# Patient Record
Sex: MALE | Race: WHITE | Employment: FULL TIME | URBAN - METROPOLITAN AREA
[De-identification: names, ages, dates, MRNs, and addresses within clinical notes are randomized per-mention and may not be internally consistent; named-entity substitution may affect disease eponyms.]

---

## 2018-02-15 PROBLEM — M17.0 OSTEOARTHRITIS OF BOTH KNEES: Status: ACTIVE | Noted: 2018-02-15

## 2018-02-15 PROBLEM — I35.0 AORTIC VALVE STENOSIS: Status: ACTIVE | Noted: 2018-02-15

## 2018-03-16 ENCOUNTER — OFFICE VISIT (OUTPATIENT)
Dept: FAMILY MEDICINE CLINIC | Age: 58
End: 2018-03-16
Payer: COMMERCIAL

## 2018-03-16 VITALS
HEART RATE: 80 BPM | HEIGHT: 71 IN | WEIGHT: 312.8 LBS | TEMPERATURE: 97.9 F | DIASTOLIC BLOOD PRESSURE: 88 MMHG | OXYGEN SATURATION: 98 % | SYSTOLIC BLOOD PRESSURE: 138 MMHG | BODY MASS INDEX: 43.79 KG/M2

## 2018-03-16 DIAGNOSIS — I10 ESSENTIAL HYPERTENSION: ICD-10-CM

## 2018-03-16 DIAGNOSIS — M17.0 OSTEOARTHRITIS OF BOTH KNEES, UNSPECIFIED OSTEOARTHRITIS TYPE: ICD-10-CM

## 2018-03-16 DIAGNOSIS — E78.5 HYPERLIPIDEMIA, UNSPECIFIED HYPERLIPIDEMIA TYPE: ICD-10-CM

## 2018-03-16 DIAGNOSIS — E11.9 TYPE 2 DIABETES MELLITUS WITHOUT COMPLICATION, WITHOUT LONG-TERM CURRENT USE OF INSULIN (HCC): Primary | ICD-10-CM

## 2018-03-16 DIAGNOSIS — F17.200 SMOKER: ICD-10-CM

## 2018-03-16 DIAGNOSIS — N52.9 ERECTILE DYSFUNCTION, UNSPECIFIED ERECTILE DYSFUNCTION TYPE: ICD-10-CM

## 2018-03-16 PROCEDURE — 99214 OFFICE O/P EST MOD 30 MIN: CPT | Performed by: PHYSICIAN ASSISTANT

## 2018-03-16 PROCEDURE — 3017F COLORECTAL CA SCREEN DOC REV: CPT | Performed by: PHYSICIAN ASSISTANT

## 2018-03-16 PROCEDURE — G8417 CALC BMI ABV UP PARAM F/U: HCPCS | Performed by: PHYSICIAN ASSISTANT

## 2018-03-16 PROCEDURE — 4004F PT TOBACCO SCREEN RCVD TLK: CPT | Performed by: PHYSICIAN ASSISTANT

## 2018-03-16 PROCEDURE — G8427 DOCREV CUR MEDS BY ELIG CLIN: HCPCS | Performed by: PHYSICIAN ASSISTANT

## 2018-03-16 PROCEDURE — G8482 FLU IMMUNIZE ORDER/ADMIN: HCPCS | Performed by: PHYSICIAN ASSISTANT

## 2018-03-16 PROCEDURE — 3044F HG A1C LEVEL LT 7.0%: CPT | Performed by: PHYSICIAN ASSISTANT

## 2018-03-16 PROCEDURE — G8598 ASA/ANTIPLAT THER USED: HCPCS | Performed by: PHYSICIAN ASSISTANT

## 2018-03-16 RX ORDER — PIOGLITAZONEHYDROCHLORIDE 15 MG/1
15 TABLET ORAL DAILY
Qty: 30 TABLET | Refills: 2 | Status: SHIPPED | OUTPATIENT
Start: 2018-03-16 | End: 2018-08-17 | Stop reason: SDUPTHER

## 2018-03-16 RX ORDER — FENOFIBRATE 145 MG/1
145 TABLET, COATED ORAL DAILY
Qty: 30 TABLET | Refills: 2 | Status: SHIPPED | OUTPATIENT
Start: 2018-03-16 | End: 2018-04-05 | Stop reason: ALTCHOICE

## 2018-03-16 RX ORDER — BUPROPION HYDROCHLORIDE 150 MG/1
150 TABLET ORAL EVERY MORNING
Qty: 30 TABLET | Refills: 2 | Status: SHIPPED | OUTPATIENT
Start: 2018-03-16 | End: 2018-06-18 | Stop reason: SDUPTHER

## 2018-03-16 RX ORDER — DICLOFENAC SODIUM 75 MG/1
75 TABLET, DELAYED RELEASE ORAL 2 TIMES DAILY
Qty: 60 TABLET | Refills: 2 | Status: SHIPPED | OUTPATIENT
Start: 2018-03-16 | End: 2018-08-05 | Stop reason: SDUPTHER

## 2018-03-16 RX ORDER — CYCLOBENZAPRINE HCL 10 MG
10 TABLET ORAL DAILY
Qty: 30 TABLET | Refills: 2 | Status: SHIPPED | OUTPATIENT
Start: 2018-03-16 | End: 2018-07-05 | Stop reason: SDUPTHER

## 2018-03-16 RX ORDER — SILDENAFIL 50 MG/1
50 TABLET, FILM COATED ORAL PRN
Qty: 10 TABLET | Refills: 5 | Status: SHIPPED | OUTPATIENT
Start: 2018-03-16 | End: 2018-04-05

## 2018-03-16 NOTE — PROGRESS NOTES
[employer]  [address] [facility], [address] [place] [address] N   [phone]      Therese Nicholson  1960  3/16/18      HPI:  Patient presents with hx of well controlled DM2, HTN, CAD, AS and DJD. Darrel Domínguez He is well know to Dr Jana Leigh, recently seen in Dec. He takes all meds as directed. He was taking his metformin 4x day, but changed to 2x bc of gi upset. His a1c was 5.9. He had DJD in bilat knees. He is s/p replacement of the left. He continues to have pain in the right, which he uses tramadol for. He recently recived this from Dr Sravanthi Sadler. hebrings in the disc of his mri, which I cant view. He was recommended to have replacement by Dr Carlos Schulte in the past, but he is the only income in his household, therefore is unable to do this right now. He is using the welbutrin to assist with smoking cessation. His quit date is next Saturday. He denies cp, le edema or sob. He asks for viagra samples. He has never used the ntg yet. Past Medical History:   Diagnosis Date    CAD (coronary artery disease)     Depression     Diabetes mellitus ([address]     DJD (degenerative joint disease)     Knees.  Dyslipidemia     Hiatal hernia 1979    History of ST elevation myocardial infarction (STEMI)     Hypertension     MI (myocardial infarction) 1/2007    Inferior wall.  Presence of stent in left circumflex coronary artery     Presence of stent in right coronary artery     Smoker         Past Surgical History:   Procedure Laterality Date    CORONARY ANGIOPLASTY WITH STENT PLACEMENT  X4    CORONARY ANGIOPLASTY WITH STENT PLACEMENT  12/30/2011    Dr. Jana Leigh 1st OM 3.0 x 20 Ion    DIAGNOSTIC CARDIAC CATH LAB PROCEDURE  3/15/2005    [facility]. Mild to moderate CAD. Normal LV.  DIAGNOSTIC CARDIAC CATH LAB PROCEDURE  1/16/2007    [facility]. Cath/PTCA/DE stent of proximal and distal RCA.  DIAGNOSTIC CARDIAC CATH LAB PROCEDURE  2/21/2007    Cath/DE stent of proximal OM1 and proximal Cx.     DIAGNOSTIC CARDIAC CATH LAB PROCEDURE type  -     cyclobenzaprine (FLEXERIL) 10 MG tablet; Take 1 tablet by mouth daily  -     diclofenac (VOLTAREN) 75 MG EC tablet; Take 1 tablet by mouth 2 times daily  -     DO Tori    Smoker  -     buPROPion (WELLBUTRIN XL) 150 MG extended release tablet; Take 1 tablet by mouth every morning    Essential hypertension    Erectile dysfunction, unspecified erectile dysfunction type  -     sildenafil (VIAGRA) 50 MG tablet; Take 1 tablet by mouth as needed for Erectile Dysfunction        Return in about 3 months (around 6/16/2018) for DM2. dm2 is stable. a1c is 5.9. There is no need for qid metformin, will continue bid. Cholesterol was at goal with trico. Will request an actual report for the mri. Send to ortho for options. He is working on smoking cessation, encouraged today. htn is stable. Given a card for discount of viagra.      TRUDY Yung

## 2018-03-20 ENCOUNTER — TELEPHONE (OUTPATIENT)
Dept: FAMILY MEDICINE CLINIC | Age: 58
End: 2018-03-20

## 2018-03-20 RX ORDER — SIMVASTATIN 10 MG
10 TABLET ORAL EVERY EVENING
Qty: 30 TABLET | Refills: 3 | Status: SHIPPED | OUTPATIENT
Start: 2018-03-20 | End: 2018-04-05 | Stop reason: CLARIF

## 2018-04-03 DIAGNOSIS — R52 PAIN: Primary | ICD-10-CM

## 2018-04-05 ENCOUNTER — OFFICE VISIT (OUTPATIENT)
Dept: ORTHOPEDIC SURGERY | Age: 58
End: 2018-04-05
Payer: COMMERCIAL

## 2018-04-05 ENCOUNTER — HOSPITAL ENCOUNTER (OUTPATIENT)
Dept: GENERAL RADIOLOGY | Age: 58
Discharge: HOME | End: 2018-04-07
Payer: COMMERCIAL

## 2018-04-05 VITALS
HEIGHT: 71 IN | TEMPERATURE: 97.9 F | SYSTOLIC BLOOD PRESSURE: 143 MMHG | HEART RATE: 79 BPM | RESPIRATION RATE: 20 BRPM | BODY MASS INDEX: 43.68 KG/M2 | DIASTOLIC BLOOD PRESSURE: 95 MMHG | WEIGHT: 312 LBS

## 2018-04-05 DIAGNOSIS — Z96.652 STATUS POST TOTAL LEFT KNEE REPLACEMENT: ICD-10-CM

## 2018-04-05 DIAGNOSIS — M17.11 PRIMARY OSTEOARTHRITIS OF RIGHT KNEE: Primary | ICD-10-CM

## 2018-04-05 DIAGNOSIS — M17.0 OSTEOARTHRITIS OF BOTH KNEES, UNSPECIFIED OSTEOARTHRITIS TYPE: ICD-10-CM

## 2018-04-05 PROCEDURE — G8598 ASA/ANTIPLAT THER USED: HCPCS | Performed by: NURSE PRACTITIONER

## 2018-04-05 PROCEDURE — 4004F PT TOBACCO SCREEN RCVD TLK: CPT | Performed by: NURSE PRACTITIONER

## 2018-04-05 PROCEDURE — 73560 X-RAY EXAM OF KNEE 1 OR 2: CPT

## 2018-04-05 PROCEDURE — 99203 OFFICE O/P NEW LOW 30 MIN: CPT | Performed by: NURSE PRACTITIONER

## 2018-04-05 PROCEDURE — 3017F COLORECTAL CA SCREEN DOC REV: CPT | Performed by: NURSE PRACTITIONER

## 2018-04-05 PROCEDURE — 99203 OFFICE O/P NEW LOW 30 MIN: CPT

## 2018-04-05 PROCEDURE — G8417 CALC BMI ABV UP PARAM F/U: HCPCS | Performed by: NURSE PRACTITIONER

## 2018-04-05 PROCEDURE — G8427 DOCREV CUR MEDS BY ELIG CLIN: HCPCS | Performed by: NURSE PRACTITIONER

## 2018-04-05 RX ORDER — FENOFIBRIC ACID 135 MG/1
135 CAPSULE, DELAYED RELEASE ORAL DAILY
COMMUNITY
End: 2018-04-05 | Stop reason: ALTCHOICE

## 2018-04-13 ENCOUNTER — HOSPITAL ENCOUNTER (OUTPATIENT)
Dept: PHYSICAL THERAPY | Age: 58
Setting detail: THERAPIES SERIES
Discharge: HOME | End: 2018-04-13
Payer: COMMERCIAL

## 2018-04-13 PROCEDURE — G8978 MOBILITY CURRENT STATUS: HCPCS | Performed by: PHYSICAL THERAPIST

## 2018-04-13 PROCEDURE — G8979 MOBILITY GOAL STATUS: HCPCS | Performed by: PHYSICAL THERAPIST

## 2018-04-13 PROCEDURE — 97161 PT EVAL LOW COMPLEX 20 MIN: CPT | Performed by: PHYSICAL THERAPIST

## 2018-04-13 ASSESSMENT — PAIN SCALES - GENERAL: PAINLEVEL_OUTOF10: 8

## 2018-04-13 ASSESSMENT — PAIN DESCRIPTION - ORIENTATION: ORIENTATION: RIGHT

## 2018-04-13 ASSESSMENT — PAIN DESCRIPTION - DESCRIPTORS: DESCRIPTORS: ACHING

## 2018-04-13 ASSESSMENT — PAIN DESCRIPTION - PAIN TYPE: TYPE: CHRONIC PAIN

## 2018-04-13 ASSESSMENT — PAIN DESCRIPTION - LOCATION: LOCATION: KNEE

## 2018-04-16 ENCOUNTER — OFFICE VISIT (OUTPATIENT)
Dept: FAMILY MEDICINE CLINIC | Age: 58
End: 2018-04-16
Payer: COMMERCIAL

## 2018-04-16 VITALS
SYSTOLIC BLOOD PRESSURE: 136 MMHG | WEIGHT: 314.4 LBS | HEIGHT: 71 IN | HEART RATE: 76 BPM | DIASTOLIC BLOOD PRESSURE: 88 MMHG | BODY MASS INDEX: 44.02 KG/M2 | TEMPERATURE: 98.2 F | OXYGEN SATURATION: 97 %

## 2018-04-16 DIAGNOSIS — I10 ESSENTIAL HYPERTENSION: ICD-10-CM

## 2018-04-16 DIAGNOSIS — F17.200 SMOKER: ICD-10-CM

## 2018-04-16 DIAGNOSIS — M17.0 OSTEOARTHRITIS OF BOTH KNEES, UNSPECIFIED OSTEOARTHRITIS TYPE: Primary | ICD-10-CM

## 2018-04-16 PROCEDURE — G8427 DOCREV CUR MEDS BY ELIG CLIN: HCPCS | Performed by: PHYSICIAN ASSISTANT

## 2018-04-16 PROCEDURE — 4004F PT TOBACCO SCREEN RCVD TLK: CPT | Performed by: PHYSICIAN ASSISTANT

## 2018-04-16 PROCEDURE — 99213 OFFICE O/P EST LOW 20 MIN: CPT | Performed by: PHYSICIAN ASSISTANT

## 2018-04-16 PROCEDURE — 3017F COLORECTAL CA SCREEN DOC REV: CPT | Performed by: PHYSICIAN ASSISTANT

## 2018-04-16 PROCEDURE — G8417 CALC BMI ABV UP PARAM F/U: HCPCS | Performed by: PHYSICIAN ASSISTANT

## 2018-04-16 PROCEDURE — G8598 ASA/ANTIPLAT THER USED: HCPCS | Performed by: PHYSICIAN ASSISTANT

## 2018-04-16 RX ORDER — TRAMADOL HYDROCHLORIDE 50 MG/1
50 TABLET ORAL EVERY 6 HOURS PRN
Qty: 120 TABLET | Refills: 0 | Status: SHIPPED | OUTPATIENT
Start: 2018-04-16 | End: 2018-05-16

## 2018-04-16 RX ORDER — LIDOCAINE AND PRILOCAINE 25; 25 MG/G; MG/G
1 CREAM TOPICAL 2 TIMES DAILY
COMMUNITY
Start: 2018-04-11 | End: 2018-11-21 | Stop reason: ALTCHOICE

## 2018-05-31 ENCOUNTER — OFFICE VISIT (OUTPATIENT)
Dept: ORTHOPEDIC SURGERY | Age: 58
End: 2018-05-31
Payer: COMMERCIAL

## 2018-05-31 VITALS
WEIGHT: 315 LBS | DIASTOLIC BLOOD PRESSURE: 97 MMHG | TEMPERATURE: 97.4 F | RESPIRATION RATE: 18 BRPM | SYSTOLIC BLOOD PRESSURE: 144 MMHG | HEART RATE: 82 BPM | HEIGHT: 71 IN | BODY MASS INDEX: 44.1 KG/M2

## 2018-05-31 DIAGNOSIS — M17.11 PRIMARY OSTEOARTHRITIS OF RIGHT KNEE: Primary | ICD-10-CM

## 2018-05-31 PROCEDURE — G8427 DOCREV CUR MEDS BY ELIG CLIN: HCPCS | Performed by: ORTHOPAEDIC SURGERY

## 2018-05-31 PROCEDURE — 4004F PT TOBACCO SCREEN RCVD TLK: CPT | Performed by: ORTHOPAEDIC SURGERY

## 2018-05-31 PROCEDURE — 3017F COLORECTAL CA SCREEN DOC REV: CPT | Performed by: ORTHOPAEDIC SURGERY

## 2018-05-31 PROCEDURE — G8417 CALC BMI ABV UP PARAM F/U: HCPCS | Performed by: ORTHOPAEDIC SURGERY

## 2018-05-31 PROCEDURE — G8598 ASA/ANTIPLAT THER USED: HCPCS | Performed by: ORTHOPAEDIC SURGERY

## 2018-05-31 PROCEDURE — 99212 OFFICE O/P EST SF 10 MIN: CPT | Performed by: ORTHOPAEDIC SURGERY

## 2018-05-31 PROCEDURE — 99213 OFFICE O/P EST LOW 20 MIN: CPT | Performed by: ORTHOPAEDIC SURGERY

## 2018-05-31 RX ORDER — HYDROCODONE BITARTRATE AND ACETAMINOPHEN 5; 325 MG/1; MG/1
1 TABLET ORAL EVERY 6 HOURS PRN
Qty: 28 TABLET | Refills: 0 | Status: SHIPPED | OUTPATIENT
Start: 2018-05-31 | End: 2018-06-07

## 2018-06-13 ENCOUNTER — TELEPHONE (OUTPATIENT)
Dept: ORTHOPEDIC SURGERY | Age: 58
End: 2018-06-13

## 2018-06-18 ENCOUNTER — OFFICE VISIT (OUTPATIENT)
Dept: FAMILY MEDICINE CLINIC | Age: 58
End: 2018-06-18
Payer: COMMERCIAL

## 2018-06-18 VITALS
WEIGHT: 314 LBS | BODY MASS INDEX: 43.96 KG/M2 | SYSTOLIC BLOOD PRESSURE: 142 MMHG | HEIGHT: 71 IN | OXYGEN SATURATION: 96 % | HEART RATE: 75 BPM | RESPIRATION RATE: 16 BRPM | DIASTOLIC BLOOD PRESSURE: 94 MMHG

## 2018-06-18 DIAGNOSIS — E11.9 TYPE 2 DIABETES MELLITUS WITHOUT COMPLICATION, WITHOUT LONG-TERM CURRENT USE OF INSULIN (HCC): Primary | ICD-10-CM

## 2018-06-18 DIAGNOSIS — I10 ESSENTIAL HYPERTENSION: ICD-10-CM

## 2018-06-18 DIAGNOSIS — E78.5 HYPERLIPIDEMIA, UNSPECIFIED HYPERLIPIDEMIA TYPE: ICD-10-CM

## 2018-06-18 DIAGNOSIS — G89.4 CHRONIC PAIN SYNDROME: ICD-10-CM

## 2018-06-18 DIAGNOSIS — M17.11 PRIMARY OSTEOARTHRITIS OF RIGHT KNEE: ICD-10-CM

## 2018-06-18 DIAGNOSIS — F17.200 SMOKER: ICD-10-CM

## 2018-06-18 LAB — HBA1C MFR BLD: 6.2 %

## 2018-06-18 PROCEDURE — 4004F PT TOBACCO SCREEN RCVD TLK: CPT | Performed by: PHYSICIAN ASSISTANT

## 2018-06-18 PROCEDURE — G8427 DOCREV CUR MEDS BY ELIG CLIN: HCPCS | Performed by: PHYSICIAN ASSISTANT

## 2018-06-18 PROCEDURE — G8598 ASA/ANTIPLAT THER USED: HCPCS | Performed by: PHYSICIAN ASSISTANT

## 2018-06-18 PROCEDURE — 3017F COLORECTAL CA SCREEN DOC REV: CPT | Performed by: PHYSICIAN ASSISTANT

## 2018-06-18 PROCEDURE — 83036 HEMOGLOBIN GLYCOSYLATED A1C: CPT | Performed by: PHYSICIAN ASSISTANT

## 2018-06-18 PROCEDURE — G8417 CALC BMI ABV UP PARAM F/U: HCPCS | Performed by: PHYSICIAN ASSISTANT

## 2018-06-18 PROCEDURE — 3044F HG A1C LEVEL LT 7.0%: CPT | Performed by: PHYSICIAN ASSISTANT

## 2018-06-18 PROCEDURE — 2022F DILAT RTA XM EVC RTNOPTHY: CPT | Performed by: PHYSICIAN ASSISTANT

## 2018-06-18 PROCEDURE — 99214 OFFICE O/P EST MOD 30 MIN: CPT | Performed by: PHYSICIAN ASSISTANT

## 2018-06-18 RX ORDER — TRIAMTERENE AND HYDROCHLOROTHIAZIDE 37.5; 25 MG/1; MG/1
1 TABLET ORAL DAILY
Qty: 90 TABLET | Refills: 3 | Status: SHIPPED | OUTPATIENT
Start: 2018-06-18 | End: 2019-03-14 | Stop reason: SDUPTHER

## 2018-06-18 RX ORDER — QUINAPRIL 40 MG/1
40 TABLET ORAL DAILY
Qty: 30 TABLET | Refills: 3 | Status: SHIPPED | OUTPATIENT
Start: 2018-06-18 | End: 2018-12-03 | Stop reason: SDUPTHER

## 2018-06-18 RX ORDER — HYDROCODONE BITARTRATE AND ACETAMINOPHEN 5; 325 MG/1; MG/1
1 TABLET ORAL 2 TIMES DAILY
Qty: 60 TABLET | Refills: 0 | Status: SHIPPED | OUTPATIENT
Start: 2018-06-18 | End: 2018-07-24 | Stop reason: SDUPTHER

## 2018-06-18 RX ORDER — CARVEDILOL 25 MG/1
TABLET ORAL
Qty: 180 TABLET | Refills: 3 | Status: SHIPPED | OUTPATIENT
Start: 2018-06-18 | End: 2018-10-22 | Stop reason: SDUPTHER

## 2018-06-18 RX ORDER — ATORVASTATIN CALCIUM 80 MG/1
TABLET, FILM COATED ORAL
Qty: 90 TABLET | Refills: 3 | Status: SHIPPED | OUTPATIENT
Start: 2018-06-18 | End: 2018-10-22 | Stop reason: SDUPTHER

## 2018-06-18 RX ORDER — BUPROPION HYDROCHLORIDE 150 MG/1
150 TABLET ORAL EVERY MORNING
Qty: 30 TABLET | Refills: 3 | Status: SHIPPED | OUTPATIENT
Start: 2018-06-18 | End: 2018-10-22 | Stop reason: SDUPTHER

## 2018-07-05 DIAGNOSIS — M17.0 OSTEOARTHRITIS OF BOTH KNEES, UNSPECIFIED OSTEOARTHRITIS TYPE: ICD-10-CM

## 2018-07-06 RX ORDER — CYCLOBENZAPRINE HCL 10 MG
10 TABLET ORAL DAILY
Qty: 30 TABLET | Refills: 2 | Status: SHIPPED | OUTPATIENT
Start: 2018-07-06 | End: 2018-10-22 | Stop reason: SDUPTHER

## 2018-07-24 ENCOUNTER — OFFICE VISIT (OUTPATIENT)
Dept: FAMILY MEDICINE CLINIC | Age: 58
End: 2018-07-24
Payer: COMMERCIAL

## 2018-07-24 ENCOUNTER — HOSPITAL ENCOUNTER (OUTPATIENT)
Age: 58
Discharge: HOME | End: 2018-07-26
Payer: COMMERCIAL

## 2018-07-24 VITALS
BODY MASS INDEX: 43.73 KG/M2 | OXYGEN SATURATION: 97 % | DIASTOLIC BLOOD PRESSURE: 86 MMHG | HEART RATE: 77 BPM | WEIGHT: 312.4 LBS | HEIGHT: 71 IN | SYSTOLIC BLOOD PRESSURE: 134 MMHG

## 2018-07-24 DIAGNOSIS — M17.11 PRIMARY OSTEOARTHRITIS OF RIGHT KNEE: ICD-10-CM

## 2018-07-24 DIAGNOSIS — G89.4 CHRONIC PAIN SYNDROME: ICD-10-CM

## 2018-07-24 DIAGNOSIS — Z23 NEED FOR PROPHYLACTIC VACCINATION AND INOCULATION AGAINST VARICELLA: ICD-10-CM

## 2018-07-24 DIAGNOSIS — Z23 NEED FOR PROPHYLACTIC VACCINATION AGAINST DIPHTHERIA-TETANUS-PERTUSSIS (DTP): ICD-10-CM

## 2018-07-24 LAB
AMPHETAMINE SCREEN, URINE: NOT DETECTED
BARBITURATE SCREEN URINE: NOT DETECTED
BENZODIAZEPINE SCREEN, URINE: NOT DETECTED
CANNABINOID SCREEN URINE: NOT DETECTED
COCAINE METABOLITE SCREEN URINE: NOT DETECTED
METHADONE SCREEN, URINE: NOT DETECTED
OPIATE SCREEN URINE: NOT DETECTED
PHENCYCLIDINE SCREEN URINE: NOT DETECTED
PROPOXYPHENE SCREEN: NOT DETECTED

## 2018-07-24 PROCEDURE — G0480 DRUG TEST DEF 1-7 CLASSES: HCPCS

## 2018-07-24 PROCEDURE — 99213 OFFICE O/P EST LOW 20 MIN: CPT | Performed by: PHYSICIAN ASSISTANT

## 2018-07-24 PROCEDURE — G8417 CALC BMI ABV UP PARAM F/U: HCPCS | Performed by: PHYSICIAN ASSISTANT

## 2018-07-24 PROCEDURE — 3017F COLORECTAL CA SCREEN DOC REV: CPT | Performed by: PHYSICIAN ASSISTANT

## 2018-07-24 PROCEDURE — 4004F PT TOBACCO SCREEN RCVD TLK: CPT | Performed by: PHYSICIAN ASSISTANT

## 2018-07-24 PROCEDURE — G8598 ASA/ANTIPLAT THER USED: HCPCS | Performed by: PHYSICIAN ASSISTANT

## 2018-07-24 PROCEDURE — G8427 DOCREV CUR MEDS BY ELIG CLIN: HCPCS | Performed by: PHYSICIAN ASSISTANT

## 2018-07-24 PROCEDURE — 80307 DRUG TEST PRSMV CHEM ANLYZR: CPT

## 2018-07-24 PROCEDURE — 90471 IMMUNIZATION ADMIN: CPT | Performed by: PHYSICIAN ASSISTANT

## 2018-07-24 PROCEDURE — 90715 TDAP VACCINE 7 YRS/> IM: CPT | Performed by: PHYSICIAN ASSISTANT

## 2018-07-24 RX ORDER — HYDROCODONE BITARTRATE AND ACETAMINOPHEN 5; 325 MG/1; MG/1
1 TABLET ORAL 2 TIMES DAILY
Qty: 60 TABLET | Refills: 0 | Status: SHIPPED | OUTPATIENT
Start: 2018-07-24 | End: 2018-08-23 | Stop reason: SDUPTHER

## 2018-07-24 NOTE — PROGRESS NOTES
[employer]  [address] [facility], [address] [place] [address] [place]   [phone]      Emily White  1960 7/24/18      HPI:  Patient presents for medication refill for knee pain that is chronic. He is a patient of Dr Brandy Barksdale, who recommended joint replacement. He is unable to do this bc he is the only income in his household. He is hopeful that he can do it in Oct. In the meantime, he has been set up with Pain Mgnt. He will be seeing them next month. He has been out of his Norco for 1.5 weeks. Past Medical History:   Diagnosis Date    Anticoagulant long-term use     CAD (coronary artery disease)     Chronic back pain     Depression     Diabetes mellitus (HCC)     DJD (degenerative joint disease)     Knees.  Dyslipidemia     Hiatal hernia 1979    History of ST elevation myocardial infarction (STEMI)     Hyperlipidemia     Hypertension     MI (myocardial infarction) 1/2007    Inferior wall.  Obesity     Presence of stent in left circumflex coronary artery     Presence of stent in right coronary artery     Sleep apnea     cpap    Smoker     Type 2 diabetes mellitus without complication ([facility]         Past Surgical History:   Procedure Laterality Date    CORONARY ANGIOPLASTY WITH STENT PLACEMENT  X4    CORONARY ANGIOPLASTY WITH STENT PLACEMENT  12/30/2011    Dr. Mauro Reyes 1st OM 3.0 x 20 Ion    DIAGNOSTIC CARDIAC CATH LAB PROCEDURE  3/15/2005    [facility]. Mild to moderate CAD. Normal LV.  DIAGNOSTIC CARDIAC CATH LAB PROCEDURE  1/16/2007    [facility]. Cath/PTCA/DE stent of proximal and distal RCA.  DIAGNOSTIC CARDIAC CATH LAB PROCEDURE  2/21/2007    Cath/DE stent of proximal OM1 and proximal Cx.  DIAGNOSTIC CARDIAC CATH LAB PROCEDURE  12/30/2011    ANURADHA of mid OM branch of circumflex.      ECHO COMPL W DOP COLOR FLOW  12/30/2011         HERNIA REPAIR  2/2014    laparoscopic ventral hernia repain    JOINT REPLACEMENT Left 4/1/14    TKA-ed       Current Outpatient Prescriptions Medication Sig Dispense Refill    HYDROcodone-acetaminophen (NORCO) 5-325 MG per tablet Take 1 tablet by mouth 2 times daily for 60 days. Re:OA, planned joint replacement. Earliest Fill Date: 7/24/18 60 tablet 0    zoster recombinant adjuvanted vaccine (SHINGRIX) 50 MCG SUSR injection Inject 0.5 mLs into the muscle once for 1 dose 50 MCG IM then repeat 2-6 months.  1 each 1    cyclobenzaprine (FLEXERIL) 10 MG tablet TAKE 1 TABLET BY MOUTH DAILY 30 tablet 2    metFORMIN (GLUCOPHAGE) 500 MG tablet Take 1 tablet by mouth 2 times daily (with meals) 60 tablet 3    atorvastatin (LIPITOR) 80 MG tablet TAKE 1 TABLET BY MOUTH AT BEDTIME 90 tablet 3    carvedilol (COREG) 25 MG tablet TAKE ONE TABLET BY MOUTH TWICE DAILY (WITH MEALS) 180 tablet 3    triamterene-hydrochlorothiazide (MAXZIDE-25) 37.5-25 MG per tablet Take 1 tablet by mouth daily 90 tablet 3    quinapril (ACCUPRIL) 40 MG tablet Take 1 tablet by mouth daily 30 tablet 3    buPROPion (WELLBUTRIN XL) 150 MG extended release tablet Take 1 tablet by mouth every morning 30 tablet 3    niacin (NIASPAN) 500 MG extended release tablet TAKE 1 TABLET BY MOUTH EVERY EVENING 30 tablet 2    lidocaine-prilocaine (EMLA) 2.5-2.5 % cream Apply 1 Dose topically 2 times daily      diclofenac (VOLTAREN) 75 MG EC tablet Take 1 tablet by mouth 2 times daily 60 tablet 2    pioglitazone (ACTOS) 15 MG tablet Take 1 tablet by mouth daily 30 tablet 2    nitroGLYCERIN (NITROSTAT) 0.4 MG SL tablet Place 1 tablet under the tongue as needed for Chest pain 25 tablet 2    Omega-3-Acid Eth Est, Dietary, 1 g CAPS Take 1 capsule by mouth 2 times daily 120 capsule 3    albuterol sulfate HFA (VENTOLIN HFA) 108 (90 Base) MCG/ACT inhaler Inhale 2 puffs into the lungs every 4 hours as needed for Wheezing 1 Inhaler 0    loratadine (CLARITIN) 10 MG tablet Take 1 tablet by mouth daily 30 tablet 3    Blood Pressure Monitoring (BLOOD PRESSURE CUFF) MISC 1 Device by Does not apply route 2 times daily Dx:  Hypertension with labile blood pressure 1 each 0    clopidogrel (PLAVIX) 75 MG tablet TAKE 1 TABLET BY MOUTH EVERY DAY 90 tablet 3    aspirin 325 MG EC tablet Take 81 mg by mouth daily.  nitroGLYCERIN (NITROSTAT) 0.4 MG SL tablet Place 1 tablet under the tongue as needed. 25 tablet 3     No current facility-administered medications for this visit. Allergies   Allergen Reactions    Bee Venom        Family History   Problem Relation Age of Onset    Diabetes Mother     Stroke Mother     Diabetes Father     Heart Disease Father        Social History     Social History    Marital status:      Spouse name: N/A    Number of children: N/A    Years of education: N/A     Occupational History    Not on file.      Social History Main Topics    Smoking status: Current Every Day Smoker     Packs/day: 0.15     Years: 39.00     Types: Cigarettes    Smokeless tobacco: Never Used    Alcohol use No    Drug use: No    Sexual activity: Not on file     Other Topics Concern    Not on file     Social History Narrative    No narrative on file       Review of Systems :  Constitutional: negative for - chills, fever, weight loss, or fatigue  Psychological: negative for - anxiety, depression or suicidal ideation  HEENT: negative for - vision changes, nasal congestion, ear pain or pharyngitis   Respiratory: negative for -  Chest heaviness, cough, shortness of breath, or pleuritic pain  Cardiovascular: negative for - diaphoresis, chest pain, palpitations, or edema   Gastrointestinal: negative for -abdominal pain, change in bowel habits, constipation, diarrhea, or nausea/vomiting  Genito-Urinary: negative for - dysuria, frequency, or nocturia  Neurological: negative for - bowel and bladder control changes, dizziness, or headaches   Dermatological: negative for - dry skin, rash, hair or nail symptoms    Physical Exam:   Vitals:    07/24/18 0751   BP: 134/86   Site: Right Arm   Position: Sitting Cuff Size: Large Adult   Pulse: 77   SpO2: 97%   Weight: (!) 312 lb 6.4 oz (141.7 kg)   Height: 5' 11\" (1.803 m)     Physical Exam   Constitutional: He is oriented to person, place, and time. He appears well-developed and well-nourished. No distress. HENT:   Head: Normocephalic and atraumatic. Right Ear: External ear normal.   Left Ear: External ear normal.   Nose: Nose normal.   Mouth/Throat: Oropharynx is clear and moist.   Eyes: Conjunctivae and EOM are normal. Pupils are equal, round, and reactive to light. No scleral icterus. Neck: Normal range of motion. Neck supple. Cardiovascular: Normal rate, regular rhythm, normal heart sounds and intact distal pulses. No murmur heard. Pulmonary/Chest: Effort normal and breath sounds normal. No accessory muscle usage. No respiratory distress. He has no wheezes. Musculoskeletal: Normal range of motion. Neurological: He is alert and oriented to person, place, and time. Skin: Skin is warm and dry. No rash noted. Psychiatric: He has a normal mood and affect. His speech is normal and behavior is normal.         Assessment/Plan:     Morro Pacheco was seen today for knee pain and medication refill. Diagnoses and all orders for this visit:    Primary osteoarthritis of right knee  -     HYDROcodone-acetaminophen (NORCO) 5-325 MG per tablet; Take 1 tablet by mouth 2 times daily for 60 days. Re:OA, planned joint replacement. Earliest Fill Date: 7/24/18    Chronic pain syndrome  -     HYDROcodone-acetaminophen (NORCO) 5-325 MG per tablet; Take 1 tablet by mouth 2 times daily for 60 days. Re:OA, planned joint replacement. Earliest Fill Date: 7/24/18    Need for prophylactic vaccination against diphtheria-tetanus-pertussis (DTP)  -     Tdap (age 10y-63y) IM (Adacel)    Need for prophylactic vaccination and inoculation against varicella  -     zoster recombinant adjuvanted vaccine (SHINGRIX) 50 MCG SUSR injection;  Inject 0.5 mLs into the muscle once for 1 dose 50 MCG IM then repeat 2-6 months. Return in about 4 weeks (around 8/21/2018). he has an apt with pain mgnt next month. He has been out og his Norco for > 1 week. Plan for joint replacement in Oct. UDS sent. Controlled Substances Monitoring:     RX Monitoring 7/24/2018   Attestation The Prescription Monitoring Report for this patient was reviewed today. Documentation No signs of potential drug abuse or diversion identified. ;Random urine drug screen sent today. Acute Pain Prescriptions Severe pain not adequately treated with lower dose. Chronic Pain Dose reduction has been attempted. Medication Contracts Existing medication contract.        TRUDY Rubin

## 2018-07-27 ENCOUNTER — OFFICE VISIT (OUTPATIENT)
Dept: PAIN MANAGEMENT | Age: 58
End: 2018-07-27
Payer: COMMERCIAL

## 2018-07-27 ENCOUNTER — HOSPITAL ENCOUNTER (OUTPATIENT)
Age: 58
Discharge: HOME | End: 2018-07-29
Payer: COMMERCIAL

## 2018-07-27 VITALS
HEART RATE: 88 BPM | SYSTOLIC BLOOD PRESSURE: 140 MMHG | TEMPERATURE: 98.5 F | DIASTOLIC BLOOD PRESSURE: 82 MMHG | HEIGHT: 71 IN | RESPIRATION RATE: 20 BRPM | WEIGHT: 311 LBS | BODY MASS INDEX: 43.54 KG/M2

## 2018-07-27 DIAGNOSIS — M17.11 PRIMARY OSTEOARTHRITIS OF RIGHT KNEE: ICD-10-CM

## 2018-07-27 DIAGNOSIS — M25.561 CHRONIC PAIN OF RIGHT KNEE: ICD-10-CM

## 2018-07-27 DIAGNOSIS — G89.4 CHRONIC PAIN SYNDROME: Primary | ICD-10-CM

## 2018-07-27 DIAGNOSIS — G89.29 CHRONIC PAIN OF RIGHT KNEE: ICD-10-CM

## 2018-07-27 LAB — SPECIFIC GRAVITY UA: 1.02 (ref 1–1.03)

## 2018-07-27 PROCEDURE — 80307 DRUG TEST PRSMV CHEM ANLYZR: CPT

## 2018-07-27 PROCEDURE — 99204 OFFICE O/P NEW MOD 45 MIN: CPT | Performed by: PAIN MEDICINE

## 2018-07-27 PROCEDURE — G0480 DRUG TEST DEF 1-7 CLASSES: HCPCS

## 2018-07-27 PROCEDURE — 81005 URINALYSIS: CPT

## 2018-07-29 LAB
6AM URINE: <10 NG/ML
CODEINE, URINE: <20 NG/ML
HYDROCODONE, URINE: <20 NG/ML
HYDROMORPHONE, URINE: <20 NG/ML
MORPHINE URINE: <20 NG/ML
NORHYDROCODONE, URINE: <20 NG/ML
NOROXYCODONE, URINE: 715 NG/ML
NOROXYMORPHONE, URINE: <20 NG/ML
OXYCODONE, URINE CONFIRMATION: 133 NG/ML
OXYMORPHONE, URINE: <20 NG/ML

## 2018-08-02 LAB
6AM URINE: <10 NG/ML
7-AMINOCLONAZEPAM, URINE: <5 NG/ML
ALPHA-HYDROXYALPRAZOLAM, URINE: <5 NG/ML
ALPHA-HYDROXYMIDAZOLAM, URINE: <20 NG/ML
ALPRAZOLAM, URINE: <5 NG/ML
CHLORDIAZEPOXIDE, URINE: <20 NG/ML
CLONAZEPAM, URINE: <5 NG/ML
CODEINE, URINE: <20 NG/ML
DIAZEPAM, URINE: <20 NG/ML
HYDROCODONE, URINE: 579 NG/ML
HYDROMORPHONE, URINE: <20 NG/ML
LORAZEPAM, URINE: <20 NG/ML
MIDAZOLAM, URINE: <20 NG/ML
MORPHINE URINE: <20 NG/ML
NORDIAZEPAM, URINE: <20 NG/ML
NORHYDROCODONE, URINE: 788 NG/ML
NOROXYCODONE, URINE: 43 NG/ML
NOROXYMORPHONE, URINE: <20 NG/ML
OXAZEPAM, URINE: <20 NG/ML
OXYCODONE, URINE CONFIRMATION: <20 NG/ML
OXYMORPHONE, URINE: <20 NG/ML
TEMAZEPAM, URINE: <20 NG/ML

## 2018-08-03 LAB
Lab: NORMAL
REPORT: NORMAL
THIS TEST SENT TO: NORMAL

## 2018-08-05 DIAGNOSIS — M17.0 OSTEOARTHRITIS OF BOTH KNEES, UNSPECIFIED OSTEOARTHRITIS TYPE: ICD-10-CM

## 2018-08-06 RX ORDER — NIACIN 500 MG/1
TABLET, EXTENDED RELEASE ORAL
Qty: 30 TABLET | Refills: 2 | Status: SHIPPED | OUTPATIENT
Start: 2018-08-06 | End: 2018-10-30 | Stop reason: SDUPTHER

## 2018-08-06 RX ORDER — DICLOFENAC SODIUM 75 MG/1
75 TABLET, DELAYED RELEASE ORAL 2 TIMES DAILY
Qty: 60 TABLET | Refills: 2 | Status: SHIPPED | OUTPATIENT
Start: 2018-08-06 | End: 2018-10-22 | Stop reason: SDUPTHER

## 2018-08-17 DIAGNOSIS — E11.9 TYPE 2 DIABETES MELLITUS WITHOUT COMPLICATION, WITHOUT LONG-TERM CURRENT USE OF INSULIN (HCC): ICD-10-CM

## 2018-08-20 RX ORDER — PIOGLITAZONEHYDROCHLORIDE 15 MG/1
15 TABLET ORAL DAILY
Qty: 30 TABLET | Refills: 2 | Status: SHIPPED | OUTPATIENT
Start: 2018-08-20 | End: 2018-12-03 | Stop reason: SDUPTHER

## 2018-08-22 NOTE — PROGRESS NOTES
[facility]        [address] [facility] Stephania Sahni  Dept: [phone]        Follow up Note      Milvia Ceballostock     Date of Visit:  2018    CC:  Patient presents for follow up   Chief Complaint   Patient presents with    Pain     right knee pain         HPI:    Pain is unchanged. Change in quality of symptoms:no. Medication side effects:none. Recent diagnostic testing:none. Recent interventional procedures:none    He has been on anticoagulation medications to include Plavix (has 5 coronary stents) and has not been on herbal supplements. He is diabetic.     Imaging:   R knee xray 2018 -   Frontal and lateral weight-bearing views of the right knee demonstrate   severe medial femorotibial joint space narrowing with associated   subchondral sclerosis, marginal spurring and irregularity along the   proximal tibia, and genu varus angulation of the knee. Degenerative   enthesopathic changes are noted at the superior aspect of the patella   and posterior aspect of the femoral condyles. The patellofemoral joint   space is not well evaluated on the provided lateral view but may be   narrowed. No acute fracture is seen. A small suprapatellar joint   effusion is noted.      L knee xray 2018 -   Cemented left total arthroplasty without evidence for   hardware failure, change in alignment and a minimal amount of joint   fluid        Potential Aberrant Drug-Related Behavior: no     Urine Drug Screenin2018 consistent    OARRS report:  2018 consistent    Past Medical History:   Diagnosis Date    Anticoagulant long-term use     CAD (coronary artery disease)     Chronic back pain     Depression     Diabetes mellitus ([address]     DJD (degenerative joint disease)     Knees.  Dyslipidemia     Hiatal hernia 1979    History of ST elevation myocardial infarction (STEMI)     Hyperlipidemia     Hypertension     MI (myocardial infarction) ([address] 2007    Inferior wall.        Neurological:     Sensory:normal to light touch BLE     Motor:                    Right Quadriceps5/5          Left Quadriceps5/5           Right Gastrocnemius5/5    Left Gastrocnemius5/5  Right Ant Tibialis5/5  Left Ant Tibialis5/5     Reflexes:       Right Achilles reflex2+  Left Achilles reflex2+  Gait:antalgic     Dermatology:     Skin:no rashes or lesions noted     Assessment/Plan:     Right knee pain due to severe OA, pending TKR in the fall due to work/insurance reasons  On PLAVIX     Urine screen and OARRS report reviewed  Increase Norco 5/325 BID # 90, he will let me know if he ends up with extra at the end of the month and we will then deduct that quantity from the following month  Patient encouraged to stay active and to lose weight, discussed diet[employer]cube [employer]. com (ketogenic diet)  Treatment plan discussed with the patient including medication side effects     Cc:  Referring physician    GARTH Mathew.

## 2018-08-23 ENCOUNTER — OFFICE VISIT (OUTPATIENT)
Dept: PAIN MANAGEMENT | Age: 58
End: 2018-08-23
Payer: COMMERCIAL

## 2018-08-23 VITALS
RESPIRATION RATE: 16 BRPM | HEART RATE: 76 BPM | HEIGHT: 71 IN | DIASTOLIC BLOOD PRESSURE: 88 MMHG | BODY MASS INDEX: 43.54 KG/M2 | TEMPERATURE: 98.4 F | WEIGHT: 311 LBS | SYSTOLIC BLOOD PRESSURE: 148 MMHG

## 2018-08-23 DIAGNOSIS — G89.29 CHRONIC PAIN OF RIGHT KNEE: ICD-10-CM

## 2018-08-23 DIAGNOSIS — M17.11 PRIMARY OSTEOARTHRITIS OF RIGHT KNEE: ICD-10-CM

## 2018-08-23 DIAGNOSIS — G89.4 CHRONIC PAIN SYNDROME: Primary | ICD-10-CM

## 2018-08-23 DIAGNOSIS — M25.561 CHRONIC PAIN OF RIGHT KNEE: ICD-10-CM

## 2018-08-23 PROCEDURE — 99214 OFFICE O/P EST MOD 30 MIN: CPT | Performed by: PAIN MEDICINE

## 2018-08-23 RX ORDER — HYDROCODONE BITARTRATE AND ACETAMINOPHEN 5; 325 MG/1; MG/1
1 TABLET ORAL 3 TIMES DAILY PRN
Qty: 90 TABLET | Refills: 0 | Status: SHIPPED | OUTPATIENT
Start: 2018-08-23 | End: 2018-09-20 | Stop reason: SDUPTHER

## 2018-08-23 NOTE — PROGRESS NOTES
8/23/2018    Trang Hendrix presents to the [address] [place] [address] on 8/23/2018. Mila Evans is complaining of pain in the knee . The pain is constant. The pain is described as aching, throbbing, shooting and stabbing. Pain is rated today at a 9 on the VAS scale. He took his last dose of hydrocodone/acetaminophen (Lorcet, Lortab, Norco, Vicodin)  on this am.      He has been on anticoagulation medications to include ASA and is managed by . TRUDY Good  .      BP (!) 148/88   Pulse 76   Temp 98.4 °F (36.9 °C)   Resp 16   Ht 5' 11\" (1.803 m)   Wt (!) 311 lb (141.1 kg)   BMI 43.38 kg/m²

## 2018-09-14 NOTE — PROGRESS NOTES
infarction) ([facility] 1/2007    Inferior wall.  Obesity     Presence of stent in left circumflex coronary artery     Presence of stent in right coronary artery     Sleep apnea     cpap    Smoker     Type 2 diabetes mellitus without complication ([facility]        Past Surgical History:   Procedure Laterality Date    CORONARY ANGIOPLASTY WITH STENT PLACEMENT  X4    CORONARY ANGIOPLASTY WITH STENT PLACEMENT  12/30/2011    Dr. Autumn Levine 1st OM 3.0 x 20 Ion    DIAGNOSTIC CARDIAC CATH LAB PROCEDURE  3/15/2005    [facility]. Mild to moderate CAD. Normal LV.  DIAGNOSTIC CARDIAC CATH LAB PROCEDURE  1/16/2007    [facility]. Cath/PTCA/DE stent of proximal and distal RCA.  DIAGNOSTIC CARDIAC CATH LAB PROCEDURE  2/21/2007    Cath/DE stent of proximal OM1 and proximal Cx.  DIAGNOSTIC CARDIAC CATH LAB PROCEDURE  12/30/2011    ANURADHA of mid OM branch of circumflex.  ECHO COMPL W DOP COLOR FLOW  12/30/2011         HERNIA REPAIR  2/2014    laparoscopic ventral hernia repain    JOINT REPLACEMENT Left 4/1/14    TKA-ed       Prior to Admission medications    Medication Sig Start Date End Date Taking? Authorizing Provider   HYDROcodone-acetaminophen (NORCO) 5-325 MG per tablet Take 1 tablet by mouth 3 times daily as needed for Pain for up to 30 days. . 8/23/18 9/22/18 Yes Komal Sweet,    pioglitazone (ACTOS) 15 MG tablet TAKE 1 TABLET BY MOUTH DAILY 8/20/18  Yes TRUDY Domingo   niacin (NIASPAN) 500 MG extended release tablet TAKE 1 TABLET BY MOUTH EVERY EVENING 8/6/18  Yes TRUDY Domingo   diclofenac (VOLTAREN) 75 MG EC tablet TAKE 1 TABLET BY MOUTH 2 TIMES DAILY 8/6/18  Yes TRUDY Domingo   cyclobenzaprine (FLEXERIL) 10 MG tablet TAKE 1 TABLET BY MOUTH DAILY 7/6/18  Yes TRUDY Domingo   metFORMIN (GLUCOPHAGE) 500 MG tablet Take 1 tablet by mouth 2 times daily (with meals) 6/18/18  Yes TRUDY Domingo   atorvastatin (LIPITOR) 80 MG tablet TAKE 1 TABLET BY MOUTH AT BEDTIME 6/18/18  Yes TRUDY Domingo   carvedilol (COREG) 25 MG tablet TAKE ONE TABLET BY MOUTH TWICE DAILY (WITH MEALS) 6/18/18  Yes TRUDY Potter   triamterene-hydrochlorothiazide [facility]) 37.5-25 MG per tablet Take 1 tablet by mouth daily 6/18/18 6/18/19 Yes TRUDY Potter   quinapril (ACCUPRIL) 40 MG tablet Take 1 tablet by mouth daily 6/18/18  Yes TRUDY Potter   buPROPion (WELLBUTRIN XL) 150 MG extended release tablet Take 1 tablet by mouth every morning 6/18/18  Yes TRUDY Potter   lidocaine-prilocaine (EMLA) 2.5-2.5 % cream Apply 1 Dose topically 2 times daily 4/11/18  Yes Historical Provider, MD   nitroGLYCERIN (NITROSTAT) 0.4 MG SL tablet Place 1 tablet under the tongue as needed for Chest pain 2/15/18 2/15/19 Yes TRUDY Potter   Ylnyw-1-Kydc Eth Est, Dietary, 1 g CAPS Take 1 capsule by mouth 2 times daily 2/15/18  Yes TRUDY Potter   albuterol sulfate HFA (VENTOLIN HFA) 108 (90 Base) MCG/ACT inhaler Inhale 2 puffs into the lungs every 4 hours as needed for Wheezing 2/15/18  Yes TRUDY Potter   loratadine (CLARITIN) 10 MG tablet Take 1 tablet by mouth daily 2/15/18  Yes TRUDY Potter   Blood Pressure Monitoring (BLOOD PRESSURE CUFF) MISC 1 Device by Does not apply route 2 times daily Dx:  Hypertension with labile blood pressure 2/15/18  Yes TRUDY Potter   clopidogrel (PLAVIX) 75 MG tablet TAKE 1 TABLET BY MOUTH EVERY DAY 1/7/15  Yes Marco Antonio Prakash MD   aspirin 325 MG EC tablet Take 81 mg by mouth daily. Yes Historical Provider, MD   nitroGLYCERIN (NITROSTAT) 0.4 MG SL tablet Place 1 tablet under the tongue as needed. 1/6/14 6/18/18  Marco Antonio Prakash MD       Allergies   Allergen Reactions    Bee Venom        Social History     Social History    Marital status:      Spouse name: N/A    Number of children: N/A    Years of education: N/A     Occupational History    Not on file.      Social History Main Topics    Smoking status: Current Every Day Smoker     Packs/day: 0.15     Years: 39.00     Types: Cigarettes    Smokeless tobacco: Never Used    Alcohol use No    Drug use: No    Sexual activity: Not on file     Other Topics Concern    Not on file     Social History Narrative    No narrative on file       Family History   Problem Relation Age of Onset    Diabetes Mother     Stroke Mother     Diabetes Father     Heart Disease Father        REVIEW OF SYSTEMS:     Ayaz Monk denies fever/chills, chest pain, shortness of breath, new bowel or bladder complaints. All other review of systems was negative. PHYSICAL EXAMINATION:      BP (!) 142/82   Pulse 70   Temp 98.1 °F (36.7 °C)   Resp 20   Ht 5' 11\" (1.803 m)   Wt (!) 310 lb (140.6 kg)   SpO2 94%   BMI 43.24 kg/m²     General:       General appearance:pleasant and well-hydrated, in no distress and A & O x3  Build:Obese  Function:Rises from a seated position with difficulty     HEENT:     Head:normocephalic, atraumatic  Pupils:regular, round, equal  Sclera: icterus absent     Lungs:     Breathing:normal breathing pattern     Abdomen:     Shape:obese and non-distended  Tenderness:none  Guarding:none     Lumbar spine:     Spine inspection:normal   CVA tenderness:No   Palpation:tenderness paravertebral muscles, left, right negative.   Range of motion:abnormal mildly Lateral bending, flexion, extension rotation bilateral and is not painful.     Musculoskeletal:     Trigger points in Paraveteral:absent bilaterally  SI joint tenderness:negative right, negative left              Piriformis tenderness:negative right, negative left  SLR:negative right, negative left, sitting      Extremities:     Tremors:None bilaterally upper and lower  Intact:Yes  Varicose veins:absent   Pulses:present Lt radial  Cyanosis:none  Edema:none x all 4 extremities     Knee:     Inspection:asymmetric, left s/p TKR, rt with obvious OA swelling mild right  Tenderness of Bony Landmarks:Medial, lateral right  Tenderness of Bursa:none, right  Effusion:present right  Crepitus:present right  ROM:Left full  Right full      Neurological:     Sensory:normal to light touch BLE     Motor:                    Right Quadriceps5/5          Left Quadriceps5/5           Right Gastrocnemius5/5    Left Gastrocnemius5/5  Right Ant Tibialis5/5  Left Ant Tibialis5/5     Gait:antalgic     Dermatology:     Skin:no rashes or lesions noted     Assessment/Plan:     Right knee pain due to severe OA, pending TKR in the fall due to work/insurance reasons  On PLAVIX     Continue Norco 5/325 BID # 90  Patient encouraged to stay active   Treatment plan discussed with the patient including medication side effects     Cc:  Referring physician    M. Leda Merlin.

## 2018-09-20 ENCOUNTER — OFFICE VISIT (OUTPATIENT)
Dept: PAIN MANAGEMENT | Age: 58
End: 2018-09-20
Payer: COMMERCIAL

## 2018-09-20 VITALS
WEIGHT: 310 LBS | TEMPERATURE: 98.1 F | HEIGHT: 71 IN | HEART RATE: 70 BPM | DIASTOLIC BLOOD PRESSURE: 82 MMHG | BODY MASS INDEX: 43.4 KG/M2 | RESPIRATION RATE: 20 BRPM | OXYGEN SATURATION: 94 % | SYSTOLIC BLOOD PRESSURE: 142 MMHG

## 2018-09-20 DIAGNOSIS — G89.4 CHRONIC PAIN SYNDROME: Primary | ICD-10-CM

## 2018-09-20 DIAGNOSIS — G89.29 CHRONIC PAIN OF RIGHT KNEE: ICD-10-CM

## 2018-09-20 DIAGNOSIS — M17.11 PRIMARY OSTEOARTHRITIS OF RIGHT KNEE: ICD-10-CM

## 2018-09-20 DIAGNOSIS — M25.561 CHRONIC PAIN OF RIGHT KNEE: ICD-10-CM

## 2018-09-20 PROCEDURE — 99213 OFFICE O/P EST LOW 20 MIN: CPT | Performed by: PAIN MEDICINE

## 2018-09-20 RX ORDER — HYDROCODONE BITARTRATE AND ACETAMINOPHEN 5; 325 MG/1; MG/1
1 TABLET ORAL 3 TIMES DAILY PRN
Qty: 90 TABLET | Refills: 0 | Status: SHIPPED | OUTPATIENT
Start: 2018-09-22 | End: 2018-10-19 | Stop reason: SDUPTHER

## 2018-09-24 ENCOUNTER — TELEPHONE (OUTPATIENT)
Dept: PAIN MANAGEMENT | Age: 58
End: 2018-09-24

## 2018-09-26 NOTE — TELEPHONE ENCOUNTER
Prior auth for [address] [place] [address],[address] [facility] approved. Pharmacy and Patient notified.

## 2018-09-27 ENCOUNTER — OFFICE VISIT (OUTPATIENT)
Dept: ORTHOPEDIC SURGERY | Age: 58
End: 2018-09-27
Payer: COMMERCIAL

## 2018-09-27 VITALS
WEIGHT: 310.3 LBS | DIASTOLIC BLOOD PRESSURE: 84 MMHG | HEIGHT: 71 IN | SYSTOLIC BLOOD PRESSURE: 124 MMHG | HEART RATE: 78 BPM | BODY MASS INDEX: 43.44 KG/M2

## 2018-09-27 DIAGNOSIS — M17.11 PRIMARY OSTEOARTHRITIS OF RIGHT KNEE: Primary | ICD-10-CM

## 2018-09-27 PROCEDURE — 99212 OFFICE O/P EST SF 10 MIN: CPT | Performed by: ORTHOPAEDIC SURGERY

## 2018-09-27 PROCEDURE — 4004F PT TOBACCO SCREEN RCVD TLK: CPT | Performed by: ORTHOPAEDIC SURGERY

## 2018-09-27 PROCEDURE — G8417 CALC BMI ABV UP PARAM F/U: HCPCS | Performed by: ORTHOPAEDIC SURGERY

## 2018-09-27 PROCEDURE — 99214 OFFICE O/P EST MOD 30 MIN: CPT | Performed by: ORTHOPAEDIC SURGERY

## 2018-09-27 PROCEDURE — G8598 ASA/ANTIPLAT THER USED: HCPCS | Performed by: ORTHOPAEDIC SURGERY

## 2018-09-27 PROCEDURE — G8427 DOCREV CUR MEDS BY ELIG CLIN: HCPCS | Performed by: ORTHOPAEDIC SURGERY

## 2018-09-27 PROCEDURE — 3017F COLORECTAL CA SCREEN DOC REV: CPT | Performed by: ORTHOPAEDIC SURGERY

## 2018-09-27 ASSESSMENT — ENCOUNTER SYMPTOMS
RHINORRHEA: 1
SORE THROAT: 0
COUGH: 0
BLOOD IN STOOL: 0
ABDOMINAL PAIN: 0
EYE REDNESS: 0
VOMITING: 0
EYE PAIN: 0
NAUSEA: 0
EYE DISCHARGE: 0
SINUS PRESSURE: 1
CONSTIPATION: 0
EYE ITCHING: 0
BACK PAIN: 0
DIARRHEA: 0
SINUS PAIN: 1
WHEEZING: 0
SHORTNESS OF BREATH: 0

## 2018-09-27 NOTE — PROGRESS NOTES
erythema. Psychiatric: He has a normal mood and affect. MSK:  Right Knee exam  Skin intact. No erythema/induration/fluctuence at knee. MIld effusion noted  Negative ballotment  Patella tracks normally  Positive patellar grind test  .   Moderate crepitus with flexion and extension of the knee  Medial joint line pain tenderness to palpation  Stable to varus and valgus at 0 and 30 degrees of flexion. Negative Lachman's and posterior drawer. Active range of motion 5-90 without pain. Passive range on motion 5-100 with pain  Compartments soft and compressible throughout leg. Calf soft and nontender with palpation    Demonstrates active ankle plantar/dorsiflexion/great toe extension. Sensation intact to light touch sural/deep peroneal/superficial peroneal/saphenous/posterior tibial nerve distributions to foot/ankle. Palpable dorsalis pedis and posterior tibialis pulses. /84 (Site: Left Upper Arm, Position: Sitting, Cuff Size: Large Adult)   Pulse 78   Ht 5' 11\" (1.803 m)   Wt (!) 310 lb 4.8 oz (140.8 kg)   BMI 43.28 kg/m²     XR  Right knee: Advanced OA of the knee with severe medial joint line narrowing, sclerotic joint orders, osteophytes noted, mild to moderate osteoarthritis patellofemoral and lateral joint line space. Left knee: Left total knee arthroplasty in place no fractures noted no loosening noted    Assessment:       Diagnosis Orders   1. Primary osteoarthritis of right knee             Plan:   Had lengthy discussion with patient regarding their diagnosis, typical prognosis, and expected outcomes. We also discussed treatment options including Continued nonoperative managements versus surgical management, along with risks and benefits of each. Patient has elected for surgical management despite associated risks.      Planning for right total knee arthroplasty, patient to obtain medical clearance, he is to attend joint class  Anticipate surgery within the next month    I have

## 2018-10-03 DIAGNOSIS — E11.9 TYPE 2 DIABETES MELLITUS WITHOUT COMPLICATION, WITHOUT LONG-TERM CURRENT USE OF INSULIN (HCC): ICD-10-CM

## 2018-10-19 ENCOUNTER — HOSPITAL ENCOUNTER (OUTPATIENT)
Age: 58
Discharge: HOME | End: 2018-10-21
Payer: COMMERCIAL

## 2018-10-19 ENCOUNTER — OFFICE VISIT (OUTPATIENT)
Dept: PAIN MANAGEMENT | Age: 58
End: 2018-10-19
Payer: COMMERCIAL

## 2018-10-19 VITALS
DIASTOLIC BLOOD PRESSURE: 80 MMHG | HEART RATE: 87 BPM | HEIGHT: 71 IN | OXYGEN SATURATION: 98 % | RESPIRATION RATE: 18 BRPM | WEIGHT: 307 LBS | SYSTOLIC BLOOD PRESSURE: 146 MMHG | BODY MASS INDEX: 42.98 KG/M2 | TEMPERATURE: 98.7 F

## 2018-10-19 DIAGNOSIS — M17.11 PRIMARY OSTEOARTHRITIS OF RIGHT KNEE: ICD-10-CM

## 2018-10-19 DIAGNOSIS — M25.561 CHRONIC PAIN OF RIGHT KNEE: ICD-10-CM

## 2018-10-19 DIAGNOSIS — G89.29 CHRONIC PAIN OF RIGHT KNEE: ICD-10-CM

## 2018-10-19 DIAGNOSIS — G89.4 CHRONIC PAIN SYNDROME: Primary | ICD-10-CM

## 2018-10-19 LAB — SPECIFIC GRAVITY UA: 1.02 (ref 1–1.03)

## 2018-10-19 PROCEDURE — 99213 OFFICE O/P EST LOW 20 MIN: CPT | Performed by: PAIN MEDICINE

## 2018-10-19 PROCEDURE — 80307 DRUG TEST PRSMV CHEM ANLYZR: CPT

## 2018-10-19 PROCEDURE — G0480 DRUG TEST DEF 1-7 CLASSES: HCPCS

## 2018-10-19 RX ORDER — HYDROCODONE BITARTRATE AND ACETAMINOPHEN 5; 325 MG/1; MG/1
1 TABLET ORAL 3 TIMES DAILY PRN
Qty: 90 TABLET | Refills: 0 | Status: SHIPPED | OUTPATIENT
Start: 2018-10-24 | End: 2018-11-23

## 2018-10-19 NOTE — PROGRESS NOTES
MOUTH TWICE DAILY (WITH MEALS) 6/18/18  Yes TRUDY Dunn   triamterene-hydrochlorothiazide [facility]) 37.5-25 MG per tablet Take 1 tablet by mouth daily 6/18/18 6/18/19 Yes TRUDY Dunn   quinapril (ACCUPRIL) 40 MG tablet Take 1 tablet by mouth daily 6/18/18  Yes TRUDY Dunn   buPROPion (WELLBUTRIN XL) 150 MG extended release tablet Take 1 tablet by mouth every morning 6/18/18  Yes TRUDY Dunn   lidocaine-prilocaine (EMLA) 2.5-2.5 % cream Apply 1 Dose topically 2 times daily 4/11/18  Yes Historical Provider, MD   nitroGLYCERIN (NITROSTAT) 0.4 MG SL tablet Place 1 tablet under the tongue as needed for Chest pain 2/15/18 2/15/19 Yes TRUDY Dunn   Rzpnf-2-Nffk Eth Est, Dietary, 1 g CAPS Take 1 capsule by mouth 2 times daily 2/15/18  Yes TRUDY Dunn   albuterol sulfate HFA (VENTOLIN HFA) 108 (90 Base) MCG/ACT inhaler Inhale 2 puffs into the lungs every 4 hours as needed for Wheezing 2/15/18  Yes TRUDY Dunn   loratadine (CLARITIN) 10 MG tablet Take 1 tablet by mouth daily 2/15/18  Yes TRUDY Dunn   Blood Pressure Monitoring (BLOOD PRESSURE CUFF) MISC 1 Device by Does not apply route 2 times daily Dx:  Hypertension with labile blood pressure 2/15/18  Yes TRUDY Dunn   clopidogrel (PLAVIX) 75 MG tablet TAKE 1 TABLET BY MOUTH EVERY DAY 1/7/15  Yes Rigo Diego MD   aspirin 325 MG EC tablet Take 325 mg by mouth daily    Yes Historical Provider, MD   nitroGLYCERIN (NITROSTAT) 0.4 MG SL tablet Place 1 tablet under the tongue as needed. 1/6/14 6/18/18  Rigo Diego MD       Allergies   Allergen Reactions    Bee Venom        Social History     Social History    Marital status:      Spouse name: N/A    Number of children: N/A    Years of education: N/A     Occupational History    Not on file.      Social History Main Topics    Smoking status: Current Every Day Smoker     Packs/day: 0.50     Years: 39.00     Types: Cigarettes    Smokeless tobacco: Never Used    Alcohol

## 2018-10-22 ENCOUNTER — HOSPITAL ENCOUNTER (OUTPATIENT)
Age: 58
Discharge: HOME | End: 2018-10-24
Payer: COMMERCIAL

## 2018-10-22 ENCOUNTER — OFFICE VISIT (OUTPATIENT)
Dept: FAMILY MEDICINE CLINIC | Age: 58
End: 2018-10-22
Payer: COMMERCIAL

## 2018-10-22 VITALS
DIASTOLIC BLOOD PRESSURE: 88 MMHG | SYSTOLIC BLOOD PRESSURE: 138 MMHG | BODY MASS INDEX: 44.1 KG/M2 | HEART RATE: 81 BPM | HEIGHT: 71 IN | TEMPERATURE: 98.2 F | WEIGHT: 315 LBS | OXYGEN SATURATION: 95 %

## 2018-10-22 DIAGNOSIS — I10 ESSENTIAL HYPERTENSION: ICD-10-CM

## 2018-10-22 DIAGNOSIS — E78.5 HYPERLIPIDEMIA, UNSPECIFIED HYPERLIPIDEMIA TYPE: ICD-10-CM

## 2018-10-22 DIAGNOSIS — F17.200 SMOKER: ICD-10-CM

## 2018-10-22 DIAGNOSIS — Z01.818 PRE-OP EVALUATION: Primary | ICD-10-CM

## 2018-10-22 DIAGNOSIS — E11.9 TYPE 2 DIABETES MELLITUS WITHOUT COMPLICATION, WITHOUT LONG-TERM CURRENT USE OF INSULIN (HCC): ICD-10-CM

## 2018-10-22 DIAGNOSIS — Z23 NEED FOR INFLUENZA VACCINATION: ICD-10-CM

## 2018-10-22 DIAGNOSIS — M17.0 OSTEOARTHRITIS OF BOTH KNEES, UNSPECIFIED OSTEOARTHRITIS TYPE: ICD-10-CM

## 2018-10-22 DIAGNOSIS — I25.10 CORONARY ARTERY DISEASE INVOLVING NATIVE HEART WITHOUT ANGINA PECTORIS, UNSPECIFIED VESSEL OR LESION TYPE: ICD-10-CM

## 2018-10-22 LAB
ALBUMIN SERPL-MCNC: 4.1 G/DL (ref 3.5–5.2)
ALP BLD-CCNC: 87 U/L (ref 40–129)
ALT SERPL-CCNC: 11 U/L (ref 0–40)
ANION GAP SERPL CALCULATED.3IONS-SCNC: 17 MMOL/L (ref 7–16)
AST SERPL-CCNC: 14 U/L (ref 0–39)
BILIRUB SERPL-MCNC: 0.4 MG/DL (ref 0–1.2)
BUN BLDV-MCNC: 27 MG/DL (ref 6–20)
CALCIUM SERPL-MCNC: 9.5 MG/DL (ref 8.6–10.2)
CHLORIDE BLD-SCNC: 101 MMOL/L (ref 98–107)
CHOLESTEROL, TOTAL: 137 MG/DL (ref 0–199)
CO2: 23 MMOL/L (ref 22–29)
CREAT SERPL-MCNC: 1.2 MG/DL (ref 0.7–1.2)
GFR AFRICAN AMERICAN: >60
GFR NON-AFRICAN AMERICAN: >60 ML/MIN/1.73
GLUCOSE BLD-MCNC: 146 MG/DL (ref 74–109)
HBA1C MFR BLD: 6.1 %
HCT VFR BLD CALC: 45.7 % (ref 37–54)
HDLC SERPL-MCNC: 33 MG/DL
HEMOGLOBIN: 14.6 G/DL (ref 12.5–16.5)
LDL CHOLESTEROL CALCULATED: 61 MG/DL (ref 0–99)
MCH RBC QN AUTO: 30.5 PG (ref 26–35)
MCHC RBC AUTO-ENTMCNC: 31.9 % (ref 32–34.5)
MCV RBC AUTO: 95.4 FL (ref 80–99.9)
PDW BLD-RTO: 12.9 FL (ref 11.5–15)
PLATELET # BLD: 161 E9/L (ref 130–450)
PMV BLD AUTO: 9.7 FL (ref 7–12)
POTASSIUM SERPL-SCNC: 4.6 MMOL/L (ref 3.5–5)
RBC # BLD: 4.79 E12/L (ref 3.8–5.8)
SODIUM BLD-SCNC: 141 MMOL/L (ref 132–146)
TOTAL PROTEIN: 6.8 G/DL (ref 6.4–8.3)
TRIGL SERPL-MCNC: 214 MG/DL (ref 0–149)
VLDLC SERPL CALC-MCNC: 43 MG/DL
WBC # BLD: 8.5 E9/L (ref 4.5–11.5)

## 2018-10-22 PROCEDURE — G8427 DOCREV CUR MEDS BY ELIG CLIN: HCPCS | Performed by: PHYSICIAN ASSISTANT

## 2018-10-22 PROCEDURE — G8598 ASA/ANTIPLAT THER USED: HCPCS | Performed by: PHYSICIAN ASSISTANT

## 2018-10-22 PROCEDURE — 83036 HEMOGLOBIN GLYCOSYLATED A1C: CPT | Performed by: PHYSICIAN ASSISTANT

## 2018-10-22 PROCEDURE — 4004F PT TOBACCO SCREEN RCVD TLK: CPT | Performed by: PHYSICIAN ASSISTANT

## 2018-10-22 PROCEDURE — 2022F DILAT RTA XM EVC RTNOPTHY: CPT | Performed by: PHYSICIAN ASSISTANT

## 2018-10-22 PROCEDURE — 90471 IMMUNIZATION ADMIN: CPT | Performed by: PHYSICIAN ASSISTANT

## 2018-10-22 PROCEDURE — 99214 OFFICE O/P EST MOD 30 MIN: CPT | Performed by: PHYSICIAN ASSISTANT

## 2018-10-22 PROCEDURE — 80053 COMPREHEN METABOLIC PANEL: CPT

## 2018-10-22 PROCEDURE — 3044F HG A1C LEVEL LT 7.0%: CPT | Performed by: PHYSICIAN ASSISTANT

## 2018-10-22 PROCEDURE — 90688 IIV4 VACCINE SPLT 0.5 ML IM: CPT | Performed by: PHYSICIAN ASSISTANT

## 2018-10-22 PROCEDURE — 85027 COMPLETE CBC AUTOMATED: CPT

## 2018-10-22 PROCEDURE — 3017F COLORECTAL CA SCREEN DOC REV: CPT | Performed by: PHYSICIAN ASSISTANT

## 2018-10-22 PROCEDURE — G8482 FLU IMMUNIZE ORDER/ADMIN: HCPCS | Performed by: PHYSICIAN ASSISTANT

## 2018-10-22 PROCEDURE — G8417 CALC BMI ABV UP PARAM F/U: HCPCS | Performed by: PHYSICIAN ASSISTANT

## 2018-10-22 PROCEDURE — 80061 LIPID PANEL: CPT

## 2018-10-22 RX ORDER — DICLOFENAC SODIUM 75 MG/1
75 TABLET, DELAYED RELEASE ORAL 2 TIMES DAILY
Qty: 60 TABLET | Refills: 5 | Status: SHIPPED | OUTPATIENT
Start: 2018-10-22 | End: 2019-03-14 | Stop reason: SDUPTHER

## 2018-10-22 RX ORDER — ATORVASTATIN CALCIUM 80 MG/1
TABLET, FILM COATED ORAL
Qty: 90 TABLET | Refills: 2 | Status: SHIPPED | OUTPATIENT
Start: 2018-10-22

## 2018-10-22 RX ORDER — BUPROPION HYDROCHLORIDE 150 MG/1
150 TABLET ORAL EVERY MORNING
Qty: 30 TABLET | Refills: 5 | Status: SHIPPED | OUTPATIENT
Start: 2018-10-22 | End: 2018-10-30

## 2018-10-22 RX ORDER — CYCLOBENZAPRINE HCL 10 MG
10 TABLET ORAL DAILY
Qty: 30 TABLET | Refills: 5 | Status: SHIPPED | OUTPATIENT
Start: 2018-10-22 | End: 2018-11-21 | Stop reason: ALTCHOICE

## 2018-10-22 RX ORDER — CARVEDILOL 25 MG/1
TABLET ORAL
Qty: 180 TABLET | Refills: 5 | Status: SHIPPED | OUTPATIENT
Start: 2018-10-22

## 2018-10-24 LAB
6AM URINE: <10 NG/ML
7-AMINOCLONAZEPAM, URINE: <5 NG/ML
ALPHA-HYDROXYALPRAZOLAM, URINE: <5 NG/ML
ALPHA-HYDROXYMIDAZOLAM, URINE: <20 NG/ML
ALPRAZOLAM, URINE: <5 NG/ML
CHLORDIAZEPOXIDE, URINE: <20 NG/ML
CLONAZEPAM, URINE: <5 NG/ML
CODEINE, URINE: <20 NG/ML
DIAZEPAM, URINE: <20 NG/ML
HYDROCODONE, URINE: <20 NG/ML
HYDROMORPHONE, URINE: <20 NG/ML
LORAZEPAM, URINE: <20 NG/ML
MIDAZOLAM, URINE: <20 NG/ML
MORPHINE URINE: <20 NG/ML
NORDIAZEPAM, URINE: <20 NG/ML
NORHYDROCODONE, URINE: <20 NG/ML
NOROXYCODONE, URINE: 2044 NG/ML
NOROXYMORPHONE, URINE: 20 NG/ML
OXAZEPAM, URINE: <20 NG/ML
OXYCODONE, URINE CONFIRMATION: 1424 NG/ML
OXYMORPHONE, URINE: <20 NG/ML
TEMAZEPAM, URINE: <20 NG/ML

## 2018-10-28 LAB
Lab: NORMAL
REPORT: NORMAL
THIS TEST SENT TO: NORMAL

## 2018-11-01 ENCOUNTER — TELEPHONE (OUTPATIENT)
Dept: FAMILY MEDICINE CLINIC | Age: 58
End: 2018-11-01

## 2018-11-01 NOTE — TELEPHONE ENCOUNTER
No note from our office will ever say a patient is cleared for surgery. We review risk factors only. paulina Duff's note can be faxed to surgeon.

## 2018-11-05 ENCOUNTER — TELEPHONE (OUTPATIENT)
Dept: FAMILY MEDICINE CLINIC | Age: 58
End: 2018-11-05

## 2018-11-14 DIAGNOSIS — E11.9 TYPE 2 DIABETES MELLITUS WITHOUT COMPLICATION, WITHOUT LONG-TERM CURRENT USE OF INSULIN (HCC): ICD-10-CM

## 2018-11-14 DIAGNOSIS — F17.200 SMOKER: ICD-10-CM

## 2018-11-14 RX ORDER — OMEGA-3-ACID ETHYL ESTERS 1 G/1
1 CAPSULE, LIQUID FILLED ORAL 2 TIMES DAILY
Qty: 120 CAPSULE | Refills: 3 | Status: SHIPPED | OUTPATIENT
Start: 2018-11-14 | End: 2019-03-14 | Stop reason: SDUPTHER

## 2018-11-14 RX ORDER — BUPROPION HYDROCHLORIDE 150 MG/1
TABLET ORAL
Qty: 30 TABLET | Refills: 3 | Status: SHIPPED | OUTPATIENT
Start: 2018-11-14 | End: 2019-06-21 | Stop reason: ALTCHOICE

## 2018-11-19 ENCOUNTER — PREP FOR PROCEDURE (OUTPATIENT)
Dept: ORTHOPEDIC SURGERY | Age: 58
End: 2018-11-19

## 2018-11-19 DIAGNOSIS — Z01.818 PREOP EXAMINATION: Primary | ICD-10-CM

## 2018-11-19 RX ORDER — SODIUM CHLORIDE 0.9 % (FLUSH) 0.9 %
10 SYRINGE (ML) INJECTION EVERY 12 HOURS SCHEDULED
Status: CANCELLED | OUTPATIENT
Start: 2018-11-19

## 2018-11-19 RX ORDER — SODIUM CHLORIDE, SODIUM LACTATE, POTASSIUM CHLORIDE, CALCIUM CHLORIDE 600; 310; 30; 20 MG/100ML; MG/100ML; MG/100ML; MG/100ML
INJECTION, SOLUTION INTRAVENOUS CONTINUOUS
Status: CANCELLED | OUTPATIENT
Start: 2018-11-19

## 2018-11-19 RX ORDER — SODIUM CHLORIDE 0.9 % (FLUSH) 0.9 %
10 SYRINGE (ML) INJECTION PRN
Status: CANCELLED | OUTPATIENT
Start: 2018-11-19

## 2018-11-21 ENCOUNTER — HOSPITAL ENCOUNTER (OUTPATIENT)
Dept: PREADMISSION TESTING | Age: 58
Discharge: HOME | End: 2018-11-21
Payer: COMMERCIAL

## 2018-11-21 VITALS
WEIGHT: 313 LBS | DIASTOLIC BLOOD PRESSURE: 98 MMHG | BODY MASS INDEX: 43.82 KG/M2 | HEIGHT: 71 IN | OXYGEN SATURATION: 97 % | SYSTOLIC BLOOD PRESSURE: 142 MMHG | TEMPERATURE: 98.1 F | RESPIRATION RATE: 20 BRPM | HEART RATE: 72 BPM

## 2018-11-21 DIAGNOSIS — Z01.818 PREOP EXAMINATION: ICD-10-CM

## 2018-11-21 DIAGNOSIS — E11.9 TYPE 2 DIABETES MELLITUS WITHOUT COMPLICATION, WITHOUT LONG-TERM CURRENT USE OF INSULIN (HCC): Primary | ICD-10-CM

## 2018-11-21 LAB
ANION GAP SERPL CALCULATED.3IONS-SCNC: 10 MMOL/L (ref 7–16)
BASOPHILS ABSOLUTE: 0.03 E9/L (ref 0–0.2)
BASOPHILS RELATIVE PERCENT: 0.4 % (ref 0–2)
BUN BLDV-MCNC: 27 MG/DL (ref 6–20)
CALCIUM SERPL-MCNC: 9.5 MG/DL (ref 8.6–10.2)
CHLORIDE BLD-SCNC: 105 MMOL/L (ref 98–107)
CO2: 30 MMOL/L (ref 22–29)
CREAT SERPL-MCNC: 1.1 MG/DL (ref 0.7–1.2)
EOSINOPHILS ABSOLUTE: 0.24 E9/L (ref 0.05–0.5)
EOSINOPHILS RELATIVE PERCENT: 3 % (ref 0–6)
GFR AFRICAN AMERICAN: >60
GFR NON-AFRICAN AMERICAN: >60 ML/MIN/1.73
GLUCOSE BLD-MCNC: 132 MG/DL (ref 74–99)
HCT VFR BLD CALC: 45 % (ref 37–54)
HEMOGLOBIN: 14.4 G/DL (ref 12.5–16.5)
IMMATURE GRANULOCYTES #: 0.03 E9/L
IMMATURE GRANULOCYTES %: 0.4 % (ref 0–5)
LYMPHOCYTES ABSOLUTE: 1.47 E9/L (ref 1.5–4)
LYMPHOCYTES RELATIVE PERCENT: 18.4 % (ref 20–42)
MCH RBC QN AUTO: 29.9 PG (ref 26–35)
MCHC RBC AUTO-ENTMCNC: 32 % (ref 32–34.5)
MCV RBC AUTO: 93.6 FL (ref 80–99.9)
MONOCYTES ABSOLUTE: 0.67 E9/L (ref 0.1–0.95)
MONOCYTES RELATIVE PERCENT: 8.4 % (ref 2–12)
NEUTROPHILS ABSOLUTE: 5.56 E9/L (ref 1.8–7.3)
NEUTROPHILS RELATIVE PERCENT: 69.4 % (ref 43–80)
PDW BLD-RTO: 12.7 FL (ref 11.5–15)
PLATELET # BLD: 169 E9/L (ref 130–450)
PMV BLD AUTO: 9.6 FL (ref 7–12)
POTASSIUM REFLEX MAGNESIUM: 4.4 MMOL/L (ref 3.5–5)
RBC # BLD: 4.81 E12/L (ref 3.8–5.8)
SODIUM BLD-SCNC: 145 MMOL/L (ref 132–146)
WBC # BLD: 8 E9/L (ref 4.5–11.5)

## 2018-11-21 PROCEDURE — 36415 COLL VENOUS BLD VENIPUNCTURE: CPT

## 2018-11-21 PROCEDURE — 87081 CULTURE SCREEN ONLY: CPT

## 2018-11-21 PROCEDURE — 85025 COMPLETE CBC W/AUTO DIFF WBC: CPT

## 2018-11-21 PROCEDURE — 80048 BASIC METABOLIC PNL TOTAL CA: CPT

## 2018-11-22 LAB — MRSA CULTURE ONLY: NORMAL

## 2018-11-25 ENCOUNTER — ANESTHESIA EVENT (OUTPATIENT)
Dept: OPERATING ROOM | Age: 58
DRG: 470 | End: 2018-11-25
Payer: COMMERCIAL

## 2018-11-26 ENCOUNTER — ANESTHESIA (OUTPATIENT)
Dept: OPERATING ROOM | Age: 58
DRG: 470 | End: 2018-11-26
Payer: COMMERCIAL

## 2018-11-26 ENCOUNTER — HOSPITAL ENCOUNTER (INPATIENT)
Age: 58
LOS: 2 days | Discharge: HOME HEALTH | DRG: 470 | End: 2018-11-28
Attending: ORTHOPAEDIC SURGERY | Admitting: ORTHOPAEDIC SURGERY
Payer: COMMERCIAL

## 2018-11-26 ENCOUNTER — APPOINTMENT (OUTPATIENT)
Dept: GENERAL RADIOLOGY | Age: 58
DRG: 470 | End: 2018-11-26
Attending: ORTHOPAEDIC SURGERY
Payer: COMMERCIAL

## 2018-11-26 VITALS — SYSTOLIC BLOOD PRESSURE: 154 MMHG | TEMPERATURE: 98.2 F | OXYGEN SATURATION: 94 % | DIASTOLIC BLOOD PRESSURE: 100 MMHG

## 2018-11-26 DIAGNOSIS — Z96.651 STATUS POST TOTAL RIGHT KNEE REPLACEMENT: Primary | ICD-10-CM

## 2018-11-26 DIAGNOSIS — E11.9 TYPE 2 DIABETES MELLITUS WITHOUT COMPLICATION, WITHOUT LONG-TERM CURRENT USE OF INSULIN (HCC): ICD-10-CM

## 2018-11-26 PROBLEM — E66.01 MORBID OBESITY WITH BMI OF 40.0-44.9, ADULT (HCC): Status: ACTIVE | Noted: 2018-11-26

## 2018-11-26 LAB
GLUCOSE BLD-MCNC: 161 MG/DL
METER GLUCOSE: 161 MG/DL (ref 74–99)
METER GLUCOSE: 168 MG/DL (ref 74–99)
METER GLUCOSE: 174 MG/DL (ref 74–99)
METER GLUCOSE: 239 MG/DL (ref 74–99)

## 2018-11-26 PROCEDURE — 2500000003 HC RX 250 WO HCPCS: Performed by: ORTHOPAEDIC SURGERY

## 2018-11-26 PROCEDURE — 2709999900 HC NON-CHARGEABLE SUPPLY: Performed by: ORTHOPAEDIC SURGERY

## 2018-11-26 PROCEDURE — 3700000001 HC ADD 15 MINUTES (ANESTHESIA): Performed by: ORTHOPAEDIC SURGERY

## 2018-11-26 PROCEDURE — 97165 OT EVAL LOW COMPLEX 30 MIN: CPT

## 2018-11-26 PROCEDURE — 6360000002 HC RX W HCPCS: Performed by: ANESTHESIOLOGY

## 2018-11-26 PROCEDURE — 6360000002 HC RX W HCPCS: Performed by: PHYSICIAN ASSISTANT

## 2018-11-26 PROCEDURE — 6370000000 HC RX 637 (ALT 250 FOR IP): Performed by: ORTHOPAEDIC SURGERY

## 2018-11-26 PROCEDURE — G8987 SELF CARE CURRENT STATUS: HCPCS

## 2018-11-26 PROCEDURE — 3600000015 HC SURGERY LEVEL 5 ADDTL 15MIN: Performed by: ORTHOPAEDIC SURGERY

## 2018-11-26 PROCEDURE — 2500000003 HC RX 250 WO HCPCS: Performed by: PHYSICIAN ASSISTANT

## 2018-11-26 PROCEDURE — 97530 THERAPEUTIC ACTIVITIES: CPT

## 2018-11-26 PROCEDURE — 7100000000 HC PACU RECOVERY - FIRST 15 MIN: Performed by: ORTHOPAEDIC SURGERY

## 2018-11-26 PROCEDURE — 27447 TOTAL KNEE ARTHROPLASTY: CPT | Performed by: ORTHOPAEDIC SURGERY

## 2018-11-26 PROCEDURE — 3600000005 HC SURGERY LEVEL 5 BASE: Performed by: ORTHOPAEDIC SURGERY

## 2018-11-26 PROCEDURE — 2580000003 HC RX 258: Performed by: ORTHOPAEDIC SURGERY

## 2018-11-26 PROCEDURE — 88305 TISSUE EXAM BY PATHOLOGIST: CPT

## 2018-11-26 PROCEDURE — 6360000002 HC RX W HCPCS: Performed by: ORTHOPAEDIC SURGERY

## 2018-11-26 PROCEDURE — 1200000000 HC SEMI PRIVATE

## 2018-11-26 PROCEDURE — 2580000003 HC RX 258: Performed by: PHYSICIAN ASSISTANT

## 2018-11-26 PROCEDURE — 82962 GLUCOSE BLOOD TEST: CPT

## 2018-11-26 PROCEDURE — 6370000000 HC RX 637 (ALT 250 FOR IP): Performed by: CLINICAL NURSE SPECIALIST

## 2018-11-26 PROCEDURE — 88311 DECALCIFY TISSUE: CPT

## 2018-11-26 PROCEDURE — 97162 PT EVAL MOD COMPLEX 30 MIN: CPT

## 2018-11-26 PROCEDURE — 3700000000 HC ANESTHESIA ATTENDED CARE: Performed by: ORTHOPAEDIC SURGERY

## 2018-11-26 PROCEDURE — 0SRC0J9 REPLACEMENT OF RIGHT KNEE JOINT WITH SYNTHETIC SUBSTITUTE, CEMENTED, OPEN APPROACH: ICD-10-PCS | Performed by: ORTHOPAEDIC SURGERY

## 2018-11-26 PROCEDURE — C1776 JOINT DEVICE (IMPLANTABLE): HCPCS | Performed by: ORTHOPAEDIC SURGERY

## 2018-11-26 PROCEDURE — G8988 SELF CARE GOAL STATUS: HCPCS

## 2018-11-26 PROCEDURE — 73560 X-RAY EXAM OF KNEE 1 OR 2: CPT

## 2018-11-26 PROCEDURE — 7100000001 HC PACU RECOVERY - ADDTL 15 MIN: Performed by: ORTHOPAEDIC SURGERY

## 2018-11-26 PROCEDURE — 64447 NJX AA&/STRD FEMORAL NRV IMG: CPT | Performed by: ANESTHESIOLOGY

## 2018-11-26 DEVICE — IMPLANT PAT DIA32MM THK10MM X3 ASYM TRIATHLON: Type: IMPLANTABLE DEVICE | Site: KNEE | Status: FUNCTIONAL

## 2018-11-26 DEVICE — IMPLANTABLE DEVICE: Type: IMPLANTABLE DEVICE | Site: KNEE | Status: FUNCTIONAL

## 2018-11-26 DEVICE — BASEPLATE TIB SZ 6 KNEE TRITANIUM CEM PRI LO PROF TRIATHLON: Type: IMPLANTABLE DEVICE | Site: KNEE | Status: FUNCTIONAL

## 2018-11-26 DEVICE — CEMENT BNE 40 GM RADIOPAQUE BA SIMPLEX P: Type: IMPLANTABLE DEVICE | Site: KNEE | Status: FUNCTIONAL

## 2018-11-26 RX ORDER — SODIUM CHLORIDE 0.9 % (FLUSH) 0.9 %
10 SYRINGE (ML) INJECTION EVERY 12 HOURS SCHEDULED
Status: DISCONTINUED | OUTPATIENT
Start: 2018-11-26 | End: 2018-11-26 | Stop reason: HOSPADM

## 2018-11-26 RX ORDER — SODIUM CHLORIDE 9 MG/ML
INJECTION, SOLUTION INTRAVENOUS CONTINUOUS PRN
Status: DISCONTINUED | OUTPATIENT
Start: 2018-11-26 | End: 2018-11-26 | Stop reason: SDUPTHER

## 2018-11-26 RX ORDER — CARVEDILOL 25 MG/1
25 TABLET ORAL 2 TIMES DAILY WITH MEALS
Status: DISCONTINUED | OUTPATIENT
Start: 2018-11-26 | End: 2018-11-28 | Stop reason: HOSPADM

## 2018-11-26 RX ORDER — ACETAMINOPHEN 500 MG
1000 TABLET ORAL ONCE
Status: CANCELLED | OUTPATIENT
Start: 2018-11-26 | End: 2018-11-26

## 2018-11-26 RX ORDER — PROMETHAZINE HYDROCHLORIDE 25 MG/ML
12.5 INJECTION, SOLUTION INTRAMUSCULAR; INTRAVENOUS EVERY 6 HOURS PRN
Status: DISCONTINUED | OUTPATIENT
Start: 2018-11-26 | End: 2018-11-28 | Stop reason: HOSPADM

## 2018-11-26 RX ORDER — NICOTINE POLACRILEX 4 MG
15 LOZENGE BUCCAL PRN
Status: DISCONTINUED | OUTPATIENT
Start: 2018-11-26 | End: 2018-11-28 | Stop reason: HOSPADM

## 2018-11-26 RX ORDER — DEXTROSE MONOHYDRATE 25 G/50ML
12.5 INJECTION, SOLUTION INTRAVENOUS PRN
Status: DISCONTINUED | OUTPATIENT
Start: 2018-11-26 | End: 2018-11-28 | Stop reason: HOSPADM

## 2018-11-26 RX ORDER — MORPHINE SULFATE 2 MG/ML
4 INJECTION, SOLUTION INTRAMUSCULAR; INTRAVENOUS
Status: DISCONTINUED | OUTPATIENT
Start: 2018-11-26 | End: 2018-11-28 | Stop reason: HOSPADM

## 2018-11-26 RX ORDER — NEOSTIGMINE METHYLSULFATE 0.5 MG/ML
INJECTION, SOLUTION INTRAVENOUS PRN
Status: DISCONTINUED | OUTPATIENT
Start: 2018-11-26 | End: 2018-11-26 | Stop reason: SDUPTHER

## 2018-11-26 RX ORDER — KETOROLAC TROMETHAMINE 30 MG/ML
15 INJECTION, SOLUTION INTRAMUSCULAR; INTRAVENOUS 2 TIMES DAILY
Status: DISCONTINUED | OUTPATIENT
Start: 2018-11-26 | End: 2018-11-28 | Stop reason: HOSPADM

## 2018-11-26 RX ORDER — DEXAMETHASONE SODIUM PHOSPHATE 10 MG/ML
INJECTION, SOLUTION INTRAMUSCULAR; INTRAVENOUS PRN
Status: DISCONTINUED | OUTPATIENT
Start: 2018-11-26 | End: 2018-11-26 | Stop reason: SDUPTHER

## 2018-11-26 RX ORDER — CELECOXIB 100 MG/1
200 CAPSULE ORAL ONCE
Status: CANCELLED | OUTPATIENT
Start: 2018-11-26 | End: 2018-11-26

## 2018-11-26 RX ORDER — HYDRALAZINE HYDROCHLORIDE 20 MG/ML
INJECTION INTRAMUSCULAR; INTRAVENOUS PRN
Status: DISCONTINUED | OUTPATIENT
Start: 2018-11-26 | End: 2018-11-26 | Stop reason: SDUPTHER

## 2018-11-26 RX ORDER — ONDANSETRON 2 MG/ML
4 INJECTION INTRAMUSCULAR; INTRAVENOUS EVERY 6 HOURS PRN
Status: DISCONTINUED | OUTPATIENT
Start: 2018-11-26 | End: 2018-11-28 | Stop reason: HOSPADM

## 2018-11-26 RX ORDER — CELECOXIB 100 MG/1
200 CAPSULE ORAL ONCE
Status: DISCONTINUED | OUTPATIENT
Start: 2018-11-26 | End: 2018-11-26 | Stop reason: HOSPADM

## 2018-11-26 RX ORDER — FENTANYL CITRATE 50 UG/ML
INJECTION, SOLUTION INTRAMUSCULAR; INTRAVENOUS PRN
Status: DISCONTINUED | OUTPATIENT
Start: 2018-11-26 | End: 2018-11-26 | Stop reason: SDUPTHER

## 2018-11-26 RX ORDER — LABETALOL HYDROCHLORIDE 5 MG/ML
INJECTION, SOLUTION INTRAVENOUS PRN
Status: DISCONTINUED | OUTPATIENT
Start: 2018-11-26 | End: 2018-11-26 | Stop reason: SDUPTHER

## 2018-11-26 RX ORDER — CEFAZOLIN SODIUM 1 G/3ML
INJECTION, POWDER, FOR SOLUTION INTRAMUSCULAR; INTRAVENOUS PRN
Status: DISCONTINUED | OUTPATIENT
Start: 2018-11-26 | End: 2018-11-26 | Stop reason: SDUPTHER

## 2018-11-26 RX ORDER — FENTANYL CITRATE 50 UG/ML
25 INJECTION, SOLUTION INTRAMUSCULAR; INTRAVENOUS EVERY 5 MIN PRN
Status: DISCONTINUED | OUTPATIENT
Start: 2018-11-26 | End: 2018-11-26 | Stop reason: HOSPADM

## 2018-11-26 RX ORDER — CALCIUM GLUCONATE 94 MG/ML
INJECTION, SOLUTION INTRAVENOUS PRN
Status: DISCONTINUED | OUTPATIENT
Start: 2018-11-26 | End: 2018-11-26 | Stop reason: HOSPADM

## 2018-11-26 RX ORDER — OXYCODONE HYDROCHLORIDE AND ACETAMINOPHEN 5; 325 MG/1; MG/1
2 TABLET ORAL EVERY 4 HOURS PRN
Status: DISCONTINUED | OUTPATIENT
Start: 2018-11-26 | End: 2018-11-28 | Stop reason: HOSPADM

## 2018-11-26 RX ORDER — FENTANYL CITRATE 50 UG/ML
50 INJECTION, SOLUTION INTRAMUSCULAR; INTRAVENOUS EVERY 5 MIN PRN
Status: DISCONTINUED | OUTPATIENT
Start: 2018-11-26 | End: 2018-11-26 | Stop reason: HOSPADM

## 2018-11-26 RX ORDER — OXYCODONE HYDROCHLORIDE AND ACETAMINOPHEN 5; 325 MG/1; MG/1
1 TABLET ORAL EVERY 4 HOURS PRN
Status: DISCONTINUED | OUTPATIENT
Start: 2018-11-26 | End: 2018-11-28 | Stop reason: HOSPADM

## 2018-11-26 RX ORDER — OXYCODONE HYDROCHLORIDE AND ACETAMINOPHEN 5; 325 MG/1; MG/1
1 TABLET ORAL EVERY 6 HOURS PRN
Qty: 28 TABLET | Refills: 0 | Status: SHIPPED | OUTPATIENT
Start: 2018-11-26 | End: 2018-12-03

## 2018-11-26 RX ORDER — ROPIVACAINE HYDROCHLORIDE 5 MG/ML
30 INJECTION, SOLUTION EPIDURAL; INFILTRATION; PERINEURAL ONCE
Status: COMPLETED | OUTPATIENT
Start: 2018-11-26 | End: 2018-11-26

## 2018-11-26 RX ORDER — GABAPENTIN 300 MG/1
600 CAPSULE ORAL ONCE
Status: DISCONTINUED | OUTPATIENT
Start: 2018-11-26 | End: 2018-11-26 | Stop reason: HOSPADM

## 2018-11-26 RX ORDER — FENTANYL CITRATE 50 UG/ML
50 INJECTION, SOLUTION INTRAMUSCULAR; INTRAVENOUS EVERY 5 MIN PRN
Status: COMPLETED | OUTPATIENT
Start: 2018-11-26 | End: 2018-11-26

## 2018-11-26 RX ORDER — DOCUSATE SODIUM 100 MG/1
100 CAPSULE, LIQUID FILLED ORAL 2 TIMES DAILY
Status: DISCONTINUED | OUTPATIENT
Start: 2018-11-26 | End: 2018-11-28 | Stop reason: HOSPADM

## 2018-11-26 RX ORDER — LIDOCAINE HYDROCHLORIDE 20 MG/ML
INJECTION, SOLUTION EPIDURAL; INFILTRATION; INTRACAUDAL; PERINEURAL PRN
Status: DISCONTINUED | OUTPATIENT
Start: 2018-11-26 | End: 2018-11-26 | Stop reason: SDUPTHER

## 2018-11-26 RX ORDER — DEXTROSE MONOHYDRATE 50 MG/ML
100 INJECTION, SOLUTION INTRAVENOUS PRN
Status: DISCONTINUED | OUTPATIENT
Start: 2018-11-26 | End: 2018-11-28 | Stop reason: HOSPADM

## 2018-11-26 RX ORDER — OXYCODONE HCL 10 MG/1
10 TABLET, FILM COATED, EXTENDED RELEASE ORAL ONCE
Status: DISCONTINUED | OUTPATIENT
Start: 2018-11-26 | End: 2018-11-26 | Stop reason: HOSPADM

## 2018-11-26 RX ORDER — GLYCOPYRROLATE 1 MG/5 ML
SYRINGE (ML) INTRAVENOUS PRN
Status: DISCONTINUED | OUTPATIENT
Start: 2018-11-26 | End: 2018-11-26 | Stop reason: SDUPTHER

## 2018-11-26 RX ORDER — MEPERIDINE HYDROCHLORIDE 50 MG/ML
12.5 INJECTION INTRAMUSCULAR; INTRAVENOUS; SUBCUTANEOUS EVERY 5 MIN PRN
Status: DISCONTINUED | OUTPATIENT
Start: 2018-11-26 | End: 2018-11-26 | Stop reason: HOSPADM

## 2018-11-26 RX ORDER — ONDANSETRON 2 MG/ML
INJECTION INTRAMUSCULAR; INTRAVENOUS PRN
Status: DISCONTINUED | OUTPATIENT
Start: 2018-11-26 | End: 2018-11-26 | Stop reason: SDUPTHER

## 2018-11-26 RX ORDER — SODIUM CHLORIDE 0.9 % (FLUSH) 0.9 %
10 SYRINGE (ML) INJECTION PRN
Status: DISCONTINUED | OUTPATIENT
Start: 2018-11-26 | End: 2018-11-26 | Stop reason: HOSPADM

## 2018-11-26 RX ORDER — TRIAMTERENE AND HYDROCHLOROTHIAZIDE 37.5; 25 MG/1; MG/1
1 TABLET ORAL DAILY
Status: DISCONTINUED | OUTPATIENT
Start: 2018-11-26 | End: 2018-11-28 | Stop reason: HOSPADM

## 2018-11-26 RX ORDER — SODIUM CHLORIDE 0.9 % (FLUSH) 0.9 %
10 SYRINGE (ML) INJECTION EVERY 12 HOURS SCHEDULED
Status: DISCONTINUED | OUTPATIENT
Start: 2018-11-26 | End: 2018-11-28 | Stop reason: HOSPADM

## 2018-11-26 RX ORDER — SODIUM CHLORIDE 0.9 % (FLUSH) 0.9 %
10 SYRINGE (ML) INJECTION PRN
Status: DISCONTINUED | OUTPATIENT
Start: 2018-11-26 | End: 2018-11-28 | Stop reason: HOSPADM

## 2018-11-26 RX ORDER — SODIUM CHLORIDE, SODIUM LACTATE, POTASSIUM CHLORIDE, CALCIUM CHLORIDE 600; 310; 30; 20 MG/100ML; MG/100ML; MG/100ML; MG/100ML
INJECTION, SOLUTION INTRAVENOUS CONTINUOUS
Status: DISCONTINUED | OUTPATIENT
Start: 2018-11-26 | End: 2018-11-26

## 2018-11-26 RX ORDER — ACETAMINOPHEN 325 MG/1
650 TABLET ORAL EVERY 4 HOURS PRN
Status: DISCONTINUED | OUTPATIENT
Start: 2018-11-26 | End: 2018-11-28 | Stop reason: HOSPADM

## 2018-11-26 RX ORDER — PROMETHAZINE HYDROCHLORIDE 25 MG/ML
6.25 INJECTION, SOLUTION INTRAMUSCULAR; INTRAVENOUS
Status: DISCONTINUED | OUTPATIENT
Start: 2018-11-26 | End: 2018-11-26 | Stop reason: HOSPADM

## 2018-11-26 RX ORDER — MIDAZOLAM HYDROCHLORIDE 1 MG/ML
1 INJECTION INTRAMUSCULAR; INTRAVENOUS EVERY 5 MIN PRN
Status: COMPLETED | OUTPATIENT
Start: 2018-11-26 | End: 2018-11-26

## 2018-11-26 RX ORDER — DIPHENHYDRAMINE HYDROCHLORIDE 50 MG/ML
25 INJECTION INTRAMUSCULAR; INTRAVENOUS EVERY 6 HOURS PRN
Status: DISCONTINUED | OUTPATIENT
Start: 2018-11-26 | End: 2018-11-28 | Stop reason: HOSPADM

## 2018-11-26 RX ORDER — OXYCODONE HCL 10 MG/1
10 TABLET, FILM COATED, EXTENDED RELEASE ORAL ONCE
Status: CANCELLED | OUTPATIENT
Start: 2018-11-26 | End: 2018-11-26

## 2018-11-26 RX ORDER — ROCURONIUM BROMIDE 10 MG/ML
INJECTION, SOLUTION INTRAVENOUS PRN
Status: DISCONTINUED | OUTPATIENT
Start: 2018-11-26 | End: 2018-11-26 | Stop reason: SDUPTHER

## 2018-11-26 RX ORDER — MIDAZOLAM HYDROCHLORIDE 1 MG/ML
1 INJECTION INTRAMUSCULAR; INTRAVENOUS PRN
Status: CANCELLED | OUTPATIENT
Start: 2018-11-26

## 2018-11-26 RX ORDER — DIPHENHYDRAMINE HYDROCHLORIDE 50 MG/ML
12.5 INJECTION INTRAMUSCULAR; INTRAVENOUS
Status: DISCONTINUED | OUTPATIENT
Start: 2018-11-26 | End: 2018-11-26 | Stop reason: HOSPADM

## 2018-11-26 RX ORDER — GABAPENTIN 300 MG/1
600 CAPSULE ORAL ONCE
Status: CANCELLED | OUTPATIENT
Start: 2018-11-26 | End: 2018-11-26

## 2018-11-26 RX ORDER — PROPOFOL 10 MG/ML
INJECTION, EMULSION INTRAVENOUS PRN
Status: DISCONTINUED | OUTPATIENT
Start: 2018-11-26 | End: 2018-11-26 | Stop reason: SDUPTHER

## 2018-11-26 RX ORDER — MORPHINE SULFATE 2 MG/ML
2 INJECTION, SOLUTION INTRAMUSCULAR; INTRAVENOUS
Status: DISCONTINUED | OUTPATIENT
Start: 2018-11-26 | End: 2018-11-28 | Stop reason: HOSPADM

## 2018-11-26 RX ORDER — OXYCODONE HYDROCHLORIDE AND ACETAMINOPHEN 5; 325 MG/1; MG/1
1 TABLET ORAL
Status: DISCONTINUED | OUTPATIENT
Start: 2018-11-26 | End: 2018-11-26 | Stop reason: HOSPADM

## 2018-11-26 RX ORDER — ROPIVACAINE HYDROCHLORIDE 5 MG/ML
30 INJECTION, SOLUTION EPIDURAL; INFILTRATION; PERINEURAL ONCE
Status: CANCELLED | OUTPATIENT
Start: 2018-11-26

## 2018-11-26 RX ORDER — ACETAMINOPHEN 500 MG
1000 TABLET ORAL ONCE
Status: DISCONTINUED | OUTPATIENT
Start: 2018-11-26 | End: 2018-11-26 | Stop reason: HOSPADM

## 2018-11-26 RX ADMIN — OXYCODONE AND ACETAMINOPHEN 2 TABLET: 5; 325 TABLET ORAL at 23:25

## 2018-11-26 RX ADMIN — INSULIN LISPRO 2 UNITS: 100 INJECTION, SOLUTION INTRAVENOUS; SUBCUTANEOUS at 13:02

## 2018-11-26 RX ADMIN — LABETALOL HYDROCHLORIDE 5 MG: 5 INJECTION INTRAVENOUS at 08:03

## 2018-11-26 RX ADMIN — NEOSTIGMINE METHYLSULFATE 3 MG: 0.5 INJECTION, SOLUTION INTRAVENOUS at 09:36

## 2018-11-26 RX ADMIN — FENTANYL CITRATE 50 MCG: 50 INJECTION, SOLUTION INTRAMUSCULAR; INTRAVENOUS at 07:48

## 2018-11-26 RX ADMIN — CARVEDILOL 25 MG: 25 TABLET, FILM COATED ORAL at 16:29

## 2018-11-26 RX ADMIN — INSULIN LISPRO 2 UNITS: 100 INJECTION, SOLUTION INTRAVENOUS; SUBCUTANEOUS at 20:37

## 2018-11-26 RX ADMIN — HYDROMORPHONE HYDROCHLORIDE 0.5 MG: 1 INJECTION, SOLUTION INTRAMUSCULAR; INTRAVENOUS; SUBCUTANEOUS at 10:20

## 2018-11-26 RX ADMIN — LIDOCAINE HYDROCHLORIDE 60 MG: 20 INJECTION, SOLUTION EPIDURAL; INFILTRATION; INTRACAUDAL; PERINEURAL at 07:48

## 2018-11-26 RX ADMIN — CEFAZOLIN SODIUM 3 G: 10 INJECTION, POWDER, FOR SOLUTION INTRAVENOUS at 23:26

## 2018-11-26 RX ADMIN — OXYCODONE AND ACETAMINOPHEN 2 TABLET: 5; 325 TABLET ORAL at 15:01

## 2018-11-26 RX ADMIN — HYDROMORPHONE HYDROCHLORIDE 0.5 MG: 1 INJECTION, SOLUTION INTRAMUSCULAR; INTRAVENOUS; SUBCUTANEOUS at 10:15

## 2018-11-26 RX ADMIN — MIDAZOLAM HYDROCHLORIDE 1 MG: 2 INJECTION, SOLUTION INTRAMUSCULAR; INTRAVENOUS at 07:22

## 2018-11-26 RX ADMIN — HYDRALAZINE HYDROCHLORIDE 5 MG: 20 INJECTION INTRAMUSCULAR; INTRAVENOUS at 08:20

## 2018-11-26 RX ADMIN — ENOXAPARIN SODIUM 30 MG: 30 INJECTION SUBCUTANEOUS at 20:22

## 2018-11-26 RX ADMIN — FENTANYL CITRATE 50 MCG: 50 INJECTION, SOLUTION INTRAMUSCULAR; INTRAVENOUS at 10:06

## 2018-11-26 RX ADMIN — OXYCODONE AND ACETAMINOPHEN 2 TABLET: 5; 325 TABLET ORAL at 19:24

## 2018-11-26 RX ADMIN — LABETALOL HYDROCHLORIDE 10 MG: 5 INJECTION INTRAVENOUS at 08:06

## 2018-11-26 RX ADMIN — TRIAMTERENE AND HYDROCHLOROTHIAZIDE 1 TABLET: 37.5; 25 TABLET ORAL at 15:00

## 2018-11-26 RX ADMIN — KETOROLAC TROMETHAMINE 15 MG: 30 INJECTION, SOLUTION INTRAMUSCULAR at 20:19

## 2018-11-26 RX ADMIN — SODIUM CHLORIDE, POTASSIUM CHLORIDE, SODIUM LACTATE AND CALCIUM CHLORIDE: 600; 310; 30; 20 INJECTION, SOLUTION INTRAVENOUS at 06:43

## 2018-11-26 RX ADMIN — Medication 0.6 MG: at 09:36

## 2018-11-26 RX ADMIN — ROPIVACAINE HYDROCHLORIDE 30 ML: 5 INJECTION, SOLUTION EPIDURAL; INFILTRATION; PERINEURAL at 07:16

## 2018-11-26 RX ADMIN — SODIUM CHLORIDE: 9 INJECTION, SOLUTION INTRAVENOUS at 07:48

## 2018-11-26 RX ADMIN — FENTANYL CITRATE 50 MCG: 50 INJECTION, SOLUTION INTRAMUSCULAR; INTRAVENOUS at 08:00

## 2018-11-26 RX ADMIN — FENTANYL CITRATE 50 MCG: 50 INJECTION, SOLUTION INTRAMUSCULAR; INTRAVENOUS at 07:22

## 2018-11-26 RX ADMIN — INSULIN LISPRO 2 UNITS: 100 INJECTION, SOLUTION INTRAVENOUS; SUBCUTANEOUS at 16:36

## 2018-11-26 RX ADMIN — PROPOFOL 170 MG: 10 INJECTION, EMULSION INTRAVENOUS at 07:48

## 2018-11-26 RX ADMIN — DOCUSATE SODIUM 100 MG: 100 CAPSULE, LIQUID FILLED ORAL at 11:50

## 2018-11-26 RX ADMIN — MORPHINE SULFATE 4 MG: 2 INJECTION, SOLUTION INTRAMUSCULAR; INTRAVENOUS at 23:30

## 2018-11-26 RX ADMIN — ROCURONIUM BROMIDE 50 MG: 10 INJECTION INTRAVENOUS at 07:48

## 2018-11-26 RX ADMIN — TRANEXAMIC ACID 1 G: 1 INJECTION, SOLUTION INTRAVENOUS at 07:48

## 2018-11-26 RX ADMIN — HYDRALAZINE HYDROCHLORIDE 5 MG: 20 INJECTION INTRAMUSCULAR; INTRAVENOUS at 08:10

## 2018-11-26 RX ADMIN — Medication 10 ML: at 20:22

## 2018-11-26 RX ADMIN — FENTANYL CITRATE 50 MCG: 50 INJECTION, SOLUTION INTRAMUSCULAR; INTRAVENOUS at 10:00

## 2018-11-26 RX ADMIN — DOCUSATE SODIUM 100 MG: 100 CAPSULE, LIQUID FILLED ORAL at 20:21

## 2018-11-26 RX ADMIN — FENTANYL CITRATE 50 MCG: 50 INJECTION, SOLUTION INTRAMUSCULAR; INTRAVENOUS at 07:17

## 2018-11-26 RX ADMIN — DEXAMETHASONE SODIUM PHOSPHATE 10 MG: 10 INJECTION INTRAMUSCULAR; INTRAVENOUS at 07:48

## 2018-11-26 RX ADMIN — MORPHINE SULFATE 4 MG: 2 INJECTION, SOLUTION INTRAMUSCULAR; INTRAVENOUS at 20:19

## 2018-11-26 RX ADMIN — CEFAZOLIN SODIUM 3 G: 10 INJECTION, POWDER, FOR SOLUTION INTRAVENOUS at 16:29

## 2018-11-26 RX ADMIN — TRANEXAMIC ACID 1000 MG: 1 INJECTION, SOLUTION INTRAVENOUS at 10:54

## 2018-11-26 RX ADMIN — PROPOFOL 200 MG: 10 INJECTION, EMULSION INTRAVENOUS at 09:15

## 2018-11-26 RX ADMIN — KETOROLAC TROMETHAMINE 15 MG: 30 INJECTION, SOLUTION INTRAMUSCULAR at 11:51

## 2018-11-26 RX ADMIN — MIDAZOLAM HYDROCHLORIDE 1 MG: 2 INJECTION, SOLUTION INTRAMUSCULAR; INTRAVENOUS at 07:17

## 2018-11-26 RX ADMIN — Medication 10 ML: at 23:26

## 2018-11-26 RX ADMIN — ONDANSETRON HYDROCHLORIDE 4 MG: 2 INJECTION, SOLUTION INTRAMUSCULAR; INTRAVENOUS at 09:05

## 2018-11-26 RX ADMIN — CEFAZOLIN 3000 MG: 1 INJECTION, POWDER, FOR SOLUTION INTRAMUSCULAR; INTRAVENOUS at 07:32

## 2018-11-26 RX ADMIN — LABETALOL HYDROCHLORIDE 5 MG: 5 INJECTION INTRAVENOUS at 08:05

## 2018-11-26 RX ADMIN — PROPOFOL 30 MG: 10 INJECTION, EMULSION INTRAVENOUS at 08:00

## 2018-11-26 ASSESSMENT — PULMONARY FUNCTION TESTS
PIF_VALUE: 33
PIF_VALUE: 33
PIF_VALUE: 32
PIF_VALUE: 32
PIF_VALUE: 34
PIF_VALUE: 1
PIF_VALUE: 35
PIF_VALUE: 0
PIF_VALUE: 32
PIF_VALUE: 34
PIF_VALUE: 31
PIF_VALUE: 32
PIF_VALUE: 35
PIF_VALUE: 36
PIF_VALUE: 30
PIF_VALUE: 29
PIF_VALUE: 36
PIF_VALUE: 1
PIF_VALUE: 35
PIF_VALUE: 16
PIF_VALUE: 36
PIF_VALUE: 34
PIF_VALUE: 30
PIF_VALUE: 32
PIF_VALUE: 34
PIF_VALUE: 35
PIF_VALUE: 33
PIF_VALUE: 34
PIF_VALUE: 32
PIF_VALUE: 35
PIF_VALUE: 34
PIF_VALUE: 0
PIF_VALUE: 33
PIF_VALUE: 35
PIF_VALUE: 34
PIF_VALUE: 32
PIF_VALUE: 32
PIF_VALUE: 2
PIF_VALUE: 22
PIF_VALUE: 34
PIF_VALUE: 37
PIF_VALUE: 1
PIF_VALUE: 32
PIF_VALUE: 35
PIF_VALUE: 37
PIF_VALUE: 34
PIF_VALUE: 36
PIF_VALUE: 33
PIF_VALUE: 32
PIF_VALUE: 34
PIF_VALUE: 32
PIF_VALUE: 29
PIF_VALUE: 35
PIF_VALUE: 0
PIF_VALUE: 30
PIF_VALUE: 34
PIF_VALUE: 35
PIF_VALUE: 34
PIF_VALUE: 33
PIF_VALUE: 16
PIF_VALUE: 34
PIF_VALUE: 35
PIF_VALUE: 44
PIF_VALUE: 35
PIF_VALUE: 35
PIF_VALUE: 34
PIF_VALUE: 36
PIF_VALUE: 32
PIF_VALUE: 32
PIF_VALUE: 33
PIF_VALUE: 35
PIF_VALUE: 34
PIF_VALUE: 30
PIF_VALUE: 32
PIF_VALUE: 32
PIF_VALUE: 35
PIF_VALUE: 32
PIF_VALUE: 35
PIF_VALUE: 35
PIF_VALUE: 34
PIF_VALUE: 36
PIF_VALUE: 32
PIF_VALUE: 33
PIF_VALUE: 33
PIF_VALUE: 36
PIF_VALUE: 1
PIF_VALUE: 32
PIF_VALUE: 32
PIF_VALUE: 36
PIF_VALUE: 30
PIF_VALUE: 16
PIF_VALUE: 34
PIF_VALUE: 2
PIF_VALUE: 30
PIF_VALUE: 32
PIF_VALUE: 33
PIF_VALUE: 33
PIF_VALUE: 35
PIF_VALUE: 34
PIF_VALUE: 35
PIF_VALUE: 33
PIF_VALUE: 35
PIF_VALUE: 18
PIF_VALUE: 33
PIF_VALUE: 2
PIF_VALUE: 33
PIF_VALUE: 1
PIF_VALUE: 34
PIF_VALUE: 33
PIF_VALUE: 35
PIF_VALUE: 31
PIF_VALUE: 33
PIF_VALUE: 35
PIF_VALUE: 33
PIF_VALUE: 32
PIF_VALUE: 35
PIF_VALUE: 0
PIF_VALUE: 29

## 2018-11-26 ASSESSMENT — PAIN DESCRIPTION - FREQUENCY
FREQUENCY: CONTINUOUS
FREQUENCY: CONTINUOUS
FREQUENCY: INTERMITTENT
FREQUENCY: CONTINUOUS
FREQUENCY: INTERMITTENT

## 2018-11-26 ASSESSMENT — PAIN DESCRIPTION - LOCATION
LOCATION: KNEE

## 2018-11-26 ASSESSMENT — PAIN DESCRIPTION - ORIENTATION
ORIENTATION: RIGHT

## 2018-11-26 ASSESSMENT — PAIN DESCRIPTION - PAIN TYPE
TYPE: SURGICAL PAIN

## 2018-11-26 ASSESSMENT — PAIN SCALES - GENERAL
PAINLEVEL_OUTOF10: 9
PAINLEVEL_OUTOF10: 7
PAINLEVEL_OUTOF10: 2
PAINLEVEL_OUTOF10: 4
PAINLEVEL_OUTOF10: 7
PAINLEVEL_OUTOF10: 2
PAINLEVEL_OUTOF10: 2
PAINLEVEL_OUTOF10: 7
PAINLEVEL_OUTOF10: 10
PAINLEVEL_OUTOF10: 3
PAINLEVEL_OUTOF10: 8
PAINLEVEL_OUTOF10: 0
PAINLEVEL_OUTOF10: 7
PAINLEVEL_OUTOF10: 7
PAINLEVEL_OUTOF10: 2
PAINLEVEL_OUTOF10: 10
PAINLEVEL_OUTOF10: 4
PAINLEVEL_OUTOF10: 9

## 2018-11-26 ASSESSMENT — PAIN DESCRIPTION - DESCRIPTORS
DESCRIPTORS: ACHING;BURNING;CONSTANT
DESCRIPTORS: ACHING;SORE
DESCRIPTORS: ACHING;DISCOMFORT;SORE
DESCRIPTORS: ACHING;BURNING;CONSTANT
DESCRIPTORS: ACHING;DISCOMFORT;SORE
DESCRIPTORS: ACHING;BURNING;CONSTANT
DESCRIPTORS: ACHING;BURNING;CONSTANT
DESCRIPTORS: ACHING;DISCOMFORT;SORE
DESCRIPTORS: ACHING;CONSTANT;DISCOMFORT
DESCRIPTORS: ACHING;SORE

## 2018-11-26 ASSESSMENT — PAIN - FUNCTIONAL ASSESSMENT: PAIN_FUNCTIONAL_ASSESSMENT: 0-10

## 2018-11-26 ASSESSMENT — PAIN DESCRIPTION - ONSET
ONSET: ON-GOING

## 2018-11-26 ASSESSMENT — LIFESTYLE VARIABLES: SMOKING_STATUS: 1

## 2018-11-26 NOTE — ANESTHESIA PRE PROCEDURE
Department of Anesthesiology  Preprocedure Note       Name:  Stan Orozco   Age:  62 y.o.  :  1960                                          MRN:  [id number]         Date:  2018      Surgeon: Randall Orr):  Rox Larson DO    Procedure: RIGHT KNEE TOTAL ARTHROPLASTY--SRIDEVI (Right )    Medications prior to admission:   Prior to Admission medications    Medication Sig Start Date End Date Taking?  Authorizing Provider   carvedilol (COREG) 25 MG tablet TAKE ONE TABLET BY MOUTH TWICE DAILY (WITH MEALS) 10/22/18  Yes TRUDY Martinez   buPROPion (WELLBUTRIN XL) 150 MG extended release tablet TAKE 1 TABLET BY MOUTH EVERY DAY IN THE MORNING 18   Erica West, DO   omega-3 acid ethyl esters (LOVAZA) 1 g capsule Take 1 capsule by mouth 2 times daily 18   Jerald Byrne,    niacin (NIASPAN) 500 MG extended release tablet TAKE 1 TABLET BY MOUTH EVERY EVENING 10/30/18   TRUDY Martinez   atorvastatin (LIPITOR) 80 MG tablet TAKE 1 TABLET BY MOUTH AT BEDTIME 10/22/18   TRUDY Martinez   diclofenac (VOLTAREN) 75 MG EC tablet Take 1 tablet by mouth 2 times daily 10/22/18   TRUDY Martinez   metFORMIN (GLUCOPHAGE) 500 MG tablet Take 1 tablet by mouth 2 times daily (with meals) 10/22/18   TRUDY Martinez   pioglitazone (ACTOS) 15 MG tablet TAKE 1 TABLET BY MOUTH DAILY 18   TRUDY Martinez   triamterene-hydrochlorothiazide [facility]) 37.5-25 MG per tablet Take 1 tablet by mouth daily 18  TRUDY Martinez   quinapril (ACCUPRIL) 40 MG tablet Take 1 tablet by mouth daily  Patient taking differently: Take 40 mg by mouth every evening  18   TRUDY Martinez   nitroGLYCERIN (NITROSTAT) 0.4 MG SL tablet Place 1 tablet under the tongue as needed for Chest pain 2/15/18 2/15/19  TRUDY Martinez   Blood Pressure Monitoring (BLOOD PRESSURE CUFF) MISC 1 Device by Does not apply route 2 times daily Dx:  Hypertension with labile blood pressure 2/15/18   TRUDY Martinez PHART, PO2ART, NCG2LYL, FSR6VBZ, BEART, [id number]     Type & Screen (If Applicable):  Lab Results   Component Value Date    LABABO O 12/30/2011    LABRH POS 12/30/2011     EKG 10/3/18   Normal Sinus Rhythm   Nonspecific T wave abnormality     ECHO 5/23/17  EF 50-55%  Mild Aortic Valve Stenosis      Anesthesia Evaluation  Patient summary reviewed and Nursing notes reviewed no history of anesthetic complications:   Airway: Mallampati: III  TM distance: <3 FB   Neck ROM: full  Mouth opening: > = 3 FB Dental:    (+) upper dentures and lower dentures      Pulmonary:   (+) sleep apnea: on noncompliant,  decreased breath sounds,  current smoker    (-) wheezes          Patient smoked on day of surgery. Cardiovascular:  Exercise tolerance: poor (<4 METS),   (+) hypertension:, valvular problems/murmurs (mild AS seen on previous echocardiogram): AS, past MI:, CAD:, CABG/stent ( hx of 5 stents, patient states last one placed 7 years ago):, murmur, hyperlipidemia      ECG reviewed  Rhythm: regular  Rate: normal  Echocardiogram reviewed         Beta Blocker:  Not on Beta Blocker         Neuro/Psych:   (+) neuromuscular disease:, psychiatric history:depression/anxiety             GI/Hepatic/Renal:   (+) hiatal hernia, morbid obesity          Endo/Other:    (+) DiabetesType II DM, , blood dyscrasia ( plavix last taken 11/20): anticoagulation therapy:., .                 Abdominal:   (+) obese,         Vascular:                                    Anesthesia Plan      general     ASA 4     (IV 20 AC    Talked to patient and spouse about possible postop ventilation and other supplemental oxygen methods such as nasal cannula, facemask and BIPAP/CPAP due to history of obstructive sleep apnea and morbid obesity.)  Induction: intravenous. MIPS: Postoperative opioids intended, Prophylactic antiemetics administered and Postoperative trial extubation. Anesthetic plan and risks discussed with patient and spouse.     Use of

## 2018-11-26 NOTE — PROGRESS NOTES
Nutrition Education    Type and Reason for Visit:  Consult    Patient stated good po intake PTA. Explained the role of nutrition in post-op healing. Handout and contact information provided. Will order Guille BID while inpatient. · Verbally reviewed following information with PATIENT  · Written educational materials provided. · Contact name and number provided. · Refer to Patient Education activity for more details.     Electronically signed by Susanna Jerome RD, DASH on 11/26/18 at 2:51 PM    Contact Number: [phone]

## 2018-11-26 NOTE — H&P
[facility])      Past Surgical History:        Procedure Laterality Date    CORONARY ANGIOPLASTY WITH STENT PLACEMENT  X4    CORONARY ANGIOPLASTY WITH STENT PLACEMENT  12/30/2011    Dr. Florencio Malone 1st OM 3.0 x 20 Ion    DIAGNOSTIC CARDIAC CATH LAB PROCEDURE  3/15/2005    [facility]. Mild to moderate CAD. Normal LV.  DIAGNOSTIC CARDIAC CATH LAB PROCEDURE  1/16/2007    [facility]. Cath/PTCA/DE stent of proximal and distal RCA.  DIAGNOSTIC CARDIAC CATH LAB PROCEDURE  2/21/2007    Cath/DE stent of proximal OM1 and proximal Cx.  DIAGNOSTIC CARDIAC CATH LAB PROCEDURE  12/30/2011    ANURADHA of mid OM branch of circumflex.  ECHO COMPL W DOP COLOR FLOW  12/30/2011         HERNIA REPAIR  2/2014    laparoscopic ventral hernia repain    JOINT REPLACEMENT Left 4/1/14    TKA-ed     Current Medications:   Current Facility-Administered Medications: celecoxib (CELEBREX) capsule 200 mg, 200 mg, Oral, Once  gabapentin (NEURONTIN) capsule 600 mg, 600 mg, Oral, Once  oxyCODONE (OXYCONTIN) extended release tablet 10 mg, 10 mg, Oral, Once  acetaminophen (TYLENOL) tablet 1,000 mg, 1,000 mg, Oral, Once  midazolam (VERSED) injection 1 mg, 1 mg, Intravenous, Q5 Min PRN  fentaNYL (SUBLIMAZE) injection 50 mcg, 50 mcg, Intravenous, Q5 Min PRN  ceFAZolin (ANCEF) 3 g in dextrose 5 % 100 mL IVPB, 3 g, Intravenous, On Call to OR  lactated ringers infusion, , Intravenous, Continuous  sodium chloride flush 0.9 % injection 10 mL, 10 mL, Intravenous, 2 times per day  sodium chloride flush 0.9 % injection 10 mL, 10 mL, Intravenous, PRN  tranexamic acid (CYKLOKAPRON) 1,000 mg in dextrose 5 % 100 mL IVPB, 1,000 mg, Intravenous, Once  tranexamic acid (CYKLOKAPRON) 1,000 mg in dextrose 5 % 100 mL IVPB, 1,000 mg, Intravenous, Once  Allergies:  Bee venom    Social History:   TOBACCO:   reports that he has been smoking Cigarettes. He has a 19.50 pack-year smoking history.  He has never used smokeless tobacco.  ETOH:   reports that he does not drink

## 2018-11-26 NOTE — CONSULTS
Hospital Medicine  Consult History & Physical        Chief Complaint:    Medical management    Date of Service:   11/26/2018    History Of Present Illness:      62 y.o. male who we are asked to see/evaluate by Magalys Hercules DO for medical management. Admitted to orthopedic services following a right knee total arthroplasty for osteoarthritis. The patient had been having pain in his right knee controlled with percocet under pain management care. He has PMH of CAD, DM, HTN, HLD and MANOLO. He is on Plavix at home. [facility] is consulted for medical management of his co-morbid conditions. Currently, he is lying in bed. He is alert and oriented to person, place and time. He states his pain is controlled at the present time. He has been placed on SSI while inpatient and is on lovenox. Plavix will be restarted when ok with ortho. He denies any chest pain, shortness of breath, nausea or abdominal pain at the present time. Past Medical History:        Diagnosis Date    Anticoagulant long-term use     CAD (coronary artery disease)     Chronic back pain     Depression     Diabetes mellitus (HCC)     DJD (degenerative joint disease)     Knees.  Dyslipidemia     Hiatal hernia 1979    History of ST elevation myocardial infarction (STEMI)     Hyperlipidemia     Hypertension     MI (myocardial infarction) ([facility] [address] 1/2007    Inferior wall.  Obesity     Presence of stent in left circumflex coronary artery     Presence of stent in right coronary artery     Sleep apnea     cpap    Smoker     Type 2 diabetes mellitus without complication ([facility] [address]        Past Surgical History:        Procedure Laterality Date    CORONARY ANGIOPLASTY WITH STENT PLACEMENT  X4    CORONARY ANGIOPLASTY WITH STENT PLACEMENT  12/30/2011    Dr. Do Steven 1st OM 3.0 x 20 Ion    DIAGNOSTIC CARDIAC CATH LAB PROCEDURE  3/15/2005    [facility]. Mild to moderate CAD. Normal LV.     DIAGNOSTIC CARDIAC CATH LAB PROCEDURE  1/16/2007

## 2018-11-26 NOTE — H&P
Please refer to H&P below. There has been no interval changes in history and physical examination. No recent illnesses. Electronically Signed By  Demario Orr D.O.  11/26/2018  7:03 AM      Progress Note with  History and Physical             Lola Matute is a 62 y.o. male, his YOB: 1960 with the following history as recorded in [facility]:             Patient Active Problem List     Diagnosis Date Noted    Chronic pain syndrome 07/27/2018    Chronic pain of right knee 07/27/2018    Type 2 diabetes mellitus without complication, without long-term current use of insulin ([facility] [address] 03/16/2018    Aortic valve stenosis 02/15/2018    Primary osteoarthritis of right knee 02/15/2018    Presence of stent in left circumflex coronary artery      Smoker      CAD (coronary artery disease) 12/28/2011    HTN (hypertension) 12/28/2011    Hyperlipemia 12/28/2011      Current Facility-Administered Medications          Current Outpatient Prescriptions   Medication Sig Dispense Refill    HYDROcodone-acetaminophen (NORCO) 5-325 MG per tablet Take 1 tablet by mouth 3 times daily as needed for Pain for up to 30 days. . 90 tablet 0    pioglitazone (ACTOS) 15 MG tablet TAKE 1 TABLET BY MOUTH DAILY 30 tablet 2    niacin (NIASPAN) 500 MG extended release tablet TAKE 1 TABLET BY MOUTH EVERY EVENING 30 tablet 2    diclofenac (VOLTAREN) 75 MG EC tablet TAKE 1 TABLET BY MOUTH 2 TIMES DAILY 60 tablet 2    cyclobenzaprine (FLEXERIL) 10 MG tablet TAKE 1 TABLET BY MOUTH DAILY 30 tablet 2    metFORMIN (GLUCOPHAGE) 500 MG tablet Take 1 tablet by mouth 2 times daily (with meals) 60 tablet 3    atorvastatin (LIPITOR) 80 MG tablet TAKE 1 TABLET BY MOUTH AT BEDTIME 90 tablet 3    carvedilol (COREG) 25 MG tablet TAKE ONE TABLET BY MOUTH TWICE DAILY (WITH MEALS) 180 tablet 3    triamterene-hydrochlorothiazide (MAXZIDE-25) 37.5-25 MG per tablet Take 1 tablet by mouth daily 90 tablet 3    quinapril (ACCUPRIL) 40 MG tablet as well as his ability to work. States his pain on average is a 6 out of 10 daily. It is worse with activities including stairs and prolonged walking or standing. Feels that it does want to give out at times due to the pain. He has been using a knee brace with minimal improvement. Patient also admits to recent weight gain. He is an underlying diabetic but states his blood sugars are under control. He does have cardiac stents in place and on plavix.     Review of Systems   Constitutional: Negative for appetite change, chills, diaphoresis, fatigue and fever. HENT: Positive for postnasal drip, rhinorrhea, sinus pain and sinus pressure. Negative for congestion, dental problem (upper and lower), hearing loss, sneezing, sore throat and tinnitus. Eyes: Negative for pain, discharge, redness and itching. Respiratory: Negative for cough, shortness of breath and wheezing. Cardiovascular: Negative for chest pain, palpitations and leg swelling. Gastrointestinal: Negative for abdominal pain, blood in stool, constipation, diarrhea, nausea and vomiting. Genitourinary: Negative for difficulty urinating and hematuria. Musculoskeletal: Negative for back pain, gait problem, joint swelling, neck pain and neck stiffness. Skin: Negative for pallor, rash and wound. Neurological: Negative for dizziness, tremors, seizures, weakness, light-headedness, numbness and headaches. Psychiatric/Behavioral: Negative.          Objective:   Physical Exam   Constitutional: He is oriented to person, place, and time. He appears well-developed and well-nourished. No distress. HENT:   Head: Normocephalic and atraumatic. Right Ear: External ear normal.   Left Ear: External ear normal.   Eyes: EOM are normal.   Neck: Normal range of motion. Neck supple. No thyromegaly present. Cardiovascular: Normal rate, regular rhythm and normal heart sounds. Exam reveals no gallop and no friction rub. No murmur heard.   Pulmonary/Chest: Effort

## 2018-11-26 NOTE — ANESTHESIA POSTPROCEDURE EVALUATION
Department of Anesthesiology  Postprocedure Note    Patient: Stan Orozco  MRN: [id number]  YOB: 1960  Date of evaluation: 11/26/2018  Time:  2:41 PM     Procedure Summary     Date:  11/26/18 [place]  [facility] [place] [facility]    Anesthesia Start:  0730 Anesthesia Stop:  0841    Procedure:  RIGHT KNEE TOTAL ARTHROPLASTY (Right Knee) Diagnosis:  (OSTEOARTHRITIS )    Surgeon:  Rox Larson DO Responsible Provider:  Walter Del Rio MD    Anesthesia Type:  general ASA Status:  4          Anesthesia Type: general    Amaury Phase I: Amaury Score: 9    Amaury Phase II:      Last vitals: Reviewed and per EMR flowsheets.        Anesthesia Post Evaluation    Patient location during evaluation: PACU  Patient participation: complete - patient participated  Level of consciousness: awake  Pain score: 2  Airway patency: patent  Nausea & Vomiting: no vomiting and no nausea  Complications: no  Cardiovascular status: hemodynamically stable  Respiratory status: acceptable  Hydration status: stable

## 2018-11-26 NOTE — PROGRESS NOTES
Physical Therapy  Initial Assessment   [facility] ORTHO-TRAUMA    Name: Luis Byers  : 1960  MRN: [id number]    Date of Service: 2018    Evaluating PT:  Jovana Kwong PT [id number]    Room #:  -V  Surgery: 18 S/P R TKR      Diagnosis Date    Anticoagulant long-term use     CAD (coronary artery disease)     Chronic back pain     Depression     Diabetes mellitus ([facility] [address]     DJD (degenerative joint disease)     Knees.  Dyslipidemia     Hiatal hernia 1979    History of ST elevation myocardial infarction (STEMI)     Hyperlipidemia     Hypertension     MI (myocardial infarction) ([facility] [address] 2007    Inferior wall.  Obesity     Presence of stent in left circumflex coronary artery     Presence of stent in right coronary artery     Sleep apnea     cpap    Smoker     Type 2 diabetes mellitus without complication ([facility]           Procedure Laterality Date    CORONARY ANGIOPLASTY WITH STENT PLACEMENT  X4    CORONARY ANGIOPLASTY WITH STENT PLACEMENT  2011    Dr. Akanksha Morrissey 1st OM 3.0 x 20 Ion    DIAGNOSTIC CARDIAC CATH LAB PROCEDURE  3/15/2005    [facility]. Mild to moderate CAD. Normal LV.  DIAGNOSTIC CARDIAC CATH LAB PROCEDURE  2007    [facility]. Cath/PTCA/DE stent of proximal and distal RCA.  DIAGNOSTIC CARDIAC CATH LAB PROCEDURE  2007    Cath/DE stent of proximal OM1 and proximal Cx.  DIAGNOSTIC CARDIAC CATH LAB PROCEDURE  2011    ANURADHA of mid OM branch of circumflex.  ECHO COMPL W DOP COLOR FLOW  2011         HERNIA REPAIR  2014    laparoscopic ventral hernia repain    JOINT REPLACEMENT Left 14    TKA-ed    ND OFFICE/OUTPT VISIT,PROCEDURE ONLY Right 2018    RIGHT KNEE TOTAL ARTHROPLASTY performed by Marv Verde DO at [facility] OR     Precautions:  Falls,   Equipment Needs:  none    Pt lives with wife in a 2 story home with 3 stairs to enter and 1 rail. Bed is on 1 floor and bath is on 1 floor. Pt ambulated with Ind PTA.  Equipment

## 2018-11-26 NOTE — PROGRESS NOTES
Occupational Therapy  OCCUPATIONAL THERAPY INITIAL EVALUATION      Date:2018  Patient Name: Glenda Rudd  MRN: [id number]  : 1960  Room: [address] [place] [address]    Evaluating OT: Marquita Paniagua. Michael Chavez OTR/L #[id number]      AM-PAC Daily Activity Raw Score:   G-Code 8987: CK  Recommended Adaptive Equipment: ww, Elevated BSC, AE prn for LE dressing and bathing     Diagnosis: OA    Surgery: s/p R TKA   Pertinent Medical History: history of  L TKA   Past Medical History:   Diagnosis Date    Anticoagulant long-term use     CAD (coronary artery disease)     Chronic back pain     Depression     Diabetes mellitus ([facility] [address]     DJD (degenerative joint disease)     Knees.  Dyslipidemia     Hiatal hernia     History of ST elevation myocardial infarction (STEMI)     Hyperlipidemia     Hypertension     MI (myocardial infarction) ([facility] [address] 2007    Inferior wall.  Obesity     Presence of stent in left circumflex coronary artery     Presence of stent in right coronary artery     Sleep apnea     cpap    Smoker     Type 2 diabetes mellitus without complication ([facility])         Precautions:  Falls, WBAT  To RLE,      Home Living: Pt lives with wife  in a 2 story home  with 2  step(s) to enter and one rail(s); bed/bath on main floor relocated   Bathroom setup: tub shower and elevated commode   Equipment owned: has ww and 3:1 commode  Prior Level of Function: Independent   with ADLs ,  Independent with IADLs; using no AD for ambulation.    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     Pain Level: 9/10 R knee pain  Cognition: A&O: 3/3; Follows multi step directions   Memory:  good    Sequencing:  good    Problem solving:  good    Judgement/safety:  good -     Functional Assessment:   Initial Eval Status  Date: 18 Treatment Status  Date: Short Term Goals  Treatment frequency: PRN 2-4 x/week   Feeding Independent      Grooming Setup sitting  Mod I with ww    UB Dressing Setup sitting   Mod I   LB Dressing Max A    Mod I with AE prn    Bathing Mod A   Mod I with AE    Toileting Min A   Mod I with AE prn   Bed Mobility  Supine to sit: min A    Sit to supine: n/t  Supine to sit: Independent   Sit to supine: Independent   Functional Transfers Sit to stand: min A   Stand to sit:  Min A  Stand pivot: min A with ww  Mod I   Functional Mobility Min A with ww in room  Mod I    Balance Sitting:     Static:  good    Dynamic:SBA  Standing: SBA with UE support     Activity Tolerance Good-  good   Visual/  Perceptual Glasses:  Yes  WFL        Safety Good-                 good     Hand dominance: R   UE ROM: RUE:  WFL  LUE:  WFL  Strength: RUE:  5/5 LUE:  5/5   Strength: B WFL  Fine Motor Coordination:  WFL    Hearing: WFL  Sensation:  No c/o numbness or tingling  Tone:  WFL  Edema: mod R Knee                            Comments/Treatment: Upon arrival, patient supine in bed. OT evaluation, education on bed mobility, walker safety, bathroom safety, discussed AE and DME for bathroom safety and LE dressing and bathing ,  At end of session, patient sitting up in chair and  with call light and phone within reach, all lines and tubes intact. OT for functional assessment of  ADL, Functional Transfer/Mobility Training, Equipment Needs, Energy Conservation Techniques, Pt/Family Education, Ther Ex- deep breathing for edema and pain control, OT role and POC reviewed. Patient  demo good- understanding of knee precautions, walker safety and LE dressing and bathing. Pt would benefit from continued skilled HHOT to increase functional independence and quality of life.     Eval Complexity: low    Assessment of current deficits   Functional mobility [x]  ADLs [x] Strength []  Cognition []  Functional transfers  [x] IADLs [x] Safety Awareness [x]  Endurance [x]  Fine Motor Coordination [] Balance [] Vision/perception [] Sensation []   Gross Motor Coordination [] ROM [] Delirium []

## 2018-11-27 ENCOUNTER — TELEPHONE (OUTPATIENT)
Dept: ORTHOPEDIC SURGERY | Age: 58
End: 2018-11-27

## 2018-11-27 LAB
HCT VFR BLD CALC: 39.7 % (ref 37–54)
HEMOGLOBIN: 13.2 G/DL (ref 12.5–16.5)
MCH RBC QN AUTO: 30.3 PG (ref 26–35)
MCHC RBC AUTO-ENTMCNC: 33.2 % (ref 32–34.5)
MCV RBC AUTO: 91.3 FL (ref 80–99.9)
METER GLUCOSE: 139 MG/DL (ref 74–99)
METER GLUCOSE: 150 MG/DL (ref 74–99)
METER GLUCOSE: 168 MG/DL (ref 74–99)
METER GLUCOSE: 198 MG/DL (ref 74–99)
PDW BLD-RTO: 12.7 FL (ref 11.5–15)
PLATELET # BLD: 176 E9/L (ref 130–450)
PMV BLD AUTO: 9.8 FL (ref 7–12)
RBC # BLD: 4.35 E12/L (ref 3.8–5.8)
WBC # BLD: 14.2 E9/L (ref 4.5–11.5)

## 2018-11-27 PROCEDURE — 6370000000 HC RX 637 (ALT 250 FOR IP): Performed by: ORTHOPAEDIC SURGERY

## 2018-11-27 PROCEDURE — 97530 THERAPEUTIC ACTIVITIES: CPT

## 2018-11-27 PROCEDURE — 2580000003 HC RX 258: Performed by: ORTHOPAEDIC SURGERY

## 2018-11-27 PROCEDURE — 82962 GLUCOSE BLOOD TEST: CPT

## 2018-11-27 PROCEDURE — 85027 COMPLETE CBC AUTOMATED: CPT

## 2018-11-27 PROCEDURE — 6370000000 HC RX 637 (ALT 250 FOR IP): Performed by: CLINICAL NURSE SPECIALIST

## 2018-11-27 PROCEDURE — 36415 COLL VENOUS BLD VENIPUNCTURE: CPT

## 2018-11-27 PROCEDURE — 1200000000 HC SEMI PRIVATE

## 2018-11-27 PROCEDURE — 6360000002 HC RX W HCPCS: Performed by: ORTHOPAEDIC SURGERY

## 2018-11-27 PROCEDURE — 97110 THERAPEUTIC EXERCISES: CPT

## 2018-11-27 RX ADMIN — DOCUSATE SODIUM 100 MG: 100 CAPSULE, LIQUID FILLED ORAL at 08:23

## 2018-11-27 RX ADMIN — Medication 10 ML: at 20:27

## 2018-11-27 RX ADMIN — INSULIN LISPRO 2 UNITS: 100 INJECTION, SOLUTION INTRAVENOUS; SUBCUTANEOUS at 18:01

## 2018-11-27 RX ADMIN — KETOROLAC TROMETHAMINE 15 MG: 30 INJECTION, SOLUTION INTRAMUSCULAR at 20:25

## 2018-11-27 RX ADMIN — ENOXAPARIN SODIUM 30 MG: 30 INJECTION SUBCUTANEOUS at 08:23

## 2018-11-27 RX ADMIN — Medication 10 ML: at 06:50

## 2018-11-27 RX ADMIN — OXYCODONE AND ACETAMINOPHEN 2 TABLET: 5; 325 TABLET ORAL at 04:15

## 2018-11-27 RX ADMIN — CEFAZOLIN SODIUM 3 G: 10 INJECTION, POWDER, FOR SOLUTION INTRAVENOUS at 06:50

## 2018-11-27 RX ADMIN — OXYCODONE AND ACETAMINOPHEN 2 TABLET: 5; 325 TABLET ORAL at 18:43

## 2018-11-27 RX ADMIN — INSULIN LISPRO 1 UNITS: 100 INJECTION, SOLUTION INTRAVENOUS; SUBCUTANEOUS at 20:32

## 2018-11-27 RX ADMIN — CARVEDILOL 25 MG: 25 TABLET, FILM COATED ORAL at 08:23

## 2018-11-27 RX ADMIN — KETOROLAC TROMETHAMINE 15 MG: 30 INJECTION, SOLUTION INTRAMUSCULAR at 08:24

## 2018-11-27 RX ADMIN — OXYCODONE AND ACETAMINOPHEN 2 TABLET: 5; 325 TABLET ORAL at 14:40

## 2018-11-27 RX ADMIN — MORPHINE SULFATE 4 MG: 2 INJECTION, SOLUTION INTRAMUSCULAR; INTRAVENOUS at 06:50

## 2018-11-27 RX ADMIN — OXYCODONE AND ACETAMINOPHEN 2 TABLET: 5; 325 TABLET ORAL at 10:01

## 2018-11-27 RX ADMIN — Medication 10 ML: at 11:48

## 2018-11-27 RX ADMIN — Medication 10 ML: at 08:32

## 2018-11-27 RX ADMIN — TRIAMTERENE AND HYDROCHLOROTHIAZIDE 1 TABLET: 37.5; 25 TABLET ORAL at 08:23

## 2018-11-27 RX ADMIN — DOCUSATE SODIUM 100 MG: 100 CAPSULE, LIQUID FILLED ORAL at 20:28

## 2018-11-27 RX ADMIN — ENOXAPARIN SODIUM 30 MG: 30 INJECTION SUBCUTANEOUS at 20:29

## 2018-11-27 RX ADMIN — MORPHINE SULFATE 4 MG: 2 INJECTION, SOLUTION INTRAMUSCULAR; INTRAVENOUS at 16:47

## 2018-11-27 RX ADMIN — Medication 10 ML: at 16:41

## 2018-11-27 RX ADMIN — MORPHINE SULFATE 4 MG: 2 INJECTION, SOLUTION INTRAMUSCULAR; INTRAVENOUS at 11:43

## 2018-11-27 RX ADMIN — INSULIN LISPRO 2 UNITS: 100 INJECTION, SOLUTION INTRAVENOUS; SUBCUTANEOUS at 11:52

## 2018-11-27 RX ADMIN — CARVEDILOL 25 MG: 25 TABLET, FILM COATED ORAL at 16:38

## 2018-11-27 ASSESSMENT — PAIN SCALES - GENERAL
PAINLEVEL_OUTOF10: 9
PAINLEVEL_OUTOF10: 8
PAINLEVEL_OUTOF10: 8
PAINLEVEL_OUTOF10: 4
PAINLEVEL_OUTOF10: 3
PAINLEVEL_OUTOF10: 4
PAINLEVEL_OUTOF10: 4
PAINLEVEL_OUTOF10: 8
PAINLEVEL_OUTOF10: 0
PAINLEVEL_OUTOF10: 7
PAINLEVEL_OUTOF10: 2
PAINLEVEL_OUTOF10: 0
PAINLEVEL_OUTOF10: 8

## 2018-11-27 ASSESSMENT — PAIN DESCRIPTION - LOCATION
LOCATION: KNEE

## 2018-11-27 ASSESSMENT — PAIN DESCRIPTION - DESCRIPTORS
DESCRIPTORS: ACHING;DISCOMFORT;SORE
DESCRIPTORS: ACHING;DISCOMFORT;SORE
DESCRIPTORS: ACHING;DISCOMFORT;SHARP;SORE
DESCRIPTORS: ACHING;DISCOMFORT;TENDER
DESCRIPTORS: ACHING;DISCOMFORT;SORE
DESCRIPTORS: ACHING;DISCOMFORT;SHARP
DESCRIPTORS: ACHING;DISCOMFORT
DESCRIPTORS: ACHING;DISCOMFORT;SORE
DESCRIPTORS: ACHING;DISCOMFORT;SORE;TENDER

## 2018-11-27 ASSESSMENT — PAIN DESCRIPTION - PAIN TYPE
TYPE: SURGICAL PAIN

## 2018-11-27 ASSESSMENT — PAIN DESCRIPTION - ORIENTATION
ORIENTATION: RIGHT

## 2018-11-27 ASSESSMENT — PAIN DESCRIPTION - ONSET
ONSET: ON-GOING
ONSET: ON-GOING

## 2018-11-27 ASSESSMENT — PAIN DESCRIPTION - FREQUENCY
FREQUENCY: CONTINUOUS

## 2018-11-27 NOTE — OP NOTE
[address]                  [address] [facility], [address] [place] [address]                                OPERATIVE REPORT    PATIENT NAME: Jana Cox                       :        1960  MED REC NO:   [id number]                            ROOM:       3399  ACCOUNT NO:   [de-identified]                           ADMIT DATE: 2018  PROVIDER:     Polo Pinon DO    DATE OF PROCEDURE:  2018    OPERATING SURGEON:  Polo Pinon DO    ASSISTANT:  Arthur Mosqueda DO    PREOPERATIVE DIAGNOSIS:  Right knee tricompartmental osteoarthritis. POSTOPERATIVE DIAGNOSIS:  Right knee tricompartmental osteoarthritis. PROCEDURE:  Right total knee arthroplasty. IMPLANTS:  [place] Triathlon CR size 6 femoral component, size 6 tibial  base plate, CR 11 mm thickness Triathlon X3 tibial bearing insert,  Triathlon X3 asymmetric patella size A32 10 mm thickness. ANESTHESIA:  General with preoperative regional block. ESTIMATED BLOOD LOSS:  Less than 100 mL. COMPLICATIONS:  None. INDICATIONS:  The patient is a 66-year-old male with longstanding right  knee osteoarthritis, which has failed conservative management. He did  have a contralateral total knee arthroplasty in the past, which he had  done well from. At this point in time, he elected for right total knee  arthroplasty. Risks and benefits of surgery were outlined in detail  with the patient. He verbalized understanding of all that was  discussed. All his questions were addressed to his satisfaction. He  did elect to proceed with the procedure as outlined. DESCRIPTION OF PROCEDURE:  The patient was brought to the operating  suite, placed on the operating table in supine position. He had  previously received a regional block by the Department of Anesthesia. He now received a general anesthetic by the Department of Anesthesia as  well as 2 gm of Ancef intravenously.   Right lower extremity the  medical-surgical thompson under the orthopedic team service. He will  receive 24 hours of postoperative antibiotics. He will be started on  chemical DVT prophylaxis, postoperative day 1. He will receive physical  therapy evaluation and will be weightbearing as tolerated in the right  lower extremity. Anticipate discharge home postoperative day-2 with  home health therapy. He will then follow up in the office in two weeks  for repeat evaluation.         Eliana Mora DO    D: 11/26/2018 13:16:02       T: 11/26/2018 22:35:52     JESSICA/[email or web address]  Job#: [id number]     Doc#: [id number]    CC:

## 2018-11-27 NOTE — PROGRESS NOTES
home with 3 stairs to enter and 1 rail. Bed is on 1 floor and bath is on 1 floor. Pt ambulated with Ind PTA. Equipment owned: maganw, cane       Initial Evaluation  Date: 11/26/18 Treatment  11/27/18 AM Short Term/ Long Term   Goals   AM-PAC 6 Clicks 33/33 20/87      Was pt agreeable to Eval/treatment? yes yes      Does pt have pain? Yes R knee 10/10 RN medicating patient upon conclusion of PT eval.  R knee 2/10 before therapy and 9/10 after session      Bed Mobility  Rolling: SBA  Supine to sit: SBA  Sit to supine: NT  Scooting: SBa Rolling: NT  Supine to sit: SBA  Sit to supine: NT  Scooting: SBA  Rolling: Ind  Supine to sit: Ind  Sit to supine: Ind  Scooting: Ind   Transfers Sit to stand: Min  Stand to sit: Min  Stand pivot: Min Sit to stand: SBA  Stand to sit: SBA  Stand pivot: Min A ww  Sit to stand: Mod Ind  Stand to sit: Mod Ind  Stand pivot: Mod Ind   Ambulation    25 feet with  fww Min A 150 feet with ww Min A  >150 feet with  fww Mod Ind   Stair negotiation: ascended and descended  NT NT  3 steps with 1 rail Min A   ROM BUE:  See OT eval  BLE:  Decreased R knee -5 to 40 flexin.        Strength BUE: See OT eval   RLE:  3/5 able to complete knee ext through available ROM. LLE:   5/5   5/5   Balance Sitting EOB:  Ind  Dynamic Standing:  Min A with fww. Sitting EOB: Ind  Dynamic Standing: Min A ww Sitting EOB:  Ind  Dynamic Standing: Mod Ind. Pt performed therapeutic exercise of the following: RLE LAQ and AAROM towel under heel slides 1 x10. Patient education  Pt was educated on safety, and therex as above for ROM. Patient response to education:   Pt verbalized understanding Pt demonstrated skill Pt requires further education in this area   yes yes x     Additional Comments: Pt was supine in bed on arrival and agreeable to treatment. Pt ambulated with moderate gait speed with no LOB. Pt performed therex as listed above. Pt AAROM 80 degrees flexion.  Will perform stair training with pt this

## 2018-11-28 VITALS
WEIGHT: 307 LBS | SYSTOLIC BLOOD PRESSURE: 136 MMHG | BODY MASS INDEX: 42.98 KG/M2 | HEIGHT: 71 IN | OXYGEN SATURATION: 93 % | TEMPERATURE: 98.2 F | RESPIRATION RATE: 19 BRPM | HEART RATE: 95 BPM | DIASTOLIC BLOOD PRESSURE: 84 MMHG

## 2018-11-28 LAB
METER GLUCOSE: 153 MG/DL (ref 74–99)
METER GLUCOSE: 163 MG/DL (ref 74–99)
METER GLUCOSE: 163 MG/DL (ref 74–99)

## 2018-11-28 PROCEDURE — 2580000003 HC RX 258: Performed by: ORTHOPAEDIC SURGERY

## 2018-11-28 PROCEDURE — 99024 POSTOP FOLLOW-UP VISIT: CPT | Performed by: PHYSICIAN ASSISTANT

## 2018-11-28 PROCEDURE — 6360000002 HC RX W HCPCS: Performed by: ORTHOPAEDIC SURGERY

## 2018-11-28 PROCEDURE — 6370000000 HC RX 637 (ALT 250 FOR IP): Performed by: CLINICAL NURSE SPECIALIST

## 2018-11-28 PROCEDURE — 6370000000 HC RX 637 (ALT 250 FOR IP): Performed by: ORTHOPAEDIC SURGERY

## 2018-11-28 PROCEDURE — 97530 THERAPEUTIC ACTIVITIES: CPT

## 2018-11-28 PROCEDURE — 97535 SELF CARE MNGMENT TRAINING: CPT

## 2018-11-28 PROCEDURE — 82962 GLUCOSE BLOOD TEST: CPT

## 2018-11-28 RX ORDER — ASPIRIN 325 MG
325 TABLET, DELAYED RELEASE (ENTERIC COATED) ORAL 2 TIMES DAILY
Qty: 56 TABLET | Refills: 0 | Status: SHIPPED | OUTPATIENT
Start: 2018-11-28 | End: 2019-03-14 | Stop reason: SDUPTHER

## 2018-11-28 RX ORDER — ASPIRIN 81 MG/1
81 TABLET ORAL 2 TIMES DAILY
Qty: 56 TABLET | Refills: 0 | Status: SHIPPED | OUTPATIENT
Start: 2018-11-28 | End: 2018-11-28

## 2018-11-28 RX ADMIN — INSULIN LISPRO 2 UNITS: 100 INJECTION, SOLUTION INTRAVENOUS; SUBCUTANEOUS at 12:41

## 2018-11-28 RX ADMIN — CARVEDILOL 25 MG: 25 TABLET, FILM COATED ORAL at 17:20

## 2018-11-28 RX ADMIN — Medication 10 ML: at 08:43

## 2018-11-28 RX ADMIN — OXYCODONE AND ACETAMINOPHEN 2 TABLET: 5; 325 TABLET ORAL at 17:20

## 2018-11-28 RX ADMIN — TRIAMTERENE AND HYDROCHLOROTHIAZIDE 1 TABLET: 37.5; 25 TABLET ORAL at 08:41

## 2018-11-28 RX ADMIN — ENOXAPARIN SODIUM 30 MG: 30 INJECTION SUBCUTANEOUS at 08:42

## 2018-11-28 RX ADMIN — INSULIN LISPRO 2 UNITS: 100 INJECTION, SOLUTION INTRAVENOUS; SUBCUTANEOUS at 08:44

## 2018-11-28 RX ADMIN — OXYCODONE AND ACETAMINOPHEN 2 TABLET: 5; 325 TABLET ORAL at 12:59

## 2018-11-28 RX ADMIN — OXYCODONE AND ACETAMINOPHEN 2 TABLET: 5; 325 TABLET ORAL at 04:54

## 2018-11-28 RX ADMIN — OXYCODONE AND ACETAMINOPHEN 2 TABLET: 5; 325 TABLET ORAL at 08:58

## 2018-11-28 RX ADMIN — DOCUSATE SODIUM 100 MG: 100 CAPSULE, LIQUID FILLED ORAL at 08:42

## 2018-11-28 RX ADMIN — OXYCODONE AND ACETAMINOPHEN 2 TABLET: 5; 325 TABLET ORAL at 00:20

## 2018-11-28 RX ADMIN — CARVEDILOL 25 MG: 25 TABLET, FILM COATED ORAL at 08:41

## 2018-11-28 RX ADMIN — KETOROLAC TROMETHAMINE 15 MG: 30 INJECTION, SOLUTION INTRAMUSCULAR at 08:01

## 2018-11-28 ASSESSMENT — PAIN SCALES - GENERAL
PAINLEVEL_OUTOF10: 9
PAINLEVEL_OUTOF10: 4
PAINLEVEL_OUTOF10: 9
PAINLEVEL_OUTOF10: 7
PAINLEVEL_OUTOF10: 4
PAINLEVEL_OUTOF10: 8
PAINLEVEL_OUTOF10: 7
PAINLEVEL_OUTOF10: 7
PAINLEVEL_OUTOF10: 0
PAINLEVEL_OUTOF10: 0

## 2018-11-28 ASSESSMENT — PAIN DESCRIPTION - ORIENTATION
ORIENTATION: RIGHT

## 2018-11-28 ASSESSMENT — PAIN DESCRIPTION - PAIN TYPE
TYPE: SURGICAL PAIN
TYPE: ACUTE PAIN
TYPE: SURGICAL PAIN
TYPE: ACUTE PAIN;SURGICAL PAIN

## 2018-11-28 ASSESSMENT — PAIN DESCRIPTION - DESCRIPTORS
DESCRIPTORS: ACHING;DISCOMFORT;SORE
DESCRIPTORS: ACHING;DISCOMFORT
DESCRIPTORS: ACHING;DISCOMFORT;SORE
DESCRIPTORS: ACHING;CONSTANT;DISCOMFORT
DESCRIPTORS: ACHING;CONSTANT;DISCOMFORT
DESCRIPTORS: ACHING;DISCOMFORT

## 2018-11-28 ASSESSMENT — PAIN DESCRIPTION - LOCATION
LOCATION: KNEE

## 2018-11-28 ASSESSMENT — PAIN DESCRIPTION - FREQUENCY
FREQUENCY: CONTINUOUS

## 2018-11-28 ASSESSMENT — PAIN DESCRIPTION - ONSET
ONSET: ON-GOING

## 2018-11-28 NOTE — DISCHARGE SUMMARY
brisk capillary refill to toes, foot warm and perfused   Sensation intact to light touch in sural/deep peroneal/superficial   peroneal/saphenous/posterior tibial nerve distributions to foot/ankle. Demonstrates active ankle plantar flexion/dorsiflexion/great toe extension   Patient up and ambulatory with wheeled walker without difficulty    Disposition: The patient was provided instructions to continue pain medication, DVT prophylaxis(Aspirin 325 mg twice daily for 28 days), Dressing changes as applicable. The patient was instructed on restrictions and activities.  The patient was to follow up with Twila Ventura DO, and post op follow up appointment was made prior to the patient being discharged from hospital.       Signed:  Electronically signed by Jeannette Marquez PA-C on 11/28/2018 at 5:00 PM

## 2018-11-28 NOTE — PROGRESS NOTES
Department of Orthopedic Surgery  Resident Progress Note    Patient seen and examined. Pain controlled. No new complaints. Denies chest pain, shortness of breath, calf pain, dizziness/lightheadedness. 250 feet with ww SBA with PT yesterday. No Bm yet but states likely today. Would like to be discharged today if possible. VITALS:  /66   Pulse 80   Temp 97.5 °F (36.4 °C)   Resp 18   Ht 5' 11\" (1.803 m)   Wt (!) 307 lb (139.3 kg)   SpO2 93%   BMI 42.82 kg/m²     GENERAL: awake, alert  MUSCULOSKELETAL:   right lower extremity:  · Dressings removed, aquacel intact  · Compartments soft and compressible, calf non-tender  · Palpable dorsalis pedis and posterior tibialis pulse, brisk cap refill to toes, foot warm and perfused  · Sensation intact to light touch in sural/deep peroneal/superficial peroneal/saphenous/posterior tibial nerve distributions to foot/ankle. · Demonstrates active ankle plantar/dorsiflexion/great toe extension    CBC:   Lab Results   Component Value Date    WBC 14.2 11/27/2018    HGB 13.2 11/27/2018    HCT 39.7 11/27/2018     11/27/2018       ASSESSMENT  · Right total knee arthroplasty 11/26/18    PLAN    Weight bearing as tolerated RLE  Deep venous thrombosis prophylaxis - lovenox, early mobilization  PT/OT  Pain Control: IV and PO  Monitor H&H  Dc planning: Okay to DC today home hhc from ortho standpoint  · Discuss with Dr. Sabine Fernandez Attending    I have seen and evaluated the patient with the resident and agree with the above assessments on today's visit. I have performed the key components of the history and physical examination and concur completely with the findings and plans as documented above.     D/c home later today once DME set up    Electronically signed by   Chucky Rockwell DO  11/28/2018

## 2018-12-03 DIAGNOSIS — E11.9 TYPE 2 DIABETES MELLITUS WITHOUT COMPLICATION, WITHOUT LONG-TERM CURRENT USE OF INSULIN (HCC): ICD-10-CM

## 2018-12-03 DIAGNOSIS — I10 ESSENTIAL HYPERTENSION: ICD-10-CM

## 2018-12-03 RX ORDER — PIOGLITAZONEHYDROCHLORIDE 15 MG/1
15 TABLET ORAL DAILY
Qty: 30 TABLET | Refills: 2 | Status: SHIPPED | OUTPATIENT
Start: 2018-12-03 | End: 2019-02-27 | Stop reason: SDUPTHER

## 2018-12-03 RX ORDER — QUINAPRIL 40 MG/1
TABLET ORAL
Qty: 30 TABLET | Refills: 3 | Status: SHIPPED | OUTPATIENT
Start: 2018-12-03 | End: 2019-03-14 | Stop reason: SDUPTHER

## 2018-12-04 DIAGNOSIS — Z96.652 STATUS POST TOTAL LEFT KNEE REPLACEMENT: Primary | ICD-10-CM

## 2018-12-04 RX ORDER — OXYCODONE HYDROCHLORIDE AND ACETAMINOPHEN 5; 325 MG/1; MG/1
1 TABLET ORAL EVERY 6 HOURS PRN
Qty: 28 TABLET | Refills: 0 | Status: SHIPPED | OUTPATIENT
Start: 2018-12-06 | End: 2018-12-13

## 2018-12-04 NOTE — TELEPHONE ENCOUNTER
Patient called for pain med refill. Notified the patient that the Rx will be electronically e-scribed and    will be available on 12/6/18 to call [facility] #[id number]- [place], [place] - [address] - P [phone] to see if it is ready to be picked up. Instructed to read label carefully for directions--dose or frequency  may have been changed.

## 2018-12-04 NOTE — TELEPHONE ENCOUNTER
Patient is status post: PROCEDURE:  Right total knee arthroplasty. Date of surgery 11/26/2018    Last prescription filled: 11/29/2018    Patient not due for refill until 12/6/2018    Will date that it can be filled on 12/6/2018- please make sure patient is taking as prescribed every 6 hours as needed for pain so he does not run out early. Controlled Substances Monitoring:     RX Monitoring 12/4/2018   Attestation The Prescription Monitoring Report for this patient was reviewed today. Documentation No signs of potential drug abuse or diversion identified.    Acute Pain Prescriptions -   Chronic Pain -   Medication Contracts -     Electronically signed by [place] Rendon PA-C on 12/4/2018 at 3:31 PM

## 2018-12-07 ENCOUNTER — TELEPHONE (OUTPATIENT)
Dept: ORTHOPEDIC SURGERY | Age: 58
End: 2018-12-07

## 2018-12-07 NOTE — TELEPHONE ENCOUNTER
Tried to call sobia  back at [facility] and was not successful. Was not able to get thru to her at the number listed. Patient does not want to go on Norco, he wants to stay on percocet. He picked up RX this morning.

## 2018-12-13 DIAGNOSIS — Z96.651 STATUS POST TOTAL RIGHT KNEE REPLACEMENT: Primary | ICD-10-CM

## 2018-12-14 ENCOUNTER — OFFICE VISIT (OUTPATIENT)
Dept: ORTHOPEDIC SURGERY | Age: 58
End: 2018-12-14
Payer: COMMERCIAL

## 2018-12-14 ENCOUNTER — HOSPITAL ENCOUNTER (OUTPATIENT)
Dept: GENERAL RADIOLOGY | Age: 58
Discharge: HOME | End: 2018-12-16
Payer: COMMERCIAL

## 2018-12-14 ENCOUNTER — TELEPHONE (OUTPATIENT)
Dept: ORTHOPEDIC SURGERY | Age: 58
End: 2018-12-14

## 2018-12-14 VITALS
HEART RATE: 74 BPM | HEIGHT: 71 IN | TEMPERATURE: 97.8 F | RESPIRATION RATE: 18 BRPM | BODY MASS INDEX: 44.1 KG/M2 | DIASTOLIC BLOOD PRESSURE: 101 MMHG | SYSTOLIC BLOOD PRESSURE: 141 MMHG | WEIGHT: 315 LBS

## 2018-12-14 DIAGNOSIS — Z96.651 STATUS POST TOTAL RIGHT KNEE REPLACEMENT: ICD-10-CM

## 2018-12-14 DIAGNOSIS — Z96.651 STATUS POST TOTAL RIGHT KNEE REPLACEMENT: Primary | ICD-10-CM

## 2018-12-14 PROCEDURE — 99213 OFFICE O/P EST LOW 20 MIN: CPT

## 2018-12-14 PROCEDURE — 99024 POSTOP FOLLOW-UP VISIT: CPT | Performed by: PHYSICIAN ASSISTANT

## 2018-12-14 PROCEDURE — 73560 X-RAY EXAM OF KNEE 1 OR 2: CPT

## 2018-12-14 RX ORDER — OXYCODONE HYDROCHLORIDE AND ACETAMINOPHEN 5; 325 MG/1; MG/1
1 TABLET ORAL EVERY 6 HOURS PRN
COMMUNITY
End: 2018-12-14 | Stop reason: SDUPTHER

## 2018-12-14 RX ORDER — OXYCODONE HYDROCHLORIDE AND ACETAMINOPHEN 5; 325 MG/1; MG/1
1 TABLET ORAL EVERY 6 HOURS PRN
Qty: 28 TABLET | Refills: 0 | Status: SHIPPED | OUTPATIENT
Start: 2018-12-14 | End: 2018-12-20 | Stop reason: SDUPTHER

## 2018-12-14 RX ORDER — OXYCODONE HYDROCHLORIDE AND ACETAMINOPHEN 5; 325 MG/1; MG/1
1 TABLET ORAL EVERY 6 HOURS PRN
Qty: 28 TABLET | Refills: 0 | Status: CANCELLED | OUTPATIENT
Start: 2018-12-14 | End: 2018-12-21

## 2018-12-14 RX ORDER — METHOCARBAMOL 750 MG/1
750 TABLET, FILM COATED ORAL 3 TIMES DAILY
Qty: 30 TABLET | Refills: 0 | Status: SHIPPED | OUTPATIENT
Start: 2018-12-14 | End: 2018-12-21 | Stop reason: ALTCHOICE

## 2018-12-14 NOTE — PROGRESS NOTES
Sutures were removed from patients right knee without difficulty. Steri strips were applied to area and wrapped in ace bandage. Patient given verbal instructions for care.   Ulisses Bath  12/14/18  3:44 PM

## 2018-12-20 DIAGNOSIS — Z96.651 STATUS POST TOTAL RIGHT KNEE REPLACEMENT: ICD-10-CM

## 2018-12-20 RX ORDER — OXYCODONE HYDROCHLORIDE AND ACETAMINOPHEN 5; 325 MG/1; MG/1
1 TABLET ORAL EVERY 6 HOURS PRN
Qty: 28 TABLET | Refills: 0 | Status: SHIPPED | OUTPATIENT
Start: 2018-12-20 | End: 2018-12-27

## 2018-12-20 NOTE — TELEPHONE ENCOUNTER
Alexa Miller, Could you please draft a letter to this patient's insurance company requesting longer authorization for pain management control for the patient post-operatively. He was authorized for 30 days of pain medication, percocet, at his last office visit, but we were told that they require a letter from our office if this authorization needs extended. Typically we management patient's post op pain for up to 3 months post operatively and would prefer the authorization be allowed for that time frame. Thank you.

## 2018-12-21 ENCOUNTER — HOSPITAL ENCOUNTER (OUTPATIENT)
Dept: PHYSICAL THERAPY | Age: 58
Setting detail: THERAPIES SERIES
Discharge: HOME | End: 2018-12-21
Payer: COMMERCIAL

## 2018-12-21 ENCOUNTER — TELEPHONE (OUTPATIENT)
Dept: ORTHOPEDIC SURGERY | Age: 58
End: 2018-12-21

## 2018-12-21 PROCEDURE — G8979 MOBILITY GOAL STATUS: HCPCS

## 2018-12-21 PROCEDURE — G8978 MOBILITY CURRENT STATUS: HCPCS

## 2018-12-21 PROCEDURE — 97161 PT EVAL LOW COMPLEX 20 MIN: CPT

## 2018-12-26 ENCOUNTER — HOSPITAL ENCOUNTER (OUTPATIENT)
Dept: PHYSICAL THERAPY | Age: 58
Setting detail: THERAPIES SERIES
Discharge: HOME | End: 2018-12-26
Payer: COMMERCIAL

## 2018-12-26 PROCEDURE — 97110 THERAPEUTIC EXERCISES: CPT

## 2018-12-27 ASSESSMENT — KOOS JR
STANDING UPRIGHT: 2
RISING FROM SITTING: 1
GOING UP OR DOWN STAIRS: 3
STRAIGHTENING KNEE FULLY: 1
HOW SEVERE IS YOUR KNEE STIFFNESS AFTER FIRST WAKING IN MORNING: 3
BENDING TO THE FLOOR TO PICK UP OBJECT: 0
TWISING OR PIVOTING ON KNEE: 3

## 2018-12-27 ASSESSMENT — PROMIS GLOBAL HEALTH SCALE
IN GENERAL, HOW WOULD YOU RATE YOUR SATISFACTION WITH YOUR SOCIAL ACTIVITIES AND RELATIONSHIPS [ON A SCALE OF 1 (POOR) TO 5 (EXCELLENT)]?: 3
HOW IS THE PROMIS V1.1 BEING ADMINISTERED?: 2
IN GENERAL, HOW WOULD YOU RATE YOUR PHYSICAL HEALTH [ON A SCALE OF 1 (POOR) TO 5 (EXCELLENT)]?: 3
IN GENERAL, HOW WOULD YOU RATE YOUR MENTAL HEALTH, INCLUDING YOUR MOOD AND YOUR ABILITY TO THINK [ON A SCALE OF 1 (POOR) TO 5 (EXCELLENT)]?: 5
IN GENERAL, WOULD YOU SAY YOUR HEALTH IS...[ON A SCALE OF 1 (POOR) TO 5 (EXCELLENT)]: 3
IN THE PAST 7 DAYS, HOW WOULD YOU RATE YOUR FATIGUE ON AVERAGE [ON A SCALE FROM 1 (NONE) TO 5 (VERY SEVERE)]?: 5
IN GENERAL, PLEASE RATE HOW WELL YOU CARRY OUT YOUR USUAL SOCIAL ACTIVITIES (INCLUDES ACTIVITIES AT HOME, AT WORK, AND IN YOUR COMMUNITY, AND RESPONSIBILITIES AS A PARENT, CHILD, SPOUSE, EMPLOYEE, FRIEND, ETC) [ON A SCALE OF 1 (POOR) TO 5 (EXCELLENT)]?: 5
SUM OF RESPONSES TO QUESTIONS 3, 6, 7, & 8: 15
IN GENERAL, WOULD YOU SAY YOUR QUALITY OF LIFE IS...[ON A SCALE OF 1 (POOR) TO 5 (EXCELLENT)]: 2
IN THE PAST 7 DAYS, HOW WOULD YOU RATE YOUR PAIN ON AVERAGE [ON A SCALE FROM 0 (NO PAIN) TO 10 (WORST IMAGINABLE PAIN)]?: 3
SUM OF RESPONSES TO QUESTIONS 2, 4, 5, & 10: 11
TO WHAT EXTENT ARE YOU ABLE TO CARRY OUT YOUR EVERYDAY PHYSICAL ACTIVITIES SUCH AS WALKING, CLIMBING STAIRS, CARRYING GROCERIES, OR MOVING A CHAIR [ON A SCALE OF 1 (NOT AT ALL) TO 5 (COMPLETELY)]?: 4
WHO IS THE PERSON COMPLETING THE PROMIS V1.1 SURVEY?: 0
IN THE PAST 7 DAYS, HOW OFTEN HAVE YOU BEEN BOTHERED BY EMOTIONAL PROBLEMS, SUCH AS FEELING ANXIOUS, DEPRESSED, OR IRRITABLE [ON A SCALE FROM 1 (NEVER) TO 5 (ALWAYS)]?: 1

## 2018-12-28 ENCOUNTER — HOSPITAL ENCOUNTER (OUTPATIENT)
Dept: PHYSICAL THERAPY | Age: 58
Setting detail: THERAPIES SERIES
Discharge: HOME | End: 2018-12-28
Payer: COMMERCIAL

## 2018-12-28 PROCEDURE — 97110 THERAPEUTIC EXERCISES: CPT

## 2018-12-28 NOTE — PROGRESS NOTES
[address] [facility]                Phone: [phone]   Fax: [phone]    Physical Therapy Daily Treatment Note  Date:  2018    Patient Name:  Robin Sherman    :  1960  MRN: [id number]      Evaluating Physical Therapist:  Kristie Crump PT, DPT   2018    Referring Physician:  Dr. Tatyana Ortiz:  Jannette   Diagnosis:  R TKA 2018  Precautions:  R LE WBAT  Visit# / total visits:  3/14-   Pain:   5-8/10    Time In:    6998  Time Out:    1055    Subjective:   Patient presents with [facility] for second of two scheduled treatment sessions this week. He reports pain across R knee 5/10 prior to session this morning. He states pain was as much as 8/10 following his first treatment session. He states it was back to aq 5/10 by the next morning but he states \"I can't seem to get it [the pain] lower than a 5\".         Exercises:  Exercise/Equipment Resistance/Repetitions Other comments    Step one 10 min L2           Heel slides 30x    Quad sets 30x         SAQs 3 x10    SLRs 3 x10         LAQ's 3 x15  2#           Hip flexor/quad stretch 5 x20s  flex band         NK static ext str 5 min  15#         Calf raises wedge 3 x10    Calf stretch on wedge 3 x20s         Step ups  fwd R LE 2 x10  6\"  two rails           Standing TKE flex band 2 x15  mini flex band         Standing HS curls 3 x10  1#         sit --> stand 2 x10             Gait training  nt           Stair training nt                      Assessment/Comments:    Supine AROM R knee Extension lacking 5° from neutral,  flexion to 98°   SLR lag 8°;  SAQ lacking 10° from neutral    Home Exercise Program:  continue from home care    Treatment/Activity Tolerance:  [x] Patient tolerated treatment well [] Patient limited by fatigue  [] Patient limited by pain  [] Patient limited by other medical complications  [] Other:     Prognosis: [x] Good [x] Fair  [] Poor    Patient Requires Follow-up: [x]

## 2018-12-31 DIAGNOSIS — Z96.651 STATUS POST TOTAL RIGHT KNEE REPLACEMENT: Primary | ICD-10-CM

## 2018-12-31 RX ORDER — OXYCODONE HYDROCHLORIDE AND ACETAMINOPHEN 5; 325 MG/1; MG/1
1 TABLET ORAL EVERY 8 HOURS PRN
Qty: 21 TABLET | Refills: 0 | Status: SHIPPED | OUTPATIENT
Start: 2018-12-31 | End: 2019-01-07

## 2019-01-01 ENCOUNTER — TELEPHONE (OUTPATIENT)
Dept: PAIN MANAGEMENT | Age: 59
End: 2019-01-01

## 2019-01-01 DIAGNOSIS — J06.9 VIRAL URI: ICD-10-CM

## 2019-01-01 RX ORDER — LORATADINE 10 MG/1
TABLET ORAL
Qty: 30 TABLET | Refills: 1 | Status: SHIPPED
Start: 2019-01-01 | End: 2020-01-01

## 2019-01-02 ENCOUNTER — HOSPITAL ENCOUNTER (OUTPATIENT)
Dept: PHYSICAL THERAPY | Age: 59
Setting detail: THERAPIES SERIES
Discharge: HOME | End: 2019-01-02
Payer: COMMERCIAL

## 2019-01-04 ENCOUNTER — HOSPITAL ENCOUNTER (OUTPATIENT)
Dept: PHYSICAL THERAPY | Age: 59
Setting detail: THERAPIES SERIES
Discharge: HOME | End: 2019-01-04
Payer: COMMERCIAL

## 2019-01-04 PROCEDURE — 97110 THERAPEUTIC EXERCISES: CPT

## 2019-01-04 PROCEDURE — 97530 THERAPEUTIC ACTIVITIES: CPT

## 2019-01-07 ENCOUNTER — HOSPITAL ENCOUNTER (OUTPATIENT)
Dept: PHYSICAL THERAPY | Age: 59
Setting detail: THERAPIES SERIES
Discharge: HOME | End: 2019-01-07
Payer: COMMERCIAL

## 2019-01-07 PROCEDURE — 97530 THERAPEUTIC ACTIVITIES: CPT

## 2019-01-07 PROCEDURE — 97110 THERAPEUTIC EXERCISES: CPT

## 2019-01-08 DIAGNOSIS — Z96.651 STATUS POST TOTAL RIGHT KNEE REPLACEMENT: Primary | ICD-10-CM

## 2019-01-08 RX ORDER — OXYCODONE HYDROCHLORIDE AND ACETAMINOPHEN 5; 325 MG/1; MG/1
1 TABLET ORAL EVERY 8 HOURS PRN
Qty: 21 TABLET | Refills: 0 | Status: SHIPPED | OUTPATIENT
Start: 2019-01-08 | End: 2019-01-17 | Stop reason: SDUPTHER

## 2019-01-11 ENCOUNTER — HOSPITAL ENCOUNTER (OUTPATIENT)
Dept: PHYSICAL THERAPY | Age: 59
Setting detail: THERAPIES SERIES
Discharge: HOME | End: 2019-01-11
Payer: COMMERCIAL

## 2019-01-11 ENCOUNTER — OFFICE VISIT (OUTPATIENT)
Dept: ORTHOPEDIC SURGERY | Age: 59
End: 2019-01-11
Payer: COMMERCIAL

## 2019-01-11 ENCOUNTER — HOSPITAL ENCOUNTER (OUTPATIENT)
Dept: GENERAL RADIOLOGY | Age: 59
Discharge: HOME | End: 2019-01-13
Payer: COMMERCIAL

## 2019-01-11 VITALS
DIASTOLIC BLOOD PRESSURE: 120 MMHG | SYSTOLIC BLOOD PRESSURE: 166 MMHG | BODY MASS INDEX: 42.98 KG/M2 | HEIGHT: 71 IN | HEART RATE: 86 BPM | WEIGHT: 307 LBS

## 2019-01-11 DIAGNOSIS — Z96.651 STATUS POST TOTAL RIGHT KNEE REPLACEMENT: ICD-10-CM

## 2019-01-11 DIAGNOSIS — Z96.651 STATUS POST TOTAL RIGHT KNEE REPLACEMENT: Primary | ICD-10-CM

## 2019-01-11 PROCEDURE — 99212 OFFICE O/P EST SF 10 MIN: CPT | Performed by: PHYSICIAN ASSISTANT

## 2019-01-11 PROCEDURE — 99024 POSTOP FOLLOW-UP VISIT: CPT | Performed by: PHYSICIAN ASSISTANT

## 2019-01-11 PROCEDURE — G0283 ELEC STIM OTHER THAN WOUND: HCPCS

## 2019-01-11 PROCEDURE — 97110 THERAPEUTIC EXERCISES: CPT

## 2019-01-11 PROCEDURE — 73560 X-RAY EXAM OF KNEE 1 OR 2: CPT

## 2019-01-14 ENCOUNTER — HOSPITAL ENCOUNTER (OUTPATIENT)
Dept: PHYSICAL THERAPY | Age: 59
Setting detail: THERAPIES SERIES
Discharge: HOME | End: 2019-01-14
Payer: COMMERCIAL

## 2019-01-14 PROCEDURE — 97530 THERAPEUTIC ACTIVITIES: CPT

## 2019-01-14 PROCEDURE — 97110 THERAPEUTIC EXERCISES: CPT

## 2019-01-17 DIAGNOSIS — Z96.651 STATUS POST TOTAL RIGHT KNEE REPLACEMENT: ICD-10-CM

## 2019-01-17 RX ORDER — OXYCODONE HYDROCHLORIDE AND ACETAMINOPHEN 5; 325 MG/1; MG/1
1 TABLET ORAL 2 TIMES DAILY PRN
Qty: 14 TABLET | Refills: 0 | Status: SHIPPED | OUTPATIENT
Start: 2019-01-17 | End: 2019-01-24 | Stop reason: SDUPTHER

## 2019-01-18 ENCOUNTER — HOSPITAL ENCOUNTER (OUTPATIENT)
Dept: PHYSICAL THERAPY | Age: 59
Setting detail: THERAPIES SERIES
Discharge: HOME | End: 2019-01-18
Payer: COMMERCIAL

## 2019-01-18 PROCEDURE — 97530 THERAPEUTIC ACTIVITIES: CPT

## 2019-01-18 PROCEDURE — 97110 THERAPEUTIC EXERCISES: CPT

## 2019-01-22 ENCOUNTER — HOSPITAL ENCOUNTER (OUTPATIENT)
Dept: PHYSICAL THERAPY | Age: 59
Setting detail: THERAPIES SERIES
Discharge: HOME | End: 2019-01-22
Payer: COMMERCIAL

## 2019-01-22 PROCEDURE — 97530 THERAPEUTIC ACTIVITIES: CPT

## 2019-01-22 PROCEDURE — 97110 THERAPEUTIC EXERCISES: CPT

## 2019-01-24 DIAGNOSIS — Z96.651 STATUS POST TOTAL RIGHT KNEE REPLACEMENT: ICD-10-CM

## 2019-01-24 RX ORDER — OXYCODONE HYDROCHLORIDE AND ACETAMINOPHEN 5; 325 MG/1; MG/1
1 TABLET ORAL 2 TIMES DAILY PRN
Qty: 14 TABLET | Refills: 0 | Status: SHIPPED | OUTPATIENT
Start: 2019-01-24 | End: 2019-01-31 | Stop reason: SDUPTHER

## 2019-01-25 ENCOUNTER — HOSPITAL ENCOUNTER (OUTPATIENT)
Dept: PHYSICAL THERAPY | Age: 59
Setting detail: THERAPIES SERIES
Discharge: HOME | End: 2019-01-25
Payer: COMMERCIAL

## 2019-01-25 PROCEDURE — 97530 THERAPEUTIC ACTIVITIES: CPT

## 2019-01-25 PROCEDURE — 97110 THERAPEUTIC EXERCISES: CPT

## 2019-01-28 ENCOUNTER — HOSPITAL ENCOUNTER (OUTPATIENT)
Dept: PHYSICAL THERAPY | Age: 59
Setting detail: THERAPIES SERIES
Discharge: HOME | End: 2019-01-28
Payer: COMMERCIAL

## 2019-01-31 DIAGNOSIS — Z96.651 STATUS POST TOTAL RIGHT KNEE REPLACEMENT: ICD-10-CM

## 2019-01-31 RX ORDER — OXYCODONE HYDROCHLORIDE AND ACETAMINOPHEN 5; 325 MG/1; MG/1
1 TABLET ORAL DAILY PRN
Qty: 7 TABLET | Refills: 0 | Status: SHIPPED | OUTPATIENT
Start: 2019-01-31 | End: 2019-02-11 | Stop reason: SDUPTHER

## 2019-02-01 ENCOUNTER — HOSPITAL ENCOUNTER (OUTPATIENT)
Dept: PHYSICAL THERAPY | Age: 59
Setting detail: THERAPIES SERIES
Discharge: HOME | End: 2019-02-01
Payer: COMMERCIAL

## 2019-02-11 DIAGNOSIS — Z96.651 STATUS POST TOTAL RIGHT KNEE REPLACEMENT: ICD-10-CM

## 2019-02-11 RX ORDER — OXYCODONE HYDROCHLORIDE AND ACETAMINOPHEN 5; 325 MG/1; MG/1
1 TABLET ORAL DAILY PRN
Qty: 7 TABLET | Refills: 0 | Status: SHIPPED | OUTPATIENT
Start: 2019-02-11 | End: 2019-02-18

## 2019-02-27 DIAGNOSIS — E11.9 TYPE 2 DIABETES MELLITUS WITHOUT COMPLICATION, WITHOUT LONG-TERM CURRENT USE OF INSULIN (HCC): ICD-10-CM

## 2019-02-28 ENCOUNTER — OFFICE VISIT (OUTPATIENT)
Dept: ORTHOPEDIC SURGERY | Age: 59
End: 2019-02-28
Payer: COMMERCIAL

## 2019-02-28 ENCOUNTER — HOSPITAL ENCOUNTER (OUTPATIENT)
Dept: GENERAL RADIOLOGY | Age: 59
Discharge: HOME | End: 2019-03-02
Payer: COMMERCIAL

## 2019-02-28 VITALS
HEIGHT: 71 IN | HEART RATE: 80 BPM | WEIGHT: 311 LBS | SYSTOLIC BLOOD PRESSURE: 173 MMHG | DIASTOLIC BLOOD PRESSURE: 121 MMHG | BODY MASS INDEX: 43.54 KG/M2

## 2019-02-28 DIAGNOSIS — Z96.651 STATUS POST TOTAL RIGHT KNEE REPLACEMENT: ICD-10-CM

## 2019-02-28 DIAGNOSIS — M17.11 PRIMARY OSTEOARTHRITIS OF RIGHT KNEE: ICD-10-CM

## 2019-02-28 PROCEDURE — 99024 POSTOP FOLLOW-UP VISIT: CPT | Performed by: ORTHOPAEDIC SURGERY

## 2019-02-28 PROCEDURE — 73560 X-RAY EXAM OF KNEE 1 OR 2: CPT

## 2019-02-28 PROCEDURE — 99212 OFFICE O/P EST SF 10 MIN: CPT | Performed by: ORTHOPAEDIC SURGERY

## 2019-02-28 RX ORDER — PIOGLITAZONEHYDROCHLORIDE 15 MG/1
15 TABLET ORAL DAILY
Qty: 30 TABLET | Refills: 2 | Status: SHIPPED | OUTPATIENT
Start: 2019-02-28 | End: 2019-03-14 | Stop reason: SDUPTHER

## 2019-03-14 DIAGNOSIS — E11.9 TYPE 2 DIABETES MELLITUS WITHOUT COMPLICATION, WITHOUT LONG-TERM CURRENT USE OF INSULIN (HCC): ICD-10-CM

## 2019-03-14 DIAGNOSIS — I10 ESSENTIAL HYPERTENSION: ICD-10-CM

## 2019-03-14 DIAGNOSIS — M17.0 OSTEOARTHRITIS OF BOTH KNEES, UNSPECIFIED OSTEOARTHRITIS TYPE: ICD-10-CM

## 2019-03-14 RX ORDER — PIOGLITAZONEHYDROCHLORIDE 15 MG/1
15 TABLET ORAL DAILY
Qty: 30 TABLET | Refills: 3 | Status: SHIPPED | OUTPATIENT
Start: 2019-03-14 | End: 2019-10-09 | Stop reason: SDUPTHER

## 2019-03-14 RX ORDER — DICLOFENAC SODIUM 75 MG/1
75 TABLET, DELAYED RELEASE ORAL 2 TIMES DAILY
Qty: 60 TABLET | Refills: 3 | Status: SHIPPED | OUTPATIENT
Start: 2019-03-14 | End: 2019-10-09 | Stop reason: SDUPTHER

## 2019-03-14 RX ORDER — OMEGA-3-ACID ETHYL ESTERS 1 G/1
1 CAPSULE, LIQUID FILLED ORAL 2 TIMES DAILY
Qty: 120 CAPSULE | Refills: 3 | Status: SHIPPED | OUTPATIENT
Start: 2019-03-14 | End: 2019-11-19 | Stop reason: SDUPTHER

## 2019-03-14 RX ORDER — ASPIRIN 325 MG
325 TABLET, DELAYED RELEASE (ENTERIC COATED) ORAL DAILY
Qty: 90 TABLET | Refills: 3 | Status: ON HOLD | OUTPATIENT
Start: 2019-03-14 | End: 2019-06-28

## 2019-03-14 RX ORDER — QUINAPRIL 40 MG/1
TABLET ORAL
Qty: 30 TABLET | Refills: 3 | Status: SHIPPED | OUTPATIENT
Start: 2019-03-14 | End: 2019-08-04 | Stop reason: SDUPTHER

## 2019-03-14 RX ORDER — NIACIN 500 MG/1
TABLET, EXTENDED RELEASE ORAL
Qty: 30 TABLET | Refills: 3 | Status: SHIPPED | OUTPATIENT
Start: 2019-03-14 | End: 2019-11-19 | Stop reason: SDUPTHER

## 2019-03-14 RX ORDER — TRIAMTERENE AND HYDROCHLOROTHIAZIDE 37.5; 25 MG/1; MG/1
1 TABLET ORAL DAILY
Qty: 30 TABLET | Refills: 3 | Status: SHIPPED | OUTPATIENT
Start: 2019-03-14 | End: 2019-10-09 | Stop reason: SDUPTHER

## 2019-03-21 ENCOUNTER — OFFICE VISIT (OUTPATIENT)
Dept: FAMILY MEDICINE CLINIC | Age: 59
End: 2019-03-21
Payer: COMMERCIAL

## 2019-03-21 VITALS
WEIGHT: 315 LBS | HEIGHT: 71 IN | OXYGEN SATURATION: 97 % | BODY MASS INDEX: 44.1 KG/M2 | DIASTOLIC BLOOD PRESSURE: 72 MMHG | SYSTOLIC BLOOD PRESSURE: 144 MMHG | HEART RATE: 73 BPM

## 2019-03-21 DIAGNOSIS — I25.10 CORONARY ARTERY DISEASE INVOLVING NATIVE HEART WITHOUT ANGINA PECTORIS, UNSPECIFIED VESSEL OR LESION TYPE: ICD-10-CM

## 2019-03-21 DIAGNOSIS — I10 ESSENTIAL HYPERTENSION: ICD-10-CM

## 2019-03-21 DIAGNOSIS — I35.0 AORTIC VALVE STENOSIS, ETIOLOGY OF CARDIAC VALVE DISEASE UNSPECIFIED: ICD-10-CM

## 2019-03-21 DIAGNOSIS — E11.9 TYPE 2 DIABETES MELLITUS WITHOUT COMPLICATION, WITHOUT LONG-TERM CURRENT USE OF INSULIN (HCC): Primary | ICD-10-CM

## 2019-03-21 DIAGNOSIS — F17.200 SMOKER: ICD-10-CM

## 2019-03-21 DIAGNOSIS — J06.9 VIRAL URI: ICD-10-CM

## 2019-03-21 LAB
CREATININE URINE POCT: 100
HBA1C MFR BLD: 6.7 %
MICROALBUMIN/CREAT 24H UR: 10 MG/G{CREAT}
MICROALBUMIN/CREAT UR-RTO: <30

## 2019-03-21 PROCEDURE — G8417 CALC BMI ABV UP PARAM F/U: HCPCS | Performed by: PHYSICIAN ASSISTANT

## 2019-03-21 PROCEDURE — 99214 OFFICE O/P EST MOD 30 MIN: CPT | Performed by: PHYSICIAN ASSISTANT

## 2019-03-21 PROCEDURE — 2022F DILAT RTA XM EVC RTNOPTHY: CPT | Performed by: PHYSICIAN ASSISTANT

## 2019-03-21 PROCEDURE — G8427 DOCREV CUR MEDS BY ELIG CLIN: HCPCS | Performed by: PHYSICIAN ASSISTANT

## 2019-03-21 PROCEDURE — 83036 HEMOGLOBIN GLYCOSYLATED A1C: CPT | Performed by: PHYSICIAN ASSISTANT

## 2019-03-21 PROCEDURE — 3017F COLORECTAL CA SCREEN DOC REV: CPT | Performed by: PHYSICIAN ASSISTANT

## 2019-03-21 PROCEDURE — G8598 ASA/ANTIPLAT THER USED: HCPCS | Performed by: PHYSICIAN ASSISTANT

## 2019-03-21 PROCEDURE — G8482 FLU IMMUNIZE ORDER/ADMIN: HCPCS | Performed by: PHYSICIAN ASSISTANT

## 2019-03-21 PROCEDURE — 82044 UR ALBUMIN SEMIQUANTITATIVE: CPT | Performed by: PHYSICIAN ASSISTANT

## 2019-03-21 PROCEDURE — 4004F PT TOBACCO SCREEN RCVD TLK: CPT | Performed by: PHYSICIAN ASSISTANT

## 2019-03-21 PROCEDURE — 3044F HG A1C LEVEL LT 7.0%: CPT | Performed by: PHYSICIAN ASSISTANT

## 2019-03-21 RX ORDER — LORATADINE 10 MG/1
10 TABLET ORAL DAILY
Qty: 30 TABLET | Refills: 0 | Status: SHIPPED | OUTPATIENT
Start: 2019-03-21 | End: 2019-04-17 | Stop reason: SDUPTHER

## 2019-03-21 RX ORDER — FLUTICASONE PROPIONATE 50 MCG
2 SPRAY, SUSPENSION (ML) NASAL DAILY
Qty: 1 BOTTLE | Refills: 0 | Status: SHIPPED | OUTPATIENT
Start: 2019-03-21 | End: 2019-04-17 | Stop reason: SDUPTHER

## 2019-03-21 ASSESSMENT — PATIENT HEALTH QUESTIONNAIRE - PHQ9
SUM OF ALL RESPONSES TO PHQ QUESTIONS 1-9: 0
SUM OF ALL RESPONSES TO PHQ QUESTIONS 1-9: 0
1. LITTLE INTEREST OR PLEASURE IN DOING THINGS: 0
SUM OF ALL RESPONSES TO PHQ9 QUESTIONS 1 & 2: 0
2. FEELING DOWN, DEPRESSED OR HOPELESS: 0

## 2019-05-06 NOTE — PROGRESS NOTES
[address] [facility], [address] [place] [address] [place]  [phone]    Follow up Note      Ponce Bence     Date of Visit:  2019    CC:  Patient presents for follow up   Chief Complaint   Patient presents with    Pain     knee back and knee        HPI:    Pain is better. Bilateral buttock pain since R TKA. Left knee pain since surgery due to antalgic gait before and after surgery. Patient reports right knee is doing well overall. Denies any numbness or tingling. Pain worse when standing and is relieved when bending over. Pain is controlled when ambulating. Pain level is an 8. Difficulty falling asleep at night. Appropriate analgesia with current medications regimen: no.    Change in quality of symptoms:no. Medication side effects:not applicable . Recent diagnostic testing:none. Recent interventional procedures:none. He has been on anticoagulation medications to include ASA, NSAIDS and Plavix. The patient  has not been on herbal supplements. The patient is diabetic. Imagin2019 Right knee x-ray    Patient MRN:  [id number]   : 1960   Age: 61 years   Gender: Male   Order Date:  2019 9:13 AM   EXAM: XR KNEE RIGHT (1-2 VIEWS)   NUMBER OF IMAGES:  2 views   INDICATION: Z96.651 Status post total right knee replacement s/p right   TKA   COMPARISON: 2019       . Redemonstration of right total knee arthroplasty hardware   Alignment remains unchanged. No foreign body is identified.           Impression   Stable right knee arthroplasty hardware with no   significant change in alignment.       The exam has been dictated and signed by Taran Jameson PA-C. Alicia Thomas MD, Radiologist, have reviewed the images and   report and concur with these findings.          R knee xray 2018 -   Frontal and lateral weight-bearing views of the right knee demonstrate   severe medial femorotibial joint space narrowing with associated subchondral sclerosis, marginal spurring and irregularity along the   proximal tibia, and genu varus angulation of the knee. Degenerative   enthesopathic changes are noted at the superior aspect of the patella   and posterior aspect of the femoral condyles. The patellofemoral joint   space is not well evaluated on the provided lateral view but may be   narrowed. No acute fracture is seen. A small suprapatellar joint   effusion is noted.      L knee xray 2018 -   Cemented left total arthroplasty without evidence for   hardware failure, change in alignment and a minimal amount of joint   fluid                                         Potential Aberrant Drug-Related Behavior: no      Urine Drug Screenin2018 consistent  10/2018 Inconsistent for oxycodone, noroxycodone. Patient was prescribed norco at the time of the UDS.       OARRS report:  2018 consistent  10/2018 consistent  2019 consistent       Past Medical History:   Diagnosis Date    Anticoagulant long-term use     CAD (coronary artery disease)     Chronic back pain     Depression     Diabetes mellitus ([address]     DJD (degenerative joint disease)     Knees.  Dyslipidemia     Hiatal hernia 1979    History of ST elevation myocardial infarction (STEMI)     Hyperlipidemia     Hypertension     MI (myocardial infarction) ([address] 2007    Inferior wall.  Obesity     Presence of stent in left circumflex coronary artery     Presence of stent in right coronary artery     Sleep apnea     cpap    Smoker     Type 2 diabetes mellitus without complication ([address]        Past Surgical History:   Procedure Laterality Date    CORONARY ANGIOPLASTY WITH STENT PLACEMENT  X4    CORONARY ANGIOPLASTY WITH STENT PLACEMENT  2011    Dr. Gee Amador 1st OM 3.0 x 20 Ion    DIAGNOSTIC CARDIAC CATH LAB PROCEDURE  3/15/2005    [facility]. Mild to moderate CAD. Normal LV.  DIAGNOSTIC CARDIAC CATH LAB PROCEDURE  2007    [facility].  Cath/PTCA/DE stent of proximal and distal RCA.  DIAGNOSTIC CARDIAC CATH LAB PROCEDURE  2/21/2007    Cath/DE stent of proximal OM1 and proximal Cx.  DIAGNOSTIC CARDIAC CATH LAB PROCEDURE  12/30/2011    ANURADHA of mid OM branch of circumflex.  ECHO COMPL W DOP COLOR FLOW  12/30/2011         HERNIA REPAIR  2/2014    laparoscopic ventral hernia repain    JOINT REPLACEMENT Left 4/1/14    TKA-ed    VT OFFICE/OUTPT VISIT,PROCEDURE ONLY Right 11/26/2018    RIGHT KNEE TOTAL ARTHROPLASTY performed by [facility],  at [address]       Prior to Admission medications    Medication Sig Start Date End Date Taking?  Authorizing Provider   Tens Unit MISC by Does not apply route 5/7/19  Yes TRUDY Interiano   fluticasone [facility]) 50 MCG/ACT nasal spray SPRAY 2 SPRAYS INTO EACH NOSTRIL EVERY DAY 4/18/19  Yes TRUDY Plascencia   loratadine (CLARITIN) 10 MG tablet TAKE 1 TABLET BY MOUTH EVERY DAY 4/18/19  Yes TRUDY Plascencia   diclofenac (VOLTAREN) 75 MG EC tablet Take 1 tablet by mouth 2 times daily 3/14/19  Yes TRUDY Plascencia   metFORMIN (GLUCOPHAGE) 500 MG tablet Take 1 tablet by mouth 2 times daily (with meals) 3/14/19  Yes TRUDY Plascencia   niacin (NIASPAN) 500 MG extended release tablet TAKE 1 TABLET BY MOUTH EVERY EVENING 3/14/19  Yes TRUDY Plascencia   omega-3 acid ethyl esters (LOVAZA) 1 g capsule Take 1 capsule by mouth 2 times daily 3/14/19  Yes TRUDY Plascencia   pioglitazone (ACTOS) 15 MG tablet Take 1 tablet by mouth daily 3/14/19  Yes TRUDY Plascencia   quinapril (ACCUPRIL) 40 MG tablet TAKE 1 TABLET BY MOUTH EVERY DAY 3/14/19  Yes TRUDY Plascencia   atorvastatin (LIPITOR) 80 MG tablet TAKE 1 TABLET BY MOUTH AT BEDTIME 10/22/18  Yes TRUDY Plascencia   carvedilol (COREG) 25 MG tablet TAKE ONE TABLET BY MOUTH TWICE DAILY (WITH MEALS) 10/22/18  Yes TRUDY Plascencia   clopidogrel (PLAVIX) 75 MG tablet TAKE 1 TABLET BY MOUTH EVERY DAY 1/7/15  Yes Polly Lopez MD   aspirin 325 MG EC tablet Take 1 tablet by mouth daily for 28 days 3/14/19 4/11/19  TRUDY Mendoza   triamterene-hydrochlorothiazide [facility]) 37.5-25 MG per tablet Take 1 tablet by mouth daily 3/14/19 3/13/20  TRUDY Mendoza   buPROPion (WELLBUTRIN XL) 150 MG extended release tablet TAKE 1 TABLET BY MOUTH EVERY DAY IN THE MORNING 11/14/18   Irina Boone DO   nitroGLYCERIN (NITROSTAT) 0.4 MG SL tablet Place 1 tablet under the tongue as needed for Chest pain 2/15/18 2/15/19  TRUDY Mendoza       Allergies   Allergen Reactions    Bee Venom        Social History     Socioeconomic History    Marital status:      Spouse name: Not on file    Number of children: Not on file    Years of education: Not on file    Highest education level: Not on file   Occupational History    Not on file   Social Needs    Financial resource strain: Not on file    Food insecurity:     Worry: Not on file     Inability: Not on file    Transportation needs:     Medical: Not on file     Non-medical: Not on file   Tobacco Use    Smoking status: Current Every Day Smoker     Packs/day: 0.25     Years: 39.00     Pack years: 9.75     Types: Cigarettes    Smokeless tobacco: Never Used   Substance and Sexual Activity    Alcohol use: No    Drug use: No    Sexual activity: Not on file   Lifestyle    Physical activity:     Days per week: Not on file     Minutes per session: Not on file    Stress: Not on file   Relationships    Social connections:     Talks on phone: Not on file     Gets together: Not on file     Attends Advent service: Not on file     Active member of club or organization: Not on file     Attends meetings of clubs or organizations: Not on file     Relationship status: Not on file    Intimate partner violence:     Fear of current or ex partner: Not on file     Emotionally abused: Not on file     Physically abused: Not on file     Forced sexual activity: Not on file   Other Topics Concern    Not on file   Social History Narrative    Not on file Family History   Problem Relation Age of Onset    Diabetes Mother     Stroke Mother     Diabetes Father     Heart Disease Father        REVIEW OF SYSTEMS:     Mac Brandt denies fever/chills, chest pain, shortness of breath, new bowel or bladder complaints. All other review of systems was negative. PHYSICAL EXAMINATION:      BP (!) 158/98   Pulse 76   Temp 98.1 °F (36.7 °C)   Resp 18   Ht 5' 11\" (1.803 m)   Wt (!) 311 lb (141.1 kg)   SpO2 98%   BMI 43.38 kg/m²     General:      General appearance:   pleasant and well-hydrated. , in no discomfort and A & O x3  Build:Obese  Function:Moves about room without difficulty. HEENT:    Head:normocephalic and atraumatic  Pupils:regular, round and equal.  Sclera: icterus absent,    Lungs:    Breathing:Normal expansion. Clear to auscultation. No rales, rhonchi, or wheezing. Abdomen:    Shape:non-distended and normal  Tenderness:none  Guarding:none    Lumbar spine:    Spine inspection:normal   CVA tenderness:No   Palpation:tenderness paravertebral muscles, midline tenderness, facet loading, left, right and negative. Range of motion:abnormal mildly Lateral bending, flexion, extension rotation bilateral and is not painful. Musculoskeletal:    SI joint tenderness:negative right, negative left              JONO test:not done right, not done left. Piriformis tenderness:negative right, negative left  Trochanteric bursa tenderness:negative right, negative left  SLR:positive right, negative left, sitting     Extremities:    Tremors:None bilaterally upper and lower  Range of motion:Generally normal shoulders, pain with internal rotation of hips negative. Intact:Yes  Varicose veins:absent   Pulses:radial pulse 2+ right  Cyanosis:none  Edema:Normal    Knee:     Inspection:symmetric, swelling none bilaterally  Tenderness of Bony Landmarks:None, bilateral  Drawer Test:negative  Effusion:absent bilaterally  Crepitus:absent bilaterally  ROM:Left 0-100  Right 0-100    Neurological:    Sensory:normal to joint position sense and light touch     Motor:   Right Quadriceps5/5          Left Quadriceps5/5           Right Gastrocnemius5/5    Left Gastrocnemius5/5  Right Ant Tibialis5/5  Left Ant Tibialis5/5  Gait: Minimally antalgic    Dermatology:    Skin:no unusual rashes, no skin lesions, no palpable subcutaneous nodules and good skin turgor    Impression:    Patient is s/p R TKA on 11/26/2018 by Dr. Jan Mari with good results. Patient needs no further care regarding his right TKA from a pain management standpoint. Patient is s/p L TKA 5 years ago by Dr. Connor Cervantes. Patient with new axial lower back pain due to antalgic gait since R TKA and new alignment of right leg  On PLAVIX, ASA, and voltaren    No prior treatment to lumbar spine. X-ray lumbar spine ordered today  PT recommended, but patient reports co-pay is too high $60 per visit. Discussed that we can inquire about financial assistance. They would like to hold off for now. Continue voltaren through pcp. Reports working well. Has failed flexeril, zanaflex - \"keeps me up at night. \"  Fercho Isadora patch sample given to assist in his pain needs  Has failed compounding pain cream when he used for his knee in the past.  States that it made his pain worse. Discussed that further treatment will be recommended after he obtains his lumbar spine x-rays              Treatment plan discussed with the patient     Controlled Substances Monitoring:     RX Monitoring 2/11/2019   Attestation The Prescription Monitoring Report for this patient was reviewed today. Acute Pain Prescriptions -   Chronic Pain Routine Monitoring No signs of potential drug abuse or diversion identified. Chronic Pain > 80 MEDD -                 We discussed with the patient that combining opioids, benzodiazepines, alcohol, illicit drugs or sleep aids increases the risk of respiratory depression including death.  We discussed that these medications may cause drowsiness, sedation or dizziness and have counseled the patient not to drive or operate machinery. We have discussed that these medications will be prescribed only by one provider. We have discussed with the patient about age related risk factors and have thoroughly discussed the importance of taking these medications as prescribed. The patient verbalizes understanding.     ccreferring physic

## 2019-05-07 ENCOUNTER — OFFICE VISIT (OUTPATIENT)
Dept: PAIN MANAGEMENT | Age: 59
End: 2019-05-07
Payer: COMMERCIAL

## 2019-05-07 VITALS
OXYGEN SATURATION: 98 % | RESPIRATION RATE: 18 BRPM | DIASTOLIC BLOOD PRESSURE: 98 MMHG | HEIGHT: 71 IN | TEMPERATURE: 98.1 F | HEART RATE: 76 BPM | WEIGHT: 311 LBS | SYSTOLIC BLOOD PRESSURE: 158 MMHG | BODY MASS INDEX: 43.54 KG/M2

## 2019-05-07 DIAGNOSIS — M54.50 CHRONIC BILATERAL LOW BACK PAIN WITHOUT SCIATICA: Primary | ICD-10-CM

## 2019-05-07 DIAGNOSIS — G89.29 CHRONIC BILATERAL LOW BACK PAIN WITHOUT SCIATICA: Primary | ICD-10-CM

## 2019-05-07 PROCEDURE — 3017F COLORECTAL CA SCREEN DOC REV: CPT | Performed by: PHYSICIAN ASSISTANT

## 2019-05-07 PROCEDURE — 99213 OFFICE O/P EST LOW 20 MIN: CPT | Performed by: PHYSICIAN ASSISTANT

## 2019-05-07 PROCEDURE — 4004F PT TOBACCO SCREEN RCVD TLK: CPT | Performed by: PHYSICIAN ASSISTANT

## 2019-05-07 PROCEDURE — G8427 DOCREV CUR MEDS BY ELIG CLIN: HCPCS | Performed by: PHYSICIAN ASSISTANT

## 2019-05-07 PROCEDURE — G8417 CALC BMI ABV UP PARAM F/U: HCPCS | Performed by: PHYSICIAN ASSISTANT

## 2019-05-07 PROCEDURE — G8598 ASA/ANTIPLAT THER USED: HCPCS | Performed by: PHYSICIAN ASSISTANT

## 2019-05-07 NOTE — PROGRESS NOTES
James Andersen presents to the [facility] on 5/7/2019. Tatiana Gilmore is complaining of pain neck back . The pain is constant. The pain is described as aching, throbbing, shooting and stabbing. Pain is rated on his best day at a 8, on his worst day at a 8, and on average at a 8 on the VAS scale. He took his last dose of asa         Any procedures since your last visit: No,     Pacemaker or defibrilator: No managed by none. He has been on anticoagulation medications to include ASA and is managed by Brenda.       BP (!) 160/110   Pulse 76   Temp 98.1 °F (36.7 °C)   Resp 18   Ht 5' 11\" (1.803 m)   Wt (!) 311 lb (141.1 kg)   SpO2 98%   BMI 43.38 kg/m²

## 2019-06-03 ENCOUNTER — TELEPHONE (OUTPATIENT)
Dept: PAIN MANAGEMENT | Age: 59
End: 2019-06-03

## 2019-06-03 ENCOUNTER — TELEPHONE (OUTPATIENT)
Dept: FAMILY MEDICINE CLINIC | Age: 59
End: 2019-06-03

## 2019-06-03 DIAGNOSIS — R93.5 ABNORMAL X-RAY OF ABDOMEN: Primary | ICD-10-CM

## 2019-06-03 DIAGNOSIS — M54.50 CHRONIC BILATERAL LOW BACK PAIN WITHOUT SCIATICA: ICD-10-CM

## 2019-06-03 DIAGNOSIS — G89.29 CHRONIC BILATERAL LOW BACK PAIN WITHOUT SCIATICA: ICD-10-CM

## 2019-06-03 DIAGNOSIS — E11.9 TYPE 2 DIABETES MELLITUS WITHOUT COMPLICATION, WITHOUT LONG-TERM CURRENT USE OF INSULIN (HCC): Primary | ICD-10-CM

## 2019-06-03 NOTE — TELEPHONE ENCOUNTER
Detailed message left for patient asking him to contact the office ASAP in reference to X-Ray results from pain management (showed a very large abdominal aortic aneurysm). Sumi Kyra ordered a CT scan to have done within the next day and we need to know where he would like this scheduled. I called patient a 2nd time to discuss; I explained that his X-Ray was abnormal & that we need to get a CT scan ASAP. He'd like to have this done at [facility]. Advised him I will call to schedule and call him back with appointment information. [facility] is able to take the patient Wednesday (tentatively) at the soonest if we are able to get insurance authorization. I called the insurance company to initiate this process & was informed all authorizations must be completed by fax. They faxed me the form & I sent it back with urgent status at 1:25pm.     I called [facility] again to verify they received my request & clinical information. They confirmed they did receive the information, and will forward to the nurse for clinical review. They stated it can take up to 48 hours for a determination to be made.

## 2019-06-05 NOTE — TELEPHONE ENCOUNTER
Per Sherin's recommendation, I contacted the patient & directed him to have CT scan done at the Emergency Dept at [facility] so we don't have to wait any longer for insurance approval as this test was to be completed urgently. He verbalized understanding and stated he will go today to have this done.

## 2019-06-05 NOTE — TELEPHONE ENCOUNTER
Patients wife Nate Archibald) called stating she is afraid they will end up paying for an emergency room bill and insists on waiting for insurance approval to have CT done. She was informed that this test is emergent and Luan Neal wants this done ASAP. She still refused to go until they get OK from insurance.

## 2019-06-06 NOTE — TELEPHONE ENCOUNTER
Incoming fax received from [employer] this morning approving CT Abdomen/Pelvis (valid 6/4/19-9/2/19, approval # [id number]). Called patient & informed him of this and also asked if he'd still like to have done at [facility]. He prefers that facility because it is cheaper if he has pay any out of pocket expenses.  Advised him I will call to schedule ASAP (they open at 8:30am)

## 2019-06-06 NOTE — TELEPHONE ENCOUNTER
Called [facility] to schedule; there was no answer on main line. Left message requesting a return call ASAP. Called [facility] a 2nd time and was informed that they are not able to take STAT or immediate scans until 6/17/19 as the reading physician is out of the office until then. I then contacted [id number] [place] [address] to see how soon they can schedule this; patient must have labs prior to test (BUN/Creatinine) and be fasting for 4 hours, therefore the next available appointment is 6/7/19 at 3pm. He needs to arrive at 2 pm for labs, and be NPO for 4 hours prior.  *No Metformin the day of the test & for 48 hours after*

## 2019-06-07 ENCOUNTER — TELEPHONE (OUTPATIENT)
Dept: PAIN MANAGEMENT | Age: 59
End: 2019-06-07

## 2019-06-07 ENCOUNTER — HOSPITAL ENCOUNTER (OUTPATIENT)
Dept: CT IMAGING | Age: 59
Discharge: HOME | End: 2019-06-09
Payer: COMMERCIAL

## 2019-06-07 ENCOUNTER — CLINICAL DOCUMENTATION (OUTPATIENT)
Dept: PAIN MANAGEMENT | Age: 59
End: 2019-06-07

## 2019-06-07 ENCOUNTER — HOSPITAL ENCOUNTER (OUTPATIENT)
Age: 59
Discharge: HOME | End: 2019-06-07
Payer: COMMERCIAL

## 2019-06-07 DIAGNOSIS — R93.5 ABNORMAL X-RAY OF ABDOMEN: ICD-10-CM

## 2019-06-07 DIAGNOSIS — E11.9 TYPE 2 DIABETES MELLITUS WITHOUT COMPLICATION, WITHOUT LONG-TERM CURRENT USE OF INSULIN (HCC): ICD-10-CM

## 2019-06-07 DIAGNOSIS — I71.40 ABDOMINAL AORTIC ANEURYSM (AAA) WITHOUT RUPTURE: Primary | ICD-10-CM

## 2019-06-07 LAB
ANION GAP SERPL CALCULATED.3IONS-SCNC: 12 MMOL/L (ref 7–16)
BUN BLDV-MCNC: 32 MG/DL (ref 6–20)
CALCIUM SERPL-MCNC: 9.9 MG/DL (ref 8.6–10.2)
CHLORIDE BLD-SCNC: 100 MMOL/L (ref 98–107)
CO2: 28 MMOL/L (ref 22–29)
CREAT SERPL-MCNC: 1.2 MG/DL (ref 0.7–1.2)
GFR AFRICAN AMERICAN: >60
GFR NON-AFRICAN AMERICAN: >60 ML/MIN/1.73
GLUCOSE BLD-MCNC: 123 MG/DL (ref 74–99)
POTASSIUM SERPL-SCNC: 4.9 MMOL/L (ref 3.5–5)
SODIUM BLD-SCNC: 140 MMOL/L (ref 132–146)

## 2019-06-07 PROCEDURE — 2580000003 HC RX 258: Performed by: RADIOLOGY

## 2019-06-07 PROCEDURE — 6360000004 HC RX CONTRAST MEDICATION: Performed by: RADIOLOGY

## 2019-06-07 PROCEDURE — 80048 BASIC METABOLIC PNL TOTAL CA: CPT

## 2019-06-07 PROCEDURE — 36415 COLL VENOUS BLD VENIPUNCTURE: CPT

## 2019-06-07 PROCEDURE — 74178 CT ABD&PLV WO CNTR FLWD CNTR: CPT

## 2019-06-07 RX ORDER — SODIUM CHLORIDE 0.9 % (FLUSH) 0.9 %
10 SYRINGE (ML) INJECTION PRN
Status: DISCONTINUED | OUTPATIENT
Start: 2019-06-07 | End: 2019-06-10 | Stop reason: HOSPADM

## 2019-06-07 RX ADMIN — IOPAMIDOL 110 ML: 755 INJECTION, SOLUTION INTRAVENOUS at 15:05

## 2019-06-07 RX ADMIN — Medication 10 ML: at 15:05

## 2019-06-07 NOTE — PROGRESS NOTES
I did speak with radiology to keep patient in the department until CT scan is read by radiologist and to have it read urgently. Patient was notified of this and knows that he will be advised of where he will need to go for care following his scan.

## 2019-06-07 NOTE — PROGRESS NOTES
[address] [facility], [address] [place] [address] N[place]  [phone]    Follow up Note      Dalton Garcia     Date of Visit:  2019    CC:  Patient presents for follow up   Chief Complaint   Patient presents with    Pain     knees/hips     HPI:    Pain is unchanged. Change in quality of symptoms:no. Medication side effects:not applicable . Recent diagnostic testing:none. Recent interventional procedures:none. He has been on anticoagulation medications to include ASA, NSAIDS and Plavix. The patient  has not been on herbal supplements. The patient is diabetic. Imagin/2019 lumbar xray -        2019 Right knee x-ray    Patient MRN:  [id number]   : 1960   Age: 61 years   Gender: Male   Order Date:  2019 9:13 AM   EXAM: XR KNEE RIGHT (1-2 VIEWS)   NUMBER OF IMAGES:  2 views   INDICATION: Z96.651 Status post total right knee replacement s/p right   TKA   COMPARISON: 2019       . Redemonstration of right total knee arthroplasty hardware   Alignment remains unchanged. No foreign body is identified.           Impression   Stable right knee arthroplasty hardware with no   significant change in alignment.       The exam has been dictated and signed by Zhane Forde PA-C. Marcela Avitia MD, Radiologist, have reviewed the images and   report and concur with these findings. R knee xray 2018 -   Frontal and lateral weight-bearing views of the right knee demonstrate   severe medial femorotibial joint space narrowing with associated   subchondral sclerosis, marginal spurring and irregularity along the   proximal tibia, and genu varus angulation of the knee. Degenerative   enthesopathic changes are noted at the superior aspect of the patella   and posterior aspect of the femoral condyles. The patellofemoral joint   space is not well evaluated on the provided lateral view but may be   narrowed. No acute fracture is seen.  A small suprapatellar joint   effusion is noted.      L knee xray 2018 -   Cemented left total arthroplasty without evidence for   hardware failure, change in alignment and a minimal amount of joint   fluid                                         Potential Aberrant Drug-Related Behavior: no      Urine Drug Screenin2018 consistent  10/2018 Inconsistent for oxycodone, noroxycodone. Patient was prescribed norco at the time of the UDS.       OARRS report:  2018 consistent  10/2018 consistent  2019 consistent   2019 consistent    Past Medical History:   Diagnosis Date    Anticoagulant long-term use     CAD (coronary artery disease)     Chronic back pain     Depression     Diabetes mellitus ([address]     DJD (degenerative joint disease)     Knees.  Dyslipidemia     Hiatal hernia 1979    History of ST elevation myocardial infarction (STEMI)     Hyperlipidemia     Hypertension     [facility]) [facility] 2007    Inferior wall.  Obesity     Presence of stent in left circumflex coronary artery     Presence of stent in right coronary artery     Sleep apnea     cpap    Smoker     Type 2 diabetes mellitus without complication ([facility]        Past Surgical History:   Procedure Laterality Date    CORONARY ANGIOPLASTY WITH STENT PLACEMENT  X4    CORONARY ANGIOPLASTY WITH STENT PLACEMENT  2011    Dr. Hill Kos 1st OM 3.0 x 20 Ion    DIAGNOSTIC CARDIAC CATH LAB PROCEDURE  3/15/2005    [facility]. Mild to moderate CAD. Normal LV.  DIAGNOSTIC CARDIAC CATH LAB PROCEDURE  2007    [facility]. Cath/PTCA/DE stent of proximal and distal RCA.  DIAGNOSTIC CARDIAC CATH LAB PROCEDURE  2007    Cath/DE stent of proximal OM1 and proximal Cx.  DIAGNOSTIC CARDIAC CATH LAB PROCEDURE  2011    ANURADHA of mid OM branch of circumflex.      ECHO COMPL W DOP COLOR FLOW  2011         HERNIA REPAIR  2014    laparoscopic ventral hernia repain    JOINT REPLACEMENT Left 14    TKA-ed    [place] OFFICE/OUTPT VISIT,PROCEDURE ONLY Right 11/26/2018    RIGHT KNEE TOTAL ARTHROPLASTY performed by Frantz Nam DO at [address]       Prior to Admission medications    Medication Sig Start Date End Date Taking?  Authorizing Provider   aspirin 325 MG EC tablet TAKE 1 TABLET BY MOUTH EVERY DAY 5/24/19  Yes Historical Provider, MD   Tens Unit [address] [facility] by Does not apply route 5/7/19  Yes TRUDY Erazo   fluticasone [facility]) 50 MCG/ACT nasal spray SPRAY 2 SPRAYS INTO EACH NOSTRIL EVERY DAY 4/18/19  Yes TRUDY Herrera   loratadine (CLARITIN) 10 MG tablet TAKE 1 TABLET BY MOUTH EVERY DAY 4/18/19  Yes TRUDY Herrera   diclofenac (VOLTAREN) 75 MG EC tablet Take 1 tablet by mouth 2 times daily 3/14/19  Yes TRUDY Herrera   metFORMIN (GLUCOPHAGE) 500 MG tablet Take 1 tablet by mouth 2 times daily (with meals) 3/14/19  Yes TRUDY Herrera   niacin (NIASPAN) 500 MG extended release tablet TAKE 1 TABLET BY MOUTH EVERY EVENING 3/14/19  Yes TRUDY Herrera   omega-3 acid ethyl esters (LOVAZA) 1 g capsule Take 1 capsule by mouth 2 times daily 3/14/19  Yes TRUDY Herrera   pioglitazone (ACTOS) 15 MG tablet Take 1 tablet by mouth daily 3/14/19  Yes TRUDY Herrera   quinapril (ACCUPRIL) 40 MG tablet TAKE 1 TABLET BY MOUTH EVERY DAY 3/14/19  Yes TRUDY Herrera   triamterene-hydrochlorothiazide [facility]) 37.5-25 MG per tablet Take 1 tablet by mouth daily 3/14/19 3/13/20 Yes TRUDY Herrera   buPROPion (WELLBUTRIN XL) 150 MG extended release tablet TAKE 1 TABLET BY MOUTH EVERY DAY IN THE MORNING 11/14/18  Yes Jadyn Posadas DO   atorvastatin (LIPITOR) 80 MG tablet TAKE 1 TABLET BY MOUTH AT BEDTIME 10/22/18  Yes TRUDY Herrera   carvedilol (COREG) 25 MG tablet TAKE ONE TABLET BY MOUTH TWICE DAILY (WITH MEALS) 10/22/18  Yes TRUDY Herrera   clopidogrel (PLAVIX) 75 MG tablet TAKE 1 TABLET BY MOUTH EVERY DAY 1/7/15  Yes Victorino Willis MD   aspirin 325 MG EC tablet Take 1 tablet by mouth daily for 28 days

## 2019-06-07 NOTE — TELEPHONE ENCOUNTER
Reviewed CT a/p results in the computer system. Dr. Sergei Gunn, who is on call for vascular surgery. I called Dr. Sergie Gunn to discuss case and seek some direction on what I should recommend to the patient for further evaluation and treatment. Dr. Sergei Gunn reviewed the results of the scan and would like to see the patient in his office within one week to discuss repair of the AAA. Spoke to Rodrigue Teran to discuss plan. Referral placed in this encounter. As I am speaking with Collette Bloomer, the patient is calling her office and Collette Bloomer is going to to relay to Mr. Jose Miguel Dominguez to expect to hear from Dr. Kim Alexis office to be scheduled for an OV within one week.

## 2019-06-07 NOTE — TELEPHONE ENCOUNTER
Reviewed chart in anticipation of pt's upcoming appt with me next week. Reviewed lumbar xray results. Discussed with Julian Montes. Reviewed communications with patient in the chart. It is not clear when/where patient is having CT scan. Called patient to discuss, patient states having CT scan today at [facility] Discussed with Mr. Gabbie Roberto the seriousness of the situation, discussed the function of the abdominal aorta, discussed if ruptures that it can be deadly and given the size of the aorta on the lumbar xray it is at high risk to rupture. Discussed not to do any strenuous activity. Discussed I will watch for the results of his study today and call radiology if needed to get results. Discussed with Mr. Gabbie Roberto that if the study shows it is as large as the lumbar xray showed that he needs to expect to need to go to the ER and be seen by vascular surgery. Julian Montes is calling [facility] radiology dept as he is having study done there today to have the radiologist review the study before the patient is permitted to leave the department. If there study shows a significant AAA, the patient will need to be directed immediately to the [facility] ER for evaluation and consideration for transfer to [facility] facility for vascular evaluation.   Tristin Joyce will then call Jennifer Gabbie Roberto to let him know that is the plan (for him to stay in the department until the radiologist reviews and he gets further instruction on what to do next)

## 2019-06-07 NOTE — PROGRESS NOTES
I talk with pain management. They gave me a call regarding the CT scan findings. I personally reviewed the CT scan findings. He has approximately 6.7 abdominal aortic aneurysm below the level of the renal's. This does not appear to be ruptured. I advised them to follow up in 1 week. I'll also place and note to my office for follow-up with the patient.

## 2019-06-10 ENCOUNTER — TELEPHONE (OUTPATIENT)
Dept: PAIN MANAGEMENT | Age: 59
End: 2019-06-10

## 2019-06-11 ENCOUNTER — OFFICE VISIT (OUTPATIENT)
Dept: PAIN MANAGEMENT | Age: 59
End: 2019-06-11
Payer: COMMERCIAL

## 2019-06-11 VITALS
BODY MASS INDEX: 43.54 KG/M2 | HEART RATE: 82 BPM | RESPIRATION RATE: 20 BRPM | SYSTOLIC BLOOD PRESSURE: 152 MMHG | OXYGEN SATURATION: 95 % | DIASTOLIC BLOOD PRESSURE: 102 MMHG | WEIGHT: 311 LBS | TEMPERATURE: 98.7 F | HEIGHT: 71 IN

## 2019-06-11 DIAGNOSIS — G89.29 CHRONIC BILATERAL LOW BACK PAIN WITHOUT SCIATICA: ICD-10-CM

## 2019-06-11 DIAGNOSIS — M51.36 DDD (DEGENERATIVE DISC DISEASE), LUMBAR: ICD-10-CM

## 2019-06-11 DIAGNOSIS — M25.551 RIGHT HIP PAIN: ICD-10-CM

## 2019-06-11 DIAGNOSIS — M54.50 CHRONIC BILATERAL LOW BACK PAIN WITHOUT SCIATICA: ICD-10-CM

## 2019-06-11 DIAGNOSIS — G89.4 CHRONIC PAIN SYNDROME: Primary | ICD-10-CM

## 2019-06-11 PROCEDURE — G8417 CALC BMI ABV UP PARAM F/U: HCPCS | Performed by: PAIN MEDICINE

## 2019-06-11 PROCEDURE — G8598 ASA/ANTIPLAT THER USED: HCPCS | Performed by: PAIN MEDICINE

## 2019-06-11 PROCEDURE — 99213 OFFICE O/P EST LOW 20 MIN: CPT | Performed by: PAIN MEDICINE

## 2019-06-11 PROCEDURE — 3017F COLORECTAL CA SCREEN DOC REV: CPT | Performed by: PAIN MEDICINE

## 2019-06-11 PROCEDURE — G8427 DOCREV CUR MEDS BY ELIG CLIN: HCPCS | Performed by: PAIN MEDICINE

## 2019-06-11 PROCEDURE — 4004F PT TOBACCO SCREEN RCVD TLK: CPT | Performed by: PAIN MEDICINE

## 2019-06-11 NOTE — PROGRESS NOTES
Mary Ellen Moraes presents to the [facility] on 6/11/2019. Pedro Jalloh is complaining of pain knees/hips. The pain is constant. The pain is described as aching, throbbing, shooting and stabbing. Pain is rated on his best day at a 9, on his worst day at a 10, and on average at a 9 on the VAS scale. He took his last dose of     Any procedures since your last visit    Pacemaker or defibrilator: No managed by none. He has been on anticoagulation medications to include Plavix/ASA and is managed by Dr. Zenaida Esquivel.       BP (!) 152/102   Pulse 82   Temp 98.7 °F (37.1 °C) (Oral)   Resp 20   Ht 5' 11\" (1.803 m)   Wt (!) 311 lb (141.1 kg)   SpO2 95%   BMI 43.38 kg/m²

## 2019-06-12 ENCOUNTER — OFFICE VISIT (OUTPATIENT)
Dept: VASCULAR SURGERY | Age: 59
End: 2019-06-12
Payer: COMMERCIAL

## 2019-06-12 ENCOUNTER — TELEPHONE (OUTPATIENT)
Dept: VASCULAR SURGERY | Age: 59
End: 2019-06-12

## 2019-06-12 VITALS — SYSTOLIC BLOOD PRESSURE: 124 MMHG | DIASTOLIC BLOOD PRESSURE: 72 MMHG | HEART RATE: 72 BPM

## 2019-06-12 DIAGNOSIS — I71.40 ABDOMINAL AORTIC ANEURYSM WITHOUT RUPTURE: Primary | ICD-10-CM

## 2019-06-12 PROCEDURE — 99203 OFFICE O/P NEW LOW 30 MIN: CPT | Performed by: SURGERY

## 2019-06-12 PROCEDURE — 3017F COLORECTAL CA SCREEN DOC REV: CPT | Performed by: SURGERY

## 2019-06-12 PROCEDURE — 4004F PT TOBACCO SCREEN RCVD TLK: CPT | Performed by: SURGERY

## 2019-06-12 PROCEDURE — G8598 ASA/ANTIPLAT THER USED: HCPCS | Performed by: SURGERY

## 2019-06-12 PROCEDURE — G8417 CALC BMI ABV UP PARAM F/U: HCPCS | Performed by: SURGERY

## 2019-06-12 PROCEDURE — G8427 DOCREV CUR MEDS BY ELIG CLIN: HCPCS | Performed by: SURGERY

## 2019-06-18 ENCOUNTER — HOSPITAL ENCOUNTER (OUTPATIENT)
Dept: NON INVASIVE DIAGNOSTICS | Age: 59
Discharge: HOME | End: 2019-06-18
Payer: COMMERCIAL

## 2019-06-18 LAB
LV EF: 55 %
LVEF MODALITY: NORMAL

## 2019-06-18 PROCEDURE — 93306 TTE W/DOPPLER COMPLETE: CPT

## 2019-06-20 ENCOUNTER — TELEPHONE (OUTPATIENT)
Dept: PAIN MANAGEMENT | Age: 59
End: 2019-06-20

## 2019-06-20 NOTE — TELEPHONE ENCOUNTER
Spoke to Mr. Lovetta Oppenheim to discuss his upcoming surgery with Dr. Ines Khan, he is scheduled for next Friday.

## 2019-06-21 RX ORDER — ACETAMINOPHEN 500 MG
500 TABLET ORAL EVERY 6 HOURS PRN
COMMUNITY
End: 2020-01-01 | Stop reason: SDUPTHER

## 2019-06-21 NOTE — PROGRESS NOTES
Vascular Surgery Outpatient Consultation        Reason for Consult:    Chief Complaint   Patient presents with    Surgical Consult     AAA       Requesting Physician:  Blaise Iqbal, [address] [facility]    Chief Complaint   Patient presents with    Surgical Consult     AAA       HISTORY OF PRESENT ILLNESS:                The patient is a 61 y.o. male who is referred for evaluation of an abdominal aortic aneurysm. He has chronic lower back pain as well as chronic lower extremity knee pain. He is status post knee surgery. Overall he states he is doing well this was an incidental finding. He denies any known history of aneurysmal disease. He denies any active chest pain shortness of breath or discomfort. He does have a cardiac history. He presents for further evaluation management and treatment. He denies any known history of distal peripheral vascular disease claudication or rest pain. .    Past Medical History:        Diagnosis Date    Anticoagulant long-term use     CAD (coronary artery disease)     Chronic back pain     Depression     Diabetes mellitus ([address]     DJD (degenerative joint disease)     Knees.  Dyslipidemia     Hiatal hernia 1979    History of ST elevation myocardial infarction (STEMI)     Hyperlipidemia     Hypertension     MI (myocardial infarction) ([address] 1/2007    Inferior wall.  Obesity     Presence of stent in left circumflex coronary artery     Presence of stent in right coronary artery     Sleep apnea     cpap    Smoker     Type 2 diabetes mellitus without complication ([facility]      Past Surgical History:        Procedure Laterality Date    CORONARY ANGIOPLASTY WITH STENT PLACEMENT  X4    CORONARY ANGIOPLASTY WITH STENT PLACEMENT  12/30/2011    Dr. Robin Epstein 1st OM 3.0 x 20 Ion    DIAGNOSTIC CARDIAC CATH LAB PROCEDURE  3/15/2005    [facility]. Mild to moderate CAD. Normal LV.     DIAGNOSTIC CARDIAC CATH LAB PROCEDURE  1/16/2007 [facility]. Cath/PTCA/DE stent of proximal and distal RCA.  DIAGNOSTIC CARDIAC CATH LAB PROCEDURE  2/21/2007    Cath/DE stent of proximal OM1 and proximal Cx.  DIAGNOSTIC CARDIAC CATH LAB PROCEDURE  12/30/2011    ANURADHA of mid OM branch of circumflex.  ECHO COMPL W DOP COLOR FLOW  12/30/2011         HERNIA REPAIR  2/2014    laparoscopic ventral hernia repain    JOINT REPLACEMENT Left 4/1/14    TKA-ed    [place] OFFICE/OUTPT VISIT,PROCEDURE ONLY Right 11/26/2018    RIGHT KNEE TOTAL ARTHROPLASTY performed by Brittney Thrasher DO at [facility] OR     Current Medications:   Prior to Admission medications    Medication Sig Start Date End Date Taking?  Authorizing Provider   aspirin 325 MG EC tablet TAKE 1 TABLET BY MOUTH EVERY DAY 5/24/19  Yes Historical Provider, MD   aspirin 325 MG EC tablet Take 1 tablet by mouth daily for 28 days 3/14/19 6/12/19 Yes TRUDY Maria   diclofenac (VOLTAREN) 75 MG EC tablet Take 1 tablet by mouth 2 times daily 3/14/19  Yes TRUDY Maria   metFORMIN (GLUCOPHAGE) 500 MG tablet Take 1 tablet by mouth 2 times daily (with meals) 3/14/19  Yes TRUDY Maria   niacin (NIASPAN) 500 MG extended release tablet TAKE 1 TABLET BY MOUTH EVERY EVENING 3/14/19  Yes TRUDY Maria   omega-3 acid ethyl esters (LOVAZA) 1 g capsule Take 1 capsule by mouth 2 times daily 3/14/19  Yes TRUDY Maria   pioglitazone (ACTOS) 15 MG tablet Take 1 tablet by mouth daily 3/14/19  Yes TRUDY Maria   quinapril (ACCUPRIL) 40 MG tablet TAKE 1 TABLET BY MOUTH EVERY DAY 3/14/19  Yes TRUDY Maria   triamterene-hydrochlorothiazide [facility]) 37.5-25 MG per tablet Take 1 tablet by mouth daily 3/14/19 3/13/20 Yes TRUDY Maria   atorvastatin (LIPITOR) 80 MG tablet TAKE 1 TABLET BY MOUTH AT BEDTIME 10/22/18  Yes TRUDY Maria   carvedilol (COREG) 25 MG tablet TAKE ONE TABLET BY MOUTH TWICE DAILY (WITH MEALS) 10/22/18  Yes TRUDY Maria   clopidogrel (PLAVIX) 75 MG tablet TAKE 1 TABLET BY MOUTH EVERY DAY 1/7/15  Yes Michele Gauthier MD   Tens Unit MISC by Does not apply route 5/7/19   TRUDY Crowley   fluticasone [facility]) 50 MCG/ACT nasal spray SPRAY 2 SPRAYS INTO EACH NOSTRIL EVERY DAY 4/18/19   TRUDY Carbajal   loratadine (CLARITIN) 10 MG tablet TAKE 1 TABLET BY MOUTH EVERY DAY 4/18/19   TRUDY Carbajal   buPROPion (WELLBUTRIN XL) 150 MG extended release tablet TAKE 1 TABLET BY MOUTH EVERY DAY IN THE MORNING 11/14/18   Stevo Luna DO   nitroGLYCERIN (NITROSTAT) 0.4 MG SL tablet Place 1 tablet under the tongue as needed for Chest pain 2/15/18 2/15/19  TRUDY Carbajal     Allergies:  Bee venom    Social History     Socioeconomic History    Marital status:      Spouse name: Not on file    Number of children: Not on file    Years of education: Not on file    Highest education level: Not on file   Occupational History    Not on file   Social Needs    Financial resource strain: Not on file    Food insecurity:     Worry: Not on file     Inability: Not on file    Transportation needs:     Medical: Not on file     Non-medical: Not on file   Tobacco Use    Smoking status: Current Every Day Smoker     Packs/day: 0.50     Years: 39.00     Pack years: 19.50     Types: Cigarettes    Smokeless tobacco: Never Used   Substance and Sexual Activity    Alcohol use: No    Drug use: No    Sexual activity: Not on file   Lifestyle    Physical activity:     Days per week: Not on file     Minutes per session: Not on file    Stress: Not on file   Relationships    Social connections:     Talks on phone: Not on file     Gets together: Not on file     Attends [employer] service: Not on file     Active member of club or organization: Not on file     Attends meetings of clubs or organizations: Not on file     Relationship status: Not on file    Intimate partner violence:     Fear of current or ex partner: Not on file     Emotionally abused: Not on file     Physically abused: Not on file     Forced sexual activity: Not on file   Other Topics Concern    Not on file   Social History Narrative    Not on file        Family History   Problem Relation Age of Onset    Diabetes Mother     Stroke Mother     Diabetes Father     Heart Disease Father        REVIEW OF SYSTEMS (New symptoms):    Eyes:     Blurred vision:   No [x]/Yes []               Diplopia:   No [x]/Yes []               Vision loss:       No [x]/Yes []   Ears, nose, throat:             Hearing loss:    No [x]/Yes []      Vertigo:   No [x]/Yes []                       Swallowing problem:  No [x]/Yes []               Nose bleeds:   No [x]/Yes []      Voice hoarseness:  No [x]/Yes []  Respiratory:             Cough:   No [x]/Yes []      Pleuritic chest pain:  No [x]/Yes []                        Dyspnea:   No [x]/Yes []      Wheezing:   No [x]/Yes []  Cardiovascular:             Angina:   No [x]/Yes []      Palpitations:   No [x]/Yes []          Claudication:    No [x]/Yes []      Leg swelling:   No [x]/Yes []  Gastrointestinal:             Nausea or vomiting:  No [x]/Yes []               Abdominal pain:  No [x]/Yes []                     Intestinal bleeding: No [x]/Yes []  Musculoskeletal:             Leg pain:   No []/Yes [x] history of bilateral knee pain with surgery      Back pain:   No []/Yes [x]                    Weakness:   No [x]/Yes []  Neurologic:             Numbness:   No [x]/Yes []      Paralysis:   No [x]/Yes []                       Headaches:   No [x]/Yes []  Hematologic, lymphatic:   Anemia:   No [x]/Yes []              Bleeding or bruising:  No [x]/Yes []              Fevers or chills: No [x]/Yes []  Endocrine:             Temp intolerance:   No [x]/Yes []                       Polydipsia, polyuria:  No [x]/Yes []  Skin:              Rash:    No [x]/Yes []      Ulcers:   No [x]/Yes []              Abnorm pigment: No [x]/Yes []  :              Frequency/urgency:  No [x]/Yes []      Hematuria:    No [x]/Yes []

## 2019-06-21 NOTE — PROGRESS NOTES
[address] PRE-ADMISSION TESTING GENERAL INSTRUCTIONS- [facility]-phone number:[phone]    GENERAL INSTRUCTIONS  [x] Antibacterial Soap shower Night before and/or AM of Surgery  [x] Nothing by mouth after midnight, including gum, candy, mints, or water. [x] You may brush your teeth, gargle, but do NOT swallow water. [x]Hibiclens shower  the night before and the morning of surgery. Do not use             Hibiclens on your face or head. [x]No smoking, chewing tobacco, illegal drugs, or alcohol within 24 hours of your surgery. [x] Jewelry, valuables or body piercing's should not be brought to the hospital. All body and/or tongue piercing's must be removed prior to arriving to hospital.   [x] Do not wear lotions, powders, deodorant. [x] Transfusion Bracelet: Please bring with you to hospital, day of surgery     PARKING INSTRUCTIONS:   [x] Arrival Time:___1000 AM__________  · [x] Parking lot '\"I\"  is located on [facility] (the corner of [facility] and [facility]). To enter, press the button and the gate will lift. A free token will be provided to exit the lot. One car per patient is allowed to park in this lot. All other cars are to park on [address] [place] [address] either in the parking garage or the handicap lot. [] To reach the [facility] from [address] [place] [address], upon entering the hospital, take elevator B to the 3rd floor. EDUCATION INSTRUCTIONS:    .     [x] Maure[address] placed in chart. [x] Pre-admission Testing educational folder given  [x] Incentive Spirometry,coughing & deep breathing exercises reviewed. [x]Medication information sheet(s)   [x]Fluoroscopy-Xray used in surgery reviewed with patient. Educational pamphlet placed in chart. [x]Pain: Post-op pain is normal and to be expected. You will be asked to rate your pain from 0-10(a zero is not acceptable-education is needed).  Your post-op pain goal is:4  [x] Ask your nurse for your

## 2019-06-26 ENCOUNTER — ANESTHESIA EVENT (OUTPATIENT)
Dept: OPERATING ROOM | Age: 59
DRG: 269 | End: 2019-06-26
Payer: COMMERCIAL

## 2019-06-26 ENCOUNTER — HOSPITAL ENCOUNTER (OUTPATIENT)
Dept: PREADMISSION TESTING | Age: 59
Setting detail: SURGERY ADMIT
Discharge: HOME | DRG: 269 | End: 2019-06-26
Payer: COMMERCIAL

## 2019-06-26 VITALS
TEMPERATURE: 98.4 F | BODY MASS INDEX: 43.54 KG/M2 | RESPIRATION RATE: 16 BRPM | OXYGEN SATURATION: 96 % | WEIGHT: 311 LBS | HEART RATE: 85 BPM | DIASTOLIC BLOOD PRESSURE: 97 MMHG | SYSTOLIC BLOOD PRESSURE: 137 MMHG | HEIGHT: 71 IN

## 2019-06-26 DIAGNOSIS — I71.40 ABDOMINAL AORTIC ANEURYSM (AAA) WITHOUT RUPTURE: ICD-10-CM

## 2019-06-26 DIAGNOSIS — Z01.818 PRE-OP TESTING: Primary | ICD-10-CM

## 2019-06-26 LAB
ABO/RH: NORMAL
ANION GAP SERPL CALCULATED.3IONS-SCNC: 12 MMOL/L (ref 7–16)
ANTIBODY SCREEN: NORMAL
BILIRUBIN URINE: NEGATIVE
BLOOD, URINE: NEGATIVE
BUN BLDV-MCNC: 37 MG/DL (ref 6–20)
CALCIUM SERPL-MCNC: 10 MG/DL (ref 8.6–10.2)
CHLORIDE BLD-SCNC: 101 MMOL/L (ref 98–107)
CLARITY: CLEAR
CO2: 28 MMOL/L (ref 22–29)
COLOR: YELLOW
CREAT SERPL-MCNC: 1.3 MG/DL (ref 0.7–1.2)
GFR AFRICAN AMERICAN: >60
GFR NON-AFRICAN AMERICAN: 56 ML/MIN/1.73
GLUCOSE BLD-MCNC: 145 MG/DL (ref 74–99)
GLUCOSE URINE: NEGATIVE MG/DL
HCT VFR BLD CALC: 49.8 % (ref 37–54)
HEMOGLOBIN: 16.5 G/DL (ref 12.5–16.5)
INR BLD: 1
KETONES, URINE: NEGATIVE MG/DL
LEUKOCYTE ESTERASE, URINE: NEGATIVE
MCH RBC QN AUTO: 30.7 PG (ref 26–35)
MCHC RBC AUTO-ENTMCNC: 33.1 % (ref 32–34.5)
MCV RBC AUTO: 92.6 FL (ref 80–99.9)
NITRITE, URINE: NEGATIVE
PDW BLD-RTO: 12.7 FL (ref 11.5–15)
PH UA: 5 (ref 5–9)
PLATELET # BLD: 178 E9/L (ref 130–450)
PMV BLD AUTO: 9.1 FL (ref 7–12)
POTASSIUM REFLEX MAGNESIUM: 3.9 MMOL/L (ref 3.5–5)
PROTEIN UA: NEGATIVE MG/DL
PROTHROMBIN TIME: 11.2 SEC (ref 9.3–12.4)
RBC # BLD: 5.38 E12/L (ref 3.8–5.8)
SODIUM BLD-SCNC: 141 MMOL/L (ref 132–146)
SPECIFIC GRAVITY UA: 1.02 (ref 1–1.03)
UROBILINOGEN, URINE: 0.2 E.U./DL
WBC # BLD: 10.9 E9/L (ref 4.5–11.5)

## 2019-06-26 PROCEDURE — 86901 BLOOD TYPING SEROLOGIC RH(D): CPT

## 2019-06-26 PROCEDURE — 85027 COMPLETE CBC AUTOMATED: CPT

## 2019-06-26 PROCEDURE — 81003 URINALYSIS AUTO W/O SCOPE: CPT

## 2019-06-26 PROCEDURE — 86850 RBC ANTIBODY SCREEN: CPT

## 2019-06-26 PROCEDURE — 80048 BASIC METABOLIC PNL TOTAL CA: CPT

## 2019-06-26 PROCEDURE — 36415 COLL VENOUS BLD VENIPUNCTURE: CPT

## 2019-06-26 PROCEDURE — 87081 CULTURE SCREEN ONLY: CPT

## 2019-06-26 PROCEDURE — 86900 BLOOD TYPING SEROLOGIC ABO: CPT

## 2019-06-26 PROCEDURE — 85610 PROTHROMBIN TIME: CPT

## 2019-06-26 ASSESSMENT — LIFESTYLE VARIABLES: SMOKING_STATUS: 1

## 2019-06-26 NOTE — ANESTHESIA PRE PROCEDURE
per tablet Take 1 tablet by mouth daily 3/14/19 3/13/20  [facility]Y [place]   atorvastatin (LIPITOR) 80 MG tablet TAKE 1 TABLET BY MOUTH AT BEDTIME 10/22/18   [facility]UD[facility] [place]   carvedilol (COREG) 25 MG tablet TAKE ONE TABLET BY MOUTH TWICE DAILY (WITH MEALS) 10/22/18   [facility]UDY [place]   nitroGLYCERIN (NITROSTAT) 0.4 MG SL tablet Place 1 tablet under the tongue as needed for Chest pain 2/15/18 2/15/19  [facility]UDY [place]   clopidogrel (PLAVIX) 75 MG tablet TAKE 1 TABLET BY MOUTH EVERY DAY 1/7/15   Jhon Li MD       Current medications:    No current facility-administered medications for this encounter.       Current Outpatient Medications   Medication Sig Dispense Refill    acetaminophen (TYLENOL) 500 MG tablet Take 500 mg by mouth every 6 hours as needed for Pain      aspirin 325 MG EC tablet TAKE 1 TABLET BY MOUTH EVERY DAY  3    Tens Unit MISC by Does not apply route 1 each 0    fluticasone (FLONASE) 50 MCG/ACT nasal spray SPRAY 2 SPRAYS INTO EACH NOSTRIL EVERY DAY 1 Bottle 2    loratadine (CLARITIN) 10 MG tablet TAKE 1 TABLET BY MOUTH EVERY DAY 30 tablet 1    aspirin 325 MG EC tablet Take 1 tablet by mouth daily for 28 days 90 tablet 3    diclofenac (VOLTAREN) 75 MG EC tablet Take 1 tablet by mouth 2 times daily (Patient taking differently: Take 75 mg by mouth 2 times daily HOLD) 60 tablet 3    metFORMIN (GLUCOPHAGE) 500 MG tablet Take 1 tablet by mouth 2 times daily (with meals) 60 tablet 3    niacin (NIASPAN) 500 MG extended release tablet TAKE 1 TABLET BY MOUTH EVERY EVENING (Patient taking differently: TAKE 1 TABLET BY MOUTH EVERY EVENING- HOLD) 30 tablet 3    omega-3 acid ethyl esters (LOVAZA) 1 g capsule Take 1 capsule by mouth 2 times daily 120 capsule 3    pioglitazone (ACTOS) 15 MG tablet Take 1 tablet by mouth daily 30 tablet 3    quinapril (ACCUPRIL) 40 MG tablet TAKE 1 TABLET BY MOUTH EVERY DAY (Patient taking differently: nightly TAKE 1 TABLET BY MOUTH EVERY DAY) 30 tablet 3    triamterene-hydrochlorothiazide (MAXZIDE-25) 37.5-25 MG per tablet Take 1 tablet by mouth daily 30 tablet 3    atorvastatin (LIPITOR) 80 MG tablet TAKE 1 TABLET BY MOUTH AT BEDTIME 90 tablet 2    carvedilol (COREG) 25 MG tablet TAKE ONE TABLET BY MOUTH TWICE DAILY (WITH MEALS) 180 tablet 5    nitroGLYCERIN (NITROSTAT) 0.4 MG SL tablet Place 1 tablet under the tongue as needed for Chest pain 25 tablet 2    clopidogrel (PLAVIX) 75 MG tablet TAKE 1 TABLET BY MOUTH EVERY DAY 90 tablet 3       Allergies: Allergies   Allergen Reactions    Bee Venom        Problem List:    Patient Active Problem List   Diagnosis Code    CAD (coronary artery disease) I25.10    HTN (hypertension) I10    Hyperlipemia E78.5    Presence of stent in left circumflex coronary artery Z95.5    Smoker F17.200    Aortic valve stenosis I35.0    Primary osteoarthritis of right knee M17.11    Type 2 diabetes mellitus without complication, without long-term current use of insulin ([facility]) E11.9    Chronic pain syndrome G89.4    Chronic pain of right knee M25.561, G89.29    Status post total right knee replacement Z96.651    Morbid obesity with BMI of 40.0-44.9, adult ([facility]) E66.01, Z68.41    Chronic bilateral low back pain without sciatica M54.5, G89.29    Right hip pain M25.551    DDD (degenerative disc disease), lumbar M51.36       Past Medical History:        Diagnosis Date    Anticoagulant long-term use     CAD (coronary artery disease)     Chronic back pain     Depression     Diabetes mellitus [facility] [address]     DJD (degenerative joint disease)     Knees.  Dyslipidemia     Hiatal hernia 1979    History of ST elevation myocardial infarction (STEMI)     Hyperlipidemia     Hypertension     MI (myocardial infarction) ([facility] [address] 1/2007    Inferior wall.     Obesity     Presence of stent in left circumflex coronary artery     Presence of stent in right coronary artery     Sleep apnea     cpap    Smoker     Type 2 diabetes mellitus 06/26/2019    GLUCOSE 120 12/31/2011    PROT 6.8 10/22/2018    CALCIUM 10.0 06/26/2019    BILITOT 0.4 10/22/2018    ALKPHOS 87 10/22/2018    AST 14 10/22/2018    ALT 11 10/22/2018       POC Tests: No results for input(s): POCGLU, POCNA, POCK, POCCL, POCBUN, POCHEMO, POCHCT in the last 72 hours. Coags:   Lab Results   Component Value Date    PROTIME 11.2 06/26/2019    PROTIME 11.1 12/30/2011    INR 1.0 06/26/2019    APTT 29.0 12/28/2011       HCG (If Applicable): No results found for: PREGTESTUR, PREGSERUM, HCG, HCGQUANT     ABGs: No results found for: PHART, PO2ART, MCQ7JPX, VBV9HLL, BEART, T5IMAHKL     Type & Screen (If Applicable):  Lab Results   Component Value Date    LABABO O 12/30/2011    [address] POS 12/30/2011       Anesthesia Evaluation  Patient summary reviewed and Nursing notes reviewed no history of anesthetic complications:   Airway: Mallampati: II  TM distance: >3 FB   Neck ROM: full  Mouth opening: > = 3 FB Dental:    (+) upper dentures and lower dentures      Pulmonary: breath sounds clear to auscultation  (+) sleep apnea: on noncompliant,  current smoker (few cigarettes a day per patient)                           Cardiovascular:    (+) hypertension:, past MI: > 6 months, CAD:, CABG/stent (5 stents5 years aog): no interval change,         Rhythm: regular  Rate: normal                 ROS comment: AAA = incidental finding with xray - denies issues       Neuro/Psych:   (+) psychiatric history: stable with treatmentdepression/anxiety             GI/Hepatic/Renal:   (+) hiatal hernia,           Endo/Other:    (+) DiabetesType II DM, well controlled, , .                 Abdominal:   (+) obese,         Vascular:                                      Anesthesia Plan      general     ASA 4     (On plavix so will avoid neuroaxial anesthesia)  Induction: intravenous.   arterial line and BIS  MIPS: Postoperative opioids intended, Prophylactic antiemetics administered and Postoperative trial

## 2019-06-27 LAB — MRSA CULTURE ONLY: NORMAL

## 2019-06-28 ENCOUNTER — ANESTHESIA (OUTPATIENT)
Dept: OPERATING ROOM | Age: 59
DRG: 269 | End: 2019-06-28
Payer: COMMERCIAL

## 2019-06-28 ENCOUNTER — HOSPITAL ENCOUNTER (INPATIENT)
Age: 59
LOS: 2 days | Discharge: HOME | DRG: 269 | End: 2019-06-30
Attending: SURGERY | Admitting: SURGERY
Payer: COMMERCIAL

## 2019-06-28 VITALS — SYSTOLIC BLOOD PRESSURE: 140 MMHG | DIASTOLIC BLOOD PRESSURE: 112 MMHG | OXYGEN SATURATION: 97 %

## 2019-06-28 DIAGNOSIS — I71.40 ABDOMINAL AORTIC ANEURYSM (AAA) WITHOUT RUPTURE: Primary | ICD-10-CM

## 2019-06-28 LAB
B.E.: -3 MMOL/L (ref -3–0)
BLOOD BANK DISPENSE STATUS: NORMAL
BLOOD BANK DISPENSE STATUS: NORMAL
BLOOD BANK PRODUCT CODE: NORMAL
BLOOD BANK PRODUCT CODE: NORMAL
BPU ID: NORMAL
BPU ID: NORMAL
CARDIOPULMONARY BYPASS: NO
DESCRIPTION BLOOD BANK: NORMAL
DESCRIPTION BLOOD BANK: NORMAL
DEVICE: ABNORMAL
HCO3 ARTERIAL: 21.4 MMOL/L (ref 22–26)
HCT VFR BLD CALC: 43.1 % (ref 37–54)
HCT,ARTERIAL: 44 % (ref 37–54)
HEMOGLOBIN: 14.4 G/DL (ref 12.5–16.5)
HGB, ARTERIAL: 15 G/DL (ref 12.5–15.5)
MCH RBC QN AUTO: 30.9 PG (ref 26–35)
MCHC RBC AUTO-ENTMCNC: 33.4 % (ref 32–34.5)
MCV RBC AUTO: 92.5 FL (ref 80–99.9)
METER GLUCOSE: 129 MG/DL (ref 74–99)
METER GLUCOSE: 133 MG/DL (ref 74–99)
METER GLUCOSE: 150 MG/DL (ref 74–99)
O2 SATURATION: 95.4 % (ref 92–98.5)
OPERATOR ID: ABNORMAL
PATIENT TEMP: 36
PCO2 (TEMP CORRECTED): 34.2 MMHG (ref 35–45)
PDW BLD-RTO: 12.5 FL (ref 11.5–15)
PH (TEMPERATURE CORRECTED): 7.4 (ref 7.35–7.45)
PLATELET # BLD: 148 E9/L (ref 130–450)
PMV BLD AUTO: 9.3 FL (ref 7–12)
PO2 (TEMP CORRECTED): 73.3 MMHG (ref 60–100)
POTASSIUM SERPL-SCNC: 3.8 MMOL/L (ref 3.5–5.5)
RBC # BLD: 4.66 E12/L (ref 3.8–5.8)
SOURCE, BLOOD GAS: ABNORMAL
WBC # BLD: 13.6 E9/L (ref 4.5–11.5)

## 2019-06-28 PROCEDURE — 3600000017 HC SURGERY HYBRID ADDL 15MIN: Performed by: SURGERY

## 2019-06-28 PROCEDURE — 85347 COAGULATION TIME ACTIVATED: CPT

## 2019-06-28 PROCEDURE — C1760 CLOSURE DEV, VASC: HCPCS | Performed by: SURGERY

## 2019-06-28 PROCEDURE — 3600000007 HC SURGERY HYBRID BASE: Performed by: SURGERY

## 2019-06-28 PROCEDURE — 6360000002 HC RX W HCPCS: Performed by: SURGERY

## 2019-06-28 PROCEDURE — 2500000003 HC RX 250 WO HCPCS: Performed by: ANESTHESIOLOGIST ASSISTANT

## 2019-06-28 PROCEDURE — C1894 INTRO/SHEATH, NON-LASER: HCPCS | Performed by: SURGERY

## 2019-06-28 PROCEDURE — 36415 COLL VENOUS BLD VENIPUNCTURE: CPT

## 2019-06-28 PROCEDURE — 6360000002 HC RX W HCPCS: Performed by: ANESTHESIOLOGIST ASSISTANT

## 2019-06-28 PROCEDURE — 2580000003 HC RX 258: Performed by: SURGERY

## 2019-06-28 PROCEDURE — 2709999900 HC NON-CHARGEABLE SUPPLY: Performed by: SURGERY

## 2019-06-28 PROCEDURE — C1769 GUIDE WIRE: HCPCS | Performed by: SURGERY

## 2019-06-28 PROCEDURE — 04V03DZ RESTRICTION OF ABDOMINAL AORTA WITH INTRALUMINAL DEVICE, PERCUTANEOUS APPROACH: ICD-10-PCS | Performed by: SURGERY

## 2019-06-28 PROCEDURE — 2000000000 HC ICU R&B

## 2019-06-28 PROCEDURE — 34706 EVASC RPR A-BIILIAC RPT: CPT | Performed by: SURGERY

## 2019-06-28 PROCEDURE — C2628 CATHETER, OCCLUSION: HCPCS | Performed by: SURGERY

## 2019-06-28 PROCEDURE — 3700000001 HC ADD 15 MINUTES (ANESTHESIA): Performed by: SURGERY

## 2019-06-28 PROCEDURE — 82962 GLUCOSE BLOOD TEST: CPT

## 2019-06-28 PROCEDURE — 34713 PERQ ACCESS & CLSR FEM ART: CPT | Performed by: SURGERY

## 2019-06-28 PROCEDURE — C1874 STENT, COATED/COV W/DEL SYS: HCPCS | Performed by: SURGERY

## 2019-06-28 PROCEDURE — 2700000000 HC OXYGEN THERAPY PER DAY

## 2019-06-28 PROCEDURE — 6360000004 HC RX CONTRAST MEDICATION: Performed by: SURGERY

## 2019-06-28 PROCEDURE — 2500000003 HC RX 250 WO HCPCS: Performed by: NURSE ANESTHETIST, CERTIFIED REGISTERED

## 2019-06-28 PROCEDURE — 85027 COMPLETE CBC AUTOMATED: CPT

## 2019-06-28 PROCEDURE — 36620 INSERTION CATHETER ARTERY: CPT

## 2019-06-28 PROCEDURE — 6370000000 HC RX 637 (ALT 250 FOR IP): Performed by: SURGERY

## 2019-06-28 PROCEDURE — 3700000000 HC ANESTHESIA ATTENDED CARE: Performed by: SURGERY

## 2019-06-28 PROCEDURE — 82803 BLOOD GASES ANY COMBINATION: CPT

## 2019-06-28 PROCEDURE — 6360000002 HC RX W HCPCS: Performed by: NURSE ANESTHETIST, CERTIFIED REGISTERED

## 2019-06-28 PROCEDURE — 6370000000 HC RX 637 (ALT 250 FOR IP): Performed by: HOSPITALIST

## 2019-06-28 PROCEDURE — 2580000003 HC RX 258: Performed by: NURSE ANESTHETIST, CERTIFIED REGISTERED

## 2019-06-28 PROCEDURE — 86923 COMPATIBILITY TEST ELECTRIC: CPT

## 2019-06-28 DEVICE — ZENITH, SPIRAL-Z AAA ILIAC LEG
Type: IMPLANTABLE DEVICE | Site: ABDOMEN | Status: FUNCTIONAL
Brand: ZENITH SPIRAL-Z

## 2019-06-28 DEVICE — ZENITH FLEX, AAA ENDOVASCULAR GRAFT MAIN BODY
Type: IMPLANTABLE DEVICE | Site: ABDOMEN | Status: FUNCTIONAL
Brand: ZENITH FLEX

## 2019-06-28 RX ORDER — SODIUM CHLORIDE 0.9 % (FLUSH) 0.9 %
10 SYRINGE (ML) INJECTION EVERY 12 HOURS SCHEDULED
Status: DISCONTINUED | OUTPATIENT
Start: 2019-06-28 | End: 2019-06-28 | Stop reason: HOSPADM

## 2019-06-28 RX ORDER — PROPOFOL 10 MG/ML
INJECTION, EMULSION INTRAVENOUS PRN
Status: DISCONTINUED | OUTPATIENT
Start: 2019-06-28 | End: 2019-06-28 | Stop reason: SDUPTHER

## 2019-06-28 RX ORDER — DEXAMETHASONE SODIUM PHOSPHATE 10 MG/ML
INJECTION, SOLUTION INTRAMUSCULAR; INTRAVENOUS PRN
Status: DISCONTINUED | OUTPATIENT
Start: 2019-06-28 | End: 2019-06-28 | Stop reason: SDUPTHER

## 2019-06-28 RX ORDER — FENTANYL CITRATE 50 UG/ML
INJECTION, SOLUTION INTRAMUSCULAR; INTRAVENOUS PRN
Status: DISCONTINUED | OUTPATIENT
Start: 2019-06-28 | End: 2019-06-28 | Stop reason: SDUPTHER

## 2019-06-28 RX ORDER — TRIAMTERENE AND HYDROCHLOROTHIAZIDE 37.5; 25 MG/1; MG/1
1 TABLET ORAL DAILY
Status: DISCONTINUED | OUTPATIENT
Start: 2019-06-28 | End: 2019-06-30 | Stop reason: HOSPADM

## 2019-06-28 RX ORDER — SODIUM CHLORIDE 9 MG/ML
INJECTION, SOLUTION INTRAVENOUS CONTINUOUS PRN
Status: DISCONTINUED | OUTPATIENT
Start: 2019-06-28 | End: 2019-06-28 | Stop reason: SDUPTHER

## 2019-06-28 RX ORDER — ATORVASTATIN CALCIUM 40 MG/1
80 TABLET, FILM COATED ORAL DAILY
Status: DISCONTINUED | OUTPATIENT
Start: 2019-06-28 | End: 2019-06-30 | Stop reason: HOSPADM

## 2019-06-28 RX ORDER — DOCUSATE SODIUM 100 MG/1
100 CAPSULE, LIQUID FILLED ORAL 2 TIMES DAILY
Status: DISCONTINUED | OUTPATIENT
Start: 2019-06-28 | End: 2019-06-30 | Stop reason: HOSPADM

## 2019-06-28 RX ORDER — OXYCODONE HYDROCHLORIDE AND ACETAMINOPHEN 5; 325 MG/1; MG/1
1 TABLET ORAL EVERY 4 HOURS PRN
Status: DISCONTINUED | OUTPATIENT
Start: 2019-06-28 | End: 2019-06-30 | Stop reason: HOSPADM

## 2019-06-28 RX ORDER — PHENYLEPHRINE HYDROCHLORIDE 10 MG/ML
INJECTION INTRAVENOUS PRN
Status: DISCONTINUED | OUTPATIENT
Start: 2019-06-28 | End: 2019-06-28 | Stop reason: SDUPTHER

## 2019-06-28 RX ORDER — SODIUM CHLORIDE, SODIUM LACTATE, POTASSIUM CHLORIDE, CALCIUM CHLORIDE 600; 310; 30; 20 MG/100ML; MG/100ML; MG/100ML; MG/100ML
INJECTION, SOLUTION INTRAVENOUS CONTINUOUS
Status: DISCONTINUED | OUTPATIENT
Start: 2019-06-28 | End: 2019-06-30

## 2019-06-28 RX ORDER — GLYCOPYRROLATE 1 MG/5 ML
SYRINGE (ML) INTRAVENOUS PRN
Status: DISCONTINUED | OUTPATIENT
Start: 2019-06-28 | End: 2019-06-28 | Stop reason: SDUPTHER

## 2019-06-28 RX ORDER — FAMOTIDINE 20 MG/1
20 TABLET, FILM COATED ORAL 2 TIMES DAILY
Status: DISCONTINUED | OUTPATIENT
Start: 2019-06-28 | End: 2019-06-30 | Stop reason: HOSPADM

## 2019-06-28 RX ORDER — LISINOPRIL 20 MG/1
20 TABLET ORAL DAILY
Status: DISCONTINUED | OUTPATIENT
Start: 2019-06-28 | End: 2019-06-30 | Stop reason: HOSPADM

## 2019-06-28 RX ORDER — NICOTINE POLACRILEX 4 MG
15 LOZENGE BUCCAL PRN
Status: DISCONTINUED | OUTPATIENT
Start: 2019-06-28 | End: 2019-06-30 | Stop reason: HOSPADM

## 2019-06-28 RX ORDER — ONDANSETRON 2 MG/ML
4 INJECTION INTRAMUSCULAR; INTRAVENOUS EVERY 6 HOURS PRN
Status: DISCONTINUED | OUTPATIENT
Start: 2019-06-28 | End: 2019-06-30 | Stop reason: HOSPADM

## 2019-06-28 RX ORDER — SODIUM CHLORIDE 0.9 % (FLUSH) 0.9 %
10 SYRINGE (ML) INJECTION PRN
Status: DISCONTINUED | OUTPATIENT
Start: 2019-06-28 | End: 2019-06-30 | Stop reason: HOSPADM

## 2019-06-28 RX ORDER — OXYCODONE HYDROCHLORIDE AND ACETAMINOPHEN 5; 325 MG/1; MG/1
2 TABLET ORAL EVERY 4 HOURS PRN
Status: DISCONTINUED | OUTPATIENT
Start: 2019-06-28 | End: 2019-06-30 | Stop reason: HOSPADM

## 2019-06-28 RX ORDER — ACETAMINOPHEN 500 MG
500 TABLET ORAL EVERY 6 HOURS PRN
Status: DISCONTINUED | OUTPATIENT
Start: 2019-06-28 | End: 2019-06-30 | Stop reason: HOSPADM

## 2019-06-28 RX ORDER — PROTAMINE SULFATE 10 MG/ML
INJECTION, SOLUTION INTRAVENOUS PRN
Status: DISCONTINUED | OUTPATIENT
Start: 2019-06-28 | End: 2019-06-28 | Stop reason: SDUPTHER

## 2019-06-28 RX ORDER — DEXTROSE MONOHYDRATE 50 MG/ML
100 INJECTION, SOLUTION INTRAVENOUS PRN
Status: DISCONTINUED | OUTPATIENT
Start: 2019-06-28 | End: 2019-06-30 | Stop reason: HOSPADM

## 2019-06-28 RX ORDER — HEPARIN SODIUM 1000 [USP'U]/ML
INJECTION, SOLUTION INTRAVENOUS; SUBCUTANEOUS PRN
Status: DISCONTINUED | OUTPATIENT
Start: 2019-06-28 | End: 2019-06-28 | Stop reason: SDUPTHER

## 2019-06-28 RX ORDER — MIDAZOLAM HYDROCHLORIDE 1 MG/ML
INJECTION INTRAMUSCULAR; INTRAVENOUS PRN
Status: DISCONTINUED | OUTPATIENT
Start: 2019-06-28 | End: 2019-06-28 | Stop reason: SDUPTHER

## 2019-06-28 RX ORDER — DEXTROSE MONOHYDRATE 25 G/50ML
12.5 INJECTION, SOLUTION INTRAVENOUS PRN
Status: DISCONTINUED | OUTPATIENT
Start: 2019-06-28 | End: 2019-06-30 | Stop reason: HOSPADM

## 2019-06-28 RX ORDER — SODIUM CHLORIDE 0.9 % (FLUSH) 0.9 %
10 SYRINGE (ML) INJECTION PRN
Status: DISCONTINUED | OUTPATIENT
Start: 2019-06-28 | End: 2019-06-28 | Stop reason: HOSPADM

## 2019-06-28 RX ORDER — ONDANSETRON 2 MG/ML
INJECTION INTRAMUSCULAR; INTRAVENOUS PRN
Status: DISCONTINUED | OUTPATIENT
Start: 2019-06-28 | End: 2019-06-28 | Stop reason: SDUPTHER

## 2019-06-28 RX ORDER — ACETAMINOPHEN 325 MG/1
650 TABLET ORAL EVERY 4 HOURS PRN
Status: DISCONTINUED | OUTPATIENT
Start: 2019-06-28 | End: 2019-06-28 | Stop reason: SDUPTHER

## 2019-06-28 RX ORDER — ROCURONIUM BROMIDE 10 MG/ML
INJECTION, SOLUTION INTRAVENOUS PRN
Status: DISCONTINUED | OUTPATIENT
Start: 2019-06-28 | End: 2019-06-28 | Stop reason: SDUPTHER

## 2019-06-28 RX ORDER — VECURONIUM BROMIDE 1 MG/ML
INJECTION, POWDER, LYOPHILIZED, FOR SOLUTION INTRAVENOUS PRN
Status: DISCONTINUED | OUTPATIENT
Start: 2019-06-28 | End: 2019-06-28 | Stop reason: SDUPTHER

## 2019-06-28 RX ORDER — LANOLIN ALCOHOL/MO/W.PET/CERES
500 CREAM (GRAM) TOPICAL NIGHTLY
Status: DISCONTINUED | OUTPATIENT
Start: 2019-06-28 | End: 2019-06-30 | Stop reason: HOSPADM

## 2019-06-28 RX ORDER — NEOSTIGMINE METHYLSULFATE 1 MG/ML
INJECTION, SOLUTION INTRAVENOUS PRN
Status: DISCONTINUED | OUTPATIENT
Start: 2019-06-28 | End: 2019-06-28 | Stop reason: SDUPTHER

## 2019-06-28 RX ORDER — SODIUM CHLORIDE 450 MG/100ML
INJECTION, SOLUTION INTRAVENOUS CONTINUOUS
Status: DISCONTINUED | OUTPATIENT
Start: 2019-06-28 | End: 2019-06-29

## 2019-06-28 RX ORDER — LIDOCAINE HYDROCHLORIDE 20 MG/ML
INJECTION, SOLUTION INFILTRATION; PERINEURAL PRN
Status: DISCONTINUED | OUTPATIENT
Start: 2019-06-28 | End: 2019-06-28 | Stop reason: SDUPTHER

## 2019-06-28 RX ORDER — SODIUM CHLORIDE 0.9 % (FLUSH) 0.9 %
10 SYRINGE (ML) INJECTION EVERY 12 HOURS SCHEDULED
Status: DISCONTINUED | OUTPATIENT
Start: 2019-06-28 | End: 2019-06-30 | Stop reason: HOSPADM

## 2019-06-28 RX ADMIN — DEXTROSE MONOHYDRATE 3 G: 50 INJECTION, SOLUTION INTRAVENOUS at 21:40

## 2019-06-28 RX ADMIN — PROTAMINE SULFATE 5 MG: 10 INJECTION, SOLUTION INTRAVENOUS at 16:02

## 2019-06-28 RX ADMIN — Medication 0.6 MG: at 16:43

## 2019-06-28 RX ADMIN — PROTAMINE SULFATE 5 MG: 10 INJECTION, SOLUTION INTRAVENOUS at 16:05

## 2019-06-28 RX ADMIN — DEXTROSE MONOHYDRATE 3 G: 50 INJECTION, SOLUTION INTRAVENOUS at 13:35

## 2019-06-28 RX ADMIN — FENTANYL CITRATE 100 MCG: 50 INJECTION, SOLUTION INTRAMUSCULAR; INTRAVENOUS at 13:31

## 2019-06-28 RX ADMIN — HEPARIN SODIUM 3000 UNITS: 1000 INJECTION INTRAVENOUS; SUBCUTANEOUS at 15:18

## 2019-06-28 RX ADMIN — SODIUM CHLORIDE: 9 INJECTION, SOLUTION INTRAVENOUS at 13:27

## 2019-06-28 RX ADMIN — PROTAMINE SULFATE 5 MG: 10 INJECTION, SOLUTION INTRAVENOUS at 16:01

## 2019-06-28 RX ADMIN — PHENYLEPHRINE HYDROCHLORIDE 200 MCG: 10 INJECTION INTRAVENOUS at 14:59

## 2019-06-28 RX ADMIN — PROPOFOL 200 MG: 10 INJECTION, EMULSION INTRAVENOUS at 13:31

## 2019-06-28 RX ADMIN — PROPOFOL 50 MG: 10 INJECTION, EMULSION INTRAVENOUS at 15:34

## 2019-06-28 RX ADMIN — VECURONIUM BROMIDE FOR INJECTION 3 MG: 1 INJECTION, POWDER, LYOPHILIZED, FOR SOLUTION INTRAVENOUS at 14:16

## 2019-06-28 RX ADMIN — PROTAMINE SULFATE 5 MG: 10 INJECTION, SOLUTION INTRAVENOUS at 16:00

## 2019-06-28 RX ADMIN — DOCUSATE SODIUM 100 MG: 100 CAPSULE, LIQUID FILLED ORAL at 21:32

## 2019-06-28 RX ADMIN — PHENYLEPHRINE HYDROCHLORIDE 100 MCG: 10 INJECTION INTRAVENOUS at 16:28

## 2019-06-28 RX ADMIN — Medication 3 MG: at 16:43

## 2019-06-28 RX ADMIN — SODIUM CHLORIDE, POTASSIUM CHLORIDE, SODIUM LACTATE AND CALCIUM CHLORIDE: 600; 310; 30; 20 INJECTION, SOLUTION INTRAVENOUS at 18:07

## 2019-06-28 RX ADMIN — SODIUM CHLORIDE: 4.5 INJECTION, SOLUTION INTRAVENOUS at 17:18

## 2019-06-28 RX ADMIN — LIDOCAINE HYDROCHLORIDE 100 MG: 20 INJECTION, SOLUTION INFILTRATION; PERINEURAL at 13:31

## 2019-06-28 RX ADMIN — HYDROMORPHONE HYDROCHLORIDE 0.5 MG: 1 INJECTION, SOLUTION INTRAMUSCULAR; INTRAVENOUS; SUBCUTANEOUS at 18:08

## 2019-06-28 RX ADMIN — OXYCODONE HYDROCHLORIDE AND ACETAMINOPHEN 2 TABLET: 5; 325 TABLET ORAL at 22:10

## 2019-06-28 RX ADMIN — PHENYLEPHRINE HYDROCHLORIDE 200 MCG: 10 INJECTION INTRAVENOUS at 15:07

## 2019-06-28 RX ADMIN — FENTANYL CITRATE 50 MCG: 50 INJECTION, SOLUTION INTRAMUSCULAR; INTRAVENOUS at 14:43

## 2019-06-28 RX ADMIN — DEXAMETHASONE SODIUM PHOSPHATE 10 MG: 10 INJECTION INTRAMUSCULAR; INTRAVENOUS at 13:35

## 2019-06-28 RX ADMIN — PROTAMINE SULFATE 5 MG: 10 INJECTION, SOLUTION INTRAVENOUS at 16:06

## 2019-06-28 RX ADMIN — ATORVASTATIN CALCIUM 80 MG: 40 TABLET, FILM COATED ORAL at 21:32

## 2019-06-28 RX ADMIN — Medication 10 ML: at 20:38

## 2019-06-28 RX ADMIN — VECURONIUM BROMIDE FOR INJECTION 7 MG: 1 INJECTION, POWDER, LYOPHILIZED, FOR SOLUTION INTRAVENOUS at 13:31

## 2019-06-28 RX ADMIN — ONDANSETRON HYDROCHLORIDE 4 MG: 2 INJECTION, SOLUTION INTRAMUSCULAR; INTRAVENOUS at 16:26

## 2019-06-28 RX ADMIN — HEPARIN SODIUM 10000 UNITS: 1000 INJECTION INTRAVENOUS; SUBCUTANEOUS at 14:34

## 2019-06-28 RX ADMIN — ROCURONIUM BROMIDE 20 MG: 10 INJECTION, SOLUTION INTRAVENOUS at 15:40

## 2019-06-28 RX ADMIN — PROTAMINE SULFATE 5 MG: 10 INJECTION, SOLUTION INTRAVENOUS at 16:03

## 2019-06-28 RX ADMIN — PROTAMINE SULFATE 5 MG: 10 INJECTION, SOLUTION INTRAVENOUS at 16:04

## 2019-06-28 RX ADMIN — LISINOPRIL 20 MG: 20 TABLET ORAL at 21:31

## 2019-06-28 RX ADMIN — HYDROMORPHONE HYDROCHLORIDE 0.5 MG: 1 INJECTION, SOLUTION INTRAMUSCULAR; INTRAVENOUS; SUBCUTANEOUS at 20:11

## 2019-06-28 RX ADMIN — PROTAMINE SULFATE 5 MG: 10 INJECTION, SOLUTION INTRAVENOUS at 15:59

## 2019-06-28 RX ADMIN — MIDAZOLAM HYDROCHLORIDE 2 MG: 1 INJECTION, SOLUTION INTRAMUSCULAR; INTRAVENOUS at 13:27

## 2019-06-28 RX ADMIN — FENTANYL CITRATE 50 MCG: 50 INJECTION, SOLUTION INTRAMUSCULAR; INTRAVENOUS at 14:16

## 2019-06-28 RX ADMIN — ROCURONIUM BROMIDE 20 MG: 10 INJECTION, SOLUTION INTRAVENOUS at 15:01

## 2019-06-28 RX ADMIN — FENTANYL CITRATE 100 MCG: 50 INJECTION, SOLUTION INTRAMUSCULAR; INTRAVENOUS at 15:37

## 2019-06-28 RX ADMIN — FENTANYL CITRATE 50 MCG: 50 INJECTION, SOLUTION INTRAMUSCULAR; INTRAVENOUS at 14:20

## 2019-06-28 RX ADMIN — FAMOTIDINE 20 MG: 20 TABLET, FILM COATED ORAL at 21:31

## 2019-06-28 RX ADMIN — Medication 500 MG: at 21:31

## 2019-06-28 RX ADMIN — PHENYLEPHRINE HYDROCHLORIDE 100 MCG: 10 INJECTION INTRAVENOUS at 15:05

## 2019-06-28 RX ADMIN — SODIUM CHLORIDE: 9 INJECTION, SOLUTION INTRAVENOUS at 15:10

## 2019-06-28 RX ADMIN — TRIAMTERENE AND HYDROCHLOROTHIAZIDE 1 TABLET: 37.5; 25 TABLET ORAL at 21:30

## 2019-06-28 ASSESSMENT — PULMONARY FUNCTION TESTS
PIF_VALUE: 28
PIF_VALUE: 33
PIF_VALUE: 31
PIF_VALUE: 28
PIF_VALUE: 33
PIF_VALUE: 29
PIF_VALUE: 28
PIF_VALUE: 28
PIF_VALUE: 31
PIF_VALUE: 33
PIF_VALUE: 31
PIF_VALUE: 15
PIF_VALUE: 28
PIF_VALUE: 28
PIF_VALUE: 33
PIF_VALUE: 33
PIF_VALUE: 28
PIF_VALUE: 33
PIF_VALUE: 28
PIF_VALUE: 1
PIF_VALUE: 28
PIF_VALUE: 33
PIF_VALUE: 28
PIF_VALUE: 33
PIF_VALUE: 28
PIF_VALUE: 33
PIF_VALUE: 33
PIF_VALUE: 28
PIF_VALUE: 33
PIF_VALUE: 39
PIF_VALUE: 33
PIF_VALUE: 28
PIF_VALUE: 33
PIF_VALUE: 28
PIF_VALUE: 1
PIF_VALUE: 3
PIF_VALUE: 33
PIF_VALUE: 33
PIF_VALUE: 28
PIF_VALUE: 33
PIF_VALUE: 28
PIF_VALUE: 1
PIF_VALUE: 3
PIF_VALUE: 28
PIF_VALUE: 33
PIF_VALUE: 1
PIF_VALUE: 28
PIF_VALUE: 3
PIF_VALUE: 35
PIF_VALUE: 28
PIF_VALUE: 28
PIF_VALUE: 29
PIF_VALUE: 28
PIF_VALUE: 31
PIF_VALUE: 28
PIF_VALUE: 28
PIF_VALUE: 24
PIF_VALUE: 31
PIF_VALUE: 33
PIF_VALUE: 28
PIF_VALUE: 33
PIF_VALUE: 28
PIF_VALUE: 33
PIF_VALUE: 28
PIF_VALUE: 1
PIF_VALUE: 28
PIF_VALUE: 25
PIF_VALUE: 33
PIF_VALUE: 33
PIF_VALUE: 28
PIF_VALUE: 28
PIF_VALUE: 33
PIF_VALUE: 28
PIF_VALUE: 31
PIF_VALUE: 28
PIF_VALUE: 33
PIF_VALUE: 33
PIF_VALUE: 28
PIF_VALUE: 28
PIF_VALUE: 0
PIF_VALUE: 33
PIF_VALUE: 28
PIF_VALUE: 28
PIF_VALUE: 33
PIF_VALUE: 28
PIF_VALUE: 28
PIF_VALUE: 33
PIF_VALUE: 32
PIF_VALUE: 33
PIF_VALUE: 28
PIF_VALUE: 29
PIF_VALUE: 33
PIF_VALUE: 28
PIF_VALUE: 1
PIF_VALUE: 33
PIF_VALUE: 31
PIF_VALUE: 28
PIF_VALUE: 31
PIF_VALUE: 33
PIF_VALUE: 28
PIF_VALUE: 33
PIF_VALUE: 28
PIF_VALUE: 28
PIF_VALUE: 33
PIF_VALUE: 28
PIF_VALUE: 33
PIF_VALUE: 28
PIF_VALUE: 33
PIF_VALUE: 28
PIF_VALUE: 28
PIF_VALUE: 31
PIF_VALUE: 35
PIF_VALUE: 28
PIF_VALUE: 33
PIF_VALUE: 19
PIF_VALUE: 1
PIF_VALUE: 28
PIF_VALUE: 33
PIF_VALUE: 28
PIF_VALUE: 28
PIF_VALUE: 33
PIF_VALUE: 28
PIF_VALUE: 33
PIF_VALUE: 28
PIF_VALUE: 28
PIF_VALUE: 34
PIF_VALUE: 33
PIF_VALUE: 33
PIF_VALUE: 29
PIF_VALUE: 28
PIF_VALUE: 33
PIF_VALUE: 33
PIF_VALUE: 28
PIF_VALUE: 31
PIF_VALUE: 28
PIF_VALUE: 33
PIF_VALUE: 28
PIF_VALUE: 28
PIF_VALUE: 1
PIF_VALUE: 1
PIF_VALUE: 28
PIF_VALUE: 28
PIF_VALUE: 33
PIF_VALUE: 28
PIF_VALUE: 33
PIF_VALUE: 19
PIF_VALUE: 0
PIF_VALUE: 28
PIF_VALUE: 32
PIF_VALUE: 31
PIF_VALUE: 33
PIF_VALUE: 28
PIF_VALUE: 33
PIF_VALUE: 31
PIF_VALUE: 28
PIF_VALUE: 33
PIF_VALUE: 33
PIF_VALUE: 41
PIF_VALUE: 33
PIF_VALUE: 3
PIF_VALUE: 32
PIF_VALUE: 28
PIF_VALUE: 0
PIF_VALUE: 28
PIF_VALUE: 2
PIF_VALUE: 28
PIF_VALUE: 33
PIF_VALUE: 29
PIF_VALUE: 33
PIF_VALUE: 28
PIF_VALUE: 28
PIF_VALUE: 38
PIF_VALUE: 31
PIF_VALUE: 33
PIF_VALUE: 31
PIF_VALUE: 31
PIF_VALUE: 28
PIF_VALUE: 28
PIF_VALUE: 33
PIF_VALUE: 28

## 2019-06-28 ASSESSMENT — PAIN DESCRIPTION - FREQUENCY: FREQUENCY: CONTINUOUS

## 2019-06-28 ASSESSMENT — PAIN DESCRIPTION - ORIENTATION
ORIENTATION: LEFT

## 2019-06-28 ASSESSMENT — PAIN DESCRIPTION - PAIN TYPE
TYPE: SURGICAL PAIN
TYPE: ACUTE PAIN
TYPE: SURGICAL PAIN

## 2019-06-28 ASSESSMENT — PAIN - FUNCTIONAL ASSESSMENT
PAIN_FUNCTIONAL_ASSESSMENT: PREVENTS OR INTERFERES SOME ACTIVE ACTIVITIES AND ADLS
PAIN_FUNCTIONAL_ASSESSMENT: 0-10

## 2019-06-28 ASSESSMENT — PAIN DESCRIPTION - PROGRESSION: CLINICAL_PROGRESSION: GRADUALLY IMPROVING

## 2019-06-28 ASSESSMENT — PAIN DESCRIPTION - DESCRIPTORS
DESCRIPTORS: ACHING
DESCRIPTORS: ACHING

## 2019-06-28 ASSESSMENT — PAIN SCALES - GENERAL
PAINLEVEL_OUTOF10: 5
PAINLEVEL_OUTOF10: 10
PAINLEVEL_OUTOF10: 10
PAINLEVEL_OUTOF10: 5
PAINLEVEL_OUTOF10: 0
PAINLEVEL_OUTOF10: 3
PAINLEVEL_OUTOF10: 8

## 2019-06-28 ASSESSMENT — PAIN DESCRIPTION - LOCATION
LOCATION: GROIN
LOCATION: GROIN;INCISION
LOCATION: GROIN

## 2019-06-28 ASSESSMENT — PAIN DESCRIPTION - ONSET: ONSET: ON-GOING

## 2019-06-28 NOTE — ANESTHESIA PROCEDURE NOTES
Arterial Line:    An arterial line was placed using surface landmarks, in the OR for the following indication(s): continuous blood pressure monitoring and blood sampling needed. A 20 gauge (size), 1 and 3/4 inch (length), Arrow (type) catheter was placed, Seldinger technique not used, into the right radial artery, secured by tape. Anesthesia type: Local  Local infiltration: Injection    Events:  patient tolerated procedure well with no complications. Other anesthesia staff: Carolyn Concepcion RN  Performed:  Other anesthesia staff   Preanesthetic Checklist  Completed: patient identified, site marked, surgical consent, pre-op evaluation, timeout performed, IV checked, risks and benefits discussed, monitors and equipment checked, anesthesia consent given, oxygen available and patient being monitored

## 2019-06-28 NOTE — H&P
Vascular Surgery History & Physical Exam    Chief Complaint: Hx AAA    HISTORY OF PRESENT ILLNESS:    The patient is a 61 y.o. male who presents to the hospital for elective repair of an assx 6 cm AAA. Past Medical History:   Diagnosis Date    Anticoagulant long-term use     CAD (coronary artery disease)     Chronic back pain     Depression     Diabetes mellitus (HCC)     DJD (degenerative joint disease)     Knees.  Dyslipidemia     Hiatal hernia 1979    History of ST elevation myocardial infarction (STEMI)     Hyperlipidemia     Hypertension     MI (myocardial infarction) ([facility] [address] 1/2007    Inferior wall.  Obesity     Presence of stent in left circumflex coronary artery     Presence of stent in right coronary artery     Sleep apnea     cpap    Smoker     Type 2 diabetes mellitus without complication ([facility] [address]      Past Surgical History:   Procedure Laterality Date    CORONARY ANGIOPLASTY WITH STENT PLACEMENT  X4    CORONARY ANGIOPLASTY WITH STENT PLACEMENT  12/30/2011    Dr. Emilia Prescott 1st OM 3.0 x 20 Ion    DIAGNOSTIC CARDIAC CATH LAB PROCEDURE  3/15/2005    [facility]. Mild to moderate CAD. Normal LV.  DIAGNOSTIC CARDIAC CATH LAB PROCEDURE  1/16/2007    [facility]. Cath/PTCA/DE stent of proximal and distal RCA.  DIAGNOSTIC CARDIAC CATH LAB PROCEDURE  2/21/2007    Cath/DE stent of proximal OM1 and proximal Cx.  DIAGNOSTIC CARDIAC CATH LAB PROCEDURE  12/30/2011    ANURADHA of mid OM branch of circumflex.      ECHO COMPL W DOP COLOR FLOW  12/30/2011         HERNIA REPAIR  2/2014    laparoscopic ventral hernia repain    JOINT REPLACEMENT Left 4/1/14    TKA-ed    JOINT REPLACEMENT Right 2018    KNEE    [place] OFFICE/OUTPT VISIT,PROCEDURE ONLY Right 11/26/2018    RIGHT KNEE TOTAL ARTHROPLASTY performed by Andrew Jordan DO at [facility] OR     Current Medications:     Current Facility-Administered Medications:     sodium chloride flush 0.9 % injection 10 mL, 10 mL, Intravenous, 2 times per day, Junnie Boast

## 2019-06-28 NOTE — ANESTHESIA POSTPROCEDURE EVALUATION
Department of Anesthesiology  Postprocedure Note    Patient: Teressa Bernabe  MRN: [id number]  YOB: 1960  Date of evaluation: 6/28/2019  Time:  7:54 PM     Procedure Summary     Date:  06/28/19 Room / Location:  [facility] 03 / [facility]    Anesthesia Start:  2287 Anesthesia Stop:  1245    Procedure:  ENDOVASCULAR REPAIR ABDOMINAL AORTIC ANEURYSM (N/A ) Diagnosis:  (ABDOMINAL AORTIC ANEURYSM)    Surgeon:  Jennifer Mcgraw MD Responsible Provider:  Severiano Aviles DO    Anesthesia Type:  general ASA Status:  4          Anesthesia Type: general    Amaury Phase I: Amaury Score: 10    Amaury Phase II:      Last vitals: Reviewed and per EMR flowsheets.        Anesthesia Post Evaluation    Patient location during evaluation: ICU  Patient participation: complete - patient participated  Level of consciousness: awake and alert  Airway patency: patent  Nausea & Vomiting: no nausea and no vomiting  Complications: no  Cardiovascular status: blood pressure returned to baseline  Respiratory status: acceptable  Hydration status: euvolemic

## 2019-06-29 ENCOUNTER — APPOINTMENT (OUTPATIENT)
Dept: CT IMAGING | Age: 59
DRG: 269 | End: 2019-06-29
Attending: SURGERY
Payer: COMMERCIAL

## 2019-06-29 PROBLEM — D62 ACUTE BLOOD LOSS AS CAUSE OF POSTOPERATIVE ANEMIA: Status: ACTIVE | Noted: 2019-06-29

## 2019-06-29 LAB
ABO/RH: NORMAL
ANION GAP SERPL CALCULATED.3IONS-SCNC: 11 MMOL/L (ref 7–16)
ANTIBODY SCREEN: NORMAL
BASOPHILS ABSOLUTE: 0.05 E9/L (ref 0–0.2)
BASOPHILS RELATIVE PERCENT: 0.3 % (ref 0–2)
BUN BLDV-MCNC: 30 MG/DL (ref 6–20)
CALCIUM SERPL-MCNC: 8.6 MG/DL (ref 8.6–10.2)
CHLORIDE BLD-SCNC: 104 MMOL/L (ref 98–107)
CO2: 22 MMOL/L (ref 22–29)
CREAT SERPL-MCNC: 1.1 MG/DL (ref 0.7–1.2)
EOSINOPHILS ABSOLUTE: 0 E9/L (ref 0.05–0.5)
EOSINOPHILS RELATIVE PERCENT: 0 % (ref 0–6)
GFR AFRICAN AMERICAN: >60
GFR NON-AFRICAN AMERICAN: >60 ML/MIN/1.73
GLUCOSE BLD-MCNC: 129 MG/DL (ref 74–99)
HCT VFR BLD CALC: 30.2 % (ref 37–54)
HCT VFR BLD CALC: 33.6 % (ref 37–54)
HCT VFR BLD CALC: 35.2 % (ref 37–54)
HCT VFR BLD CALC: 36 % (ref 37–54)
HCT VFR BLD CALC: 37.6 % (ref 37–54)
HEMOGLOBIN: 10.1 G/DL (ref 12.5–16.5)
HEMOGLOBIN: 11.3 G/DL (ref 12.5–16.5)
HEMOGLOBIN: 11.9 G/DL (ref 12.5–16.5)
HEMOGLOBIN: 12 G/DL (ref 12.5–16.5)
HEMOGLOBIN: 12.5 G/DL (ref 12.5–16.5)
IMMATURE GRANULOCYTES #: 0.16 E9/L
IMMATURE GRANULOCYTES %: 0.9 % (ref 0–5)
LYMPHOCYTES ABSOLUTE: 0.87 E9/L (ref 1.5–4)
LYMPHOCYTES RELATIVE PERCENT: 4.8 % (ref 20–42)
MCH RBC QN AUTO: 30.9 PG (ref 26–35)
MCH RBC QN AUTO: 30.9 PG (ref 26–35)
MCHC RBC AUTO-ENTMCNC: 33.2 % (ref 32–34.5)
MCHC RBC AUTO-ENTMCNC: 33.8 % (ref 32–34.5)
MCV RBC AUTO: 91.4 FL (ref 80–99.9)
MCV RBC AUTO: 92.8 FL (ref 80–99.9)
METER GLUCOSE: 131 MG/DL (ref 74–99)
METER GLUCOSE: 164 MG/DL (ref 74–99)
METER GLUCOSE: 169 MG/DL (ref 74–99)
METER GLUCOSE: 170 MG/DL (ref 74–99)
MONOCYTES ABSOLUTE: 1.06 E9/L (ref 0.1–0.95)
MONOCYTES RELATIVE PERCENT: 5.8 % (ref 2–12)
NEUTROPHILS ABSOLUTE: 16.07 E9/L (ref 1.8–7.3)
NEUTROPHILS RELATIVE PERCENT: 88.2 % (ref 43–80)
PDW BLD-RTO: 12.3 FL (ref 11.5–15)
PDW BLD-RTO: 12.3 FL (ref 11.5–15)
PLATELET # BLD: 137 E9/L (ref 130–450)
PLATELET # BLD: 142 E9/L (ref 130–450)
PMV BLD AUTO: 9.4 FL (ref 7–12)
PMV BLD AUTO: 9.6 FL (ref 7–12)
POTASSIUM REFLEX MAGNESIUM: 4.2 MMOL/L (ref 3.5–5)
RBC # BLD: 3.85 E12/L (ref 3.8–5.8)
RBC # BLD: 4.05 E12/L (ref 3.8–5.8)
SODIUM BLD-SCNC: 137 MMOL/L (ref 132–146)
WBC # BLD: 16.9 E9/L (ref 4.5–11.5)
WBC # BLD: 18.2 E9/L (ref 4.5–11.5)

## 2019-06-29 PROCEDURE — 2000000000 HC ICU R&B

## 2019-06-29 PROCEDURE — 36415 COLL VENOUS BLD VENIPUNCTURE: CPT

## 2019-06-29 PROCEDURE — 85025 COMPLETE CBC W/AUTO DIFF WBC: CPT

## 2019-06-29 PROCEDURE — 2700000000 HC OXYGEN THERAPY PER DAY

## 2019-06-29 PROCEDURE — 86923 COMPATIBILITY TEST ELECTRIC: CPT

## 2019-06-29 PROCEDURE — 85014 HEMATOCRIT: CPT

## 2019-06-29 PROCEDURE — 6370000000 HC RX 637 (ALT 250 FOR IP): Performed by: INTERNAL MEDICINE

## 2019-06-29 PROCEDURE — 74176 CT ABD & PELVIS W/O CONTRAST: CPT

## 2019-06-29 PROCEDURE — 86901 BLOOD TYPING SEROLOGIC RH(D): CPT

## 2019-06-29 PROCEDURE — 85027 COMPLETE CBC AUTOMATED: CPT

## 2019-06-29 PROCEDURE — 99024 POSTOP FOLLOW-UP VISIT: CPT | Performed by: SURGERY

## 2019-06-29 PROCEDURE — 6360000002 HC RX W HCPCS: Performed by: SURGERY

## 2019-06-29 PROCEDURE — 85018 HEMOGLOBIN: CPT

## 2019-06-29 PROCEDURE — 2580000003 HC RX 258: Performed by: SURGERY

## 2019-06-29 PROCEDURE — 94664 DEMO&/EVAL PT USE INHALER: CPT

## 2019-06-29 PROCEDURE — 6370000000 HC RX 637 (ALT 250 FOR IP): Performed by: SURGERY

## 2019-06-29 PROCEDURE — 2580000003 HC RX 258: Performed by: STUDENT IN AN ORGANIZED HEALTH CARE EDUCATION/TRAINING PROGRAM

## 2019-06-29 PROCEDURE — 80048 BASIC METABOLIC PNL TOTAL CA: CPT

## 2019-06-29 PROCEDURE — 86900 BLOOD TYPING SEROLOGIC ABO: CPT

## 2019-06-29 PROCEDURE — 82962 GLUCOSE BLOOD TEST: CPT

## 2019-06-29 PROCEDURE — 6370000000 HC RX 637 (ALT 250 FOR IP): Performed by: HOSPITALIST

## 2019-06-29 PROCEDURE — 86850 RBC ANTIBODY SCREEN: CPT

## 2019-06-29 RX ORDER — IPRATROPIUM BROMIDE AND ALBUTEROL SULFATE 2.5; .5 MG/3ML; MG/3ML
1 SOLUTION RESPIRATORY (INHALATION)
Status: DISCONTINUED | OUTPATIENT
Start: 2019-06-29 | End: 2019-06-30 | Stop reason: HOSPADM

## 2019-06-29 RX ORDER — SODIUM CHLORIDE, SODIUM LACTATE, POTASSIUM CHLORIDE, CALCIUM CHLORIDE 600; 310; 30; 20 MG/100ML; MG/100ML; MG/100ML; MG/100ML
1000 INJECTION, SOLUTION INTRAVENOUS ONCE
Status: COMPLETED | OUTPATIENT
Start: 2019-06-29 | End: 2019-06-29

## 2019-06-29 RX ADMIN — FAMOTIDINE 20 MG: 20 TABLET, FILM COATED ORAL at 21:05

## 2019-06-29 RX ADMIN — SODIUM CHLORIDE, POTASSIUM CHLORIDE, SODIUM LACTATE AND CALCIUM CHLORIDE 1000 ML: 600; 310; 30; 20 INJECTION, SOLUTION INTRAVENOUS at 02:54

## 2019-06-29 RX ADMIN — HYDROMORPHONE HYDROCHLORIDE 0.5 MG: 1 INJECTION, SOLUTION INTRAMUSCULAR; INTRAVENOUS; SUBCUTANEOUS at 00:19

## 2019-06-29 RX ADMIN — OXYCODONE HYDROCHLORIDE AND ACETAMINOPHEN 1 TABLET: 5; 325 TABLET ORAL at 11:12

## 2019-06-29 RX ADMIN — DEXTROSE MONOHYDRATE 3 G: 50 INJECTION, SOLUTION INTRAVENOUS at 05:45

## 2019-06-29 RX ADMIN — SODIUM CHLORIDE, POTASSIUM CHLORIDE, SODIUM LACTATE AND CALCIUM CHLORIDE: 600; 310; 30; 20 INJECTION, SOLUTION INTRAVENOUS at 03:57

## 2019-06-29 RX ADMIN — TRIAMTERENE AND HYDROCHLOROTHIAZIDE 1 TABLET: 37.5; 25 TABLET ORAL at 08:52

## 2019-06-29 RX ADMIN — DOCUSATE SODIUM 100 MG: 100 CAPSULE, LIQUID FILLED ORAL at 08:52

## 2019-06-29 RX ADMIN — INSULIN LISPRO 2 UNITS: 100 INJECTION, SOLUTION INTRAVENOUS; SUBCUTANEOUS at 11:12

## 2019-06-29 RX ADMIN — SODIUM CHLORIDE, POTASSIUM CHLORIDE, SODIUM LACTATE AND CALCIUM CHLORIDE: 600; 310; 30; 20 INJECTION, SOLUTION INTRAVENOUS at 12:51

## 2019-06-29 RX ADMIN — INSULIN LISPRO 2 UNITS: 100 INJECTION, SOLUTION INTRAVENOUS; SUBCUTANEOUS at 17:10

## 2019-06-29 RX ADMIN — IPRATROPIUM BROMIDE AND ALBUTEROL SULFATE 1 AMPULE: .5; 3 SOLUTION RESPIRATORY (INHALATION) at 21:51

## 2019-06-29 RX ADMIN — INSULIN LISPRO 2 UNITS: 100 INJECTION, SOLUTION INTRAVENOUS; SUBCUTANEOUS at 21:05

## 2019-06-29 RX ADMIN — FAMOTIDINE 20 MG: 20 TABLET, FILM COATED ORAL at 08:52

## 2019-06-29 RX ADMIN — Medication 10 ML: at 08:52

## 2019-06-29 RX ADMIN — ENOXAPARIN SODIUM 40 MG: 40 INJECTION SUBCUTANEOUS at 08:51

## 2019-06-29 RX ADMIN — OXYCODONE HYDROCHLORIDE AND ACETAMINOPHEN 2 TABLET: 5; 325 TABLET ORAL at 19:51

## 2019-06-29 RX ADMIN — Medication 10 ML: at 21:05

## 2019-06-29 RX ADMIN — DOCUSATE SODIUM 100 MG: 100 CAPSULE, LIQUID FILLED ORAL at 22:03

## 2019-06-29 RX ADMIN — OXYCODONE HYDROCHLORIDE AND ACETAMINOPHEN 2 TABLET: 5; 325 TABLET ORAL at 07:03

## 2019-06-29 RX ADMIN — Medication 500 MG: at 21:05

## 2019-06-29 RX ADMIN — ATORVASTATIN CALCIUM 80 MG: 40 TABLET, FILM COATED ORAL at 22:03

## 2019-06-29 RX ADMIN — OXYCODONE HYDROCHLORIDE AND ACETAMINOPHEN 2 TABLET: 5; 325 TABLET ORAL at 15:14

## 2019-06-29 ASSESSMENT — PAIN DESCRIPTION - ONSET
ONSET: ON-GOING
ONSET: ON-GOING

## 2019-06-29 ASSESSMENT — PAIN DESCRIPTION - DESCRIPTORS
DESCRIPTORS: ACHING;DISCOMFORT;TENDER
DESCRIPTORS: DISCOMFORT;TENDER;ACHING
DESCRIPTORS: ACHING
DESCRIPTORS: ACHING

## 2019-06-29 ASSESSMENT — PAIN SCALES - GENERAL
PAINLEVEL_OUTOF10: 7
PAINLEVEL_OUTOF10: 6
PAINLEVEL_OUTOF10: 6
PAINLEVEL_OUTOF10: 8
PAINLEVEL_OUTOF10: 0
PAINLEVEL_OUTOF10: 10
PAINLEVEL_OUTOF10: 7
PAINLEVEL_OUTOF10: 3
PAINLEVEL_OUTOF10: 6

## 2019-06-29 ASSESSMENT — PAIN DESCRIPTION - FREQUENCY
FREQUENCY: CONTINUOUS
FREQUENCY: CONTINUOUS

## 2019-06-29 ASSESSMENT — PAIN DESCRIPTION - PAIN TYPE
TYPE: ACUTE PAIN
TYPE: ACUTE PAIN;SURGICAL PAIN
TYPE: ACUTE PAIN;SURGICAL PAIN
TYPE: ACUTE PAIN

## 2019-06-29 ASSESSMENT — PAIN DESCRIPTION - LOCATION
LOCATION: GROIN

## 2019-06-29 ASSESSMENT — PAIN - FUNCTIONAL ASSESSMENT
PAIN_FUNCTIONAL_ASSESSMENT: PREVENTS OR INTERFERES SOME ACTIVE ACTIVITIES AND ADLS
PAIN_FUNCTIONAL_ASSESSMENT: PREVENTS OR INTERFERES SOME ACTIVE ACTIVITIES AND ADLS

## 2019-06-29 ASSESSMENT — PAIN DESCRIPTION - ORIENTATION
ORIENTATION: LEFT

## 2019-06-29 NOTE — OP NOTE
which had good wall apposition after the sheath was removed. The wire was maintained there was no hemodynamic instability. the wire was removed. There was still some bleeding that was noted and the remaining arteriotomy was closed with 2 interrupted 5-0 Prolene sutures. This controlled the arteriotomy site. There was a palpable pulse proximally and a palpable pulse distally. There was no bleeding from the arteriotomy site. Was some medial venous bleeding. In the subcutaneous tissue and this was sutured with a 2-0 Vicryl suture. Copious irrigation was performed. We tested the arteriotomy site there was no evidence of bleeding. A multilayer Vicryl closure was performed. There was still some evidence of some bleeding medially therefore we open the incision again I examined the arteriotomy site there was no significant bleeding there was still some venous bleeding from the more medial subcutaneous tissue and additional suture and Bovie cautery was used. The wound was then closed in a multilayer closure with Vicryl suture consisting of 3-0, 2-0 followed by a 4-0 running subcuticular closure. The right sheath was also removed over the wire. The pro glides appear to have good wall apposition. There was no evidence of hematoma. Pressure was held and the patient was reversed with protamine. End of the procedure everything was accounted for. And the patient tolerated the procedure well. I also broke scrub during this time and examined the pulses he had palpable dorsalis pedis and posterior tibial pulses that were symmetric.     Ronaldo Burrell

## 2019-06-29 NOTE — CONSULTS
Hospital Medicine  Consult History & Physical        Chief Complaint:  Med mgt    Date of Service: Pt seen/examined in consultation on 6-28-19    History Of Present Illness:      62 yo male hx cad chronic back pain htn hlp obesity hypothyroidism aaa now s/p aaa repair, seen postop in SICU as postop medical consult/follow up, pt comfortable on NC, no distress aox3, pain controlled    Past Medical History:        Diagnosis Date    Anticoagulant long-term use     CAD (coronary artery disease)     Chronic back pain     Depression     Diabetes mellitus ([address]     DJD (degenerative joint disease)     Knees.  Dyslipidemia     Hiatal hernia 1979    History of ST elevation myocardial infarction (STEMI)     Hyperlipidemia     Hypertension     MI (myocardial infarction) ([facility] [address] 1/2007    Inferior wall.  Obesity     Presence of stent in left circumflex coronary artery     Presence of stent in right coronary artery     Sleep apnea     cpap    Smoker     Type 2 diabetes mellitus without complication ([facility] [address]        Past Surgical History:        Procedure Laterality Date    CORONARY ANGIOPLASTY WITH STENT PLACEMENT  X4    CORONARY ANGIOPLASTY WITH STENT PLACEMENT  12/30/2011    Dr. Emilia Prescott 1st OM 3.0 x 20 Ion    DIAGNOSTIC CARDIAC CATH LAB PROCEDURE  3/15/2005    [facility]. Mild to moderate CAD. Normal LV.  DIAGNOSTIC CARDIAC CATH LAB PROCEDURE  1/16/2007    [facility]. Cath/PTCA/DE stent of proximal and distal RCA.  DIAGNOSTIC CARDIAC CATH LAB PROCEDURE  2/21/2007    Cath/DE stent of proximal OM1 and proximal Cx.  DIAGNOSTIC CARDIAC CATH LAB PROCEDURE  12/30/2011    ANURADHA of mid OM branch of circumflex.      ECHO COMPL W DOP COLOR FLOW  12/30/2011         HERNIA REPAIR  2/2014    laparoscopic ventral hernia repain    JOINT REPLACEMENT Left 4/1/14    TKA-ed    JOINT REPLACEMENT Right 2018    KNEE    [place] OFFICE/OUTPT VISIT,PROCEDURE ONLY Right 11/26/2018    RIGHT KNEE TOTAL ARTHROPLASTY performed by Dinh Del Valle, DO at [facility] OR       Medications Prior to Admission:    Prior to Admission medications    Medication Sig Start Date End Date Taking?  Authorizing Provider   acetaminophen (TYLENOL) 500 MG tablet Take 500 mg by mouth every 6 hours as needed for Pain   Yes Historical Provider, MD   aspirin 325 MG EC tablet TAKE 1 TABLET BY MOUTH EVERY DAY 5/24/19  Yes Historical Provider, MD   metFORMIN (GLUCOPHAGE) 500 MG tablet Take 1 tablet by mouth 2 times daily (with meals) 3/14/19  Yes TRUDY Ortiz   pioglitazone (ACTOS) 15 MG tablet Take 1 tablet by mouth daily 3/14/19  Yes [place] TRUDY Watkins   quinapril (ACCUPRIL) 40 MG tablet TAKE 1 TABLET BY MOUTH EVERY DAY  Patient taking differently: nightly TAKE 1 TABLET BY MOUTH EVERY DAY 3/14/19  Yes TRUDY Ortiz   triamterene-hydrochlorothiazide [facility]) 37.5-25 MG per tablet Take 1 tablet by mouth daily 3/14/19 3/13/20 Yes TRUDY Ortiz   atorvastatin (LIPITOR) 80 MG tablet TAKE 1 TABLET BY MOUTH AT BEDTIME 10/22/18  Yes TRUDY Ortiz   carvedilol (COREG) 25 MG tablet TAKE ONE TABLET BY MOUTH TWICE DAILY (WITH MEALS) 10/22/18  Yes TRUDY Ortiz   clopidogrel (PLAVIX) 75 MG tablet TAKE 1 TABLET BY MOUTH EVERY DAY 1/7/15  Yes Mony Forbes MD   Tens Unit MIS by Does not apply route 5/7/19   TRUDY Morris   fluticasone [facility]) 50 MCG/ACT nasal spray SPRAY 2 SPRAYS INTO EACH NOSTRIL EVERY DAY 4/18/19   TRUDY Ortiz   diclofenac (VOLTAREN) 75 MG EC tablet Take 1 tablet by mouth 2 times daily  Patient taking differently: Take 75 mg by mouth 2 times daily HOLD 3/14/19   TRUDY Ortiz   niacin (NIASPAN) 500 MG extended release tablet TAKE 1 TABLET BY MOUTH EVERY EVENING  Patient taking differently: TAKE 1 TABLET BY MOUTH EVERY EVENING- HOLD 3/14/19   TRUDY Ortiz   omega-3 acid ethyl esters (LOVAZA) 1 g capsule Take 1 capsule by mouth 2 times daily 3/14/19   TRUDY Ortiz   nitroGLYCERIN (NITROSTAT) 0.4 MG SL tablet Place 1

## 2019-06-29 NOTE — PLAN OF CARE
Problem: Pain:  Goal: Control of acute pain  Description  Control of acute pain  Outcome: Met This Shift     Problem: Falls - Risk of:  Goal: Will remain free from falls  Description  Will remain free from falls  Outcome: Met This Shift     Problem: Infection - Surgical Site:  Goal: Will show no infection signs and symptoms  Description  Will show no infection signs and symptoms  Outcome: Met This Shift

## 2019-06-29 NOTE — PROGRESS NOTES
Patient had low urine output in a low blood pressure last night status post Ivar. Overall he was asymptomatic according to the resident last night. Based on those issues we did a noncontrasted CT scan. He has no evidence of retroperitoneal hematoma. He has a left groin hematoma. This is consistent with his physical examination pre-and post.  I have reviewed his hemoglobin his hemoglobin is stable. We will continue with current management. No plans for additional surgical intervention. I also reviewed the aneurysm      The area is secondary to the recently placed stent graft. The air in the left groin is also secondary to dead space and recent closure.
Patient signed out of anesthesia discharge criteria met. Patient is alert and oriented x4, HENRY equal and bilaterally. On 3 Liters nasal cannula, will continue to monitor.
(37.1 °C) Oral 70 14 93 % --   19 0700 -- -- -- 78 14 97 % --   19 0600 -- -- -- 80 14 96 % (!) 305 lb 9.6 oz (138.6 kg)   19 0500 -- -- -- 80 14 96 % --   19 0400 -- 97.5 °F (36.4 °C) Temporal 76 14 95 % --   19 0300 (!) 89/57 -- -- 83 15 95 % --   19 0200 -- -- -- 76 12 94 % --   19 0100 -- -- -- 77 16 94 % --   19 0049 -- -- -- 78 16 94 % --   19 0000 -- 97.3 °F (36.3 °C) Temporal 81 12 95 % --   19 230 -- -- -- 81 12 96 % --   19 220 -- -- -- 81 13 95 % --   19 -- -- -- 85 13 94 % --   19 -- 97.5 °F (36.4 °C) Temporal 83 13 98 % --   19 1930 -- -- -- 87 15 98 % --   19 1900 -- -- -- 79 16 95 % --   19 1845 -- -- -- 81 17 99 % --   19 1830 -- -- -- 79 19 94 % --   19 1815 -- -- -- 79 17 96 % --   19 1800 -- 97.5 °F (36.4 °C) Temporal 76 16 94 % --   19 1745 -- -- -- 76 15 94 % --   19 1730 -- -- -- 74 20 93 % --   19 1715 -- -- -- 77 15 99 % --   19 1711 (!) 153/84 96.8 °F (36 °C) Temporal 75 14 98 % --   19 1705 -- 96.8 °F (36 °C) Temporal 84 14 95 % --     Temp (24hrs), Av.5 °F (36.4 °C), Min:96.8 °F (36 °C), Max:98.8 °F (37.1 °C)      Intake/Output Summary (Last 24 hours) at 2019 1537  Last data filed at 2019 1500  Gross per 24 hour   Intake 5588 ml   Output 2820 ml   Net 2768 ml       Gen: Awake, alert, NAD. Resting Supine in bed with Car podcast on  HEENT: NC/AT, moist mucous membranes, no oropharyngeal erythema or exudate  Neck: supple, trachea midline, no anterior cervical or SC LAD  Heart:  Normal s1/s2, RRR, no murmurs, gallops, or rubs. \  Lungs:  Clear to ausculation  bilaterally, Rather loud Intermittent Sighing -wheeze  Abd: Morbidly Obese examination bowel sounds present, soft, nontender, nondistended, no masses  Extrem:  Left Extremity warm. Groin Access Site is soft.  Large Ecchymosis Spread up the lower abdominal wall and into the
midline no jugular venous distention  CVS: Currently regular rate and rhythm no murmur rub or gallop  Resp: Currently clear to auscultation bilaterally no wheezes rales or rhonchi  Abd: Soft nontender no rebound or guarding positive bowel sounds  Extremities: Palpable dorsalis pedis and posterior tibial pulses. Right groin demonstrates minimal ecchymosis. There is no hematoma. Left groin demonstrates a hematoma that soft. See skin exam  Neuro: Cranial nerves II through XII are grossly intact bilaterally no gross cranial nerve deficit    LABS:    Lab Results   Component Value Date    WBC 13.6 (H) 06/28/2019    HGB 14.4 06/28/2019    HCT 43.1 06/28/2019     06/28/2019    PROTIME 11.2 06/26/2019    INR 1.0 06/26/2019    APTT 29.0 12/28/2011    K 3.8 06/28/2019    BUN 37 (H) 06/26/2019    CREATININE 1.3 (H) 06/26/2019       RADIOLOGY:  No orders to display       ASSESSMENT/PLAN:   · #1 status post abdominal aortic aneurysm repair with left femoral artery cutdown. He is status post endovascular AAA. At this point he is doing well we will keep an eye on the groin. His last hemoglobin was 14.4. We did lose some blood intraoperatively so I do expect this to trend down.         Electronically signed by Devan Troy MD on 6/29/2019 at 2:20 AM

## 2019-06-29 NOTE — PLAN OF CARE
Problem: Pain:  Goal: Pain level will decrease  Description  Pain level will decrease  Outcome: Met This Shift     Problem: Pain:  Goal: Control of acute pain  Description  Control of acute pain  Outcome: Met This Shift     Problem: Pain:  Goal: Control of chronic pain  Description  Control of chronic pain  Outcome: Met This Shift     Problem: Falls - Risk of:  Goal: Will remain free from falls  Description  Will remain free from falls  Outcome: Met This Shift     Problem: Falls - Risk of:  Goal: Absence of physical injury  Description  Absence of physical injury  Outcome: Met This Shift     Problem: Infection - Surgical Site:  Goal: Will show no infection signs and symptoms  Description  Will show no infection signs and symptoms  Outcome: Met This Shift     Problem: Infection - Surgical Site:  Goal: Ability to maintain a body temperature in the normal range will improve  Description  Ability to maintain a body temperature in the normal range will improve  Outcome: Met This Shift     Problem: Infection - Surgical Site:  Goal: Ability to maintain appropriate glucose levels will improve  Description  Ability to maintain appropriate glucose levels will improve  Outcome: Met This Shift     Problem: Pain - Acute:  Goal: Recognizes and communicates pain  Description  Recognizes and communicates pain  Outcome: Met This Shift     Problem: Tissue Perfusion - Peripheral, Altered:  Goal: Absence of signs or symptoms of impaired coagulation  Description  Absence of signs or symptoms of impaired coagulation  Outcome: Met This Shift     Problem: Tissue Perfusion - Peripheral, Altered:  Goal: Circulatory function of lower extremities is within specified parameters  Description  Circulatory function of lower extremities is within specified parameters  Outcome: Met This Shift     Problem: Tissue Perfusion - Peripheral, Altered:  Goal: Strength of peripheral pulses will increase  Description  Strength of peripheral pulses will

## 2019-06-29 NOTE — FLOWSHEET NOTE
Left groin continues to have firmness and ecchymosis;10# sandbag continued. Outlined area of ecchymosis extended some.

## 2019-06-30 VITALS
SYSTOLIC BLOOD PRESSURE: 128 MMHG | OXYGEN SATURATION: 97 % | BODY MASS INDEX: 43.55 KG/M2 | HEART RATE: 78 BPM | TEMPERATURE: 98.2 F | HEIGHT: 71 IN | RESPIRATION RATE: 16 BRPM | DIASTOLIC BLOOD PRESSURE: 69 MMHG | WEIGHT: 311.1 LBS

## 2019-06-30 LAB
HCT VFR BLD CALC: 30.1 % (ref 37–54)
HEMOGLOBIN: 9.9 G/DL (ref 12.5–16.5)
MCH RBC QN AUTO: 30.7 PG (ref 26–35)
MCHC RBC AUTO-ENTMCNC: 32.9 % (ref 32–34.5)
MCV RBC AUTO: 93.5 FL (ref 80–99.9)
METER GLUCOSE: 125 MG/DL (ref 74–99)
METER GLUCOSE: 132 MG/DL (ref 74–99)
PDW BLD-RTO: 12.5 FL (ref 11.5–15)
PLATELET # BLD: 113 E9/L (ref 130–450)
PMV BLD AUTO: 9.8 FL (ref 7–12)
RBC # BLD: 3.22 E12/L (ref 3.8–5.8)
WBC # BLD: 12.3 E9/L (ref 4.5–11.5)

## 2019-06-30 PROCEDURE — 6370000000 HC RX 637 (ALT 250 FOR IP): Performed by: STUDENT IN AN ORGANIZED HEALTH CARE EDUCATION/TRAINING PROGRAM

## 2019-06-30 PROCEDURE — 85027 COMPLETE CBC AUTOMATED: CPT

## 2019-06-30 PROCEDURE — 6370000000 HC RX 637 (ALT 250 FOR IP): Performed by: INTERNAL MEDICINE

## 2019-06-30 PROCEDURE — 6370000000 HC RX 637 (ALT 250 FOR IP): Performed by: SURGERY

## 2019-06-30 PROCEDURE — 6370000000 HC RX 637 (ALT 250 FOR IP): Performed by: HOSPITALIST

## 2019-06-30 PROCEDURE — 99024 POSTOP FOLLOW-UP VISIT: CPT | Performed by: SURGERY

## 2019-06-30 PROCEDURE — 36415 COLL VENOUS BLD VENIPUNCTURE: CPT

## 2019-06-30 PROCEDURE — 2700000000 HC OXYGEN THERAPY PER DAY

## 2019-06-30 PROCEDURE — 82962 GLUCOSE BLOOD TEST: CPT

## 2019-06-30 PROCEDURE — 2580000003 HC RX 258: Performed by: SURGERY

## 2019-06-30 PROCEDURE — 94640 AIRWAY INHALATION TREATMENT: CPT

## 2019-06-30 RX ORDER — PSEUDOEPHEDRINE HCL 30 MG
100 TABLET ORAL 2 TIMES DAILY
Qty: 60 CAPSULE | Refills: 0 | Status: SHIPPED | OUTPATIENT
Start: 2019-06-30 | End: 2020-01-01

## 2019-06-30 RX ORDER — ASPIRIN 81 MG/1
81 TABLET ORAL DAILY
Qty: 30 TABLET | Refills: 0 | Status: SHIPPED | OUTPATIENT
Start: 2019-06-30 | End: 2019-08-14

## 2019-06-30 RX ORDER — OXYCODONE HYDROCHLORIDE AND ACETAMINOPHEN 5; 325 MG/1; MG/1
1 TABLET ORAL EVERY 6 HOURS PRN
Qty: 20 TABLET | Refills: 0 | Status: SHIPPED | OUTPATIENT
Start: 2019-06-30 | End: 2019-07-05

## 2019-06-30 RX ADMIN — OXYCODONE HYDROCHLORIDE AND ACETAMINOPHEN 2 TABLET: 5; 325 TABLET ORAL at 16:03

## 2019-06-30 RX ADMIN — TRIAMTERENE AND HYDROCHLOROTHIAZIDE 1 TABLET: 37.5; 25 TABLET ORAL at 08:25

## 2019-06-30 RX ADMIN — FAMOTIDINE 20 MG: 20 TABLET, FILM COATED ORAL at 08:25

## 2019-06-30 RX ADMIN — OXYCODONE HYDROCHLORIDE AND ACETAMINOPHEN 2 TABLET: 5; 325 TABLET ORAL at 07:50

## 2019-06-30 RX ADMIN — IPRATROPIUM BROMIDE AND ALBUTEROL SULFATE 1 AMPULE: .5; 3 SOLUTION RESPIRATORY (INHALATION) at 17:39

## 2019-06-30 RX ADMIN — DOCUSATE SODIUM 100 MG: 100 CAPSULE, LIQUID FILLED ORAL at 08:25

## 2019-06-30 RX ADMIN — OXYCODONE HYDROCHLORIDE AND ACETAMINOPHEN 2 TABLET: 5; 325 TABLET ORAL at 02:24

## 2019-06-30 RX ADMIN — OXYCODONE HYDROCHLORIDE AND ACETAMINOPHEN 2 TABLET: 5; 325 TABLET ORAL at 11:59

## 2019-06-30 RX ADMIN — IPRATROPIUM BROMIDE AND ALBUTEROL SULFATE 1 AMPULE: .5; 3 SOLUTION RESPIRATORY (INHALATION) at 08:30

## 2019-06-30 RX ADMIN — Medication 10 ML: at 08:25

## 2019-06-30 RX ADMIN — IPRATROPIUM BROMIDE AND ALBUTEROL SULFATE 1 AMPULE: .5; 3 SOLUTION RESPIRATORY (INHALATION) at 14:41

## 2019-06-30 ASSESSMENT — PAIN DESCRIPTION - ORIENTATION
ORIENTATION: LEFT

## 2019-06-30 ASSESSMENT — PAIN SCALES - GENERAL
PAINLEVEL_OUTOF10: 2
PAINLEVEL_OUTOF10: 0
PAINLEVEL_OUTOF10: 8
PAINLEVEL_OUTOF10: 3
PAINLEVEL_OUTOF10: 2
PAINLEVEL_OUTOF10: 8
PAINLEVEL_OUTOF10: 7
PAINLEVEL_OUTOF10: 7
PAINLEVEL_OUTOF10: 0

## 2019-06-30 ASSESSMENT — PAIN DESCRIPTION - PAIN TYPE
TYPE: SURGICAL PAIN
TYPE: ACUTE PAIN;SURGICAL PAIN
TYPE: SURGICAL PAIN
TYPE: ACUTE PAIN;SURGICAL PAIN

## 2019-06-30 ASSESSMENT — PAIN DESCRIPTION - DESCRIPTORS
DESCRIPTORS: ACHING;DISCOMFORT;TENDER
DESCRIPTORS: ACHING;SORE;DISCOMFORT
DESCRIPTORS: ACHING;DISCOMFORT;SORE

## 2019-06-30 ASSESSMENT — PAIN DESCRIPTION - PROGRESSION: CLINICAL_PROGRESSION: GRADUALLY IMPROVING

## 2019-06-30 ASSESSMENT — PAIN - FUNCTIONAL ASSESSMENT
PAIN_FUNCTIONAL_ASSESSMENT: PREVENTS OR INTERFERES SOME ACTIVE ACTIVITIES AND ADLS
PAIN_FUNCTIONAL_ASSESSMENT: ACTIVITIES ARE NOT PREVENTED
PAIN_FUNCTIONAL_ASSESSMENT: PREVENTS OR INTERFERES SOME ACTIVE ACTIVITIES AND ADLS

## 2019-06-30 ASSESSMENT — PAIN DESCRIPTION - LOCATION
LOCATION: GROIN

## 2019-06-30 ASSESSMENT — PAIN DESCRIPTION - ONSET
ONSET: ON-GOING
ONSET: ON-GOING

## 2019-06-30 ASSESSMENT — PAIN DESCRIPTION - FREQUENCY
FREQUENCY: CONTINUOUS
FREQUENCY: CONTINUOUS

## 2019-06-30 NOTE — PLAN OF CARE
Problem: Pain:  Goal: Pain level will decrease  Description  Pain level will decrease  Outcome: Met This Shift     Problem: Falls - Risk of:  Goal: Will remain free from falls  Description  Will remain free from falls  6/30/2019 1530 by Mike Wills RN  Outcome: Met This Shift

## 2019-07-02 LAB
BLOOD BANK DISPENSE STATUS: NORMAL
BLOOD BANK DISPENSE STATUS: NORMAL
BLOOD BANK PRODUCT CODE: NORMAL
BLOOD BANK PRODUCT CODE: NORMAL
BPU ID: NORMAL
BPU ID: NORMAL
DESCRIPTION BLOOD BANK: NORMAL
DESCRIPTION BLOOD BANK: NORMAL
POC ACT LR: 152 SECONDS
POC ACT LR: 245 SECONDS
POC ACT LR: 292 SECONDS
POC ACT LR: 347 SECONDS

## 2019-07-02 NOTE — DISCHARGE SUMMARY
Palpable pulses are present in the DP and PT. Motor and sensation are intact. See skin examination  Neuro: Cranial nerves II through XII are grossly intact bilateral without any gross cranial nerve deficit        Disposition: home    In process/preliminary results:  Outstanding Order Results     Date and Time Order Name Status Description    6/29/2019 0350 PREPARE RBC (CROSSMATCH), 2 Units Preliminary     6/26/2019 0745 PREPARE RBC (CROSSMATCH) Preliminary           Patient Instructions:   Discharge Medication List as of 6/30/2019  6:02 PM           Details   oxyCODONE-acetaminophen (PERCOCET) 5-325 MG per tablet Take 1 tablet by mouth every 6 hours as needed for Pain for up to 5 days. , Disp-20 tablet, R-0Print      docusate sodium (COLACE, DULCOLAX) 100 MG CAPS Take 100 mg by mouth 2 times daily, Disp-60 capsule, R-0Print              Details   aspirin EC 81 MG EC tablet Take 1 tablet by mouth daily, Disp-30 tablet, R-0Normal              Details   acetaminophen (TYLENOL) 500 MG tablet Take 500 mg by mouth every 6 hours as needed for PainHistorical Med      metFORMIN (GLUCOPHAGE) 500 MG tablet Take 1 tablet by mouth 2 times daily (with meals), Disp-60 tablet, R-3Normal      pioglitazone (ACTOS) 15 MG tablet Take 1 tablet by mouth daily, Disp-30 tablet, R-3Normal      quinapril (ACCUPRIL) 40 MG tablet TAKE 1 TABLET BY MOUTH EVERY DAY, Disp-30 tablet, R-3Normal      triamterene-hydrochlorothiazide (MAXZIDE-25) 37.5-25 MG per tablet Take 1 tablet by mouth daily, Disp-30 tablet, R-3Normal      atorvastatin (LIPITOR) 80 MG tablet TAKE 1 TABLET BY MOUTH AT BEDTIME, Disp-90 tablet, R-2Normal      carvedilol (COREG) 25 MG tablet TAKE ONE TABLET BY MOUTH TWICE DAILY (WITH MEALS), Disp-180 tablet, R-5Normal      clopidogrel (PLAVIX) 75 MG tablet TAKE 1 TABLET BY MOUTH EVERY DAY, Disp-90 tablet, R-3      [place] [facility] [place], Disp-1 each, R-0, Print      fluticasone (FLONASE) 50 MCG/ACT nasal spray SPRAY 2

## 2019-07-08 DIAGNOSIS — Z95.828 HISTORY OF ENDOVASCULAR STENT GRAFT FOR ABDOMINAL AORTIC ANEURYSM (AAA): Primary | ICD-10-CM

## 2019-07-08 RX ORDER — OXYCODONE HYDROCHLORIDE AND ACETAMINOPHEN 5; 325 MG/1; MG/1
1 TABLET ORAL EVERY 6 HOURS PRN
Qty: 20 TABLET | Refills: 0 | Status: SHIPPED | OUTPATIENT
Start: 2019-07-08 | End: 2019-07-15

## 2019-07-17 ENCOUNTER — OFFICE VISIT (OUTPATIENT)
Dept: VASCULAR SURGERY | Age: 59
End: 2019-07-17

## 2019-07-17 VITALS — DIASTOLIC BLOOD PRESSURE: 74 MMHG | SYSTOLIC BLOOD PRESSURE: 122 MMHG | HEART RATE: 72 BPM

## 2019-07-17 DIAGNOSIS — I71.40 ABDOMINAL AORTIC ANEURYSM (AAA) WITHOUT RUPTURE: Primary | ICD-10-CM

## 2019-07-17 DIAGNOSIS — G89.18 POST-OP PAIN: ICD-10-CM

## 2019-07-17 DIAGNOSIS — Z95.828 HISTORY OF ENDOVASCULAR STENT GRAFT FOR ABDOMINAL AORTIC ANEURYSM (AAA): ICD-10-CM

## 2019-07-17 PROCEDURE — 99024 POSTOP FOLLOW-UP VISIT: CPT | Performed by: SURGERY

## 2019-07-17 RX ORDER — OXYCODONE HYDROCHLORIDE AND ACETAMINOPHEN 5; 325 MG/1; MG/1
1 TABLET ORAL EVERY 6 HOURS PRN
Qty: 20 TABLET | Refills: 0 | Status: SHIPPED | OUTPATIENT
Start: 2019-07-17 | End: 2019-07-22

## 2019-07-17 NOTE — PROGRESS NOTES
[x]/Yes []  :              Frequency/urgency:  No [x]/Yes []      Hematuria:    No [x]/Yes []                      Incontinence:    No [x]/Yes []    PHYSICAL EXAM:  Vitals:    07/17/19 0806   BP: 122/74   Pulse: 72     General Appearance: alert and oriented to person, place and time, well developed and well- nourished, in no acute distress  Skin: warm and dry, no rash or erythema  Head: normocephalic and atraumatic  Eyes: extraocular eye movements intact, conjunctivae normal  ENT: external ear and ear canal normal bilaterally, nose without deformity  Pulmonary/Chest: clear to auscultation bilaterally- no wheezes, rales or rhonchi, normal air movement, no respiratory distress  Cardiovascular: normal rate, regular rhythm, normal S1 and S2,   Abdomen: soft, non-tender, non-distended, normal bowel sounds, no masses or organomegaly  Left groin still has surgical glue. There is no evidence of drainage. There is no fluctuance. The ecchymosis is essentially resolved. I do not feel any pulsation, thrill or auscultate any bruit. Right groin is unremarkable he was percutaneous accessed on his right side  Musculoskeletal: normal range of motion, no joint swelling, deformity or tenderness  Neurologic: no cranial nerve deficit, gait, coordination and speech normal  Extremities: no leg edema bilaterally    PULSE EXAM      Right      Left   Brachial     Radial     Femoral 3 3   Popliteal 3 3   Dorsalis Pedis 2 2   Posterior Tibial 2 2   (3=normal, 2=diminished, 1=barely palpable, 4=widened)      Problem List Items Addressed This Visit     AAA (abdominal aortic aneurysm) ([address] - Primary    Relevant Orders    CTA ABDOMEN PELVIS W CONTRAST      Other Visit Diagnoses     History of endovascular stent graft for abdominal aortic aneurysm (AAA)        Relevant Orders    CTA ABDOMEN PELVIS W CONTRAST          #1 status post abdominal aortic aneurysm repair. At this point he is doing well.   In the next 2 weeks we will repeat the CT

## 2019-07-22 DIAGNOSIS — E11.9 TYPE 2 DIABETES MELLITUS WITHOUT COMPLICATION, WITHOUT LONG-TERM CURRENT USE OF INSULIN (HCC): ICD-10-CM

## 2019-07-26 ENCOUNTER — TELEPHONE (OUTPATIENT)
Dept: VASCULAR SURGERY | Age: 59
End: 2019-07-26

## 2019-07-28 ENCOUNTER — HOSPITAL ENCOUNTER (OUTPATIENT)
Dept: CT IMAGING | Age: 59
Discharge: HOME | End: 2019-07-30
Payer: COMMERCIAL

## 2019-07-28 DIAGNOSIS — I71.40 ABDOMINAL AORTIC ANEURYSM (AAA) WITHOUT RUPTURE: ICD-10-CM

## 2019-07-28 DIAGNOSIS — Z95.828 HISTORY OF ENDOVASCULAR STENT GRAFT FOR ABDOMINAL AORTIC ANEURYSM (AAA): ICD-10-CM

## 2019-07-28 PROCEDURE — 74174 CTA ABD&PLVS W/CONTRAST: CPT

## 2019-07-28 PROCEDURE — 6360000004 HC RX CONTRAST MEDICATION: Performed by: RADIOLOGY

## 2019-07-28 PROCEDURE — 2580000003 HC RX 258: Performed by: RADIOLOGY

## 2019-07-28 RX ORDER — 0.9 % SODIUM CHLORIDE 0.9 %
10 VIAL (ML) INJECTION 2 TIMES DAILY
Status: DISCONTINUED | OUTPATIENT
Start: 2019-07-28 | End: 2019-07-31 | Stop reason: HOSPADM

## 2019-07-28 RX ADMIN — IOPAMIDOL 100 ML: 755 INJECTION, SOLUTION INTRAVENOUS at 08:46

## 2019-07-28 RX ADMIN — Medication 10 ML: at 08:46

## 2019-07-29 ENCOUNTER — TELEPHONE (OUTPATIENT)
Dept: VASCULAR SURGERY | Age: 59
End: 2019-07-29

## 2019-07-29 DIAGNOSIS — I71.40 ABDOMINAL AORTIC ANEURYSM (AAA) WITHOUT RUPTURE: Primary | ICD-10-CM

## 2019-07-29 DIAGNOSIS — Z95.828 HISTORY OF ENDOVASCULAR STENT GRAFT FOR ABDOMINAL AORTIC ANEURYSM (AAA): ICD-10-CM

## 2019-08-04 DIAGNOSIS — I10 ESSENTIAL HYPERTENSION: ICD-10-CM

## 2019-08-05 RX ORDER — QUINAPRIL 40 MG/1
TABLET ORAL
Qty: 30 TABLET | Refills: 3 | Status: SHIPPED | OUTPATIENT
Start: 2019-08-05 | End: 2019-12-11 | Stop reason: SDUPTHER

## 2019-08-05 NOTE — TELEPHONE ENCOUNTER
Requested Prescriptions     Pending Prescriptions Disp Refills    quinapril (ACCUPRIL) 40 MG tablet [Pharmacy Med Name: QUINAPRIL 40 MG TABLET] 30 tablet 3     Sig: TAKE 1 TABLET BY MOUTH EVERY DAY

## 2019-08-13 NOTE — PROGRESS NOTES
seen. A small suprapatellar joint   effusion is noted.      L knee xray 2018 -   Cemented left total arthroplasty without evidence for   hardware failure, change in alignment and a minimal amount of joint   fluid                                         Potential Aberrant Drug-Related Behavior: no      Urine Drug Screenin2018 consistent  10/2018 Inconsistent for oxycodone, noroxycodone. Patient was prescribed norco at the time of the UDS.       OARRS report:  2018 consistent  10/2018 consistent  2019 consistent  2019 consistent    Past Medical History:   Diagnosis Date    Anticoagulant long-term use     CAD (coronary artery disease)     Chronic back pain     Depression     [facility]     DJD (degenerative joint disease)     Knees.  Dyslipidemia     Hiatal hernia 1979    History of ST elevation myocardial infarction (STEMI)     Hyperlipidemia     Hypertension     MI (myocardial infarction) ([facility] 2007    Inferior wall.  Obesity     Presence of stent in left circumflex coronary artery     Presence of stent in right coronary artery     Sleep apnea     cpap    Smoker     Type 2 diabetes mellitus without complication [facility])        Past Surgical History:   Procedure Laterality Date    ABDOMINAL AORTIC ANEURYSM REPAIR, ENDOVASCULAR N/A 2019    ENDOVASCULAR REPAIR ABDOMINAL AORTIC ANEURYSM performed [facility] Chandrika Lackey MD [facility]  X4    CORONARY ANGIOPLASTY WITH STENT PLACEMENT  2011    Dr. Antonia Montgomery 1st OM 3.0 x 20 Ion    DIAGNOSTIC CARDIAC CATH LAB PROCEDURE  3/15/2005    [facility]. Mild to moderate CAD. Normal LV.  DIAGNOSTIC CARDIAC CATH LAB PROCEDURE  2007    [facility]. Cath/PTCA/DE stent of proximal and distal RCA.  DIAGNOSTIC CARDIAC CATH LAB PROCEDURE  2007    Cath/DE stent of proximal OM1 and proximal Cx.     DIAGNOSTIC CARDIAC CATH LAB PROCEDURE  2011    ANURADHA of mid OM branch of circumflex.  ECHO COMPL W DOP COLOR FLOW  12/30/2011         HERNIA REPAIR  2/2014    laparoscopic ventral hernia repain    JOINT REPLACEMENT Left 4/1/14    TKA-ed    JOINT REPLACEMENT Right 2018    KNEE    MA OFFICE/OUTPT VISIT,PROCEDURE ONLY Right 11/26/2018    RIGHT KNEE TOTAL ARTHROPLASTY performed by Karine Servin DO at [address]       Prior to Admission medications    Medication Sig Start Date End Date Taking?  Authorizing Provider   aspirin 325 MG EC tablet TAKE 1 TABLET BY MOUTH EVERY DAY 7/17/19  Yes Historical Provider, MD   quinapril (ACCUPRIL) 40 MG tablet TAKE 1 TABLET BY MOUTH EVERY DAY 8/5/19  Yes TRUDY Trinidad   metFORMIN (GLUCOPHAGE) 500 MG tablet Take 1 tablet by mouth 2 times daily (with meals) 7/22/19  Yes TRUDY Trinidad   docusate sodium (COLACE, DULCOLAX) 100 MG CAPS Take 100 mg by mouth 2 times daily 6/30/19  Yes Lane Tran DO   acetaminophen (TYLENOL) 500 MG tablet Take 500 mg by mouth every 6 hours as needed for Pain   Yes Historical Provider, MD   Tens Unit [address] [facility] by Does not apply route 5/7/19  Yes TRUDY Smith   fluticasone [facility]) 50 MCG/ACT nasal spray SPRAY 2 SPRAYS INTO EACH NOSTRIL EVERY DAY 4/18/19  Yes TRUDY Trinidad   diclofenac (VOLTAREN) 75 MG EC tablet Take 1 tablet by mouth 2 times daily  Patient taking differently: Take 75 mg by mouth 2 times daily HOLD 3/14/19  Yes TRUDY Trinidad   niacin (NIASPAN) 500 MG extended release tablet TAKE 1 TABLET BY MOUTH EVERY EVENING  Patient taking differently: TAKE 1 TABLET BY MOUTH EVERY EVENING- HOLD 3/14/19  Yes TRUDY Trinidad   omega-3 acid ethyl esters (LOVAZA) 1 g capsule Take 1 capsule by mouth 2 times daily 3/14/19  Yes TRUDY Trinidad   pioglitazone (ACTOS) 15 MG tablet Take 1 tablet by mouth daily 3/14/19  Yes TRUDY Trinidad   triamterene-hydrochlorothiazide [facility]) 37.5-25 MG per tablet Take 1 tablet by mouth daily 3/14/19 3/13/20 Yes TRUDY Trinidad   atorvastatin (LIPITOR) antalgic    Dermatology:    Skin:no unusual rashes, no skin lesions    Impression:     Patient with axial lower back (along b/l SIJ) pain that starts at the end of his work day   Rt postero-lateral hip pain and pain with ROM rt hip   Lumbar xray shows degenerative changes   Rt hip xray shows mild degeneration R>L hips  On PLAVIX, ASA, and voltaren    AAA discovered on previous lumbar xray and care was coordinated with vascular surgery  Had AAA repair, doing well, went back to work w/ light duty  Continues with rt hip pain, xray reviewed and shows mild degeneration    Rt hip xray reviewed  Lumbar MRI ordered to eval if pain has radicular component  We discussed a diagnostic rt hip injection but we will wait until review of the lumbar MRI to determine the next step from an interventional standpoint  PT recommended, but patient reports co-pay is too high $60 per visit. Continue voltaren through pcp. Reports working well. Has failed flexeril, zanaflex - \"keeps me up at night. \"  Apap up to 2000 mg per day  Has failed compounding pain cream when he used for his knee in the past.  States that it made his pain worse.                Treatment plan discussed with the patient     Cc:  Referring physician

## 2019-08-14 ENCOUNTER — OFFICE VISIT (OUTPATIENT)
Dept: PAIN MANAGEMENT | Age: 59
End: 2019-08-14
Payer: COMMERCIAL

## 2019-08-14 ENCOUNTER — TELEPHONE (OUTPATIENT)
Dept: PAIN MANAGEMENT | Age: 59
End: 2019-08-14

## 2019-08-14 VITALS
DIASTOLIC BLOOD PRESSURE: 88 MMHG | RESPIRATION RATE: 16 BRPM | WEIGHT: 298 LBS | BODY MASS INDEX: 41.72 KG/M2 | OXYGEN SATURATION: 94 % | SYSTOLIC BLOOD PRESSURE: 158 MMHG | HEART RATE: 88 BPM | TEMPERATURE: 97.6 F | HEIGHT: 71 IN

## 2019-08-14 DIAGNOSIS — M54.16 LUMBAR RADICULOPATHY: ICD-10-CM

## 2019-08-14 DIAGNOSIS — G89.29 CHRONIC BILATERAL LOW BACK PAIN WITH RIGHT-SIDED SCIATICA: ICD-10-CM

## 2019-08-14 DIAGNOSIS — M51.36 DDD (DEGENERATIVE DISC DISEASE), LUMBAR: ICD-10-CM

## 2019-08-14 DIAGNOSIS — M25.551 RIGHT HIP PAIN: ICD-10-CM

## 2019-08-14 DIAGNOSIS — G89.4 CHRONIC PAIN SYNDROME: Primary | ICD-10-CM

## 2019-08-14 DIAGNOSIS — M54.41 CHRONIC BILATERAL LOW BACK PAIN WITH RIGHT-SIDED SCIATICA: ICD-10-CM

## 2019-08-14 PROCEDURE — 4004F PT TOBACCO SCREEN RCVD TLK: CPT | Performed by: PAIN MEDICINE

## 2019-08-14 PROCEDURE — 99213 OFFICE O/P EST LOW 20 MIN: CPT | Performed by: PAIN MEDICINE

## 2019-08-14 PROCEDURE — G8417 CALC BMI ABV UP PARAM F/U: HCPCS | Performed by: PAIN MEDICINE

## 2019-08-14 PROCEDURE — G8598 ASA/ANTIPLAT THER USED: HCPCS | Performed by: PAIN MEDICINE

## 2019-08-14 PROCEDURE — G8427 DOCREV CUR MEDS BY ELIG CLIN: HCPCS | Performed by: PAIN MEDICINE

## 2019-08-14 PROCEDURE — 3017F COLORECTAL CA SCREEN DOC REV: CPT | Performed by: PAIN MEDICINE

## 2019-08-15 ENCOUNTER — TELEPHONE (OUTPATIENT)
Dept: PAIN MANAGEMENT | Age: 59
End: 2019-08-15

## 2019-08-15 DIAGNOSIS — M51.36 DDD (DEGENERATIVE DISC DISEASE), LUMBAR: ICD-10-CM

## 2019-08-15 DIAGNOSIS — G89.29 CHRONIC BILATERAL LOW BACK PAIN WITHOUT SCIATICA: ICD-10-CM

## 2019-08-15 DIAGNOSIS — G89.29 CHRONIC BILATERAL LOW BACK PAIN WITH RIGHT-SIDED SCIATICA: ICD-10-CM

## 2019-08-15 DIAGNOSIS — M54.50 CHRONIC BILATERAL LOW BACK PAIN WITHOUT SCIATICA: ICD-10-CM

## 2019-08-15 DIAGNOSIS — I71.40 ABDOMINAL AORTIC ANEURYSM (AAA) WITHOUT RUPTURE: ICD-10-CM

## 2019-08-15 DIAGNOSIS — M54.41 CHRONIC BILATERAL LOW BACK PAIN WITH RIGHT-SIDED SCIATICA: ICD-10-CM

## 2019-08-15 DIAGNOSIS — G89.4 CHRONIC PAIN SYNDROME: Primary | ICD-10-CM

## 2019-08-15 DIAGNOSIS — M54.16 LUMBAR RADICULOPATHY: ICD-10-CM

## 2019-08-15 RX ORDER — HYDROCODONE BITARTRATE AND ACETAMINOPHEN 5; 325 MG/1; MG/1
1 TABLET ORAL 2 TIMES DAILY PRN
Qty: 60 TABLET | Refills: 0 | Status: SHIPPED | OUTPATIENT
Start: 2019-08-15 | End: 2019-09-16 | Stop reason: SDUPTHER

## 2019-08-15 NOTE — TELEPHONE ENCOUNTER
Ray So, can you call him. I would be willing to do 1 or 2 Norco per day for him. Please ask him if he needs 1 or 2. Thanks.

## 2019-09-03 ENCOUNTER — TELEPHONE (OUTPATIENT)
Dept: PAIN MANAGEMENT | Age: 59
End: 2019-09-03

## 2019-09-09 DIAGNOSIS — M54.16 LUMBAR RADICULOPATHY: Primary | ICD-10-CM

## 2019-09-09 DIAGNOSIS — G89.29 CHRONIC BILATERAL LOW BACK PAIN WITH RIGHT-SIDED SCIATICA: ICD-10-CM

## 2019-09-09 DIAGNOSIS — M54.41 CHRONIC BILATERAL LOW BACK PAIN WITH RIGHT-SIDED SCIATICA: ICD-10-CM

## 2019-09-09 NOTE — TELEPHONE ENCOUNTER
Pt. Called and stated that he wanted a order for physical therapy and he will  order and find a facility that takes his insurance.

## 2019-09-16 DIAGNOSIS — G89.29 CHRONIC BILATERAL LOW BACK PAIN WITHOUT SCIATICA: ICD-10-CM

## 2019-09-16 DIAGNOSIS — M54.50 CHRONIC BILATERAL LOW BACK PAIN WITHOUT SCIATICA: ICD-10-CM

## 2019-09-16 DIAGNOSIS — M54.16 LUMBAR RADICULOPATHY: ICD-10-CM

## 2019-09-16 DIAGNOSIS — G89.29 CHRONIC BILATERAL LOW BACK PAIN WITH RIGHT-SIDED SCIATICA: ICD-10-CM

## 2019-09-16 DIAGNOSIS — M51.36 DDD (DEGENERATIVE DISC DISEASE), LUMBAR: ICD-10-CM

## 2019-09-16 DIAGNOSIS — G89.4 CHRONIC PAIN SYNDROME: ICD-10-CM

## 2019-09-16 DIAGNOSIS — M54.41 CHRONIC BILATERAL LOW BACK PAIN WITH RIGHT-SIDED SCIATICA: ICD-10-CM

## 2019-09-16 RX ORDER — HYDROCODONE BITARTRATE AND ACETAMINOPHEN 5; 325 MG/1; MG/1
1 TABLET ORAL 2 TIMES DAILY PRN
Qty: 14 TABLET | Refills: 0 | Status: SHIPPED | OUTPATIENT
Start: 2019-09-16 | End: 2019-09-23 | Stop reason: SDUPTHER

## 2019-09-23 DIAGNOSIS — M54.50 CHRONIC BILATERAL LOW BACK PAIN WITHOUT SCIATICA: ICD-10-CM

## 2019-09-23 DIAGNOSIS — M54.16 LUMBAR RADICULOPATHY: ICD-10-CM

## 2019-09-23 DIAGNOSIS — M54.41 CHRONIC BILATERAL LOW BACK PAIN WITH RIGHT-SIDED SCIATICA: ICD-10-CM

## 2019-09-23 DIAGNOSIS — G89.29 CHRONIC BILATERAL LOW BACK PAIN WITHOUT SCIATICA: ICD-10-CM

## 2019-09-23 DIAGNOSIS — M51.36 DDD (DEGENERATIVE DISC DISEASE), LUMBAR: ICD-10-CM

## 2019-09-23 DIAGNOSIS — G89.29 CHRONIC BILATERAL LOW BACK PAIN WITH RIGHT-SIDED SCIATICA: ICD-10-CM

## 2019-09-23 DIAGNOSIS — G89.4 CHRONIC PAIN SYNDROME: ICD-10-CM

## 2019-09-23 RX ORDER — HYDROCODONE BITARTRATE AND ACETAMINOPHEN 5; 325 MG/1; MG/1
1 TABLET ORAL 2 TIMES DAILY PRN
Qty: 6 TABLET | Refills: 0 | Status: SHIPPED | OUTPATIENT
Start: 2019-09-23 | End: 2019-09-25 | Stop reason: SDUPTHER

## 2019-09-25 ENCOUNTER — OFFICE VISIT (OUTPATIENT)
Dept: PAIN MANAGEMENT | Age: 59
End: 2019-09-25
Payer: COMMERCIAL

## 2019-09-25 ENCOUNTER — HOSPITAL ENCOUNTER (OUTPATIENT)
Age: 59
Discharge: HOME | End: 2019-09-27
Payer: COMMERCIAL

## 2019-09-25 VITALS
RESPIRATION RATE: 18 BRPM | TEMPERATURE: 98.6 F | HEIGHT: 71 IN | BODY MASS INDEX: 42.14 KG/M2 | SYSTOLIC BLOOD PRESSURE: 132 MMHG | WEIGHT: 301 LBS | OXYGEN SATURATION: 98 % | HEART RATE: 84 BPM | DIASTOLIC BLOOD PRESSURE: 72 MMHG

## 2019-09-25 DIAGNOSIS — M54.16 LUMBAR RADICULOPATHY: ICD-10-CM

## 2019-09-25 DIAGNOSIS — G89.29 CHRONIC BILATERAL LOW BACK PAIN WITHOUT SCIATICA: ICD-10-CM

## 2019-09-25 DIAGNOSIS — M51.36 DDD (DEGENERATIVE DISC DISEASE), LUMBAR: ICD-10-CM

## 2019-09-25 DIAGNOSIS — M54.41 CHRONIC BILATERAL LOW BACK PAIN WITH RIGHT-SIDED SCIATICA: ICD-10-CM

## 2019-09-25 DIAGNOSIS — G89.4 CHRONIC PAIN SYNDROME: ICD-10-CM

## 2019-09-25 DIAGNOSIS — M54.50 CHRONIC BILATERAL LOW BACK PAIN WITHOUT SCIATICA: ICD-10-CM

## 2019-09-25 DIAGNOSIS — G89.29 CHRONIC BILATERAL LOW BACK PAIN WITH RIGHT-SIDED SCIATICA: ICD-10-CM

## 2019-09-25 LAB — SPECIFIC GRAVITY UA: 1.02 (ref 1–1.03)

## 2019-09-25 PROCEDURE — G0480 DRUG TEST DEF 1-7 CLASSES: HCPCS

## 2019-09-25 PROCEDURE — 3017F COLORECTAL CA SCREEN DOC REV: CPT | Performed by: PHYSICIAN ASSISTANT

## 2019-09-25 PROCEDURE — G8417 CALC BMI ABV UP PARAM F/U: HCPCS | Performed by: PHYSICIAN ASSISTANT

## 2019-09-25 PROCEDURE — 80307 DRUG TEST PRSMV CHEM ANLYZR: CPT

## 2019-09-25 PROCEDURE — 4004F PT TOBACCO SCREEN RCVD TLK: CPT | Performed by: PHYSICIAN ASSISTANT

## 2019-09-25 PROCEDURE — 99213 OFFICE O/P EST LOW 20 MIN: CPT | Performed by: PHYSICIAN ASSISTANT

## 2019-09-25 PROCEDURE — G8427 DOCREV CUR MEDS BY ELIG CLIN: HCPCS | Performed by: PHYSICIAN ASSISTANT

## 2019-09-25 PROCEDURE — G8598 ASA/ANTIPLAT THER USED: HCPCS | Performed by: PHYSICIAN ASSISTANT

## 2019-09-25 RX ORDER — HYDROCODONE BITARTRATE AND ACETAMINOPHEN 5; 325 MG/1; MG/1
1 TABLET ORAL 2 TIMES DAILY PRN
Qty: 70 TABLET | Refills: 0 | Status: SHIPPED | OUTPATIENT
Start: 2019-09-26 | End: 2019-10-23

## 2019-09-25 NOTE — PROGRESS NOTES
[facility] [facility]  [address]    Follow up Note      Odilon Solomon     Date of Visit:  2019    CC:  Patient presents for follow up   Chief Complaint   Patient presents with    Pain     back        HPI:    Pain is unchanged. Has not started therapy yet. Starts on Friday. Right groin pain is tolerable. Lower back is sore. Reports not getting enough coverage with the norco in the middle of the day. Appropriate analgesia with current medications regimen: no.    Change in quality of symptoms:no. Medication side effects:none. Recent diagnostic testing:none. Recent interventional procedures:none since last visit. He has been on anticoagulation medications to include ASA, NSAIDS and Plavix. The patient  has not been on herbal supplements. The patient is diabetic. Imagin/2019 lumbar xray -         2019 Right knee x-ray     Patient MRN:  [id number]   : 1960   Age: 61 years   Gender: Male   Order Date:  2019 9:13 AM   EXAM: XR KNEE RIGHT (1-2 VIEWS)   NUMBER OF IMAGES:  2 views   INDICATION: Z96.651 Status post total right knee replacement s/p right   TKA   COMPARISON: 2019       . Redemonstration of right total knee arthroplasty hardware   Alignment remains unchanged. No foreign body is identified.           Impression   Stable right knee arthroplasty hardware with no   significant change in alignment.       The exam has been dictated and signed by Armond Ospina PA-C. Una Moscoso MD, Radiologist, have reviewed the images and   report and concur with these findings.      R knee xray 2018 -   Frontal and lateral weight-bearing views of the right knee demonstrate   severe medial femorotibial joint space narrowing with associated   subchondral sclerosis, marginal spurring and irregularity along the   proximal tibia, and genu varus angulation of the knee.  Degenerative   enthesopathic changes are noted at the motion:Generally normal shoulders, pain with internal rotation of hips negative. Intact:Yes  Varicose veins:absent   Pulses:radial pulse 2+ right  Cyanosis:none  Edema:Normal    Neurological:    Sensory:normal to light touch   Motor:   Right Quadriceps5/5          Left Quadriceps5/5           Right Gastrocnemius5/5    Left Gastrocnemius5/5  Right Ant Tibialis5/5  Left Ant Tibialis5/5  [address]    Dermatology:    Skin:no unusual rashes, no skin lesions, no palpable subcutaneous nodules and good skin turgor    Impression:                Patient with axial lower back (along b/l SIJ) pain that starts at the end of his work day              Rt postero-lateral hip pain and pain with ROM rt hip              Lumbar xray shows degenerative changes              Rt hip xray shows mild degeneration R>L hips  On PLAVIX, ASA, and voltaren     AAA discovered on previous lumbar xray and care was coordinated with vascular surgery  Had AAA repair, doing well, went back to work w/ light duty  Continues with tolerable rt hip pain, xray reviewed and shows mild degeneration     Continue norco 5/325 BID. #10 extra pills given if needed TID as patient has difficulty making it through the day with 2 pills. UDS ordered today  Lumbar MRI ordered to eval if pain has radicular component. We are awaiting therapy. He starts on Friday. Will be doing 2 sessions to learn HEP. Will re-ordered MRI when completed. We discussed a diagnostic rt hip injection but we will wait until review of the lumbar MRI to determine the next step from an interventional standpoint    Continue voltaren through pcp. Reports working well. Has failed flexeril, zanaflex - \"keeps me up at night. \"  Apap up to 2000 mg per day - no longer taking  Has failed compounding pain cream when he used for his knee in the past.  States that it made his pain worse.                Treatment plan discussed with the patient      Controlled Substance Monitoring:    Acute and

## 2019-09-29 LAB
6AM URINE: <10 NG/ML
CODEINE, URINE: <20 NG/ML
HYDROCODONE, URINE: 1591 NG/ML
HYDROMORPHONE, URINE: <20 NG/ML
MORPHINE URINE: <20 NG/ML
NORHYDROCODONE, URINE: 1315 NG/ML
NOROXYCODONE, URINE: 323 NG/ML
NOROXYMORPHONE, URINE: <20 NG/ML
OXYCODONE, URINE CONFIRMATION: 42 NG/ML
OXYMORPHONE, URINE: <20 NG/ML

## 2019-10-01 LAB
7-AMINOCLONAZEPAM, URINE: <5 NG/ML
ALPHA-HYDROXYALPRAZOLAM, URINE: <5 NG/ML
ALPHA-HYDROXYMIDAZOLAM, URINE: <20 NG/ML
ALPRAZOLAM, URINE: <5 NG/ML
CHLORDIAZEPOXIDE, URINE: <20 NG/ML
CLONAZEPAM, URINE: <5 NG/ML
DIAZEPAM, URINE: <20 NG/ML
LORAZEPAM, URINE: <20 NG/ML
MIDAZOLAM, URINE: <20 NG/ML
NORDIAZEPAM, URINE: <20 NG/ML
OXAZEPAM, URINE: <20 NG/ML
TEMAZEPAM, URINE: <20 NG/ML

## 2019-10-03 LAB
Lab: NORMAL
REPORT: NORMAL
THIS TEST SENT TO: NORMAL

## 2019-10-09 DIAGNOSIS — E11.9 TYPE 2 DIABETES MELLITUS WITHOUT COMPLICATION, WITHOUT LONG-TERM CURRENT USE OF INSULIN (HCC): ICD-10-CM

## 2019-10-09 DIAGNOSIS — M17.0 OSTEOARTHRITIS OF BOTH KNEES, UNSPECIFIED OSTEOARTHRITIS TYPE: ICD-10-CM

## 2019-10-09 DIAGNOSIS — I10 ESSENTIAL HYPERTENSION: ICD-10-CM

## 2019-10-09 RX ORDER — PIOGLITAZONEHYDROCHLORIDE 15 MG/1
TABLET ORAL
Qty: 30 TABLET | Refills: 3 | Status: SHIPPED
Start: 2019-10-09 | End: 2020-01-01

## 2019-10-09 RX ORDER — TRIAMTERENE AND HYDROCHLOROTHIAZIDE 37.5; 25 MG/1; MG/1
TABLET ORAL
Qty: 30 TABLET | Refills: 3 | Status: SHIPPED
Start: 2019-10-09 | End: 2020-01-01

## 2019-10-09 RX ORDER — DICLOFENAC SODIUM 75 MG/1
TABLET, DELAYED RELEASE ORAL
Qty: 60 TABLET | Refills: 3 | Status: SHIPPED
Start: 2019-10-09 | End: 2020-01-01

## 2019-10-13 DIAGNOSIS — J06.9 VIRAL URI: ICD-10-CM

## 2019-10-14 RX ORDER — LORATADINE 10 MG/1
TABLET ORAL
Qty: 30 TABLET | Refills: 1 | Status: SHIPPED | OUTPATIENT
Start: 2019-10-14 | End: 2019-01-01 | Stop reason: SDUPTHER

## 2019-10-23 ENCOUNTER — OFFICE VISIT (OUTPATIENT)
Dept: PAIN MANAGEMENT | Age: 59
End: 2019-10-23
Payer: COMMERCIAL

## 2019-10-23 VITALS
OXYGEN SATURATION: 99 % | WEIGHT: 305 LBS | HEART RATE: 78 BPM | TEMPERATURE: 98.6 F | SYSTOLIC BLOOD PRESSURE: 124 MMHG | RESPIRATION RATE: 16 BRPM | BODY MASS INDEX: 42.7 KG/M2 | DIASTOLIC BLOOD PRESSURE: 74 MMHG | HEIGHT: 71 IN

## 2019-10-23 DIAGNOSIS — G89.4 CHRONIC PAIN SYNDROME: Primary | ICD-10-CM

## 2019-10-23 DIAGNOSIS — M51.36 DDD (DEGENERATIVE DISC DISEASE), LUMBAR: ICD-10-CM

## 2019-10-23 DIAGNOSIS — M54.16 LUMBAR RADICULOPATHY: ICD-10-CM

## 2019-10-23 DIAGNOSIS — G89.29 CHRONIC BILATERAL LOW BACK PAIN WITH RIGHT-SIDED SCIATICA: ICD-10-CM

## 2019-10-23 DIAGNOSIS — M54.50 CHRONIC BILATERAL LOW BACK PAIN WITHOUT SCIATICA: ICD-10-CM

## 2019-10-23 DIAGNOSIS — M54.41 CHRONIC BILATERAL LOW BACK PAIN WITH RIGHT-SIDED SCIATICA: ICD-10-CM

## 2019-10-23 DIAGNOSIS — G89.29 CHRONIC BILATERAL LOW BACK PAIN WITHOUT SCIATICA: ICD-10-CM

## 2019-10-23 PROCEDURE — G8427 DOCREV CUR MEDS BY ELIG CLIN: HCPCS | Performed by: PHYSICIAN ASSISTANT

## 2019-10-23 PROCEDURE — 99213 OFFICE O/P EST LOW 20 MIN: CPT | Performed by: PHYSICIAN ASSISTANT

## 2019-10-23 PROCEDURE — G8598 ASA/ANTIPLAT THER USED: HCPCS | Performed by: PHYSICIAN ASSISTANT

## 2019-10-23 PROCEDURE — 4004F PT TOBACCO SCREEN RCVD TLK: CPT | Performed by: PHYSICIAN ASSISTANT

## 2019-10-23 PROCEDURE — G8417 CALC BMI ABV UP PARAM F/U: HCPCS | Performed by: PHYSICIAN ASSISTANT

## 2019-10-23 PROCEDURE — 3017F COLORECTAL CA SCREEN DOC REV: CPT | Performed by: PHYSICIAN ASSISTANT

## 2019-10-23 PROCEDURE — G8484 FLU IMMUNIZE NO ADMIN: HCPCS | Performed by: PHYSICIAN ASSISTANT

## 2019-10-23 RX ORDER — OXYCODONE HYDROCHLORIDE AND ACETAMINOPHEN 5; 325 MG/1; MG/1
0.5 TABLET ORAL 3 TIMES DAILY PRN
Qty: 42 TABLET | Refills: 0 | Status: SHIPPED | OUTPATIENT
Start: 2019-10-26 | End: 2019-11-20 | Stop reason: SDUPTHER

## 2019-11-19 DIAGNOSIS — E11.9 TYPE 2 DIABETES MELLITUS WITHOUT COMPLICATION, WITHOUT LONG-TERM CURRENT USE OF INSULIN (HCC): ICD-10-CM

## 2019-11-19 RX ORDER — OMEGA-3-ACID ETHYL ESTERS 1 G/1
CAPSULE, LIQUID FILLED ORAL
Qty: 60 CAPSULE | Refills: 7 | Status: SHIPPED | OUTPATIENT
Start: 2019-11-19 | End: 2020-01-01 | Stop reason: SDUPTHER

## 2019-11-19 RX ORDER — NIACIN 500 MG/1
TABLET, EXTENDED RELEASE ORAL
Qty: 30 TABLET | Refills: 3 | Status: SHIPPED
Start: 2019-11-19 | End: 2020-01-01 | Stop reason: SDUPTHER

## 2019-11-20 ENCOUNTER — OFFICE VISIT (OUTPATIENT)
Dept: PAIN MANAGEMENT | Age: 59
End: 2019-11-20
Payer: COMMERCIAL

## 2019-11-20 ENCOUNTER — HOSPITAL ENCOUNTER (OUTPATIENT)
Age: 59
Discharge: HOME | End: 2019-11-22
Payer: COMMERCIAL

## 2019-11-20 VITALS
SYSTOLIC BLOOD PRESSURE: 130 MMHG | OXYGEN SATURATION: 93 % | WEIGHT: 311 LBS | BODY MASS INDEX: 43.54 KG/M2 | RESPIRATION RATE: 18 BRPM | HEIGHT: 71 IN | HEART RATE: 79 BPM | DIASTOLIC BLOOD PRESSURE: 74 MMHG

## 2019-11-20 DIAGNOSIS — G89.29 CHRONIC BILATERAL LOW BACK PAIN WITH RIGHT-SIDED SCIATICA: ICD-10-CM

## 2019-11-20 DIAGNOSIS — G89.29 CHRONIC BILATERAL LOW BACK PAIN WITHOUT SCIATICA: ICD-10-CM

## 2019-11-20 DIAGNOSIS — M54.41 CHRONIC BILATERAL LOW BACK PAIN WITH RIGHT-SIDED SCIATICA: ICD-10-CM

## 2019-11-20 DIAGNOSIS — M51.36 DDD (DEGENERATIVE DISC DISEASE), LUMBAR: ICD-10-CM

## 2019-11-20 DIAGNOSIS — M54.16 LUMBAR RADICULOPATHY: ICD-10-CM

## 2019-11-20 DIAGNOSIS — M54.50 CHRONIC BILATERAL LOW BACK PAIN WITHOUT SCIATICA: ICD-10-CM

## 2019-11-20 DIAGNOSIS — G89.4 CHRONIC PAIN SYNDROME: ICD-10-CM

## 2019-11-20 PROCEDURE — G8484 FLU IMMUNIZE NO ADMIN: HCPCS | Performed by: PHYSICIAN ASSISTANT

## 2019-11-20 PROCEDURE — 99213 OFFICE O/P EST LOW 20 MIN: CPT | Performed by: PHYSICIAN ASSISTANT

## 2019-11-20 PROCEDURE — 3017F COLORECTAL CA SCREEN DOC REV: CPT | Performed by: PHYSICIAN ASSISTANT

## 2019-11-20 PROCEDURE — G8417 CALC BMI ABV UP PARAM F/U: HCPCS | Performed by: PHYSICIAN ASSISTANT

## 2019-11-20 PROCEDURE — G8427 DOCREV CUR MEDS BY ELIG CLIN: HCPCS | Performed by: PHYSICIAN ASSISTANT

## 2019-11-20 PROCEDURE — 4004F PT TOBACCO SCREEN RCVD TLK: CPT | Performed by: PHYSICIAN ASSISTANT

## 2019-11-20 PROCEDURE — G0480 DRUG TEST DEF 1-7 CLASSES: HCPCS

## 2019-11-20 PROCEDURE — G8598 ASA/ANTIPLAT THER USED: HCPCS | Performed by: PHYSICIAN ASSISTANT

## 2019-11-20 PROCEDURE — 80307 DRUG TEST PRSMV CHEM ANLYZR: CPT

## 2019-11-20 RX ORDER — OXYCODONE HYDROCHLORIDE AND ACETAMINOPHEN 5; 325 MG/1; MG/1
0.5 TABLET ORAL 3 TIMES DAILY PRN
Qty: 42 TABLET | Refills: 0 | Status: SHIPPED | OUTPATIENT
Start: 2019-11-25 | End: 2019-12-11 | Stop reason: SDUPTHER

## 2019-11-21 LAB — SPECIFIC GRAVITY UA: 1.01 (ref 1–1.03)

## 2019-11-26 LAB
6AM URINE: <10 NG/ML
CODEINE, URINE: <20 NG/ML
HYDROCODONE, URINE: <20 NG/ML
HYDROMORPHONE, URINE: <20 NG/ML
MORPHINE URINE: <20 NG/ML
NORHYDROCODONE, URINE: <20 NG/ML
NOROXYCODONE, URINE: 1257 NG/ML
NOROXYMORPHONE, URINE: 36 NG/ML
OXYCODONE, URINE CONFIRMATION: 703 NG/ML
OXYMORPHONE, URINE: <20 NG/ML

## 2019-11-27 LAB
Lab: NORMAL
REPORT: NORMAL
THIS TEST SENT TO: NORMAL

## 2019-12-11 ENCOUNTER — PREP FOR PROCEDURE (OUTPATIENT)
Dept: PAIN MANAGEMENT | Age: 59
End: 2019-12-11

## 2019-12-11 ENCOUNTER — OFFICE VISIT (OUTPATIENT)
Dept: PAIN MANAGEMENT | Age: 59
End: 2019-12-11
Payer: COMMERCIAL

## 2019-12-11 VITALS
WEIGHT: 312 LBS | OXYGEN SATURATION: 97 % | DIASTOLIC BLOOD PRESSURE: 88 MMHG | BODY MASS INDEX: 43.68 KG/M2 | RESPIRATION RATE: 18 BRPM | HEART RATE: 78 BPM | TEMPERATURE: 98.4 F | HEIGHT: 71 IN | SYSTOLIC BLOOD PRESSURE: 154 MMHG

## 2019-12-11 DIAGNOSIS — G89.29 CHRONIC BILATERAL LOW BACK PAIN WITHOUT SCIATICA: ICD-10-CM

## 2019-12-11 DIAGNOSIS — M54.16 LUMBAR RADICULOPATHY: ICD-10-CM

## 2019-12-11 DIAGNOSIS — M54.50 CHRONIC BILATERAL LOW BACK PAIN WITHOUT SCIATICA: ICD-10-CM

## 2019-12-11 DIAGNOSIS — I10 ESSENTIAL HYPERTENSION: ICD-10-CM

## 2019-12-11 DIAGNOSIS — E66.01 MORBID OBESITY WITH BMI OF 40.0-44.9, ADULT (HCC): ICD-10-CM

## 2019-12-11 DIAGNOSIS — M47.817 LUMBOSACRAL SPONDYLOSIS WITHOUT MYELOPATHY: Primary | ICD-10-CM

## 2019-12-11 DIAGNOSIS — M51.36 DDD (DEGENERATIVE DISC DISEASE), LUMBAR: ICD-10-CM

## 2019-12-11 DIAGNOSIS — M47.817 LUMBOSACRAL SPONDYLOSIS WITHOUT MYELOPATHY: ICD-10-CM

## 2019-12-11 DIAGNOSIS — M51.36 DDD (DEGENERATIVE DISC DISEASE), LUMBAR: Primary | ICD-10-CM

## 2019-12-11 DIAGNOSIS — G89.29 CHRONIC BILATERAL LOW BACK PAIN WITH RIGHT-SIDED SCIATICA: ICD-10-CM

## 2019-12-11 DIAGNOSIS — M54.41 CHRONIC BILATERAL LOW BACK PAIN WITH RIGHT-SIDED SCIATICA: ICD-10-CM

## 2019-12-11 DIAGNOSIS — G89.4 CHRONIC PAIN SYNDROME: Primary | ICD-10-CM

## 2019-12-11 PROCEDURE — 99214 OFFICE O/P EST MOD 30 MIN: CPT | Performed by: PAIN MEDICINE

## 2019-12-11 PROCEDURE — 3017F COLORECTAL CA SCREEN DOC REV: CPT | Performed by: PAIN MEDICINE

## 2019-12-11 PROCEDURE — G8427 DOCREV CUR MEDS BY ELIG CLIN: HCPCS | Performed by: PAIN MEDICINE

## 2019-12-11 PROCEDURE — G8417 CALC BMI ABV UP PARAM F/U: HCPCS | Performed by: PAIN MEDICINE

## 2019-12-11 PROCEDURE — G8598 ASA/ANTIPLAT THER USED: HCPCS | Performed by: PAIN MEDICINE

## 2019-12-11 PROCEDURE — 4004F PT TOBACCO SCREEN RCVD TLK: CPT | Performed by: PAIN MEDICINE

## 2019-12-11 PROCEDURE — G8484 FLU IMMUNIZE NO ADMIN: HCPCS | Performed by: PAIN MEDICINE

## 2019-12-11 RX ORDER — OXYCODONE HYDROCHLORIDE AND ACETAMINOPHEN 5; 325 MG/1; MG/1
1 TABLET ORAL 3 TIMES DAILY
Qty: 90 TABLET | Refills: 0 | Status: SHIPPED | OUTPATIENT
Start: 2019-01-01 | End: 2020-01-01 | Stop reason: SDUPTHER

## 2019-12-11 RX ORDER — QUINAPRIL 40 MG/1
TABLET ORAL
Qty: 30 TABLET | Refills: 3 | Status: SHIPPED
Start: 2019-12-11 | End: 2020-01-01

## 2020-01-01 ENCOUNTER — VIRTUAL VISIT (OUTPATIENT)
Dept: PAIN MANAGEMENT | Age: 60
End: 2020-01-01
Payer: COMMERCIAL

## 2020-01-01 ENCOUNTER — PREP FOR PROCEDURE (OUTPATIENT)
Dept: PAIN MANAGEMENT | Age: 60
End: 2020-01-01

## 2020-01-01 ENCOUNTER — TELEPHONE (OUTPATIENT)
Dept: PAIN MANAGEMENT | Age: 60
End: 2020-01-01

## 2020-01-01 ENCOUNTER — APPOINTMENT (OUTPATIENT)
Dept: GENERAL RADIOLOGY | Age: 60
DRG: 207 | End: 2020-01-01
Payer: COMMERCIAL

## 2020-01-01 ENCOUNTER — APPOINTMENT (OUTPATIENT)
Dept: ULTRASOUND IMAGING | Age: 60
DRG: 207 | End: 2020-01-01
Payer: COMMERCIAL

## 2020-01-01 ENCOUNTER — HOSPITAL ENCOUNTER (OUTPATIENT)
Dept: RADIATION ONCOLOGY | Age: 60
Discharge: HOME | End: 2020-12-01
Payer: COMMERCIAL

## 2020-01-01 ENCOUNTER — HOSPITAL ENCOUNTER (OUTPATIENT)
Dept: RADIATION ONCOLOGY | Age: 60
Discharge: HOME | End: 2020-12-10
Attending: RADIOLOGY
Payer: COMMERCIAL

## 2020-01-01 ENCOUNTER — OFFICE VISIT (OUTPATIENT)
Dept: FAMILY MEDICINE CLINIC | Age: 60
End: 2020-01-01
Payer: COMMERCIAL

## 2020-01-01 ENCOUNTER — APPOINTMENT (OUTPATIENT)
Dept: GENERAL RADIOLOGY | Age: 60
DRG: 870 | End: 2020-01-01
Attending: HOSPITALIST
Payer: COMMERCIAL

## 2020-01-01 ENCOUNTER — HOSPITAL ENCOUNTER (OUTPATIENT)
Dept: RADIATION ONCOLOGY | Age: 60
Discharge: HOME | End: 2020-12-04
Payer: COMMERCIAL

## 2020-01-01 ENCOUNTER — HOSPITAL ENCOUNTER (OUTPATIENT)
Dept: RADIATION ONCOLOGY | Age: 60
Discharge: HOME | End: 2020-12-08
Attending: RADIOLOGY
Payer: COMMERCIAL

## 2020-01-01 ENCOUNTER — TELEPHONE (OUTPATIENT)
Dept: RADIATION ONCOLOGY | Age: 60
End: 2020-01-01

## 2020-01-01 ENCOUNTER — TELEPHONE (OUTPATIENT)
Dept: BARIATRICS/WEIGHT MGMT | Age: 60
End: 2020-01-01

## 2020-01-01 ENCOUNTER — APPOINTMENT (OUTPATIENT)
Dept: ULTRASOUND IMAGING | Age: 60
DRG: 870 | End: 2020-01-01
Attending: HOSPITALIST
Payer: COMMERCIAL

## 2020-01-01 ENCOUNTER — HOSPITAL ENCOUNTER (OUTPATIENT)
Age: 60
Setting detail: OUTPATIENT SURGERY
Discharge: HOME | End: 2020-06-11
Attending: PAIN MEDICINE | Admitting: PAIN MEDICINE
Payer: COMMERCIAL

## 2020-01-01 ENCOUNTER — HOSPITAL ENCOUNTER (OUTPATIENT)
Age: 60
Discharge: HOME | End: 2020-07-09
Payer: COMMERCIAL

## 2020-01-01 ENCOUNTER — ANESTHESIA EVENT (OUTPATIENT)
Dept: ICU | Age: 60
DRG: 207 | End: 2020-01-01
Payer: COMMERCIAL

## 2020-01-01 ENCOUNTER — HOSPITAL ENCOUNTER (OUTPATIENT)
Dept: RADIATION ONCOLOGY | Age: 60
Discharge: HOME | End: 2020-12-07
Attending: RADIOLOGY
Payer: COMMERCIAL

## 2020-01-01 ENCOUNTER — HOSPITAL ENCOUNTER (OUTPATIENT)
Age: 60
Discharge: HOME | End: 2020-08-29
Payer: COMMERCIAL

## 2020-01-01 ENCOUNTER — HOSPITAL ENCOUNTER (OUTPATIENT)
Age: 60
Discharge: HOME | End: 2020-10-11
Payer: COMMERCIAL

## 2020-01-01 ENCOUNTER — VIRTUAL VISIT (OUTPATIENT)
Dept: FAMILY MEDICINE CLINIC | Age: 60
End: 2020-01-01
Payer: COMMERCIAL

## 2020-01-01 ENCOUNTER — HOSPITAL ENCOUNTER (OUTPATIENT)
Age: 60
Discharge: HOME | End: 2020-10-21
Payer: COMMERCIAL

## 2020-01-01 ENCOUNTER — TELEPHONE (OUTPATIENT)
Dept: FAMILY MEDICINE CLINIC | Age: 60
End: 2020-01-01

## 2020-01-01 ENCOUNTER — ANESTHESIA (OUTPATIENT)
Dept: ICU | Age: 60
DRG: 207 | End: 2020-01-01
Payer: COMMERCIAL

## 2020-01-01 ENCOUNTER — HOSPITAL ENCOUNTER (OUTPATIENT)
Dept: GENERAL RADIOLOGY | Age: 60
Setting detail: OUTPATIENT SURGERY
Discharge: HOME | End: 2020-06-13
Attending: PAIN MEDICINE
Payer: COMMERCIAL

## 2020-01-01 ENCOUNTER — HOSPITAL ENCOUNTER (OUTPATIENT)
Dept: GENERAL RADIOLOGY | Age: 60
Discharge: HOME | End: 2020-11-19
Attending: PAIN MEDICINE
Payer: COMMERCIAL

## 2020-01-01 ENCOUNTER — OFFICE VISIT (OUTPATIENT)
Dept: PAIN MANAGEMENT | Age: 60
End: 2020-01-01
Payer: COMMERCIAL

## 2020-01-01 ENCOUNTER — TELEPHONE (OUTPATIENT)
Dept: SURGERY | Age: 60
End: 2020-01-01

## 2020-01-01 ENCOUNTER — HOSPITAL ENCOUNTER (OUTPATIENT)
Age: 60
Setting detail: OUTPATIENT SURGERY
Discharge: HOME | End: 2020-03-12
Attending: PAIN MEDICINE | Admitting: PAIN MEDICINE
Payer: COMMERCIAL

## 2020-01-01 ENCOUNTER — APPOINTMENT (OUTPATIENT)
Dept: CT IMAGING | Age: 60
DRG: 207 | End: 2020-01-01
Payer: COMMERCIAL

## 2020-01-01 ENCOUNTER — HOSPITAL ENCOUNTER (OUTPATIENT)
Age: 60
Discharge: HOME | End: 2020-10-18
Payer: COMMERCIAL

## 2020-01-01 ENCOUNTER — HOSPITAL ENCOUNTER (OUTPATIENT)
Age: 60
Discharge: HOME | End: 2020-06-10
Payer: COMMERCIAL

## 2020-01-01 ENCOUNTER — VIRTUAL VISIT (OUTPATIENT)
Dept: BARIATRICS/WEIGHT MGMT | Age: 60
End: 2020-01-01
Payer: COMMERCIAL

## 2020-01-01 ENCOUNTER — HOSPITAL ENCOUNTER (OUTPATIENT)
Dept: RADIATION ONCOLOGY | Age: 60
Discharge: HOME | End: 2020-12-09
Attending: RADIOLOGY
Payer: COMMERCIAL

## 2020-01-01 ENCOUNTER — HOSPITAL ENCOUNTER (INPATIENT)
Age: 60
LOS: 16 days | Discharge: ANOTHER HOSPITAL | DRG: 207 | End: 2020-12-03
Attending: EMERGENCY MEDICINE | Admitting: INTERNAL MEDICINE
Payer: COMMERCIAL

## 2020-01-01 ENCOUNTER — HOSPITAL ENCOUNTER (OUTPATIENT)
Age: 60
Setting detail: OUTPATIENT SURGERY
Discharge: OTHER INSTITUTION | End: 2020-11-17
Attending: PAIN MEDICINE | Admitting: PAIN MEDICINE
Payer: COMMERCIAL

## 2020-01-01 ENCOUNTER — HOSPITAL ENCOUNTER (OUTPATIENT)
Dept: GENERAL RADIOLOGY | Age: 60
Setting detail: OUTPATIENT SURGERY
Discharge: HOME | End: 2020-03-14
Attending: PAIN MEDICINE
Payer: COMMERCIAL

## 2020-01-01 ENCOUNTER — TELEPHONE (OUTPATIENT)
Dept: ADMINISTRATIVE | Age: 60
End: 2020-01-01

## 2020-01-01 ENCOUNTER — HOSPITAL ENCOUNTER (OUTPATIENT)
Age: 60
Discharge: HOME | End: 2020-10-08
Payer: COMMERCIAL

## 2020-01-01 ENCOUNTER — HOSPITAL ENCOUNTER (OUTPATIENT)
Dept: MRI IMAGING | Age: 60
Discharge: HOME | End: 2020-09-23
Payer: COMMERCIAL

## 2020-01-01 ENCOUNTER — ANESTHESIA EVENT (OUTPATIENT)
Dept: OPERATING ROOM | Age: 60
End: 2020-01-01

## 2020-01-01 ENCOUNTER — HOSPITAL ENCOUNTER (OUTPATIENT)
Dept: RADIATION ONCOLOGY | Age: 60
Discharge: HOME | End: 2020-12-09
Payer: COMMERCIAL

## 2020-01-01 ENCOUNTER — HOSPITAL ENCOUNTER (OUTPATIENT)
Age: 60
Discharge: HOME | End: 2020-02-14
Payer: COMMERCIAL

## 2020-01-01 ENCOUNTER — HOSPITAL ENCOUNTER (INPATIENT)
Age: 60
LOS: 10 days | DRG: 870 | End: 2020-12-13
Attending: HOSPITALIST | Admitting: INTERNAL MEDICINE
Payer: COMMERCIAL

## 2020-01-01 ENCOUNTER — ANESTHESIA (OUTPATIENT)
Dept: OPERATING ROOM | Age: 60
End: 2020-01-01

## 2020-01-01 ENCOUNTER — HOSPITAL ENCOUNTER (OUTPATIENT)
Age: 60
Setting detail: OUTPATIENT SURGERY
Discharge: HOME | End: 2020-09-01
Attending: PAIN MEDICINE | Admitting: PAIN MEDICINE
Payer: COMMERCIAL

## 2020-01-01 ENCOUNTER — HOSPITAL ENCOUNTER (OUTPATIENT)
Dept: CT IMAGING | Age: 60
Discharge: HOME | End: 2020-10-17
Payer: COMMERCIAL

## 2020-01-01 ENCOUNTER — HOSPITAL ENCOUNTER (OUTPATIENT)
Age: 60
Discharge: HOME | End: 2020-10-25
Payer: COMMERCIAL

## 2020-01-01 ENCOUNTER — NURSE ONLY (OUTPATIENT)
Dept: FAMILY MEDICINE CLINIC | Age: 60
End: 2020-01-01
Payer: COMMERCIAL

## 2020-01-01 ENCOUNTER — HOSPITAL ENCOUNTER (OUTPATIENT)
Dept: GENERAL RADIOLOGY | Age: 60
Discharge: HOME | End: 2020-10-17
Payer: COMMERCIAL

## 2020-01-01 ENCOUNTER — HOSPITAL ENCOUNTER (OUTPATIENT)
Dept: GENERAL RADIOLOGY | Age: 60
Discharge: HOME | End: 2020-09-03
Attending: PAIN MEDICINE
Payer: COMMERCIAL

## 2020-01-01 ENCOUNTER — HOSPITAL ENCOUNTER (OUTPATIENT)
Age: 60
Discharge: HOME | End: 2020-07-08
Payer: COMMERCIAL

## 2020-01-01 VITALS
BODY MASS INDEX: 44.1 KG/M2 | OXYGEN SATURATION: 96 % | DIASTOLIC BLOOD PRESSURE: 100 MMHG | SYSTOLIC BLOOD PRESSURE: 148 MMHG | RESPIRATION RATE: 19 BRPM | TEMPERATURE: 97.7 F | WEIGHT: 315 LBS | HEART RATE: 80 BPM | HEIGHT: 71 IN

## 2020-01-01 VITALS
DIASTOLIC BLOOD PRESSURE: 98 MMHG | OXYGEN SATURATION: 98 % | SYSTOLIC BLOOD PRESSURE: 188 MMHG | TEMPERATURE: 97.9 F | WEIGHT: 311 LBS | HEART RATE: 72 BPM | RESPIRATION RATE: 18 BRPM | HEIGHT: 71 IN | BODY MASS INDEX: 43.54 KG/M2

## 2020-01-01 VITALS
HEART RATE: 136 BPM | WEIGHT: 315 LBS | RESPIRATION RATE: 20 BRPM | OXYGEN SATURATION: 80 % | TEMPERATURE: 101.2 F | DIASTOLIC BLOOD PRESSURE: 72 MMHG | HEIGHT: 71 IN | SYSTOLIC BLOOD PRESSURE: 89 MMHG | BODY MASS INDEX: 44.1 KG/M2

## 2020-01-01 VITALS
TEMPERATURE: 96.3 F | HEIGHT: 71 IN | BODY MASS INDEX: 44.1 KG/M2 | DIASTOLIC BLOOD PRESSURE: 64 MMHG | HEART RATE: 88 BPM | RESPIRATION RATE: 19 BRPM | SYSTOLIC BLOOD PRESSURE: 98 MMHG | OXYGEN SATURATION: 92 % | WEIGHT: 315 LBS

## 2020-01-01 VITALS
HEART RATE: 76 BPM | SYSTOLIC BLOOD PRESSURE: 142 MMHG | TEMPERATURE: 98.1 F | BODY MASS INDEX: 44.1 KG/M2 | HEIGHT: 71 IN | RESPIRATION RATE: 12 BRPM | WEIGHT: 315 LBS | DIASTOLIC BLOOD PRESSURE: 92 MMHG

## 2020-01-01 VITALS
WEIGHT: 314 LBS | SYSTOLIC BLOOD PRESSURE: 157 MMHG | DIASTOLIC BLOOD PRESSURE: 79 MMHG | BODY MASS INDEX: 43.96 KG/M2 | RESPIRATION RATE: 18 BRPM | HEART RATE: 68 BPM | HEIGHT: 71 IN | TEMPERATURE: 97.1 F | OXYGEN SATURATION: 94 %

## 2020-01-01 VITALS
HEART RATE: 77 BPM | WEIGHT: 315 LBS | BODY MASS INDEX: 44.1 KG/M2 | HEIGHT: 71 IN | SYSTOLIC BLOOD PRESSURE: 188 MMHG | RESPIRATION RATE: 18 BRPM | DIASTOLIC BLOOD PRESSURE: 111 MMHG | OXYGEN SATURATION: 94 % | TEMPERATURE: 97.5 F

## 2020-01-01 VITALS
TEMPERATURE: 98.5 F | RESPIRATION RATE: 18 BRPM | HEIGHT: 71 IN | DIASTOLIC BLOOD PRESSURE: 110 MMHG | SYSTOLIC BLOOD PRESSURE: 160 MMHG | WEIGHT: 312 LBS | BODY MASS INDEX: 43.68 KG/M2 | OXYGEN SATURATION: 95 % | HEART RATE: 89 BPM

## 2020-01-01 VITALS
DIASTOLIC BLOOD PRESSURE: 86 MMHG | WEIGHT: 315 LBS | RESPIRATION RATE: 20 BRPM | SYSTOLIC BLOOD PRESSURE: 138 MMHG | BODY MASS INDEX: 44.1 KG/M2 | OXYGEN SATURATION: 97 % | TEMPERATURE: 98.4 F | HEART RATE: 79 BPM | HEIGHT: 71 IN

## 2020-01-01 VITALS
DIASTOLIC BLOOD PRESSURE: 80 MMHG | HEIGHT: 71 IN | OXYGEN SATURATION: 95 % | BODY MASS INDEX: 44.1 KG/M2 | SYSTOLIC BLOOD PRESSURE: 136 MMHG | HEART RATE: 88 BPM | TEMPERATURE: 96.3 F | WEIGHT: 315 LBS

## 2020-01-01 VITALS
RESPIRATION RATE: 16 BRPM | HEIGHT: 71 IN | TEMPERATURE: 97.6 F | WEIGHT: 315 LBS | BODY MASS INDEX: 44.1 KG/M2 | DIASTOLIC BLOOD PRESSURE: 82 MMHG | SYSTOLIC BLOOD PRESSURE: 155 MMHG | HEART RATE: 70 BPM | OXYGEN SATURATION: 92 %

## 2020-01-01 VITALS
OXYGEN SATURATION: 96 % | HEIGHT: 71 IN | WEIGHT: 312 LBS | DIASTOLIC BLOOD PRESSURE: 90 MMHG | RESPIRATION RATE: 20 BRPM | HEART RATE: 86 BPM | BODY MASS INDEX: 43.68 KG/M2 | SYSTOLIC BLOOD PRESSURE: 150 MMHG | TEMPERATURE: 97.6 F

## 2020-01-01 VITALS
SYSTOLIC BLOOD PRESSURE: 132 MMHG | BODY MASS INDEX: 43.54 KG/M2 | WEIGHT: 311 LBS | TEMPERATURE: 98.4 F | OXYGEN SATURATION: 98 % | DIASTOLIC BLOOD PRESSURE: 72 MMHG | HEART RATE: 74 BPM | RESPIRATION RATE: 18 BRPM | HEIGHT: 71 IN

## 2020-01-01 VITALS
WEIGHT: 315 LBS | HEART RATE: 103 BPM | TEMPERATURE: 100 F | HEIGHT: 71 IN | RESPIRATION RATE: 21 BRPM | BODY MASS INDEX: 44.1 KG/M2 | DIASTOLIC BLOOD PRESSURE: 77 MMHG | OXYGEN SATURATION: 91 % | SYSTOLIC BLOOD PRESSURE: 126 MMHG

## 2020-01-01 VITALS
OXYGEN SATURATION: 93 % | SYSTOLIC BLOOD PRESSURE: 116 MMHG | RESPIRATION RATE: 18 BRPM | DIASTOLIC BLOOD PRESSURE: 66 MMHG | WEIGHT: 314 LBS | BODY MASS INDEX: 43.96 KG/M2 | TEMPERATURE: 94.1 F | HEART RATE: 79 BPM | HEIGHT: 71 IN

## 2020-01-01 VITALS
DIASTOLIC BLOOD PRESSURE: 67 MMHG | WEIGHT: 315 LBS | BODY MASS INDEX: 44.1 KG/M2 | SYSTOLIC BLOOD PRESSURE: 108 MMHG | RESPIRATION RATE: 22 BRPM | HEART RATE: 84 BPM | TEMPERATURE: 96.6 F | HEIGHT: 71 IN | OXYGEN SATURATION: 95 %

## 2020-01-01 VITALS
SYSTOLIC BLOOD PRESSURE: 148 MMHG | RESPIRATION RATE: 16 BRPM | TEMPERATURE: 96.5 F | HEART RATE: 83 BPM | OXYGEN SATURATION: 96 % | DIASTOLIC BLOOD PRESSURE: 100 MMHG

## 2020-01-01 VITALS — HEIGHT: 71 IN | BODY MASS INDEX: 43.68 KG/M2 | WEIGHT: 312 LBS

## 2020-01-01 VITALS
TEMPERATURE: 97.6 F | HEART RATE: 78 BPM | SYSTOLIC BLOOD PRESSURE: 136 MMHG | DIASTOLIC BLOOD PRESSURE: 84 MMHG | RESPIRATION RATE: 20 BRPM

## 2020-01-01 LAB
6AM URINE: <10 NG/ML
7-AMINOCLONAZEPAM, URINE: <5 NG/ML
AADO2: 167.6 MMHG
AADO2: 197.2 MMHG
AADO2: 198.1 MMHG
AADO2: 247.5 MMHG
AADO2: 250.6 MMHG
AADO2: 251.9 MMHG
AADO2: 254.4 MMHG
AADO2: 258.6 MMHG
AADO2: 260.1 MMHG
AADO2: 260.1 MMHG
AADO2: 262.9 MMHG
AADO2: 263.2 MMHG
AADO2: 264.8 MMHG
AADO2: 267.2 MMHG
AADO2: 273.9 MMHG
AADO2: 274.7 MMHG
AADO2: 278.2 MMHG
AADO2: 289.2 MMHG
AADO2: 289.2 MMHG
AADO2: 291.1 MMHG
AADO2: 291.5 MMHG
AADO2: 292.9 MMHG
AADO2: 298.8 MMHG
AADO2: 304.1 MMHG
AADO2: 325.5 MMHG
AADO2: 344.3 MMHG
AADO2: 344.6 MMHG
AADO2: 364.9 MMHG
AADO2: 378.3 MMHG
AADO2: 425.1 MMHG
AADO2: 509.2 MMHG
AADO2: 521.6 MMHG
AADO2: 559.5 MMHG
ABO/RH: NORMAL
ADENOVIRUS BY PCR: NOT DETECTED
ALBUMIN SERPL-MCNC: 2.3 G/DL (ref 3.5–5.2)
ALBUMIN SERPL-MCNC: 2.4 G/DL (ref 3.5–5.2)
ALBUMIN SERPL-MCNC: 2.5 G/DL (ref 3.5–5.2)
ALBUMIN SERPL-MCNC: 2.6 G/DL (ref 3.5–5.2)
ALBUMIN SERPL-MCNC: 2.7 G/DL (ref 3.5–5.2)
ALBUMIN SERPL-MCNC: 2.8 G/DL (ref 3.5–5.2)
ALBUMIN SERPL-MCNC: 2.9 G/DL (ref 3.5–5.2)
ALBUMIN SERPL-MCNC: 3 G/DL (ref 3.5–5.2)
ALBUMIN SERPL-MCNC: 3.1 G/DL (ref 3.5–5.2)
ALBUMIN SERPL-MCNC: 3.2 G/DL (ref 3.5–5.2)
ALBUMIN SERPL-MCNC: 3.3 G/DL (ref 3.5–5.2)
ALBUMIN SERPL-MCNC: 3.4 G/DL (ref 3.5–5.2)
ALBUMIN SERPL-MCNC: 3.5 G/DL (ref 3.5–5.2)
ALBUMIN SERPL-MCNC: 3.7 G/DL (ref 3.5–5.2)
ALBUMIN SERPL-MCNC: 4.6 G/DL (ref 3.5–5.2)
ALP BLD-CCNC: 111 U/L (ref 40–129)
ALP BLD-CCNC: 127 U/L (ref 40–129)
ALP BLD-CCNC: 133 U/L (ref 40–129)
ALP BLD-CCNC: 134 U/L (ref 40–129)
ALP BLD-CCNC: 136 U/L (ref 40–129)
ALP BLD-CCNC: 138 U/L (ref 40–129)
ALP BLD-CCNC: 140 U/L (ref 40–129)
ALP BLD-CCNC: 144 U/L (ref 40–129)
ALP BLD-CCNC: 146 U/L (ref 40–129)
ALP BLD-CCNC: 147 U/L (ref 40–129)
ALP BLD-CCNC: 149 U/L (ref 40–129)
ALP BLD-CCNC: 152 U/L (ref 40–129)
ALP BLD-CCNC: 153 U/L (ref 40–129)
ALP BLD-CCNC: 155 U/L (ref 40–129)
ALP BLD-CCNC: 157 U/L (ref 40–129)
ALP BLD-CCNC: 158 U/L (ref 40–129)
ALP BLD-CCNC: 159 U/L (ref 40–129)
ALP BLD-CCNC: 160 U/L (ref 40–129)
ALP BLD-CCNC: 160 U/L (ref 40–129)
ALP BLD-CCNC: 163 U/L (ref 40–129)
ALP BLD-CCNC: 164 U/L (ref 40–129)
ALP BLD-CCNC: 179 U/L (ref 40–129)
ALP BLD-CCNC: 181 U/L (ref 40–129)
ALP BLD-CCNC: 185 U/L (ref 40–129)
ALP BLD-CCNC: 201 U/L (ref 40–129)
ALP BLD-CCNC: 202 U/L (ref 40–129)
ALP BLD-CCNC: 215 U/L (ref 40–129)
ALP BLD-CCNC: 217 U/L (ref 40–129)
ALP BLD-CCNC: 219 U/L (ref 40–129)
ALP BLD-CCNC: 224 U/L (ref 40–129)
ALP BLD-CCNC: 280 U/L (ref 40–129)
ALP BLD-CCNC: 310 U/L (ref 40–129)
ALPHA-HYDROXYALPRAZOLAM, URINE: <5 NG/ML
ALPHA-HYDROXYMIDAZOLAM, URINE: <20 NG/ML
ALPRAZOLAM, URINE: <5 NG/ML
ALT SERPL-CCNC: 16 U/L (ref 0–40)
ALT SERPL-CCNC: 24 U/L (ref 0–40)
ALT SERPL-CCNC: 26 U/L (ref 0–40)
ALT SERPL-CCNC: 26 U/L (ref 0–40)
ALT SERPL-CCNC: 30 U/L (ref 0–40)
ALT SERPL-CCNC: 32 U/L (ref 0–40)
ALT SERPL-CCNC: 34 U/L (ref 0–40)
ALT SERPL-CCNC: 34 U/L (ref 0–40)
ALT SERPL-CCNC: 37 U/L (ref 0–40)
ALT SERPL-CCNC: 37 U/L (ref 0–40)
ALT SERPL-CCNC: 38 U/L (ref 0–40)
ALT SERPL-CCNC: 38 U/L (ref 0–40)
ALT SERPL-CCNC: 44 U/L (ref 0–40)
ALT SERPL-CCNC: 48 U/L (ref 0–40)
ALT SERPL-CCNC: 49 U/L (ref 0–40)
ALT SERPL-CCNC: 51 U/L (ref 0–40)
ALT SERPL-CCNC: 51 U/L (ref 0–40)
ALT SERPL-CCNC: 55 U/L (ref 0–40)
ALT SERPL-CCNC: 61 U/L (ref 0–40)
ALT SERPL-CCNC: 63 U/L (ref 0–40)
ALT SERPL-CCNC: 63 U/L (ref 0–40)
ALT SERPL-CCNC: 73 U/L (ref 0–40)
ALT SERPL-CCNC: 73 U/L (ref 0–40)
ALT SERPL-CCNC: 76 U/L (ref 0–40)
ALT SERPL-CCNC: 77 U/L (ref 0–40)
ALT SERPL-CCNC: 79 U/L (ref 0–40)
ALT SERPL-CCNC: 79 U/L (ref 0–40)
ALT SERPL-CCNC: 81 U/L (ref 0–40)
ALT SERPL-CCNC: 81 U/L (ref 0–40)
ALT SERPL-CCNC: 83 U/L (ref 0–40)
ALT SERPL-CCNC: 85 U/L (ref 0–40)
ALT SERPL-CCNC: 87 U/L (ref 0–40)
ALT SERPL-CCNC: 88 U/L (ref 0–40)
ALT SERPL-CCNC: 88 U/L (ref 0–40)
AMORPHOUS: ABNORMAL
ANION GAP SERPL CALCULATED.3IONS-SCNC: 10 MMOL/L (ref 7–16)
ANION GAP SERPL CALCULATED.3IONS-SCNC: 11 MMOL/L (ref 7–16)
ANION GAP SERPL CALCULATED.3IONS-SCNC: 12 MMOL/L (ref 7–16)
ANION GAP SERPL CALCULATED.3IONS-SCNC: 13 MMOL/L (ref 7–16)
ANION GAP SERPL CALCULATED.3IONS-SCNC: 14 MMOL/L (ref 7–16)
ANION GAP SERPL CALCULATED.3IONS-SCNC: 18 MMOL/L (ref 7–16)
ANION GAP SERPL CALCULATED.3IONS-SCNC: 6 MMOL/L (ref 7–16)
ANION GAP SERPL CALCULATED.3IONS-SCNC: 7 MMOL/L (ref 7–16)
ANION GAP SERPL CALCULATED.3IONS-SCNC: 7 MMOL/L (ref 7–16)
ANION GAP SERPL CALCULATED.3IONS-SCNC: 8 MMOL/L (ref 7–16)
ANION GAP SERPL CALCULATED.3IONS-SCNC: 8 MMOL/L (ref 7–16)
ANION GAP SERPL CALCULATED.3IONS-SCNC: 9 MMOL/L (ref 7–16)
ANISOCYTOSIS: ABNORMAL
ANTIBODY SCREEN: NORMAL
AST SERPL-CCNC: 108 U/L (ref 0–39)
AST SERPL-CCNC: 111 U/L (ref 0–39)
AST SERPL-CCNC: 112 U/L (ref 0–39)
AST SERPL-CCNC: 123 U/L (ref 0–39)
AST SERPL-CCNC: 134 U/L (ref 0–39)
AST SERPL-CCNC: 147 U/L (ref 0–39)
AST SERPL-CCNC: 148 U/L (ref 0–39)
AST SERPL-CCNC: 150 U/L (ref 0–39)
AST SERPL-CCNC: 152 U/L (ref 0–39)
AST SERPL-CCNC: 16 U/L (ref 0–39)
AST SERPL-CCNC: 21 U/L (ref 0–39)
AST SERPL-CCNC: 23 U/L (ref 0–39)
AST SERPL-CCNC: 24 U/L (ref 0–39)
AST SERPL-CCNC: 30 U/L (ref 0–39)
AST SERPL-CCNC: 33 U/L (ref 0–39)
AST SERPL-CCNC: 40 U/L (ref 0–39)
AST SERPL-CCNC: 40 U/L (ref 0–39)
AST SERPL-CCNC: 42 U/L (ref 0–39)
AST SERPL-CCNC: 45 U/L (ref 0–39)
AST SERPL-CCNC: 45 U/L (ref 0–39)
AST SERPL-CCNC: 46 U/L (ref 0–39)
AST SERPL-CCNC: 46 U/L (ref 0–39)
AST SERPL-CCNC: 47 U/L (ref 0–39)
AST SERPL-CCNC: 48 U/L (ref 0–39)
AST SERPL-CCNC: 49 U/L (ref 0–39)
AST SERPL-CCNC: 50 U/L (ref 0–39)
AST SERPL-CCNC: 51 U/L (ref 0–39)
AST SERPL-CCNC: 55 U/L (ref 0–39)
AST SERPL-CCNC: 57 U/L (ref 0–39)
AST SERPL-CCNC: 73 U/L (ref 0–39)
AST SERPL-CCNC: 76 U/L (ref 0–39)
AST SERPL-CCNC: 77 U/L (ref 0–39)
AST SERPL-CCNC: 80 U/L (ref 0–39)
AST SERPL-CCNC: 88 U/L (ref 0–39)
ATYPICAL LYMPHOCYTE RELATIVE PERCENT: 0.9 % (ref 0–4)
ATYPICAL LYMPHOCYTE RELATIVE PERCENT: 1 % (ref 0–4)
B.E.: 0 MMOL/L (ref -3–3)
B.E.: 0.5 MMOL/L (ref -3–3)
B.E.: 0.5 MMOL/L (ref -3–3)
B.E.: 0.7 MMOL/L (ref -3–3)
B.E.: 0.7 MMOL/L (ref -3–3)
B.E.: 1.1 MMOL/L (ref -3–3)
B.E.: 1.2 MMOL/L (ref -3–3)
B.E.: 1.3 MMOL/L (ref -3–3)
B.E.: 1.7 MMOL/L (ref -3–3)
B.E.: 10.1 MMOL/L (ref -3–3)
B.E.: 10.8 MMOL/L (ref -3–3)
B.E.: 11.3 MMOL/L (ref -3–3)
B.E.: 12.3 MMOL/L (ref -3–3)
B.E.: 2.3 MMOL/L (ref -3–3)
B.E.: 2.5 MMOL/L (ref -3–3)
B.E.: 3.2 MMOL/L (ref -3–3)
B.E.: 3.2 MMOL/L (ref -3–3)
B.E.: 3.8 MMOL/L (ref -3–3)
B.E.: 4 MMOL/L (ref -3–3)
B.E.: 5 MMOL/L (ref -3–3)
B.E.: 5 MMOL/L (ref -3–3)
B.E.: 5.1 MMOL/L (ref -3–3)
B.E.: 5.9 MMOL/L (ref -3–3)
B.E.: 6.3 MMOL/L (ref -3–3)
B.E.: 6.5 MMOL/L (ref -3–3)
B.E.: 6.8 MMOL/L (ref -3–3)
B.E.: 7 MMOL/L (ref -3–3)
B.E.: 7.3 MMOL/L (ref -3–3)
B.E.: 7.6 MMOL/L (ref -3–3)
B.E.: 8.5 MMOL/L (ref -3–3)
B.E.: 9.7 MMOL/L (ref -3–3)
B.E.: 9.8 MMOL/L (ref -3–0)
BACTERIA: ABNORMAL /HPF
BASOPHILIC STIPPLING: ABNORMAL
BASOPHILS ABSOLUTE: 0 E9/L (ref 0–0.2)
BASOPHILS ABSOLUTE: 0.02 E9/L (ref 0–0.2)
BASOPHILS ABSOLUTE: 0.04 E9/L (ref 0–0.2)
BASOPHILS ABSOLUTE: 0.04 E9/L (ref 0–0.2)
BASOPHILS ABSOLUTE: 0.05 E9/L (ref 0–0.2)
BASOPHILS ABSOLUTE: 0.06 E9/L (ref 0–0.2)
BASOPHILS ABSOLUTE: 0.07 E9/L (ref 0–0.2)
BASOPHILS RELATIVE PERCENT: 0 % (ref 0–2)
BASOPHILS RELATIVE PERCENT: 0.2 % (ref 0–2)
BASOPHILS RELATIVE PERCENT: 0.3 % (ref 0–2)
BASOPHILS RELATIVE PERCENT: 0.3 % (ref 0–2)
BASOPHILS RELATIVE PERCENT: 0.4 % (ref 0–2)
BASOPHILS RELATIVE PERCENT: 0.6 % (ref 0–2)
BASOPHILS RELATIVE PERCENT: 0.9 % (ref 0–2)
BASOPHILS RELATIVE PERCENT: 1 % (ref 0–2)
BILIRUB SERPL-MCNC: 0.2 MG/DL (ref 0–1.2)
BILIRUB SERPL-MCNC: 0.2 MG/DL (ref 0–1.2)
BILIRUB SERPL-MCNC: 0.3 MG/DL (ref 0–1.2)
BILIRUB SERPL-MCNC: 0.4 MG/DL (ref 0–1.2)
BILIRUB SERPL-MCNC: 0.5 MG/DL (ref 0–1.2)
BILIRUB SERPL-MCNC: 0.6 MG/DL (ref 0–1.2)
BILIRUB SERPL-MCNC: 0.7 MG/DL (ref 0–1.2)
BILIRUB SERPL-MCNC: 0.8 MG/DL (ref 0–1.2)
BILIRUB SERPL-MCNC: 0.9 MG/DL (ref 0–1.2)
BILIRUB SERPL-MCNC: 1 MG/DL (ref 0–1.2)
BILIRUBIN URINE: NEGATIVE
BLASTS RELATIVE PERCENT: 0.9 % (ref 0–0)
BLOOD BANK DISPENSE STATUS: NORMAL
BLOOD BANK PRODUCT CODE: NORMAL
BLOOD CULTURE, ROUTINE: NORMAL
BLOOD, URINE: ABNORMAL
BORDETELLA PARAPERTUSSIS BY PCR: NOT DETECTED
BORDETELLA PERTUSSIS BY PCR: NOT DETECTED
BPU ID: NORMAL
BUN BLDV-MCNC: 100 MG/DL (ref 8–23)
BUN BLDV-MCNC: 101 MG/DL (ref 8–23)
BUN BLDV-MCNC: 103 MG/DL (ref 8–23)
BUN BLDV-MCNC: 106 MG/DL (ref 8–23)
BUN BLDV-MCNC: 27 MG/DL (ref 8–23)
BUN BLDV-MCNC: 31 MG/DL (ref 8–23)
BUN BLDV-MCNC: 36 MG/DL (ref 8–23)
BUN BLDV-MCNC: 45 MG/DL (ref 8–23)
BUN BLDV-MCNC: 49 MG/DL (ref 8–23)
BUN BLDV-MCNC: 50 MG/DL (ref 8–23)
BUN BLDV-MCNC: 52 MG/DL (ref 8–23)
BUN BLDV-MCNC: 55 MG/DL (ref 8–23)
BUN BLDV-MCNC: 55 MG/DL (ref 8–23)
BUN BLDV-MCNC: 56 MG/DL (ref 8–23)
BUN BLDV-MCNC: 56 MG/DL (ref 8–23)
BUN BLDV-MCNC: 57 MG/DL (ref 8–23)
BUN BLDV-MCNC: 57 MG/DL (ref 8–23)
BUN BLDV-MCNC: 58 MG/DL (ref 8–23)
BUN BLDV-MCNC: 59 MG/DL (ref 8–23)
BUN BLDV-MCNC: 60 MG/DL (ref 8–23)
BUN BLDV-MCNC: 61 MG/DL (ref 8–23)
BUN BLDV-MCNC: 62 MG/DL (ref 8–23)
BUN BLDV-MCNC: 63 MG/DL (ref 8–23)
BUN BLDV-MCNC: 64 MG/DL (ref 8–23)
BUN BLDV-MCNC: 64 MG/DL (ref 8–23)
BUN BLDV-MCNC: 68 MG/DL (ref 8–23)
BUN BLDV-MCNC: 69 MG/DL (ref 8–23)
BUN BLDV-MCNC: 71 MG/DL (ref 8–23)
BUN BLDV-MCNC: 76 MG/DL (ref 8–23)
BUN BLDV-MCNC: 78 MG/DL (ref 8–23)
BUN BLDV-MCNC: 84 MG/DL (ref 8–23)
BUN BLDV-MCNC: 87 MG/DL (ref 8–23)
BUN BLDV-MCNC: 89 MG/DL (ref 8–23)
BUN BLDV-MCNC: 90 MG/DL (ref 8–23)
BUN BLDV-MCNC: 95 MG/DL (ref 8–23)
BURR CELLS: ABNORMAL
C-REACTIVE PROTEIN: 2.5 MG/DL (ref 0–0.4)
C-REACTIVE PROTEIN: 4.2 MG/DL (ref 0–0.4)
C-REACTIVE PROTEIN: 6.7 MG/DL (ref 0–0.4)
CALCIUM IONIZED: 1.33 MMOL/L (ref 1.15–1.33)
CALCIUM SERPL-MCNC: 10.1 MG/DL (ref 8.6–10.2)
CALCIUM SERPL-MCNC: 11 MG/DL (ref 8.6–10.2)
CALCIUM SERPL-MCNC: 11.3 MG/DL (ref 8.6–10.2)
CALCIUM SERPL-MCNC: 8 MG/DL (ref 8.6–10.2)
CALCIUM SERPL-MCNC: 8.1 MG/DL (ref 8.6–10.2)
CALCIUM SERPL-MCNC: 8.2 MG/DL (ref 8.6–10.2)
CALCIUM SERPL-MCNC: 8.3 MG/DL (ref 8.6–10.2)
CALCIUM SERPL-MCNC: 8.4 MG/DL (ref 8.6–10.2)
CALCIUM SERPL-MCNC: 8.5 MG/DL (ref 8.6–10.2)
CALCIUM SERPL-MCNC: 8.6 MG/DL (ref 8.6–10.2)
CALCIUM SERPL-MCNC: 8.7 MG/DL (ref 8.6–10.2)
CALCIUM SERPL-MCNC: 8.8 MG/DL (ref 8.6–10.2)
CALCIUM SERPL-MCNC: 8.9 MG/DL (ref 8.6–10.2)
CALCIUM SERPL-MCNC: 9 MG/DL (ref 8.6–10.2)
CALCIUM SERPL-MCNC: 9.1 MG/DL (ref 8.6–10.2)
CALCIUM SERPL-MCNC: 9.3 MG/DL (ref 8.6–10.2)
CALCIUM SERPL-MCNC: 9.7 MG/DL (ref 8.6–10.2)
CALCIUM SERPL-MCNC: 9.8 MG/DL (ref 8.6–10.2)
CHLAMYDOPHILIA PNEUMONIAE BY PCR: NOT DETECTED
CHLORDIAZEPOXIDE, URINE: <20 NG/ML
CHLORIDE BLD-SCNC: 101 MMOL/L (ref 98–107)
CHLORIDE BLD-SCNC: 102 MMOL/L (ref 98–107)
CHLORIDE BLD-SCNC: 103 MMOL/L (ref 98–107)
CHLORIDE BLD-SCNC: 103 MMOL/L (ref 98–107)
CHLORIDE BLD-SCNC: 104 MMOL/L (ref 98–107)
CHLORIDE BLD-SCNC: 105 MMOL/L (ref 98–107)
CHLORIDE BLD-SCNC: 106 MMOL/L (ref 98–107)
CHLORIDE BLD-SCNC: 107 MMOL/L (ref 98–107)
CHLORIDE BLD-SCNC: 107 MMOL/L (ref 98–107)
CHLORIDE BLD-SCNC: 108 MMOL/L (ref 98–107)
CHLORIDE BLD-SCNC: 108 MMOL/L (ref 98–107)
CHLORIDE BLD-SCNC: 111 MMOL/L (ref 98–107)
CHLORIDE BLD-SCNC: 111 MMOL/L (ref 98–107)
CHLORIDE BLD-SCNC: 112 MMOL/L (ref 98–107)
CHLORIDE BLD-SCNC: 92 MMOL/L (ref 98–107)
CHLORIDE BLD-SCNC: 93 MMOL/L (ref 98–107)
CHLORIDE BLD-SCNC: 93 MMOL/L (ref 98–107)
CHLORIDE BLD-SCNC: 94 MMOL/L (ref 98–107)
CHLORIDE BLD-SCNC: 96 MMOL/L (ref 98–107)
CHLORIDE BLD-SCNC: 97 MMOL/L (ref 98–107)
CHLORIDE BLD-SCNC: 97 MMOL/L (ref 98–107)
CHLORIDE BLD-SCNC: 98 MMOL/L (ref 98–107)
CHLORIDE BLD-SCNC: 99 MMOL/L (ref 98–107)
CHLORIDE URINE RANDOM: 120 MMOL/L
CHOLESTEROL, TOTAL: 115 MG/DL (ref 0–199)
CHOLESTEROL, TOTAL: 143 MG/DL (ref 0–199)
CLARITY: ABNORMAL
CLONAZEPAM, URINE: <5 NG/ML
CO2: 21 MMOL/L (ref 22–29)
CO2: 23 MMOL/L (ref 22–29)
CO2: 24 MMOL/L (ref 22–29)
CO2: 25 MMOL/L (ref 22–29)
CO2: 25 MMOL/L (ref 22–29)
CO2: 26 MMOL/L (ref 22–29)
CO2: 27 MMOL/L (ref 22–29)
CO2: 28 MMOL/L (ref 22–29)
CO2: 29 MMOL/L (ref 22–29)
CO2: 30 MMOL/L (ref 22–29)
CO2: 31 MMOL/L (ref 22–29)
CO2: 32 MMOL/L (ref 22–29)
CO2: 33 MMOL/L (ref 22–29)
CO2: 34 MMOL/L (ref 22–29)
CO2: 34 MMOL/L (ref 22–29)
CO2: 35 MMOL/L (ref 22–29)
CO2: 36 MMOL/L (ref 22–29)
CO2: 39 MMOL/L (ref 22–29)
CODEINE, URINE: <20 NG/ML
COHB: 0.2 % (ref 0–1.5)
COHB: 0.4 % (ref 0–1.5)
COHB: 0.7 % (ref 0–1.5)
COHB: 0.7 % (ref 0–1.5)
COHB: 0.8 % (ref 0–1.5)
COHB: 0.9 % (ref 0–1.5)
COHB: 1.1 % (ref 0–1.5)
COHB: 1.1 % (ref 0–1.5)
COHB: 1.2 % (ref 0–1.5)
COHB: 1.2 % (ref 0–1.5)
COHB: 1.3 % (ref 0–1.5)
COHB: 1.4 % (ref 0–1.5)
COHB: 1.5 % (ref 0–1.5)
COHB: 1.6 % (ref 0–1.5)
COHB: 1.7 % (ref 0–1.5)
COHB: 1.8 % (ref 0–1.5)
COHB: 1.8 % (ref 0–1.5)
COHB: 2 % (ref 0–1.5)
COHB: 2.1 % (ref 0–1.5)
COHB: 2.1 % (ref 0–1.5)
COLOR: YELLOW
COMMENT: ABNORMAL
COMMENT: ABNORMAL
CORONAVIRUS 229E BY PCR: NOT DETECTED
CORONAVIRUS HKU1 BY PCR: NOT DETECTED
CORONAVIRUS NL63 BY PCR: NOT DETECTED
CORONAVIRUS OC43 BY PCR: NOT DETECTED
CREAT SERPL-MCNC: 1 MG/DL (ref 0.7–1.2)
CREAT SERPL-MCNC: 1 MG/DL (ref 0.7–1.2)
CREAT SERPL-MCNC: 1.1 MG/DL (ref 0.7–1.2)
CREAT SERPL-MCNC: 1.2 MG/DL (ref 0.7–1.2)
CREAT SERPL-MCNC: 1.3 MG/DL (ref 0.7–1.2)
CREAT SERPL-MCNC: 1.4 MG/DL (ref 0.7–1.2)
CREAT SERPL-MCNC: 1.5 MG/DL (ref 0.7–1.2)
CREAT SERPL-MCNC: 1.5 MG/DL (ref 0.7–1.2)
CREAT SERPL-MCNC: 1.6 MG/DL (ref 0.7–1.2)
CREAT SERPL-MCNC: 1.7 MG/DL (ref 0.7–1.2)
CREAT SERPL-MCNC: 1.7 MG/DL (ref 0.7–1.2)
CREAT SERPL-MCNC: 1.8 MG/DL (ref 0.7–1.2)
CREAT SERPL-MCNC: 2.6 MG/DL (ref 0.7–1.2)
CREATININE URINE POCT: 50
CREATININE URINE: 33 MG/DL (ref 40–278)
CRITICAL: ABNORMAL
CULTURE, BLOOD 2: ABNORMAL
CULTURE, BLOOD 2: NORMAL
CULTURE, BLOOD 2: NORMAL
CULTURE, RESPIRATORY: ABNORMAL
CULTURE, RESPIRATORY: NORMAL
D DIMER: 3820 NG/ML DDU
DATE ANALYZED: ABNORMAL
DATE OF COLLECTION: ABNORMAL
DELIVERY SYSTEMS: ABNORMAL
DESCRIPTION BLOOD BANK: NORMAL
DEVICE: ABNORMAL
DIAZEPAM, URINE: <20 NG/ML
DOHLE BODIES: ABNORMAL
EKG ATRIAL RATE: 73 BPM
EKG P AXIS: 52 DEGREES
EKG P-R INTERVAL: 174 MS
EKG Q-T INTERVAL: 366 MS
EKG QRS DURATION: 100 MS
EKG QTC CALCULATION (BAZETT): 403 MS
EKG R AXIS: 30 DEGREES
EKG T AXIS: 47 DEGREES
EKG VENTRICULAR RATE: 73 BPM
EOSINOPHILS ABSOLUTE: 0 E9/L (ref 0.05–0.5)
EOSINOPHILS ABSOLUTE: 0.04 E9/L (ref 0.05–0.5)
EOSINOPHILS ABSOLUTE: 0.04 E9/L (ref 0.05–0.5)
EOSINOPHILS ABSOLUTE: 0.05 E9/L (ref 0.05–0.5)
EOSINOPHILS ABSOLUTE: 0.05 E9/L (ref 0.05–0.5)
EOSINOPHILS ABSOLUTE: 0.06 E9/L (ref 0.05–0.5)
EOSINOPHILS ABSOLUTE: 0.07 E9/L (ref 0.05–0.5)
EOSINOPHILS ABSOLUTE: 0.07 E9/L (ref 0.05–0.5)
EOSINOPHILS ABSOLUTE: 0.08 E9/L (ref 0.05–0.5)
EOSINOPHILS ABSOLUTE: 0.09 E9/L (ref 0.05–0.5)
EOSINOPHILS ABSOLUTE: 0.09 E9/L (ref 0.05–0.5)
EOSINOPHILS ABSOLUTE: 0.1 E9/L (ref 0.05–0.5)
EOSINOPHILS ABSOLUTE: 0.12 E9/L (ref 0.05–0.5)
EOSINOPHILS ABSOLUTE: 0.28 E9/L (ref 0.05–0.5)
EOSINOPHILS ABSOLUTE: 0.29 E9/L (ref 0.05–0.5)
EOSINOPHILS RELATIVE PERCENT: 0 % (ref 0–6)
EOSINOPHILS RELATIVE PERCENT: 0.2 % (ref 0–6)
EOSINOPHILS RELATIVE PERCENT: 0.3 % (ref 0–6)
EOSINOPHILS RELATIVE PERCENT: 0.4 % (ref 0–6)
EOSINOPHILS RELATIVE PERCENT: 0.4 % (ref 0–6)
EOSINOPHILS RELATIVE PERCENT: 0.5 % (ref 0–6)
EOSINOPHILS RELATIVE PERCENT: 0.6 % (ref 0–6)
EOSINOPHILS RELATIVE PERCENT: 0.7 % (ref 0–6)
EOSINOPHILS RELATIVE PERCENT: 0.9 % (ref 0–6)
EOSINOPHILS RELATIVE PERCENT: 1 % (ref 0–6)
EOSINOPHILS RELATIVE PERCENT: 1.6 % (ref 0–6)
EOSINOPHILS RELATIVE PERCENT: 1.7 % (ref 0–6)
EOSINOPHILS RELATIVE PERCENT: 2 % (ref 0–6)
EOSINOPHILS RELATIVE PERCENT: 2 % (ref 0–6)
EOSINOPHILS RELATIVE PERCENT: 2.6 % (ref 0–6)
EOSINOPHILS RELATIVE PERCENT: 2.6 % (ref 0–6)
FERRITIN: 3578 NG/ML
FIO2 ARTERIAL: 60
FIO2: 100 %
FIO2: 45 %
FIO2: 45 %
FIO2: 50 %
FIO2: 55 %
FIO2: 60 %
FIO2: 65 %
FIO2: 70 %
FIO2: 80 %
FIO2: 80 %
FOLATE: 4.3 NG/ML (ref 4.8–24.2)
GFR AFRICAN AMERICAN: 31
GFR AFRICAN AMERICAN: 47
GFR AFRICAN AMERICAN: 50
GFR AFRICAN AMERICAN: 50
GFR AFRICAN AMERICAN: 53
GFR AFRICAN AMERICAN: 54
GFR AFRICAN AMERICAN: 58
GFR AFRICAN AMERICAN: 58
GFR AFRICAN AMERICAN: >60
GFR NON-AFRICAN AMERICAN: 25 ML/MIN/1.73
GFR NON-AFRICAN AMERICAN: 39 ML/MIN/1.73
GFR NON-AFRICAN AMERICAN: 41 ML/MIN/1.73
GFR NON-AFRICAN AMERICAN: 41 ML/MIN/1.73
GFR NON-AFRICAN AMERICAN: 44 ML/MIN/1.73
GFR NON-AFRICAN AMERICAN: 48 ML/MIN/1.73
GFR NON-AFRICAN AMERICAN: 48 ML/MIN/1.73
GFR NON-AFRICAN AMERICAN: 52 ML/MIN/1.73
GFR NON-AFRICAN AMERICAN: 56 ML/MIN/1.73
GFR NON-AFRICAN AMERICAN: >60 ML/MIN/1.73
GLUCOSE BLD-MCNC: 116 MG/DL (ref 74–99)
GLUCOSE BLD-MCNC: 121 MG/DL (ref 74–99)
GLUCOSE BLD-MCNC: 125 MG/DL (ref 74–99)
GLUCOSE BLD-MCNC: 129 MG/DL (ref 74–99)
GLUCOSE BLD-MCNC: 135 MG/DL (ref 74–99)
GLUCOSE BLD-MCNC: 141 MG/DL (ref 74–99)
GLUCOSE BLD-MCNC: 143 MG/DL (ref 74–99)
GLUCOSE BLD-MCNC: 145 MG/DL (ref 74–99)
GLUCOSE BLD-MCNC: 146 MG/DL (ref 74–99)
GLUCOSE BLD-MCNC: 149 MG/DL (ref 74–99)
GLUCOSE BLD-MCNC: 154 MG/DL (ref 74–99)
GLUCOSE BLD-MCNC: 157 MG/DL (ref 74–99)
GLUCOSE BLD-MCNC: 159 MG/DL (ref 74–99)
GLUCOSE BLD-MCNC: 163 MG/DL (ref 74–99)
GLUCOSE BLD-MCNC: 167 MG/DL (ref 74–99)
GLUCOSE BLD-MCNC: 171 MG/DL (ref 74–99)
GLUCOSE BLD-MCNC: 173 MG/DL (ref 74–99)
GLUCOSE BLD-MCNC: 173 MG/DL (ref 74–99)
GLUCOSE BLD-MCNC: 174 MG/DL (ref 74–99)
GLUCOSE BLD-MCNC: 175 MG/DL (ref 74–99)
GLUCOSE BLD-MCNC: 175 MG/DL (ref 74–99)
GLUCOSE BLD-MCNC: 176 MG/DL (ref 74–99)
GLUCOSE BLD-MCNC: 182 MG/DL (ref 74–99)
GLUCOSE BLD-MCNC: 183 MG/DL (ref 74–99)
GLUCOSE BLD-MCNC: 185 MG/DL (ref 74–99)
GLUCOSE BLD-MCNC: 185 MG/DL (ref 74–99)
GLUCOSE BLD-MCNC: 186 MG/DL (ref 74–99)
GLUCOSE BLD-MCNC: 190 MG/DL (ref 74–99)
GLUCOSE BLD-MCNC: 197 MG/DL (ref 74–99)
GLUCOSE BLD-MCNC: 198 MG/DL (ref 74–99)
GLUCOSE BLD-MCNC: 199 MG/DL (ref 74–99)
GLUCOSE BLD-MCNC: 200 MG/DL (ref 74–99)
GLUCOSE BLD-MCNC: 205 MG/DL (ref 74–99)
GLUCOSE BLD-MCNC: 206 MG/DL (ref 74–99)
GLUCOSE BLD-MCNC: 225 MG/DL (ref 74–99)
GLUCOSE BLD-MCNC: 226 MG/DL (ref 74–99)
GLUCOSE BLD-MCNC: 228 MG/DL (ref 74–99)
GLUCOSE BLD-MCNC: 233 MG/DL (ref 74–99)
GLUCOSE BLD-MCNC: 235 MG/DL (ref 74–99)
GLUCOSE BLD-MCNC: 262 MG/DL (ref 74–99)
GLUCOSE BLD-MCNC: 291 MG/DL (ref 74–99)
GLUCOSE BLD-MCNC: 306 MG/DL (ref 74–99)
GLUCOSE BLD-MCNC: 308 MG/DL (ref 74–99)
GLUCOSE BLD-MCNC: 326 MG/DL (ref 74–99)
GLUCOSE BLD-MCNC: 396 MG/DL (ref 74–99)
GLUCOSE URINE: NEGATIVE MG/DL
HBA1C MFR BLD: 6.9 %
HBA1C MFR BLD: 7.2 %
HBA1C MFR BLD: 7.7 % (ref 4–5.6)
HCO3 ARTERIAL: 36 MMOL/L (ref 22–26)
HCO3: 25 MMOL/L (ref 22–26)
HCO3: 25.2 MMOL/L (ref 22–26)
HCO3: 25.6 MMOL/L (ref 22–26)
HCO3: 26 MMOL/L (ref 22–26)
HCO3: 26.7 MMOL/L (ref 22–26)
HCO3: 26.7 MMOL/L (ref 22–26)
HCO3: 26.9 MMOL/L (ref 22–26)
HCO3: 26.9 MMOL/L (ref 22–26)
HCO3: 27.4 MMOL/L (ref 22–26)
HCO3: 28.1 MMOL/L (ref 22–26)
HCO3: 28.4 MMOL/L (ref 22–26)
HCO3: 28.6 MMOL/L (ref 22–26)
HCO3: 29.2 MMOL/L (ref 22–26)
HCO3: 29.5 MMOL/L (ref 22–26)
HCO3: 29.6 MMOL/L (ref 22–26)
HCO3: 29.9 MMOL/L (ref 22–26)
HCO3: 30.2 MMOL/L (ref 22–26)
HCO3: 30.4 MMOL/L (ref 22–26)
HCO3: 30.7 MMOL/L (ref 22–26)
HCO3: 30.9 MMOL/L (ref 22–26)
HCO3: 31.4 MMOL/L (ref 22–26)
HCO3: 31.7 MMOL/L (ref 22–26)
HCO3: 31.9 MMOL/L (ref 22–26)
HCO3: 32.1 MMOL/L (ref 22–26)
HCO3: 32.3 MMOL/L (ref 22–26)
HCO3: 32.5 MMOL/L (ref 22–26)
HCO3: 33.2 MMOL/L (ref 22–26)
HCO3: 34.3 MMOL/L (ref 22–26)
HCO3: 34.3 MMOL/L (ref 22–26)
HCO3: 34.4 MMOL/L (ref 22–26)
HCO3: 35.8 MMOL/L (ref 22–26)
HCO3: 35.8 MMOL/L (ref 22–26)
HCO3: 36.8 MMOL/L (ref 22–26)
HCT VFR BLD CALC: 19.2 % (ref 37–54)
HCT VFR BLD CALC: 19.3 % (ref 37–54)
HCT VFR BLD CALC: 21.7 % (ref 37–54)
HCT VFR BLD CALC: 22.3 % (ref 37–54)
HCT VFR BLD CALC: 23.2 % (ref 37–54)
HCT VFR BLD CALC: 23.5 % (ref 37–54)
HCT VFR BLD CALC: 24.2 % (ref 37–54)
HCT VFR BLD CALC: 24.3 % (ref 37–54)
HCT VFR BLD CALC: 24.6 % (ref 37–54)
HCT VFR BLD CALC: 24.8 % (ref 37–54)
HCT VFR BLD CALC: 24.9 % (ref 37–54)
HCT VFR BLD CALC: 25 % (ref 37–54)
HCT VFR BLD CALC: 25.1 % (ref 37–54)
HCT VFR BLD CALC: 25.2 % (ref 37–54)
HCT VFR BLD CALC: 25.3 % (ref 37–54)
HCT VFR BLD CALC: 25.4 % (ref 37–54)
HCT VFR BLD CALC: 25.7 % (ref 37–54)
HCT VFR BLD CALC: 25.9 % (ref 37–54)
HCT VFR BLD CALC: 25.9 % (ref 37–54)
HCT VFR BLD CALC: 26 % (ref 37–54)
HCT VFR BLD CALC: 26.1 % (ref 37–54)
HCT VFR BLD CALC: 26.2 % (ref 37–54)
HCT VFR BLD CALC: 26.3 % (ref 37–54)
HCT VFR BLD CALC: 26.4 % (ref 37–54)
HCT VFR BLD CALC: 26.5 % (ref 37–54)
HCT VFR BLD CALC: 26.6 % (ref 37–54)
HCT VFR BLD CALC: 26.7 % (ref 37–54)
HCT VFR BLD CALC: 26.8 % (ref 37–54)
HCT VFR BLD CALC: 26.8 % (ref 37–54)
HCT VFR BLD CALC: 26.9 % (ref 37–54)
HCT VFR BLD CALC: 26.9 % (ref 37–54)
HCT VFR BLD CALC: 27 % (ref 37–54)
HCT VFR BLD CALC: 27.1 % (ref 37–54)
HCT VFR BLD CALC: 27.2 % (ref 37–54)
HCT VFR BLD CALC: 27.3 % (ref 37–54)
HCT VFR BLD CALC: 27.3 % (ref 37–54)
HCT VFR BLD CALC: 27.4 % (ref 37–54)
HCT VFR BLD CALC: 27.6 % (ref 37–54)
HCT VFR BLD CALC: 28 % (ref 37–54)
HCT VFR BLD CALC: 28.2 % (ref 37–54)
HCT VFR BLD CALC: 29.6 % (ref 37–54)
HCT VFR BLD CALC: 30.6 % (ref 37–54)
HCT VFR BLD CALC: 33 % (ref 37–54)
HCT VFR BLD CALC: 42.3 % (ref 37–54)
HCT VFR BLD CALC: 52.5 % (ref 37–54)
HDLC SERPL-MCNC: 42 MG/DL
HDLC SERPL-MCNC: 42 MG/DL
HEMOCCULT STL QL: ABNORMAL
HEMOCCULT STL QL: POSITIVE
HEMOGLOBIN: 10.1 G/DL (ref 12.5–16.5)
HEMOGLOBIN: 13.2 G/DL (ref 12.5–16.5)
HEMOGLOBIN: 16.9 G/DL (ref 12.5–16.5)
HEMOGLOBIN: 5.6 G/DL (ref 12.5–16.5)
HEMOGLOBIN: 5.7 G/DL (ref 12.5–16.5)
HEMOGLOBIN: 6.5 G/DL (ref 12.5–16.5)
HEMOGLOBIN: 6.8 G/DL (ref 12.5–16.5)
HEMOGLOBIN: 7 G/DL (ref 12.5–16.5)
HEMOGLOBIN: 7.1 G/DL (ref 12.5–16.5)
HEMOGLOBIN: 7.1 G/DL (ref 12.5–16.5)
HEMOGLOBIN: 7.2 G/DL (ref 12.5–16.5)
HEMOGLOBIN: 7.2 G/DL (ref 12.5–16.5)
HEMOGLOBIN: 7.3 G/DL (ref 12.5–16.5)
HEMOGLOBIN: 7.3 G/DL (ref 12.5–16.5)
HEMOGLOBIN: 7.4 G/DL (ref 12.5–16.5)
HEMOGLOBIN: 7.5 G/DL (ref 12.5–16.5)
HEMOGLOBIN: 7.6 G/DL (ref 12.5–16.5)
HEMOGLOBIN: 7.7 G/DL (ref 12.5–16.5)
HEMOGLOBIN: 7.8 G/DL (ref 12.5–16.5)
HEMOGLOBIN: 7.9 G/DL (ref 12.5–16.5)
HEMOGLOBIN: 8 G/DL (ref 12.5–16.5)
HEMOGLOBIN: 8.1 G/DL (ref 12.5–16.5)
HEMOGLOBIN: 8.2 G/DL (ref 12.5–16.5)
HEMOGLOBIN: 8.2 G/DL (ref 12.5–16.5)
HEMOGLOBIN: 8.3 G/DL (ref 12.5–16.5)
HEMOGLOBIN: 8.5 G/DL (ref 12.5–16.5)
HEMOGLOBIN: 8.6 G/DL (ref 12.5–16.5)
HEMOGLOBIN: 9.8 G/DL (ref 12.5–16.5)
HEPARIN PF4 ANTIBODY: 0.06 OD
HHB: 10.5 % (ref 0–5)
HHB: 12 % (ref 0–5)
HHB: 13.1 % (ref 0–5)
HHB: 3.2 % (ref 0–5)
HHB: 3.8 % (ref 0–5)
HHB: 3.9 % (ref 0–5)
HHB: 4 % (ref 0–5)
HHB: 4 % (ref 0–5)
HHB: 4.1 % (ref 0–5)
HHB: 4.1 % (ref 0–5)
HHB: 4.4 % (ref 0–5)
HHB: 4.7 % (ref 0–5)
HHB: 5.2 % (ref 0–5)
HHB: 5.3 % (ref 0–5)
HHB: 5.5 % (ref 0–5)
HHB: 5.5 % (ref 0–5)
HHB: 5.8 % (ref 0–5)
HHB: 6 % (ref 0–5)
HHB: 6.1 % (ref 0–5)
HHB: 6.3 % (ref 0–5)
HHB: 6.4 % (ref 0–5)
HHB: 6.5 % (ref 0–5)
HHB: 6.6 % (ref 0–5)
HHB: 6.8 % (ref 0–5)
HHB: 7.5 % (ref 0–5)
HHB: 7.8 % (ref 0–5)
HHB: 7.8 % (ref 0–5)
HHB: 7.9 % (ref 0–5)
HHB: 8 % (ref 0–5)
HHB: 8 % (ref 0–5)
HHB: 9 % (ref 0–5)
HHB: 9.1 % (ref 0–5)
HHB: 9.2 % (ref 0–5)
HUMAN METAPNEUMOVIRUS BY PCR: NOT DETECTED
HUMAN RHINOVIRUS/ENTEROVIRUS BY PCR: NOT DETECTED
HYDROCODONE, URINE: <20 NG/ML
HYDROMORPHONE, URINE: <20 NG/ML
HYPOCHROMIA: ABNORMAL
IMMATURE GRANULOCYTES #: 0.04 E9/L
IMMATURE GRANULOCYTES #: 0.29 E9/L
IMMATURE GRANULOCYTES #: 0.35 E9/L
IMMATURE GRANULOCYTES #: 0.57 E9/L
IMMATURE GRANULOCYTES %: 0.4 % (ref 0–5)
IMMATURE GRANULOCYTES %: 4 % (ref 0–5)
IMMATURE GRANULOCYTES %: 6.1 % (ref 0–5)
IMMATURE GRANULOCYTES %: 6.3 % (ref 0–5)
INFLUENZA A BY PCR: NOT DETECTED
INFLUENZA B BY PCR: NOT DETECTED
INR BLD: 1
INR BLD: 1.1
INR BLD: 1.2
INR BLD: 1.2
INR BLD: 1.3
INR BLD: 1.3
IRON SATURATION: 54 % (ref 20–55)
IRON: 89 MCG/DL (ref 59–158)
KETONES, URINE: NEGATIVE MG/DL
L. PNEUMOPHILA SEROGP 1 UR AG: NORMAL
LAB: ABNORMAL
LACTIC ACID: 1.7 MMOL/L (ref 0.5–2.2)
LACTIC ACID: 2.5 MMOL/L (ref 0.5–2.2)
LDL CHOLESTEROL CALCULATED: 41 MG/DL (ref 0–99)
LDL CHOLESTEROL CALCULATED: 57 MG/DL (ref 0–99)
LEUKOCYTE ESTERASE, URINE: NEGATIVE
LORAZEPAM, URINE: <20 NG/ML
LYMPHOCYTES ABSOLUTE: 0.26 E9/L (ref 1.5–4)
LYMPHOCYTES ABSOLUTE: 0.27 E9/L (ref 1.5–4)
LYMPHOCYTES ABSOLUTE: 0.43 E9/L (ref 1.5–4)
LYMPHOCYTES ABSOLUTE: 0.45 E9/L (ref 1.5–4)
LYMPHOCYTES ABSOLUTE: 0.55 E9/L (ref 1.5–4)
LYMPHOCYTES ABSOLUTE: 0.55 E9/L (ref 1.5–4)
LYMPHOCYTES ABSOLUTE: 0.57 E9/L (ref 1.5–4)
LYMPHOCYTES ABSOLUTE: 0.61 E9/L (ref 1.5–4)
LYMPHOCYTES ABSOLUTE: 0.62 E9/L (ref 1.5–4)
LYMPHOCYTES ABSOLUTE: 0.67 E9/L (ref 1.5–4)
LYMPHOCYTES ABSOLUTE: 0.74 E9/L (ref 1.5–4)
LYMPHOCYTES ABSOLUTE: 0.74 E9/L (ref 1.5–4)
LYMPHOCYTES ABSOLUTE: 0.81 E9/L (ref 1.5–4)
LYMPHOCYTES ABSOLUTE: 0.83 E9/L (ref 1.5–4)
LYMPHOCYTES ABSOLUTE: 0.98 E9/L (ref 1.5–4)
LYMPHOCYTES ABSOLUTE: 1.01 E9/L (ref 1.5–4)
LYMPHOCYTES ABSOLUTE: 1.01 E9/L (ref 1.5–4)
LYMPHOCYTES ABSOLUTE: 1.06 E9/L (ref 1.5–4)
LYMPHOCYTES ABSOLUTE: 1.1 E9/L (ref 1.5–4)
LYMPHOCYTES ABSOLUTE: 1.12 E9/L (ref 1.5–4)
LYMPHOCYTES ABSOLUTE: 1.12 E9/L (ref 1.5–4)
LYMPHOCYTES ABSOLUTE: 1.23 E9/L (ref 1.5–4)
LYMPHOCYTES ABSOLUTE: 1.23 E9/L (ref 1.5–4)
LYMPHOCYTES ABSOLUTE: 1.45 E9/L (ref 1.5–4)
LYMPHOCYTES ABSOLUTE: 1.49 E9/L (ref 1.5–4)
LYMPHOCYTES ABSOLUTE: 1.74 E9/L (ref 1.5–4)
LYMPHOCYTES ABSOLUTE: 1.84 E9/L (ref 1.5–4)
LYMPHOCYTES ABSOLUTE: 1.86 E9/L (ref 1.5–4)
LYMPHOCYTES ABSOLUTE: 1.93 E9/L (ref 1.5–4)
LYMPHOCYTES RELATIVE PERCENT: 10 % (ref 20–42)
LYMPHOCYTES RELATIVE PERCENT: 10.3 % (ref 20–42)
LYMPHOCYTES RELATIVE PERCENT: 10.8 % (ref 20–42)
LYMPHOCYTES RELATIVE PERCENT: 10.8 % (ref 20–42)
LYMPHOCYTES RELATIVE PERCENT: 11 % (ref 20–42)
LYMPHOCYTES RELATIVE PERCENT: 11 % (ref 20–42)
LYMPHOCYTES RELATIVE PERCENT: 12 % (ref 20–42)
LYMPHOCYTES RELATIVE PERCENT: 13 % (ref 20–42)
LYMPHOCYTES RELATIVE PERCENT: 13.6 % (ref 20–42)
LYMPHOCYTES RELATIVE PERCENT: 14.7 % (ref 20–42)
LYMPHOCYTES RELATIVE PERCENT: 15 % (ref 20–42)
LYMPHOCYTES RELATIVE PERCENT: 15.4 % (ref 20–42)
LYMPHOCYTES RELATIVE PERCENT: 15.7 % (ref 20–42)
LYMPHOCYTES RELATIVE PERCENT: 15.9 % (ref 20–42)
LYMPHOCYTES RELATIVE PERCENT: 16.1 % (ref 20–42)
LYMPHOCYTES RELATIVE PERCENT: 16.9 % (ref 20–42)
LYMPHOCYTES RELATIVE PERCENT: 17 % (ref 20–42)
LYMPHOCYTES RELATIVE PERCENT: 17 % (ref 20–42)
LYMPHOCYTES RELATIVE PERCENT: 17.7 % (ref 20–42)
LYMPHOCYTES RELATIVE PERCENT: 18 % (ref 20–42)
LYMPHOCYTES RELATIVE PERCENT: 18.4 % (ref 20–42)
LYMPHOCYTES RELATIVE PERCENT: 23 % (ref 20–42)
LYMPHOCYTES RELATIVE PERCENT: 5.2 % (ref 20–42)
LYMPHOCYTES RELATIVE PERCENT: 5.4 % (ref 20–42)
LYMPHOCYTES RELATIVE PERCENT: 6.1 % (ref 20–42)
LYMPHOCYTES RELATIVE PERCENT: 7 % (ref 20–42)
LYMPHOCYTES RELATIVE PERCENT: 7.1 % (ref 20–42)
LYMPHOCYTES RELATIVE PERCENT: 7.8 % (ref 20–42)
LYMPHOCYTES RELATIVE PERCENT: 9.6 % (ref 20–42)
Lab: ABNORMAL
Lab: NORMAL
MAGNESIUM: 1.7 MG/DL (ref 1.6–2.6)
MAGNESIUM: 1.8 MG/DL (ref 1.6–2.6)
MAGNESIUM: 1.8 MG/DL (ref 1.6–2.6)
MAGNESIUM: 1.9 MG/DL (ref 1.6–2.6)
MAGNESIUM: 1.9 MG/DL (ref 1.6–2.6)
MAGNESIUM: 2.1 MG/DL (ref 1.6–2.6)
MAGNESIUM: 2.2 MG/DL (ref 1.6–2.6)
MAGNESIUM: 2.2 MG/DL (ref 1.6–2.6)
MAGNESIUM: 2.3 MG/DL (ref 1.6–2.6)
MAGNESIUM: 2.4 MG/DL (ref 1.6–2.6)
MAGNESIUM: 2.4 MG/DL (ref 1.6–2.6)
MAGNESIUM: 2.6 MG/DL (ref 1.6–2.6)
MAGNESIUM: 2.6 MG/DL (ref 1.6–2.6)
MAGNESIUM: 2.7 MG/DL (ref 1.6–2.6)
MAGNESIUM: 2.8 MG/DL (ref 1.6–2.6)
MAGNESIUM: 2.9 MG/DL (ref 1.6–2.6)
MAGNESIUM: 3 MG/DL (ref 1.6–2.6)
MAGNESIUM: 3 MG/DL (ref 1.6–2.6)
MAGNESIUM: 3.1 MG/DL (ref 1.6–2.6)
MCH RBC QN AUTO: 26.4 PG (ref 26–35)
MCH RBC QN AUTO: 26.5 PG (ref 26–35)
MCH RBC QN AUTO: 26.5 PG (ref 26–35)
MCH RBC QN AUTO: 26.6 PG (ref 26–35)
MCH RBC QN AUTO: 26.6 PG (ref 26–35)
MCH RBC QN AUTO: 26.7 PG (ref 26–35)
MCH RBC QN AUTO: 26.8 PG (ref 26–35)
MCH RBC QN AUTO: 26.8 PG (ref 26–35)
MCH RBC QN AUTO: 27 PG (ref 26–35)
MCH RBC QN AUTO: 27 PG (ref 26–35)
MCH RBC QN AUTO: 27.1 PG (ref 26–35)
MCH RBC QN AUTO: 27.2 PG (ref 26–35)
MCH RBC QN AUTO: 27.4 PG (ref 26–35)
MCH RBC QN AUTO: 27.4 PG (ref 26–35)
MCH RBC QN AUTO: 27.5 PG (ref 26–35)
MCH RBC QN AUTO: 27.5 PG (ref 26–35)
MCH RBC QN AUTO: 27.7 PG (ref 26–35)
MCH RBC QN AUTO: 27.9 PG (ref 26–35)
MCH RBC QN AUTO: 27.9 PG (ref 26–35)
MCH RBC QN AUTO: 28 PG (ref 26–35)
MCH RBC QN AUTO: 28.1 PG (ref 26–35)
MCH RBC QN AUTO: 28.1 PG (ref 26–35)
MCH RBC QN AUTO: 28.2 PG (ref 26–35)
MCH RBC QN AUTO: 28.3 PG (ref 26–35)
MCH RBC QN AUTO: 28.4 PG (ref 26–35)
MCH RBC QN AUTO: 28.5 PG (ref 26–35)
MCH RBC QN AUTO: 28.6 PG (ref 26–35)
MCH RBC QN AUTO: 28.7 PG (ref 26–35)
MCH RBC QN AUTO: 28.8 PG (ref 26–35)
MCH RBC QN AUTO: 29.2 PG (ref 26–35)
MCH RBC QN AUTO: 29.3 PG (ref 26–35)
MCH RBC QN AUTO: 29.4 PG (ref 26–35)
MCH RBC QN AUTO: 30.5 PG (ref 26–35)
MCHC RBC AUTO-ENTMCNC: 27.8 % (ref 32–34.5)
MCHC RBC AUTO-ENTMCNC: 27.9 % (ref 32–34.5)
MCHC RBC AUTO-ENTMCNC: 28 % (ref 32–34.5)
MCHC RBC AUTO-ENTMCNC: 28.2 % (ref 32–34.5)
MCHC RBC AUTO-ENTMCNC: 28.3 % (ref 32–34.5)
MCHC RBC AUTO-ENTMCNC: 28.4 % (ref 32–34.5)
MCHC RBC AUTO-ENTMCNC: 28.7 % (ref 32–34.5)
MCHC RBC AUTO-ENTMCNC: 28.8 % (ref 32–34.5)
MCHC RBC AUTO-ENTMCNC: 29 % (ref 32–34.5)
MCHC RBC AUTO-ENTMCNC: 29.2 % (ref 32–34.5)
MCHC RBC AUTO-ENTMCNC: 29.5 % (ref 32–34.5)
MCHC RBC AUTO-ENTMCNC: 29.5 % (ref 32–34.5)
MCHC RBC AUTO-ENTMCNC: 29.6 % (ref 32–34.5)
MCHC RBC AUTO-ENTMCNC: 29.7 % (ref 32–34.5)
MCHC RBC AUTO-ENTMCNC: 29.8 % (ref 32–34.5)
MCHC RBC AUTO-ENTMCNC: 29.9 % (ref 32–34.5)
MCHC RBC AUTO-ENTMCNC: 30 % (ref 32–34.5)
MCHC RBC AUTO-ENTMCNC: 30.1 % (ref 32–34.5)
MCHC RBC AUTO-ENTMCNC: 30.1 % (ref 32–34.5)
MCHC RBC AUTO-ENTMCNC: 30.2 % (ref 32–34.5)
MCHC RBC AUTO-ENTMCNC: 30.3 % (ref 32–34.5)
MCHC RBC AUTO-ENTMCNC: 30.4 % (ref 32–34.5)
MCHC RBC AUTO-ENTMCNC: 30.4 % (ref 32–34.5)
MCHC RBC AUTO-ENTMCNC: 30.5 % (ref 32–34.5)
MCHC RBC AUTO-ENTMCNC: 30.6 % (ref 32–34.5)
MCHC RBC AUTO-ENTMCNC: 30.7 % (ref 32–34.5)
MCHC RBC AUTO-ENTMCNC: 31.1 % (ref 32–34.5)
MCHC RBC AUTO-ENTMCNC: 31.2 % (ref 32–34.5)
MCHC RBC AUTO-ENTMCNC: 31.6 % (ref 32–34.5)
MCHC RBC AUTO-ENTMCNC: 31.7 % (ref 32–34.5)
MCHC RBC AUTO-ENTMCNC: 32 % (ref 32–34.5)
MCHC RBC AUTO-ENTMCNC: 32.2 % (ref 32–34.5)
MCHC RBC AUTO-ENTMCNC: 32.3 % (ref 32–34.5)
MCV RBC AUTO: 89.9 FL (ref 80–99.9)
MCV RBC AUTO: 90.1 FL (ref 80–99.9)
MCV RBC AUTO: 90.8 FL (ref 80–99.9)
MCV RBC AUTO: 90.9 FL (ref 80–99.9)
MCV RBC AUTO: 91 FL (ref 80–99.9)
MCV RBC AUTO: 91.2 FL (ref 80–99.9)
MCV RBC AUTO: 91.3 FL (ref 80–99.9)
MCV RBC AUTO: 91.5 FL (ref 80–99.9)
MCV RBC AUTO: 91.8 FL (ref 80–99.9)
MCV RBC AUTO: 92 FL (ref 80–99.9)
MCV RBC AUTO: 92.1 FL (ref 80–99.9)
MCV RBC AUTO: 92.2 FL (ref 80–99.9)
MCV RBC AUTO: 92.3 FL (ref 80–99.9)
MCV RBC AUTO: 92.4 FL (ref 80–99.9)
MCV RBC AUTO: 92.4 FL (ref 80–99.9)
MCV RBC AUTO: 92.5 FL (ref 80–99.9)
MCV RBC AUTO: 92.5 FL (ref 80–99.9)
MCV RBC AUTO: 92.6 FL (ref 80–99.9)
MCV RBC AUTO: 92.6 FL (ref 80–99.9)
MCV RBC AUTO: 93 FL (ref 80–99.9)
MCV RBC AUTO: 93 FL (ref 80–99.9)
MCV RBC AUTO: 93.2 FL (ref 80–99.9)
MCV RBC AUTO: 93.2 FL (ref 80–99.9)
MCV RBC AUTO: 93.4 FL (ref 80–99.9)
MCV RBC AUTO: 93.7 FL (ref 80–99.9)
MCV RBC AUTO: 93.7 FL (ref 80–99.9)
MCV RBC AUTO: 93.8 FL (ref 80–99.9)
MCV RBC AUTO: 93.9 FL (ref 80–99.9)
MCV RBC AUTO: 94.1 FL (ref 80–99.9)
MCV RBC AUTO: 94.2 FL (ref 80–99.9)
MCV RBC AUTO: 94.6 FL (ref 80–99.9)
MCV RBC AUTO: 94.7 FL (ref 80–99.9)
MCV RBC AUTO: 94.9 FL (ref 80–99.9)
MCV RBC AUTO: 95.1 FL (ref 80–99.9)
METAMYELOCYTES RELATIVE PERCENT: 0.9 % (ref 0–1)
METAMYELOCYTES RELATIVE PERCENT: 1 % (ref 0–1)
METAMYELOCYTES RELATIVE PERCENT: 1.7 % (ref 0–1)
METAMYELOCYTES RELATIVE PERCENT: 1.8 % (ref 0–1)
METAMYELOCYTES RELATIVE PERCENT: 1.9 % (ref 0–1)
METAMYELOCYTES RELATIVE PERCENT: 5 % (ref 0–1)
METER GLUCOSE: 118 MG/DL (ref 74–99)
METER GLUCOSE: 125 MG/DL (ref 74–99)
METER GLUCOSE: 127 MG/DL (ref 74–99)
METER GLUCOSE: 128 MG/DL (ref 74–99)
METER GLUCOSE: 129 MG/DL (ref 74–99)
METER GLUCOSE: 130 MG/DL (ref 74–99)
METER GLUCOSE: 131 MG/DL (ref 74–99)
METER GLUCOSE: 132 MG/DL (ref 74–99)
METER GLUCOSE: 133 MG/DL (ref 74–99)
METER GLUCOSE: 136 MG/DL (ref 74–99)
METER GLUCOSE: 139 MG/DL (ref 74–99)
METER GLUCOSE: 140 MG/DL (ref 74–99)
METER GLUCOSE: 141 MG/DL (ref 74–99)
METER GLUCOSE: 141 MG/DL (ref 74–99)
METER GLUCOSE: 143 MG/DL (ref 74–99)
METER GLUCOSE: 145 MG/DL (ref 74–99)
METER GLUCOSE: 145 MG/DL (ref 74–99)
METER GLUCOSE: 146 MG/DL (ref 74–99)
METER GLUCOSE: 150 MG/DL (ref 74–99)
METER GLUCOSE: 152 MG/DL (ref 74–99)
METER GLUCOSE: 152 MG/DL (ref 74–99)
METER GLUCOSE: 154 MG/DL (ref 74–99)
METER GLUCOSE: 155 MG/DL (ref 74–99)
METER GLUCOSE: 155 MG/DL (ref 74–99)
METER GLUCOSE: 157 MG/DL (ref 74–99)
METER GLUCOSE: 157 MG/DL (ref 74–99)
METER GLUCOSE: 158 MG/DL (ref 74–99)
METER GLUCOSE: 158 MG/DL (ref 74–99)
METER GLUCOSE: 159 MG/DL (ref 74–99)
METER GLUCOSE: 160 MG/DL (ref 74–99)
METER GLUCOSE: 160 MG/DL (ref 74–99)
METER GLUCOSE: 162 MG/DL (ref 74–99)
METER GLUCOSE: 163 MG/DL (ref 74–99)
METER GLUCOSE: 164 MG/DL (ref 74–99)
METER GLUCOSE: 165 MG/DL (ref 74–99)
METER GLUCOSE: 165 MG/DL (ref 74–99)
METER GLUCOSE: 166 MG/DL (ref 74–99)
METER GLUCOSE: 166 MG/DL (ref 74–99)
METER GLUCOSE: 168 MG/DL (ref 74–99)
METER GLUCOSE: 168 MG/DL (ref 74–99)
METER GLUCOSE: 169 MG/DL (ref 74–99)
METER GLUCOSE: 169 MG/DL (ref 74–99)
METER GLUCOSE: 170 MG/DL (ref 74–99)
METER GLUCOSE: 171 MG/DL (ref 74–99)
METER GLUCOSE: 171 MG/DL (ref 74–99)
METER GLUCOSE: 173 MG/DL (ref 74–99)
METER GLUCOSE: 175 MG/DL (ref 74–99)
METER GLUCOSE: 176 MG/DL (ref 74–99)
METER GLUCOSE: 178 MG/DL (ref 74–99)
METER GLUCOSE: 179 MG/DL (ref 74–99)
METER GLUCOSE: 179 MG/DL (ref 74–99)
METER GLUCOSE: 181 MG/DL (ref 74–99)
METER GLUCOSE: 183 MG/DL (ref 74–99)
METER GLUCOSE: 184 MG/DL (ref 74–99)
METER GLUCOSE: 185 MG/DL (ref 74–99)
METER GLUCOSE: 186 MG/DL (ref 74–99)
METER GLUCOSE: 189 MG/DL (ref 74–99)
METER GLUCOSE: 190 MG/DL (ref 74–99)
METER GLUCOSE: 190 MG/DL (ref 74–99)
METER GLUCOSE: 192 MG/DL (ref 74–99)
METER GLUCOSE: 192 MG/DL (ref 74–99)
METER GLUCOSE: 193 MG/DL (ref 74–99)
METER GLUCOSE: 194 MG/DL (ref 74–99)
METER GLUCOSE: 195 MG/DL (ref 74–99)
METER GLUCOSE: 199 MG/DL (ref 74–99)
METER GLUCOSE: 200 MG/DL (ref 74–99)
METER GLUCOSE: 202 MG/DL (ref 74–99)
METER GLUCOSE: 203 MG/DL (ref 74–99)
METER GLUCOSE: 204 MG/DL (ref 74–99)
METER GLUCOSE: 205 MG/DL (ref 74–99)
METER GLUCOSE: 208 MG/DL (ref 74–99)
METER GLUCOSE: 209 MG/DL (ref 74–99)
METER GLUCOSE: 211 MG/DL (ref 74–99)
METER GLUCOSE: 213 MG/DL (ref 74–99)
METER GLUCOSE: 213 MG/DL (ref 74–99)
METER GLUCOSE: 214 MG/DL (ref 74–99)
METER GLUCOSE: 215 MG/DL (ref 74–99)
METER GLUCOSE: 216 MG/DL (ref 74–99)
METER GLUCOSE: 218 MG/DL (ref 74–99)
METER GLUCOSE: 219 MG/DL (ref 74–99)
METER GLUCOSE: 219 MG/DL (ref 74–99)
METER GLUCOSE: 220 MG/DL (ref 74–99)
METER GLUCOSE: 221 MG/DL (ref 74–99)
METER GLUCOSE: 227 MG/DL (ref 74–99)
METER GLUCOSE: 231 MG/DL (ref 74–99)
METER GLUCOSE: 233 MG/DL (ref 74–99)
METER GLUCOSE: 240 MG/DL (ref 74–99)
METER GLUCOSE: 247 MG/DL (ref 74–99)
METER GLUCOSE: 247 MG/DL (ref 74–99)
METER GLUCOSE: 255 MG/DL (ref 74–99)
METER GLUCOSE: 256 MG/DL (ref 74–99)
METER GLUCOSE: 259 MG/DL (ref 74–99)
METER GLUCOSE: 261 MG/DL (ref 74–99)
METER GLUCOSE: 261 MG/DL (ref 74–99)
METER GLUCOSE: 268 MG/DL (ref 74–99)
METER GLUCOSE: 268 MG/DL (ref 74–99)
METER GLUCOSE: 276 MG/DL (ref 74–99)
METER GLUCOSE: 286 MG/DL (ref 74–99)
METER GLUCOSE: 290 MG/DL (ref 74–99)
METER GLUCOSE: 297 MG/DL (ref 74–99)
METER GLUCOSE: 302 MG/DL (ref 74–99)
METER GLUCOSE: 489 MG/DL (ref 74–99)
METER GLUCOSE: >500 MG/DL (ref 74–99)
METER GLUCOSE: ABNORMAL MG/DL (ref 74–99)
METHB: 0.1 % (ref 0–1.5)
METHB: 0.1 % (ref 0–1.5)
METHB: 0.2 % (ref 0–1.5)
METHB: 0.2 % (ref 0–1.5)
METHB: 0.3 % (ref 0–1.5)
METHB: 0.4 % (ref 0–1.5)
METHB: 0.4 % (ref 0–1.5)
METHB: 0.6 % (ref 0–1.5)
MICROALBUMIN/CREAT 24H UR: 30 MG/G{CREAT}
MICROALBUMIN/CREAT UR-RTO: ABNORMAL
MIDAZOLAM, URINE: <20 NG/ML
MODE: ABNORMAL
MODE: AC
MONOCYTES ABSOLUTE: 0.06 E9/L (ref 0.1–0.95)
MONOCYTES ABSOLUTE: 0.09 E9/L (ref 0.1–0.95)
MONOCYTES ABSOLUTE: 0.1 E9/L (ref 0.1–0.95)
MONOCYTES ABSOLUTE: 0.12 E9/L (ref 0.1–0.95)
MONOCYTES ABSOLUTE: 0.19 E9/L (ref 0.1–0.95)
MONOCYTES ABSOLUTE: 0.2 E9/L (ref 0.1–0.95)
MONOCYTES ABSOLUTE: 0.2 E9/L (ref 0.1–0.95)
MONOCYTES ABSOLUTE: 0.21 E9/L (ref 0.1–0.95)
MONOCYTES ABSOLUTE: 0.22 E9/L (ref 0.1–0.95)
MONOCYTES ABSOLUTE: 0.24 E9/L (ref 0.1–0.95)
MONOCYTES ABSOLUTE: 0.25 E9/L (ref 0.1–0.95)
MONOCYTES ABSOLUTE: 0.26 E9/L (ref 0.1–0.95)
MONOCYTES ABSOLUTE: 0.26 E9/L (ref 0.1–0.95)
MONOCYTES ABSOLUTE: 0.27 E9/L (ref 0.1–0.95)
MONOCYTES ABSOLUTE: 0.28 E9/L (ref 0.1–0.95)
MONOCYTES ABSOLUTE: 0.29 E9/L (ref 0.1–0.95)
MONOCYTES ABSOLUTE: 0.3 E9/L (ref 0.1–0.95)
MONOCYTES ABSOLUTE: 0.33 E9/L (ref 0.1–0.95)
MONOCYTES ABSOLUTE: 0.4 E9/L (ref 0.1–0.95)
MONOCYTES ABSOLUTE: 0.48 E9/L (ref 0.1–0.95)
MONOCYTES ABSOLUTE: 0.49 E9/L (ref 0.1–0.95)
MONOCYTES ABSOLUTE: 0.5 E9/L (ref 0.1–0.95)
MONOCYTES ABSOLUTE: 0.67 E9/L (ref 0.1–0.95)
MONOCYTES ABSOLUTE: 0.7 E9/L (ref 0.1–0.95)
MONOCYTES ABSOLUTE: 0.83 E9/L (ref 0.1–0.95)
MONOCYTES ABSOLUTE: 1.07 E9/L (ref 0.1–0.95)
MONOCYTES ABSOLUTE: 1.09 E9/L (ref 0.1–0.95)
MONOCYTES RELATIVE PERCENT: 0.9 % (ref 2–12)
MONOCYTES RELATIVE PERCENT: 1.7 % (ref 2–12)
MONOCYTES RELATIVE PERCENT: 1.8 % (ref 2–12)
MONOCYTES RELATIVE PERCENT: 1.9 % (ref 2–12)
MONOCYTES RELATIVE PERCENT: 10.4 % (ref 2–12)
MONOCYTES RELATIVE PERCENT: 13 % (ref 2–12)
MONOCYTES RELATIVE PERCENT: 2 % (ref 2–12)
MONOCYTES RELATIVE PERCENT: 2.6 % (ref 2–12)
MONOCYTES RELATIVE PERCENT: 2.7 % (ref 2–12)
MONOCYTES RELATIVE PERCENT: 2.7 % (ref 2–12)
MONOCYTES RELATIVE PERCENT: 3 % (ref 2–12)
MONOCYTES RELATIVE PERCENT: 3 % (ref 2–12)
MONOCYTES RELATIVE PERCENT: 3.5 % (ref 2–12)
MONOCYTES RELATIVE PERCENT: 3.9 % (ref 2–12)
MONOCYTES RELATIVE PERCENT: 4 % (ref 2–12)
MONOCYTES RELATIVE PERCENT: 4.3 % (ref 2–12)
MONOCYTES RELATIVE PERCENT: 4.4 % (ref 2–12)
MONOCYTES RELATIVE PERCENT: 4.9 % (ref 2–12)
MONOCYTES RELATIVE PERCENT: 5 % (ref 2–12)
MONOCYTES RELATIVE PERCENT: 5.3 % (ref 2–12)
MONOCYTES RELATIVE PERCENT: 5.4 % (ref 2–12)
MONOCYTES RELATIVE PERCENT: 5.5 % (ref 2–12)
MONOCYTES RELATIVE PERCENT: 6 % (ref 2–12)
MONOCYTES RELATIVE PERCENT: 6.4 % (ref 2–12)
MONOCYTES RELATIVE PERCENT: 7.5 % (ref 2–12)
MONOCYTES RELATIVE PERCENT: 7.5 % (ref 2–12)
MONOCYTES RELATIVE PERCENT: 8 % (ref 2–12)
MORPHINE URINE: <20 NG/ML
MRSA CULTURE ONLY: NORMAL
MUCUS: PRESENT /LPF
MYCOPLASMA PNEUMONIAE BY PCR: NOT DETECTED
MYELOCYTE PERCENT: 0.9 % (ref 0–0)
MYELOCYTE PERCENT: 1.8 % (ref 0–0)
MYELOCYTE PERCENT: 2 % (ref 0–0)
MYELOCYTE PERCENT: 2.7 % (ref 0–0)
MYELOCYTE PERCENT: 3 % (ref 0–0)
MYELOCYTE PERCENT: 5 % (ref 0–0)
NEUTROPHILS ABSOLUTE: 3.77 E9/L (ref 1.8–7.3)
NEUTROPHILS ABSOLUTE: 3.84 E9/L (ref 1.8–7.3)
NEUTROPHILS ABSOLUTE: 3.86 E9/L (ref 1.8–7.3)
NEUTROPHILS ABSOLUTE: 4.64 E9/L (ref 1.8–7.3)
NEUTROPHILS ABSOLUTE: 4.64 E9/L (ref 1.8–7.3)
NEUTROPHILS ABSOLUTE: 4.72 E9/L (ref 1.8–7.3)
NEUTROPHILS ABSOLUTE: 4.73 E9/L (ref 1.8–7.3)
NEUTROPHILS ABSOLUTE: 5 E9/L (ref 1.8–7.3)
NEUTROPHILS ABSOLUTE: 5.1 E9/L (ref 1.8–7.3)
NEUTROPHILS ABSOLUTE: 5.21 E9/L (ref 1.8–7.3)
NEUTROPHILS ABSOLUTE: 5.39 E9/L (ref 1.8–7.3)
NEUTROPHILS ABSOLUTE: 5.46 E9/L (ref 1.8–7.3)
NEUTROPHILS ABSOLUTE: 5.49 E9/L (ref 1.8–7.3)
NEUTROPHILS ABSOLUTE: 5.52 E9/L (ref 1.8–7.3)
NEUTROPHILS ABSOLUTE: 5.74 E9/L (ref 1.8–7.3)
NEUTROPHILS ABSOLUTE: 5.8 E9/L (ref 1.8–7.3)
NEUTROPHILS ABSOLUTE: 5.9 E9/L (ref 1.8–7.3)
NEUTROPHILS ABSOLUTE: 6.24 E9/L (ref 1.8–7.3)
NEUTROPHILS ABSOLUTE: 6.36 E9/L (ref 1.8–7.3)
NEUTROPHILS ABSOLUTE: 6.4 E9/L (ref 1.8–7.3)
NEUTROPHILS ABSOLUTE: 6.44 E9/L (ref 1.8–7.3)
NEUTROPHILS ABSOLUTE: 6.8 E9/L (ref 1.8–7.3)
NEUTROPHILS ABSOLUTE: 6.8 E9/L (ref 1.8–7.3)
NEUTROPHILS ABSOLUTE: 6.95 E9/L (ref 1.8–7.3)
NEUTROPHILS ABSOLUTE: 7.38 E9/L (ref 1.8–7.3)
NEUTROPHILS ABSOLUTE: 7.57 E9/L (ref 1.8–7.3)
NEUTROPHILS ABSOLUTE: 7.74 E9/L (ref 1.8–7.3)
NEUTROPHILS ABSOLUTE: 7.94 E9/L (ref 1.8–7.3)
NEUTROPHILS ABSOLUTE: 8.89 E9/L (ref 1.8–7.3)
NEUTROPHILS RELATIVE PERCENT: 69 % (ref 43–80)
NEUTROPHILS RELATIVE PERCENT: 69.1 % (ref 43–80)
NEUTROPHILS RELATIVE PERCENT: 69.5 % (ref 43–80)
NEUTROPHILS RELATIVE PERCENT: 71.3 % (ref 43–80)
NEUTROPHILS RELATIVE PERCENT: 72 % (ref 43–80)
NEUTROPHILS RELATIVE PERCENT: 72 % (ref 43–80)
NEUTROPHILS RELATIVE PERCENT: 74 % (ref 43–80)
NEUTROPHILS RELATIVE PERCENT: 75.7 % (ref 43–80)
NEUTROPHILS RELATIVE PERCENT: 76 % (ref 43–80)
NEUTROPHILS RELATIVE PERCENT: 76 % (ref 43–80)
NEUTROPHILS RELATIVE PERCENT: 76.2 % (ref 43–80)
NEUTROPHILS RELATIVE PERCENT: 76.3 % (ref 43–80)
NEUTROPHILS RELATIVE PERCENT: 78.2 % (ref 43–80)
NEUTROPHILS RELATIVE PERCENT: 78.2 % (ref 43–80)
NEUTROPHILS RELATIVE PERCENT: 78.8 % (ref 43–80)
NEUTROPHILS RELATIVE PERCENT: 80 % (ref 43–80)
NEUTROPHILS RELATIVE PERCENT: 80 % (ref 43–80)
NEUTROPHILS RELATIVE PERCENT: 82.6 % (ref 43–80)
NEUTROPHILS RELATIVE PERCENT: 83.8 % (ref 43–80)
NEUTROPHILS RELATIVE PERCENT: 84.9 % (ref 43–80)
NEUTROPHILS RELATIVE PERCENT: 85 % (ref 43–80)
NEUTROPHILS RELATIVE PERCENT: 85 % (ref 43–80)
NEUTROPHILS RELATIVE PERCENT: 86 % (ref 43–80)
NEUTROPHILS RELATIVE PERCENT: 86.5 % (ref 43–80)
NEUTROPHILS RELATIVE PERCENT: 86.6 % (ref 43–80)
NEUTROPHILS RELATIVE PERCENT: 88.7 % (ref 43–80)
NEUTROPHILS RELATIVE PERCENT: 89.6 % (ref 43–80)
NEUTROPHILS RELATIVE PERCENT: 90.4 % (ref 43–80)
NEUTROPHILS RELATIVE PERCENT: 91.3 % (ref 43–80)
NITRITE, URINE: NEGATIVE
NORDIAZEPAM, URINE: <20 NG/ML
NORHYDROCODONE, URINE: <20 NG/ML
NOROXYCODONE, URINE: 1244 NG/ML
NOROXYCODONE, URINE: 942 NG/ML
NOROXYCODONE, URINE: <20 NG/ML
NOROXYMORPHONE, URINE: 46 NG/ML
NOROXYMORPHONE, URINE: <20 NG/ML
NOROXYMORPHONE, URINE: NORMAL NG/ML
NUCLEATED RED BLOOD CELLS: 1 /100 WBC
NUCLEATED RED BLOOD CELLS: 1.7 /100 WBC
NUCLEATED RED BLOOD CELLS: 1.8 /100 WBC
NUCLEATED RED BLOOD CELLS: 10 /100 WBC
NUCLEATED RED BLOOD CELLS: 10.3 /100 WBC
NUCLEATED RED BLOOD CELLS: 10.4 /100 WBC
NUCLEATED RED BLOOD CELLS: 11.3 /100 WBC
NUCLEATED RED BLOOD CELLS: 13 /100 WBC
NUCLEATED RED BLOOD CELLS: 2.7 /100 WBC
NUCLEATED RED BLOOD CELLS: 3 /100 WBC
NUCLEATED RED BLOOD CELLS: 3.5 /100 WBC
NUCLEATED RED BLOOD CELLS: 4 /100 WBC
NUCLEATED RED BLOOD CELLS: 4.3 /100 WBC
NUCLEATED RED BLOOD CELLS: 4.5 /100 WBC
NUCLEATED RED BLOOD CELLS: 4.6 /100 WBC
NUCLEATED RED BLOOD CELLS: 5 /100 WBC
NUCLEATED RED BLOOD CELLS: 5.4 /100 WBC
NUCLEATED RED BLOOD CELLS: 6.3 /100 WBC
NUCLEATED RED BLOOD CELLS: 6.3 /100 WBC
NUCLEATED RED BLOOD CELLS: 7 /100 WBC
NUCLEATED RED BLOOD CELLS: 8.8 /100 WBC
NUCLEATED RED BLOOD CELLS: 9 /100 WBC
O2 CONTENT: 10.1 ML/DL
O2 CONTENT: 10.2 ML/DL
O2 CONTENT: 10.3 ML/DL
O2 CONTENT: 10.3 ML/DL
O2 CONTENT: 10.4 ML/DL
O2 CONTENT: 10.4 ML/DL
O2 CONTENT: 10.6 ML/DL
O2 CONTENT: 10.8 ML/DL
O2 CONTENT: 10.9 ML/DL
O2 CONTENT: 11 ML/DL
O2 CONTENT: 11.1 ML/DL
O2 CONTENT: 11.2 ML/DL
O2 CONTENT: 11.3 ML/DL
O2 CONTENT: 11.3 ML/DL
O2 CONTENT: 11.4 ML/DL
O2 CONTENT: 11.6 ML/DL
O2 CONTENT: 11.7 ML/DL
O2 CONTENT: 11.9 ML/DL
O2 CONTENT: 12 ML/DL
O2 CONTENT: 12 ML/DL
O2 CONTENT: 12.1 ML/DL
O2 CONTENT: 12.2 ML/DL
O2 CONTENT: 12.3 ML/DL
O2 CONTENT: 12.3 ML/DL
O2 CONTENT: 12.4 ML/DL
O2 CONTENT: 12.8 ML/DL
O2 CONTENT: 13.3 ML/DL
O2 CONTENT: 13.8 ML/DL
O2 CONTENT: 8.2 ML/DL
O2 SATURATION: 86.7 % (ref 92–98.5)
O2 SATURATION: 87.8 % (ref 92–98.5)
O2 SATURATION: 89.2 % (ref 92–98.5)
O2 SATURATION: 90.7 % (ref 92–98.5)
O2 SATURATION: 90.8 % (ref 92–98.5)
O2 SATURATION: 90.8 % (ref 92–98.5)
O2 SATURATION: 91.2 % (ref 92–98.5)
O2 SATURATION: 91.8 % (ref 92–98.5)
O2 SATURATION: 91.9 % (ref 92–98.5)
O2 SATURATION: 92 % (ref 92–98.5)
O2 SATURATION: 92.3 % (ref 92–98.5)
O2 SATURATION: 93.1 % (ref 92–98.5)
O2 SATURATION: 93.3 % (ref 92–98.5)
O2 SATURATION: 93.4 % (ref 92–98.5)
O2 SATURATION: 93.5 % (ref 92–98.5)
O2 SATURATION: 93.6 % (ref 92–98.5)
O2 SATURATION: 93.8 % (ref 92–98.5)
O2 SATURATION: 94 % (ref 92–98.5)
O2 SATURATION: 94.1 % (ref 92–98.5)
O2 SATURATION: 94.4 % (ref 92–98.5)
O2 SATURATION: 94.4 % (ref 92–98.5)
O2 SATURATION: 94.6 % (ref 92–98.5)
O2 SATURATION: 94.7 % (ref 92–98.5)
O2 SATURATION: 95.3 % (ref 92–98.5)
O2 SATURATION: 95.5 % (ref 92–98.5)
O2 SATURATION: 95.8 % (ref 92–98.5)
O2 SATURATION: 95.8 % (ref 92–98.5)
O2 SATURATION: 95.9 % (ref 92–98.5)
O2 SATURATION: 95.9 % (ref 92–98.5)
O2 SATURATION: 96 % (ref 92–98.5)
O2 SATURATION: 96.2 % (ref 92–98.5)
O2 SATURATION: 96.8 % (ref 92–98.5)
O2HB: 85.3 % (ref 94–97)
O2HB: 86.2 % (ref 94–97)
O2HB: 87.1 % (ref 94–97)
O2HB: 89.2 % (ref 94–97)
O2HB: 89.2 % (ref 94–97)
O2HB: 89.8 % (ref 94–97)
O2HB: 89.9 % (ref 94–97)
O2HB: 89.9 % (ref 94–97)
O2HB: 90 % (ref 94–97)
O2HB: 90.2 % (ref 94–97)
O2HB: 90.5 % (ref 94–97)
O2HB: 90.8 % (ref 94–97)
O2HB: 91.3 % (ref 94–97)
O2HB: 91.6 % (ref 94–97)
O2HB: 91.7 % (ref 94–97)
O2HB: 91.8 % (ref 94–97)
O2HB: 92.5 % (ref 94–97)
O2HB: 92.5 % (ref 94–97)
O2HB: 92.6 % (ref 94–97)
O2HB: 92.7 % (ref 94–97)
O2HB: 92.8 % (ref 94–97)
O2HB: 93.2 % (ref 94–97)
O2HB: 93.5 % (ref 94–97)
O2HB: 93.6 % (ref 94–97)
O2HB: 93.7 % (ref 94–97)
O2HB: 93.8 % (ref 94–97)
O2HB: 93.9 % (ref 94–97)
O2HB: 94 % (ref 94–97)
O2HB: 94.1 % (ref 94–97)
O2HB: 94.5 % (ref 94–97)
O2HB: 94.8 % (ref 94–97)
O2HB: 95 % (ref 94–97)
O2HB: 95.3 % (ref 94–97)
OPERATOR ID: 101
OPERATOR ID: 1394
OPERATOR ID: 1632
OPERATOR ID: 1687
OPERATOR ID: 1874
OPERATOR ID: 2325
OPERATOR ID: 2485
OPERATOR ID: 2577
OPERATOR ID: 2577
OPERATOR ID: 2593
OPERATOR ID: 2856
OPERATOR ID: 2856
OPERATOR ID: 2962
OPERATOR ID: 3114
OPERATOR ID: 3114
OPERATOR ID: 316
OPERATOR ID: 420
OPERATOR ID: 789
OPERATOR ID: 789
OPERATOR ID: 913
OPERATOR ID: ABNORMAL
ORGANISM: ABNORMAL
OVALOCYTES: ABNORMAL
OXAZEPAM, URINE: <20 NG/ML
OXYCODONE, URINE CONFIRMATION: 640 NG/ML
OXYCODONE, URINE CONFIRMATION: 707 NG/ML
OXYCODONE, URINE CONFIRMATION: <20 NG/ML
OXYMORPHONE, URINE: <20 NG/ML
PARAINFLUENZA VIRUS 1 BY PCR: NOT DETECTED
PARAINFLUENZA VIRUS 2 BY PCR: NOT DETECTED
PARAINFLUENZA VIRUS 3 BY PCR: NOT DETECTED
PARAINFLUENZA VIRUS 4 BY PCR: NOT DETECTED
PARATHYROID HORMONE INTACT: 16 PG/ML (ref 15–65)
PATHOLOGIST REVIEW: NORMAL
PATIENT TEMP: 37
PATIENT TEMP: 37 C
PCO2 (TEMP CORRECTED): 59.1 MMHG (ref 35–45)
PCO2: 37.1 MMHG (ref 35–45)
PCO2: 37.7 MMHG (ref 35–45)
PCO2: 38.4 MMHG (ref 35–45)
PCO2: 39.2 MMHG (ref 35–45)
PCO2: 41.7 MMHG (ref 35–45)
PCO2: 43.5 MMHG (ref 35–45)
PCO2: 44.1 MMHG (ref 35–45)
PCO2: 45.2 MMHG (ref 35–45)
PCO2: 45.7 MMHG (ref 35–45)
PCO2: 46.2 MMHG (ref 35–45)
PCO2: 46.4 MMHG (ref 35–45)
PCO2: 46.8 MMHG (ref 35–45)
PCO2: 47.3 MMHG (ref 35–45)
PCO2: 47.7 MMHG (ref 35–45)
PCO2: 47.8 MMHG (ref 35–45)
PCO2: 47.8 MMHG (ref 35–45)
PCO2: 47.9 MMHG (ref 35–45)
PCO2: 48.4 MMHG (ref 35–45)
PCO2: 48.9 MMHG (ref 35–45)
PCO2: 49.3 MMHG (ref 35–45)
PCO2: 49.6 MMHG (ref 35–45)
PCO2: 50 MMHG (ref 35–45)
PCO2: 50.1 MMHG (ref 35–45)
PCO2: 51 MMHG (ref 35–45)
PCO2: 51.5 MMHG (ref 35–45)
PCO2: 59.8 MMHG (ref 35–45)
PCO2: 66.8 MMHG (ref 35–45)
PCO2: 71 MMHG (ref 35–45)
PCO2: 81.7 MMHG (ref 35–45)
PCO2: 82.1 MMHG (ref 35–45)
PCO2: 86.5 MMHG (ref 35–45)
PDW BLD-RTO: 12.9 FL (ref 11.5–15)
PDW BLD-RTO: 13.4 FL (ref 11.5–15)
PDW BLD-RTO: 16.3 FL (ref 11.5–15)
PDW BLD-RTO: 16.4 FL (ref 11.5–15)
PDW BLD-RTO: 16.4 FL (ref 11.5–15)
PDW BLD-RTO: 16.5 FL (ref 11.5–15)
PDW BLD-RTO: 16.6 FL (ref 11.5–15)
PDW BLD-RTO: 16.7 FL (ref 11.5–15)
PDW BLD-RTO: 16.8 FL (ref 11.5–15)
PDW BLD-RTO: 16.9 FL (ref 11.5–15)
PDW BLD-RTO: 17 FL (ref 11.5–15)
PDW BLD-RTO: 17.1 FL (ref 11.5–15)
PDW BLD-RTO: 17.2 FL (ref 11.5–15)
PDW BLD-RTO: 17.2 FL (ref 11.5–15)
PDW BLD-RTO: 17.3 FL (ref 11.5–15)
PDW BLD-RTO: 17.4 FL (ref 11.5–15)
PDW BLD-RTO: 17.5 FL (ref 11.5–15)
PEEP/CPAP: 10 CMH2O
PEEP/CPAP: 12 CMH2O
PEEP/CPAP: 5 CMH2O
PEEP/CPAP: 8 CMH2O
PFO2: 0.77 MMHG/%
PFO2: 0.84 MMHG/%
PFO2: 0.87 MMHG/%
PFO2: 0.96 MMHG/%
PFO2: 0.97 MMHG/%
PFO2: 0.97 MMHG/%
PFO2: 1 MMHG/%
PFO2: 1.02 MMHG/%
PFO2: 1.06 MMHG/%
PFO2: 1.07 MMHG/%
PFO2: 1.13 MMHG/%
PFO2: 1.14 MMHG/%
PFO2: 1.14 MMHG/%
PFO2: 1.15 MMHG/%
PFO2: 1.15 MMHG/%
PFO2: 1.16 MMHG/%
PFO2: 1.19 MMHG/%
PFO2: 1.2 MMHG/%
PFO2: 1.2 MMHG/%
PFO2: 1.22 MMHG/%
PFO2: 1.27 MMHG/%
PFO2: 1.37 MMHG/%
PFO2: 1.4 MMHG/%
PFO2: 1.41 MMHG/%
PFO2: 1.42 MMHG/%
PFO2: 1.43 MMHG/%
PFO2: 1.44 MMHG/%
PFO2: 1.48 MMHG/%
PFO2: 1.52 MMHG/%
PFO2: 1.61 MMHG/%
PFO2: 1.96 MMHG/%
PH (TEMPERATURE CORRECTED): 7.39 (ref 7.35–7.45)
PH BLOOD GAS: 7.21 (ref 7.35–7.45)
PH BLOOD GAS: 7.21 (ref 7.35–7.45)
PH BLOOD GAS: 7.22 (ref 7.35–7.45)
PH BLOOD GAS: 7.25 (ref 7.35–7.45)
PH BLOOD GAS: 7.27 (ref 7.35–7.45)
PH BLOOD GAS: 7.31 (ref 7.35–7.45)
PH BLOOD GAS: 7.34 (ref 7.35–7.45)
PH BLOOD GAS: 7.34 (ref 7.35–7.45)
PH BLOOD GAS: 7.37 (ref 7.35–7.45)
PH BLOOD GAS: 7.38 (ref 7.35–7.45)
PH BLOOD GAS: 7.38 (ref 7.35–7.45)
PH BLOOD GAS: 7.39 (ref 7.35–7.45)
PH BLOOD GAS: 7.4 (ref 7.35–7.45)
PH BLOOD GAS: 7.42 (ref 7.35–7.45)
PH BLOOD GAS: 7.43 (ref 7.35–7.45)
PH BLOOD GAS: 7.44 (ref 7.35–7.45)
PH BLOOD GAS: 7.46 (ref 7.35–7.45)
PH BLOOD GAS: 7.46 (ref 7.35–7.45)
PH BLOOD GAS: 7.47 (ref 7.35–7.45)
PH BLOOD GAS: 7.48 (ref 7.35–7.45)
PH BLOOD GAS: 7.49 (ref 7.35–7.45)
PH BLOOD GAS: 7.49 (ref 7.35–7.45)
PH BLOOD GAS: 7.5 (ref 7.35–7.45)
PH BLOOD GAS: 7.51 (ref 7.35–7.45)
PH BLOOD GAS: 7.51 (ref 7.35–7.45)
PH UA: 5.5 (ref 5–9)
PHOSPHORUS: 1.9 MG/DL (ref 2.5–4.5)
PHOSPHORUS: 2.3 MG/DL (ref 2.5–4.5)
PHOSPHORUS: 2.4 MG/DL (ref 2.5–4.5)
PHOSPHORUS: 2.4 MG/DL (ref 2.5–4.5)
PHOSPHORUS: 2.5 MG/DL (ref 2.5–4.5)
PHOSPHORUS: 2.5 MG/DL (ref 2.5–4.5)
PHOSPHORUS: 2.8 MG/DL (ref 2.5–4.5)
PHOSPHORUS: 2.9 MG/DL (ref 2.5–4.5)
PHOSPHORUS: 3 MG/DL (ref 2.5–4.5)
PHOSPHORUS: 3 MG/DL (ref 2.5–4.5)
PHOSPHORUS: 3.1 MG/DL (ref 2.5–4.5)
PHOSPHORUS: 3.2 MG/DL (ref 2.5–4.5)
PHOSPHORUS: 3.3 MG/DL (ref 2.5–4.5)
PHOSPHORUS: 3.3 MG/DL (ref 2.5–4.5)
PHOSPHORUS: 3.4 MG/DL (ref 2.5–4.5)
PHOSPHORUS: 3.6 MG/DL (ref 2.5–4.5)
PHOSPHORUS: 3.6 MG/DL (ref 2.5–4.5)
PHOSPHORUS: 3.9 MG/DL (ref 2.5–4.5)
PHOSPHORUS: 4.1 MG/DL (ref 2.5–4.5)
PHOSPHORUS: 4.1 MG/DL (ref 2.5–4.5)
PLATELET # BLD: 173 E9/L (ref 130–450)
PLATELET # BLD: 19 E9/L (ref 130–450)
PLATELET # BLD: 19 E9/L (ref 130–450)
PLATELET # BLD: 195 E9/L (ref 130–450)
PLATELET # BLD: 20 E9/L (ref 130–450)
PLATELET # BLD: 201 E9/L (ref 130–450)
PLATELET # BLD: 21 E9/L (ref 130–450)
PLATELET # BLD: 22 E9/L (ref 130–450)
PLATELET # BLD: 23 E9/L (ref 130–450)
PLATELET # BLD: 24 E9/L (ref 130–450)
PLATELET # BLD: 25 E9/L (ref 130–450)
PLATELET # BLD: 26 E9/L (ref 130–450)
PLATELET # BLD: 27 E9/L (ref 130–450)
PLATELET # BLD: 28 E9/L (ref 130–450)
PLATELET # BLD: 29 E9/L (ref 130–450)
PLATELET # BLD: 30 E9/L (ref 130–450)
PLATELET # BLD: 30 E9/L (ref 130–450)
PLATELET # BLD: 31 E9/L (ref 130–450)
PLATELET # BLD: 32 E9/L (ref 130–450)
PLATELET # BLD: 33 E9/L (ref 130–450)
PLATELET # BLD: 33 E9/L (ref 130–450)
PLATELET # BLD: 34 E9/L (ref 130–450)
PLATELET # BLD: 34 E9/L (ref 130–450)
PLATELET # BLD: 36 E9/L (ref 130–450)
PLATELET # BLD: 38 E9/L (ref 130–450)
PLATELET # BLD: 39 E9/L (ref 130–450)
PLATELET # BLD: 48 E9/L (ref 130–450)
PLATELET # BLD: 49 E9/L (ref 130–450)
PLATELET # BLD: 57 E9/L (ref 130–450)
PLATELET # BLD: 60 E9/L (ref 130–450)
PLATELET # BLD: 68 E9/L (ref 130–450)
PLATELET # BLD: 80 E9/L (ref 130–450)
PLATELET CONFIRMATION: NORMAL
PMV BLD AUTO: 10 FL (ref 7–12)
PMV BLD AUTO: 10.1 FL (ref 7–12)
PMV BLD AUTO: 10.1 FL (ref 7–12)
PMV BLD AUTO: 10.2 FL (ref 7–12)
PMV BLD AUTO: 10.2 FL (ref 7–12)
PMV BLD AUTO: 10.3 FL (ref 7–12)
PMV BLD AUTO: 10.3 FL (ref 7–12)
PMV BLD AUTO: 10.4 FL (ref 7–12)
PMV BLD AUTO: 10.5 FL (ref 7–12)
PMV BLD AUTO: 10.7 FL (ref 7–12)
PMV BLD AUTO: 10.8 FL (ref 7–12)
PMV BLD AUTO: 10.9 FL (ref 7–12)
PMV BLD AUTO: 11 FL (ref 7–12)
PMV BLD AUTO: 11.1 FL (ref 7–12)
PMV BLD AUTO: 11.2 FL (ref 7–12)
PMV BLD AUTO: 11.3 FL (ref 7–12)
PMV BLD AUTO: 11.5 FL (ref 7–12)
PMV BLD AUTO: 11.6 FL (ref 7–12)
PMV BLD AUTO: 11.8 FL (ref 7–12)
PMV BLD AUTO: 11.8 FL (ref 7–12)
PMV BLD AUTO: 11.9 FL (ref 7–12)
PMV BLD AUTO: 12 FL (ref 7–12)
PMV BLD AUTO: 12 FL (ref 7–12)
PMV BLD AUTO: 12.1 FL (ref 7–12)
PMV BLD AUTO: 12.1 FL (ref 7–12)
PMV BLD AUTO: 12.4 FL (ref 7–12)
PMV BLD AUTO: 12.7 FL (ref 7–12)
PMV BLD AUTO: 12.9 FL (ref 7–12)
PMV BLD AUTO: 12.9 FL (ref 7–12)
PMV BLD AUTO: 13.1 FL (ref 7–12)
PMV BLD AUTO: 13.3 FL (ref 7–12)
PMV BLD AUTO: 9.3 FL (ref 7–12)
PMV BLD AUTO: 9.6 FL (ref 7–12)
PMV BLD AUTO: 9.9 FL (ref 7–12)
PMV BLD AUTO: ABNORMAL FL (ref 7–12)
PO2 (TEMP CORRECTED): 64.2 MMHG (ref 60–80)
PO2: 55.1 MMHG (ref 75–100)
PO2: 57.9 MMHG (ref 75–100)
PO2: 60.7 MMHG (ref 75–100)
PO2: 62.3 MMHG (ref 75–100)
PO2: 63.7 MMHG (ref 75–100)
PO2: 66.1 MMHG (ref 75–100)
PO2: 68.4 MMHG (ref 75–100)
PO2: 68.5 MMHG (ref 75–100)
PO2: 68.8 MMHG (ref 75–100)
PO2: 68.8 MMHG (ref 75–100)
PO2: 69.4 MMHG (ref 75–100)
PO2: 69.7 MMHG (ref 75–100)
PO2: 69.9 MMHG (ref 75–100)
PO2: 70 MMHG (ref 75–100)
PO2: 71.7 MMHG (ref 75–100)
PO2: 72 MMHG (ref 75–100)
PO2: 75.6 MMHG (ref 75–100)
PO2: 76.6 MMHG (ref 75–100)
PO2: 77 MMHG (ref 75–100)
PO2: 77.1 MMHG (ref 75–100)
PO2: 77.9 MMHG (ref 75–100)
PO2: 81.3 MMHG (ref 75–100)
PO2: 81.4 MMHG (ref 75–100)
PO2: 83.6 MMHG (ref 75–100)
PO2: 83.8 MMHG (ref 75–100)
PO2: 84.3 MMHG (ref 75–100)
PO2: 84.3 MMHG (ref 75–100)
PO2: 86.2 MMHG (ref 75–100)
PO2: 88.2 MMHG (ref 75–100)
PO2: 96.6 MMHG (ref 75–100)
PO2: 97.1 MMHG (ref 75–100)
POIKILOCYTES: ABNORMAL
POLYCHROMASIA: ABNORMAL
POSITIVE END EXP PRESS: 8 CMH2O
POTASSIUM REFLEX MAGNESIUM: 3.6 MMOL/L (ref 3.5–5)
POTASSIUM REFLEX MAGNESIUM: 4.4 MMOL/L (ref 3.5–5)
POTASSIUM REFLEX MAGNESIUM: 5.1 MMOL/L (ref 3.5–5)
POTASSIUM SERPL-SCNC: 2.9 MMOL/L (ref 3.5–5)
POTASSIUM SERPL-SCNC: 2.9 MMOL/L (ref 3.5–5)
POTASSIUM SERPL-SCNC: 3 MMOL/L (ref 3.5–5)
POTASSIUM SERPL-SCNC: 3.1 MMOL/L (ref 3.5–5)
POTASSIUM SERPL-SCNC: 3.2 MMOL/L (ref 3.5–5)
POTASSIUM SERPL-SCNC: 3.3 MMOL/L (ref 3.5–5)
POTASSIUM SERPL-SCNC: 3.4 MMOL/L (ref 3.5–5)
POTASSIUM SERPL-SCNC: 3.4 MMOL/L (ref 3.5–5)
POTASSIUM SERPL-SCNC: 3.5 MMOL/L (ref 3.5–5)
POTASSIUM SERPL-SCNC: 3.6 MMOL/L (ref 3.5–5)
POTASSIUM SERPL-SCNC: 3.8 MMOL/L (ref 3.5–5)
POTASSIUM SERPL-SCNC: 3.9 MMOL/L (ref 3.5–5)
POTASSIUM SERPL-SCNC: 3.9 MMOL/L (ref 3.5–5)
POTASSIUM SERPL-SCNC: 4.1 MMOL/L (ref 3.5–5)
POTASSIUM SERPL-SCNC: 4.3 MMOL/L (ref 3.5–5)
POTASSIUM SERPL-SCNC: 4.4 MMOL/L (ref 3.5–5)
POTASSIUM SERPL-SCNC: 4.5 MMOL/L (ref 3.5–5)
POTASSIUM SERPL-SCNC: 4.6 MMOL/L (ref 3.5–5)
POTASSIUM SERPL-SCNC: 4.6 MMOL/L (ref 3.5–5)
POTASSIUM SERPL-SCNC: 4.7 MMOL/L (ref 3.5–5)
POTASSIUM SERPL-SCNC: 4.9 MMOL/L (ref 3.5–5)
POTASSIUM SERPL-SCNC: 5.1 MMOL/L (ref 3.5–5)
POTASSIUM SERPL-SCNC: 5.1 MMOL/L (ref 3.5–5)
POTASSIUM SERPL-SCNC: 5.2 MMOL/L (ref 3.5–5)
POTASSIUM SERPL-SCNC: 5.3 MMOL/L (ref 3.5–5)
POTASSIUM SERPL-SCNC: 5.5 MMOL/L (ref 3.5–5)
POTASSIUM SERPL-SCNC: 5.6 MMOL/L (ref 3.5–5)
POTASSIUM, UR: 46.1 MMOL/L
PRESSURE SUPPORT: 5 CMH2O
PRO-BNP: 1948 PG/ML (ref 0–125)
PRO-BNP: 2533 PG/ML (ref 0–125)
PROCALCITONIN: 0.23 NG/ML (ref 0–0.08)
PROCALCITONIN: 1.15 NG/ML (ref 0–0.08)
PROMYELOCYTES PERCENT: 0.9 % (ref 0–0)
PROTEIN PROTEIN: 16 MG/DL (ref 0–12)
PROTEIN UA: ABNORMAL MG/DL
PROTEIN/CREAT RATIO: 0.5
PROTEIN/CREAT RATIO: 0.5 (ref 0–0.2)
PROTHROMBIN TIME: 11 SEC (ref 9.3–12.4)
PROTHROMBIN TIME: 11.3 SEC (ref 9.3–12.4)
PROTHROMBIN TIME: 11.4 SEC (ref 9.3–12.4)
PROTHROMBIN TIME: 11.6 SEC (ref 9.3–12.4)
PROTHROMBIN TIME: 12.1 SEC (ref 9.3–12.4)
PROTHROMBIN TIME: 12.1 SEC (ref 9.3–12.4)
PROTHROMBIN TIME: 12.5 SEC (ref 9.3–12.4)
PROTHROMBIN TIME: 12.6 SEC (ref 9.3–12.4)
PROTHROMBIN TIME: 13.5 SEC (ref 9.3–12.4)
PROTHROMBIN TIME: 13.5 SEC (ref 9.3–12.4)
PROTHROMBIN TIME: 14.2 SEC (ref 9.3–12.4)
PROTHROMBIN TIME: 15 SEC (ref 9.3–12.4)
PS: 10 CMH20
PS: 10 CMH20
PS: 8 CMH20
RBC # BLD: 2.06 E12/L (ref 3.8–5.8)
RBC # BLD: 2.06 E12/L (ref 3.8–5.8)
RBC # BLD: 2.42 E12/L (ref 3.8–5.8)
RBC # BLD: 2.52 E12/L (ref 3.8–5.8)
RBC # BLD: 2.54 E12/L (ref 3.8–5.8)
RBC # BLD: 2.59 E12/L (ref 3.8–5.8)
RBC # BLD: 2.62 E12/L (ref 3.8–5.8)
RBC # BLD: 2.63 E12/L (ref 3.8–5.8)
RBC # BLD: 2.65 E12/L (ref 3.8–5.8)
RBC # BLD: 2.69 E12/L (ref 3.8–5.8)
RBC # BLD: 2.69 E12/L (ref 3.8–5.8)
RBC # BLD: 2.71 E12/L (ref 3.8–5.8)
RBC # BLD: 2.72 E12/L (ref 3.8–5.8)
RBC # BLD: 2.72 E12/L (ref 3.8–5.8)
RBC # BLD: 2.73 E12/L (ref 3.8–5.8)
RBC # BLD: 2.74 E12/L (ref 3.8–5.8)
RBC # BLD: 2.74 E12/L (ref 3.8–5.8)
RBC # BLD: 2.76 E12/L (ref 3.8–5.8)
RBC # BLD: 2.77 E12/L (ref 3.8–5.8)
RBC # BLD: 2.78 E12/L (ref 3.8–5.8)
RBC # BLD: 2.79 E12/L (ref 3.8–5.8)
RBC # BLD: 2.8 E12/L (ref 3.8–5.8)
RBC # BLD: 2.82 E12/L (ref 3.8–5.8)
RBC # BLD: 2.83 E12/L (ref 3.8–5.8)
RBC # BLD: 2.84 E12/L (ref 3.8–5.8)
RBC # BLD: 2.87 E12/L (ref 3.8–5.8)
RBC # BLD: 2.87 E12/L (ref 3.8–5.8)
RBC # BLD: 2.88 E12/L (ref 3.8–5.8)
RBC # BLD: 2.89 E12/L (ref 3.8–5.8)
RBC # BLD: 2.89 E12/L (ref 3.8–5.8)
RBC # BLD: 2.92 E12/L (ref 3.8–5.8)
RBC # BLD: 2.93 E12/L (ref 3.8–5.8)
RBC # BLD: 2.94 E12/L (ref 3.8–5.8)
RBC # BLD: 2.94 E12/L (ref 3.8–5.8)
RBC # BLD: 2.96 E12/L (ref 3.8–5.8)
RBC # BLD: 2.96 E12/L (ref 3.8–5.8)
RBC # BLD: 3 E12/L (ref 3.8–5.8)
RBC # BLD: 3.02 E12/L (ref 3.8–5.8)
RBC # BLD: 3.02 E12/L (ref 3.8–5.8)
RBC # BLD: 3.05 E12/L (ref 3.8–5.8)
RBC # BLD: 3.2 E12/L (ref 3.8–5.8)
RBC # BLD: 3.35 E12/L (ref 3.8–5.8)
RBC # BLD: 3.54 E12/L (ref 3.8–5.8)
RBC # BLD: 4.6 E12/L (ref 3.8–5.8)
RBC # BLD: 5.55 E12/L (ref 3.8–5.8)
RBC UA: >20 /HPF (ref 0–2)
REPORT: NORMAL
RESPIRATORY SYNCYTIAL VIRUS BY PCR: NOT DETECTED
RI(T): 190 %
RI(T): 269 %
RI(T): 275 %
RI(T): 3.01
RI(T): 3.08
RI(T): 3.21
RI(T): 3.32
RI(T): 3.37
RI(T): 3.78
RI(T): 3.82
RI(T): 3.98
RI(T): 318 %
RI(T): 326 %
RI(T): 359 %
RI(T): 372 %
RI(T): 4.22
RI(T): 4.67
RI(T): 4.69
RI(T): 404 %
RI(T): 404 %
RI(T): 412 %
RI(T): 417 %
RI(T): 422 %
RI(T): 424 %
RI(T): 428 %
RI(T): 465 %
RI(T): 522 %
RI(T): 524 %
RI(T): 525 %
RI(T): 555 %
RI(T): 568 %
RI(T): 619 %
RI(T): 727 %
RR MECHANICAL: 12 B/MIN
RR MECHANICAL: 16 B/MIN
RR MECHANICAL: 20 B/MIN
SARS-COV-2, NAAT: NOT DETECTED
SARS-COV-2, PCR: NOT DETECTED
SARS-COV-2: NOT DETECTED
SARS-COV-2: NOT DETECTED
SCHISTOCYTES: ABNORMAL
SEDIMENTATION RATE, ERYTHROCYTE: 63 MM/HR (ref 0–15)
SEDIMENTATION RATE, ERYTHROCYTE: 82 MM/HR (ref 0–15)
SMEAR, RESPIRATORY: ABNORMAL
SMEAR, RESPIRATORY: NORMAL
SODIUM BLD-SCNC: 131 MMOL/L (ref 132–146)
SODIUM BLD-SCNC: 133 MMOL/L (ref 132–146)
SODIUM BLD-SCNC: 134 MMOL/L (ref 132–146)
SODIUM BLD-SCNC: 134 MMOL/L (ref 132–146)
SODIUM BLD-SCNC: 135 MMOL/L (ref 132–146)
SODIUM BLD-SCNC: 135 MMOL/L (ref 132–146)
SODIUM BLD-SCNC: 136 MMOL/L (ref 132–146)
SODIUM BLD-SCNC: 137 MMOL/L (ref 132–146)
SODIUM BLD-SCNC: 138 MMOL/L (ref 132–146)
SODIUM BLD-SCNC: 139 MMOL/L (ref 132–146)
SODIUM BLD-SCNC: 140 MMOL/L (ref 132–146)
SODIUM BLD-SCNC: 141 MMOL/L (ref 132–146)
SODIUM BLD-SCNC: 142 MMOL/L (ref 132–146)
SODIUM BLD-SCNC: 142 MMOL/L (ref 132–146)
SODIUM BLD-SCNC: 143 MMOL/L (ref 132–146)
SODIUM BLD-SCNC: 143 MMOL/L (ref 132–146)
SODIUM BLD-SCNC: 144 MMOL/L (ref 132–146)
SODIUM BLD-SCNC: 145 MMOL/L (ref 132–146)
SODIUM BLD-SCNC: 145 MMOL/L (ref 132–146)
SODIUM BLD-SCNC: 146 MMOL/L (ref 132–146)
SODIUM BLD-SCNC: 146 MMOL/L (ref 132–146)
SODIUM BLD-SCNC: 147 MMOL/L (ref 132–146)
SODIUM BLD-SCNC: 147 MMOL/L (ref 132–146)
SODIUM BLD-SCNC: 148 MMOL/L (ref 132–146)
SODIUM BLD-SCNC: 149 MMOL/L (ref 132–146)
SODIUM BLD-SCNC: 150 MMOL/L (ref 132–146)
SODIUM BLD-SCNC: 151 MMOL/L (ref 132–146)
SODIUM BLD-SCNC: 152 MMOL/L (ref 132–146)
SODIUM URINE: 93 MMOL/L
SOURCE, BLOOD GAS: ABNORMAL
SOURCE: NORMAL
SOURCE: NORMAL
SPECIFIC GRAVITY UA: 1.02 (ref 1–1.03)
STREP PNEUMONIAE ANTIGEN, URINE: NORMAL
TARGET CELLS: ABNORMAL
TEAR DROP CELLS: ABNORMAL
TEMAZEPAM, URINE: <20 NG/ML
THB: 10.5 G/DL (ref 11.5–16.5)
THB: 6.1 G/DL (ref 11.5–16.5)
THB: 7.9 G/DL (ref 11.5–16.5)
THB: 8 G/DL (ref 11.5–16.5)
THB: 8.1 G/DL (ref 11.5–16.5)
THB: 8.2 G/DL (ref 11.5–16.5)
THB: 8.3 G/DL (ref 11.5–16.5)
THB: 8.3 G/DL (ref 11.5–16.5)
THB: 8.4 G/DL (ref 11.5–16.5)
THB: 8.5 G/DL (ref 11.5–16.5)
THB: 8.6 G/DL (ref 11.5–16.5)
THB: 8.6 G/DL (ref 11.5–16.5)
THB: 8.7 G/DL (ref 11.5–16.5)
THB: 8.8 G/DL (ref 11.5–16.5)
THB: 9.1 G/DL (ref 11.5–16.5)
THB: 9.1 G/DL (ref 11.5–16.5)
THB: 9.2 G/DL (ref 11.5–16.5)
THB: 9.3 G/DL (ref 11.5–16.5)
THB: 9.3 G/DL (ref 11.5–16.5)
THB: 9.4 G/DL (ref 11.5–16.5)
THB: 9.4 G/DL (ref 11.5–16.5)
THB: 9.8 G/DL (ref 11.5–16.5)
THB: 9.9 G/DL (ref 11.5–16.5)
THIS TEST SENT TO: NORMAL
TIME ANALYZED: 1014
TIME ANALYZED: 1115
TIME ANALYZED: 1253
TIME ANALYZED: 1540
TIME ANALYZED: 1543
TIME ANALYZED: 1814
TIME ANALYZED: 411
TIME ANALYZED: 423
TIME ANALYZED: 443
TIME ANALYZED: 444
TIME ANALYZED: 447
TIME ANALYZED: 49
TIME ANALYZED: 506
TIME ANALYZED: 527
TIME ANALYZED: 531
TIME ANALYZED: 539
TIME ANALYZED: 539
TIME ANALYZED: 541
TIME ANALYZED: 546
TIME ANALYZED: 550
TIME ANALYZED: 556
TIME ANALYZED: 603
TIME ANALYZED: 608
TIME ANALYZED: 611
TIME ANALYZED: 628
TIME ANALYZED: 632
TIME ANALYZED: 637
TIME ANALYZED: 642
TIME ANALYZED: 642
TIME ANALYZED: 654
TIME ANALYZED: 713
TIME ANALYZED: 749
TIME ANALYZED: 840
TOTAL IRON BINDING CAPACITY: 164 MCG/DL (ref 250–450)
TOTAL PROTEIN: 4.6 G/DL (ref 6.4–8.3)
TOTAL PROTEIN: 4.7 G/DL (ref 6.4–8.3)
TOTAL PROTEIN: 4.7 G/DL (ref 6.4–8.3)
TOTAL PROTEIN: 4.8 G/DL (ref 6.4–8.3)
TOTAL PROTEIN: 4.9 G/DL (ref 6.4–8.3)
TOTAL PROTEIN: 5 G/DL (ref 6.4–8.3)
TOTAL PROTEIN: 5.1 G/DL (ref 6.4–8.3)
TOTAL PROTEIN: 5.2 G/DL (ref 6.4–8.3)
TOTAL PROTEIN: 5.3 G/DL (ref 6.4–8.3)
TOTAL PROTEIN: 5.3 G/DL (ref 6.4–8.3)
TOTAL PROTEIN: 5.4 G/DL (ref 6.4–8.3)
TOTAL PROTEIN: 5.4 G/DL (ref 6.4–8.3)
TOTAL PROTEIN: 5.6 G/DL (ref 6.4–8.3)
TOTAL PROTEIN: 5.8 G/DL (ref 6.4–8.3)
TOTAL PROTEIN: 5.9 G/DL (ref 6.4–8.3)
TOTAL PROTEIN: 6 G/DL (ref 6.4–8.3)
TOTAL PROTEIN: 6 G/DL (ref 6.4–8.3)
TOTAL PROTEIN: 6.3 G/DL (ref 6.4–8.3)
TOTAL PROTEIN: 6.6 G/DL (ref 6.4–8.3)
TOTAL PROTEIN: 7.3 G/DL (ref 6.4–8.3)
TOTAL PROTEIN: 7.7 G/DL (ref 6.4–8.3)
TOXIC GRANULATION: ABNORMAL
TOXIC GRANULATION: ABNORMAL
TRIGL SERPL-MCNC: 160 MG/DL (ref 0–149)
TRIGL SERPL-MCNC: 218 MG/DL (ref 0–149)
TROPONIN: 0.3 NG/ML (ref 0–0.03)
TROPONIN: <0.01 NG/ML (ref 0–0.03)
UREA NITROGEN, UR: 552 MG/DL (ref 800–1666)
URIC ACID, SERUM: 9.2 MG/DL (ref 3.4–7)
URINE CULTURE, ROUTINE: NORMAL
UROBILINOGEN, URINE: 0.2 E.U./DL
VACUOLATED NEUTROPHILS: ABNORMAL
VITAMIN B-12: 651 PG/ML (ref 211–946)
VLDLC SERPL CALC-MCNC: 32 MG/DL
VLDLC SERPL CALC-MCNC: 44 MG/DL
VT MECHANICAL: 450 ML
VT MECHANICAL: 450 ML
VT MECHANICAL: 480 ML
VT MECHANICAL: 500 ML
VT MECHANICAL: 510 ML
VT MECHANICAL: 570 ML
WBC # BLD: 10.1 E9/L (ref 4.5–11.5)
WBC # BLD: 10.8 E9/L (ref 4.5–11.5)
WBC # BLD: 10.9 E9/L (ref 4.5–11.5)
WBC # BLD: 11 E9/L (ref 4.5–11.5)
WBC # BLD: 4.6 E9/L (ref 4.5–11.5)
WBC # BLD: 4.8 E9/L (ref 4.5–11.5)
WBC # BLD: 4.9 E9/L (ref 4.5–11.5)
WBC # BLD: 4.9 E9/L (ref 4.5–11.5)
WBC # BLD: 5 E9/L (ref 4.5–11.5)
WBC # BLD: 5.1 E9/L (ref 4.5–11.5)
WBC # BLD: 5.3 E9/L (ref 4.5–11.5)
WBC # BLD: 5.4 E9/L (ref 4.5–11.5)
WBC # BLD: 5.5 E9/L (ref 4.5–11.5)
WBC # BLD: 5.5 E9/L (ref 4.5–11.5)
WBC # BLD: 5.6 E9/L (ref 4.5–11.5)
WBC # BLD: 5.7 E9/L (ref 4.5–11.5)
WBC # BLD: 5.7 E9/L (ref 4.5–11.5)
WBC # BLD: 6.1 E9/L (ref 4.5–11.5)
WBC # BLD: 6.1 E9/L (ref 4.5–11.5)
WBC # BLD: 6.2 E9/L (ref 4.5–11.5)
WBC # BLD: 6.3 E9/L (ref 4.5–11.5)
WBC # BLD: 6.4 E9/L (ref 4.5–11.5)
WBC # BLD: 7 E9/L (ref 4.5–11.5)
WBC # BLD: 7 E9/L (ref 4.5–11.5)
WBC # BLD: 7.2 E9/L (ref 4.5–11.5)
WBC # BLD: 7.4 E9/L (ref 4.5–11.5)
WBC # BLD: 7.9 E9/L (ref 4.5–11.5)
WBC # BLD: 8 E9/L (ref 4.5–11.5)
WBC # BLD: 8.2 E9/L (ref 4.5–11.5)
WBC # BLD: 8.4 E9/L (ref 4.5–11.5)
WBC # BLD: 8.4 E9/L (ref 4.5–11.5)
WBC # BLD: 8.5 E9/L (ref 4.5–11.5)
WBC # BLD: 9.3 E9/L (ref 4.5–11.5)
WBC # BLD: 9.3 E9/L (ref 4.5–11.5)
WBC # BLD: 9.7 E9/L (ref 4.5–11.5)
WBC UA: ABNORMAL /HPF (ref 0–5)

## 2020-01-01 PROCEDURE — 94640 AIRWAY INHALATION TREATMENT: CPT

## 2020-01-01 PROCEDURE — 6370000000 HC RX 637 (ALT 250 FOR IP): Performed by: INTERNAL MEDICINE

## 2020-01-01 PROCEDURE — 80053 COMPREHEN METABOLIC PANEL: CPT

## 2020-01-01 PROCEDURE — 36600 WITHDRAWAL OF ARTERIAL BLOOD: CPT

## 2020-01-01 PROCEDURE — 99214 OFFICE O/P EST MOD 30 MIN: CPT | Performed by: PHYSICIAN ASSISTANT

## 2020-01-01 PROCEDURE — 84484 ASSAY OF TROPONIN QUANT: CPT

## 2020-01-01 PROCEDURE — 3600000012 HC SURGERY LEVEL 2 ADDTL 15MIN: Performed by: PAIN MEDICINE

## 2020-01-01 PROCEDURE — 6360000002 HC RX W HCPCS: Performed by: INTERNAL MEDICINE

## 2020-01-01 PROCEDURE — 84300 ASSAY OF URINE SODIUM: CPT

## 2020-01-01 PROCEDURE — G8482 FLU IMMUNIZE ORDER/ADMIN: HCPCS | Performed by: PAIN MEDICINE

## 2020-01-01 PROCEDURE — 6370000000 HC RX 637 (ALT 250 FOR IP)

## 2020-01-01 PROCEDURE — G8417 CALC BMI ABV UP PARAM F/U: HCPCS | Performed by: PAIN MEDICINE

## 2020-01-01 PROCEDURE — P9016 RBC LEUKOCYTES REDUCED: HCPCS

## 2020-01-01 PROCEDURE — G0480 DRUG TEST DEF 1-7 CLASSES: HCPCS

## 2020-01-01 PROCEDURE — 71045 X-RAY EXAM CHEST 1 VIEW: CPT

## 2020-01-01 PROCEDURE — 83880 ASSAY OF NATRIURETIC PEPTIDE: CPT

## 2020-01-01 PROCEDURE — 6360000002 HC RX W HCPCS: Performed by: SPECIALIST

## 2020-01-01 PROCEDURE — 6360000002 HC RX W HCPCS: Performed by: FAMILY MEDICINE

## 2020-01-01 PROCEDURE — 74018 RADEX ABDOMEN 1 VIEW: CPT

## 2020-01-01 PROCEDURE — 87186 SC STD MICRODIL/AGAR DIL: CPT

## 2020-01-01 PROCEDURE — 6370000000 HC RX 637 (ALT 250 FOR IP): Performed by: NURSE PRACTITIONER

## 2020-01-01 PROCEDURE — 85610 PROTHROMBIN TIME: CPT

## 2020-01-01 PROCEDURE — 82962 GLUCOSE BLOOD TEST: CPT

## 2020-01-01 PROCEDURE — 94003 VENT MGMT INPAT SUBQ DAY: CPT

## 2020-01-01 PROCEDURE — 4004F PT TOBACCO SCREEN RCVD TLK: CPT | Performed by: PAIN MEDICINE

## 2020-01-01 PROCEDURE — 3609011900 HC BRONCHOSCOPY NEEDLE BX TRACHEA MAIN STEM&/BRON: Performed by: INTERNAL MEDICINE

## 2020-01-01 PROCEDURE — P9047 ALBUMIN (HUMAN), 25%, 50ML: HCPCS | Performed by: INTERNAL MEDICINE

## 2020-01-01 PROCEDURE — 2500000003 HC RX 250 WO HCPCS: Performed by: INTERNAL MEDICINE

## 2020-01-01 PROCEDURE — 2580000003 HC RX 258: Performed by: FAMILY MEDICINE

## 2020-01-01 PROCEDURE — 2500000003 HC RX 250 WO HCPCS: Performed by: FAMILY MEDICINE

## 2020-01-01 PROCEDURE — 2000000000 HC ICU R&B

## 2020-01-01 PROCEDURE — 02HV33Z INSERTION OF INFUSION DEVICE INTO SUPERIOR VENA CAVA, PERCUTANEOUS APPROACH: ICD-10-PCS | Performed by: FAMILY MEDICINE

## 2020-01-01 PROCEDURE — 99232 SBSQ HOSP IP/OBS MODERATE 35: CPT | Performed by: INTERNAL MEDICINE

## 2020-01-01 PROCEDURE — 77262 THER RADIOLOGY TX PLNG INTRM: CPT | Performed by: RADIOLOGY

## 2020-01-01 PROCEDURE — 31500 INSERT EMERGENCY AIRWAY: CPT | Performed by: ANESTHESIOLOGY

## 2020-01-01 PROCEDURE — 64635 DESTROY LUMB/SAC FACET JNT: CPT | Performed by: PAIN MEDICINE

## 2020-01-01 PROCEDURE — 85025 COMPLETE CBC W/AUTO DIFF WBC: CPT

## 2020-01-01 PROCEDURE — 87040 BLOOD CULTURE FOR BACTERIA: CPT

## 2020-01-01 PROCEDURE — 2580000003 HC RX 258

## 2020-01-01 PROCEDURE — 2709999900 HC NON-CHARGEABLE SUPPLY: Performed by: PAIN MEDICINE

## 2020-01-01 PROCEDURE — 2580000003 HC RX 258: Performed by: INTERNAL MEDICINE

## 2020-01-01 PROCEDURE — 3600000002 HC SURGERY LEVEL 2 BASE: Performed by: PAIN MEDICINE

## 2020-01-01 PROCEDURE — 80061 LIPID PANEL: CPT

## 2020-01-01 PROCEDURE — 3017F COLORECTAL CA SCREEN DOC REV: CPT | Performed by: PAIN MEDICINE

## 2020-01-01 PROCEDURE — P9035 PLATELET PHERES LEUKOREDUCED: HCPCS

## 2020-01-01 PROCEDURE — 36415 COLL VENOUS BLD VENIPUNCTURE: CPT

## 2020-01-01 PROCEDURE — 83735 ASSAY OF MAGNESIUM: CPT

## 2020-01-01 PROCEDURE — 84132 ASSAY OF SERUM POTASSIUM: CPT

## 2020-01-01 PROCEDURE — 99213 OFFICE O/P EST LOW 20 MIN: CPT | Performed by: PAIN MEDICINE

## 2020-01-01 PROCEDURE — 3017F COLORECTAL CA SCREEN DOC REV: CPT | Performed by: PHYSICIAN ASSISTANT

## 2020-01-01 PROCEDURE — G8427 DOCREV CUR MEDS BY ELIG CLIN: HCPCS | Performed by: PAIN MEDICINE

## 2020-01-01 PROCEDURE — 90686 IIV4 VACC NO PRSV 0.5 ML IM: CPT | Performed by: PHYSICIAN ASSISTANT

## 2020-01-01 PROCEDURE — 86901 BLOOD TYPING SEROLOGIC RH(D): CPT

## 2020-01-01 PROCEDURE — 77334 RADIATION TREATMENT AID(S): CPT | Performed by: RADIOLOGY

## 2020-01-01 PROCEDURE — 82436 ASSAY OF URINE CHLORIDE: CPT

## 2020-01-01 PROCEDURE — G8484 FLU IMMUNIZE NO ADMIN: HCPCS | Performed by: PAIN MEDICINE

## 2020-01-01 PROCEDURE — 3E0R3NZ INTRODUCTION OF ANALGESICS, HYPNOTICS, SEDATIVES INTO SPINAL CANAL, PERCUTANEOUS APPROACH: ICD-10-PCS | Performed by: EMERGENCY MEDICINE

## 2020-01-01 PROCEDURE — 85027 COMPLETE CBC AUTOMATED: CPT

## 2020-01-01 PROCEDURE — 6370000000 HC RX 637 (ALT 250 FOR IP): Performed by: FAMILY MEDICINE

## 2020-01-01 PROCEDURE — 84100 ASSAY OF PHOSPHORUS: CPT

## 2020-01-01 PROCEDURE — 77295 3-D RADIOTHERAPY PLAN: CPT | Performed by: RADIOLOGY

## 2020-01-01 PROCEDURE — 3044F HG A1C LEVEL LT 7.0%: CPT | Performed by: PHYSICIAN ASSISTANT

## 2020-01-01 PROCEDURE — C9113 INJ PANTOPRAZOLE SODIUM, VIA: HCPCS | Performed by: INTERNAL MEDICINE

## 2020-01-01 PROCEDURE — 2060000000 HC ICU INTERMEDIATE R&B

## 2020-01-01 PROCEDURE — 2500000003 HC RX 250 WO HCPCS: Performed by: NURSE PRACTITIONER

## 2020-01-01 PROCEDURE — 2022F DILAT RTA XM EVC RTNOPTHY: CPT | Performed by: PHYSICIAN ASSISTANT

## 2020-01-01 PROCEDURE — 86900 BLOOD TYPING SEROLOGIC ABO: CPT

## 2020-01-01 PROCEDURE — 86140 C-REACTIVE PROTEIN: CPT

## 2020-01-01 PROCEDURE — 80048 BASIC METABOLIC PNL TOTAL CA: CPT

## 2020-01-01 PROCEDURE — 2580000003 HC RX 258: Performed by: EMERGENCY MEDICINE

## 2020-01-01 PROCEDURE — 88305 TISSUE EXAM BY PATHOLOGIST: CPT

## 2020-01-01 PROCEDURE — 96372 THER/PROPH/DIAG INJ SC/IM: CPT

## 2020-01-01 PROCEDURE — 3017F COLORECTAL CA SCREEN DOC REV: CPT | Performed by: INTERNAL MEDICINE

## 2020-01-01 PROCEDURE — 36430 TRANSFUSION BLD/BLD COMPNT: CPT

## 2020-01-01 PROCEDURE — 82805 BLOOD GASES W/O2 SATURATION: CPT

## 2020-01-01 PROCEDURE — 77417 THER RADIOLOGY PORT IMAGE(S): CPT | Performed by: RADIOLOGY

## 2020-01-01 PROCEDURE — G8482 FLU IMMUNIZE ORDER/ADMIN: HCPCS | Performed by: PHYSICIAN ASSISTANT

## 2020-01-01 PROCEDURE — 87305 ASPERGILLUS AG IA: CPT

## 2020-01-01 PROCEDURE — 2580000003 HC RX 258: Performed by: NURSE PRACTITIONER

## 2020-01-01 PROCEDURE — 85049 AUTOMATED PLATELET COUNT: CPT

## 2020-01-01 PROCEDURE — 82728 ASSAY OF FERRITIN: CPT

## 2020-01-01 PROCEDURE — G8427 DOCREV CUR MEDS BY ELIG CLIN: HCPCS | Performed by: PHYSICIAN ASSISTANT

## 2020-01-01 PROCEDURE — 7100000011 HC PHASE II RECOVERY - ADDTL 15 MIN

## 2020-01-01 PROCEDURE — 6360000002 HC RX W HCPCS: Performed by: PAIN MEDICINE

## 2020-01-01 PROCEDURE — C1751 CATH, INF, PER/CENT/MIDLINE: HCPCS

## 2020-01-01 PROCEDURE — 64494 INJ PARAVERT F JNT L/S 2 LEV: CPT | Performed by: PAIN MEDICINE

## 2020-01-01 PROCEDURE — 2500000003 HC RX 250 WO HCPCS: Performed by: PAIN MEDICINE

## 2020-01-01 PROCEDURE — 62323 NJX INTERLAMINAR LMBR/SAC: CPT | Performed by: PAIN MEDICINE

## 2020-01-01 PROCEDURE — 93010 ELECTROCARDIOGRAM REPORT: CPT | Performed by: INTERNAL MEDICINE

## 2020-01-01 PROCEDURE — 86022 PLATELET ANTIBODIES: CPT

## 2020-01-01 PROCEDURE — G8417 CALC BMI ABV UP PARAM F/U: HCPCS | Performed by: PHYSICIAN ASSISTANT

## 2020-01-01 PROCEDURE — 7100000011 HC PHASE II RECOVERY - ADDTL 15 MIN: Performed by: PAIN MEDICINE

## 2020-01-01 PROCEDURE — 6360000002 HC RX W HCPCS: Performed by: EMERGENCY MEDICINE

## 2020-01-01 PROCEDURE — 6360000004 HC RX CONTRAST MEDICATION: Performed by: RADIOLOGY

## 2020-01-01 PROCEDURE — 84156 ASSAY OF PROTEIN URINE: CPT

## 2020-01-01 PROCEDURE — P9073 PLATELETS PHERESIS PATH REDU: HCPCS

## 2020-01-01 PROCEDURE — 2580000003 HC RX 258: Performed by: SPECIALIST

## 2020-01-01 PROCEDURE — 2500000003 HC RX 250 WO HCPCS: Performed by: EMERGENCY MEDICINE

## 2020-01-01 PROCEDURE — 82607 VITAMIN B-12: CPT

## 2020-01-01 PROCEDURE — 94669 MECHANICAL CHEST WALL OSCILL: CPT

## 2020-01-01 PROCEDURE — 99232 SBSQ HOSP IP/OBS MODERATE 35: CPT | Performed by: SURGERY

## 2020-01-01 PROCEDURE — 87206 SMEAR FLUORESCENT/ACID STAI: CPT

## 2020-01-01 PROCEDURE — 84295 ASSAY OF SERUM SODIUM: CPT

## 2020-01-01 PROCEDURE — 77412 RADIATION TX DELIVERY LVL 3: CPT | Performed by: RADIOLOGY

## 2020-01-01 PROCEDURE — 6370000000 HC RX 637 (ALT 250 FOR IP): Performed by: EMERGENCY MEDICINE

## 2020-01-01 PROCEDURE — 6360000002 HC RX W HCPCS: Performed by: NURSE PRACTITIONER

## 2020-01-01 PROCEDURE — 84145 PROCALCITONIN (PCT): CPT

## 2020-01-01 PROCEDURE — 99233 SBSQ HOSP IP/OBS HIGH 50: CPT | Performed by: INTERNAL MEDICINE

## 2020-01-01 PROCEDURE — 99285 EMERGENCY DEPT VISIT HI MDM: CPT

## 2020-01-01 PROCEDURE — 0BH17EZ INSERTION OF ENDOTRACHEAL AIRWAY INTO TRACHEA, VIA NATURAL OR ARTIFICIAL OPENING: ICD-10-PCS | Performed by: INTERNAL MEDICINE

## 2020-01-01 PROCEDURE — G8428 CUR MEDS NOT DOCUMENT: HCPCS | Performed by: INTERNAL MEDICINE

## 2020-01-01 PROCEDURE — 36592 COLLECT BLOOD FROM PICC: CPT

## 2020-01-01 PROCEDURE — 99231 SBSQ HOSP IP/OBS SF/LOW 25: CPT | Performed by: SURGERY

## 2020-01-01 PROCEDURE — 84550 ASSAY OF BLOOD/URIC ACID: CPT

## 2020-01-01 PROCEDURE — 4004F PT TOBACCO SCREEN RCVD TLK: CPT | Performed by: PHYSICIAN ASSISTANT

## 2020-01-01 PROCEDURE — 83550 IRON BINDING TEST: CPT

## 2020-01-01 PROCEDURE — 99232 SBSQ HOSP IP/OBS MODERATE 35: CPT | Performed by: FAMILY MEDICINE

## 2020-01-01 PROCEDURE — 99214 OFFICE O/P EST MOD 30 MIN: CPT | Performed by: PAIN MEDICINE

## 2020-01-01 PROCEDURE — 88342 IMHCHEM/IMCYTCHM 1ST ANTB: CPT

## 2020-01-01 PROCEDURE — 99213 OFFICE O/P EST LOW 20 MIN: CPT

## 2020-01-01 PROCEDURE — 83036 HEMOGLOBIN GLYCOSYLATED A1C: CPT | Performed by: PHYSICIAN ASSISTANT

## 2020-01-01 PROCEDURE — 86850 RBC ANTIBODY SCREEN: CPT

## 2020-01-01 PROCEDURE — 83970 ASSAY OF PARATHORMONE: CPT

## 2020-01-01 PROCEDURE — 82044 UR ALBUMIN SEMIQUANTITATIVE: CPT | Performed by: PHYSICIAN ASSISTANT

## 2020-01-01 PROCEDURE — 93308 TTE F-UP OR LMTD: CPT

## 2020-01-01 PROCEDURE — 87077 CULTURE AEROBIC IDENTIFY: CPT

## 2020-01-01 PROCEDURE — 74174 CTA ABD&PLVS W/CONTRAST: CPT

## 2020-01-01 PROCEDURE — 76937 US GUIDE VASCULAR ACCESS: CPT

## 2020-01-01 PROCEDURE — 86923 COMPATIBILITY TEST ELECTRIC: CPT

## 2020-01-01 PROCEDURE — 0W9B3ZZ DRAINAGE OF LEFT PLEURAL CAVITY, PERCUTANEOUS APPROACH: ICD-10-PCS | Performed by: INTERNAL MEDICINE

## 2020-01-01 PROCEDURE — 84133 ASSAY OF URINE POTASSIUM: CPT

## 2020-01-01 PROCEDURE — U0002 COVID-19 LAB TEST NON-CDC: HCPCS

## 2020-01-01 PROCEDURE — 87070 CULTURE OTHR SPECIMN AEROBIC: CPT

## 2020-01-01 PROCEDURE — 99253 IP/OBS CNSLTJ NEW/EST LOW 45: CPT | Performed by: SURGERY

## 2020-01-01 PROCEDURE — 85651 RBC SED RATE NONAUTOMATED: CPT

## 2020-01-01 PROCEDURE — 64636 DESTROY L/S FACET JNT ADDL: CPT | Performed by: PAIN MEDICINE

## 2020-01-01 PROCEDURE — 82746 ASSAY OF FOLIC ACID SERUM: CPT

## 2020-01-01 PROCEDURE — 85018 HEMOGLOBIN: CPT

## 2020-01-01 PROCEDURE — 87449 NOS EACH ORGANISM AG IA: CPT

## 2020-01-01 PROCEDURE — 31624 DX BRONCHOSCOPE/LAVAGE: CPT

## 2020-01-01 PROCEDURE — 99291 CRITICAL CARE FIRST HOUR: CPT | Performed by: INTERNAL MEDICINE

## 2020-01-01 PROCEDURE — 82570 ASSAY OF URINE CREATININE: CPT

## 2020-01-01 PROCEDURE — 6360000002 HC RX W HCPCS: Performed by: STUDENT IN AN ORGANIZED HEALTH CARE EDUCATION/TRAINING PROGRAM

## 2020-01-01 PROCEDURE — 05HM33Z INSERTION OF INFUSION DEVICE INTO RIGHT INTERNAL JUGULAR VEIN, PERCUTANEOUS APPROACH: ICD-10-PCS | Performed by: INTERNAL MEDICINE

## 2020-01-01 PROCEDURE — P9047 ALBUMIN (HUMAN), 25%, 50ML: HCPCS | Performed by: NURSE PRACTITIONER

## 2020-01-01 PROCEDURE — 6360000002 HC RX W HCPCS

## 2020-01-01 PROCEDURE — 3017F COLORECTAL CA SCREEN DOC REV: CPT | Performed by: FAMILY MEDICINE

## 2020-01-01 PROCEDURE — 94002 VENT MGMT INPAT INIT DAY: CPT

## 2020-01-01 PROCEDURE — 99223 1ST HOSP IP/OBS HIGH 75: CPT | Performed by: NURSE PRACTITIONER

## 2020-01-01 PROCEDURE — 80307 DRUG TEST PRSMV CHEM ANLYZR: CPT

## 2020-01-01 PROCEDURE — 85014 HEMATOCRIT: CPT

## 2020-01-01 PROCEDURE — 77307 TELETHX ISODOSE PLAN CPLX: CPT | Performed by: RADIOLOGY

## 2020-01-01 PROCEDURE — 99239 HOSP IP/OBS DSCHRG MGMT >30: CPT | Performed by: FAMILY MEDICINE

## 2020-01-01 PROCEDURE — 99214 OFFICE O/P EST MOD 30 MIN: CPT | Performed by: FAMILY MEDICINE

## 2020-01-01 PROCEDURE — 83605 ASSAY OF LACTIC ACID: CPT

## 2020-01-01 PROCEDURE — 5A1955Z RESPIRATORY VENTILATION, GREATER THAN 96 CONSECUTIVE HOURS: ICD-10-PCS | Performed by: INTERNAL MEDICINE

## 2020-01-01 PROCEDURE — 94664 DEMO&/EVAL PT USE INHALER: CPT

## 2020-01-01 PROCEDURE — 72131 CT LUMBAR SPINE W/O DYE: CPT

## 2020-01-01 PROCEDURE — 85378 FIBRIN DEGRADE SEMIQUANT: CPT

## 2020-01-01 PROCEDURE — 94660 CPAP INITIATION&MGMT: CPT

## 2020-01-01 PROCEDURE — 3209999900 FLUORO FOR SURGICAL PROCEDURES

## 2020-01-01 PROCEDURE — 93005 ELECTROCARDIOGRAM TRACING: CPT | Performed by: INTERNAL MEDICINE

## 2020-01-01 PROCEDURE — 88173 CYTOPATH EVAL FNA REPORT: CPT

## 2020-01-01 PROCEDURE — 2500000003 HC RX 250 WO HCPCS

## 2020-01-01 PROCEDURE — 36569 INSJ PICC 5 YR+ W/O IMAGING: CPT

## 2020-01-01 PROCEDURE — 99253 IP/OBS CNSLTJ NEW/EST LOW 45: CPT | Performed by: RADIOLOGY

## 2020-01-01 PROCEDURE — 6360000004 HC RX CONTRAST MEDICATION: Performed by: EMERGENCY MEDICINE

## 2020-01-01 PROCEDURE — 88341 IMHCHEM/IMCYTCHM EA ADD ANTB: CPT

## 2020-01-01 PROCEDURE — 82330 ASSAY OF CALCIUM: CPT

## 2020-01-01 PROCEDURE — 76604 US EXAM CHEST: CPT | Performed by: RADIOLOGY

## 2020-01-01 PROCEDURE — U0003 INFECTIOUS AGENT DETECTION BY NUCLEIC ACID (DNA OR RNA); SEVERE ACUTE RESPIRATORY SYNDROME CORONAVIRUS 2 (SARS-COV-2) (CORONAVIRUS DISEASE [COVID-19]), AMPLIFIED PROBE TECHNIQUE, MAKING USE OF HIGH THROUGHPUT TECHNOLOGIES AS DESCRIBED BY CMS-2020-01-R: HCPCS

## 2020-01-01 PROCEDURE — 51702 INSERT TEMP BLADDER CATH: CPT

## 2020-01-01 PROCEDURE — 89220 SPUTUM SPECIMEN COLLECTION: CPT

## 2020-01-01 PROCEDURE — 99205 OFFICE O/P NEW HI 60 MIN: CPT | Performed by: INTERNAL MEDICINE

## 2020-01-01 PROCEDURE — 99233 SBSQ HOSP IP/OBS HIGH 50: CPT | Performed by: FAMILY MEDICINE

## 2020-01-01 PROCEDURE — 36415 COLL VENOUS BLD VENIPUNCTURE: CPT | Performed by: PHYSICIAN ASSISTANT

## 2020-01-01 PROCEDURE — 99223 1ST HOSP IP/OBS HIGH 75: CPT | Performed by: INTERNAL MEDICINE

## 2020-01-01 PROCEDURE — 6370000000 HC RX 637 (ALT 250 FOR IP): Performed by: STUDENT IN AN ORGANIZED HEALTH CARE EDUCATION/TRAINING PROGRAM

## 2020-01-01 PROCEDURE — 7100000010 HC PHASE II RECOVERY - FIRST 15 MIN: Performed by: PAIN MEDICINE

## 2020-01-01 PROCEDURE — 93970 EXTREMITY STUDY: CPT

## 2020-01-01 PROCEDURE — 3051F HG A1C>EQUAL 7.0%<8.0%: CPT | Performed by: PHYSICIAN ASSISTANT

## 2020-01-01 PROCEDURE — 64493 INJ PARAVERT F JNT L/S 1 LEV: CPT | Performed by: PAIN MEDICINE

## 2020-01-01 PROCEDURE — 2022F DILAT RTA XM EVC RTNOPTHY: CPT | Performed by: FAMILY MEDICINE

## 2020-01-01 PROCEDURE — 76604 US EXAM CHEST: CPT

## 2020-01-01 PROCEDURE — 99231 SBSQ HOSP IP/OBS SF/LOW 25: CPT | Performed by: NURSE PRACTITIONER

## 2020-01-01 PROCEDURE — 31500 INSERT EMERGENCY AIRWAY: CPT

## 2020-01-01 PROCEDURE — 83540 ASSAY OF IRON: CPT

## 2020-01-01 PROCEDURE — 77290 THER RAD SIMULAJ FIELD CPLX: CPT | Performed by: RADIOLOGY

## 2020-01-01 PROCEDURE — 84540 ASSAY OF URINE/UREA-N: CPT

## 2020-01-01 PROCEDURE — 77300 RADIATION THERAPY DOSE PLAN: CPT | Performed by: RADIOLOGY

## 2020-01-01 PROCEDURE — 87088 URINE BACTERIA CULTURE: CPT

## 2020-01-01 PROCEDURE — 0BC88ZZ EXTIRPATION OF MATTER FROM LEFT UPPER LOBE BRONCHUS, VIA NATURAL OR ARTIFICIAL OPENING ENDOSCOPIC: ICD-10-PCS | Performed by: INTERNAL MEDICINE

## 2020-01-01 PROCEDURE — 2709999900 HC NON-CHARGEABLE SUPPLY: Performed by: INTERNAL MEDICINE

## 2020-01-01 PROCEDURE — 87081 CULTURE SCREEN ONLY: CPT

## 2020-01-01 PROCEDURE — 72265 MYELOGRAPHY L-S SPINE: CPT

## 2020-01-01 PROCEDURE — 82803 BLOOD GASES ANY COMBINATION: CPT

## 2020-01-01 PROCEDURE — 7100000010 HC PHASE II RECOVERY - FIRST 15 MIN

## 2020-01-01 PROCEDURE — 2580000003 HC RX 258: Performed by: STUDENT IN AN ORGANIZED HEALTH CARE EDUCATION/TRAINING PROGRAM

## 2020-01-01 PROCEDURE — G8427 DOCREV CUR MEDS BY ELIG CLIN: HCPCS | Performed by: FAMILY MEDICINE

## 2020-01-01 PROCEDURE — 83036 HEMOGLOBIN GLYCOSYLATED A1C: CPT

## 2020-01-01 PROCEDURE — 99024 POSTOP FOLLOW-UP VISIT: CPT | Performed by: PAIN MEDICINE

## 2020-01-01 PROCEDURE — 0202U NFCT DS 22 TRGT SARS-COV-2: CPT

## 2020-01-01 PROCEDURE — 3046F HEMOGLOBIN A1C LEVEL >9.0%: CPT | Performed by: FAMILY MEDICINE

## 2020-01-01 PROCEDURE — 99233 SBSQ HOSP IP/OBS HIGH 50: CPT | Performed by: NURSE PRACTITIONER

## 2020-01-01 PROCEDURE — 81001 URINALYSIS AUTO W/SCOPE: CPT

## 2020-01-01 PROCEDURE — 93005 ELECTROCARDIOGRAM TRACING: CPT | Performed by: STUDENT IN AN ORGANIZED HEALTH CARE EDUCATION/TRAINING PROGRAM

## 2020-01-01 PROCEDURE — 90471 IMMUNIZATION ADMIN: CPT | Performed by: PHYSICIAN ASSISTANT

## 2020-01-01 PROCEDURE — 72132 CT LUMBAR SPINE W/DYE: CPT

## 2020-01-01 PROCEDURE — 05HY33Z INSERTION OF INFUSION DEVICE INTO UPPER VEIN, PERCUTANEOUS APPROACH: ICD-10-PCS | Performed by: INTERNAL MEDICINE

## 2020-01-01 PROCEDURE — 87450 HC DIRECT STREP B ANTIGEN: CPT

## 2020-01-01 PROCEDURE — 71275 CT ANGIOGRAPHY CHEST: CPT

## 2020-01-01 PROCEDURE — 0BJ08ZZ INSPECTION OF TRACHEOBRONCHIAL TREE, VIA NATURAL OR ARTIFICIAL OPENING ENDOSCOPIC: ICD-10-PCS | Performed by: INTERNAL MEDICINE

## 2020-01-01 PROCEDURE — 6360000004 HC RX CONTRAST MEDICATION: Performed by: PAIN MEDICINE

## 2020-01-01 PROCEDURE — 36410 VNPNXR 3YR/> PHY/QHP DX/THER: CPT

## 2020-01-01 RX ORDER — LANOLIN ALCOHOL/MO/W.PET/CERES
CREAM (GRAM) TOPICAL
Qty: 30 TABLET | Refills: 5 | Status: ON HOLD
Start: 2020-01-01 | End: 2020-01-01

## 2020-01-01 RX ORDER — POTASSIUM CHLORIDE 29.8 MG/ML
20 INJECTION INTRAVENOUS
Status: COMPLETED | OUTPATIENT
Start: 2020-01-01 | End: 2020-01-01

## 2020-01-01 RX ORDER — OXYCODONE HYDROCHLORIDE 5 MG/1
2.5 TABLET ORAL 3 TIMES DAILY PRN
Status: DISCONTINUED | OUTPATIENT
Start: 2020-01-01 | End: 2020-01-01 | Stop reason: HOSPADM

## 2020-01-01 RX ORDER — ACETAZOLAMIDE 250 MG/1
250 TABLET ORAL EVERY 12 HOURS
Status: COMPLETED | OUTPATIENT
Start: 2020-01-01 | End: 2020-01-01

## 2020-01-01 RX ORDER — 0.9 % SODIUM CHLORIDE 0.9 %
20 INTRAVENOUS SOLUTION INTRAVENOUS ONCE
Status: DISCONTINUED | OUTPATIENT
Start: 2020-01-01 | End: 2020-01-01 | Stop reason: HOSPADM

## 2020-01-01 RX ORDER — LORATADINE 10 MG/1
TABLET ORAL
Qty: 30 TABLET | Refills: 1 | Status: SHIPPED
Start: 2020-01-01 | End: 2020-01-01

## 2020-01-01 RX ORDER — OMEGA-3-ACID ETHYL ESTERS 1 G/1
CAPSULE, LIQUID FILLED ORAL
Qty: 60 CAPSULE | Refills: 5 | Status: SHIPPED | OUTPATIENT
Start: 2020-01-01

## 2020-01-01 RX ORDER — FAMOTIDINE 20 MG/1
20 TABLET, FILM COATED ORAL 2 TIMES DAILY
Status: DISCONTINUED | OUTPATIENT
Start: 2020-01-01 | End: 2020-01-01

## 2020-01-01 RX ORDER — METHYLPREDNISOLONE SODIUM SUCCINATE 40 MG/ML
40 INJECTION, POWDER, LYOPHILIZED, FOR SOLUTION INTRAMUSCULAR; INTRAVENOUS EVERY 12 HOURS
Status: DISCONTINUED | OUTPATIENT
Start: 2020-01-01 | End: 2020-01-01

## 2020-01-01 RX ORDER — PREGABALIN 150 MG/1
150 CAPSULE ORAL 2 TIMES DAILY
Qty: 60 CAPSULE | Refills: 0 | Status: SHIPPED | OUTPATIENT
Start: 2020-01-01 | End: 2020-01-01

## 2020-01-01 RX ORDER — MAGNESIUM SULFATE 1 G/100ML
1 INJECTION INTRAVENOUS PRN
Status: DISCONTINUED | OUTPATIENT
Start: 2020-01-01 | End: 2020-01-01 | Stop reason: HOSPADM

## 2020-01-01 RX ORDER — MAGNESIUM SULFATE 1 G/100ML
1 INJECTION INTRAVENOUS ONCE
Status: COMPLETED | OUTPATIENT
Start: 2020-01-01 | End: 2020-01-01

## 2020-01-01 RX ORDER — NICOTINE POLACRILEX 4 MG
15 LOZENGE BUCCAL PRN
Status: DISCONTINUED | OUTPATIENT
Start: 2020-01-01 | End: 2020-01-01 | Stop reason: HOSPADM

## 2020-01-01 RX ORDER — ARFORMOTEROL TARTRATE 15 UG/2ML
15 SOLUTION RESPIRATORY (INHALATION) 2 TIMES DAILY
Status: DISCONTINUED | OUTPATIENT
Start: 2020-01-01 | End: 2020-01-01 | Stop reason: HOSPADM

## 2020-01-01 RX ORDER — ACETAMINOPHEN 650 MG/1
650 SUPPOSITORY RECTAL EVERY 4 HOURS PRN
Status: CANCELLED | OUTPATIENT
Start: 2020-01-01

## 2020-01-01 RX ORDER — SODIUM CHLORIDE FOR INHALATION 3 %
2 VIAL, NEBULIZER (ML) INHALATION 2 TIMES DAILY
Status: DISCONTINUED | OUTPATIENT
Start: 2020-01-01 | End: 2020-01-01

## 2020-01-01 RX ORDER — POTASSIUM CHLORIDE 7.45 MG/ML
10 INJECTION INTRAVENOUS
Status: COMPLETED | OUTPATIENT
Start: 2020-01-01 | End: 2020-01-01

## 2020-01-01 RX ORDER — POTASSIUM CHLORIDE 7.45 MG/ML
10 INJECTION INTRAVENOUS
Status: ACTIVE | OUTPATIENT
Start: 2020-01-01 | End: 2020-01-01

## 2020-01-01 RX ORDER — BUMETANIDE 0.25 MG/ML
1 INJECTION, SOLUTION INTRAMUSCULAR; INTRAVENOUS ONCE
Status: COMPLETED | OUTPATIENT
Start: 2020-01-01 | End: 2020-01-01

## 2020-01-01 RX ORDER — TIZANIDINE 4 MG/1
4 TABLET ORAL 3 TIMES DAILY PRN
Qty: 90 TABLET | Refills: 0 | Status: SHIPPED
Start: 2020-01-01 | End: 2020-01-01 | Stop reason: SDUPTHER

## 2020-01-01 RX ORDER — INSULIN GLARGINE 100 [IU]/ML
0.25 INJECTION, SOLUTION SUBCUTANEOUS NIGHTLY
Status: DISCONTINUED | OUTPATIENT
Start: 2020-01-01 | End: 2020-01-01

## 2020-01-01 RX ORDER — TIZANIDINE 4 MG/1
4 TABLET ORAL 3 TIMES DAILY PRN
Qty: 90 TABLET | Refills: 0 | Status: SHIPPED | OUTPATIENT
Start: 2020-01-01 | End: 2020-01-01

## 2020-01-01 RX ORDER — TRIAMTERENE AND HYDROCHLOROTHIAZIDE 37.5; 25 MG/1; MG/1
TABLET ORAL
Qty: 30 TABLET | Refills: 0 | Status: SHIPPED
Start: 2020-01-01 | End: 2020-01-01

## 2020-01-01 RX ORDER — BUPIVACAINE HYDROCHLORIDE 2.5 MG/ML
INJECTION, SOLUTION EPIDURAL; INFILTRATION; INTRACAUDAL PRN
Status: DISCONTINUED | OUTPATIENT
Start: 2020-01-01 | End: 2020-01-01 | Stop reason: ALTCHOICE

## 2020-01-01 RX ORDER — LORATADINE 10 MG/1
10 TABLET ORAL DAILY
Qty: 30 TABLET | Refills: 0 | Status: SHIPPED | OUTPATIENT
Start: 2020-01-01 | End: 2020-01-01

## 2020-01-01 RX ORDER — ALBUTEROL SULFATE 2.5 MG/3ML
2.5 SOLUTION RESPIRATORY (INHALATION) EVERY 4 HOURS PRN
Status: CANCELLED | OUTPATIENT
Start: 2020-01-01

## 2020-01-01 RX ORDER — METHYLPREDNISOLONE SODIUM SUCCINATE 125 MG/2ML
125 INJECTION, POWDER, LYOPHILIZED, FOR SOLUTION INTRAMUSCULAR; INTRAVENOUS ONCE
Status: COMPLETED | OUTPATIENT
Start: 2020-01-01 | End: 2020-01-01

## 2020-01-01 RX ORDER — 0.9 % SODIUM CHLORIDE 0.9 %
20 INTRAVENOUS SOLUTION INTRAVENOUS ONCE
Status: COMPLETED | OUTPATIENT
Start: 2020-01-01 | End: 2020-01-01

## 2020-01-01 RX ORDER — GABAPENTIN 300 MG/1
CAPSULE ORAL
Qty: 81 CAPSULE | Refills: 0 | Status: ON HOLD
Start: 2020-01-01 | End: 2020-01-01

## 2020-01-01 RX ORDER — ACETAMINOPHEN 325 MG/1
650 TABLET ORAL EVERY 4 HOURS PRN
Status: CANCELLED | OUTPATIENT
Start: 2020-01-01

## 2020-01-01 RX ORDER — ACETAMINOPHEN 500 MG
500 TABLET ORAL EVERY 6 HOURS PRN
Qty: 120 TABLET | Refills: 5 | Status: SHIPPED
Start: 2020-01-01 | End: 2020-01-01

## 2020-01-01 RX ORDER — LANOLIN ALCOHOL/MO/W.PET/CERES
CREAM (GRAM) TOPICAL
Qty: 30 TABLET | Refills: 2 | Status: SHIPPED | OUTPATIENT
Start: 2020-01-01 | End: 2020-01-01 | Stop reason: SDUPTHER

## 2020-01-01 RX ORDER — SODIUM CHLORIDE 0.9 % (FLUSH) 0.9 %
10 SYRINGE (ML) INJECTION PRN
Status: DISCONTINUED | OUTPATIENT
Start: 2020-01-01 | End: 2020-01-01 | Stop reason: HOSPADM

## 2020-01-01 RX ORDER — OXYCODONE AND ACETAMINOPHEN 7.5; 325 MG/1; MG/1
1 TABLET ORAL 3 TIMES DAILY PRN
Qty: 90 TABLET | Refills: 0 | Status: SHIPPED | OUTPATIENT
Start: 2020-01-01 | End: 2020-01-01

## 2020-01-01 RX ORDER — ARFORMOTEROL TARTRATE 15 UG/2ML
15 SOLUTION RESPIRATORY (INHALATION) 2 TIMES DAILY
Status: CANCELLED | OUTPATIENT
Start: 2020-01-01

## 2020-01-01 RX ORDER — MAGNESIUM SULFATE IN WATER 40 MG/ML
2 INJECTION, SOLUTION INTRAVENOUS ONCE
Status: COMPLETED | OUTPATIENT
Start: 2020-01-01 | End: 2020-01-01

## 2020-01-01 RX ORDER — FUROSEMIDE 10 MG/ML
60 INJECTION INTRAMUSCULAR; INTRAVENOUS 3 TIMES DAILY
Status: DISCONTINUED | OUTPATIENT
Start: 2020-01-01 | End: 2020-01-01

## 2020-01-01 RX ORDER — HEPARIN SODIUM (PORCINE) LOCK FLUSH IV SOLN 100 UNIT/ML 100 UNIT/ML
1 SOLUTION INTRAVENOUS EVERY 12 HOURS SCHEDULED
Status: DISCONTINUED | OUTPATIENT
Start: 2020-01-01 | End: 2020-01-01 | Stop reason: HOSPADM

## 2020-01-01 RX ORDER — ONDANSETRON 2 MG/ML
4 INJECTION INTRAMUSCULAR; INTRAVENOUS EVERY 6 HOURS PRN
Status: DISCONTINUED | OUTPATIENT
Start: 2020-01-01 | End: 2020-01-01

## 2020-01-01 RX ORDER — KETOROLAC TROMETHAMINE 30 MG/ML
30 INJECTION, SOLUTION INTRAMUSCULAR; INTRAVENOUS ONCE
Status: COMPLETED | OUTPATIENT
Start: 2020-01-01 | End: 2020-01-01

## 2020-01-01 RX ORDER — ATORVASTATIN CALCIUM 40 MG/1
80 TABLET, FILM COATED ORAL NIGHTLY
Status: CANCELLED | OUTPATIENT
Start: 2020-01-01

## 2020-01-01 RX ORDER — DEXTROSE MONOHYDRATE 25 G/50ML
12.5 INJECTION, SOLUTION INTRAVENOUS PRN
Status: DISCONTINUED | OUTPATIENT
Start: 2020-01-01 | End: 2020-01-01 | Stop reason: HOSPADM

## 2020-01-01 RX ORDER — SODIUM CHLORIDE 0.9 % (FLUSH) 0.9 %
10 SYRINGE (ML) INJECTION PRN
Status: CANCELLED | OUTPATIENT
Start: 2020-01-01

## 2020-01-01 RX ORDER — PIOGLITAZONEHYDROCHLORIDE 15 MG/1
TABLET ORAL
Qty: 30 TABLET | Refills: 0 | Status: SHIPPED | OUTPATIENT
Start: 2020-01-01 | End: 2020-01-01 | Stop reason: SDUPTHER

## 2020-01-01 RX ORDER — FAMOTIDINE 20 MG/1
20 TABLET, FILM COATED ORAL 2 TIMES DAILY
Status: DISCONTINUED | OUTPATIENT
Start: 2020-01-01 | End: 2020-01-01 | Stop reason: HOSPADM

## 2020-01-01 RX ORDER — ACETAZOLAMIDE 250 MG/1
250 TABLET ORAL 2 TIMES DAILY
Status: DISCONTINUED | OUTPATIENT
Start: 2020-01-01 | End: 2020-01-01 | Stop reason: HOSPADM

## 2020-01-01 RX ORDER — METHYLPREDNISOLONE SODIUM SUCCINATE 40 MG/ML
40 INJECTION, POWDER, LYOPHILIZED, FOR SOLUTION INTRAMUSCULAR; INTRAVENOUS EVERY 8 HOURS
Status: DISCONTINUED | OUTPATIENT
Start: 2020-01-01 | End: 2020-01-01

## 2020-01-01 RX ORDER — FUROSEMIDE 10 MG/ML
40 INJECTION INTRAMUSCULAR; INTRAVENOUS ONCE
Status: COMPLETED | OUTPATIENT
Start: 2020-01-01 | End: 2020-01-01

## 2020-01-01 RX ORDER — NICOTINE 21 MG/24HR
1 PATCH, TRANSDERMAL 24 HOURS TRANSDERMAL ONCE
Status: DISCONTINUED | OUTPATIENT
Start: 2020-01-01 | End: 2020-01-01

## 2020-01-01 RX ORDER — PANTOPRAZOLE SODIUM 40 MG/10ML
40 INJECTION, POWDER, LYOPHILIZED, FOR SOLUTION INTRAVENOUS DAILY
Status: DISCONTINUED | OUTPATIENT
Start: 2020-01-01 | End: 2020-01-01

## 2020-01-01 RX ORDER — QUINAPRIL 40 MG/1
TABLET ORAL
Qty: 30 TABLET | Refills: 5 | Status: SHIPPED
Start: 2020-01-01 | End: 2020-01-01

## 2020-01-01 RX ORDER — POTASSIUM CHLORIDE 29.8 MG/ML
40 INJECTION INTRAVENOUS ONCE
Status: COMPLETED | OUTPATIENT
Start: 2020-01-01 | End: 2020-01-01

## 2020-01-01 RX ORDER — ACETYLCYSTEINE 100 MG/ML
4 SOLUTION ORAL; RESPIRATORY (INHALATION) 2 TIMES DAILY
Status: CANCELLED | OUTPATIENT
Start: 2020-01-01

## 2020-01-01 RX ORDER — OXYCODONE AND ACETAMINOPHEN 7.5; 325 MG/1; MG/1
1 TABLET ORAL 3 TIMES DAILY PRN
Qty: 90 TABLET | Refills: 0 | Status: SHIPPED
Start: 2020-01-01 | End: 2020-01-01 | Stop reason: SDUPTHER

## 2020-01-01 RX ORDER — SODIUM CHLORIDE 9 MG/ML
INJECTION, SOLUTION INTRAVENOUS CONTINUOUS
Status: DISCONTINUED | OUTPATIENT
Start: 2020-01-01 | End: 2020-01-01

## 2020-01-01 RX ORDER — QUETIAPINE FUMARATE 100 MG/1
100 TABLET, FILM COATED ORAL 2 TIMES DAILY
Status: DISCONTINUED | OUTPATIENT
Start: 2020-01-01 | End: 2020-01-01

## 2020-01-01 RX ORDER — SODIUM CHLORIDE FOR INHALATION 3 %
2 VIAL, NEBULIZER (ML) INHALATION EVERY 4 HOURS
Status: DISCONTINUED | OUTPATIENT
Start: 2020-01-01 | End: 2020-01-01

## 2020-01-01 RX ORDER — POTASSIUM CHLORIDE 7.45 MG/ML
10 INJECTION INTRAVENOUS
Status: DISCONTINUED | OUTPATIENT
Start: 2020-01-01 | End: 2020-01-01

## 2020-01-01 RX ORDER — FAMOTIDINE 20 MG/1
20 TABLET, FILM COATED ORAL 2 TIMES DAILY
Status: CANCELLED | OUTPATIENT
Start: 2020-01-01

## 2020-01-01 RX ORDER — ALBUTEROL SULFATE 2.5 MG/3ML
2.5 SOLUTION RESPIRATORY (INHALATION) EVERY 4 HOURS
Status: CANCELLED | OUTPATIENT
Start: 2020-01-01

## 2020-01-01 RX ORDER — POLYETHYLENE GLYCOL 3350 17 G/17G
17 POWDER, FOR SOLUTION ORAL DAILY PRN
Status: DISCONTINUED | OUTPATIENT
Start: 2020-01-01 | End: 2020-01-01

## 2020-01-01 RX ORDER — PIOGLITAZONEHYDROCHLORIDE 15 MG/1
TABLET ORAL
Qty: 30 TABLET | Refills: 5 | Status: SHIPPED | OUTPATIENT
Start: 2020-01-01

## 2020-01-01 RX ORDER — LABETALOL HYDROCHLORIDE 5 MG/ML
5 INJECTION, SOLUTION INTRAVENOUS ONCE
Status: COMPLETED | OUTPATIENT
Start: 2020-01-01 | End: 2020-01-01

## 2020-01-01 RX ORDER — CLOPIDOGREL BISULFATE 75 MG/1
TABLET ORAL
Qty: 90 TABLET | Refills: 3 | Status: SHIPPED | OUTPATIENT
Start: 2020-01-01

## 2020-01-01 RX ORDER — METHYLPREDNISOLONE SODIUM SUCCINATE 125 MG/2ML
80 INJECTION, POWDER, LYOPHILIZED, FOR SOLUTION INTRAMUSCULAR; INTRAVENOUS EVERY 8 HOURS
Status: DISCONTINUED | OUTPATIENT
Start: 2020-01-01 | End: 2020-01-01

## 2020-01-01 RX ORDER — DEXTROSE MONOHYDRATE 50 MG/ML
100 INJECTION, SOLUTION INTRAVENOUS PRN
Status: DISCONTINUED | OUTPATIENT
Start: 2020-01-01 | End: 2020-01-01 | Stop reason: HOSPADM

## 2020-01-01 RX ORDER — DICLOFENAC SODIUM 75 MG/1
TABLET, DELAYED RELEASE ORAL
Qty: 60 TABLET | Refills: 5 | OUTPATIENT
Start: 2020-01-01

## 2020-01-01 RX ORDER — ACETYLCYSTEINE 200 MG/ML
2 SOLUTION ORAL; RESPIRATORY (INHALATION) 2 TIMES DAILY
Status: DISCONTINUED | OUTPATIENT
Start: 2020-01-01 | End: 2020-01-01 | Stop reason: ALTCHOICE

## 2020-01-01 RX ORDER — ALBUTEROL SULFATE 90 UG/1
2 AEROSOL, METERED RESPIRATORY (INHALATION)
Status: DISCONTINUED | OUTPATIENT
Start: 2020-01-01 | End: 2020-01-01 | Stop reason: DRUGHIGH

## 2020-01-01 RX ORDER — TRIAMTERENE AND HYDROCHLOROTHIAZIDE 75; 50 MG/1; MG/1
1 TABLET ORAL DAILY
Qty: 30 TABLET | Refills: 1 | Status: SHIPPED
Start: 2020-01-01 | End: 2020-01-01

## 2020-01-01 RX ORDER — SUCCINYLCHOLINE CHLORIDE 20 MG/ML
INJECTION INTRAMUSCULAR; INTRAVENOUS
Status: COMPLETED
Start: 2020-01-01 | End: 2020-01-01

## 2020-01-01 RX ORDER — DICLOFENAC SODIUM 75 MG/1
TABLET, DELAYED RELEASE ORAL
Qty: 60 TABLET | Refills: 0 | Status: SHIPPED
Start: 2020-01-01 | End: 2020-01-01

## 2020-01-01 RX ORDER — POLYETHYLENE GLYCOL 3350 17 G/17G
17 POWDER, FOR SOLUTION ORAL DAILY PRN
Status: CANCELLED | OUTPATIENT
Start: 2020-01-01

## 2020-01-01 RX ORDER — INSULIN GLARGINE 100 [IU]/ML
10 INJECTION, SOLUTION SUBCUTANEOUS DAILY
Status: DISCONTINUED | OUTPATIENT
Start: 2020-01-01 | End: 2020-01-01 | Stop reason: HOSPADM

## 2020-01-01 RX ORDER — NICOTINE 21 MG/24HR
1 PATCH, TRANSDERMAL 24 HOURS TRANSDERMAL DAILY
Status: DISCONTINUED | OUTPATIENT
Start: 2020-01-01 | End: 2020-01-01 | Stop reason: HOSPADM

## 2020-01-01 RX ORDER — DEXTROSE MONOHYDRATE 50 MG/ML
INJECTION, SOLUTION INTRAVENOUS CONTINUOUS
Status: DISCONTINUED | OUTPATIENT
Start: 2020-01-01 | End: 2020-01-01

## 2020-01-01 RX ORDER — LEVOFLOXACIN 5 MG/ML
750 INJECTION, SOLUTION INTRAVENOUS EVERY 24 HOURS
Status: DISCONTINUED | OUTPATIENT
Start: 2020-01-01 | End: 2020-01-01

## 2020-01-01 RX ORDER — NICOTINE 21 MG/24HR
1 PATCH, TRANSDERMAL 24 HOURS TRANSDERMAL DAILY
Status: CANCELLED | OUTPATIENT
Start: 2020-01-01

## 2020-01-01 RX ORDER — FUROSEMIDE 10 MG/ML
40 INJECTION INTRAMUSCULAR; INTRAVENOUS 2 TIMES DAILY
Status: DISCONTINUED | OUTPATIENT
Start: 2020-01-01 | End: 2020-01-01

## 2020-01-01 RX ORDER — IPRATROPIUM BROMIDE AND ALBUTEROL SULFATE 2.5; .5 MG/3ML; MG/3ML
1 SOLUTION RESPIRATORY (INHALATION)
Status: COMPLETED | OUTPATIENT
Start: 2020-01-01 | End: 2020-01-01

## 2020-01-01 RX ORDER — DICLOFENAC SODIUM 75 MG/1
TABLET, DELAYED RELEASE ORAL
Qty: 60 TABLET | Refills: 5 | Status: SHIPPED
Start: 2020-01-01 | End: 2020-01-01

## 2020-01-01 RX ORDER — 0.9 % SODIUM CHLORIDE 0.9 %
250 INTRAVENOUS SOLUTION INTRAVENOUS ONCE
Status: DISCONTINUED | OUTPATIENT
Start: 2020-01-01 | End: 2020-01-01

## 2020-01-01 RX ORDER — METHYLPREDNISOLONE SODIUM SUCCINATE 40 MG/ML
80 INJECTION, POWDER, LYOPHILIZED, FOR SOLUTION INTRAMUSCULAR; INTRAVENOUS EVERY 8 HOURS
Status: DISCONTINUED | OUTPATIENT
Start: 2020-01-01 | End: 2020-01-01

## 2020-01-01 RX ORDER — 0.9 % SODIUM CHLORIDE 0.9 %
20 INTRAVENOUS SOLUTION INTRAVENOUS ONCE
Status: DISCONTINUED | OUTPATIENT
Start: 2020-01-01 | End: 2020-01-01

## 2020-01-01 RX ORDER — SODIUM CHLORIDE 0.9 % (FLUSH) 0.9 %
10 SYRINGE (ML) INJECTION EVERY 12 HOURS SCHEDULED
Status: DISCONTINUED | OUTPATIENT
Start: 2020-01-01 | End: 2020-01-01 | Stop reason: HOSPADM

## 2020-01-01 RX ORDER — TIZANIDINE 4 MG/1
4 TABLET ORAL 3 TIMES DAILY PRN
Status: CANCELLED | OUTPATIENT
Start: 2020-01-01

## 2020-01-01 RX ORDER — POTASSIUM CHLORIDE 7.45 MG/ML
10 INJECTION INTRAVENOUS
Status: DISPENSED | OUTPATIENT
Start: 2020-01-01 | End: 2020-01-01

## 2020-01-01 RX ORDER — OXYCODONE HYDROCHLORIDE AND ACETAMINOPHEN 5; 325 MG/1; MG/1
1 TABLET ORAL 3 TIMES DAILY
Qty: 90 TABLET | Refills: 0 | Status: SHIPPED
Start: 2020-01-01 | End: 2020-01-01 | Stop reason: SDUPTHER

## 2020-01-01 RX ORDER — DICLOFENAC SODIUM 75 MG/1
TABLET, DELAYED RELEASE ORAL
COMMUNITY
Start: 2020-01-01

## 2020-01-01 RX ORDER — OXYCODONE HYDROCHLORIDE AND ACETAMINOPHEN 5; 325 MG/1; MG/1
1 TABLET ORAL 3 TIMES DAILY PRN
Status: DISCONTINUED | OUTPATIENT
Start: 2020-01-01 | End: 2020-01-01 | Stop reason: HOSPADM

## 2020-01-01 RX ORDER — METHYLPREDNISOLONE SODIUM SUCCINATE 40 MG/ML
40 INJECTION, POWDER, LYOPHILIZED, FOR SOLUTION INTRAMUSCULAR; INTRAVENOUS DAILY
Status: CANCELLED | OUTPATIENT
Start: 2020-01-01

## 2020-01-01 RX ORDER — ALBUTEROL SULFATE 2.5 MG/3ML
2.5 SOLUTION RESPIRATORY (INHALATION) EVERY 4 HOURS
Status: DISCONTINUED | OUTPATIENT
Start: 2020-01-01 | End: 2020-01-01 | Stop reason: HOSPADM

## 2020-01-01 RX ORDER — INSULIN GLARGINE 100 [IU]/ML
0.25 INJECTION, SOLUTION SUBCUTANEOUS NIGHTLY
Status: CANCELLED | OUTPATIENT
Start: 2020-01-01

## 2020-01-01 RX ORDER — HEPARIN SODIUM (PORCINE) LOCK FLUSH IV SOLN 100 UNIT/ML 100 UNIT/ML
1 SOLUTION INTRAVENOUS PRN
Status: DISCONTINUED | OUTPATIENT
Start: 2020-01-01 | End: 2020-01-01 | Stop reason: HOSPADM

## 2020-01-01 RX ORDER — CHLORHEXIDINE GLUCONATE 0.12 MG/ML
15 RINSE ORAL 2 TIMES DAILY
Status: DISCONTINUED | OUTPATIENT
Start: 2020-01-01 | End: 2020-01-01 | Stop reason: HOSPADM

## 2020-01-01 RX ORDER — HEPARIN SODIUM (PORCINE) LOCK FLUSH IV SOLN 100 UNIT/ML 100 UNIT/ML
1 SOLUTION INTRAVENOUS EVERY 12 HOURS SCHEDULED
Status: CANCELLED | OUTPATIENT
Start: 2020-01-01

## 2020-01-01 RX ORDER — LORAZEPAM 2 MG/ML
0.5 INJECTION INTRAMUSCULAR ONCE
Status: COMPLETED | OUTPATIENT
Start: 2020-01-01 | End: 2020-01-01

## 2020-01-01 RX ORDER — FLUTICASONE PROPIONATE 50 MCG
2 SPRAY, SUSPENSION (ML) NASAL DAILY
Status: CANCELLED | OUTPATIENT
Start: 2020-01-01

## 2020-01-01 RX ORDER — ACETAMINOPHEN 650 MG/1
650 SUPPOSITORY RECTAL EVERY 4 HOURS PRN
Status: DISCONTINUED | OUTPATIENT
Start: 2020-01-01 | End: 2020-01-01 | Stop reason: HOSPADM

## 2020-01-01 RX ORDER — ACETAMINOPHEN 650 MG/1
650 SUPPOSITORY RECTAL EVERY 6 HOURS PRN
Status: DISCONTINUED | OUTPATIENT
Start: 2020-01-01 | End: 2020-01-01 | Stop reason: HOSPADM

## 2020-01-01 RX ORDER — METHYLPREDNISOLONE ACETATE 40 MG/ML
INJECTION, SUSPENSION INTRA-ARTICULAR; INTRALESIONAL; INTRAMUSCULAR; SOFT TISSUE PRN
Status: DISCONTINUED | OUTPATIENT
Start: 2020-01-01 | End: 2020-01-01 | Stop reason: ALTCHOICE

## 2020-01-01 RX ORDER — POLYETHYLENE GLYCOL 3350 17 G/17G
17 POWDER, FOR SOLUTION ORAL DAILY PRN
Status: DISCONTINUED | OUTPATIENT
Start: 2020-01-01 | End: 2020-01-01 | Stop reason: HOSPADM

## 2020-01-01 RX ORDER — PROPOFOL 10 MG/ML
INJECTION, EMULSION INTRAVENOUS
Status: COMPLETED
Start: 2020-01-01 | End: 2020-01-01

## 2020-01-01 RX ORDER — INSULIN GLARGINE 100 [IU]/ML
40 INJECTION, SOLUTION SUBCUTANEOUS NIGHTLY
Status: DISCONTINUED | OUTPATIENT
Start: 2020-01-01 | End: 2020-01-01 | Stop reason: HOSPADM

## 2020-01-01 RX ORDER — TIZANIDINE 4 MG/1
4 TABLET ORAL 3 TIMES DAILY PRN
Status: DISCONTINUED | OUTPATIENT
Start: 2020-01-01 | End: 2020-01-01 | Stop reason: HOSPADM

## 2020-01-01 RX ORDER — QUINAPRIL 10 MG/1
10 TABLET ORAL DAILY
Qty: 30 TABLET | Refills: 3 | Status: SHIPPED | OUTPATIENT
Start: 2020-01-01

## 2020-01-01 RX ORDER — TRIAMTERENE AND HYDROCHLOROTHIAZIDE 37.5; 25 MG/1; MG/1
TABLET ORAL
Qty: 30 TABLET | Refills: 5 | Status: SHIPPED
Start: 2020-01-01 | End: 2020-01-01

## 2020-01-01 RX ORDER — FLUTICASONE PROPIONATE 50 MCG
2 SPRAY, SUSPENSION (ML) NASAL DAILY
Status: DISCONTINUED | OUTPATIENT
Start: 2020-01-01 | End: 2020-01-01

## 2020-01-01 RX ORDER — NICOTINE POLACRILEX 4 MG
15 LOZENGE BUCCAL PRN
Status: CANCELLED | OUTPATIENT
Start: 2020-01-01

## 2020-01-01 RX ORDER — LIDOCAINE HYDROCHLORIDE 10 MG/ML
5 INJECTION, SOLUTION EPIDURAL; INFILTRATION; INTRACAUDAL; PERINEURAL ONCE
Status: DISCONTINUED | OUTPATIENT
Start: 2020-01-01 | End: 2020-01-01 | Stop reason: HOSPADM

## 2020-01-01 RX ORDER — ACETAMINOPHEN 650 MG/1
650 SUPPOSITORY RECTAL EVERY 4 HOURS PRN
Status: DISCONTINUED | OUTPATIENT
Start: 2020-01-01 | End: 2020-01-01 | Stop reason: SDUPTHER

## 2020-01-01 RX ORDER — LORATADINE 10 MG/1
10 TABLET ORAL DAILY
Qty: 30 TABLET | Refills: 1 | Status: SHIPPED | OUTPATIENT
Start: 2020-01-01 | End: 2020-01-01

## 2020-01-01 RX ORDER — POTASSIUM CHLORIDE 7.45 MG/ML
10 INJECTION INTRAVENOUS PRN
Status: CANCELLED | OUTPATIENT
Start: 2020-01-01

## 2020-01-01 RX ORDER — ALBUMIN (HUMAN) 12.5 G/50ML
25 SOLUTION INTRAVENOUS 2 TIMES DAILY
Status: COMPLETED | OUTPATIENT
Start: 2020-01-01 | End: 2020-01-01

## 2020-01-01 RX ORDER — SODIUM CHLORIDE 0.9 % (FLUSH) 0.9 %
10 SYRINGE (ML) INJECTION EVERY 12 HOURS SCHEDULED
Status: DISCONTINUED | OUTPATIENT
Start: 2020-01-01 | End: 2020-01-01 | Stop reason: SDUPTHER

## 2020-01-01 RX ORDER — PETROLATUM 42 G/100G
OINTMENT TOPICAL 2 TIMES DAILY PRN
Status: DISCONTINUED | OUTPATIENT
Start: 2020-01-01 | End: 2020-01-01 | Stop reason: HOSPADM

## 2020-01-01 RX ORDER — PROPOFOL 10 MG/ML
10 INJECTION, EMULSION INTRAVENOUS
Status: DISCONTINUED | OUTPATIENT
Start: 2020-01-01 | End: 2020-01-01 | Stop reason: HOSPADM

## 2020-01-01 RX ORDER — POTASSIUM CHLORIDE 7.45 MG/ML
10 INJECTION INTRAVENOUS PRN
Status: DISCONTINUED | OUTPATIENT
Start: 2020-01-01 | End: 2020-01-01 | Stop reason: HOSPADM

## 2020-01-01 RX ORDER — LIDOCAINE HYDROCHLORIDE 5 MG/ML
INJECTION, SOLUTION INFILTRATION; INTRAVENOUS PRN
Status: DISCONTINUED | OUTPATIENT
Start: 2020-01-01 | End: 2020-01-01 | Stop reason: ALTCHOICE

## 2020-01-01 RX ORDER — ALBUTEROL SULFATE 2.5 MG/3ML
2.5 SOLUTION RESPIRATORY (INHALATION) EVERY 4 HOURS PRN
Status: DISCONTINUED | OUTPATIENT
Start: 2020-01-01 | End: 2020-01-01

## 2020-01-01 RX ORDER — ACETAMINOPHEN 325 MG/1
650 TABLET ORAL EVERY 6 HOURS PRN
Status: DISCONTINUED | OUTPATIENT
Start: 2020-01-01 | End: 2020-01-01 | Stop reason: HOSPADM

## 2020-01-01 RX ORDER — GLYCOPYRROLATE 0.2 MG/ML
0.2 INJECTION INTRAMUSCULAR; INTRAVENOUS EVERY 4 HOURS PRN
Status: DISCONTINUED | OUTPATIENT
Start: 2020-01-01 | End: 2020-01-01 | Stop reason: HOSPADM

## 2020-01-01 RX ORDER — LORATADINE 10 MG/1
10 CAPSULE, LIQUID FILLED ORAL DAILY
COMMUNITY

## 2020-01-01 RX ORDER — PREGABALIN 75 MG/1
75 CAPSULE ORAL 2 TIMES DAILY
Qty: 14 CAPSULE | Refills: 0 | Status: SHIPPED
Start: 2020-01-01 | End: 2020-01-01

## 2020-01-01 RX ORDER — PROMETHAZINE HYDROCHLORIDE 25 MG/1
12.5 TABLET ORAL EVERY 6 HOURS PRN
Status: CANCELLED | OUTPATIENT
Start: 2020-01-01

## 2020-01-01 RX ORDER — PROPOFOL 10 MG/ML
10 INJECTION, EMULSION INTRAVENOUS
Status: DISCONTINUED | OUTPATIENT
Start: 2020-01-01 | End: 2020-01-01

## 2020-01-01 RX ORDER — MIDAZOLAM HYDROCHLORIDE 2 MG/2ML
0.5 INJECTION, SOLUTION INTRAMUSCULAR; INTRAVENOUS
Status: DISCONTINUED | OUTPATIENT
Start: 2020-01-01 | End: 2020-01-01 | Stop reason: HOSPADM

## 2020-01-01 RX ORDER — CARVEDILOL 25 MG/1
25 TABLET ORAL 2 TIMES DAILY
Status: DISCONTINUED | OUTPATIENT
Start: 2020-01-01 | End: 2020-01-01

## 2020-01-01 RX ORDER — PROMETHAZINE HYDROCHLORIDE 25 MG/1
12.5 TABLET ORAL EVERY 6 HOURS PRN
Status: DISCONTINUED | OUTPATIENT
Start: 2020-01-01 | End: 2020-01-01 | Stop reason: HOSPADM

## 2020-01-01 RX ORDER — SODIUM CHLORIDE 0.9 % (FLUSH) 0.9 %
10 SYRINGE (ML) INJECTION PRN
Status: DISCONTINUED | OUTPATIENT
Start: 2020-01-01 | End: 2020-01-01 | Stop reason: SDUPTHER

## 2020-01-01 RX ORDER — CLOPIDOGREL BISULFATE 75 MG/1
75 TABLET ORAL DAILY
Status: DISCONTINUED | OUTPATIENT
Start: 2020-01-01 | End: 2020-01-01 | Stop reason: HOSPADM

## 2020-01-01 RX ORDER — FENTANYL CITRATE 50 UG/ML
50 INJECTION, SOLUTION INTRAMUSCULAR; INTRAVENOUS ONCE
Status: COMPLETED | OUTPATIENT
Start: 2020-01-01 | End: 2020-01-01

## 2020-01-01 RX ORDER — MIDAZOLAM HYDROCHLORIDE 1 MG/ML
INJECTION INTRAMUSCULAR; INTRAVENOUS
Status: COMPLETED
Start: 2020-01-01 | End: 2020-01-01

## 2020-01-01 RX ORDER — POTASSIUM CHLORIDE 29.8 MG/ML
40 INJECTION INTRAVENOUS
Status: COMPLETED | OUTPATIENT
Start: 2020-01-01 | End: 2020-01-01

## 2020-01-01 RX ORDER — TIZANIDINE 4 MG/1
4 TABLET ORAL 3 TIMES DAILY PRN
Qty: 30 TABLET | Refills: 0 | Status: SHIPPED
Start: 2020-01-01 | End: 2020-01-01 | Stop reason: SDUPTHER

## 2020-01-01 RX ORDER — IPRATROPIUM BROMIDE AND ALBUTEROL SULFATE 2.5; .5 MG/3ML; MG/3ML
1 SOLUTION RESPIRATORY (INHALATION)
Status: DISCONTINUED | OUTPATIENT
Start: 2020-01-01 | End: 2020-01-01 | Stop reason: CLARIF

## 2020-01-01 RX ORDER — SODIUM CHLORIDE 9 MG/ML
10 INJECTION INTRAVENOUS DAILY
Status: DISCONTINUED | OUTPATIENT
Start: 2020-01-01 | End: 2020-01-01

## 2020-01-01 RX ORDER — ATORVASTATIN CALCIUM 40 MG/1
80 TABLET, FILM COATED ORAL NIGHTLY
Status: DISCONTINUED | OUTPATIENT
Start: 2020-01-01 | End: 2020-01-01 | Stop reason: HOSPADM

## 2020-01-01 RX ORDER — ACETAMINOPHEN 650 MG/1
650 SUPPOSITORY RECTAL EVERY 6 HOURS PRN
Status: DISCONTINUED | OUTPATIENT
Start: 2020-01-01 | End: 2020-01-01

## 2020-01-01 RX ORDER — MAGNESIUM SULFATE 1 G/100ML
1 INJECTION INTRAVENOUS PRN
Status: DISCONTINUED | OUTPATIENT
Start: 2020-01-01 | End: 2020-01-01

## 2020-01-01 RX ORDER — BUMETANIDE 0.25 MG/ML
2 INJECTION, SOLUTION INTRAMUSCULAR; INTRAVENOUS 2 TIMES DAILY
Status: DISCONTINUED | OUTPATIENT
Start: 2020-01-01 | End: 2020-01-01

## 2020-01-01 RX ORDER — IPRATROPIUM BROMIDE AND ALBUTEROL SULFATE 2.5; .5 MG/3ML; MG/3ML
1 SOLUTION RESPIRATORY (INHALATION) EVERY 4 HOURS
Status: DISCONTINUED | OUTPATIENT
Start: 2020-01-01 | End: 2020-01-01

## 2020-01-01 RX ORDER — NIACIN 500 MG/1
TABLET, EXTENDED RELEASE ORAL
Qty: 90 TABLET | Refills: 3 | Status: SHIPPED
Start: 2020-01-01 | End: 2020-01-01 | Stop reason: SDUPTHER

## 2020-01-01 RX ORDER — ALBUTEROL SULFATE 90 UG/1
2 AEROSOL, METERED RESPIRATORY (INHALATION) EVERY 4 HOURS PRN
Status: DISCONTINUED | OUTPATIENT
Start: 2020-01-01 | End: 2020-01-01 | Stop reason: CLARIF

## 2020-01-01 RX ORDER — HEPARIN SODIUM (PORCINE) LOCK FLUSH IV SOLN 100 UNIT/ML 100 UNIT/ML
3 SOLUTION INTRAVENOUS EVERY 12 HOURS SCHEDULED
Status: DISCONTINUED | OUTPATIENT
Start: 2020-01-01 | End: 2020-01-01 | Stop reason: HOSPADM

## 2020-01-01 RX ORDER — ALBUMIN (HUMAN) 12.5 G/50ML
25 SOLUTION INTRAVENOUS EVERY 8 HOURS
Status: DISCONTINUED | OUTPATIENT
Start: 2020-01-01 | End: 2020-01-01

## 2020-01-01 RX ORDER — IPRATROPIUM BROMIDE AND ALBUTEROL SULFATE 2.5; .5 MG/3ML; MG/3ML
1 SOLUTION RESPIRATORY (INHALATION)
Status: DISCONTINUED | OUTPATIENT
Start: 2020-01-01 | End: 2020-01-01

## 2020-01-01 RX ORDER — ACETYLCYSTEINE 100 MG/ML
4 SOLUTION ORAL; RESPIRATORY (INHALATION) 2 TIMES DAILY
Status: DISCONTINUED | OUTPATIENT
Start: 2020-01-01 | End: 2020-01-01 | Stop reason: HOSPADM

## 2020-01-01 RX ORDER — POTASSIUM CHLORIDE 7.45 MG/ML
10 INJECTION INTRAVENOUS PRN
Status: DISCONTINUED | OUTPATIENT
Start: 2020-01-01 | End: 2020-01-01

## 2020-01-01 RX ORDER — OXYCODONE AND ACETAMINOPHEN 7.5; 325 MG/1; MG/1
1 TABLET ORAL 3 TIMES DAILY PRN
Status: DISCONTINUED | OUTPATIENT
Start: 2020-01-01 | End: 2020-01-01 | Stop reason: CLARIF

## 2020-01-01 RX ORDER — CHLORHEXIDINE GLUCONATE 0.12 MG/ML
15 RINSE ORAL 2 TIMES DAILY
Status: CANCELLED | OUTPATIENT
Start: 2020-01-01

## 2020-01-01 RX ORDER — METOPROLOL TARTRATE 5 MG/5ML
2.5 INJECTION INTRAVENOUS ONCE
Status: COMPLETED | OUTPATIENT
Start: 2020-01-01 | End: 2020-01-01

## 2020-01-01 RX ORDER — HEPARIN SODIUM (PORCINE) LOCK FLUSH IV SOLN 100 UNIT/ML 100 UNIT/ML
1 SOLUTION INTRAVENOUS PRN
Status: CANCELLED | OUTPATIENT
Start: 2020-01-01

## 2020-01-01 RX ORDER — OXYCODONE HYDROCHLORIDE AND ACETAMINOPHEN 5; 325 MG/1; MG/1
1 TABLET ORAL 3 TIMES DAILY
Qty: 90 TABLET | Refills: 0 | Status: SHIPPED | OUTPATIENT
Start: 2020-01-01 | End: 2020-01-01

## 2020-01-01 RX ORDER — ONDANSETRON 2 MG/ML
4 INJECTION INTRAMUSCULAR; INTRAVENOUS EVERY 6 HOURS PRN
Status: DISCONTINUED | OUTPATIENT
Start: 2020-01-01 | End: 2020-01-01 | Stop reason: HOSPADM

## 2020-01-01 RX ORDER — MIDAZOLAM HYDROCHLORIDE 1 MG/ML
4 INJECTION INTRAMUSCULAR; INTRAVENOUS ONCE
Status: COMPLETED | OUTPATIENT
Start: 2020-01-01 | End: 2020-01-01

## 2020-01-01 RX ORDER — HEPARIN SODIUM (PORCINE) LOCK FLUSH IV SOLN 100 UNIT/ML 100 UNIT/ML
3 SOLUTION INTRAVENOUS PRN
Status: DISCONTINUED | OUTPATIENT
Start: 2020-01-01 | End: 2020-01-01 | Stop reason: HOSPADM

## 2020-01-01 RX ORDER — ASPIRIN 325 MG
325 TABLET ORAL DAILY
Status: DISCONTINUED | OUTPATIENT
Start: 2020-01-01 | End: 2020-01-01

## 2020-01-01 RX ORDER — ALBUMIN (HUMAN) 12.5 G/50ML
25 SOLUTION INTRAVENOUS EVERY 8 HOURS
Status: COMPLETED | OUTPATIENT
Start: 2020-01-01 | End: 2020-01-01

## 2020-01-01 RX ORDER — POTASSIUM CHLORIDE 29.8 MG/ML
20 INJECTION INTRAVENOUS ONCE
Status: COMPLETED | OUTPATIENT
Start: 2020-01-01 | End: 2020-01-01

## 2020-01-01 RX ORDER — PIOGLITAZONEHYDROCHLORIDE 15 MG/1
TABLET ORAL
Qty: 30 TABLET | Refills: 5 | Status: SHIPPED
Start: 2020-01-01 | End: 2020-01-01

## 2020-01-01 RX ORDER — ONDANSETRON 2 MG/ML
4 INJECTION INTRAMUSCULAR; INTRAVENOUS EVERY 6 HOURS PRN
Status: CANCELLED | OUTPATIENT
Start: 2020-01-01

## 2020-01-01 RX ORDER — BUMETANIDE 0.25 MG/ML
2 INJECTION, SOLUTION INTRAMUSCULAR; INTRAVENOUS ONCE
Status: COMPLETED | OUTPATIENT
Start: 2020-01-01 | End: 2020-01-01

## 2020-01-01 RX ORDER — METHYLPREDNISOLONE SODIUM SUCCINATE 40 MG/ML
40 INJECTION, POWDER, LYOPHILIZED, FOR SOLUTION INTRAMUSCULAR; INTRAVENOUS DAILY
Status: DISCONTINUED | OUTPATIENT
Start: 2020-01-01 | End: 2020-01-01 | Stop reason: HOSPADM

## 2020-01-01 RX ORDER — SODIUM CHLORIDE 0.9 % (FLUSH) 0.9 %
10 SYRINGE (ML) INJECTION PRN
Status: DISCONTINUED | OUTPATIENT
Start: 2020-01-01 | End: 2020-01-01

## 2020-01-01 RX ORDER — LORAZEPAM 2 MG/ML
INJECTION INTRAMUSCULAR
Status: DISPENSED
Start: 2020-01-01 | End: 2020-01-01

## 2020-01-01 RX ORDER — QUINAPRIL 40 MG/1
TABLET ORAL
Qty: 30 TABLET | Refills: 3 | Status: SHIPPED | OUTPATIENT
Start: 2020-01-01 | End: 2020-01-01 | Stop reason: SDUPTHER

## 2020-01-01 RX ORDER — CLOPIDOGREL BISULFATE 75 MG/1
TABLET ORAL
Qty: 90 TABLET | Refills: 3 | Status: CANCELLED | OUTPATIENT
Start: 2020-01-01

## 2020-01-01 RX ORDER — ACETAMINOPHEN 325 MG/1
650 TABLET ORAL EVERY 4 HOURS PRN
Status: DISCONTINUED | OUTPATIENT
Start: 2020-01-01 | End: 2020-01-01 | Stop reason: SDUPTHER

## 2020-01-01 RX ORDER — DICLOFENAC SODIUM 75 MG/1
TABLET, DELAYED RELEASE ORAL
Qty: 60 TABLET | Refills: 0 | Status: SHIPPED | OUTPATIENT
Start: 2020-01-01 | End: 2020-01-01 | Stop reason: SDUPTHER

## 2020-01-01 RX ORDER — PROMETHAZINE HYDROCHLORIDE 25 MG/1
12.5 TABLET ORAL EVERY 6 HOURS PRN
Status: DISCONTINUED | OUTPATIENT
Start: 2020-01-01 | End: 2020-01-01

## 2020-01-01 RX ORDER — ACETAMINOPHEN 325 MG/1
650 TABLET ORAL EVERY 6 HOURS PRN
Status: DISCONTINUED | OUTPATIENT
Start: 2020-01-01 | End: 2020-01-01

## 2020-01-01 RX ORDER — PREGABALIN 75 MG/1
150 CAPSULE ORAL 2 TIMES DAILY
Status: DISCONTINUED | OUTPATIENT
Start: 2020-01-01 | End: 2020-01-01

## 2020-01-01 RX ORDER — SUCRALFATE 1 G/1
1 TABLET ORAL EVERY 6 HOURS SCHEDULED
Status: DISCONTINUED | OUTPATIENT
Start: 2020-01-01 | End: 2020-01-01

## 2020-01-01 RX ORDER — BUMETANIDE 0.25 MG/ML
2 INJECTION, SOLUTION INTRAMUSCULAR; INTRAVENOUS DAILY
Status: DISCONTINUED | OUTPATIENT
Start: 2020-01-01 | End: 2020-01-01 | Stop reason: HOSPADM

## 2020-01-01 RX ORDER — ACETAMINOPHEN 325 MG/1
650 TABLET ORAL EVERY 4 HOURS PRN
Status: DISCONTINUED | OUTPATIENT
Start: 2020-01-01 | End: 2020-01-01 | Stop reason: HOSPADM

## 2020-01-01 RX ORDER — ALBUTEROL SULFATE 2.5 MG/3ML
2.5 SOLUTION RESPIRATORY (INHALATION) EVERY 4 HOURS PRN
Status: DISCONTINUED | OUTPATIENT
Start: 2020-01-01 | End: 2020-01-01 | Stop reason: HOSPADM

## 2020-01-01 RX ORDER — ARFORMOTEROL TARTRATE 15 UG/2ML
15 SOLUTION RESPIRATORY (INHALATION) 2 TIMES DAILY
Status: DISCONTINUED | OUTPATIENT
Start: 2020-01-01 | End: 2020-01-01

## 2020-01-01 RX ORDER — LIDOCAINE HYDROCHLORIDE 20 MG/ML
INJECTION, SOLUTION EPIDURAL; INFILTRATION; INTRACAUDAL; PERINEURAL PRN
Status: DISCONTINUED | OUTPATIENT
Start: 2020-01-01 | End: 2020-01-01 | Stop reason: ALTCHOICE

## 2020-01-01 RX ORDER — ATORVASTATIN CALCIUM 80 MG/1
TABLET, FILM COATED ORAL
Qty: 90 TABLET | Refills: 2 | Status: CANCELLED | OUTPATIENT
Start: 2020-01-01

## 2020-01-01 RX ORDER — CLOPIDOGREL BISULFATE 75 MG/1
75 TABLET ORAL DAILY
Status: CANCELLED | OUTPATIENT
Start: 2020-01-01

## 2020-01-01 RX ORDER — LISINOPRIL 10 MG/1
10 TABLET ORAL DAILY
Status: DISCONTINUED | OUTPATIENT
Start: 2020-01-01 | End: 2020-01-01

## 2020-01-01 RX ORDER — OXYCODONE HYDROCHLORIDE AND ACETAMINOPHEN 5; 325 MG/1; MG/1
1 TABLET ORAL 3 TIMES DAILY PRN
Status: CANCELLED | OUTPATIENT
Start: 2020-01-01

## 2020-01-01 RX ORDER — METHYLPREDNISOLONE SODIUM SUCCINATE 40 MG/ML
40 INJECTION, POWDER, LYOPHILIZED, FOR SOLUTION INTRAMUSCULAR; INTRAVENOUS DAILY
Status: DISCONTINUED | OUTPATIENT
Start: 2020-01-01 | End: 2020-01-01

## 2020-01-01 RX ORDER — INSULIN GLARGINE 100 [IU]/ML
0.25 INJECTION, SOLUTION SUBCUTANEOUS NIGHTLY
Status: DISCONTINUED | OUTPATIENT
Start: 2020-01-01 | End: 2020-01-01 | Stop reason: HOSPADM

## 2020-01-01 RX ORDER — ALBUTEROL SULFATE 2.5 MG/3ML
2.5 SOLUTION RESPIRATORY (INHALATION) EVERY 4 HOURS
Status: DISCONTINUED | OUTPATIENT
Start: 2020-01-01 | End: 2020-01-01

## 2020-01-01 RX ORDER — TRIAMTERENE AND HYDROCHLOROTHIAZIDE 37.5; 25 MG/1; MG/1
TABLET ORAL
Qty: 30 TABLET | Refills: 0 | Status: SHIPPED | OUTPATIENT
Start: 2020-01-01 | End: 2020-01-01 | Stop reason: SDUPTHER

## 2020-01-01 RX ORDER — DIMETHICONE, OXYBENZONE, AND PADIMATE O 2; 2.5; 6.6 G/100G; G/100G; G/100G
STICK TOPICAL
Status: COMPLETED
Start: 2020-01-01 | End: 2020-01-01

## 2020-01-01 RX ORDER — SILDENAFIL 50 MG/1
50 TABLET, FILM COATED ORAL PRN
Qty: 30 TABLET | Refills: 3 | Status: SHIPPED | OUTPATIENT
Start: 2020-01-01

## 2020-01-01 RX ORDER — ATORVASTATIN CALCIUM 80 MG/1
80 TABLET, FILM COATED ORAL NIGHTLY
Status: DISCONTINUED | OUTPATIENT
Start: 2020-01-01 | End: 2020-01-01 | Stop reason: HOSPADM

## 2020-01-01 RX ORDER — FLUTICASONE PROPIONATE 50 MCG
2 SPRAY, SUSPENSION (ML) NASAL DAILY
Qty: 1 BOTTLE | Refills: 1 | Status: SHIPPED | OUTPATIENT
Start: 2020-01-01

## 2020-01-01 RX ORDER — DEXTROSE MONOHYDRATE 25 G/50ML
12.5 INJECTION, SOLUTION INTRAVENOUS PRN
Status: CANCELLED | OUTPATIENT
Start: 2020-01-01

## 2020-01-01 RX ORDER — SODIUM CHLORIDE 9 MG/ML
INJECTION, SOLUTION INTRAVENOUS EVERY 12 HOURS
Status: DISCONTINUED | OUTPATIENT
Start: 2020-01-01 | End: 2020-01-01 | Stop reason: HOSPADM

## 2020-01-01 RX ORDER — POTASSIUM CHLORIDE 29.8 MG/ML
40 INJECTION INTRAVENOUS ONCE
Status: DISCONTINUED | OUTPATIENT
Start: 2020-01-01 | End: 2020-01-01

## 2020-01-01 RX ORDER — SODIUM CHLORIDE 0.9 % (FLUSH) 0.9 %
10 SYRINGE (ML) INJECTION EVERY 12 HOURS SCHEDULED
Status: CANCELLED | OUTPATIENT
Start: 2020-01-01

## 2020-01-01 RX ORDER — POTASSIUM CHLORIDE 29.8 MG/ML
20 INJECTION INTRAVENOUS PRN
Status: DISCONTINUED | OUTPATIENT
Start: 2020-01-01 | End: 2020-01-01

## 2020-01-01 RX ORDER — FLUTICASONE PROPIONATE 50 MCG
2 SPRAY, SUSPENSION (ML) NASAL DAILY
Status: DISCONTINUED | OUTPATIENT
Start: 2020-01-01 | End: 2020-01-01 | Stop reason: HOSPADM

## 2020-01-01 RX ORDER — LANOLIN ALCOHOL/MO/W.PET/CERES
CREAM (GRAM) TOPICAL
Qty: 30 TABLET | Refills: 2 | Status: SHIPPED | OUTPATIENT
Start: 2020-01-01

## 2020-01-01 RX ORDER — MAGNESIUM SULFATE 1 G/100ML
1 INJECTION INTRAVENOUS PRN
Status: CANCELLED | OUTPATIENT
Start: 2020-01-01

## 2020-01-01 RX ORDER — OXYCODONE HYDROCHLORIDE 5 MG/1
2.5 TABLET ORAL 3 TIMES DAILY PRN
Status: CANCELLED | OUTPATIENT
Start: 2020-01-01

## 2020-01-01 RX ORDER — MIDAZOLAM HYDROCHLORIDE 2 MG/2ML
1 INJECTION, SOLUTION INTRAMUSCULAR; INTRAVENOUS
Status: DISCONTINUED | OUTPATIENT
Start: 2020-01-01 | End: 2020-01-01 | Stop reason: HOSPADM

## 2020-01-01 RX ORDER — OMEGA-3-ACID ETHYL ESTERS 1 G/1
CAPSULE, LIQUID FILLED ORAL
Qty: 60 CAPSULE | Refills: 5 | Status: SHIPPED
Start: 2020-01-01 | End: 2020-01-01

## 2020-01-01 RX ORDER — PIOGLITAZONEHYDROCHLORIDE 15 MG/1
TABLET ORAL
Qty: 30 TABLET | Refills: 0 | Status: SHIPPED
Start: 2020-01-01 | End: 2020-01-01

## 2020-01-01 RX ORDER — NICOTINE 21 MG/24HR
1 PATCH, TRANSDERMAL 24 HOURS TRANSDERMAL DAILY
Status: DISCONTINUED | OUTPATIENT
Start: 2020-01-01 | End: 2020-01-01

## 2020-01-01 RX ORDER — ETOMIDATE 2 MG/ML
INJECTION INTRAVENOUS
Status: COMPLETED
Start: 2020-01-01 | End: 2020-01-01

## 2020-01-01 RX ORDER — DEXTROSE MONOHYDRATE 50 MG/ML
100 INJECTION, SOLUTION INTRAVENOUS PRN
Status: CANCELLED | OUTPATIENT
Start: 2020-01-01

## 2020-01-01 RX ORDER — CARVEDILOL 25 MG/1
TABLET ORAL
Qty: 180 TABLET | Refills: 5 | Status: CANCELLED | OUTPATIENT
Start: 2020-01-01

## 2020-01-01 RX ORDER — ACETAZOLAMIDE 250 MG/1
250 TABLET ORAL EVERY 12 HOURS
Status: DISCONTINUED | OUTPATIENT
Start: 2020-01-01 | End: 2020-01-01

## 2020-01-01 RX ADMIN — ARFORMOTEROL TARTRATE 15 MCG: 15 SOLUTION RESPIRATORY (INHALATION) at 21:30

## 2020-01-01 RX ADMIN — ALBUTEROL SULFATE 2.5 MG: 2.5 SOLUTION RESPIRATORY (INHALATION) at 12:38

## 2020-01-01 RX ADMIN — ETOMIDATE: 2 INJECTION INTRAVENOUS at 12:34

## 2020-01-01 RX ADMIN — IPRATROPIUM BROMIDE AND ALBUTEROL SULFATE 1 AMPULE: 2.5; .5 SOLUTION RESPIRATORY (INHALATION) at 22:14

## 2020-01-01 RX ADMIN — AMPICILLIN SODIUM AND SULBACTAM SODIUM 3 G: 2; 1 INJECTION, POWDER, FOR SOLUTION INTRAMUSCULAR; INTRAVENOUS at 18:33

## 2020-01-01 RX ADMIN — CHLORHEXIDINE GLUCONATE 0.12% ORAL RINSE 15 ML: 1.2 LIQUID ORAL at 20:12

## 2020-01-01 RX ADMIN — SUCRALFATE 1 G: 1 TABLET ORAL at 06:15

## 2020-01-01 RX ADMIN — ALBUTEROL SULFATE 2.5 MG: 2.5 SOLUTION RESPIRATORY (INHALATION) at 22:22

## 2020-01-01 RX ADMIN — Medication 200 MCG/HR: at 03:16

## 2020-01-01 RX ADMIN — INSULIN LISPRO 2 UNITS: 100 INJECTION, SOLUTION INTRAVENOUS; SUBCUTANEOUS at 17:36

## 2020-01-01 RX ADMIN — CEFEPIME 2 G: 2 INJECTION, POWDER, FOR SOLUTION INTRAVENOUS at 02:06

## 2020-01-01 RX ADMIN — Medication 200 MCG/HR: at 08:00

## 2020-01-01 RX ADMIN — CEFEPIME 2 G: 2 INJECTION, POWDER, FOR SOLUTION INTRAVENOUS at 12:27

## 2020-01-01 RX ADMIN — INSULIN GLARGINE 36 UNITS: 100 INJECTION, SOLUTION SUBCUTANEOUS at 20:30

## 2020-01-01 RX ADMIN — INSULIN LISPRO 3 UNITS: 100 INJECTION, SOLUTION INTRAVENOUS; SUBCUTANEOUS at 00:00

## 2020-01-01 RX ADMIN — ASPIRIN 325 MG: 325 TABLET, FILM COATED ORAL at 08:16

## 2020-01-01 RX ADMIN — SODIUM CHLORIDE: 9 INJECTION, SOLUTION INTRAVENOUS at 00:30

## 2020-01-01 RX ADMIN — CEFEPIME 2 G: 2 INJECTION, POWDER, FOR SOLUTION INTRAVENOUS at 20:16

## 2020-01-01 RX ADMIN — DEXTROSE MONOHYDRATE 9 MG/HR: 5 INJECTION, SOLUTION INTRAVENOUS at 05:38

## 2020-01-01 RX ADMIN — SODIUM CHLORIDE, PRESERVATIVE FREE 100 UNITS: 5 INJECTION INTRAVENOUS at 19:55

## 2020-01-01 RX ADMIN — INSULIN LISPRO 6 UNITS: 100 INJECTION, SOLUTION INTRAVENOUS; SUBCUTANEOUS at 16:40

## 2020-01-01 RX ADMIN — WATER 2 ML: 1 INJECTION INTRAMUSCULAR; INTRAVENOUS; SUBCUTANEOUS at 10:00

## 2020-01-01 RX ADMIN — Medication 200 MCG/HR: at 22:20

## 2020-01-01 RX ADMIN — Medication 200 MCG/HR: at 14:50

## 2020-01-01 RX ADMIN — ATORVASTATIN CALCIUM 80 MG: 40 TABLET, FILM COATED ORAL at 21:54

## 2020-01-01 RX ADMIN — SODIUM CHLORIDE, PRESERVATIVE FREE 10 ML: 5 INJECTION INTRAVENOUS at 10:18

## 2020-01-01 RX ADMIN — SODIUM CHLORIDE SOLN NEBU 3% 2 ML: 3 NEBU SOLN at 09:44

## 2020-01-01 RX ADMIN — SODIUM CHLORIDE, PRESERVATIVE FREE 10 ML: 5 INJECTION INTRAVENOUS at 08:19

## 2020-01-01 RX ADMIN — Medication 200 MCG/HR: at 08:51

## 2020-01-01 RX ADMIN — FAMOTIDINE 20 MG: 10 INJECTION INTRAVENOUS at 21:08

## 2020-01-01 RX ADMIN — METHYLPREDNISOLONE SODIUM SUCCINATE 40 MG: 40 INJECTION, POWDER, LYOPHILIZED, FOR SOLUTION INTRAMUSCULAR; INTRAVENOUS at 09:56

## 2020-01-01 RX ADMIN — ARFORMOTEROL TARTRATE 15 MCG: 15 SOLUTION RESPIRATORY (INHALATION) at 20:34

## 2020-01-01 RX ADMIN — BUMETANIDE 2 MG: 0.25 INJECTION INTRAMUSCULAR; INTRAVENOUS at 07:56

## 2020-01-01 RX ADMIN — ARFORMOTEROL TARTRATE 15 MCG: 15 SOLUTION RESPIRATORY (INHALATION) at 19:58

## 2020-01-01 RX ADMIN — Medication 100 MCG/HR: at 12:27

## 2020-01-01 RX ADMIN — IPRATROPIUM BROMIDE AND ALBUTEROL SULFATE 1 AMPULE: 2.5; .5 SOLUTION RESPIRATORY (INHALATION) at 16:30

## 2020-01-01 RX ADMIN — ARFORMOTEROL TARTRATE 15 MCG: 15 SOLUTION RESPIRATORY (INHALATION) at 08:17

## 2020-01-01 RX ADMIN — ACETAMINOPHEN 650 MG: 325 TABLET ORAL at 06:46

## 2020-01-01 RX ADMIN — SODIUM CHLORIDE, PRESERVATIVE FREE 10 ML: 5 INJECTION INTRAVENOUS at 21:00

## 2020-01-01 RX ADMIN — OXYCODONE HYDROCHLORIDE AND ACETAMINOPHEN 1 TABLET: 5; 325 TABLET ORAL at 02:02

## 2020-01-01 RX ADMIN — METHYLPREDNISOLONE SODIUM SUCCINATE 40 MG: 40 INJECTION, POWDER, LYOPHILIZED, FOR SOLUTION INTRAMUSCULAR; INTRAVENOUS at 11:28

## 2020-01-01 RX ADMIN — ALBUTEROL SULFATE 2.5 MG: 2.5 SOLUTION RESPIRATORY (INHALATION) at 03:15

## 2020-01-01 RX ADMIN — CHLORHEXIDINE GLUCONATE 0.12% ORAL RINSE 15 ML: 1.2 LIQUID ORAL at 21:05

## 2020-01-01 RX ADMIN — SODIUM CHLORIDE, PRESERVATIVE FREE 100 UNITS: 5 INJECTION INTRAVENOUS at 20:08

## 2020-01-01 RX ADMIN — ACETAZOLAMIDE 250 MG: 250 TABLET ORAL at 21:23

## 2020-01-01 RX ADMIN — PROPOFOL 10 MCG/KG/MIN: 10 INJECTION, EMULSION INTRAVENOUS at 19:23

## 2020-01-01 RX ADMIN — INSULIN LISPRO 9 UNITS: 100 INJECTION, SOLUTION INTRAVENOUS; SUBCUTANEOUS at 17:41

## 2020-01-01 RX ADMIN — Medication: at 20:09

## 2020-01-01 RX ADMIN — ARFORMOTEROL TARTRATE 15 MCG: 15 SOLUTION RESPIRATORY (INHALATION) at 20:15

## 2020-01-01 RX ADMIN — ALBUTEROL SULFATE 2.5 MG: 2.5 SOLUTION RESPIRATORY (INHALATION) at 16:18

## 2020-01-01 RX ADMIN — CEFEPIME 2 G: 2 INJECTION, POWDER, FOR SOLUTION INTRAVENOUS at 21:09

## 2020-01-01 RX ADMIN — SODIUM CHLORIDE SOLN NEBU 3% 2 ML: 3 NEBU SOLN at 01:13

## 2020-01-01 RX ADMIN — ACETYLCYSTEINE 400 MG: 100 INHALANT RESPIRATORY (INHALATION) at 20:45

## 2020-01-01 RX ADMIN — ARFORMOTEROL TARTRATE 15 MCG: 15 SOLUTION RESPIRATORY (INHALATION) at 10:15

## 2020-01-01 RX ADMIN — SUCRALFATE 1 G: 1 TABLET ORAL at 11:20

## 2020-01-01 RX ADMIN — POTASSIUM CHLORIDE 20 MEQ: 400 INJECTION, SOLUTION INTRAVENOUS at 20:09

## 2020-01-01 RX ADMIN — ACETYLCYSTEINE 400 MG: 100 INHALANT RESPIRATORY (INHALATION) at 19:54

## 2020-01-01 RX ADMIN — Medication 200 MCG/HR: at 19:53

## 2020-01-01 RX ADMIN — ATORVASTATIN CALCIUM 80 MG: 40 TABLET, FILM COATED ORAL at 21:08

## 2020-01-01 RX ADMIN — METHYLPREDNISOLONE SODIUM SUCCINATE 40 MG: 40 INJECTION, POWDER, FOR SOLUTION INTRAMUSCULAR; INTRAVENOUS at 10:21

## 2020-01-01 RX ADMIN — ALBUTEROL SULFATE 2.5 MG: 2.5 SOLUTION RESPIRATORY (INHALATION) at 11:50

## 2020-01-01 RX ADMIN — INSULIN LISPRO 6 UNITS: 100 INJECTION, SOLUTION INTRAVENOUS; SUBCUTANEOUS at 18:29

## 2020-01-01 RX ADMIN — FAMOTIDINE 20 MG: 20 TABLET, FILM COATED ORAL at 09:56

## 2020-01-01 RX ADMIN — IPRATROPIUM BROMIDE AND ALBUTEROL SULFATE 1 AMPULE: .5; 3 SOLUTION RESPIRATORY (INHALATION) at 03:31

## 2020-01-01 RX ADMIN — SODIUM CHLORIDE: 9 INJECTION, SOLUTION INTRAVENOUS at 08:19

## 2020-01-01 RX ADMIN — PREGABALIN 150 MG: 75 CAPSULE ORAL at 20:12

## 2020-01-01 RX ADMIN — ACETYLCYSTEINE 400 MG: 100 INHALANT RESPIRATORY (INHALATION) at 08:23

## 2020-01-01 RX ADMIN — CHLORHEXIDINE GLUCONATE 0.12% ORAL RINSE 15 ML: 1.2 LIQUID ORAL at 10:16

## 2020-01-01 RX ADMIN — INSULIN GLARGINE 36 UNITS: 100 INJECTION, SOLUTION SUBCUTANEOUS at 20:09

## 2020-01-01 RX ADMIN — Medication 200 MCG/HR: at 05:27

## 2020-01-01 RX ADMIN — ACETYLCYSTEINE 400 MG: 100 INHALANT RESPIRATORY (INHALATION) at 08:16

## 2020-01-01 RX ADMIN — SODIUM CHLORIDE: 9 INJECTION, SOLUTION INTRAVENOUS at 07:56

## 2020-01-01 RX ADMIN — PREGABALIN 150 MG: 75 CAPSULE ORAL at 21:54

## 2020-01-01 RX ADMIN — SODIUM CHLORIDE, PRESERVATIVE FREE 10 ML: 5 INJECTION INTRAVENOUS at 17:25

## 2020-01-01 RX ADMIN — CHLORHEXIDINE GLUCONATE 0.12% ORAL RINSE 15 ML: 1.2 LIQUID ORAL at 20:43

## 2020-01-01 RX ADMIN — SODIUM CHLORIDE: 9 INJECTION, SOLUTION INTRAVENOUS at 07:42

## 2020-01-01 RX ADMIN — AZITHROMYCIN MONOHYDRATE 500 MG: 500 INJECTION, POWDER, LYOPHILIZED, FOR SOLUTION INTRAVENOUS at 17:02

## 2020-01-01 RX ADMIN — DEXTROSE MONOHYDRATE 8 MG/HR: 5 INJECTION, SOLUTION INTRAVENOUS at 15:35

## 2020-01-01 RX ADMIN — SODIUM CHLORIDE, PRESERVATIVE FREE 10 ML: 5 INJECTION INTRAVENOUS at 08:23

## 2020-01-01 RX ADMIN — SODIUM CHLORIDE 20 ML: 9 INJECTION, SOLUTION INTRAVENOUS at 20:03

## 2020-01-01 RX ADMIN — ACETAMINOPHEN 650 MG: 325 TABLET ORAL at 18:55

## 2020-01-01 RX ADMIN — SODIUM CHLORIDE, PRESERVATIVE FREE 10 ML: 5 INJECTION INTRAVENOUS at 09:08

## 2020-01-01 RX ADMIN — Medication 3 MG/HR: at 03:02

## 2020-01-01 RX ADMIN — SODIUM CHLORIDE, PRESERVATIVE FREE 300 UNITS: 5 INJECTION INTRAVENOUS at 08:23

## 2020-01-01 RX ADMIN — CHLORHEXIDINE GLUCONATE 0.12% ORAL RINSE 15 ML: 1.2 LIQUID ORAL at 21:40

## 2020-01-01 RX ADMIN — INSULIN LISPRO 6 UNITS: 100 INJECTION, SOLUTION INTRAVENOUS; SUBCUTANEOUS at 06:20

## 2020-01-01 RX ADMIN — CHLORHEXIDINE GLUCONATE 0.12% ORAL RINSE 15 ML: 1.2 LIQUID ORAL at 20:11

## 2020-01-01 RX ADMIN — CHLORHEXIDINE GLUCONATE 15 ML: 1.2 RINSE ORAL at 20:21

## 2020-01-01 RX ADMIN — ARFORMOTEROL TARTRATE 15 MCG: 15 SOLUTION RESPIRATORY (INHALATION) at 22:15

## 2020-01-01 RX ADMIN — METHYLPREDNISOLONE SODIUM SUCCINATE 40 MG: 40 INJECTION, POWDER, LYOPHILIZED, FOR SOLUTION INTRAMUSCULAR; INTRAVENOUS at 01:08

## 2020-01-01 RX ADMIN — ALBUTEROL SULFATE 2.5 MG: 2.5 SOLUTION RESPIRATORY (INHALATION) at 16:20

## 2020-01-01 RX ADMIN — INSULIN LISPRO 6 UNITS: 100 INJECTION, SOLUTION INTRAVENOUS; SUBCUTANEOUS at 11:42

## 2020-01-01 RX ADMIN — IPRATROPIUM BROMIDE AND ALBUTEROL SULFATE 1 AMPULE: 2.5; .5 SOLUTION RESPIRATORY (INHALATION) at 18:26

## 2020-01-01 RX ADMIN — MAGNESIUM SULFATE HEPTAHYDRATE 1 G: 1 INJECTION, SOLUTION INTRAVENOUS at 06:46

## 2020-01-01 RX ADMIN — Medication 200 MCG/HR: at 06:48

## 2020-01-01 RX ADMIN — METHYLPREDNISOLONE SODIUM SUCCINATE 40 MG: 40 INJECTION, POWDER, LYOPHILIZED, FOR SOLUTION INTRAMUSCULAR; INTRAVENOUS at 09:59

## 2020-01-01 RX ADMIN — Medication 8 MG/HR: at 15:10

## 2020-01-01 RX ADMIN — CHLORHEXIDINE GLUCONATE 0.12% ORAL RINSE 15 ML: 1.2 LIQUID ORAL at 09:07

## 2020-01-01 RX ADMIN — BUMETANIDE 0.5 MG/HR: 0.25 INJECTION INTRAMUSCULAR; INTRAVENOUS at 13:58

## 2020-01-01 RX ADMIN — INSULIN LISPRO 3 UNITS: 100 INJECTION, SOLUTION INTRAVENOUS; SUBCUTANEOUS at 20:24

## 2020-01-01 RX ADMIN — CEFEPIME 2 G: 2 INJECTION, POWDER, FOR SOLUTION INTRAVENOUS at 12:31

## 2020-01-01 RX ADMIN — Medication 10 ML: at 21:40

## 2020-01-01 RX ADMIN — DEXTROSE MONOHYDRATE: 50 INJECTION, SOLUTION INTRAVENOUS at 10:42

## 2020-01-01 RX ADMIN — CLOPIDOGREL 75 MG: 75 TABLET, FILM COATED ORAL at 08:15

## 2020-01-01 RX ADMIN — Medication 200 MCG/HR: at 05:32

## 2020-01-01 RX ADMIN — ACETYLCYSTEINE 400 MG: 100 INHALANT RESPIRATORY (INHALATION) at 20:01

## 2020-01-01 RX ADMIN — METHYLPREDNISOLONE SODIUM SUCCINATE 80 MG: 125 INJECTION, POWDER, LYOPHILIZED, FOR SOLUTION INTRAMUSCULAR; INTRAVENOUS at 02:10

## 2020-01-01 RX ADMIN — IPRATROPIUM BROMIDE AND ALBUTEROL SULFATE 1 AMPULE: 2.5; .5 SOLUTION RESPIRATORY (INHALATION) at 21:48

## 2020-01-01 RX ADMIN — MAGNESIUM SULFATE HEPTAHYDRATE 2 G: 40 INJECTION, SOLUTION INTRAVENOUS at 09:02

## 2020-01-01 RX ADMIN — Medication 200 MCG/HR: at 12:24

## 2020-01-01 RX ADMIN — Medication 200 MCG/HR: at 19:34

## 2020-01-01 RX ADMIN — Medication 200 MCG/HR: at 15:45

## 2020-01-01 RX ADMIN — DEXTROSE MONOHYDRATE: 50 INJECTION, SOLUTION INTRAVENOUS at 05:38

## 2020-01-01 RX ADMIN — INSULIN LISPRO 6 UNITS: 100 INJECTION, SOLUTION INTRAVENOUS; SUBCUTANEOUS at 12:25

## 2020-01-01 RX ADMIN — ACETAMINOPHEN 650 MG: 325 TABLET ORAL at 09:09

## 2020-01-01 RX ADMIN — POTASSIUM & SODIUM PHOSPHATES POWDER PACK 280-160-250 MG 500 MG: 280-160-250 PACK at 14:09

## 2020-01-01 RX ADMIN — PREGABALIN 150 MG: 75 CAPSULE ORAL at 09:00

## 2020-01-01 RX ADMIN — ATORVASTATIN CALCIUM 80 MG: 40 TABLET, FILM COATED ORAL at 21:43

## 2020-01-01 RX ADMIN — AMPICILLIN SODIUM AND SULBACTAM SODIUM 3 G: 2; 1 INJECTION, POWDER, FOR SOLUTION INTRAMUSCULAR; INTRAVENOUS at 12:58

## 2020-01-01 RX ADMIN — ALBUTEROL SULFATE 2.5 MG: 2.5 SOLUTION RESPIRATORY (INHALATION) at 20:03

## 2020-01-01 RX ADMIN — ASPIRIN 325 MG: 325 TABLET, FILM COATED ORAL at 09:05

## 2020-01-01 RX ADMIN — INSULIN LISPRO 2 UNITS: 100 INJECTION, SOLUTION INTRAVENOUS; SUBCUTANEOUS at 17:33

## 2020-01-01 RX ADMIN — DEXTROSE MONOHYDRATE 10 MG/HR: 5 INJECTION, SOLUTION INTRAVENOUS at 22:13

## 2020-01-01 RX ADMIN — CHLORHEXIDINE GLUCONATE 15 ML: 1.2 RINSE ORAL at 21:08

## 2020-01-01 RX ADMIN — PREGABALIN 150 MG: 75 CAPSULE ORAL at 08:42

## 2020-01-01 RX ADMIN — INSULIN LISPRO 6 UNITS: 100 INJECTION, SOLUTION INTRAVENOUS; SUBCUTANEOUS at 12:07

## 2020-01-01 RX ADMIN — INSULIN LISPRO 3 UNITS: 100 INJECTION, SOLUTION INTRAVENOUS; SUBCUTANEOUS at 05:30

## 2020-01-01 RX ADMIN — POTASSIUM & SODIUM PHOSPHATES POWDER PACK 280-160-250 MG 500 MG: 280-160-250 PACK at 22:04

## 2020-01-01 RX ADMIN — ARFORMOTEROL TARTRATE 15 MCG: 15 SOLUTION RESPIRATORY (INHALATION) at 08:24

## 2020-01-01 RX ADMIN — ARFORMOTEROL TARTRATE 15 MCG: 15 SOLUTION RESPIRATORY (INHALATION) at 08:00

## 2020-01-01 RX ADMIN — CEFEPIME 2 G: 2 INJECTION, POWDER, FOR SOLUTION INTRAMUSCULAR; INTRAVENOUS at 12:34

## 2020-01-01 RX ADMIN — DEXTROSE MONOHYDRATE 8 MG/HR: 5 INJECTION, SOLUTION INTRAVENOUS at 04:07

## 2020-01-01 RX ADMIN — INSULIN GLARGINE 36 UNITS: 100 INJECTION, SOLUTION SUBCUTANEOUS at 19:55

## 2020-01-01 RX ADMIN — PROPOFOL 15 MCG/KG/MIN: 10 INJECTION, EMULSION INTRAVENOUS at 20:13

## 2020-01-01 RX ADMIN — RACEPINEPHRINE HYDROCHLORIDE 11.25 MG: 11.25 SOLUTION RESPIRATORY (INHALATION) at 11:40

## 2020-01-01 RX ADMIN — Medication: at 09:48

## 2020-01-01 RX ADMIN — CHLORHEXIDINE GLUCONATE 15 ML: 1.2 RINSE ORAL at 20:17

## 2020-01-01 RX ADMIN — DEXTROSE MONOHYDRATE 7 MG/HR: 5 INJECTION, SOLUTION INTRAVENOUS at 17:26

## 2020-01-01 RX ADMIN — ACETAZOLAMIDE 250 MG: 250 TABLET ORAL at 10:51

## 2020-01-01 RX ADMIN — CHLORHEXIDINE GLUCONATE 15 ML: 1.2 RINSE ORAL at 07:37

## 2020-01-01 RX ADMIN — INSULIN LISPRO 3 UNITS: 100 INJECTION, SOLUTION INTRAVENOUS; SUBCUTANEOUS at 23:02

## 2020-01-01 RX ADMIN — POTASSIUM CHLORIDE 10 MEQ: 10 INJECTION, SOLUTION INTRAVENOUS at 19:00

## 2020-01-01 RX ADMIN — Medication 200 MCG/HR: at 01:00

## 2020-01-01 RX ADMIN — INSULIN LISPRO 3 UNITS: 100 INJECTION, SOLUTION INTRAVENOUS; SUBCUTANEOUS at 18:01

## 2020-01-01 RX ADMIN — SODIUM CHLORIDE SOLN NEBU 3% 2 ML: 3 NEBU SOLN at 17:38

## 2020-01-01 RX ADMIN — SUCRALFATE 1 G: 1 TABLET ORAL at 11:07

## 2020-01-01 RX ADMIN — SUCRALFATE 1 G: 1 TABLET ORAL at 19:55

## 2020-01-01 RX ADMIN — ACETYLCYSTEINE 400 MG: 100 INHALANT RESPIRATORY (INHALATION) at 20:05

## 2020-01-01 RX ADMIN — ALBUTEROL SULFATE 2.5 MG: 2.5 SOLUTION RESPIRATORY (INHALATION) at 16:52

## 2020-01-01 RX ADMIN — IPRATROPIUM BROMIDE AND ALBUTEROL SULFATE 1 AMPULE: 2.5; .5 SOLUTION RESPIRATORY (INHALATION) at 10:14

## 2020-01-01 RX ADMIN — Medication 200 MCG/HR: at 18:04

## 2020-01-01 RX ADMIN — METHYLPREDNISOLONE SODIUM SUCCINATE 40 MG: 40 INJECTION, POWDER, LYOPHILIZED, FOR SOLUTION INTRAMUSCULAR; INTRAVENOUS at 03:00

## 2020-01-01 RX ADMIN — ALBUTEROL SULFATE 2.5 MG: 2.5 SOLUTION RESPIRATORY (INHALATION) at 20:16

## 2020-01-01 RX ADMIN — ALBUTEROL SULFATE 2.5 MG: 2.5 SOLUTION RESPIRATORY (INHALATION) at 19:45

## 2020-01-01 RX ADMIN — INSULIN LISPRO 6 UNITS: 100 INJECTION, SOLUTION INTRAVENOUS; SUBCUTANEOUS at 16:15

## 2020-01-01 RX ADMIN — DEXTROSE MONOHYDRATE: 50 INJECTION, SOLUTION INTRAVENOUS at 02:22

## 2020-01-01 RX ADMIN — ACETYLCYSTEINE 400 MG: 100 INHALANT RESPIRATORY (INHALATION) at 09:24

## 2020-01-01 RX ADMIN — ALBUTEROL SULFATE 2.5 MG: 2.5 SOLUTION RESPIRATORY (INHALATION) at 20:00

## 2020-01-01 RX ADMIN — ARFORMOTEROL TARTRATE 15 MCG: 15 SOLUTION RESPIRATORY (INHALATION) at 21:47

## 2020-01-01 RX ADMIN — Medication 200 MCG/HR: at 16:28

## 2020-01-01 RX ADMIN — INSULIN GLARGINE 36 UNITS: 100 INJECTION, SOLUTION SUBCUTANEOUS at 21:16

## 2020-01-01 RX ADMIN — METHYLPREDNISOLONE SODIUM SUCCINATE 40 MG: 40 INJECTION, POWDER, LYOPHILIZED, FOR SOLUTION INTRAMUSCULAR; INTRAVENOUS at 18:07

## 2020-01-01 RX ADMIN — AMPICILLIN SODIUM AND SULBACTAM SODIUM 3 G: 2; 1 INJECTION, POWDER, FOR SOLUTION INTRAMUSCULAR; INTRAVENOUS at 06:21

## 2020-01-01 RX ADMIN — PREGABALIN 150 MG: 75 CAPSULE ORAL at 21:08

## 2020-01-01 RX ADMIN — SODIUM CHLORIDE: 9 INJECTION, SOLUTION INTRAVENOUS at 16:00

## 2020-01-01 RX ADMIN — PETROLATUM: 42 OINTMENT TOPICAL at 20:17

## 2020-01-01 RX ADMIN — AMPICILLIN SODIUM AND SULBACTAM SODIUM 3 G: 2; 1 INJECTION, POWDER, FOR SOLUTION INTRAMUSCULAR; INTRAVENOUS at 13:34

## 2020-01-01 RX ADMIN — ACETYLCYSTEINE 400 MG: 100 INHALANT RESPIRATORY (INHALATION) at 11:56

## 2020-01-01 RX ADMIN — Medication 50 MCG/HR: at 09:00

## 2020-01-01 RX ADMIN — INSULIN LISPRO 6 UNITS: 100 INJECTION, SOLUTION INTRAVENOUS; SUBCUTANEOUS at 11:28

## 2020-01-01 RX ADMIN — Medication: at 20:50

## 2020-01-01 RX ADMIN — Medication: at 20:17

## 2020-01-01 RX ADMIN — SODIUM CHLORIDE, PRESERVATIVE FREE 100 UNITS: 5 INJECTION INTRAVENOUS at 08:26

## 2020-01-01 RX ADMIN — INSULIN LISPRO 3 UNITS: 100 INJECTION, SOLUTION INTRAVENOUS; SUBCUTANEOUS at 05:35

## 2020-01-01 RX ADMIN — IPRATROPIUM BROMIDE AND ALBUTEROL SULFATE 1 AMPULE: 2.5; .5 SOLUTION RESPIRATORY (INHALATION) at 20:52

## 2020-01-01 RX ADMIN — CEFEPIME 2 G: 2 INJECTION, POWDER, FOR SOLUTION INTRAVENOUS at 11:56

## 2020-01-01 RX ADMIN — ALBUMIN (HUMAN) 25 G: 0.25 INJECTION, SOLUTION INTRAVENOUS at 02:24

## 2020-01-01 RX ADMIN — FLUTICASONE PROPIONATE 2 SPRAY: 50 SPRAY, METERED NASAL at 07:50

## 2020-01-01 RX ADMIN — INSULIN LISPRO 2 UNITS: 100 INJECTION, SOLUTION INTRAVENOUS; SUBCUTANEOUS at 01:08

## 2020-01-01 RX ADMIN — CEFEPIME 2 G: 2 INJECTION, POWDER, FOR SOLUTION INTRAVENOUS at 12:35

## 2020-01-01 RX ADMIN — SODIUM CHLORIDE, PRESERVATIVE FREE 100 UNITS: 5 INJECTION INTRAVENOUS at 08:39

## 2020-01-01 RX ADMIN — ALBUTEROL SULFATE 2.5 MG: 2.5 SOLUTION RESPIRATORY (INHALATION) at 09:23

## 2020-01-01 RX ADMIN — ARFORMOTEROL TARTRATE 15 MCG: 15 SOLUTION RESPIRATORY (INHALATION) at 10:57

## 2020-01-01 RX ADMIN — ATORVASTATIN CALCIUM 80 MG: 40 TABLET, FILM COATED ORAL at 20:12

## 2020-01-01 RX ADMIN — CHLORHEXIDINE GLUCONATE 0.12% ORAL RINSE 15 ML: 1.2 LIQUID ORAL at 08:52

## 2020-01-01 RX ADMIN — PETROLATUM: 42 OINTMENT TOPICAL at 16:00

## 2020-01-01 RX ADMIN — ACETAMINOPHEN 650 MG: 325 TABLET ORAL at 20:43

## 2020-01-01 RX ADMIN — SODIUM CHLORIDE: 9 INJECTION, SOLUTION INTRAVENOUS at 15:36

## 2020-01-01 RX ADMIN — Medication 200 MCG/HR: at 09:47

## 2020-01-01 RX ADMIN — ALBUTEROL SULFATE 2.5 MG: 2.5 SOLUTION RESPIRATORY (INHALATION) at 09:12

## 2020-01-01 RX ADMIN — BUMETANIDE 2 MG: 0.25 INJECTION INTRAMUSCULAR; INTRAVENOUS at 08:18

## 2020-01-01 RX ADMIN — ASPIRIN 325 MG: 325 TABLET, FILM COATED ORAL at 08:36

## 2020-01-01 RX ADMIN — INSULIN LISPRO 9 UNITS: 100 INJECTION, SOLUTION INTRAVENOUS; SUBCUTANEOUS at 12:35

## 2020-01-01 RX ADMIN — CEFTRIAXONE 2 G: 2 INJECTION, POWDER, FOR SOLUTION INTRAMUSCULAR; INTRAVENOUS at 17:08

## 2020-01-01 RX ADMIN — SODIUM CHLORIDE, PRESERVATIVE FREE 10 ML: 5 INJECTION INTRAVENOUS at 21:05

## 2020-01-01 RX ADMIN — CHLORHEXIDINE GLUCONATE 15 ML: 1.2 RINSE ORAL at 19:49

## 2020-01-01 RX ADMIN — PANTOPRAZOLE SODIUM 40 MG: 40 INJECTION, POWDER, LYOPHILIZED, FOR SOLUTION INTRAVENOUS at 08:26

## 2020-01-01 RX ADMIN — AMPICILLIN SODIUM AND SULBACTAM SODIUM 3 G: 2; 1 INJECTION, POWDER, FOR SOLUTION INTRAMUSCULAR; INTRAVENOUS at 12:07

## 2020-01-01 RX ADMIN — PREGABALIN 150 MG: 75 CAPSULE ORAL at 08:17

## 2020-01-01 RX ADMIN — SODIUM CHLORIDE 20 ML: 9 INJECTION, SOLUTION INTRAVENOUS at 13:30

## 2020-01-01 RX ADMIN — DEXTROSE MONOHYDRATE 10 MG/HR: 5 INJECTION, SOLUTION INTRAVENOUS at 05:28

## 2020-01-01 RX ADMIN — POTASSIUM BICARBONATE 40 MEQ: 782 TABLET, EFFERVESCENT ORAL at 14:35

## 2020-01-01 RX ADMIN — Medication 200 MCG/HR: at 01:20

## 2020-01-01 RX ADMIN — BUMETANIDE 2 MG: 0.25 INJECTION INTRAMUSCULAR; INTRAVENOUS at 11:05

## 2020-01-01 RX ADMIN — WATER 1 ML: 1 INJECTION INTRAMUSCULAR; INTRAVENOUS; SUBCUTANEOUS at 09:34

## 2020-01-01 RX ADMIN — INSULIN LISPRO 3 UNITS: 100 INJECTION, SOLUTION INTRAVENOUS; SUBCUTANEOUS at 09:14

## 2020-01-01 RX ADMIN — ALBUTEROL SULFATE 2.5 MG: 2.5 SOLUTION RESPIRATORY (INHALATION) at 20:39

## 2020-01-01 RX ADMIN — Medication 200 MCG/HR: at 03:54

## 2020-01-01 RX ADMIN — VANCOMYCIN HYDROCHLORIDE 2000 MG: 10 INJECTION, POWDER, LYOPHILIZED, FOR SOLUTION INTRAVENOUS at 14:36

## 2020-01-01 RX ADMIN — CHLORHEXIDINE GLUCONATE 15 ML: 1.2 RINSE ORAL at 20:43

## 2020-01-01 RX ADMIN — IPRATROPIUM BROMIDE AND ALBUTEROL SULFATE 1 AMPULE: 2.5; .5 SOLUTION RESPIRATORY (INHALATION) at 09:44

## 2020-01-01 RX ADMIN — CHLORHEXIDINE GLUCONATE 0.12% ORAL RINSE 15 ML: 1.2 LIQUID ORAL at 08:17

## 2020-01-01 RX ADMIN — INSULIN LISPRO 3 UNITS: 100 INJECTION, SOLUTION INTRAVENOUS; SUBCUTANEOUS at 16:29

## 2020-01-01 RX ADMIN — Medication 200 MCG/HR: at 13:34

## 2020-01-01 RX ADMIN — AMPICILLIN SODIUM AND SULBACTAM SODIUM 3 G: 2; 1 INJECTION, POWDER, FOR SOLUTION INTRAMUSCULAR; INTRAVENOUS at 18:26

## 2020-01-01 RX ADMIN — ALBUTEROL SULFATE 2.5 MG: 2.5 SOLUTION RESPIRATORY (INHALATION) at 20:44

## 2020-01-01 RX ADMIN — PANTOPRAZOLE SODIUM 40 MG: 40 INJECTION, POWDER, LYOPHILIZED, FOR SOLUTION INTRAVENOUS at 08:17

## 2020-01-01 RX ADMIN — PERFLUTREN 1.65 MG: 6.52 INJECTION, SUSPENSION INTRAVENOUS at 14:35

## 2020-01-01 RX ADMIN — CHLORHEXIDINE GLUCONATE 0.12% ORAL RINSE 15 ML: 1.2 LIQUID ORAL at 07:50

## 2020-01-01 RX ADMIN — METHYLPREDNISOLONE SODIUM SUCCINATE 40 MG: 40 INJECTION, POWDER, FOR SOLUTION INTRAMUSCULAR; INTRAVENOUS at 09:09

## 2020-01-01 RX ADMIN — METHYLPREDNISOLONE SODIUM SUCCINATE 40 MG: 40 INJECTION, POWDER, LYOPHILIZED, FOR SOLUTION INTRAMUSCULAR; INTRAVENOUS at 02:34

## 2020-01-01 RX ADMIN — ALBUTEROL SULFATE 2.5 MG: 2.5 SOLUTION RESPIRATORY (INHALATION) at 04:11

## 2020-01-01 RX ADMIN — IPRATROPIUM BROMIDE AND ALBUTEROL SULFATE 1 AMPULE: 2.5; .5 SOLUTION RESPIRATORY (INHALATION) at 17:38

## 2020-01-01 RX ADMIN — SODIUM CHLORIDE: 9 INJECTION, SOLUTION INTRAVENOUS at 00:23

## 2020-01-01 RX ADMIN — Medication 200 MCG/HR: at 18:28

## 2020-01-01 RX ADMIN — Medication 200 MCG/HR: at 11:37

## 2020-01-01 RX ADMIN — SODIUM CHLORIDE, PRESERVATIVE FREE 10 ML: 5 INJECTION INTRAVENOUS at 09:17

## 2020-01-01 RX ADMIN — INSULIN LISPRO 9 UNITS: 100 INJECTION, SOLUTION INTRAVENOUS; SUBCUTANEOUS at 06:05

## 2020-01-01 RX ADMIN — AMPICILLIN SODIUM AND SULBACTAM SODIUM 3 G: 2; 1 INJECTION, POWDER, FOR SOLUTION INTRAMUSCULAR; INTRAVENOUS at 20:54

## 2020-01-01 RX ADMIN — CHLORHEXIDINE GLUCONATE 0.12% ORAL RINSE 15 ML: 1.2 LIQUID ORAL at 09:56

## 2020-01-01 RX ADMIN — ACETAZOLAMIDE 250 MG: 250 TABLET ORAL at 11:05

## 2020-01-01 RX ADMIN — SODIUM CHLORIDE: 9 INJECTION, SOLUTION INTRAVENOUS at 16:45

## 2020-01-01 RX ADMIN — ATORVASTATIN CALCIUM 80 MG: 40 TABLET, FILM COATED ORAL at 21:09

## 2020-01-01 RX ADMIN — IPRATROPIUM BROMIDE AND ALBUTEROL SULFATE 1 AMPULE: 2.5; .5 SOLUTION RESPIRATORY (INHALATION) at 17:48

## 2020-01-01 RX ADMIN — INSULIN LISPRO 6 UNITS: 100 INJECTION, SOLUTION INTRAVENOUS; SUBCUTANEOUS at 17:59

## 2020-01-01 RX ADMIN — Medication 150 MCG/HR: at 14:48

## 2020-01-01 RX ADMIN — Medication 200 MCG/HR: at 06:38

## 2020-01-01 RX ADMIN — Medication 200 MCG/HR: at 05:52

## 2020-01-01 RX ADMIN — ARFORMOTEROL TARTRATE 15 MCG: 15 SOLUTION RESPIRATORY (INHALATION) at 20:54

## 2020-01-01 RX ADMIN — METHYLPREDNISOLONE SODIUM SUCCINATE 40 MG: 40 INJECTION, POWDER, FOR SOLUTION INTRAMUSCULAR; INTRAVENOUS at 08:09

## 2020-01-01 RX ADMIN — AMPICILLIN SODIUM AND SULBACTAM SODIUM 3 G: 2; 1 INJECTION, POWDER, FOR SOLUTION INTRAMUSCULAR; INTRAVENOUS at 00:36

## 2020-01-01 RX ADMIN — INSULIN LISPRO 3 UNITS: 100 INJECTION, SOLUTION INTRAVENOUS; SUBCUTANEOUS at 23:11

## 2020-01-01 RX ADMIN — ACETAMINOPHEN 650 MG: 650 SUPPOSITORY RECTAL at 23:32

## 2020-01-01 RX ADMIN — Medication 10 ML: at 08:16

## 2020-01-01 RX ADMIN — INSULIN GLARGINE 36 UNITS: 100 INJECTION, SOLUTION SUBCUTANEOUS at 20:42

## 2020-01-01 RX ADMIN — BUMETANIDE 2 MG: 0.25 INJECTION, SOLUTION INTRAMUSCULAR; INTRAVENOUS at 10:35

## 2020-01-01 RX ADMIN — IPRATROPIUM BROMIDE AND ALBUTEROL SULFATE 1 AMPULE: .5; 3 SOLUTION RESPIRATORY (INHALATION) at 13:27

## 2020-01-01 RX ADMIN — ARFORMOTEROL TARTRATE 15 MCG: 15 SOLUTION RESPIRATORY (INHALATION) at 20:55

## 2020-01-01 RX ADMIN — SODIUM CHLORIDE, PRESERVATIVE FREE 10 ML: 5 INJECTION INTRAVENOUS at 08:36

## 2020-01-01 RX ADMIN — ACETAMINOPHEN 650 MG: 325 TABLET ORAL at 19:04

## 2020-01-01 RX ADMIN — ATORVASTATIN CALCIUM 80 MG: 40 TABLET, FILM COATED ORAL at 19:49

## 2020-01-01 RX ADMIN — Medication 200 MCG/HR: at 17:56

## 2020-01-01 RX ADMIN — SODIUM CHLORIDE: 9 INJECTION, SOLUTION INTRAVENOUS at 00:50

## 2020-01-01 RX ADMIN — SUCRALFATE 1 G: 1 TABLET ORAL at 06:27

## 2020-01-01 RX ADMIN — CEFEPIME 2 G: 2 INJECTION, POWDER, FOR SOLUTION INTRAVENOUS at 13:00

## 2020-01-01 RX ADMIN — CHLORHEXIDINE GLUCONATE 15 ML: 1.2 RINSE ORAL at 08:09

## 2020-01-01 RX ADMIN — Medication 200 MCG/HR: at 10:00

## 2020-01-01 RX ADMIN — AMPICILLIN SODIUM AND SULBACTAM SODIUM 3 G: 2; 1 INJECTION, POWDER, FOR SOLUTION INTRAMUSCULAR; INTRAVENOUS at 06:52

## 2020-01-01 RX ADMIN — ALBUTEROL SULFATE 2.5 MG: 2.5 SOLUTION RESPIRATORY (INHALATION) at 12:49

## 2020-01-01 RX ADMIN — SUCCINYLCHOLINE CHLORIDE: 20 INJECTION, SOLUTION INTRAMUSCULAR; INTRAVENOUS at 12:36

## 2020-01-01 RX ADMIN — CEFEPIME 2 G: 2 INJECTION, POWDER, FOR SOLUTION INTRAVENOUS at 19:08

## 2020-01-01 RX ADMIN — IPRATROPIUM BROMIDE AND ALBUTEROL SULFATE 1 AMPULE: .5; 3 SOLUTION RESPIRATORY (INHALATION) at 09:31

## 2020-01-01 RX ADMIN — INSULIN LISPRO 2 UNITS: 100 INJECTION, SOLUTION INTRAVENOUS; SUBCUTANEOUS at 00:57

## 2020-01-01 RX ADMIN — AMPICILLIN SODIUM AND SULBACTAM SODIUM 3 G: 2; 1 INJECTION, POWDER, FOR SOLUTION INTRAMUSCULAR; INTRAVENOUS at 00:01

## 2020-01-01 RX ADMIN — INSULIN LISPRO 2 UNITS: 100 INJECTION, SOLUTION INTRAVENOUS; SUBCUTANEOUS at 12:55

## 2020-01-01 RX ADMIN — ALBUTEROL SULFATE 2.5 MG: 2.5 SOLUTION RESPIRATORY (INHALATION) at 23:54

## 2020-01-01 RX ADMIN — POTASSIUM CHLORIDE: 149 INJECTION, SOLUTION, CONCENTRATE INTRAVENOUS at 12:34

## 2020-01-01 RX ADMIN — Medication 7 MG/HR: at 06:46

## 2020-01-01 RX ADMIN — POTASSIUM CHLORIDE 10 MEQ: 10 INJECTION, SOLUTION INTRAVENOUS at 18:56

## 2020-01-01 RX ADMIN — Medication 200 MCG/HR: at 01:03

## 2020-01-01 RX ADMIN — INSULIN LISPRO 6 UNITS: 100 INJECTION, SOLUTION INTRAVENOUS; SUBCUTANEOUS at 12:30

## 2020-01-01 RX ADMIN — ACETAZOLAMIDE 250 MG: 250 TABLET ORAL at 20:09

## 2020-01-01 RX ADMIN — CEFEPIME 2 G: 2 INJECTION, POWDER, FOR SOLUTION INTRAVENOUS at 13:59

## 2020-01-01 RX ADMIN — ACETAMINOPHEN 650 MG: 325 TABLET ORAL at 08:24

## 2020-01-01 RX ADMIN — ACETAZOLAMIDE 250 MG: 250 TABLET ORAL at 10:19

## 2020-01-01 RX ADMIN — METHYLPREDNISOLONE SODIUM SUCCINATE 40 MG: 40 INJECTION, POWDER, LYOPHILIZED, FOR SOLUTION INTRAMUSCULAR; INTRAVENOUS at 12:00

## 2020-01-01 RX ADMIN — BUMETANIDE 0.5 MG/HR: 0.25 INJECTION INTRAMUSCULAR; INTRAVENOUS at 11:25

## 2020-01-01 RX ADMIN — SODIUM CHLORIDE SOLN NEBU 3% 2 ML: 3 NEBU SOLN at 20:33

## 2020-01-01 RX ADMIN — ALBUTEROL SULFATE 2.5 MG: 2.5 SOLUTION RESPIRATORY (INHALATION) at 17:55

## 2020-01-01 RX ADMIN — POTASSIUM CHLORIDE 10 MEQ: 10 INJECTION, SOLUTION INTRAVENOUS at 09:18

## 2020-01-01 RX ADMIN — Medication 8 MG/HR: at 21:02

## 2020-01-01 RX ADMIN — PETROLATUM: 42 OINTMENT TOPICAL at 12:33

## 2020-01-01 RX ADMIN — ALBUTEROL SULFATE 2.5 MG: 2.5 SOLUTION RESPIRATORY (INHALATION) at 13:50

## 2020-01-01 RX ADMIN — Medication 200 MCG/HR: at 13:57

## 2020-01-01 RX ADMIN — SUCRALFATE 1 G: 1 TABLET ORAL at 19:04

## 2020-01-01 RX ADMIN — ALBUTEROL SULFATE 2.5 MG: 2.5 SOLUTION RESPIRATORY (INHALATION) at 16:48

## 2020-01-01 RX ADMIN — CEFEPIME 2 G: 2 INJECTION, POWDER, FOR SOLUTION INTRAVENOUS at 04:59

## 2020-01-01 RX ADMIN — ALBUTEROL SULFATE 2.5 MG: 2.5 SOLUTION RESPIRATORY (INHALATION) at 12:28

## 2020-01-01 RX ADMIN — INSULIN LISPRO 3 UNITS: 100 INJECTION, SOLUTION INTRAVENOUS; SUBCUTANEOUS at 23:56

## 2020-01-01 RX ADMIN — CEFEPIME 2 G: 2 INJECTION, POWDER, FOR SOLUTION INTRAVENOUS at 05:08

## 2020-01-01 RX ADMIN — IPRATROPIUM BROMIDE AND ALBUTEROL SULFATE 1 AMPULE: .5; 3 SOLUTION RESPIRATORY (INHALATION) at 08:36

## 2020-01-01 RX ADMIN — ACETAMINOPHEN 650 MG: 325 TABLET ORAL at 08:53

## 2020-01-01 RX ADMIN — CISATRACURIUM BESYLATE 2 MCG/KG/MIN: 10 INJECTION INTRAVENOUS at 02:28

## 2020-01-01 RX ADMIN — SODIUM CHLORIDE SOLN NEBU 3% 2 ML: 3 NEBU SOLN at 17:13

## 2020-01-01 RX ADMIN — SUCRALFATE 1 G: 1 TABLET ORAL at 12:00

## 2020-01-01 RX ADMIN — DEXTROSE MONOHYDRATE 10 MG/HR: 5 INJECTION, SOLUTION INTRAVENOUS at 23:16

## 2020-01-01 RX ADMIN — POTASSIUM CHLORIDE 40 MEQ: 400 INJECTION, SOLUTION INTRAVENOUS at 09:02

## 2020-01-01 RX ADMIN — FUROSEMIDE 40 MG: 10 INJECTION, SOLUTION INTRAMUSCULAR; INTRAVENOUS at 18:33

## 2020-01-01 RX ADMIN — ACETYLCYSTEINE 400 MG: 100 INHALANT RESPIRATORY (INHALATION) at 20:16

## 2020-01-01 RX ADMIN — Medication 5 MG/HR: at 15:19

## 2020-01-01 RX ADMIN — LEVOFLOXACIN 750 MG: 5 INJECTION, SOLUTION INTRAVENOUS at 09:55

## 2020-01-01 RX ADMIN — OXYCODONE HYDROCHLORIDE AND ACETAMINOPHEN 1 TABLET: 5; 325 TABLET ORAL at 12:15

## 2020-01-01 RX ADMIN — Medication 10 ML: at 22:11

## 2020-01-01 RX ADMIN — FLUTICASONE PROPIONATE 2 SPRAY: 50 SPRAY, METERED NASAL at 08:23

## 2020-01-01 RX ADMIN — INSULIN LISPRO 9 UNITS: 100 INJECTION, SOLUTION INTRAVENOUS; SUBCUTANEOUS at 12:02

## 2020-01-01 RX ADMIN — CHLORHEXIDINE GLUCONATE 15 ML: 1.2 RINSE ORAL at 07:38

## 2020-01-01 RX ADMIN — ALBUTEROL SULFATE 2.5 MG: 2.5 SOLUTION RESPIRATORY (INHALATION) at 19:54

## 2020-01-01 RX ADMIN — LORAZEPAM 0.5 MG: 2 INJECTION INTRAMUSCULAR at 06:49

## 2020-01-01 RX ADMIN — Medication 200 MCG/HR: at 20:29

## 2020-01-01 RX ADMIN — BUMETANIDE 1 MG: 0.25 INJECTION INTRAMUSCULAR; INTRAVENOUS at 18:35

## 2020-01-01 RX ADMIN — POTASSIUM BICARBONATE 20 MEQ: 782 TABLET, EFFERVESCENT ORAL at 13:50

## 2020-01-01 RX ADMIN — CARVEDILOL 25 MG: 25 TABLET, FILM COATED ORAL at 02:02

## 2020-01-01 RX ADMIN — ARFORMOTEROL TARTRATE 15 MCG: 15 SOLUTION RESPIRATORY (INHALATION) at 10:53

## 2020-01-01 RX ADMIN — LABETALOL HYDROCHLORIDE 5 MG: 5 INJECTION INTRAVENOUS at 12:35

## 2020-01-01 RX ADMIN — ENOXAPARIN SODIUM 40 MG: 40 INJECTION SUBCUTANEOUS at 08:27

## 2020-01-01 RX ADMIN — Medication 200 MCG/HR: at 21:41

## 2020-01-01 RX ADMIN — SUCRALFATE 1 G: 1 TABLET ORAL at 18:02

## 2020-01-01 RX ADMIN — PANTOPRAZOLE SODIUM 40 MG: 40 INJECTION, POWDER, LYOPHILIZED, FOR SOLUTION INTRAVENOUS at 10:28

## 2020-01-01 RX ADMIN — ATORVASTATIN CALCIUM 80 MG: 40 TABLET, FILM COATED ORAL at 20:21

## 2020-01-01 RX ADMIN — FAMOTIDINE 20 MG: 20 TABLET, FILM COATED ORAL at 07:58

## 2020-01-01 RX ADMIN — ATORVASTATIN CALCIUM 80 MG: 40 TABLET, FILM COATED ORAL at 02:02

## 2020-01-01 RX ADMIN — FAMOTIDINE 20 MG: 10 INJECTION INTRAVENOUS at 10:20

## 2020-01-01 RX ADMIN — METHYLPREDNISOLONE SODIUM SUCCINATE 40 MG: 40 INJECTION, POWDER, LYOPHILIZED, FOR SOLUTION INTRAMUSCULAR; INTRAVENOUS at 08:27

## 2020-01-01 RX ADMIN — ALBUTEROL SULFATE 2.5 MG: 2.5 SOLUTION RESPIRATORY (INHALATION) at 10:56

## 2020-01-01 RX ADMIN — Medication 7 MG/HR: at 09:21

## 2020-01-01 RX ADMIN — POTASSIUM CHLORIDE 40 MEQ: 400 INJECTION, SOLUTION INTRAVENOUS at 08:17

## 2020-01-01 RX ADMIN — Medication 200 MCG/HR: at 14:54

## 2020-01-01 RX ADMIN — ATORVASTATIN CALCIUM 80 MG: 40 TABLET, FILM COATED ORAL at 21:38

## 2020-01-01 RX ADMIN — Medication 200 MCG/HR: at 00:39

## 2020-01-01 RX ADMIN — ASPIRIN 325 MG: 325 TABLET, FILM COATED ORAL at 08:41

## 2020-01-01 RX ADMIN — SODIUM CHLORIDE SOLN NEBU 3% 2 ML: 3 NEBU SOLN at 14:01

## 2020-01-01 RX ADMIN — ALBUMIN (HUMAN) 25 G: 0.25 INJECTION, SOLUTION INTRAVENOUS at 03:05

## 2020-01-01 RX ADMIN — SODIUM CHLORIDE, PRESERVATIVE FREE 10 ML: 5 INJECTION INTRAVENOUS at 08:56

## 2020-01-01 RX ADMIN — CLOPIDOGREL 75 MG: 75 TABLET, FILM COATED ORAL at 10:22

## 2020-01-01 RX ADMIN — MIDAZOLAM HYDROCHLORIDE 4 MG: 1 INJECTION INTRAMUSCULAR; INTRAVENOUS at 09:40

## 2020-01-01 RX ADMIN — METHYLPREDNISOLONE SODIUM SUCCINATE 40 MG: 40 INJECTION, POWDER, FOR SOLUTION INTRAMUSCULAR; INTRAVENOUS at 08:45

## 2020-01-01 RX ADMIN — IPRATROPIUM BROMIDE AND ALBUTEROL SULFATE 1 AMPULE: 2.5; .5 SOLUTION RESPIRATORY (INHALATION) at 12:14

## 2020-01-01 RX ADMIN — CEFEPIME 2 G: 2 INJECTION, POWDER, FOR SOLUTION INTRAVENOUS at 21:06

## 2020-01-01 RX ADMIN — METHYLPREDNISOLONE SODIUM SUCCINATE 40 MG: 40 INJECTION, POWDER, LYOPHILIZED, FOR SOLUTION INTRAMUSCULAR; INTRAVENOUS at 11:17

## 2020-01-01 RX ADMIN — Medication 8 MG/HR: at 21:33

## 2020-01-01 RX ADMIN — CEFEPIME 2 G: 2 INJECTION, POWDER, FOR SOLUTION INTRAVENOUS at 05:03

## 2020-01-01 RX ADMIN — POTASSIUM CHLORIDE 40 MEQ: 400 INJECTION, SOLUTION INTRAVENOUS at 07:39

## 2020-01-01 RX ADMIN — Medication 200 MCG/HR: at 06:16

## 2020-01-01 RX ADMIN — SODIUM CHLORIDE SOLN NEBU 3% 2 ML: 3 NEBU SOLN at 11:37

## 2020-01-01 RX ADMIN — SODIUM CHLORIDE 20 ML: 9 INJECTION, SOLUTION INTRAVENOUS at 00:23

## 2020-01-01 RX ADMIN — Medication 200 MCG/HR: at 20:22

## 2020-01-01 RX ADMIN — SODIUM CHLORIDE, PRESERVATIVE FREE 100 UNITS: 5 INJECTION INTRAVENOUS at 21:02

## 2020-01-01 RX ADMIN — PANTOPRAZOLE SODIUM 40 MG: 40 INJECTION, POWDER, LYOPHILIZED, FOR SOLUTION INTRAVENOUS at 08:28

## 2020-01-01 RX ADMIN — Medication 10 ML: at 20:25

## 2020-01-01 RX ADMIN — SODIUM CHLORIDE SOLN NEBU 3% 2 ML: 3 NEBU SOLN at 21:31

## 2020-01-01 RX ADMIN — ALBUTEROL SULFATE 2.5 MG: 2.5 SOLUTION RESPIRATORY (INHALATION) at 23:32

## 2020-01-01 RX ADMIN — Medication 200 MCG/HR: at 18:34

## 2020-01-01 RX ADMIN — ALBUTEROL SULFATE 2.5 MG: 2.5 SOLUTION RESPIRATORY (INHALATION) at 23:09

## 2020-01-01 RX ADMIN — ALBUTEROL SULFATE 2.5 MG: 2.5 SOLUTION RESPIRATORY (INHALATION) at 15:35

## 2020-01-01 RX ADMIN — SODIUM CHLORIDE, PRESERVATIVE FREE 10 ML: 5 INJECTION INTRAVENOUS at 20:08

## 2020-01-01 RX ADMIN — SODIUM CHLORIDE SOLN NEBU 3% 2 ML: 3 NEBU SOLN at 16:43

## 2020-01-01 RX ADMIN — ASPIRIN 325 MG: 325 TABLET, FILM COATED ORAL at 08:52

## 2020-01-01 RX ADMIN — ALBUTEROL SULFATE 2.5 MG: 2.5 SOLUTION RESPIRATORY (INHALATION) at 09:28

## 2020-01-01 RX ADMIN — Medication 10 ML: at 08:00

## 2020-01-01 RX ADMIN — SODIUM CHLORIDE, PRESERVATIVE FREE 10 ML: 5 INJECTION INTRAVENOUS at 03:46

## 2020-01-01 RX ADMIN — ACETAMINOPHEN 650 MG: 325 TABLET ORAL at 19:27

## 2020-01-01 RX ADMIN — Medication 200 MCG/HR: at 19:37

## 2020-01-01 RX ADMIN — CHLORHEXIDINE GLUCONATE 15 ML: 1.2 RINSE ORAL at 20:09

## 2020-01-01 RX ADMIN — SODIUM CHLORIDE: 9 INJECTION, SOLUTION INTRAVENOUS at 10:23

## 2020-01-01 RX ADMIN — INSULIN GLARGINE 36 UNITS: 100 INJECTION, SOLUTION SUBCUTANEOUS at 19:50

## 2020-01-01 RX ADMIN — INSULIN LISPRO 3 UNITS: 100 INJECTION, SOLUTION INTRAVENOUS; SUBCUTANEOUS at 04:23

## 2020-01-01 RX ADMIN — METHYLPREDNISOLONE SODIUM SUCCINATE 40 MG: 40 INJECTION, POWDER, FOR SOLUTION INTRAMUSCULAR; INTRAVENOUS at 08:15

## 2020-01-01 RX ADMIN — IPRATROPIUM BROMIDE AND ALBUTEROL SULFATE 1 AMPULE: 2.5; .5 SOLUTION RESPIRATORY (INHALATION) at 16:50

## 2020-01-01 RX ADMIN — DEXTROSE MONOHYDRATE 10 MG/HR: 5 INJECTION, SOLUTION INTRAVENOUS at 01:38

## 2020-01-01 RX ADMIN — ARFORMOTEROL TARTRATE 15 MCG: 15 SOLUTION RESPIRATORY (INHALATION) at 11:36

## 2020-01-01 RX ADMIN — IPRATROPIUM BROMIDE AND ALBUTEROL SULFATE 1 AMPULE: 2.5; .5 SOLUTION RESPIRATORY (INHALATION) at 10:54

## 2020-01-01 RX ADMIN — INSULIN GLARGINE 36 UNITS: 100 INJECTION, SOLUTION SUBCUTANEOUS at 20:27

## 2020-01-01 RX ADMIN — HEPARIN 100 UNITS: 100 SYRINGE at 20:10

## 2020-01-01 RX ADMIN — DEXTROSE MONOHYDRATE 10 MG/HR: 5 INJECTION, SOLUTION INTRAVENOUS at 18:29

## 2020-01-01 RX ADMIN — Medication 8 MG/HR: at 02:29

## 2020-01-01 RX ADMIN — WATER 2 ML: 1 INJECTION INTRAMUSCULAR; INTRAVENOUS; SUBCUTANEOUS at 09:19

## 2020-01-01 RX ADMIN — ALBUTEROL SULFATE 2.5 MG: 2.5 SOLUTION RESPIRATORY (INHALATION) at 03:24

## 2020-01-01 RX ADMIN — POTASSIUM CHLORIDE 20 MEQ: 29.8 INJECTION, SOLUTION INTRAVENOUS at 23:30

## 2020-01-01 RX ADMIN — POTASSIUM CHLORIDE 40 MEQ: 400 INJECTION, SOLUTION INTRAVENOUS at 23:30

## 2020-01-01 RX ADMIN — ALBUTEROL SULFATE 2.5 MG: 2.5 SOLUTION RESPIRATORY (INHALATION) at 20:40

## 2020-01-01 RX ADMIN — FAMOTIDINE 20 MG: 20 TABLET, FILM COATED ORAL at 20:25

## 2020-01-01 RX ADMIN — Medication 7 MG/HR: at 13:48

## 2020-01-01 RX ADMIN — INSULIN LISPRO 3 UNITS: 100 INJECTION, SOLUTION INTRAVENOUS; SUBCUTANEOUS at 18:07

## 2020-01-01 RX ADMIN — ALBUTEROL SULFATE 2.5 MG: 2.5 SOLUTION RESPIRATORY (INHALATION) at 19:58

## 2020-01-01 RX ADMIN — VANCOMYCIN HYDROCHLORIDE 2000 MG: 10 INJECTION, POWDER, LYOPHILIZED, FOR SOLUTION INTRAVENOUS at 07:39

## 2020-01-01 RX ADMIN — AMPICILLIN SODIUM AND SULBACTAM SODIUM 3 G: 2; 1 INJECTION, POWDER, FOR SOLUTION INTRAMUSCULAR; INTRAVENOUS at 13:20

## 2020-01-01 RX ADMIN — ALBUTEROL SULFATE 2.5 MG: 2.5 SOLUTION RESPIRATORY (INHALATION) at 00:18

## 2020-01-01 RX ADMIN — INSULIN LISPRO 6 UNITS: 100 INJECTION, SOLUTION INTRAVENOUS; SUBCUTANEOUS at 05:30

## 2020-01-01 RX ADMIN — Medication 125 MCG/HR: at 17:17

## 2020-01-01 RX ADMIN — FAMOTIDINE 20 MG: 20 TABLET, FILM COATED ORAL at 20:30

## 2020-01-01 RX ADMIN — POTASSIUM CHLORIDE 10 MEQ: 10 INJECTION, SOLUTION INTRAVENOUS at 08:21

## 2020-01-01 RX ADMIN — SODIUM CHLORIDE: 9 INJECTION, SOLUTION INTRAVENOUS at 08:16

## 2020-01-01 RX ADMIN — Medication 200 MCG/HR: at 00:57

## 2020-01-01 RX ADMIN — SODIUM CHLORIDE, PRESERVATIVE FREE 100 UNITS: 5 INJECTION INTRAVENOUS at 20:31

## 2020-01-01 RX ADMIN — ACETYLCYSTEINE 400 MG: 100 INHALANT RESPIRATORY (INHALATION) at 07:42

## 2020-01-01 RX ADMIN — SUCRALFATE 1 G: 1 TABLET ORAL at 05:38

## 2020-01-01 RX ADMIN — INSULIN LISPRO 6 UNITS: 100 INJECTION, SOLUTION INTRAVENOUS; SUBCUTANEOUS at 20:04

## 2020-01-01 RX ADMIN — SUCRALFATE 1 G: 1 TABLET ORAL at 00:03

## 2020-01-01 RX ADMIN — INSULIN LISPRO 9 UNITS: 100 INJECTION, SOLUTION INTRAVENOUS; SUBCUTANEOUS at 17:54

## 2020-01-01 RX ADMIN — ACETYLCYSTEINE 400 MG: 100 INHALANT RESPIRATORY (INHALATION) at 09:01

## 2020-01-01 RX ADMIN — LIDOCAINE HYDROCHLORIDE 2 ML: 10 INJECTION, SOLUTION EPIDURAL; INFILTRATION; INTRACAUDAL; PERINEURAL at 14:35

## 2020-01-01 RX ADMIN — CHLORHEXIDINE GLUCONATE 0.12% ORAL RINSE 15 ML: 1.2 LIQUID ORAL at 10:29

## 2020-01-01 RX ADMIN — SODIUM CHLORIDE: 9 INJECTION, SOLUTION INTRAVENOUS at 03:16

## 2020-01-01 RX ADMIN — Medication 200 MCG/HR: at 01:52

## 2020-01-01 RX ADMIN — INSULIN LISPRO 1 UNITS: 100 INJECTION, SOLUTION INTRAVENOUS; SUBCUTANEOUS at 08:57

## 2020-01-01 RX ADMIN — IPRATROPIUM BROMIDE AND ALBUTEROL SULFATE 1 AMPULE: 2.5; .5 SOLUTION RESPIRATORY (INHALATION) at 17:43

## 2020-01-01 RX ADMIN — METHYLPREDNISOLONE SODIUM SUCCINATE 40 MG: 40 INJECTION, POWDER, LYOPHILIZED, FOR SOLUTION INTRAMUSCULAR; INTRAVENOUS at 10:24

## 2020-01-01 RX ADMIN — ALBUMIN (HUMAN) 25 G: 0.25 INJECTION, SOLUTION INTRAVENOUS at 21:38

## 2020-01-01 RX ADMIN — BUMETANIDE 0.5 MG/HR: 0.25 INJECTION INTRAMUSCULAR; INTRAVENOUS at 08:58

## 2020-01-01 RX ADMIN — ARFORMOTEROL TARTRATE 15 MCG: 15 SOLUTION RESPIRATORY (INHALATION) at 19:55

## 2020-01-01 RX ADMIN — CHLORHEXIDINE GLUCONATE 0.12% ORAL RINSE 15 ML: 1.2 LIQUID ORAL at 22:04

## 2020-01-01 RX ADMIN — Medication 200 MCG/HR: at 14:43

## 2020-01-01 RX ADMIN — AMPICILLIN SODIUM AND SULBACTAM SODIUM 3 G: 2; 1 INJECTION, POWDER, FOR SOLUTION INTRAMUSCULAR; INTRAVENOUS at 12:03

## 2020-01-01 RX ADMIN — FAMOTIDINE 20 MG: 10 INJECTION INTRAVENOUS at 08:45

## 2020-01-01 RX ADMIN — ATORVASTATIN CALCIUM 80 MG: 40 TABLET, FILM COATED ORAL at 19:54

## 2020-01-01 RX ADMIN — ACETYLCYSTEINE 400 MG: 100 INHALANT RESPIRATORY (INHALATION) at 19:45

## 2020-01-01 RX ADMIN — ARFORMOTEROL TARTRATE 15 MCG: 15 SOLUTION RESPIRATORY (INHALATION) at 07:45

## 2020-01-01 RX ADMIN — POTASSIUM CHLORIDE 10 MEQ: 10 INJECTION, SOLUTION INTRAVENOUS at 09:22

## 2020-01-01 RX ADMIN — CHLORHEXIDINE GLUCONATE 15 ML: 1.2 RINSE ORAL at 20:16

## 2020-01-01 RX ADMIN — INSULIN LISPRO 3 UNITS: 100 INJECTION, SOLUTION INTRAVENOUS; SUBCUTANEOUS at 17:48

## 2020-01-01 RX ADMIN — ALBUTEROL SULFATE 2.5 MG: 2.5 SOLUTION RESPIRATORY (INHALATION) at 02:51

## 2020-01-01 RX ADMIN — POTASSIUM CHLORIDE 20 MEQ: 29.8 INJECTION, SOLUTION INTRAVENOUS at 22:48

## 2020-01-01 RX ADMIN — SODIUM CHLORIDE, PRESERVATIVE FREE 10 ML: 5 INJECTION INTRAVENOUS at 20:27

## 2020-01-01 RX ADMIN — SUCRALFATE 1 G: 1 TABLET ORAL at 18:33

## 2020-01-01 RX ADMIN — CEFEPIME 2 G: 2 INJECTION, POWDER, FOR SOLUTION INTRAVENOUS at 03:02

## 2020-01-01 RX ADMIN — ALBUMIN (HUMAN) 25 G: 0.25 INJECTION, SOLUTION INTRAVENOUS at 18:39

## 2020-01-01 RX ADMIN — INSULIN LISPRO 3 UNITS: 100 INJECTION, SOLUTION INTRAVENOUS; SUBCUTANEOUS at 00:01

## 2020-01-01 RX ADMIN — POTASSIUM BICARBONATE 40 MEQ: 782 TABLET, EFFERVESCENT ORAL at 20:09

## 2020-01-01 RX ADMIN — INSULIN GLARGINE 10 UNITS: 100 INJECTION, SOLUTION SUBCUTANEOUS at 10:05

## 2020-01-01 RX ADMIN — SODIUM CHLORIDE SOLN NEBU 3% 2 ML: 3 NEBU SOLN at 22:15

## 2020-01-01 RX ADMIN — PROPOFOL 20 MCG/KG/MIN: 10 INJECTION, EMULSION INTRAVENOUS at 02:34

## 2020-01-01 RX ADMIN — INSULIN LISPRO 3 UNITS: 100 INJECTION, SOLUTION INTRAVENOUS; SUBCUTANEOUS at 20:46

## 2020-01-01 RX ADMIN — INSULIN LISPRO 3 UNITS: 100 INJECTION, SOLUTION INTRAVENOUS; SUBCUTANEOUS at 19:52

## 2020-01-01 RX ADMIN — INSULIN LISPRO 6 UNITS: 100 INJECTION, SOLUTION INTRAVENOUS; SUBCUTANEOUS at 11:17

## 2020-01-01 RX ADMIN — CHLORHEXIDINE GLUCONATE 0.12% ORAL RINSE 15 ML: 1.2 LIQUID ORAL at 08:23

## 2020-01-01 RX ADMIN — METHYLPREDNISOLONE SODIUM SUCCINATE 40 MG: 40 INJECTION, POWDER, FOR SOLUTION INTRAMUSCULAR; INTRAVENOUS at 08:00

## 2020-01-01 RX ADMIN — IPRATROPIUM BROMIDE AND ALBUTEROL SULFATE 1 AMPULE: 2.5; .5 SOLUTION RESPIRATORY (INHALATION) at 21:12

## 2020-01-01 RX ADMIN — IPRATROPIUM BROMIDE AND ALBUTEROL SULFATE 1 AMPULE: .5; 3 SOLUTION RESPIRATORY (INHALATION) at 14:37

## 2020-01-01 RX ADMIN — ARFORMOTEROL TARTRATE 15 MCG: 15 SOLUTION RESPIRATORY (INHALATION) at 20:40

## 2020-01-01 RX ADMIN — INSULIN LISPRO 3 UNITS: 100 INJECTION, SOLUTION INTRAVENOUS; SUBCUTANEOUS at 11:12

## 2020-01-01 RX ADMIN — SODIUM CHLORIDE, PRESERVATIVE FREE: 5 INJECTION INTRAVENOUS at 10:18

## 2020-01-01 RX ADMIN — FAMOTIDINE 20 MG: 10 INJECTION INTRAVENOUS at 09:09

## 2020-01-01 RX ADMIN — POTASSIUM CHLORIDE 20 MEQ: 400 INJECTION, SOLUTION INTRAVENOUS at 11:30

## 2020-01-01 RX ADMIN — POTASSIUM CHLORIDE 40 MEQ: 400 INJECTION, SOLUTION INTRAVENOUS at 19:23

## 2020-01-01 RX ADMIN — INSULIN LISPRO 3 UNITS: 100 INJECTION, SOLUTION INTRAVENOUS; SUBCUTANEOUS at 07:42

## 2020-01-01 RX ADMIN — Medication 150 MCG/HR: at 18:08

## 2020-01-01 RX ADMIN — IOPAMIDOL 100 ML: 755 INJECTION, SOLUTION INTRAVENOUS at 15:20

## 2020-01-01 RX ADMIN — DEXTROSE MONOHYDRATE 8 MG/HR: 5 INJECTION, SOLUTION INTRAVENOUS at 14:52

## 2020-01-01 RX ADMIN — INSULIN LISPRO 3 UNITS: 100 INJECTION, SOLUTION INTRAVENOUS; SUBCUTANEOUS at 00:17

## 2020-01-01 RX ADMIN — POTASSIUM CHLORIDE 10 MEQ: 10 INJECTION, SOLUTION INTRAVENOUS at 06:08

## 2020-01-01 RX ADMIN — SUCRALFATE 1 G: 1 TABLET ORAL at 05:30

## 2020-01-01 RX ADMIN — POTASSIUM CHLORIDE 40 MEQ: 400 INJECTION, SOLUTION INTRAVENOUS at 21:50

## 2020-01-01 RX ADMIN — DEXTROSE MONOHYDRATE 9 MG/HR: 5 INJECTION, SOLUTION INTRAVENOUS at 18:28

## 2020-01-01 RX ADMIN — SODIUM CHLORIDE: 9 INJECTION, SOLUTION INTRAVENOUS at 18:33

## 2020-01-01 RX ADMIN — INSULIN GLARGINE 36 UNITS: 100 INJECTION, SOLUTION SUBCUTANEOUS at 20:53

## 2020-01-01 RX ADMIN — BUMETANIDE 2 MG: 0.25 INJECTION, SOLUTION INTRAMUSCULAR; INTRAVENOUS at 17:58

## 2020-01-01 RX ADMIN — INSULIN LISPRO 6 UNITS: 100 INJECTION, SOLUTION INTRAVENOUS; SUBCUTANEOUS at 17:25

## 2020-01-01 RX ADMIN — Medication 75 MCG/HR: at 09:32

## 2020-01-01 RX ADMIN — SODIUM CHLORIDE, PRESERVATIVE FREE 10 ML: 5 INJECTION INTRAVENOUS at 10:33

## 2020-01-01 RX ADMIN — ALBUTEROL SULFATE 2.5 MG: 2.5 SOLUTION RESPIRATORY (INHALATION) at 13:43

## 2020-01-01 RX ADMIN — ALBUTEROL SULFATE 2.5 MG: 2.5 SOLUTION RESPIRATORY (INHALATION) at 23:41

## 2020-01-01 RX ADMIN — SODIUM CHLORIDE, PRESERVATIVE FREE 10 ML: 5 INJECTION INTRAVENOUS at 08:26

## 2020-01-01 RX ADMIN — IPRATROPIUM BROMIDE AND ALBUTEROL SULFATE 1 AMPULE: 2.5; .5 SOLUTION RESPIRATORY (INHALATION) at 20:33

## 2020-01-01 RX ADMIN — DEXTROSE MONOHYDRATE: 50 INJECTION, SOLUTION INTRAVENOUS at 09:30

## 2020-01-01 RX ADMIN — FAMOTIDINE 20 MG: 10 INJECTION INTRAVENOUS at 21:48

## 2020-01-01 RX ADMIN — SODIUM CHLORIDE SOLN NEBU 3% 2 ML: 3 NEBU SOLN at 09:24

## 2020-01-01 RX ADMIN — Medication 200 MCG/HR: at 15:43

## 2020-01-01 RX ADMIN — ALBUTEROL SULFATE 2.5 MG: 2.5 SOLUTION RESPIRATORY (INHALATION) at 08:36

## 2020-01-01 RX ADMIN — PETROLATUM: 42 OINTMENT TOPICAL at 18:33

## 2020-01-01 RX ADMIN — ALBUTEROL SULFATE 2.5 MG: 2.5 SOLUTION RESPIRATORY (INHALATION) at 04:33

## 2020-01-01 RX ADMIN — POTASSIUM CHLORIDE 20 MEQ: 400 INJECTION, SOLUTION INTRAVENOUS at 18:34

## 2020-01-01 RX ADMIN — FAMOTIDINE 20 MG: 10 INJECTION INTRAVENOUS at 20:09

## 2020-01-01 RX ADMIN — PROPOFOL 20.03 MCG/KG/MIN: 10 INJECTION, EMULSION INTRAVENOUS at 01:17

## 2020-01-01 RX ADMIN — FAMOTIDINE 20 MG: 10 INJECTION INTRAVENOUS at 10:43

## 2020-01-01 RX ADMIN — SODIUM CHLORIDE 20 ML: 9 INJECTION, SOLUTION INTRAVENOUS at 12:14

## 2020-01-01 RX ADMIN — ALBUMIN (HUMAN) 25 G: 0.25 INJECTION, SOLUTION INTRAVENOUS at 11:00

## 2020-01-01 RX ADMIN — SODIUM CHLORIDE, PRESERVATIVE FREE 10 ML: 5 INJECTION INTRAVENOUS at 10:30

## 2020-01-01 RX ADMIN — SODIUM CHLORIDE, PRESERVATIVE FREE 10 ML: 5 INJECTION INTRAVENOUS at 22:16

## 2020-01-01 RX ADMIN — Medication 5 MG/HR: at 04:32

## 2020-01-01 RX ADMIN — ALBUTEROL SULFATE 2.5 MG: 2.5 SOLUTION RESPIRATORY (INHALATION) at 06:46

## 2020-01-01 RX ADMIN — SODIUM CHLORIDE, PRESERVATIVE FREE 10 ML: 5 INJECTION INTRAVENOUS at 14:48

## 2020-01-01 RX ADMIN — FAMOTIDINE 20 MG: 10 INJECTION INTRAVENOUS at 07:38

## 2020-01-01 RX ADMIN — SODIUM PHOSPHATE, MONOBASIC, MONOHYDRATE 15 MMOL: 276; 142 INJECTION, SOLUTION INTRAVENOUS at 10:33

## 2020-01-01 RX ADMIN — CEFEPIME 2 G: 2 INJECTION, POWDER, FOR SOLUTION INTRAVENOUS at 04:48

## 2020-01-01 RX ADMIN — Medication 200 MCG/HR: at 05:34

## 2020-01-01 RX ADMIN — INSULIN LISPRO 3 UNITS: 100 INJECTION, SOLUTION INTRAVENOUS; SUBCUTANEOUS at 11:28

## 2020-01-01 RX ADMIN — INSULIN LISPRO 3 UNITS: 100 INJECTION, SOLUTION INTRAVENOUS; SUBCUTANEOUS at 23:50

## 2020-01-01 RX ADMIN — IPRATROPIUM BROMIDE AND ALBUTEROL SULFATE 1 AMPULE: .5; 3 SOLUTION RESPIRATORY (INHALATION) at 03:24

## 2020-01-01 RX ADMIN — Medication: at 15:43

## 2020-01-01 RX ADMIN — METHYLPREDNISOLONE SODIUM SUCCINATE 40 MG: 40 INJECTION, POWDER, LYOPHILIZED, FOR SOLUTION INTRAMUSCULAR; INTRAVENOUS at 01:52

## 2020-01-01 RX ADMIN — IPRATROPIUM BROMIDE AND ALBUTEROL SULFATE 1 AMPULE: .5; 3 SOLUTION RESPIRATORY (INHALATION) at 14:35

## 2020-01-01 RX ADMIN — Medication 200 MCG/HR: at 21:54

## 2020-01-01 RX ADMIN — AMPICILLIN SODIUM AND SULBACTAM SODIUM 3 G: 2; 1 INJECTION, POWDER, FOR SOLUTION INTRAMUSCULAR; INTRAVENOUS at 00:13

## 2020-01-01 RX ADMIN — SODIUM CHLORIDE, PRESERVATIVE FREE 10 ML: 5 INJECTION INTRAVENOUS at 11:04

## 2020-01-01 RX ADMIN — SODIUM CHLORIDE: 9 INJECTION, SOLUTION INTRAVENOUS at 08:00

## 2020-01-01 RX ADMIN — IPRATROPIUM BROMIDE AND ALBUTEROL SULFATE 1 AMPULE: 2.5; .5 SOLUTION RESPIRATORY (INHALATION) at 14:41

## 2020-01-01 RX ADMIN — DEXTROSE MONOHYDRATE 10 MG/HR: 5 INJECTION, SOLUTION INTRAVENOUS at 11:14

## 2020-01-01 RX ADMIN — ASPIRIN 325 MG: 325 TABLET, FILM COATED ORAL at 10:29

## 2020-01-01 RX ADMIN — AMPICILLIN SODIUM AND SULBACTAM SODIUM 3 G: 2; 1 INJECTION, POWDER, FOR SOLUTION INTRAMUSCULAR; INTRAVENOUS at 18:39

## 2020-01-01 RX ADMIN — Medication 200 MCG/HR: at 14:19

## 2020-01-01 RX ADMIN — METHYLPREDNISOLONE SODIUM SUCCINATE 40 MG: 40 INJECTION, POWDER, LYOPHILIZED, FOR SOLUTION INTRAMUSCULAR; INTRAVENOUS at 09:34

## 2020-01-01 RX ADMIN — PANTOPRAZOLE SODIUM 40 MG: 40 INJECTION, POWDER, LYOPHILIZED, FOR SOLUTION INTRAVENOUS at 08:18

## 2020-01-01 RX ADMIN — CEFEPIME 2 G: 2 INJECTION, POWDER, FOR SOLUTION INTRAVENOUS at 18:41

## 2020-01-01 RX ADMIN — ALBUTEROL SULFATE 2.5 MG: 2.5 SOLUTION RESPIRATORY (INHALATION) at 00:30

## 2020-01-01 RX ADMIN — NITROGLYCERIN 0.5 INCH: 20 OINTMENT TOPICAL at 22:37

## 2020-01-01 RX ADMIN — PROPOFOL 1000 MG: 10 INJECTION, EMULSION INTRAVENOUS at 09:00

## 2020-01-01 RX ADMIN — POTASSIUM CHLORIDE 10 MEQ: 10 INJECTION, SOLUTION INTRAVENOUS at 08:39

## 2020-01-01 RX ADMIN — SUCRALFATE 1 G: 1 TABLET ORAL at 18:43

## 2020-01-01 RX ADMIN — INSULIN LISPRO 12 UNITS: 100 INJECTION, SOLUTION INTRAVENOUS; SUBCUTANEOUS at 15:19

## 2020-01-01 RX ADMIN — ALBUTEROL SULFATE 2.5 MG: 2.5 SOLUTION RESPIRATORY (INHALATION) at 03:37

## 2020-01-01 RX ADMIN — ENOXAPARIN SODIUM 40 MG: 40 INJECTION SUBCUTANEOUS at 08:59

## 2020-01-01 RX ADMIN — ARFORMOTEROL TARTRATE 15 MCG: 15 SOLUTION RESPIRATORY (INHALATION) at 19:45

## 2020-01-01 RX ADMIN — SODIUM CHLORIDE, PRESERVATIVE FREE 10 ML: 5 INJECTION INTRAVENOUS at 20:12

## 2020-01-01 RX ADMIN — ALBUTEROL SULFATE 2.5 MG: 2.5 SOLUTION RESPIRATORY (INHALATION) at 16:30

## 2020-01-01 RX ADMIN — ACETAZOLAMIDE 250 MG: 250 TABLET ORAL at 10:38

## 2020-01-01 RX ADMIN — SODIUM CHLORIDE 20 ML: 9 INJECTION, SOLUTION INTRAVENOUS at 17:56

## 2020-01-01 RX ADMIN — INSULIN LISPRO 3 UNITS: 100 INJECTION, SOLUTION INTRAVENOUS; SUBCUTANEOUS at 11:34

## 2020-01-01 RX ADMIN — ACETYLCYSTEINE 400 MG: 100 INHALANT RESPIRATORY (INHALATION) at 08:00

## 2020-01-01 RX ADMIN — POTASSIUM BICARBONATE 40 MEQ: 782 TABLET, EFFERVESCENT ORAL at 09:08

## 2020-01-01 RX ADMIN — IPRATROPIUM BROMIDE AND ALBUTEROL SULFATE 1 AMPULE: 2.5; .5 SOLUTION RESPIRATORY (INHALATION) at 16:49

## 2020-01-01 RX ADMIN — CHLORHEXIDINE GLUCONATE 0.12% ORAL RINSE 15 ML: 1.2 LIQUID ORAL at 08:38

## 2020-01-01 RX ADMIN — FUROSEMIDE 40 MG: 10 INJECTION, SOLUTION INTRAMUSCULAR; INTRAVENOUS at 08:54

## 2020-01-01 RX ADMIN — KETOROLAC TROMETHAMINE 30 MG: 30 INJECTION, SOLUTION INTRAMUSCULAR; INTRAVENOUS at 16:12

## 2020-01-01 RX ADMIN — DEXTROSE MONOHYDRATE: 50 INJECTION, SOLUTION INTRAVENOUS at 12:56

## 2020-01-01 RX ADMIN — Medication 9 MG/HR: at 04:00

## 2020-01-01 RX ADMIN — CEFEPIME 2 G: 2 INJECTION, POWDER, FOR SOLUTION INTRAVENOUS at 21:35

## 2020-01-01 RX ADMIN — SODIUM CHLORIDE, PRESERVATIVE FREE 10 ML: 5 INJECTION INTRAVENOUS at 11:18

## 2020-01-01 RX ADMIN — ATORVASTATIN CALCIUM 80 MG: 40 TABLET, FILM COATED ORAL at 20:24

## 2020-01-01 RX ADMIN — DEXTROSE MONOHYDRATE 10 MG/HR: 5 INJECTION, SOLUTION INTRAVENOUS at 11:01

## 2020-01-01 RX ADMIN — ALBUTEROL SULFATE 2.5 MG: 2.5 SOLUTION RESPIRATORY (INHALATION) at 16:00

## 2020-01-01 RX ADMIN — SODIUM CHLORIDE: 9 INJECTION, SOLUTION INTRAVENOUS at 17:24

## 2020-01-01 RX ADMIN — IPRATROPIUM BROMIDE AND ALBUTEROL SULFATE 1 AMPULE: 2.5; .5 SOLUTION RESPIRATORY (INHALATION) at 14:13

## 2020-01-01 RX ADMIN — ASPIRIN 325 MG: 325 TABLET, FILM COATED ORAL at 08:17

## 2020-01-01 RX ADMIN — CHLORHEXIDINE GLUCONATE 0.12% ORAL RINSE 15 ML: 1.2 LIQUID ORAL at 20:28

## 2020-01-01 RX ADMIN — Medication 200 MCG/HR: at 16:08

## 2020-01-01 RX ADMIN — CEFEPIME 2 G: 2 INJECTION, POWDER, FOR SOLUTION INTRAVENOUS at 05:11

## 2020-01-01 RX ADMIN — AMPICILLIN SODIUM AND SULBACTAM SODIUM 3 G: 2; 1 INJECTION, POWDER, FOR SOLUTION INTRAMUSCULAR; INTRAVENOUS at 06:27

## 2020-01-01 RX ADMIN — CEFTRIAXONE 2 G: 2 INJECTION, POWDER, FOR SOLUTION INTRAMUSCULAR; INTRAVENOUS at 17:23

## 2020-01-01 RX ADMIN — Medication 200 MCG/HR: at 11:43

## 2020-01-01 RX ADMIN — CEFEPIME 2 G: 2 INJECTION, POWDER, FOR SOLUTION INTRAVENOUS at 20:25

## 2020-01-01 RX ADMIN — DEXTROSE MONOHYDRATE: 50 INJECTION, SOLUTION INTRAVENOUS at 02:30

## 2020-01-01 RX ADMIN — ALBUTEROL SULFATE 2.5 MG: 2.5 SOLUTION RESPIRATORY (INHALATION) at 01:12

## 2020-01-01 RX ADMIN — ARFORMOTEROL TARTRATE 15 MCG: 15 SOLUTION RESPIRATORY (INHALATION) at 09:44

## 2020-01-01 RX ADMIN — SODIUM CHLORIDE SOLN NEBU 3% 2 ML: 3 NEBU SOLN at 14:13

## 2020-01-01 RX ADMIN — INSULIN LISPRO 3 UNITS: 100 INJECTION, SOLUTION INTRAVENOUS; SUBCUTANEOUS at 12:07

## 2020-01-01 RX ADMIN — POTASSIUM CHLORIDE 20 MEQ: 400 INJECTION, SOLUTION INTRAVENOUS at 21:15

## 2020-01-01 RX ADMIN — CHLOROTHIAZIDE SODIUM 125 MG: 500 INJECTION, POWDER, LYOPHILIZED, FOR SOLUTION INTRAVENOUS at 22:07

## 2020-01-01 RX ADMIN — SODIUM CHLORIDE, PRESERVATIVE FREE 10 ML: 5 INJECTION INTRAVENOUS at 08:25

## 2020-01-01 RX ADMIN — Medication 8 MG/HR: at 15:36

## 2020-01-01 RX ADMIN — Medication 200 MCG/HR: at 21:22

## 2020-01-01 RX ADMIN — ALBUTEROL SULFATE 2.5 MG: 2.5 SOLUTION RESPIRATORY (INHALATION) at 20:20

## 2020-01-01 RX ADMIN — SODIUM CHLORIDE: 9 INJECTION, SOLUTION INTRAVENOUS at 17:54

## 2020-01-01 RX ADMIN — POTASSIUM CHLORIDE 40 MEQ: 400 INJECTION, SOLUTION INTRAVENOUS at 06:08

## 2020-01-01 RX ADMIN — ARFORMOTEROL TARTRATE 15 MCG: 15 SOLUTION RESPIRATORY (INHALATION) at 08:36

## 2020-01-01 RX ADMIN — CEFEPIME 2 G: 2 INJECTION, POWDER, FOR SOLUTION INTRAVENOUS at 11:28

## 2020-01-01 RX ADMIN — IPRATROPIUM BROMIDE AND ALBUTEROL SULFATE 1 AMPULE: 2.5; .5 SOLUTION RESPIRATORY (INHALATION) at 09:24

## 2020-01-01 RX ADMIN — ARFORMOTEROL TARTRATE 15 MCG: 15 SOLUTION RESPIRATORY (INHALATION) at 08:23

## 2020-01-01 RX ADMIN — INSULIN LISPRO 3 UNITS: 100 INJECTION, SOLUTION INTRAVENOUS; SUBCUTANEOUS at 16:09

## 2020-01-01 RX ADMIN — INSULIN GLARGINE 36 UNITS: 100 INJECTION, SOLUTION SUBCUTANEOUS at 21:21

## 2020-01-01 RX ADMIN — CEFEPIME 2 G: 2 INJECTION, POWDER, FOR SOLUTION INTRAVENOUS at 11:24

## 2020-01-01 RX ADMIN — FAMOTIDINE 20 MG: 20 TABLET, FILM COATED ORAL at 20:58

## 2020-01-01 RX ADMIN — METHYLPREDNISOLONE SODIUM SUCCINATE 40 MG: 40 INJECTION, POWDER, LYOPHILIZED, FOR SOLUTION INTRAMUSCULAR; INTRAVENOUS at 17:29

## 2020-01-01 RX ADMIN — INSULIN LISPRO 2 UNITS: 100 INJECTION, SOLUTION INTRAVENOUS; SUBCUTANEOUS at 06:14

## 2020-01-01 RX ADMIN — PREGABALIN 150 MG: 75 CAPSULE ORAL at 08:26

## 2020-01-01 RX ADMIN — POTASSIUM BICARBONATE 40 MEQ: 782 TABLET, EFFERVESCENT ORAL at 21:43

## 2020-01-01 RX ADMIN — INSULIN LISPRO 9 UNITS: 100 INJECTION, SOLUTION INTRAVENOUS; SUBCUTANEOUS at 00:00

## 2020-01-01 RX ADMIN — IPRATROPIUM BROMIDE AND ALBUTEROL SULFATE 1 AMPULE: .5; 3 SOLUTION RESPIRATORY (INHALATION) at 19:49

## 2020-01-01 RX ADMIN — INSULIN LISPRO 1.5 UNITS: 100 INJECTION, SOLUTION INTRAVENOUS; SUBCUTANEOUS at 08:06

## 2020-01-01 RX ADMIN — IPRATROPIUM BROMIDE AND ALBUTEROL SULFATE 1 AMPULE: .5; 3 SOLUTION RESPIRATORY (INHALATION) at 16:46

## 2020-01-01 RX ADMIN — DEXTROSE MONOHYDRATE 10 MG/HR: 5 INJECTION, SOLUTION INTRAVENOUS at 00:15

## 2020-01-01 RX ADMIN — Medication 10 ML: at 20:17

## 2020-01-01 RX ADMIN — INSULIN LISPRO 3 UNITS: 100 INJECTION, SOLUTION INTRAVENOUS; SUBCUTANEOUS at 17:35

## 2020-01-01 RX ADMIN — Medication 8 MG/HR: at 11:11

## 2020-01-01 RX ADMIN — INSULIN LISPRO 3 UNITS: 100 INJECTION, SOLUTION INTRAVENOUS; SUBCUTANEOUS at 23:49

## 2020-01-01 RX ADMIN — ALBUTEROL SULFATE 2.5 MG: 2.5 SOLUTION RESPIRATORY (INHALATION) at 16:56

## 2020-01-01 RX ADMIN — INSULIN GLARGINE 36 UNITS: 100 INJECTION, SOLUTION SUBCUTANEOUS at 22:12

## 2020-01-01 RX ADMIN — POTASSIUM CHLORIDE 10 MEQ: 10 INJECTION, SOLUTION INTRAVENOUS at 09:55

## 2020-01-01 RX ADMIN — ENOXAPARIN SODIUM 40 MG: 40 INJECTION SUBCUTANEOUS at 08:41

## 2020-01-01 RX ADMIN — IPRATROPIUM BROMIDE AND ALBUTEROL SULFATE 1 AMPULE: 2.5; .5 SOLUTION RESPIRATORY (INHALATION) at 17:13

## 2020-01-01 RX ADMIN — SODIUM CHLORIDE, PRESERVATIVE FREE 10 ML: 5 INJECTION INTRAVENOUS at 08:55

## 2020-01-01 RX ADMIN — CHLORHEXIDINE GLUCONATE 15 ML: 1.2 RINSE ORAL at 07:59

## 2020-01-01 RX ADMIN — Medication 200 MCG/HR: at 16:04

## 2020-01-01 RX ADMIN — ALBUTEROL SULFATE 2.5 MG: 2.5 SOLUTION RESPIRATORY (INHALATION) at 16:17

## 2020-01-01 RX ADMIN — Medication 200 MCG/HR: at 23:43

## 2020-01-01 RX ADMIN — CEFEPIME 2 G: 2 INJECTION, POWDER, FOR SOLUTION INTRAVENOUS at 05:27

## 2020-01-01 RX ADMIN — PANTOPRAZOLE SODIUM 40 MG: 40 INJECTION, POWDER, LYOPHILIZED, FOR SOLUTION INTRAVENOUS at 09:00

## 2020-01-01 RX ADMIN — ACETYLCYSTEINE 400 MG: 100 INHALANT RESPIRATORY (INHALATION) at 08:36

## 2020-01-01 RX ADMIN — ACETAMINOPHEN 650 MG: 650 SUPPOSITORY RECTAL at 04:30

## 2020-01-01 RX ADMIN — AMPICILLIN SODIUM AND SULBACTAM SODIUM 3 G: 2; 1 INJECTION, POWDER, FOR SOLUTION INTRAMUSCULAR; INTRAVENOUS at 00:27

## 2020-01-01 RX ADMIN — IOPAMIDOL 13 ML: 408 INJECTION, SOLUTION INTRATHECAL at 10:28

## 2020-01-01 RX ADMIN — ARFORMOTEROL TARTRATE 15 MCG: 15 SOLUTION RESPIRATORY (INHALATION) at 21:46

## 2020-01-01 RX ADMIN — FAMOTIDINE 20 MG: 10 INJECTION INTRAVENOUS at 08:08

## 2020-01-01 RX ADMIN — INSULIN LISPRO 6 UNITS: 100 INJECTION, SOLUTION INTRAVENOUS; SUBCUTANEOUS at 05:48

## 2020-01-01 RX ADMIN — DEXTROSE MONOHYDRATE: 50 INJECTION, SOLUTION INTRAVENOUS at 06:45

## 2020-01-01 RX ADMIN — Medication 2 MG/HR: at 09:57

## 2020-01-01 RX ADMIN — QUETIAPINE FUMARATE 100 MG: 100 TABLET ORAL at 12:31

## 2020-01-01 RX ADMIN — MAGNESIUM SULFATE HEPTAHYDRATE 2 G: 40 INJECTION, SOLUTION INTRAVENOUS at 01:56

## 2020-01-01 RX ADMIN — VANCOMYCIN HYDROCHLORIDE 2000 MG: 10 INJECTION, POWDER, LYOPHILIZED, FOR SOLUTION INTRAVENOUS at 01:30

## 2020-01-01 RX ADMIN — INSULIN LISPRO 3 UNITS: 100 INJECTION, SOLUTION INTRAVENOUS; SUBCUTANEOUS at 21:29

## 2020-01-01 RX ADMIN — INSULIN LISPRO 3 UNITS: 100 INJECTION, SOLUTION INTRAVENOUS; SUBCUTANEOUS at 04:16

## 2020-01-01 RX ADMIN — METHYLPREDNISOLONE SODIUM SUCCINATE 40 MG: 40 INJECTION, POWDER, LYOPHILIZED, FOR SOLUTION INTRAMUSCULAR; INTRAVENOUS at 09:08

## 2020-01-01 RX ADMIN — INSULIN LISPRO 3 UNITS: 100 INJECTION, SOLUTION INTRAVENOUS; SUBCUTANEOUS at 16:30

## 2020-01-01 RX ADMIN — ACETAZOLAMIDE 250 MG: 250 TABLET ORAL at 08:15

## 2020-01-01 RX ADMIN — SUCRALFATE 1 G: 1 TABLET ORAL at 17:20

## 2020-01-01 RX ADMIN — CHLORHEXIDINE GLUCONATE 0.12% ORAL RINSE 15 ML: 1.2 LIQUID ORAL at 20:17

## 2020-01-01 RX ADMIN — Medication 200 MCG/HR: at 23:41

## 2020-01-01 RX ADMIN — FAMOTIDINE 20 MG: 10 INJECTION INTRAVENOUS at 20:16

## 2020-01-01 RX ADMIN — SODIUM CHLORIDE: 9 INJECTION, SOLUTION INTRAVENOUS at 00:34

## 2020-01-01 RX ADMIN — METHYLPREDNISOLONE SODIUM SUCCINATE 40 MG: 40 INJECTION, POWDER, LYOPHILIZED, FOR SOLUTION INTRAMUSCULAR; INTRAVENOUS at 17:47

## 2020-01-01 RX ADMIN — CEFEPIME 2 G: 2 INJECTION, POWDER, FOR SOLUTION INTRAVENOUS at 20:21

## 2020-01-01 RX ADMIN — ALBUTEROL SULFATE 2.5 MG: 2.5 SOLUTION RESPIRATORY (INHALATION) at 04:29

## 2020-01-01 RX ADMIN — INSULIN LISPRO 6 UNITS: 100 INJECTION, SOLUTION INTRAVENOUS; SUBCUTANEOUS at 11:12

## 2020-01-01 RX ADMIN — INSULIN LISPRO 3 UNITS: 100 INJECTION, SOLUTION INTRAVENOUS; SUBCUTANEOUS at 04:18

## 2020-01-01 RX ADMIN — METHYLPREDNISOLONE SODIUM SUCCINATE 40 MG: 40 INJECTION, POWDER, LYOPHILIZED, FOR SOLUTION INTRAMUSCULAR; INTRAVENOUS at 02:06

## 2020-01-01 RX ADMIN — ACETAZOLAMIDE 250 MG: 250 TABLET ORAL at 20:16

## 2020-01-01 RX ADMIN — ATORVASTATIN CALCIUM 80 MG: 40 TABLET, FILM COATED ORAL at 20:42

## 2020-01-01 RX ADMIN — INSULIN GLARGINE 28.8 UNITS: 100 INJECTION, SOLUTION SUBCUTANEOUS at 21:10

## 2020-01-01 RX ADMIN — ATORVASTATIN CALCIUM 80 MG: 40 TABLET, FILM COATED ORAL at 20:26

## 2020-01-01 RX ADMIN — ALBUMIN (HUMAN) 25 G: 0.25 INJECTION, SOLUTION INTRAVENOUS at 18:35

## 2020-01-01 RX ADMIN — ARFORMOTEROL TARTRATE 15 MCG: 15 SOLUTION RESPIRATORY (INHALATION) at 07:42

## 2020-01-01 RX ADMIN — DEXTROSE MONOHYDRATE: 50 INJECTION, SOLUTION INTRAVENOUS at 10:45

## 2020-01-01 RX ADMIN — ACETAZOLAMIDE 250 MG: 250 TABLET ORAL at 21:31

## 2020-01-01 RX ADMIN — POTASSIUM & SODIUM PHOSPHATES POWDER PACK 280-160-250 MG 250 MG: 280-160-250 PACK at 15:00

## 2020-01-01 RX ADMIN — OXYCODONE HYDROCHLORIDE 2.5 MG: 5 TABLET ORAL at 02:02

## 2020-01-01 RX ADMIN — Medication 200 MCG/HR: at 19:28

## 2020-01-01 RX ADMIN — IPRATROPIUM BROMIDE AND ALBUTEROL SULFATE 1 AMPULE: .5; 3 SOLUTION RESPIRATORY (INHALATION) at 20:51

## 2020-01-01 RX ADMIN — METHYLPREDNISOLONE SODIUM SUCCINATE 40 MG: 40 INJECTION, POWDER, LYOPHILIZED, FOR SOLUTION INTRAMUSCULAR; INTRAVENOUS at 23:38

## 2020-01-01 RX ADMIN — CHLORHEXIDINE GLUCONATE 15 ML: 1.2 RINSE ORAL at 21:24

## 2020-01-01 RX ADMIN — POTASSIUM CHLORIDE 10 MEQ: 10 INJECTION, SOLUTION INTRAVENOUS at 15:00

## 2020-01-01 RX ADMIN — Medication 200 MCG/HR: at 22:30

## 2020-01-01 RX ADMIN — LEVOFLOXACIN 750 MG: 5 INJECTION, SOLUTION INTRAVENOUS at 10:20

## 2020-01-01 RX ADMIN — IBUPROFEN 400 MG: 200 SUSPENSION ORAL at 01:42

## 2020-01-01 RX ADMIN — SODIUM CHLORIDE, PRESERVATIVE FREE 10 ML: 5 INJECTION INTRAVENOUS at 09:00

## 2020-01-01 RX ADMIN — CEFEPIME 2 G: 2 INJECTION, POWDER, FOR SOLUTION INTRAVENOUS at 11:04

## 2020-01-01 RX ADMIN — Medication 200 MCG/HR: at 16:53

## 2020-01-01 RX ADMIN — ACETAMINOPHEN 650 MG: 650 SUPPOSITORY RECTAL at 03:30

## 2020-01-01 RX ADMIN — METHYLPREDNISOLONE SODIUM SUCCINATE 40 MG: 40 INJECTION, POWDER, LYOPHILIZED, FOR SOLUTION INTRAMUSCULAR; INTRAVENOUS at 11:30

## 2020-01-01 RX ADMIN — FAMOTIDINE 20 MG: 10 INJECTION INTRAVENOUS at 21:24

## 2020-01-01 RX ADMIN — OXYCODONE HYDROCHLORIDE 2.5 MG: 5 TABLET ORAL at 12:15

## 2020-01-01 RX ADMIN — METHYLPREDNISOLONE SODIUM SUCCINATE 40 MG: 40 INJECTION, POWDER, FOR SOLUTION INTRAMUSCULAR; INTRAVENOUS at 07:38

## 2020-01-01 RX ADMIN — SODIUM CHLORIDE, PRESERVATIVE FREE 10 ML: 5 INJECTION INTRAVENOUS at 02:10

## 2020-01-01 RX ADMIN — BUMETANIDE 0.2 MG/HR: 0.25 INJECTION INTRAMUSCULAR; INTRAVENOUS at 17:13

## 2020-01-01 RX ADMIN — METHYLPREDNISOLONE SODIUM SUCCINATE 40 MG: 40 INJECTION, POWDER, LYOPHILIZED, FOR SOLUTION INTRAMUSCULAR; INTRAVENOUS at 11:07

## 2020-01-01 RX ADMIN — SODIUM CHLORIDE, PRESERVATIVE FREE 10 ML: 5 INJECTION INTRAVENOUS at 09:26

## 2020-01-01 RX ADMIN — ACETAZOLAMIDE 250 MG: 250 TABLET ORAL at 07:21

## 2020-01-01 RX ADMIN — CHLORHEXIDINE GLUCONATE 0.12% ORAL RINSE 15 ML: 1.2 LIQUID ORAL at 20:30

## 2020-01-01 RX ADMIN — Medication 200 MCG/HR: at 03:09

## 2020-01-01 RX ADMIN — DEXTROSE MONOHYDRATE: 50 INJECTION, SOLUTION INTRAVENOUS at 03:44

## 2020-01-01 RX ADMIN — SODIUM CHLORIDE, PRESERVATIVE FREE 10 ML: 5 INJECTION INTRAVENOUS at 07:51

## 2020-01-01 RX ADMIN — SODIUM CHLORIDE, PRESERVATIVE FREE 10 ML: 5 INJECTION INTRAVENOUS at 11:27

## 2020-01-01 RX ADMIN — INSULIN LISPRO 3 UNITS: 100 INJECTION, SOLUTION INTRAVENOUS; SUBCUTANEOUS at 05:43

## 2020-01-01 RX ADMIN — INSULIN GLARGINE 36 UNITS: 100 INJECTION, SOLUTION SUBCUTANEOUS at 20:29

## 2020-01-01 RX ADMIN — AMPICILLIN SODIUM AND SULBACTAM SODIUM 3 G: 2; 1 INJECTION, POWDER, FOR SOLUTION INTRAMUSCULAR; INTRAVENOUS at 18:24

## 2020-01-01 RX ADMIN — ALBUTEROL SULFATE 2.5 MG: 2.5 SOLUTION RESPIRATORY (INHALATION) at 07:45

## 2020-01-01 RX ADMIN — ARFORMOTEROL TARTRATE 15 MCG: 15 SOLUTION RESPIRATORY (INHALATION) at 20:20

## 2020-01-01 RX ADMIN — METHYLPREDNISOLONE SODIUM SUCCINATE 125 MG: 125 INJECTION, POWDER, FOR SOLUTION INTRAMUSCULAR; INTRAVENOUS at 02:06

## 2020-01-01 RX ADMIN — ARFORMOTEROL TARTRATE 15 MCG: 15 SOLUTION RESPIRATORY (INHALATION) at 08:59

## 2020-01-01 RX ADMIN — CHLORHEXIDINE GLUCONATE 15 ML: 1.2 RINSE ORAL at 21:40

## 2020-01-01 RX ADMIN — ALBUTEROL SULFATE 2.5 MG: 2.5 SOLUTION RESPIRATORY (INHALATION) at 20:25

## 2020-01-01 RX ADMIN — SODIUM CHLORIDE, PRESERVATIVE FREE 10 ML: 5 INJECTION INTRAVENOUS at 20:31

## 2020-01-01 RX ADMIN — BUMETANIDE 0.5 MG/HR: 0.25 INJECTION INTRAMUSCULAR; INTRAVENOUS at 13:39

## 2020-01-01 RX ADMIN — Medication 100 MCG/HR: at 09:58

## 2020-01-01 RX ADMIN — Medication 200 MCG/HR: at 22:50

## 2020-01-01 RX ADMIN — ARFORMOTEROL TARTRATE 15 MCG: 15 SOLUTION RESPIRATORY (INHALATION) at 20:45

## 2020-01-01 RX ADMIN — ALBUMIN (HUMAN) 25 G: 0.25 INJECTION, SOLUTION INTRAVENOUS at 23:30

## 2020-01-01 RX ADMIN — METHYLPREDNISOLONE SODIUM SUCCINATE 40 MG: 40 INJECTION, POWDER, LYOPHILIZED, FOR SOLUTION INTRAMUSCULAR; INTRAVENOUS at 00:01

## 2020-01-01 RX ADMIN — INSULIN LISPRO 6 UNITS: 100 INJECTION, SOLUTION INTRAVENOUS; SUBCUTANEOUS at 05:34

## 2020-01-01 RX ADMIN — SODIUM CHLORIDE, PRESERVATIVE FREE 10 ML: 5 INJECTION INTRAVENOUS at 08:42

## 2020-01-01 RX ADMIN — FENTANYL CITRATE 50 MCG: 50 INJECTION, SOLUTION INTRAMUSCULAR; INTRAVENOUS at 17:38

## 2020-01-01 RX ADMIN — Medication 200 MCG/HR: at 04:18

## 2020-01-01 RX ADMIN — Medication 200 MCG/HR: at 10:14

## 2020-01-01 RX ADMIN — Medication 200 MCG/HR: at 21:47

## 2020-01-01 RX ADMIN — BUMETANIDE 2 MG: 0.25 INJECTION, SOLUTION INTRAMUSCULAR; INTRAVENOUS at 03:46

## 2020-01-01 RX ADMIN — CHLORHEXIDINE GLUCONATE 0.12% ORAL RINSE 15 ML: 1.2 LIQUID ORAL at 08:27

## 2020-01-01 RX ADMIN — INSULIN LISPRO 6 UNITS: 100 INJECTION, SOLUTION INTRAVENOUS; SUBCUTANEOUS at 18:06

## 2020-01-01 RX ADMIN — IPRATROPIUM BROMIDE AND ALBUTEROL SULFATE 1 AMPULE: 2.5; .5 SOLUTION RESPIRATORY (INHALATION) at 14:01

## 2020-01-01 RX ADMIN — CHLORHEXIDINE GLUCONATE 0.12% ORAL RINSE 15 ML: 1.2 LIQUID ORAL at 21:04

## 2020-01-01 RX ADMIN — Medication 200 MCG/HR: at 14:34

## 2020-01-01 RX ADMIN — Medication 10 ML: at 08:09

## 2020-01-01 RX ADMIN — ARFORMOTEROL TARTRATE 15 MCG: 15 SOLUTION RESPIRATORY (INHALATION) at 09:26

## 2020-01-01 RX ADMIN — ARFORMOTEROL TARTRATE 15 MCG: 15 SOLUTION RESPIRATORY (INHALATION) at 10:38

## 2020-01-01 RX ADMIN — Medication 150 MCG/HR: at 19:30

## 2020-01-01 RX ADMIN — Medication 200 MCG/HR: at 04:39

## 2020-01-01 RX ADMIN — DEXTROSE MONOHYDRATE: 50 INJECTION, SOLUTION INTRAVENOUS at 16:09

## 2020-01-01 RX ADMIN — FLUTICASONE PROPIONATE 2 SPRAY: 50 SPRAY, METERED NASAL at 10:01

## 2020-01-01 RX ADMIN — Medication 200 MCG/HR: at 15:37

## 2020-01-01 RX ADMIN — METHYLPREDNISOLONE SODIUM SUCCINATE 40 MG: 40 INJECTION, POWDER, LYOPHILIZED, FOR SOLUTION INTRAMUSCULAR; INTRAVENOUS at 11:59

## 2020-01-01 RX ADMIN — SODIUM CHLORIDE, PRESERVATIVE FREE 10 ML: 5 INJECTION INTRAVENOUS at 10:01

## 2020-01-01 RX ADMIN — Medication 200 MCG/HR: at 16:48

## 2020-01-01 RX ADMIN — PROPOFOL 10 MCG/KG/MIN: 10 INJECTION, EMULSION INTRAVENOUS at 12:27

## 2020-01-01 RX ADMIN — Medication 150 MCG/HR: at 15:01

## 2020-01-01 RX ADMIN — INSULIN LISPRO 3 UNITS: 100 INJECTION, SOLUTION INTRAVENOUS; SUBCUTANEOUS at 08:08

## 2020-01-01 RX ADMIN — ALBUMIN (HUMAN) 25 G: 0.25 INJECTION, SOLUTION INTRAVENOUS at 09:05

## 2020-01-01 RX ADMIN — Medication 8 MG/HR: at 20:32

## 2020-01-01 RX ADMIN — IPRATROPIUM BROMIDE AND ALBUTEROL SULFATE 1 AMPULE: 2.5; .5 SOLUTION RESPIRATORY (INHALATION) at 12:32

## 2020-01-01 RX ADMIN — PREGABALIN 150 MG: 75 CAPSULE ORAL at 08:27

## 2020-01-01 RX ADMIN — INSULIN LISPRO 3 UNITS: 100 INJECTION, SOLUTION INTRAVENOUS; SUBCUTANEOUS at 00:14

## 2020-01-01 RX ADMIN — INSULIN GLARGINE 36 UNITS: 100 INJECTION, SOLUTION SUBCUTANEOUS at 20:58

## 2020-01-01 RX ADMIN — IPRATROPIUM BROMIDE AND ALBUTEROL SULFATE 1 AMPULE: 2.5; .5 SOLUTION RESPIRATORY (INHALATION) at 21:46

## 2020-01-01 RX ADMIN — DEXTROSE MONOHYDRATE: 50 INJECTION, SOLUTION INTRAVENOUS at 14:06

## 2020-01-01 RX ADMIN — Medication 200 MCG/HR: at 07:12

## 2020-01-01 RX ADMIN — PREGABALIN 150 MG: 75 CAPSULE ORAL at 08:19

## 2020-01-01 RX ADMIN — TIZANIDINE 4 MG: 4 TABLET ORAL at 02:02

## 2020-01-01 RX ADMIN — FLUTICASONE PROPIONATE 2 SPRAY: 50 SPRAY, METERED NASAL at 07:59

## 2020-01-01 RX ADMIN — ALBUTEROL SULFATE 2.5 MG: 2.5 SOLUTION RESPIRATORY (INHALATION) at 03:25

## 2020-01-01 RX ADMIN — SUCCINYLCHOLINE CHLORIDE 200 MG: 20 INJECTION, SOLUTION INTRAMUSCULAR; INTRAVENOUS at 08:55

## 2020-01-01 RX ADMIN — INSULIN LISPRO 3 UNITS: 100 INJECTION, SOLUTION INTRAVENOUS; SUBCUTANEOUS at 04:37

## 2020-01-01 RX ADMIN — ARFORMOTEROL TARTRATE 15 MCG: 15 SOLUTION RESPIRATORY (INHALATION) at 09:13

## 2020-01-01 RX ADMIN — POTASSIUM CHLORIDE 20 MEQ: 400 INJECTION, SOLUTION INTRAVENOUS at 22:23

## 2020-01-01 RX ADMIN — Medication 7 MG/HR: at 14:31

## 2020-01-01 RX ADMIN — Medication 6 MG/HR: at 06:24

## 2020-01-01 RX ADMIN — INSULIN LISPRO 6 UNITS: 100 INJECTION, SOLUTION INTRAVENOUS; SUBCUTANEOUS at 12:27

## 2020-01-01 RX ADMIN — FUROSEMIDE 40 MG: 10 INJECTION, SOLUTION INTRAMUSCULAR; INTRAVENOUS at 21:54

## 2020-01-01 RX ADMIN — SUCRALFATE 1 G: 1 TABLET ORAL at 23:58

## 2020-01-01 RX ADMIN — SODIUM CHLORIDE, PRESERVATIVE FREE 100 UNITS: 5 INJECTION INTRAVENOUS at 21:05

## 2020-01-01 RX ADMIN — POTASSIUM BICARBONATE 20 MEQ: 782 TABLET, EFFERVESCENT ORAL at 08:35

## 2020-01-01 RX ADMIN — Medication 10 MG/HR: at 05:48

## 2020-01-01 RX ADMIN — Medication 8 MG/HR: at 04:08

## 2020-01-01 RX ADMIN — SODIUM PHOSPHATE, MONOBASIC, MONOHYDRATE 15 MMOL: 276; 142 INJECTION, SOLUTION INTRAVENOUS at 11:27

## 2020-01-01 RX ADMIN — Medication 9 MG/HR: at 20:07

## 2020-01-01 RX ADMIN — HEPARIN 100 UNITS: 100 SYRINGE at 20:16

## 2020-01-01 RX ADMIN — METHYLPREDNISOLONE SODIUM SUCCINATE 40 MG: 40 INJECTION, POWDER, LYOPHILIZED, FOR SOLUTION INTRAMUSCULAR; INTRAVENOUS at 17:26

## 2020-01-01 RX ADMIN — Medication 10 ML: at 21:50

## 2020-01-01 RX ADMIN — SODIUM CHLORIDE SOLN NEBU 3% 2 ML: 3 NEBU SOLN at 17:02

## 2020-01-01 RX ADMIN — ALBUTEROL SULFATE 2.5 MG: 2.5 SOLUTION RESPIRATORY (INHALATION) at 01:16

## 2020-01-01 RX ADMIN — AMPICILLIN SODIUM AND SULBACTAM SODIUM 3 G: 2; 1 INJECTION, POWDER, FOR SOLUTION INTRAMUSCULAR; INTRAVENOUS at 06:24

## 2020-01-01 RX ADMIN — BUMETANIDE 2 MG: 0.25 INJECTION INTRAMUSCULAR; INTRAVENOUS at 10:21

## 2020-01-01 RX ADMIN — Medication 200 MCG/HR: at 08:28

## 2020-01-01 RX ADMIN — PREGABALIN 150 MG: 75 CAPSULE ORAL at 20:17

## 2020-01-01 RX ADMIN — INSULIN GLARGINE 36 UNITS: 100 INJECTION, SOLUTION SUBCUTANEOUS at 21:43

## 2020-01-01 RX ADMIN — ALBUTEROL SULFATE 2.5 MG: 2.5 SOLUTION RESPIRATORY (INHALATION) at 01:13

## 2020-01-01 RX ADMIN — ALBUTEROL SULFATE 2.5 MG: 2.5 SOLUTION RESPIRATORY (INHALATION) at 11:57

## 2020-01-01 RX ADMIN — Medication 200 MCG/HR: at 22:22

## 2020-01-01 RX ADMIN — INSULIN LISPRO 3 UNITS: 100 INJECTION, SOLUTION INTRAVENOUS; SUBCUTANEOUS at 00:50

## 2020-01-01 RX ADMIN — AMPICILLIN SODIUM AND SULBACTAM SODIUM 3 G: 2; 1 INJECTION, POWDER, FOR SOLUTION INTRAMUSCULAR; INTRAVENOUS at 12:56

## 2020-01-01 RX ADMIN — INSULIN LISPRO 6 UNITS: 100 INJECTION, SOLUTION INTRAVENOUS; SUBCUTANEOUS at 19:51

## 2020-01-01 RX ADMIN — CHLORHEXIDINE GLUCONATE 0.12% ORAL RINSE 15 ML: 1.2 LIQUID ORAL at 21:02

## 2020-01-01 RX ADMIN — ACETYLCYSTEINE 400 MG: 100 INHALANT RESPIRATORY (INHALATION) at 07:50

## 2020-01-01 RX ADMIN — CLOPIDOGREL 75 MG: 75 TABLET, FILM COATED ORAL at 07:57

## 2020-01-01 RX ADMIN — ACETAMINOPHEN 650 MG: 325 TABLET ORAL at 16:30

## 2020-01-01 RX ADMIN — ATORVASTATIN CALCIUM 80 MG: 40 TABLET, FILM COATED ORAL at 20:58

## 2020-01-01 RX ADMIN — INSULIN LISPRO 6 UNITS: 100 INJECTION, SOLUTION INTRAVENOUS; SUBCUTANEOUS at 12:13

## 2020-01-01 RX ADMIN — AMPICILLIN SODIUM AND SULBACTAM SODIUM 3 G: 2; 1 INJECTION, POWDER, FOR SOLUTION INTRAMUSCULAR; INTRAVENOUS at 00:30

## 2020-01-01 RX ADMIN — CHLOROTHIAZIDE SODIUM 125 MG: 500 INJECTION, POWDER, LYOPHILIZED, FOR SOLUTION INTRAVENOUS at 10:19

## 2020-01-01 RX ADMIN — ACETAMINOPHEN 650 MG: 325 TABLET ORAL at 21:38

## 2020-01-01 RX ADMIN — Medication 200 MCG/HR: at 11:56

## 2020-01-01 RX ADMIN — SODIUM CHLORIDE SOLN NEBU 3% 2 ML: 3 NEBU SOLN at 20:52

## 2020-01-01 RX ADMIN — POTASSIUM CHLORIDE 10 MEQ: 10 INJECTION, SOLUTION INTRAVENOUS at 06:54

## 2020-01-01 RX ADMIN — Medication 200 MCG/HR: at 22:26

## 2020-01-01 RX ADMIN — Medication 6 MG/HR: at 20:56

## 2020-01-01 RX ADMIN — ALBUMIN (HUMAN) 25 G: 0.25 INJECTION, SOLUTION INTRAVENOUS at 06:33

## 2020-01-01 RX ADMIN — CHLORHEXIDINE GLUCONATE 15 ML: 1.2 RINSE ORAL at 08:15

## 2020-01-01 RX ADMIN — SODIUM CHLORIDE: 9 INJECTION, SOLUTION INTRAVENOUS at 01:36

## 2020-01-01 RX ADMIN — Medication: at 08:04

## 2020-01-01 RX ADMIN — ACETAMINOPHEN 650 MG: 650 SUPPOSITORY RECTAL at 20:34

## 2020-01-01 RX ADMIN — INSULIN LISPRO 2 UNITS: 100 INJECTION, SOLUTION INTRAVENOUS; SUBCUTANEOUS at 11:53

## 2020-01-01 RX ADMIN — INSULIN LISPRO 3 UNITS: 100 INJECTION, SOLUTION INTRAVENOUS; SUBCUTANEOUS at 11:59

## 2020-01-01 RX ADMIN — POTASSIUM CHLORIDE 10 MEQ: 10 INJECTION, SOLUTION INTRAVENOUS at 08:44

## 2020-01-01 RX ADMIN — IPRATROPIUM BROMIDE AND ALBUTEROL SULFATE 1 AMPULE: 2.5; .5 SOLUTION RESPIRATORY (INHALATION) at 20:55

## 2020-01-01 RX ADMIN — DEXTROSE MONOHYDRATE 10 MG/HR: 5 INJECTION, SOLUTION INTRAVENOUS at 08:45

## 2020-01-01 RX ADMIN — IPRATROPIUM BROMIDE AND ALBUTEROL SULFATE 1 AMPULE: .5; 3 SOLUTION RESPIRATORY (INHALATION) at 11:08

## 2020-01-01 RX ADMIN — ACETAMINOPHEN 650 MG: 325 TABLET ORAL at 12:06

## 2020-01-01 RX ADMIN — CEFEPIME 2 G: 2 INJECTION, POWDER, FOR SOLUTION INTRAVENOUS at 14:33

## 2020-01-01 RX ADMIN — POTASSIUM CHLORIDE 10 MEQ: 10 INJECTION, SOLUTION INTRAVENOUS at 08:15

## 2020-01-01 RX ADMIN — ARFORMOTEROL TARTRATE 15 MCG: 15 SOLUTION RESPIRATORY (INHALATION) at 19:57

## 2020-01-01 RX ADMIN — CEFEPIME 2 G: 2 INJECTION, POWDER, FOR SOLUTION INTRAVENOUS at 21:24

## 2020-01-01 RX ADMIN — Medication 200 MCG/HR: at 12:15

## 2020-01-01 RX ADMIN — CHLORHEXIDINE GLUCONATE 15 ML: 1.2 RINSE ORAL at 10:22

## 2020-01-01 RX ADMIN — ALBUMIN (HUMAN) 25 G: 0.25 INJECTION, SOLUTION INTRAVENOUS at 08:54

## 2020-01-01 RX ADMIN — ACETYLCYSTEINE 400 MG: 100 INHALANT RESPIRATORY (INHALATION) at 20:02

## 2020-01-01 RX ADMIN — ACETYLCYSTEINE 400 MG: 100 INHALANT RESPIRATORY (INHALATION) at 19:59

## 2020-01-01 RX ADMIN — ALBUTEROL SULFATE 2.5 MG: 2.5 SOLUTION RESPIRATORY (INHALATION) at 23:25

## 2020-01-01 RX ADMIN — ALBUTEROL SULFATE 2.5 MG: 2.5 SOLUTION RESPIRATORY (INHALATION) at 03:22

## 2020-01-01 RX ADMIN — CHLORHEXIDINE GLUCONATE 0.12% ORAL RINSE 15 ML: 1.2 LIQUID ORAL at 20:24

## 2020-01-01 RX ADMIN — Medication 200 MCG/HR: at 03:07

## 2020-01-01 RX ADMIN — FAMOTIDINE 20 MG: 10 INJECTION INTRAVENOUS at 08:15

## 2020-01-01 RX ADMIN — ACETAZOLAMIDE 250 MG: 250 TABLET ORAL at 08:05

## 2020-01-01 RX ADMIN — Medication 200 MCG/HR: at 23:06

## 2020-01-01 RX ADMIN — INSULIN LISPRO 3 UNITS: 100 INJECTION, SOLUTION INTRAVENOUS; SUBCUTANEOUS at 07:46

## 2020-01-01 RX ADMIN — FUROSEMIDE 60 MG: 10 INJECTION, SOLUTION INTRAMUSCULAR; INTRAVENOUS at 12:31

## 2020-01-01 RX ADMIN — FAMOTIDINE 20 MG: 10 INJECTION INTRAVENOUS at 21:39

## 2020-01-01 RX ADMIN — Medication 10 MG/HR: at 16:45

## 2020-01-01 RX ADMIN — ALBUMIN (HUMAN) 25 G: 0.25 INJECTION, SOLUTION INTRAVENOUS at 20:22

## 2020-01-01 RX ADMIN — Medication 200 MCG/HR: at 22:59

## 2020-01-01 RX ADMIN — Medication 200 MCG/HR: at 13:52

## 2020-01-01 RX ADMIN — INSULIN LISPRO 9 UNITS: 100 INJECTION, SOLUTION INTRAVENOUS; SUBCUTANEOUS at 18:34

## 2020-01-01 RX ADMIN — Medication 10 ML: at 08:20

## 2020-01-01 RX ADMIN — METHYLPREDNISOLONE SODIUM SUCCINATE 40 MG: 40 INJECTION, POWDER, LYOPHILIZED, FOR SOLUTION INTRAMUSCULAR; INTRAVENOUS at 10:29

## 2020-01-01 RX ADMIN — AMPICILLIN SODIUM AND SULBACTAM SODIUM 3 G: 2; 1 INJECTION, POWDER, FOR SOLUTION INTRAMUSCULAR; INTRAVENOUS at 18:53

## 2020-01-01 RX ADMIN — CEFTRIAXONE 2 G: 2 INJECTION, POWDER, FOR SOLUTION INTRAMUSCULAR; INTRAVENOUS at 17:30

## 2020-01-01 RX ADMIN — PETROLATUM: 42 OINTMENT TOPICAL at 06:15

## 2020-01-01 RX ADMIN — INSULIN LISPRO 4 UNITS: 100 INJECTION, SOLUTION INTRAVENOUS; SUBCUTANEOUS at 06:49

## 2020-01-01 RX ADMIN — INSULIN LISPRO 6 UNITS: 100 INJECTION, SOLUTION INTRAVENOUS; SUBCUTANEOUS at 00:08

## 2020-01-01 RX ADMIN — FAMOTIDINE 20 MG: 20 TABLET, FILM COATED ORAL at 10:46

## 2020-01-01 RX ADMIN — ENOXAPARIN SODIUM 40 MG: 40 INJECTION SUBCUTANEOUS at 10:34

## 2020-01-01 RX ADMIN — ALBUTEROL SULFATE 2.5 MG: 2.5 SOLUTION RESPIRATORY (INHALATION) at 22:52

## 2020-01-01 RX ADMIN — Medication 200 MCG/HR: at 06:51

## 2020-01-01 RX ADMIN — POTASSIUM CHLORIDE 10 MEQ: 10 INJECTION, SOLUTION INTRAVENOUS at 16:11

## 2020-01-01 RX ADMIN — FAMOTIDINE 20 MG: 20 TABLET, FILM COATED ORAL at 09:07

## 2020-01-01 RX ADMIN — Medication 200 MCG/HR: at 16:14

## 2020-01-01 RX ADMIN — METHYLPREDNISOLONE SODIUM SUCCINATE 40 MG: 40 INJECTION, POWDER, LYOPHILIZED, FOR SOLUTION INTRAMUSCULAR; INTRAVENOUS at 09:58

## 2020-01-01 RX ADMIN — METHYLPREDNISOLONE SODIUM SUCCINATE 40 MG: 40 INJECTION, POWDER, LYOPHILIZED, FOR SOLUTION INTRAMUSCULAR; INTRAVENOUS at 23:58

## 2020-01-01 RX ADMIN — CHLORHEXIDINE GLUCONATE 15 ML: 1.2 RINSE ORAL at 07:56

## 2020-01-01 RX ADMIN — POTASSIUM & SODIUM PHOSPHATES POWDER PACK 280-160-250 MG 250 MG: 280-160-250 PACK at 20:53

## 2020-01-01 RX ADMIN — ALBUTEROL SULFATE 2.5 MG: 2.5 SOLUTION RESPIRATORY (INHALATION) at 03:26

## 2020-01-01 RX ADMIN — ACETAMINOPHEN 650 MG: 325 TABLET ORAL at 09:57

## 2020-01-01 RX ADMIN — Medication 10 ML: at 10:21

## 2020-01-01 RX ADMIN — ACETYLCYSTEINE 400 MG: 100 INHALANT RESPIRATORY (INHALATION) at 20:25

## 2020-01-01 RX ADMIN — POTASSIUM CHLORIDE 10 MEQ: 10 INJECTION, SOLUTION INTRAVENOUS at 07:31

## 2020-01-01 RX ADMIN — PROPOFOL 20 MCG/KG/MIN: 10 INJECTION, EMULSION INTRAVENOUS at 08:43

## 2020-01-01 RX ADMIN — Medication 200 MCG/HR: at 12:32

## 2020-01-01 RX ADMIN — TIZANIDINE 4 MG: 4 TABLET ORAL at 19:58

## 2020-01-01 RX ADMIN — POTASSIUM & SODIUM PHOSPHATES POWDER PACK 280-160-250 MG 500 MG: 280-160-250 PACK at 17:26

## 2020-01-01 RX ADMIN — SODIUM CHLORIDE, PRESERVATIVE FREE 100 UNITS: 5 INJECTION INTRAVENOUS at 20:30

## 2020-01-01 RX ADMIN — FAMOTIDINE 20 MG: 10 INJECTION INTRAVENOUS at 20:21

## 2020-01-01 RX ADMIN — SODIUM CHLORIDE, PRESERVATIVE FREE 10 ML: 5 INJECTION INTRAVENOUS at 20:30

## 2020-01-01 RX ADMIN — INSULIN LISPRO 6 UNITS: 100 INJECTION, SOLUTION INTRAVENOUS; SUBCUTANEOUS at 23:30

## 2020-01-01 RX ADMIN — ALBUTEROL SULFATE 2.5 MG: 2.5 SOLUTION RESPIRATORY (INHALATION) at 04:39

## 2020-01-01 RX ADMIN — INSULIN LISPRO 3 UNITS: 100 INJECTION, SOLUTION INTRAVENOUS; SUBCUTANEOUS at 00:23

## 2020-01-01 RX ADMIN — POTASSIUM CHLORIDE 10 MEQ: 10 INJECTION, SOLUTION INTRAVENOUS at 10:34

## 2020-01-01 RX ADMIN — INSULIN LISPRO 6 UNITS: 100 INJECTION, SOLUTION INTRAVENOUS; SUBCUTANEOUS at 15:52

## 2020-01-01 RX ADMIN — METHYLPREDNISOLONE SODIUM SUCCINATE 40 MG: 40 INJECTION, POWDER, LYOPHILIZED, FOR SOLUTION INTRAMUSCULAR; INTRAVENOUS at 18:02

## 2020-01-01 RX ADMIN — CEFEPIME 2 G: 2 INJECTION, POWDER, FOR SOLUTION INTRAVENOUS at 04:44

## 2020-01-01 RX ADMIN — PETROLATUM: 42 OINTMENT TOPICAL at 10:22

## 2020-01-01 RX ADMIN — ACETYLCYSTEINE 400 MG: 200 SOLUTION ORAL; RESPIRATORY (INHALATION) at 20:54

## 2020-01-01 RX ADMIN — METHYLPREDNISOLONE SODIUM SUCCINATE 40 MG: 40 INJECTION, POWDER, LYOPHILIZED, FOR SOLUTION INTRAMUSCULAR; INTRAVENOUS at 00:13

## 2020-01-01 RX ADMIN — Medication 200 MCG/HR: at 07:50

## 2020-01-01 RX ADMIN — INSULIN LISPRO 2 UNITS: 100 INJECTION, SOLUTION INTRAVENOUS; SUBCUTANEOUS at 07:04

## 2020-01-01 RX ADMIN — INSULIN LISPRO 6 UNITS: 100 INJECTION, SOLUTION INTRAVENOUS; SUBCUTANEOUS at 23:59

## 2020-01-01 RX ADMIN — POTASSIUM & SODIUM PHOSPHATES POWDER PACK 280-160-250 MG 250 MG: 280-160-250 PACK at 20:25

## 2020-01-01 RX ADMIN — FAMOTIDINE 20 MG: 10 INJECTION INTRAVENOUS at 21:49

## 2020-01-01 RX ADMIN — CHLORHEXIDINE GLUCONATE 0.12% ORAL RINSE 15 ML: 1.2 LIQUID ORAL at 08:41

## 2020-01-01 RX ADMIN — ACETAMINOPHEN 650 MG: 325 TABLET ORAL at 20:57

## 2020-01-01 RX ADMIN — CHLORHEXIDINE GLUCONATE 0.12% ORAL RINSE 15 ML: 1.2 LIQUID ORAL at 21:09

## 2020-01-01 RX ADMIN — IPRATROPIUM BROMIDE AND ALBUTEROL SULFATE 1 AMPULE: 2.5; .5 SOLUTION RESPIRATORY (INHALATION) at 10:37

## 2020-01-01 RX ADMIN — INSULIN LISPRO 9 UNITS: 100 INJECTION, SOLUTION INTRAVENOUS; SUBCUTANEOUS at 06:07

## 2020-01-01 RX ADMIN — ATORVASTATIN CALCIUM 80 MG: 40 TABLET, FILM COATED ORAL at 20:17

## 2020-01-01 RX ADMIN — INSULIN LISPRO 1 UNITS: 100 INJECTION, SOLUTION INTRAVENOUS; SUBCUTANEOUS at 02:13

## 2020-01-01 RX ADMIN — ALBUTEROL SULFATE 2.5 MG: 2.5 SOLUTION RESPIRATORY (INHALATION) at 09:49

## 2020-01-01 RX ADMIN — POTASSIUM CHLORIDE 20 MEQ: 29.8 INJECTION, SOLUTION INTRAVENOUS at 00:35

## 2020-01-01 RX ADMIN — ATORVASTATIN CALCIUM 80 MG: 40 TABLET, FILM COATED ORAL at 20:09

## 2020-01-01 RX ADMIN — ARFORMOTEROL TARTRATE 15 MCG: 15 SOLUTION RESPIRATORY (INHALATION) at 08:03

## 2020-01-01 RX ADMIN — SODIUM CHLORIDE 20 ML: 9 INJECTION, SOLUTION INTRAVENOUS at 11:22

## 2020-01-01 RX ADMIN — Medication 150 MCG/HR: at 23:27

## 2020-01-01 RX ADMIN — AMPICILLIN SODIUM AND SULBACTAM SODIUM 3 G: 2; 1 INJECTION, POWDER, FOR SOLUTION INTRAMUSCULAR; INTRAVENOUS at 14:07

## 2020-01-01 RX ADMIN — INSULIN LISPRO 3 UNITS: 100 INJECTION, SOLUTION INTRAVENOUS; SUBCUTANEOUS at 11:33

## 2020-01-01 RX ADMIN — CHLORHEXIDINE GLUCONATE 0.12% ORAL RINSE 15 ML: 1.2 LIQUID ORAL at 20:26

## 2020-01-01 RX ADMIN — MIDAZOLAM HYDROCHLORIDE 4 MG: 1 INJECTION, SOLUTION INTRAMUSCULAR; INTRAVENOUS at 09:40

## 2020-01-01 RX ADMIN — FAMOTIDINE 20 MG: 10 INJECTION INTRAVENOUS at 07:59

## 2020-01-01 RX ADMIN — SODIUM CHLORIDE SOLN NEBU 3% 2 ML: 3 NEBU SOLN at 21:12

## 2020-01-01 RX ADMIN — ACETAZOLAMIDE 250 MG: 250 TABLET ORAL at 07:38

## 2020-01-01 RX ADMIN — CHLORHEXIDINE GLUCONATE 0.12% ORAL RINSE 15 ML: 1.2 LIQUID ORAL at 08:24

## 2020-01-01 RX ADMIN — POTASSIUM CHLORIDE 10 MEQ: 10 INJECTION, SOLUTION INTRAVENOUS at 10:35

## 2020-01-01 RX ADMIN — Medication 10 ML: at 20:12

## 2020-01-01 RX ADMIN — ARFORMOTEROL TARTRATE 15 MCG: 15 SOLUTION RESPIRATORY (INHALATION) at 20:25

## 2020-01-01 RX ADMIN — IPRATROPIUM BROMIDE AND ALBUTEROL SULFATE 1 AMPULE: 2.5; .5 SOLUTION RESPIRATORY (INHALATION) at 13:16

## 2020-01-01 RX ADMIN — IPRATROPIUM BROMIDE AND ALBUTEROL SULFATE 1 AMPULE: .5; 3 SOLUTION RESPIRATORY (INHALATION) at 22:42

## 2020-01-01 RX ADMIN — POTASSIUM CHLORIDE 40 MEQ: 400 INJECTION, SOLUTION INTRAVENOUS at 08:08

## 2020-01-01 RX ADMIN — ATORVASTATIN CALCIUM 80 MG: 40 TABLET, FILM COATED ORAL at 20:53

## 2020-01-01 RX ADMIN — POTASSIUM CHLORIDE 40 MEQ: 400 INJECTION, SOLUTION INTRAVENOUS at 09:10

## 2020-01-01 RX ADMIN — CEFEPIME 2 G: 2 INJECTION, POWDER, FOR SOLUTION INTRAVENOUS at 04:25

## 2020-01-01 RX ADMIN — POTASSIUM CHLORIDE 40 MEQ: 400 INJECTION, SOLUTION INTRAVENOUS at 22:11

## 2020-01-01 RX ADMIN — CHLORHEXIDINE GLUCONATE 0.12% ORAL RINSE 15 ML: 1.2 LIQUID ORAL at 08:13

## 2020-01-01 RX ADMIN — ALBUTEROL SULFATE 2.5 MG: 2.5 SOLUTION RESPIRATORY (INHALATION) at 00:13

## 2020-01-01 RX ADMIN — IPRATROPIUM BROMIDE AND ALBUTEROL SULFATE 1 AMPULE: 2.5; .5 SOLUTION RESPIRATORY (INHALATION) at 17:02

## 2020-01-01 RX ADMIN — ALBUTEROL SULFATE 2.5 MG: 2.5 SOLUTION RESPIRATORY (INHALATION) at 07:42

## 2020-01-01 RX ADMIN — Medication 200 MCG/HR: at 03:13

## 2020-01-01 RX ADMIN — Medication 200 MCG/HR: at 01:08

## 2020-01-01 RX ADMIN — SODIUM CHLORIDE, PRESERVATIVE FREE 10 ML: 5 INJECTION INTRAVENOUS at 21:54

## 2020-01-01 RX ADMIN — SODIUM CHLORIDE SOLN NEBU 3% 2 ML: 3 NEBU SOLN at 08:24

## 2020-01-01 RX ADMIN — HEPARIN 100 UNITS: 100 SYRINGE at 21:08

## 2020-01-01 RX ADMIN — CHLORHEXIDINE GLUCONATE 0.12% ORAL RINSE 15 ML: 1.2 LIQUID ORAL at 09:00

## 2020-01-01 RX ADMIN — AMPICILLIN SODIUM AND SULBACTAM SODIUM 3 G: 2; 1 INJECTION, POWDER, FOR SOLUTION INTRAMUSCULAR; INTRAVENOUS at 05:38

## 2020-01-01 RX ADMIN — IPRATROPIUM BROMIDE AND ALBUTEROL SULFATE 1 AMPULE: 2.5; .5 SOLUTION RESPIRATORY (INHALATION) at 13:38

## 2020-01-01 RX ADMIN — ALBUTEROL SULFATE 2.5 MG: 2.5 SOLUTION RESPIRATORY (INHALATION) at 08:03

## 2020-01-01 RX ADMIN — LEVOFLOXACIN 750 MG: 5 INJECTION, SOLUTION INTRAVENOUS at 11:18

## 2020-01-01 RX ADMIN — METHYLPREDNISOLONE SODIUM SUCCINATE 40 MG: 40 INJECTION, POWDER, FOR SOLUTION INTRAMUSCULAR; INTRAVENOUS at 07:57

## 2020-01-01 RX ADMIN — CEFEPIME 2 G: 2 INJECTION, POWDER, FOR SOLUTION INTRAVENOUS at 12:36

## 2020-01-01 RX ADMIN — POTASSIUM CHLORIDE 10 MEQ: 10 INJECTION, SOLUTION INTRAVENOUS at 10:10

## 2020-01-01 RX ADMIN — Medication 200 MCG/HR: at 20:24

## 2020-01-01 RX ADMIN — INSULIN GLARGINE 36 UNITS: 100 INJECTION, SOLUTION SUBCUTANEOUS at 02:13

## 2020-01-01 RX ADMIN — INSULIN LISPRO 6 UNITS: 100 INJECTION, SOLUTION INTRAVENOUS; SUBCUTANEOUS at 06:22

## 2020-01-01 RX ADMIN — METOPROLOL TARTRATE 2.5 MG: 5 INJECTION INTRAVENOUS at 18:02

## 2020-01-01 RX ADMIN — PREGABALIN 150 MG: 75 CAPSULE ORAL at 02:02

## 2020-01-01 RX ADMIN — Medication 200 MCG/HR: at 02:03

## 2020-01-01 RX ADMIN — INSULIN LISPRO 6 UNITS: 100 INJECTION, SOLUTION INTRAVENOUS; SUBCUTANEOUS at 17:08

## 2020-01-01 RX ADMIN — IPRATROPIUM BROMIDE AND ALBUTEROL SULFATE 1 AMPULE: 2.5; .5 SOLUTION RESPIRATORY (INHALATION) at 09:26

## 2020-01-01 RX ADMIN — IPRATROPIUM BROMIDE AND ALBUTEROL SULFATE 1 AMPULE: 2.5; .5 SOLUTION RESPIRATORY (INHALATION) at 21:15

## 2020-01-01 RX ADMIN — INSULIN LISPRO 6 UNITS: 100 INJECTION, SOLUTION INTRAVENOUS; SUBCUTANEOUS at 00:07

## 2020-01-01 RX ADMIN — INSULIN LISPRO 2 UNITS: 100 INJECTION, SOLUTION INTRAVENOUS; SUBCUTANEOUS at 01:52

## 2020-01-01 RX ADMIN — METHYLPREDNISOLONE SODIUM SUCCINATE 125 MG: 125 INJECTION, POWDER, FOR SOLUTION INTRAMUSCULAR; INTRAVENOUS at 14:06

## 2020-01-01 RX ADMIN — Medication 200 MCG/HR: at 05:26

## 2020-01-01 RX ADMIN — CHLORHEXIDINE GLUCONATE 15 ML: 1.2 RINSE ORAL at 09:09

## 2020-01-01 RX ADMIN — CEFEPIME 2 G: 2 INJECTION, POWDER, FOR SOLUTION INTRAVENOUS at 04:56

## 2020-01-01 RX ADMIN — CEFTRIAXONE 2 G: 2 INJECTION, POWDER, FOR SOLUTION INTRAMUSCULAR; INTRAVENOUS at 17:27

## 2020-01-01 RX ADMIN — CEFEPIME 2 G: 2 INJECTION, POWDER, FOR SOLUTION INTRAVENOUS at 21:40

## 2020-01-01 RX ADMIN — Medication 200 MCG/HR: at 04:58

## 2020-01-01 RX ADMIN — DEXTROSE MONOHYDRATE: 50 INJECTION, SOLUTION INTRAVENOUS at 15:13

## 2020-01-01 RX ADMIN — IPRATROPIUM BROMIDE AND ALBUTEROL SULFATE 1 AMPULE: .5; 3 SOLUTION RESPIRATORY (INHALATION) at 14:36

## 2020-01-01 RX ADMIN — ACETAMINOPHEN 650 MG: 325 TABLET ORAL at 09:04

## 2020-01-01 RX ADMIN — MAGNESIUM SULFATE HEPTAHYDRATE 2 G: 40 INJECTION, SOLUTION INTRAVENOUS at 10:34

## 2020-01-01 RX ADMIN — HEPARIN 100 UNITS: 100 SYRINGE at 08:45

## 2020-01-01 RX ADMIN — ACETAMINOPHEN 650 MG: 325 TABLET ORAL at 14:11

## 2020-01-01 RX ADMIN — ATORVASTATIN CALCIUM 80 MG: 80 TABLET, FILM COATED ORAL at 19:57

## 2020-01-01 RX ADMIN — CEFEPIME 2 G: 2 INJECTION, POWDER, FOR SOLUTION INTRAVENOUS at 20:58

## 2020-01-01 RX ADMIN — IPRATROPIUM BROMIDE AND ALBUTEROL SULFATE 1 AMPULE: .5; 3 SOLUTION RESPIRATORY (INHALATION) at 17:36

## 2020-01-01 RX ADMIN — IPRATROPIUM BROMIDE AND ALBUTEROL SULFATE 1 AMPULE: 2.5; .5 SOLUTION RESPIRATORY (INHALATION) at 21:30

## 2020-01-01 RX ADMIN — BUMETANIDE 0.5 MG/HR: 0.25 INJECTION INTRAMUSCULAR; INTRAVENOUS at 08:17

## 2020-01-01 RX ADMIN — ALBUTEROL SULFATE 2.5 MG: 2.5 SOLUTION RESPIRATORY (INHALATION) at 08:00

## 2020-01-01 RX ADMIN — ALBUTEROL SULFATE 2.5 MG: 2.5 SOLUTION RESPIRATORY (INHALATION) at 08:59

## 2020-01-01 RX ADMIN — SODIUM CHLORIDE: 9 INJECTION, SOLUTION INTRAVENOUS at 01:19

## 2020-01-01 RX ADMIN — CEFEPIME 2 G: 2 INJECTION, POWDER, FOR SOLUTION INTRAVENOUS at 19:54

## 2020-01-01 RX ADMIN — ENOXAPARIN SODIUM 40 MG: 40 INJECTION SUBCUTANEOUS at 08:18

## 2020-01-01 RX ADMIN — ASPIRIN 325 MG: 325 TABLET, FILM COATED ORAL at 08:28

## 2020-01-01 RX ADMIN — ALBUTEROL SULFATE 2.5 MG: 2.5 SOLUTION RESPIRATORY (INHALATION) at 08:17

## 2020-01-01 RX ADMIN — POTASSIUM CHLORIDE 10 MEQ: 10 INJECTION, SOLUTION INTRAVENOUS at 11:49

## 2020-01-01 RX ADMIN — AMPICILLIN SODIUM AND SULBACTAM SODIUM 3 G: 2; 1 INJECTION, POWDER, FOR SOLUTION INTRAMUSCULAR; INTRAVENOUS at 06:23

## 2020-01-01 RX ADMIN — INSULIN LISPRO 3 UNITS: 100 INJECTION, SOLUTION INTRAVENOUS; SUBCUTANEOUS at 20:42

## 2020-01-01 RX ADMIN — ALBUMIN (HUMAN) 25 G: 0.25 INJECTION, SOLUTION INTRAVENOUS at 10:10

## 2020-01-01 RX ADMIN — ALBUTEROL SULFATE 2.5 MG: 2.5 SOLUTION RESPIRATORY (INHALATION) at 14:03

## 2020-01-01 RX ADMIN — ARFORMOTEROL TARTRATE 15 MCG: 15 SOLUTION RESPIRATORY (INHALATION) at 20:00

## 2020-01-01 RX ADMIN — POTASSIUM CHLORIDE 10 MEQ: 10 INJECTION, SOLUTION INTRAVENOUS at 10:37

## 2020-01-01 RX ADMIN — CHLORHEXIDINE GLUCONATE 0.12% ORAL RINSE 15 ML: 1.2 LIQUID ORAL at 21:21

## 2020-01-01 RX ADMIN — ATORVASTATIN CALCIUM 80 MG: 80 TABLET, FILM COATED ORAL at 21:31

## 2020-01-01 RX ADMIN — ARFORMOTEROL TARTRATE 15 MCG: 15 SOLUTION RESPIRATORY (INHALATION) at 09:29

## 2020-01-01 RX ADMIN — INSULIN LISPRO 2 UNITS: 100 INJECTION, SOLUTION INTRAVENOUS; SUBCUTANEOUS at 08:23

## 2020-01-01 RX ADMIN — Medication 10 MG/HR: at 19:31

## 2020-01-01 RX ADMIN — SODIUM CHLORIDE, PRESERVATIVE FREE 10 ML: 5 INJECTION INTRAVENOUS at 08:38

## 2020-01-01 RX ADMIN — ARFORMOTEROL TARTRATE 15 MCG: 15 SOLUTION RESPIRATORY (INHALATION) at 09:49

## 2020-01-01 RX ADMIN — ACETYLCYSTEINE 400 MG: 100 INHALANT RESPIRATORY (INHALATION) at 09:29

## 2020-01-01 RX ADMIN — SODIUM CHLORIDE, PRESERVATIVE FREE 10 ML: 5 INJECTION INTRAVENOUS at 08:39

## 2020-01-01 RX ADMIN — ACETAMINOPHEN 650 MG: 325 TABLET ORAL at 08:40

## 2020-01-01 RX ADMIN — INSULIN LISPRO 3 UNITS: 100 INJECTION, SOLUTION INTRAVENOUS; SUBCUTANEOUS at 08:36

## 2020-01-01 RX ADMIN — METHYLPREDNISOLONE SODIUM SUCCINATE 40 MG: 40 INJECTION, POWDER, LYOPHILIZED, FOR SOLUTION INTRAMUSCULAR; INTRAVENOUS at 00:46

## 2020-01-01 RX ADMIN — ACETYLCYSTEINE 400 MG: 100 INHALANT RESPIRATORY (INHALATION) at 09:49

## 2020-01-01 RX ADMIN — ACETAZOLAMIDE 250 MG: 250 TABLET ORAL at 16:01

## 2020-01-01 RX ADMIN — DEXTROSE MONOHYDRATE: 50 INJECTION, SOLUTION INTRAVENOUS at 18:21

## 2020-01-01 RX ADMIN — POTASSIUM CHLORIDE 40 MEQ: 400 INJECTION, SOLUTION INTRAVENOUS at 21:07

## 2020-01-01 RX ADMIN — ASPIRIN 325 MG: 325 TABLET, FILM COATED ORAL at 09:00

## 2020-01-01 RX ADMIN — SUCRALFATE 1 G: 1 TABLET ORAL at 05:29

## 2020-01-01 RX ADMIN — Medication 200 MCG/HR: at 01:34

## 2020-01-01 RX ADMIN — ACETYLCYSTEINE 400 MG: 100 INHALANT RESPIRATORY (INHALATION) at 20:04

## 2020-01-01 RX ADMIN — SODIUM CHLORIDE, PRESERVATIVE FREE 10 ML: 5 INJECTION INTRAVENOUS at 20:24

## 2020-01-01 RX ADMIN — DEXTROSE MONOHYDRATE 10 MG/HR: 5 INJECTION, SOLUTION INTRAVENOUS at 09:30

## 2020-01-01 RX ADMIN — Medication 200 MCG/HR: at 02:22

## 2020-01-01 RX ADMIN — FAMOTIDINE 20 MG: 10 INJECTION INTRAVENOUS at 19:49

## 2020-01-01 RX ADMIN — ALBUTEROL SULFATE 2.5 MG: 2.5 SOLUTION RESPIRATORY (INHALATION) at 08:23

## 2020-01-01 RX ADMIN — HYDROMORPHONE HYDROCHLORIDE 0.5 MG: 1 INJECTION, SOLUTION INTRAMUSCULAR; INTRAVENOUS; SUBCUTANEOUS at 03:39

## 2020-01-01 RX ADMIN — FLUTICASONE PROPIONATE 2 SPRAY: 50 SPRAY, METERED NASAL at 14:07

## 2020-01-01 RX ADMIN — POTASSIUM CHLORIDE 10 MEQ: 10 INJECTION, SOLUTION INTRAVENOUS at 10:11

## 2020-01-01 RX ADMIN — INSULIN LISPRO 3 UNITS: 100 INJECTION, SOLUTION INTRAVENOUS; SUBCUTANEOUS at 20:40

## 2020-01-01 RX ADMIN — Medication 7 MG/HR: at 00:25

## 2020-01-01 RX ADMIN — ALBUMIN (HUMAN) 25 G: 0.25 INJECTION, SOLUTION INTRAVENOUS at 16:02

## 2020-01-01 RX ADMIN — Medication 200 MCG/HR: at 22:24

## 2020-01-01 RX ADMIN — ALBUTEROL SULFATE 2.5 MG: 2.5 SOLUTION RESPIRATORY (INHALATION) at 16:39

## 2020-01-01 RX ADMIN — BUMETANIDE 2 MG: 0.25 INJECTION INTRAMUSCULAR; INTRAVENOUS at 07:38

## 2020-01-01 RX ADMIN — INSULIN LISPRO 6 UNITS: 100 INJECTION, SOLUTION INTRAVENOUS; SUBCUTANEOUS at 05:00

## 2020-01-01 RX ADMIN — ATORVASTATIN CALCIUM 80 MG: 80 TABLET, FILM COATED ORAL at 20:21

## 2020-01-01 RX ADMIN — ARFORMOTEROL TARTRATE 15 MCG: 15 SOLUTION RESPIRATORY (INHALATION) at 20:05

## 2020-01-01 RX ADMIN — Medication 10 ML: at 08:46

## 2020-01-01 RX ADMIN — Medication 200 MCG/HR: at 09:32

## 2020-01-01 RX ADMIN — ACETAZOLAMIDE 250 MG: 250 TABLET ORAL at 20:21

## 2020-01-01 RX ADMIN — Medication 200 MCG/HR: at 02:43

## 2020-01-01 RX ADMIN — ARFORMOTEROL TARTRATE 15 MCG: 15 SOLUTION RESPIRATORY (INHALATION) at 09:24

## 2020-01-01 RX ADMIN — SUCRALFATE 1 G: 1 TABLET ORAL at 05:43

## 2020-01-01 RX ADMIN — POTASSIUM CHLORIDE 40 MEQ: 400 INJECTION, SOLUTION INTRAVENOUS at 10:20

## 2020-01-01 RX ADMIN — Medication 200 MCG/HR: at 22:16

## 2020-01-01 RX ADMIN — ARFORMOTEROL TARTRATE 15 MCG: 15 SOLUTION RESPIRATORY (INHALATION) at 20:03

## 2020-01-01 RX ADMIN — POTASSIUM BICARBONATE 40 MEQ: 782 TABLET, EFFERVESCENT ORAL at 08:21

## 2020-01-01 RX ADMIN — ACETAMINOPHEN 650 MG: 650 SUPPOSITORY RECTAL at 00:30

## 2020-01-01 RX ADMIN — Medication 9 MG/HR: at 21:54

## 2020-01-01 RX ADMIN — AMPICILLIN SODIUM AND SULBACTAM SODIUM 3 G: 2; 1 INJECTION, POWDER, FOR SOLUTION INTRAMUSCULAR; INTRAVENOUS at 12:23

## 2020-01-01 RX ADMIN — SODIUM CHLORIDE, PRESERVATIVE FREE 100 UNITS: 5 INJECTION INTRAVENOUS at 20:26

## 2020-01-01 RX ADMIN — INSULIN LISPRO 6 UNITS: 100 INJECTION, SOLUTION INTRAVENOUS; SUBCUTANEOUS at 17:56

## 2020-01-01 RX ADMIN — IPRATROPIUM BROMIDE AND ALBUTEROL SULFATE 1 AMPULE: 2.5; .5 SOLUTION RESPIRATORY (INHALATION) at 11:37

## 2020-01-01 RX ADMIN — INSULIN LISPRO 6 UNITS: 100 INJECTION, SOLUTION INTRAVENOUS; SUBCUTANEOUS at 00:17

## 2020-01-01 RX ADMIN — SODIUM CHLORIDE, PRESERVATIVE FREE 10 ML: 5 INJECTION INTRAVENOUS at 20:29

## 2020-01-01 RX ADMIN — CEFEPIME 2 G: 2 INJECTION, POWDER, FOR SOLUTION INTRAVENOUS at 12:01

## 2020-01-01 RX ADMIN — CEFEPIME 2 G: 2 INJECTION, POWDER, FOR SOLUTION INTRAVENOUS at 11:30

## 2020-01-01 RX ADMIN — PANTOPRAZOLE SODIUM 40 MG: 40 INJECTION, POWDER, LYOPHILIZED, FOR SOLUTION INTRAVENOUS at 08:42

## 2020-01-01 RX ADMIN — ARFORMOTEROL TARTRATE 15 MCG: 15 SOLUTION RESPIRATORY (INHALATION) at 09:23

## 2020-01-01 RX ADMIN — ACETYLCYSTEINE 400 MG: 100 INHALANT RESPIRATORY (INHALATION) at 20:44

## 2020-01-01 RX ADMIN — POTASSIUM CHLORIDE 10 MEQ: 7.46 INJECTION, SOLUTION INTRAVENOUS at 17:51

## 2020-01-01 RX ADMIN — SUCRALFATE 1 G: 1 TABLET ORAL at 00:50

## 2020-01-01 RX ADMIN — POTASSIUM & SODIUM PHOSPHATES POWDER PACK 280-160-250 MG 250 MG: 280-160-250 PACK at 08:39

## 2020-01-01 RX ADMIN — ACETYLCYSTEINE 400 MG: 100 INHALANT RESPIRATORY (INHALATION) at 20:40

## 2020-01-01 RX ADMIN — Medication 200 MCG/HR: at 06:29

## 2020-01-01 RX ADMIN — CHLORHEXIDINE GLUCONATE 0.12% ORAL RINSE 15 ML: 1.2 LIQUID ORAL at 09:05

## 2020-01-01 RX ADMIN — ARFORMOTEROL TARTRATE 15 MCG: 15 SOLUTION RESPIRATORY (INHALATION) at 21:15

## 2020-01-01 RX ADMIN — Medication 200 MCG/HR: at 08:20

## 2020-01-01 RX ADMIN — HEPARIN 300 UNITS: 100 SYRINGE at 08:17

## 2020-01-01 RX ADMIN — Medication 200 MCG/HR: at 09:03

## 2020-01-01 RX ADMIN — IPRATROPIUM BROMIDE AND ALBUTEROL SULFATE 1 AMPULE: .5; 3 SOLUTION RESPIRATORY (INHALATION) at 23:43

## 2020-01-01 RX ADMIN — POTASSIUM CHLORIDE 10 MEQ: 10 INJECTION, SOLUTION INTRAVENOUS at 09:50

## 2020-01-01 RX ADMIN — METHYLPREDNISOLONE SODIUM SUCCINATE 40 MG: 40 INJECTION, POWDER, LYOPHILIZED, FOR SOLUTION INTRAMUSCULAR; INTRAVENOUS at 17:30

## 2020-01-01 RX ADMIN — INSULIN GLARGINE 36 UNITS: 100 INJECTION, SOLUTION SUBCUTANEOUS at 22:01

## 2020-01-01 RX ADMIN — Medication 10 ML: at 07:56

## 2020-01-01 RX ADMIN — Medication 200 MCG/HR: at 02:34

## 2020-01-01 RX ADMIN — Medication 150 MCG/HR: at 05:12

## 2020-01-01 RX ADMIN — HEPARIN 100 UNITS: 100 SYRINGE at 08:16

## 2020-01-01 RX ADMIN — FLUTICASONE PROPIONATE 2 SPRAY: 50 SPRAY, METERED NASAL at 09:08

## 2020-01-01 RX ADMIN — SUCRALFATE 1 G: 1 TABLET ORAL at 13:00

## 2020-01-01 RX ADMIN — ASPIRIN 325 MG: 325 TABLET, FILM COATED ORAL at 08:26

## 2020-01-01 RX ADMIN — SODIUM CHLORIDE, PRESERVATIVE FREE 10 ML: 5 INJECTION INTRAVENOUS at 08:18

## 2020-01-01 RX ADMIN — ALBUTEROL SULFATE 2.5 MG: 2.5 SOLUTION RESPIRATORY (INHALATION) at 13:22

## 2020-01-01 RX ADMIN — IPRATROPIUM BROMIDE AND ALBUTEROL SULFATE 1 AMPULE: .5; 3 SOLUTION RESPIRATORY (INHALATION) at 09:18

## 2020-01-01 RX ADMIN — ALBUTEROL SULFATE 2.5 MG: 2.5 SOLUTION RESPIRATORY (INHALATION) at 04:17

## 2020-01-01 RX ADMIN — METHYLPREDNISOLONE SODIUM SUCCINATE 40 MG: 40 INJECTION, POWDER, LYOPHILIZED, FOR SOLUTION INTRAMUSCULAR; INTRAVENOUS at 08:01

## 2020-01-01 RX ADMIN — CEFEPIME 2 G: 2 INJECTION, POWDER, FOR SOLUTION INTRAVENOUS at 02:40

## 2020-01-01 RX ADMIN — DEXTROSE MONOHYDRATE: 50 INJECTION, SOLUTION INTRAVENOUS at 19:34

## 2020-01-01 RX ADMIN — Medication 200 MCG/HR: at 10:06

## 2020-01-01 RX ADMIN — POTASSIUM BICARBONATE 40 MEQ: 782 TABLET, EFFERVESCENT ORAL at 10:06

## 2020-01-01 RX ADMIN — ALBUTEROL SULFATE 2.5 MG: 2.5 SOLUTION RESPIRATORY (INHALATION) at 23:30

## 2020-01-01 RX ADMIN — Medication 200 MCG/HR: at 16:50

## 2020-01-01 RX ADMIN — CHLORHEXIDINE GLUCONATE 15 ML: 1.2 RINSE ORAL at 08:45

## 2020-01-01 RX ADMIN — Medication 200 MCG/HR: at 11:28

## 2020-01-01 RX ADMIN — SUCRALFATE 1 G: 1 TABLET ORAL at 00:13

## 2020-01-01 RX ADMIN — Medication 200 MCG/HR: at 19:27

## 2020-01-01 RX ADMIN — ACETYLCYSTEINE 400 MG: 200 SOLUTION ORAL; RESPIRATORY (INHALATION) at 21:16

## 2020-01-01 RX ADMIN — ACETAMINOPHEN 650 MG: 325 TABLET ORAL at 02:31

## 2020-01-01 RX ADMIN — Medication 200 MCG/HR: at 11:04

## 2020-01-01 RX ADMIN — ENOXAPARIN SODIUM 40 MG: 40 INJECTION SUBCUTANEOUS at 10:28

## 2020-01-01 RX ADMIN — METHYLPREDNISOLONE SODIUM SUCCINATE 40 MG: 40 INJECTION, POWDER, LYOPHILIZED, FOR SOLUTION INTRAMUSCULAR; INTRAVENOUS at 01:00

## 2020-01-01 RX ADMIN — Medication 8 MG/HR: at 08:57

## 2020-01-01 RX ADMIN — ALBUTEROL SULFATE 2.5 MG: 2.5 SOLUTION RESPIRATORY (INHALATION) at 16:36

## 2020-01-01 RX ADMIN — POTASSIUM CHLORIDE 20 MEQ: 29.8 INJECTION, SOLUTION INTRAVENOUS at 06:37

## 2020-01-01 RX ADMIN — Medication 6 MG/HR: at 05:52

## 2020-01-01 RX ADMIN — Medication 200 MCG/HR: at 08:57

## 2020-01-01 RX ADMIN — POTASSIUM CHLORIDE: 2 INJECTION, SOLUTION, CONCENTRATE INTRAVENOUS at 01:07

## 2020-01-01 RX ADMIN — INSULIN LISPRO 3 UNITS: 100 INJECTION, SOLUTION INTRAVENOUS; SUBCUTANEOUS at 06:18

## 2020-01-01 RX ADMIN — ACETAMINOPHEN 650 MG: 325 TABLET ORAL at 15:32

## 2020-01-01 RX ADMIN — ACETAZOLAMIDE 250 MG: 250 TABLET ORAL at 21:08

## 2020-01-01 RX ADMIN — SODIUM CHLORIDE SOLN NEBU 3% 2 ML: 3 NEBU SOLN at 09:26

## 2020-01-01 RX ADMIN — AMPICILLIN SODIUM AND SULBACTAM SODIUM 3 G: 2; 1 INJECTION, POWDER, FOR SOLUTION INTRAMUSCULAR; INTRAVENOUS at 00:57

## 2020-01-01 RX ADMIN — Medication 10 ML: at 07:39

## 2020-01-01 RX ADMIN — POTASSIUM BICARBONATE 40 MEQ: 782 TABLET, EFFERVESCENT ORAL at 10:16

## 2020-01-01 RX ADMIN — INSULIN LISPRO 3 UNITS: 100 INJECTION, SOLUTION INTRAVENOUS; SUBCUTANEOUS at 00:29

## 2020-01-01 RX ADMIN — SODIUM CHLORIDE, PRESERVATIVE FREE 10 ML: 5 INJECTION INTRAVENOUS at 20:43

## 2020-01-01 RX ADMIN — ACETAMINOPHEN 650 MG: 325 TABLET ORAL at 03:53

## 2020-01-01 RX ADMIN — INSULIN LISPRO 3 UNITS: 100 INJECTION, SOLUTION INTRAVENOUS; SUBCUTANEOUS at 11:26

## 2020-01-01 RX ADMIN — ALBUTEROL SULFATE 2.5 MG: 2.5 SOLUTION RESPIRATORY (INHALATION) at 15:10

## 2020-01-01 RX ADMIN — SUCRALFATE 1 G: 1 TABLET ORAL at 23:30

## 2020-01-01 RX ADMIN — IPRATROPIUM BROMIDE AND ALBUTEROL SULFATE 1 AMPULE: 2.5; .5 SOLUTION RESPIRATORY (INHALATION) at 08:24

## 2020-01-01 RX ADMIN — DEXTROSE MONOHYDRATE 8 MG/HR: 5 INJECTION, SOLUTION INTRAVENOUS at 12:25

## 2020-01-01 RX ADMIN — INSULIN LISPRO 6 UNITS: 100 INJECTION, SOLUTION INTRAVENOUS; SUBCUTANEOUS at 12:18

## 2020-01-01 RX ADMIN — Medication 200 MCG/HR: at 12:16

## 2020-01-01 RX ADMIN — LEVOFLOXACIN 750 MG: 5 INJECTION, SOLUTION INTRAVENOUS at 09:58

## 2020-01-01 RX ADMIN — FAMOTIDINE 20 MG: 10 INJECTION INTRAVENOUS at 07:56

## 2020-01-01 RX ADMIN — SODIUM CHLORIDE, PRESERVATIVE FREE 300 UNITS: 5 INJECTION INTRAVENOUS at 21:39

## 2020-01-01 RX ADMIN — ALBUTEROL SULFATE 2.5 MG: 2.5 SOLUTION RESPIRATORY (INHALATION) at 12:23

## 2020-01-01 RX ADMIN — METHYLPREDNISOLONE SODIUM SUCCINATE 40 MG: 40 INJECTION, POWDER, LYOPHILIZED, FOR SOLUTION INTRAMUSCULAR; INTRAVENOUS at 08:40

## 2020-01-01 RX ADMIN — AMPICILLIN SODIUM AND SULBACTAM SODIUM 3 G: 2; 1 INJECTION, POWDER, FOR SOLUTION INTRAMUSCULAR; INTRAVENOUS at 05:29

## 2020-01-01 RX ADMIN — SODIUM CHLORIDE SOLN NEBU 3% 2 ML: 3 NEBU SOLN at 10:54

## 2020-01-01 RX ADMIN — ACETYLCYSTEINE 400 MG: 200 SOLUTION ORAL; RESPIRATORY (INHALATION) at 10:38

## 2020-01-01 RX ADMIN — Medication 200 MCG/HR: at 01:32

## 2020-01-01 RX ADMIN — ALBUTEROL SULFATE 2.5 MG: 2.5 SOLUTION RESPIRATORY (INHALATION) at 12:13

## 2020-01-01 RX ADMIN — PREGABALIN 150 MG: 75 CAPSULE ORAL at 20:42

## 2020-01-01 RX ADMIN — ARFORMOTEROL TARTRATE 15 MCG: 15 SOLUTION RESPIRATORY (INHALATION) at 21:12

## 2020-01-01 RX ADMIN — POTASSIUM CHLORIDE 10 MEQ: 10 INJECTION, SOLUTION INTRAVENOUS at 11:00

## 2020-01-01 RX ADMIN — ACETAMINOPHEN 650 MG: 325 TABLET ORAL at 10:13

## 2020-01-01 RX ADMIN — INSULIN LISPRO 2 UNITS: 100 INJECTION, SOLUTION INTRAVENOUS; SUBCUTANEOUS at 12:07

## 2020-01-01 RX ADMIN — ALBUTEROL SULFATE 2.5 MG: 2.5 SOLUTION RESPIRATORY (INHALATION) at 03:01

## 2020-01-01 RX ADMIN — SODIUM CHLORIDE 20 ML: 9 INJECTION, SOLUTION INTRAVENOUS at 20:52

## 2020-01-01 RX ADMIN — Medication: at 15:41

## 2020-01-01 RX ADMIN — AMPICILLIN SODIUM AND SULBACTAM SODIUM 3 G: 2; 1 INJECTION, POWDER, FOR SOLUTION INTRAMUSCULAR; INTRAVENOUS at 18:54

## 2020-01-01 RX ADMIN — POTASSIUM CHLORIDE 10 MEQ: 10 INJECTION, SOLUTION INTRAVENOUS at 09:49

## 2020-01-01 RX ADMIN — POTASSIUM BICARBONATE 20 MEQ: 782 TABLET, EFFERVESCENT ORAL at 17:22

## 2020-01-01 RX ADMIN — Medication 200 MCG/HR: at 17:41

## 2020-01-01 RX ADMIN — DEXTROSE MONOHYDRATE 10 MG/HR: 5 INJECTION, SOLUTION INTRAVENOUS at 12:25

## 2020-01-01 RX ADMIN — POTASSIUM CHLORIDE 20 MEQ: 29.8 INJECTION, SOLUTION INTRAVENOUS at 08:13

## 2020-01-01 ASSESSMENT — PAIN DESCRIPTION - DESCRIPTORS
DESCRIPTORS: ACHING;DISCOMFORT;THROBBING
DESCRIPTORS_2: SPASM;BURNING
DESCRIPTORS: PATIENT UNABLE TO DESCRIBE
DESCRIPTORS: PATIENT UNABLE TO DESCRIBE
DESCRIPTORS: ACHING;THROBBING
DESCRIPTORS_2: SPASM;BURNING
DESCRIPTORS: ACHING;CONSTANT;DISCOMFORT;DULL

## 2020-01-01 ASSESSMENT — PAIN SCALES - GENERAL
PAINLEVEL_OUTOF10: 0
PAINLEVEL_OUTOF10: 2
PAINLEVEL_OUTOF10: 0
PAINLEVEL_OUTOF10: 0
PAINLEVEL_OUTOF10: 4
PAINLEVEL_OUTOF10: 0
PAINLEVEL_OUTOF10: 5
PAINLEVEL_OUTOF10: 0
PAINLEVEL_OUTOF10: 3
PAINLEVEL_OUTOF10: 0
PAINLEVEL_OUTOF10: 10
PAINLEVEL_OUTOF10: 0
PAINLEVEL_OUTOF10: 5
PAINLEVEL_OUTOF10: 0
PAINLEVEL_OUTOF10: 7
PAINLEVEL_OUTOF10: 0
PAINLEVEL_OUTOF10: 5
PAINLEVEL_OUTOF10: 0
PAINLEVEL_OUTOF10: 5
PAINLEVEL_OUTOF10: 0
PAINLEVEL_OUTOF10: 3
PAINLEVEL_OUTOF10: 0
PAINLEVEL_OUTOF10: 3
PAINLEVEL_OUTOF10: 7
PAINLEVEL_OUTOF10: 0
PAINLEVEL_OUTOF10: 10
PAINLEVEL_OUTOF10: 0
PAINLEVEL_OUTOF10: 3
PAINLEVEL_OUTOF10: 0

## 2020-01-01 ASSESSMENT — PULMONARY FUNCTION TESTS
PIF_VALUE: 31
PIF_VALUE: 28
PIF_VALUE: 29
PIF_VALUE: 36
PIF_VALUE: 30
PIF_VALUE: 28
PIF_VALUE: 39
PIF_VALUE: 29
PIF_VALUE: 23
PIF_VALUE: 30
PIF_VALUE: 28
PIF_VALUE: 31
PIF_VALUE: 40
PIF_VALUE: 32
PIF_VALUE: 34
PIF_VALUE: 30
PIF_VALUE: 30
PIF_VALUE: 31
PIF_VALUE: 38
PIF_VALUE: 21
PIF_VALUE: 29
PIF_VALUE: 34
PIF_VALUE: 32
PIF_VALUE: 30
PIF_VALUE: 41
PIF_VALUE: 36
PIF_VALUE: 32
PIF_VALUE: 32
PIF_VALUE: 35
PIF_VALUE: 33
PIF_VALUE: 32
PIF_VALUE: 31
PIF_VALUE: 41
PIF_VALUE: 35
PIF_VALUE: 28
PIF_VALUE: 29
PIF_VALUE: 41
PIF_VALUE: 16
PIF_VALUE: 27
PIF_VALUE: 32
PIF_VALUE: 55
PIF_VALUE: 32
PIF_VALUE: 30
PIF_VALUE: 38
PIF_VALUE: 32
PIF_VALUE: 34
PIF_VALUE: 30
PIF_VALUE: 35
PIF_VALUE: 31
PIF_VALUE: 29
PIF_VALUE: 31
PIF_VALUE: 28
PIF_VALUE: 35
PIF_VALUE: 28
PIF_VALUE: 38
PIF_VALUE: 30
PIF_VALUE: 30
PIF_VALUE: 31
PIF_VALUE: 28
PIF_VALUE: 40
PIF_VALUE: 28
PIF_VALUE: 27
PIF_VALUE: 30
PIF_VALUE: 26
PIF_VALUE: 27
PIF_VALUE: 30
PIF_VALUE: 30
PIF_VALUE: 36
PIF_VALUE: 32
PIF_VALUE: 34
PIF_VALUE: 38
PIF_VALUE: 15
PIF_VALUE: 31
PIF_VALUE: 30
PIF_VALUE: 31
PIF_VALUE: 31
PIF_VALUE: 35
PIF_VALUE: 30
PIF_VALUE: 31
PIF_VALUE: 31
PIF_VALUE: 30
PIF_VALUE: 31
PIF_VALUE: 24
PIF_VALUE: 35
PIF_VALUE: 28
PIF_VALUE: 30
PIF_VALUE: 28
PIF_VALUE: 27
PIF_VALUE: 26
PIF_VALUE: 30
PIF_VALUE: 29
PIF_VALUE: 37
PIF_VALUE: 27
PIF_VALUE: 17
PIF_VALUE: 32
PIF_VALUE: 34
PIF_VALUE: 32
PIF_VALUE: 33
PIF_VALUE: 28
PIF_VALUE: 40
PIF_VALUE: 34
PIF_VALUE: 30
PIF_VALUE: 29
PIF_VALUE: 32
PIF_VALUE: 23
PIF_VALUE: 30
PIF_VALUE: 28
PIF_VALUE: 27
PIF_VALUE: 31
PIF_VALUE: 31
PIF_VALUE: 34
PIF_VALUE: 31
PIF_VALUE: 31
PIF_VALUE: 27
PIF_VALUE: 30
PIF_VALUE: 32
PIF_VALUE: 28
PIF_VALUE: 30
PIF_VALUE: 36
PIF_VALUE: 41
PIF_VALUE: 27
PIF_VALUE: 30
PIF_VALUE: 30
PIF_VALUE: 31
PIF_VALUE: 25
PIF_VALUE: 18
PIF_VALUE: 31
PIF_VALUE: 30
PIF_VALUE: 26
PIF_VALUE: 29
PIF_VALUE: 35
PIF_VALUE: 31
PIF_VALUE: 31
PIF_VALUE: 28
PIF_VALUE: 31
PIF_VALUE: 38
PIF_VALUE: 38
PIF_VALUE: 23
PIF_VALUE: 30
PIF_VALUE: 28
PIF_VALUE: 30
PIF_VALUE: 36
PIF_VALUE: 32
PIF_VALUE: 55
PIF_VALUE: 33
PIF_VALUE: 34
PIF_VALUE: 38
PIF_VALUE: 27
PIF_VALUE: 40
PIF_VALUE: 29
PIF_VALUE: 20
PIF_VALUE: 35
PIF_VALUE: 33
PIF_VALUE: 30
PIF_VALUE: 29
PIF_VALUE: 30
PIF_VALUE: 31
PIF_VALUE: 33
PIF_VALUE: 26
PIF_VALUE: 33
PIF_VALUE: 33
PIF_VALUE: 31
PIF_VALUE: 27
PIF_VALUE: 31
PIF_VALUE: 33
PIF_VALUE: 30
PIF_VALUE: 41
PIF_VALUE: 32
PIF_VALUE: 28
PIF_VALUE: 30
PIF_VALUE: 17
PIF_VALUE: 34
PIF_VALUE: 35
PIF_VALUE: 29
PIF_VALUE: 31
PIF_VALUE: 29
PIF_VALUE: 31
PIF_VALUE: 29
PIF_VALUE: 32
PIF_VALUE: 36
PIF_VALUE: 34
PIF_VALUE: 26
PIF_VALUE: 36
PIF_VALUE: 32
PIF_VALUE: 33
PIF_VALUE: 30
PIF_VALUE: 37
PIF_VALUE: 40
PIF_VALUE: 29
PIF_VALUE: 30
PIF_VALUE: 31
PIF_VALUE: 24
PIF_VALUE: 32
PIF_VALUE: 34
PIF_VALUE: 28
PIF_VALUE: 29
PIF_VALUE: 29
PIF_VALUE: 39
PIF_VALUE: 33
PIF_VALUE: 32
PIF_VALUE: 30
PIF_VALUE: 29
PIF_VALUE: 38
PIF_VALUE: 30
PIF_VALUE: 31
PIF_VALUE: 22
PIF_VALUE: 0
PIF_VALUE: 38
PIF_VALUE: 34
PIF_VALUE: 31
PIF_VALUE: 29
PIF_VALUE: 28
PIF_VALUE: 33
PIF_VALUE: 33
PIF_VALUE: 27
PIF_VALUE: 30
PIF_VALUE: 28
PIF_VALUE: 35
PIF_VALUE: 16
PIF_VALUE: 25
PIF_VALUE: 30
PIF_VALUE: 33
PIF_VALUE: 31
PIF_VALUE: 37
PIF_VALUE: 27
PIF_VALUE: 37
PIF_VALUE: 30
PIF_VALUE: 35
PIF_VALUE: 32
PIF_VALUE: 36
PIF_VALUE: 36
PIF_VALUE: 32
PIF_VALUE: 33
PIF_VALUE: 31
PIF_VALUE: 27
PIF_VALUE: 32
PIF_VALUE: 32
PIF_VALUE: 33
PIF_VALUE: 31
PIF_VALUE: 27
PIF_VALUE: 30
PIF_VALUE: 31
PIF_VALUE: 29
PIF_VALUE: 34
PIF_VALUE: 28
PIF_VALUE: 29
PIF_VALUE: 32
PIF_VALUE: 39
PIF_VALUE: 25
PIF_VALUE: 32
PIF_VALUE: 27
PIF_VALUE: 33
PIF_VALUE: 29
PIF_VALUE: 38
PIF_VALUE: 28
PIF_VALUE: 30
PIF_VALUE: 31
PIF_VALUE: 29
PIF_VALUE: 28
PIF_VALUE: 50
PIF_VALUE: 31
PIF_VALUE: 32
PIF_VALUE: 16
PIF_VALUE: 30
PIF_VALUE: 43
PIF_VALUE: 31
PIF_VALUE: 30
PIF_VALUE: 31
PIF_VALUE: 34
PIF_VALUE: 40
PIF_VALUE: 55
PIF_VALUE: 31
PIF_VALUE: 29
PIF_VALUE: 28
PIF_VALUE: 33
PIF_VALUE: 32
PIF_VALUE: 32
PIF_VALUE: 34
PIF_VALUE: 28
PIF_VALUE: 27
PIF_VALUE: 17
PIF_VALUE: 16
PIF_VALUE: 31
PIF_VALUE: 33
PIF_VALUE: 31
PIF_VALUE: 27
PIF_VALUE: 31
PIF_VALUE: 32
PIF_VALUE: 36
PIF_VALUE: 30
PIF_VALUE: 40
PIF_VALUE: 16
PIF_VALUE: 28
PIF_VALUE: 47
PIF_VALUE: 32
PIF_VALUE: 39
PIF_VALUE: 29
PIF_VALUE: 36
PIF_VALUE: 16
PIF_VALUE: 36
PIF_VALUE: 31
PIF_VALUE: 23
PIF_VALUE: 31
PIF_VALUE: 48
PIF_VALUE: 29
PIF_VALUE: 30
PIF_VALUE: 29
PIF_VALUE: 32
PIF_VALUE: 29
PIF_VALUE: 27
PIF_VALUE: 37
PIF_VALUE: 30
PIF_VALUE: 36
PIF_VALUE: 34
PIF_VALUE: 35
PIF_VALUE: 36
PIF_VALUE: 29
PIF_VALUE: 26
PIF_VALUE: 29
PIF_VALUE: 31
PIF_VALUE: 29
PIF_VALUE: 35
PIF_VALUE: 33
PIF_VALUE: 38
PIF_VALUE: 29
PIF_VALUE: 31
PIF_VALUE: 31
PIF_VALUE: 34
PIF_VALUE: 31
PIF_VALUE: 35
PIF_VALUE: 32
PIF_VALUE: 31
PIF_VALUE: 31
PIF_VALUE: 29
PIF_VALUE: 16
PIF_VALUE: 19
PIF_VALUE: 32
PIF_VALUE: 36
PIF_VALUE: 31
PIF_VALUE: 36
PIF_VALUE: 27
PIF_VALUE: 34
PIF_VALUE: 33
PIF_VALUE: 32
PIF_VALUE: 29
PIF_VALUE: 35
PIF_VALUE: 32
PIF_VALUE: 30
PIF_VALUE: 36
PIF_VALUE: 0
PIF_VALUE: 28
PIF_VALUE: 32
PIF_VALUE: 16
PIF_VALUE: 29
PIF_VALUE: 32
PIF_VALUE: 29
PIF_VALUE: 35
PIF_VALUE: 36
PIF_VALUE: 33
PIF_VALUE: 29
PIF_VALUE: 32
PIF_VALUE: 32
PIF_VALUE: 31
PIF_VALUE: 33
PIF_VALUE: 29
PIF_VALUE: 27
PIF_VALUE: 29
PIF_VALUE: 32
PIF_VALUE: 29
PIF_VALUE: 30
PIF_VALUE: 29
PIF_VALUE: 28
PIF_VALUE: 0
PIF_VALUE: 30
PIF_VALUE: 31
PIF_VALUE: 28
PIF_VALUE: 28
PIF_VALUE: 29
PIF_VALUE: 29
PIF_VALUE: 28
PIF_VALUE: 32
PIF_VALUE: 30
PIF_VALUE: 37
PIF_VALUE: 29
PIF_VALUE: 29
PIF_VALUE: 34
PIF_VALUE: 30
PIF_VALUE: 30
PIF_VALUE: 29
PIF_VALUE: 31
PIF_VALUE: 28
PIF_VALUE: 33
PIF_VALUE: 36
PIF_VALUE: 31
PIF_VALUE: 32
PIF_VALUE: 41
PIF_VALUE: 29
PIF_VALUE: 32
PIF_VALUE: 31
PIF_VALUE: 35
PIF_VALUE: 27
PIF_VALUE: 34
PIF_VALUE: 29
PIF_VALUE: 17
PIF_VALUE: 27
PIF_VALUE: 48
PIF_VALUE: 30
PIF_VALUE: 34
PIF_VALUE: 29
PIF_VALUE: 35
PIF_VALUE: 52
PIF_VALUE: 41
PIF_VALUE: 26
PIF_VALUE: 31
PIF_VALUE: 44
PIF_VALUE: 50
PIF_VALUE: 34
PIF_VALUE: 33
PIF_VALUE: 33
PIF_VALUE: 31
PIF_VALUE: 29
PIF_VALUE: 30
PIF_VALUE: 27
PIF_VALUE: 44
PIF_VALUE: 36
PIF_VALUE: 35
PIF_VALUE: 33
PIF_VALUE: 28
PIF_VALUE: 14
PIF_VALUE: 30
PIF_VALUE: 30
PIF_VALUE: 31
PIF_VALUE: 31
PIF_VALUE: 35
PIF_VALUE: 32
PIF_VALUE: 37
PIF_VALUE: 31
PIF_VALUE: 26
PIF_VALUE: 31
PIF_VALUE: 38
PIF_VALUE: 21
PIF_VALUE: 31
PIF_VALUE: 31
PIF_VALUE: 26
PIF_VALUE: 29
PIF_VALUE: 30
PIF_VALUE: 39
PIF_VALUE: 29
PIF_VALUE: 20
PIF_VALUE: 38
PIF_VALUE: 15
PIF_VALUE: 34
PIF_VALUE: 30
PIF_VALUE: 29
PIF_VALUE: 32
PIF_VALUE: 30
PIF_VALUE: 16
PIF_VALUE: 33
PIF_VALUE: 34
PIF_VALUE: 36
PIF_VALUE: 37
PIF_VALUE: 15
PIF_VALUE: 37
PIF_VALUE: 32
PIF_VALUE: 38
PIF_VALUE: 30
PIF_VALUE: 16
PIF_VALUE: 33
PIF_VALUE: 33
PIF_VALUE: 29
PIF_VALUE: 55
PIF_VALUE: 32
PIF_VALUE: 30
PIF_VALUE: 22
PIF_VALUE: 30
PIF_VALUE: 30
PIF_VALUE: 33
PIF_VALUE: 34
PIF_VALUE: 30
PIF_VALUE: 29
PIF_VALUE: 28
PIF_VALUE: 28
PIF_VALUE: 38
PIF_VALUE: 30
PIF_VALUE: 36
PIF_VALUE: 33
PIF_VALUE: 31
PIF_VALUE: 22
PIF_VALUE: 22
PIF_VALUE: 31
PIF_VALUE: 33
PIF_VALUE: 35
PIF_VALUE: 32
PIF_VALUE: 38
PIF_VALUE: 33
PIF_VALUE: 19
PIF_VALUE: 30
PIF_VALUE: 35
PIF_VALUE: 27
PIF_VALUE: 27
PIF_VALUE: 32
PIF_VALUE: 29
PIF_VALUE: 32
PIF_VALUE: 34
PIF_VALUE: 15
PIF_VALUE: 26
PIF_VALUE: 33
PIF_VALUE: 33
PIF_VALUE: 34
PIF_VALUE: 31
PIF_VALUE: 17
PIF_VALUE: 30
PIF_VALUE: 37
PIF_VALUE: 31
PIF_VALUE: 34
PIF_VALUE: 30
PIF_VALUE: 29
PIF_VALUE: 27
PIF_VALUE: 29
PIF_VALUE: 31
PIF_VALUE: 30
PIF_VALUE: 37
PIF_VALUE: 36
PIF_VALUE: 35
PIF_VALUE: 27
PIF_VALUE: 35
PIF_VALUE: 33
PIF_VALUE: 31
PIF_VALUE: 30
PIF_VALUE: 25
PIF_VALUE: 30
PIF_VALUE: 19
PIF_VALUE: 31
PIF_VALUE: 15
PIF_VALUE: 36
PIF_VALUE: 30
PIF_VALUE: 32
PIF_VALUE: 31
PIF_VALUE: 32
PIF_VALUE: 17
PIF_VALUE: 28
PIF_VALUE: 29
PIF_VALUE: 29
PIF_VALUE: 27
PIF_VALUE: 41
PIF_VALUE: 29
PIF_VALUE: 29
PIF_VALUE: 31
PIF_VALUE: 32
PIF_VALUE: 30
PIF_VALUE: 32
PIF_VALUE: 33
PIF_VALUE: 31
PIF_VALUE: 26
PIF_VALUE: 30
PIF_VALUE: 41
PIF_VALUE: 29
PIF_VALUE: 27
PIF_VALUE: 34
PIF_VALUE: 14
PIF_VALUE: 37
PIF_VALUE: 26
PIF_VALUE: 41
PIF_VALUE: 22
PIF_VALUE: 34
PIF_VALUE: 27
PIF_VALUE: 27
PIF_VALUE: 26
PIF_VALUE: 30
PIF_VALUE: 35
PIF_VALUE: 31
PIF_VALUE: 30
PIF_VALUE: 35
PIF_VALUE: 31
PIF_VALUE: 28
PIF_VALUE: 31
PIF_VALUE: 26
PIF_VALUE: 23
PIF_VALUE: 30
PIF_VALUE: 30
PIF_VALUE: 31
PIF_VALUE: 29
PIF_VALUE: 35
PIF_VALUE: 32
PIF_VALUE: 14
PIF_VALUE: 30
PIF_VALUE: 34
PIF_VALUE: 37
PIF_VALUE: 30
PIF_VALUE: 31
PIF_VALUE: 41
PIF_VALUE: 37
PIF_VALUE: 32
PIF_VALUE: 30
PIF_VALUE: 34
PIF_VALUE: 36
PIF_VALUE: 22
PIF_VALUE: 42
PIF_VALUE: 33
PIF_VALUE: 36
PIF_VALUE: 30
PIF_VALUE: 29
PIF_VALUE: 30
PIF_VALUE: 31
PIF_VALUE: 31
PIF_VALUE: 28
PIF_VALUE: 30
PIF_VALUE: 34
PIF_VALUE: 31
PIF_VALUE: 33
PIF_VALUE: 30
PIF_VALUE: 29
PIF_VALUE: 33
PIF_VALUE: 32
PIF_VALUE: 34
PIF_VALUE: 30
PIF_VALUE: 30
PIF_VALUE: 29
PIF_VALUE: 34
PIF_VALUE: 30
PIF_VALUE: 29
PIF_VALUE: 29
PIF_VALUE: 42
PIF_VALUE: 33
PIF_VALUE: 29
PIF_VALUE: 38
PIF_VALUE: 32
PIF_VALUE: 35
PIF_VALUE: 30
PIF_VALUE: 34
PIF_VALUE: 30
PIF_VALUE: 27
PIF_VALUE: 33
PIF_VALUE: 27
PIF_VALUE: 29
PIF_VALUE: 19
PIF_VALUE: 34
PIF_VALUE: 32
PIF_VALUE: 26
PIF_VALUE: 53
PIF_VALUE: 29
PIF_VALUE: 33
PIF_VALUE: 36
PIF_VALUE: 30
PIF_VALUE: 32
PIF_VALUE: 32
PIF_VALUE: 19

## 2020-01-01 ASSESSMENT — ENCOUNTER SYMPTOMS
DIARRHEA: 0
VOMITING: 0
SORE THROAT: 0
NAUSEA: 0
ABDOMINAL PAIN: 0
CHEST TIGHTNESS: 0
VOICE CHANGE: 0
COUGH: 1
BACK PAIN: 0
WHEEZING: 1
SHORTNESS OF BREATH: 1
EYE ITCHING: 0
COUGH: 0
NAUSEA: 0
DIARRHEA: 0
SHORTNESS OF BREATH: 0
EYE PAIN: 0
ABDOMINAL PAIN: 0
BACK PAIN: 0
RHINORRHEA: 0
PHOTOPHOBIA: 0
CONSTIPATION: 0

## 2020-01-01 ASSESSMENT — PAIN DESCRIPTION - PROGRESSION
CLINICAL_PROGRESSION: NOT CHANGED
CLINICAL_PROGRESSION: GRADUALLY WORSENING
CLINICAL_PROGRESSION_2: NOT CHANGED
CLINICAL_PROGRESSION_2: NOT CHANGED

## 2020-01-01 ASSESSMENT — PAIN DESCRIPTION - DURATION
DURATION_2: INTERMITTENT
DURATION_2: INTERMITTENT

## 2020-01-01 ASSESSMENT — PAIN DESCRIPTION - LOCATION
LOCATION_2: BUTTOCKS
LOCATION: BACK
LOCATION_2: LEG
LOCATION: ABDOMEN
LOCATION: BACK

## 2020-01-01 ASSESSMENT — PAIN DESCRIPTION - ONSET
ONSET_2: ON-GOING
ONSET: ON-GOING
ONSET_2: ON-GOING
ONSET: PROGRESSIVE

## 2020-01-01 ASSESSMENT — PATIENT HEALTH QUESTIONNAIRE - PHQ9
SUM OF ALL RESPONSES TO PHQ9 QUESTIONS 1 & 2: 0
SUM OF ALL RESPONSES TO PHQ QUESTIONS 1-9: 0
SUM OF ALL RESPONSES TO PHQ QUESTIONS 1-9: 0
1. LITTLE INTEREST OR PLEASURE IN DOING THINGS: 0
SUM OF ALL RESPONSES TO PHQ QUESTIONS 1-9: 0
SUM OF ALL RESPONSES TO PHQ QUESTIONS 1-9: 0
2. FEELING DOWN, DEPRESSED OR HOPELESS: 0
1. LITTLE INTEREST OR PLEASURE IN DOING THINGS: 0
2. FEELING DOWN, DEPRESSED OR HOPELESS: 0
SUM OF ALL RESPONSES TO PHQ9 QUESTIONS 1 & 2: 0

## 2020-01-01 ASSESSMENT — PAIN DESCRIPTION - ORIENTATION
ORIENTATION: LOWER
ORIENTATION_2: LEFT;RIGHT
ORIENTATION: LOWER
ORIENTATION_2: LEFT;RIGHT

## 2020-01-01 ASSESSMENT — PAIN DESCRIPTION - PAIN TYPE
TYPE: CHRONIC PAIN
TYPE: OTHER (COMMENT)
TYPE: SURGICAL PAIN
TYPE_2: CHRONIC PAIN
TYPE: SURGICAL PAIN
TYPE_2: CHRONIC PAIN
TYPE: CHRONIC PAIN

## 2020-01-01 ASSESSMENT — PAIN - FUNCTIONAL ASSESSMENT
PAIN_FUNCTIONAL_ASSESSMENT: 0-10
PAIN_FUNCTIONAL_ASSESSMENT: 0-10
PAIN_FUNCTIONAL_ASSESSMENT: PREVENTS OR INTERFERES WITH MANY ACTIVE NOT PASSIVE ACTIVITIES
PAIN_FUNCTIONAL_ASSESSMENT: 0-10
PAIN_FUNCTIONAL_ASSESSMENT: PREVENTS OR INTERFERES SOME ACTIVE ACTIVITIES AND ADLS

## 2020-01-01 ASSESSMENT — PAIN DESCRIPTION - INTENSITY
RATING_2: 10
RATING_2: 10

## 2020-01-01 ASSESSMENT — PAIN DESCRIPTION - DIRECTION: RADIATING_TOWARDS_2: HAMSTRINGS

## 2020-01-01 ASSESSMENT — LIFESTYLE VARIABLES: SMOKING_STATUS: 1

## 2020-01-01 ASSESSMENT — PAIN DESCRIPTION - FREQUENCY
FREQUENCY: CONTINUOUS
FREQUENCY: CONTINUOUS

## 2020-01-15 NOTE — PROGRESS NOTES
[facility]  [address]    Follow up Note      Kelly Kasper     Date of Visit:  2020    CC:  Patient presents for follow up   Chief Complaint   Patient presents with    Follow-up     back pain      HPI:    Pain is better on increased percocet dosing. Appropriate analgesia with current medications regimen: no.    Change in quality of symptoms:no. Medication side effects:none. Recent diagnostic testing:none. Recent interventional procedures:none since last visit. He has been on anticoagulation medications to include ASA, NSAIDS and Plavix. The patient  has not been on herbal supplements. The patient is diabetic. Imagin/2019 lumbar xray -       2019 Right knee x-ray     Patient MRN:  [id number]   : 1960   Age: 61 years   Gender: Male   Order Date:  2019 9:13 AM   EXAM: XR KNEE RIGHT (1-2 VIEWS)   NUMBER OF IMAGES:  2 views   INDICATION: Z96.651 Status post total right knee replacement s/p right   TKA   COMPARISON: 2019       . Redemonstration of right total knee arthroplasty hardware   Alignment remains unchanged. No foreign body is identified.           Impression   Stable right knee arthroplasty hardware with no   significant change in alignment.       The exam has been dictated and signed by Kassandra Beltran PA-C. Jason Connell MD, Radiologist, have reviewed the images and   report and concur with these findings.      R knee xray 2018 -   Frontal and lateral weight-bearing views of the right knee demonstrate   severe medial femorotibial joint space narrowing with associated   subchondral sclerosis, marginal spurring and irregularity along the   proximal tibia, and genu varus angulation of the knee. Degenerative   enthesopathic changes are noted at the superior aspect of the patella   and posterior aspect of the femoral condyles.  The patellofemoral joint   space is not well evaluated on the provided lateral view but may be   narrowed. No acute fracture is seen. A small suprapatellar joint   effusion is noted.      L knee xray 2018 -   Cemented left total arthroplasty without evidence for   hardware failure, change in alignment and a minimal amount of joint   fluid      Potential Aberrant Drug-Related Behavior: no      Urine Drug Screenin2018 consistent  10/2018 Inconsistent for oxycodone, noroxycodone. Patient was prescribed norco at the time of the UDS.    2019  Inconsistent for oxycodone, noroxycodone. Patient was prescribed norco at the time of the UDS. I did discuss this with the patient at length as these were 1 year apart and patient had surgery and was cared for other providers in the meantime. Patient reports that he did not realize that he was not allowed to take previously prescribed pain medication. He understands that if this occurs again, we will no longer be able to prescribe. 2019 consistent     OARRS report:  2018 consistent  10/2018 consistent  2019 consistent  2019 consistent  2019 consistent  10/2019 consistent  2019 consistent  2019 consistent  2020 consistent    Past Medical History:   Diagnosis Date    Anticoagulant long-term use     CAD (coronary artery disease)     Chronic back pain     Depression     Diabetes mellitus ([address]     DJD (degenerative joint disease)     Knees.  Dyslipidemia     Hiatal hernia     History of ST elevation myocardial infarction (STEMI)     Hyperlipidemia     Hypertension     Lumbar radiculopathy 2019    MI (myocardial infarction) ([address] 2007    Inferior wall.     Obesity     Presence of stent in left circumflex coronary artery     Presence of stent in right coronary artery     Sleep apnea     cpap    Smoker     Type 2 diabetes mellitus without complication [facility])        Past Surgical History:   Procedure Laterality Date    ABDOMINAL AORTIC ANEURYSM REPAIR, ENDOVASCULAR N/A 2019 ENDOVASCULAR REPAIR ABDOMINAL AORTIC ANEURYSM performed by Dany Neil MD at [address]  X4    CORONARY ANGIOPLASTY WITH STENT PLACEMENT  12/30/2011    Dr. John Richardson 1st OM 3.0 x 20 Ion    DIAGNOSTIC CARDIAC CATH LAB PROCEDURE  3/15/2005    [facility]. Mild to moderate CAD. Normal LV.  DIAGNOSTIC CARDIAC CATH LAB PROCEDURE  1/16/2007    [facility]. Cath/PTCA/DE stent of proximal and distal RCA.  DIAGNOSTIC CARDIAC CATH LAB PROCEDURE  2/21/2007    Cath/DE stent of proximal OM1 and proximal Cx.  DIAGNOSTIC CARDIAC CATH LAB PROCEDURE  12/30/2011    ANURADHA of mid OM branch of circumflex.  ECHO COMPL W DOP COLOR FLOW  12/30/2011         HERNIA REPAIR  2/2014    laparoscopic ventral hernia repain    JOINT REPLACEMENT Left 4/1/14    TKA-ed    JOINT REPLACEMENT Right 2018    KNEE    SC OFFICE/OUTPT VISIT,PROCEDURE ONLY Right 11/26/2018    RIGHT KNEE TOTAL ARTHROPLASTY performed by Evan Altamirano DO at [address]       Prior to Admission medications    Medication Sig Start Date End Date Taking?  Authorizing Provider   loratadine (CLARITIN) 10 MG tablet TAKE 1 TABLET BY MOUTH EVERY DAY 12/16/19  Yes TRUDY Martinez   quinapril (ACCUPRIL) 40 MG tablet TAKE 1 TABLET BY MOUTH EVERY DAY 12/11/19  Yes Salma Jain DO   omega-3 acid ethyl esters (LOVAZA) 1 g capsule TAKE 1 CAPSULE BY MOUTH TWICE A DAY 11/19/19  Yes TRUDY Martinez   niacin (NIASPAN) 500 MG extended release tablet TAKE 1 TABLET BY MOUTH EVERY DAY IN THE EVENING 11/19/19  Yes TURDY Martinez   pioglitazone (ACTOS) 15 MG tablet TAKE 1 TABLET BY MOUTH EVERY DAY 10/9/19  Yes Joe Byrne DO   diclofenac (VOLTAREN) 75 MG EC tablet TAKE 1 TABLET BY MOUTH TWICE A DAY 10/9/19  Yes Salma Jain DO   triamterene-hydrochlorothiazide (ZVGKIFQ-21) 37.5-25 MG per tablet TAKE 1 TABLET BY MOUTH EVERY DAY 10/9/19  Yes Salma Jain DO   aspirin 325 MG EC tablet TAKE 1 TABLET BY MOUTH EVERY DAY 7/17/19 Yes Historical Provider, MD   metFORMIN (GLUCOPHAGE) 500 MG tablet Take 1 tablet by mouth 2 times daily (with meals) 7/22/19  Yes TRUDY Devi   docusate sodium (COLACE, DULCOLAX) 100 MG CAPS Take 100 mg by mouth 2 times daily 6/30/19  Yes Lane Tran DO   acetaminophen (TYLENOL) 500 MG tablet Take 500 mg by mouth every 6 hours as needed for Pain   Yes Historical Provider, MD   Tens Unit [address] [facility] by Does not apply route 5/7/19  Yes TRUDY Ivory   fluticasone [facility]) 50 MCG/ACT nasal spray SPRAY 2 SPRAYS INTO EACH NOSTRIL EVERY DAY 4/18/19  Yes TRUDY Devi   atorvastatin (LIPITOR) 80 MG tablet TAKE 1 TABLET BY MOUTH AT BEDTIME 10/22/18  Yes TRUDY Devi   carvedilol (COREG) 25 MG tablet TAKE ONE TABLET BY MOUTH TWICE DAILY (WITH MEALS) 10/22/18  Yes TRUDY Devi   clopidogrel (PLAVIX) 75 MG tablet TAKE 1 TABLET BY MOUTH EVERY DAY 1/7/15  Yes Edwardo Esparza MD   nitroGLYCERIN (NITROSTAT) 0.4 MG SL tablet Place 1 tablet under the tongue as needed for Chest pain 2/15/18 2/15/19  TRUDY Devi       Allergies   Allergen Reactions    Bee Venom        Social History     Socioeconomic History    Marital status:      Spouse name: Not on file    Number of children: Not on file    Years of education: Not on file    Highest education level: Not on file   Occupational History    Not on file   Social Needs    Financial resource strain: Not on file    Food insecurity:     Worry: Not on file     Inability: Not on file    Transportation needs:     Medical: Not on file     Non-medical: Not on file   Tobacco Use    Smoking status: Current Every Day Smoker     Packs/day: 0.50     Years: 39.00     Pack years: 19.50     Types: Cigarettes    Smokeless tobacco: Never Used   Substance and Sexual Activity    Alcohol use: No    Drug use: No    Sexual activity: Not on file   Lifestyle    Physical activity:     Days per week: Not on file     Minutes per session: Not on file    Stress: Not on file   Relationships    Social connections:     Talks on phone: Not on file     Gets together: Not on file     Attends [employer] service: Not on file     Active member of club or organization: Not on file     Attends meetings of clubs or organizations: Not on file     Relationship status: Not on file    Intimate partner violence:     Fear of current or ex partner: Not on file     Emotionally abused: Not on file     Physically abused: Not on file     Forced sexual activity: Not on file   Other Topics Concern    Not on file   Social History Narrative    Not on file       Family History   Problem Relation Age of Onset    Diabetes Mother     Stroke Mother     Diabetes Father     Heart Disease Father        REVIEW OF SYSTEMS:     [facility] denies fever/chills, chest pain, shortness of breath, new bowel or bladder complaints. All other review of systems was negative. PHYSICAL EXAMINATION:      BP (!) 150/90   Pulse 86   Temp 97.6 °F (36.4 °C) (Oral)   Resp 20   Ht 5' 11\" (1.803 m)   Wt (!) 312 lb (141.5 kg)   SpO2 96%   BMI 43.52 kg/m²     General:      General appearance:pleasant and well-hydrated, in no discomfort and A & O x3  Build: Morbidly obese  Function:Rises from a seated position with difficulty    HEENT:    Head:normocephalic and atraumatic  Pupils:regular, round and equal.  Sclera: icterus absent,    Lungs:    Breathing:Normal expansion. No accessory muscle use. Abdomen:    Shape:obese, non-distended and normal  Tenderness:none  Guarding:none    Lumbar spine:    Spine inspection:normal   CVA tenderness:No   Palpation: + midline TTP, + paraspinal TTP b/l.  + pain with facet loading. Range of motion:abnormal moderately in lateral bending, flexion, extension rotation bilateral and is painful.     Musculoskeletal:    SI joint tenderness:negative right, negative left              JONO test:not done right, not done left  Piriformis tenderness:negative right, negative left  Trochanteric bursa

## 2020-01-17 NOTE — PROGRESS NOTES
Darcy Christine presents to the [address] [place] [address] on 1/17/2020. Rosanna Reeves is complaining of pain lower back . The pain is constant. The pain is described as aching, throbbing, shooting and stabbing. Pain is rated on his best day at a 5, on his worst day at a 10, and on average at a 8 on the VAS scale. He took his last dose of     Any procedures since your last visit: No, with  % relief. Pacemaker or defibrilator: No managed by     He is not on NSAIDS and is  on anticoagulation medications to include ASA and Plavix and is managed by PCP. BP (!) 150/90   Pulse 86   Temp 97.6 °F (36.4 °C) (Oral)   Resp 20   Ht 5' 11\" (1.803 m)   Wt (!) 312 lb (141.5 kg)   SpO2 96%   BMI 43.52 kg/m²      No LMP for male patient.

## 2020-02-11 NOTE — PROGRESS NOTES
6/28/2019    ENDOVASCULAR REPAIR ABDOMINAL AORTIC ANEURYSM performed by Robbert Galeazzi, MD at [facility]  X4    CORONARY ANGIOPLASTY WITH STENT PLACEMENT  12/30/2011    Dr. Kamilah Landon 1st OM 3.0 x 20 Ion    DIAGNOSTIC CARDIAC CATH LAB PROCEDURE  3/15/2005    [facility]. Mild to moderate CAD. Normal LV.  DIAGNOSTIC CARDIAC CATH LAB PROCEDURE  1/16/2007    [facility]. Cath/PTCA/DE stent of proximal and distal RCA.  DIAGNOSTIC CARDIAC CATH LAB PROCEDURE  2/21/2007    Cath/DE stent of proximal OM1 and proximal Cx.  DIAGNOSTIC CARDIAC CATH LAB PROCEDURE  12/30/2011    ANURADHA of mid OM branch of circumflex.  ECHO COMPL W DOP COLOR FLOW  12/30/2011         HERNIA REPAIR  2/2014    laparoscopic ventral hernia repain    JOINT REPLACEMENT Left 4/1/14    TKA-ed    JOINT REPLACEMENT Right 2018    KNEE    NM OFFICE/OUTPT VISIT,PROCEDURE ONLY Right 11/26/2018    RIGHT KNEE TOTAL ARTHROPLASTY performed by Colleen Mckeon DO at [address]       Prior to Admission medications    Medication Sig Start Date End Date Taking? Authorizing Provider   oxyCODONE-acetaminophen (PERCOCET) 5-325 MG per tablet Take 1 tablet by mouth 3 times daily for 30 days.  1/17/20 2/16/20 Yes Kesha Maldonado,    loratadine (CLARITIN) 10 MG tablet TAKE 1 TABLET BY MOUTH EVERY DAY 12/16/19  Yes TRUDY Charlton   quinapril (ACCUPRIL) 40 MG tablet TAKE 1 TABLET BY MOUTH EVERY DAY 12/11/19  Yes Alta Greene DO   omega-3 acid ethyl esters (LOVAZA) 1 g capsule TAKE 1 CAPSULE BY MOUTH TWICE A DAY 11/19/19  Yes TRUDY Charlton   niacin (NIASPAN) 500 MG extended release tablet TAKE 1 TABLET BY MOUTH EVERY DAY IN THE EVENING 11/19/19  Yes TRUDY Charlton   pioglitazone (ACTOS) 15 MG tablet TAKE 1 TABLET BY MOUTH EVERY DAY 10/9/19  Yes Ekaterina Byrne,    diclofenac (VOLTAREN) 75 MG EC tablet TAKE 1 TABLET BY MOUTH TWICE A DAY 10/9/19  Yes Alta Greene DO   triamterene-hydrochlorothiazide (MAXZIDE-25) 37.5-25 MG per tablet TAKE 1 TABLET BY MOUTH EVERY DAY 10/9/19  Yes Ifeanyi Byrne DO   aspirin 325 MG EC tablet TAKE 1 TABLET BY MOUTH EVERY DAY 7/17/19  Yes Historical Provider, MD   metFORMIN (GLUCOPHAGE) 500 MG tablet Take 1 tablet by mouth 2 times daily (with meals) 7/22/19  Yes TRUDY Arboleda   docusate sodium (COLACE, DULCOLAX) 100 MG CAPS Take 100 mg by mouth 2 times daily 6/30/19  Yes Lane Tran DO   acetaminophen (TYLENOL) 500 MG tablet Take 500 mg by mouth every 6 hours as needed for Pain   Yes Historical Provider, MD   Tens Unit [address] [facility] by Does not apply route 5/7/19  Yes TRUDY Wick   fluticasone Annabelle Buzzard) 50 MCG/ACT nasal spray SPRAY 2 SPRAYS INTO EACH NOSTRIL EVERY DAY 4/18/19  Yes TRUDY Arboleda   atorvastatin (LIPITOR) 80 MG tablet TAKE 1 TABLET BY MOUTH AT BEDTIME 10/22/18  Yes TRUDY Arboleda   carvedilol (COREG) 25 MG tablet TAKE ONE TABLET BY MOUTH TWICE DAILY (WITH MEALS) 10/22/18  Yes TRUDY Arboleda   clopidogrel (PLAVIX) 75 MG tablet TAKE 1 TABLET BY MOUTH EVERY DAY 1/7/15  Yes Shay Dean MD   nitroGLYCERIN (NITROSTAT) 0.4 MG SL tablet Place 1 tablet under the tongue as needed for Chest pain 2/15/18 2/15/19  TRUDY Arboleda       Allergies   Allergen Reactions    Bee Venom        Social History     Socioeconomic History    Marital status:      Spouse name: Not on file    Number of children: Not on file    Years of education: Not on file    Highest education level: Not on file   Occupational History    Not on file   Social Needs    Financial resource strain: Not on file    Food insecurity:     Worry: Not on file     Inability: Not on file    Transportation needs:     Medical: Not on file     Non-medical: Not on file   Tobacco Use    Smoking status: Current Every Day Smoker     Packs/day: 0.50     Years: 39.00     Pack years: 19.50     Types: Cigarettes    Smokeless tobacco: Never Used   Substance and Sexual Activity    Alcohol use: tenderness:negative right, negative left              JONO test:not done right, not done left  Piriformis tenderness:negative right, negative left  Trochanteric bursa tenderness:negative right, negative left  SLR: negative right, negative left, sitting     Extremities:    Tremors:None bilaterally upper and lower  Range of motion:pain with internal rotation of hips negative. Intact:Yes  Varicose veins:absent   Pulses:radial pulse 2+ left  Cyanosis:none  Edema:Normal    Neurological:    Sensory:normal to light touch   Motor:   Right Quadriceps5/5          Left Quadriceps5/5           Right Gastrocnemius5/5    Left Gastrocnemius5/5  Right Ant Tibialis5/5  Left Ant Tibialis5/5    Gait:normal station    Dermatology:    Skin:no unusual rashes, no skin lesions    Impression:                Patient with lower back (along b/l SIJ) pain that radiates into the b/l hips              Lumbar xray shows significant degenerative changes (DDD and facet degeneration)              Rt hip xray shows mild degeneration R>L hips  On PLAVIX, ASA    Pt went to PT end of September and then again beginning of October for two sessions. He was instructed on a HEP plan as his copays were unaffordable. He has continue with this HEP as well as significant activity modification without improvement in his symptoms.        RF percocet 5/325 1 tab TID prn. Ordered for 2/16/2020.   Previously discussed taking medication as prescribed  Lumbar MRI ordered prev, were able to obtain PT notes today, it seems this was again denied, will work on peer to peer considering above information  Consider b/l L3,4,5 MNBB vs L5-S1 IRENE (pt on Plavix and ASA and will need to hold per WILLIAM guidelines), discussed with patient I would trial MNBB first given his H&P as well as lack of MRI - he woule like to move forward  Discussed smoking cessation strategies - he is down 14 cigarettes per day at this point  Discussed weight loss - referral to Dr. 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 \"  Has failed compounding pain cream when he used for his knee in the past.  States that it made his pain worse. Treatment plan discussed with the patient                  Plan:    We discussed with the patient that combining opioids, benzodiazepines, alcohol, illicit drugs or sleep aids increases the risk of respiratory depression including death. We discussed that these medications may cause drowsiness, sedation or dizziness and have counseled the patient not to drive or operate machinery. We have discussed that these medications will be prescribed only by one provider. We have discussed with the patient about age related risk factors and have thoroughly discussed the importance of taking these medications as prescribed. The patient verbalizes understanding.     Cc:  Referring physician

## 2020-03-06 NOTE — PROGRESS NOTES
[facility]  INSTRUCTIONS    . ..........................................................................................................................................       ? Parking the day of Surgery is located in the [facility]. Upon entering the door, make an immediate right to the surgery reception desk    ? Bring photo ID and insurance card     ? You may have a light breakfast day of procedure    ? Wear loose comfortable clothing    ? Please follow instructions for medications as given per Dr's office     ? Stop blood thinners as per Dr's office instructions    ? You can expect a call the business day prior to procedure to notify you if your arrival time changes    ?  Please arrange for

## 2020-03-12 NOTE — H&P
[facility] Pain Management        [address] [facility], [address]  Dept: [phone]    Procedure History & Physical      Jorge Check. HPI:    Patient  is here for b/l LBP for b/l L3,4,5 MNBB #1  Labs/imaging studies reviewed   All question and concerns addressed including R/B/A associated with the procedure    Past Medical History:   Diagnosis Date    Anticoagulant long-term use     CAD (coronary artery disease)     Chronic back pain     Depression     Diabetes mellitus ([address]     Dyslipidemia     Hiatal hernia 1979    History of ST elevation myocardial infarction (STEMI)     Hyperlipidemia     Hypertension     Lumbar radiculopathy 8/14/2019    Obesity     Presence of stent in left circumflex coronary artery     Presence of stent in right coronary artery     Sleep apnea     cpap    Smoker     Type 2 diabetes mellitus without complication ([facility]        Past Surgical History:   Procedure Laterality Date    ABDOMINAL AORTIC ANEURYSM REPAIR, ENDOVASCULAR N/A 6/28/2019    ENDOVASCULAR REPAIR ABDOMINAL AORTIC ANEURYSM performed by Scott Mcdermott MD at [address] [facility] STENT PLACEMENT  X4    CORONARY ANGIOPLASTY WITH STENT PLACEMENT  12/30/2011    Dr. Dilip Hong 1st OM 3.0 x 20 Ion    DIAGNOSTIC CARDIAC CATH LAB PROCEDURE  3/15/2005    [facility]. Mild to moderate CAD. Normal LV.  DIAGNOSTIC CARDIAC CATH LAB PROCEDURE  1/16/2007    [facility]. Cath/PTCA/DE stent of proximal and distal RCA.  DIAGNOSTIC CARDIAC CATH LAB PROCEDURE  2/21/2007    Cath/DE stent of proximal OM1 and proximal Cx.  DIAGNOSTIC CARDIAC CATH LAB PROCEDURE  12/30/2011    ANURADHA of mid OM branch of circumflex.      ECHO COMPL W DOP COLOR FLOW  12/30/2011         HERNIA REPAIR  2/2014    laparoscopic ventral hernia repain    JOINT REPLACEMENT Left 4/1/14    TKA-ed    JOINT REPLACEMENT Right 2018    KNEE    [place] OFFICE/OUTPT VISIT,PROCEDURE ONLY Right 11/26/2018    RIGHT KNEE TOTAL ARTHROPLASTY performed by Eladio Sharma DO at [address]       Prior to Admission medications    Medication Sig Start Date End Date Taking? Authorizing Provider   metFORMIN (GLUCOPHAGE) 500 MG tablet TAKE 1 TABLET BY MOUTH TWICE A DAY WITH MEALS 2/27/20  Yes TRUDY Berry   diclofenac (VOLTAREN) 75 MG EC tablet TAKE 1 TABLET BY MOUTH TWICE A DAY 2/24/20  Yes TRUDY Berry   pioglitazone (ACTOS) 15 MG tablet TAKE 1 TABLET BY MOUTH EVERY DAY 2/24/20  Yes TRUDY Berry   triamterene-hydrochlorothiazide (MAXZIDE-25) 37.5-25 MG per tablet TAKE 1 TABLET BY MOUTH EVERY DAY 2/24/20  Yes TRUDY Berry   oxyCODONE-acetaminophen (PERCOCET) 5-325 MG per tablet Take 1 tablet by mouth 3 times daily for 30 days.  2/16/20 3/17/20 Yes Fermin Pierson DO   quinapril (ACCUPRIL) 40 MG tablet TAKE 1 TABLET BY MOUTH EVERY DAY 12/11/19  Yes Rosalba Fisher DO   omega-3 acid ethyl esters (LOVAZA) 1 g capsule TAKE 1 CAPSULE BY MOUTH TWICE A DAY 11/19/19  Yes TRUDY Berry   niacin (NIASPAN) 500 MG extended release tablet TAKE 1 TABLET BY MOUTH EVERY DAY IN THE EVENING 11/19/19  Yes TRUDY Berry   aspirin 325 MG EC tablet TAKE 1 TABLET BY MOUTH EVERY DAY 7/17/19  Yes Historical Provider, MD   acetaminophen (TYLENOL) 500 MG tablet Take 500 mg by mouth every 6 hours as needed for Pain   Yes Historical Provider, MD   Tens [address] [facility] by Does not apply route 5/7/19  Yes TRUDY Wolfe   atorvastatin (LIPITOR) 80 MG tablet TAKE 1 TABLET BY MOUTH AT BEDTIME 10/22/18  Yes TRUDY Berry   carvedilol (COREG) 25 MG tablet TAKE ONE TABLET BY MOUTH TWICE DAILY (WITH MEALS) 10/22/18  Yes TRUDY Berry   nitroGLYCERIN (NITROSTAT) 0.4 MG SL tablet Place 1 tablet under the tongue as needed for Chest pain 2/15/18 3/6/20 Yes TRUDY Berry   clopidogrel (PLAVIX) 75 MG tablet TAKE 1 TABLET BY MOUTH EVERY DAY 1/7/15  Yes Teddy Leija MD   loratadine (CLARITIN) 10 MG tablet TAKE 1 TABLET BY MOUTH EVERY DAY 2/21/20   Bharati Sams nausea, vomiting, change in bowel habits, diarrhea, constipation and abdominal pain    MUSCULOSKELETAL: negative for muscle weakness    SKIN: negative for itching or rashes. BEHAVIOR/PSYCH:  negative for poor appetite, increased appetite, decreased sleep and poor concentration    All other systems negative      PHYSICAL EXAM:    VITALS:  BP (!) 173/91   Pulse 77   Temp 97.6 °F (36.4 °C) (Temporal)   Resp 18   Ht 5' 11\" (1.803 m)   Wt (!) 315 lb (142.9 kg)   SpO2 96%   BMI 43.93 kg/m²     CONSTITUTIONAL:  awake, alert, cooperative, no apparent distress, and appears stated age    EYES: PERRLA, EOMI    LUNGS:  No increased work of breathing, no audible wheezing    CARDIOVASCULAR:  regular rate and rhythm    ABDOMEN:  Soft non tender non distended     EXTREMITIES: no signs of clubbing or cyanosis. MUSCULOSKELETAL: negative for flaccid muscle tone or spastic movements. SKIN: gross examination reveals no signs of rashes, or diaphoresis. NEURO: Cranial nerves II-XII grossly intact. No signs of agitated mood.        Assessment/Plan:    B/l LBP pain for b/l L3,4,5 MNBB #1

## 2020-03-12 NOTE — PROGRESS NOTES
ethyl esters (LOVAZA) 1 g capsule TAKE 1 CAPSULE BY MOUTH TWICE A DAY 11/19/19  Yes TRUDY Hernández   niacin (NIASPAN) 500 MG extended release tablet TAKE 1 TABLET BY MOUTH EVERY DAY IN THE EVENING 11/19/19  Yes TRUDY Hernández   aspirin 325 MG EC tablet TAKE 1 TABLET BY MOUTH EVERY DAY 7/17/19  Yes Historical Provider, MD   acetaminophen (TYLENOL) 500 MG tablet Take 500 mg by mouth every 6 hours as needed for Pain   Yes Historical Provider, MD   [address] [facility] by Does not apply route 5/7/19  Yes TRUDY Roman   atorvastatin (LIPITOR) 80 MG tablet TAKE 1 TABLET BY MOUTH AT BEDTIME 10/22/18  Yes TRUDY Hernández   carvedilol (COREG) 25 MG tablet TAKE ONE TABLET BY MOUTH TWICE DAILY (WITH MEALS) 10/22/18  Yes TRUDY Hernández   nitroGLYCERIN (NITROSTAT) 0.4 MG SL tablet Place 1 tablet under the tongue as needed for Chest pain 2/15/18 3/13/20 Yes TRUDY Hernández   clopidogrel (PLAVIX) 75 MG tablet TAKE 1 TABLET BY MOUTH EVERY DAY 1/7/15  Yes Alexandria Angulo MD       Allergies   Allergen Reactions    Bee Venom Anaphylaxis       Social History     Socioeconomic History    Marital status:      Spouse name: Not on file    Number of children: Not on file    Years of education: Not on file    Highest education level: Not on file   Occupational History    Not on file   Social Needs    Financial resource strain: Not on file    Food insecurity     Worry: Not on file     Inability: Not on file    Transportation needs     Medical: Not on file     Non-medical: Not on file   Tobacco Use    Smoking status: Current Every Day Smoker     Packs/day: 0.50     Years: 39.00     Pack years: 19.50     Types: Cigarettes    Smokeless tobacco: Never Used   Substance and Sexual Activity    Alcohol use: No    Drug use: No    Sexual activity: Not on file   Lifestyle    Physical activity     Days per week: Not on file     Minutes per session: Not on file    Stress: Not on file   Relationships    Social connections Talks on phone: Not on file     Gets together: Not on file     Attends [employer] service: Not on file     Active member of club or organization: Not on file     Attends meetings of clubs or organizations: Not on file     Relationship status: Not on file    Intimate partner violence     Fear of current or ex partner: Not on file     Emotionally abused: Not on file     Physically abused: Not on file     Forced sexual activity: Not on file   Other Topics Concern    Not on file   Social History Narrative    Not on file       Family History   Problem Relation Age of Onset    Diabetes Mother     Stroke Mother     Diabetes Father     Heart Disease Father        REVIEW OF SYSTEMS:     Loan Rodriguez denies fever/chills, chest pain, shortness of breath, new bowel or bladder complaints. All other review of systems was negative. PHYSICAL EXAMINATION:      BP (!) 142/92 (Site: Right Upper Arm, Position: Sitting, Cuff Size: Large Adult)   Pulse 76   Temp 98.1 °F (36.7 °C) (Oral)   Resp 12   Ht 5' 11\" (1.803 m)   Wt (!) 315 lb (142.9 kg)   BMI 43.93 kg/m²     General:      General appearance:pleasant and well-hydrated, in no discomfort and A & O x3  Build: Morbidly obese  Function:Rises from a seated position with difficulty    HEENT:    Head:normocephalic and atraumatic  Pupils:regular, round and equal.  Sclera: icterus absent    Lungs:    Breathing:Normal expansion. No accessory muscle use. Abdomen:    Shape:obese, non-distended and normal  Tenderness:none  Guarding:none    Lumbar spine:    Spine inspection:normal   CVA tenderness:No   Palpation: + midline TTP, + paraspinal TTP b/l.  + pain with facet loading. Range of motion:abnormal moderately in lateral bending, flexion, extension rotation bilateral and is painful.     Musculoskeletal:    SI joint tenderness:negative right, negative left              JONO test:not done right, not done left  Piriformis tenderness:negative right, negative left  Trochanteric bursa tenderness:negative right, negative left  SLR: negative right, negative left, sitting     Extremities:    Tremors:None bilaterally upper and lower  Range of motion:pain with internal rotation of hips negative. Intact:Yes  Varicose veins:absent   Pulses:radial pulse 2+ left  Cyanosis:none  Edema:Normal    Neurological:    Sensory:normal to light touch   Motor:   Right Quadriceps5/5          Left Quadriceps5/5           Right Gastrocnemius5/5    Left Gastrocnemius5/5  Right Ant Tibialis5/5  Left Ant Tibialis5/5    Gait:normal station    Dermatology:    Skin:no unusual rashes, no skin lesions    Impression:                Patient with lower back (along b/l SIJ) pain that radiates into the b/l hips              Lumbar xray shows significant degenerative changes (DDD and facet degeneration)              Rt hip xray shows mild degeneration R>L hips  On PLAVIX, ASA     OARRS and UDS reviewed  RF percocet 5/325 1 tab TID prn. Ordered for 3/17/2020. Previously discussed taking medication as prescribed  Lumbar MRI ordered prev, were able to obtain PT notes, it seems this was again denied, will work on peer to peer considering above information, however, he is improving with the current tx plan  b/l L3,4,5 MNBB (pt on Plavix and ASA and will need to hold per WILLIAM guidelines) #1 with 100% relief x 6 hours, repeat  Discussed smoking cessation strategies - he is down 14 cigarettes per day at this point  Discussed weight loss - referral to Dr. 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 \"  Has failed compounding pain cream when he used for his knee in the past.  States that it made his pain worse.                Treatment plan discussed with the patient                  Plan:    We discussed with the patient that combining opioids, benzodiazepines, alcohol, illicit drugs or sleep aids increases the risk of respiratory depression including death. We discussed that these medications may cause drowsiness, sedation or dizziness and have counseled the patient not to drive or operate machinery. We have discussed that these medications will be prescribed only by one provider. We have discussed with the patient about age related risk factors and have thoroughly discussed the importance of taking these medications as prescribed. The patient verbalizes understanding.     Cc:  Referring physician

## 2020-03-12 NOTE — PROGRESS NOTES
Patient given discharge instructions and verbalizes understanding  Patient pain free and ambulating at this time

## 2020-03-12 NOTE — OP NOTE
3/12/2020    Patient: Jorge Hansen :  1960  Age:  61 y.o. Sex:  male     PRE-OPERATIVE DIAGNOSIS: Bilateral Lumbar spondylosis, lumbar facet syndrome. POST-OPERATIVE DIAGNOSIS: Same. PROCEDURE:  # 1 Fluoroscopic guided lumbar medial branch blocks Bilateral at Levels: L3,4,5 (anesthetizing the L4-5 and L5-S1 facet joints    SURGEON: GARTH Robins. ANESTHESIA: local    ESTIMATED BLOOD LOSS: None.  __________________________________________________________  BRIEF HISTORY:  Jorge Hansen comes in today for 3 fluoroscopic guided lumbar medial branch blocks  Bilateral  at Levels: L3,4,5. The potential complications of this procedure were discussed with him again today. He has elected to undergo the aforementioned procedure. Loi complete History & Physical examination were reviewed in depth, a copy of which is in the chart. DESCRIPTION OF PROCEDURE:   After confirming written and informed consent, a time-out was performed and Loi name and date of birth, the procedure to be performed as well as the plan for the location of the needle insertion were confirmed. The patient was brought into the procedure room and placed in the prone position on the fluoroscopy table. Standard monitors were placed and vital signs were observed throughout the procedure. The area of the lumbar spine was prepped with chloraprep and draped in a sterile manner. AP fluoroscopy was used to identify and katty bartons point at the targeted levels. The skin and subcutaneous tissues in these identified areas were anesthetized with 0.5%Lidocaine. A #22 gauge spinal needle was advanced toward the junction of the superior articular process and the transverse process, along the course of the medial branch. Satisfactory needle placement was confirmed by AP and oblique projections.   At the sacral alar level, the sacral alar region was visualized and the needle tip was positioned on the sacral alar at the base

## 2020-03-13 NOTE — PROGRESS NOTES
Patricio Houser presents to the [facility] on 3/13/2020. Rosanna Reeves is complaining of low back pain. The pain is constant. The pain is described as aching. Pain is rated on his best day at a 6, on his worst day at a 10, and on average at a 8 on the VAS scale. He took his last dose of Percocet at 6:30am.     Any procedures since your last visit: Yes, with 0 % relief. Pacemaker or defibrilator: No    He is not on NSAIDS and is  on anticoagulation medications to include Plavix and is managed by Dr. Nikhil Sanchez. There were no vitals taken for this visit. No LMP for male patient.

## 2020-04-01 NOTE — TELEPHONE ENCOUNTER
4-1-20-left voice mail message for [phone] to call back and set up a virtual appointment.     Melinda Florentino RN  Pain Management

## 2020-04-03 NOTE — PROGRESS NOTES
[address]  [address] [facility], [address] [place] [address]  [phone]    Tele health follow up Note      Janneth Mckeon. Date of Visit:  2020    CC:  Tele health follow up   Chief Complaint   Patient presents with    Pain       Consent:  The patient and/or health care decision maker is aware that he/she may receive a bill for this tele-health service [employer] [facility], depending on his insurance coverage, and has provided verbal consent to proceed: Yes  I have considered the risks of abuse, dependence, addiction and diversion. My patient is aware that they will need a follow-up visit (in-person or virtually) at the appropriate time indicated for continued medications. Further, my patient is aware that when this acute crisis has lifted, they will be expected to return for an in-person visit and all elements of standard local and hospital guidelines in order to continue this medication. Patient Location:Home   Physician location:Home    HPI:    Pain is unchanged. Change in quality of symptoms:no. Medication side effects:none. Recent diagnostic testing:none. Recent interventional procedures:none.       He has been on anticoagulation medications to include ASA, NSAIDS and Plavix. The patient  has not been on herbal supplements. The patient is diabetic.     Imagin/2019 lumbar xray -       2019 Right knee x-ray     Patient MRN:  [id number]   : 1960   Age: 61 years   Gender: Male   Order Date:  2019 9:13 AM   EXAM: XR KNEE RIGHT (1-2 VIEWS)   NUMBER OF IMAGES:  2 views   INDICATION: Z96.651 Status post total right knee replacement s/p right   TKA   COMPARISON: 2019       . Redemonstration of right total knee arthroplasty hardware   Alignment remains unchanged.    No foreign body is identified.           Impression   Stable right knee arthroplasty hardware with no   significant change in alignment.       The exam has been dictated and signed by

## 2020-05-04 NOTE — PROGRESS NOTES
Percocet, Diclofenec    PHYSICAL EXAMINATION:  Vital Signs: none available    Constitutional: [x] Appears well-developed and well-nourished [x] No apparent distress        Mental status: [x] Alert and awake  [x] Oriented to person/place/time [x]Able to follow commands            Psychiatric:     [x] Normal mood [x] Full affect      HISTORY & ASSESSMENT/PLAN:    Problem 1 - Chronic back pain  HPI  - see above for description of problem      Pt has been directed by Dr. Gena Cruz in pain management to reduce his BMI to off load the mechanical load on the spine and correct the altered biomechanics of his spine that are due to changes in posture due to his adiposity  Assessment - Uncontrolled  Plan  - Proceed with weight reduction per the plan below    Problem 2 - Obesity  HPI  - See above for description of problem. He describes a diet without trouble foods and only  rena in his beverages per day. If this is an accurate assessment, then the source of the excess calories required to support his excess weight must come from his regular meals and the types and amounts of the entrees and sides that it comprises. Therefore targeted elimination will provide little benefit. After discussing the options of a shake-based VLCD and a counting-based regimen, he prefers the latter. Specifically, he wishes to work with a dietician and craft a meal-plan that produces weight reduction and also is a diet on which he can remain for weight control.   Assessment - Uncontrolled; needs to meet with  Ginette DAVID Zhu, and begin a simple counting-plan until seeing him  Plan  -   Rules:  · Count every calorie every day (consider using a smart phone hilario such as [employer] (EPS), Fat Tracker, Lose It, My Fitness Pal)    Requirements:  · Make sure protein intake is at least 100 grams per day (consider using a protein shake - Nectar, Pure Protein, Premier, Boost Max, Ensure Max, BeneProtein and [employer] (which is lactose-free) are milk-based

## 2020-05-04 NOTE — PATIENT INSTRUCTIONS
Rules:  · Count every calorie every day (consider using a smart phone hilario such as B[employer]ap[employer], Fat Tracker, Lose It, My Fitness Pal)    Requirements:  · Make sure protein intake is at least 100 grams per day (consider using a protein shake - Nectar, Pure Protein, Premier, Boost Max, Ensure Max, BeneProtein and [employer] (which is lactose-free) are milk-based options; Nectar, Premier Protein Clear, IsoPure Protein Drink, and Protein 2 O are water-based options; Quest protein bar(or Cosco, which is cheaper and is ordered on [address] [facility] [address]) and the [employer] 1 protein bars can also be used, but have less protein in them ); other drink options can be found in the protein powder report on the [address] [place] [address] website  · Make sure that fiber intake is at least 28 grams per day. Do this by either eating 1 cup of the original, plain Fiber One cereal every day or 6 tablespoons of Benefiber every day. Work up to this amount slowly by starting with only one-fourth of the target amount and adding another one-fourth every one or two weeks  · Take one multivitamin every day    Targets:  · Limit calorie intake to 2000 calories/day  · Walk 30 minutes daily  · Avoid eating 2 hours within bedtime. Tips:  · Do not eat outside of the dining room or the kitchen  · Do not eat while watching TV, videos, working on the computer or using a smart phone  · Do not eat food out of a multi-serving bag or container. See the dietician for assistance with food choices and meal planning  Prior to seeing him, keep a careful food log for two typical days (one work day and one weekend day). Log all foods and their amounts (volume or weight), carb/fat/protein content, and calore content for the foods, meals and day. Make two lists: one of the top 5 foods important for you to include in your weekly diet routine and one of the top 5 foods commonly eaten by people that you dislike.     Return to see Dr. Brandy Gallego in 6 weeks

## 2020-05-06 NOTE — PROGRESS NOTES
[address]  [address] [facility], [address] [place] [address] [place]  [phone]    Tele health follow up Note      Franco Tavares. Date of Visit:  2020    CC:  Tele health follow up   Chief Complaint   Patient presents with    Follow-up    Back Pain     lumbar       Consent:  The patient and/or health care decision maker is aware that he/she may receive a bill for this tele-health service [employer] [facility], depending on his insurance coverage, and has provided verbal consent to proceed: Yes  I have considered the risks of abuse, dependence, addiction and diversion. My patient is aware that they will need a follow-up visit (in-person or virtually) at the appropriate time indicated for continued medications. Further, my patient is aware that when this acute crisis has lifted, they will be expected to return for an in-person visit and all elements of standard local and hospital guidelines in order to continue this medication. Patient Location:Home   Physician location:Home    HPI:    Pain is unchanged. Change in quality of symptoms:no. Medication side effects:none. Recent diagnostic testing:none. Recent interventional procedures:none.       He has been on anticoagulation medications to include ASA, NSAIDS and Plavix. The patient  has not been on herbal supplements. The patient is diabetic.     Imagin/2019 lumbar xray -       2019 Right knee x-ray     Patient MRN:  [id number]   : 1960   Age: 61 years   Gender: Male   Order Date:  2019 9:13 AM   EXAM: XR KNEE RIGHT (1-2 VIEWS)   NUMBER OF IMAGES:  2 views   INDICATION: Z96.651 Status post total right knee replacement s/p right   TKA   COMPARISON: 2019       . Redemonstration of right total knee arthroplasty hardware   Alignment remains unchanged.    No foreign body is identified.           Impression   Stable right knee arthroplasty hardware with no   significant change in alignment.       The exam has alcohol, illicit drugs or sleep aids increases the risk of respiratory depression including death. We discussed that these medications may cause drowsiness, sedation or dizziness and have counseled the patient not to drive or operate machinery. We have discussed that these medications will be prescribed only by one provider. We have discussed with the patient about age related risk factors and have thoroughly discussed the importance of taking these medications as prescribed. The patient verbalizes understanding. Patient advised regarding steps to help prevent the spread of COVID-19   SOURCE - https://zayda-riky.info/. html     1-Stay home except to get medical care  2-Clean your hands often for atleast 20 seconds, avoid touching: Avoid touching your eyes, nose, and mouth with unwashed hands. 3-Seek medical attention: Seek prompt medical attention if your illness is worsening (e.g., difficulty breathing). Call you doctor first.  3-Wear a facemask if you are sick   4-Cover your coughs and sneezes           I affirm this is a Patient Initiated Episode with an established Patient who has not had a related appointment within my department in the past 7 days or scheduled within the next 24 hours.     Total Time: 17 minutes     Cc: Referring physician

## 2020-05-07 NOTE — PROGRESS NOTES
swelling, neuro changes. He does have a hx of AAA and follows with vascular. He does need to have US completed and recommend to follow up. All questions were answered to patients satisfaction. Past Medical History:   Diagnosis Date    Anticoagulant long-term use     CAD (coronary artery disease)     Chronic back pain     Depression     Dyslipidemia     Hiatal hernia 1979    History of ST elevation myocardial infarction (STEMI)     Hyperlipidemia     Hypertension     Lumbar radiculopathy 8/14/2019    Obesity     Presence of stent in left circumflex coronary artery     Presence of stent in right coronary artery     Sleep apnea     cpap    Smoker     Type 2 diabetes mellitus without complication ([facility] [address]        Past Surgical History:   Procedure Laterality Date    ABDOMINAL AORTIC ANEURYSM REPAIR, ENDOVASCULAR N/A 6/28/2019    ENDOVASCULAR REPAIR ABDOMINAL AORTIC ANEURYSM performed by Mingo Kramer MD at [address] Bilateral 3/12/2020    BILATERAL L3,L4,L5 MEDIAL BRANCH NERVE BLOCK performed by Roger Waters DO at [facility] OR    CORONARY ANGIOPLASTY WITH STENT PLACEMENT  X4    CORONARY ANGIOPLASTY WITH STENT PLACEMENT  12/30/2011    Dr. Herring Close 1st OM 3.0 x 20 Ion    DIAGNOSTIC CARDIAC CATH LAB PROCEDURE  3/15/2005    [facility]. Mild to moderate CAD. Normal LV.  DIAGNOSTIC CARDIAC CATH LAB PROCEDURE  1/16/2007    [facility]. Cath/PTCA/DE stent of proximal and distal RCA.  DIAGNOSTIC CARDIAC CATH LAB PROCEDURE  2/21/2007    Cath/DE stent of proximal OM1 and proximal Cx.  DIAGNOSTIC CARDIAC CATH LAB PROCEDURE  12/30/2011    ANURADHA of mid OM branch of circumflex.      ECHO COMPL W DOP COLOR FLOW  12/30/2011         HERNIA REPAIR  2/2014    laparoscopic ventral hernia repain    JOINT REPLACEMENT Left 4/1/14    TKA-ed    JOINT REPLACEMENT Right 2018    KNEE    [place] OFFICE/OUTPT VISIT,PROCEDURE ONLY Right 11/26/2018    RIGHT KNEE TOTAL ARTHROPLASTY performed by Elinor Jennings postnasal drip, rhinorrhea and sore throat. Eyes: Negative for pain and itching. Respiratory: Negative for cough, chest tightness and shortness of breath. Cardiovascular: Negative for chest pain and leg swelling. Gastrointestinal: Negative for abdominal pain, constipation, diarrhea and nausea. Endocrine: Negative for heat intolerance. Genitourinary: Negative for flank pain, frequency and urgency. Musculoskeletal: Positive for arthralgias. Negative for back pain. Skin: Negative for pallor and rash. Allergic/Immunologic: Negative for environmental allergies. Neurological: Negative for dizziness, numbness and headaches. Hematological: Does not bruise/bleed easily. Psychiatric/Behavioral: Negative for agitation, behavioral problems and hallucinations. PHYSICAL EXAM  Ht 5' 11\" (1.803 m)   Wt (!) 312 lb (141.5 kg)   BMI 43.52 kg/m²   Physical Exam  Vitals signs reviewed. Constitutional:       Appearance: Normal appearance. He is obese. HENT:      Head: Normocephalic. Nose: Nose normal.   Eyes:      Extraocular Movements: Extraocular movements intact. Conjunctiva/sclera: Conjunctivae normal.   Neck:      Musculoskeletal: Normal range of motion. Pulmonary:      Effort: Pulmonary effort is normal.   Musculoskeletal: Normal range of motion. Skin:     Findings: No rash. Neurological:      General: No focal deficit present. Mental Status: He is alert and oriented to person, place, and time. Mental status is at baseline. Psychiatric:         Mood and Affect: Mood normal.         Behavior: Behavior normal.         Thought Content: Thought content normal.         Judgment: Judgment normal.              Laboratory:   All laboratory and radiology results have been personally reviewed by myself    Lab Results   Component Value Date     06/29/2019    K 4.2 06/29/2019     06/29/2019    CO2 22 06/29/2019    BUN 30 06/29/2019    CREATININE 1.1 06/29/2019    PROT 6.8 10/22/2018    LABALBU 4.1 10/22/2018    LABALBU 4.5 12/28/2011    CALCIUM 8.6 06/29/2019    GFRAA >60 06/29/2019    LABGLOM >60 06/29/2019    GLUCOSE 129 06/29/2019    GLUCOSE 120 12/31/2011    AST 14 10/22/2018    ALT 11 10/22/2018    ALKPHOS 87 10/22/2018    BILITOT 0.4 10/22/2018    TSH 1.090 02/15/2018    CHOL 137 10/22/2018    TRIG 214 10/22/2018    HDL 33 10/22/2018    LDLCALC 61 10/22/2018    LABA1C 6.7 03/21/2019        Lab Results   Component Value Date    CHOL 137 10/22/2018    CHOL 130 02/15/2018    CHOL 123 12/29/2011     Lab Results   Component Value Date    TRIG 214 (H) 10/22/2018    TRIG 147 02/15/2018    TRIG 216 (H) 12/29/2011     Lab Results   Component Value Date    HDL 33 10/22/2018    HDL 40 02/15/2018    HDL 26.0 (A) 12/29/2011     Lab Results   Component Value Date    LDLCALC 61 10/22/2018    LDLCALC 61 02/15/2018    LDLCALC 54 12/29/2011       Lab Results   Component Value Date    LABA1C 6.7 03/21/2019    LABA1C 6.1 10/22/2018    LABA1C 6.2 06/18/2018     Lab Results   Component Value Date    LDLCALC 61 10/22/2018    CREATININE 1.1 06/29/2019       ASSESSMENT/PLAN:     Diagnosis Orders   1. Type 2 diabetes mellitus without complication, without long-term current use of insulin ([facility])  metFORMIN (GLUCOPHAGE) 500 MG tablet    pioglitazone (ACTOS) 15 MG tablet    omega-3 acid ethyl esters (LOVAZA) 1 g capsule    CBC Auto Differential    Comprehensive Metabolic Panel    TSH without Reflex    Hemoglobin A1C   2. Hyperlipidemia, unspecified hyperlipidemia type  Lipid Panel   3. Essential hypertension  triamterene-hydrochlorothiazide (MAXZIDE-25) 37.5-25 MG per tablet    quinapril (ACCUPRIL) 40 MG tablet   4. Osteoarthritis of both knees, unspecified osteoarthritis type  diclofenac (VOLTAREN) 75 MG EC tablet   5. Vitamin D deficiency  Vitamin D 25 Hydroxy   6. Abdominal aortic aneurysm (AAA) without rupture ([facility] [address]     7. Morbid obesity with BMI of 40.0-44.9, adult ([facility])       A1c to be completed.  Will

## 2020-06-04 NOTE — PROGRESS NOTES
Patricio Mosher. was read the following message We want to confirm that, for purposes of billing, this is a virtual visit with your provider for which we will submit a claim for reimbursement with your insurance company. You will be responsible for any copays, coinsurance amounts or other amounts not covered by your insurance company. If you do not accept this, unfortunately we will not be able to schedule a virtual visit with the provider. Do you accept? Nancy Hightower responded Yes .
benzodiazepines, alcohol, illicit drugs or sleep aids increases the risk of respiratory depression including death. We discussed that these medications may cause drowsiness, sedation or dizziness and have counseled the patient not to drive or operate machinery. We have discussed that these medications will be prescribed only by one provider. We have discussed with the patient about age related risk factors and have thoroughly discussed the importance of taking these medications as prescribed. The patient verbalizes understanding. Patient advised regarding steps to help prevent the spread of COVID-19   SOURCE - https://zayda-lan.info/. html     1-Stay home except to get medical care  2-Clean your hands often for atleast 20 seconds, avoid touching: Avoid touching your eyes, nose, and mouth with unwashed hands. 3-Seek medical attention: Seek prompt medical attention if your illness is worsening (e.g., difficulty breathing). Call you doctor first.  3-Wear a facemask if you are sick   4-Cover your coughs and sneezes           I affirm this is a Patient Initiated Episode with an established Patient who has not had a related appointment within my department in the past 7 days or scheduled within the next 24 hours.     Total Time: 15 minutes     Cc: Referring physician

## 2020-06-11 NOTE — OP NOTE
2020    Patient: Luiz Nelson :  1960  Age:  61 y.o. Sex:  male     PRE-OPERATIVE DIAGNOSIS: Bilateral Lumbar spondylosis, lumbar facet syndrome. POST-OPERATIVE DIAGNOSIS: Same. PROCEDURE:  # 2 Fluoroscopic guided lumbar medial branch blocks Bilateral at Levels: L3,4,5 (anesthetizing the L4-5 and L5-S1 facet joints    SURGEON: GARTH Warner. ANESTHESIA: local    ESTIMATED BLOOD LOSS: None.  __________________________________________________________  BRIEF HISTORY:  Luiz Nelson comes in today for 3 fluoroscopic guided lumbar medial branch blocks  Bilateral  at Levels: L3,4,5. The potential complications of this procedure were discussed with him again today. He has elected to undergo the aforementioned procedure. Loi complete History & Physical examination were reviewed in depth, a copy of which is in the chart. DESCRIPTION OF PROCEDURE:   After confirming written and informed consent, a time-out was performed and Loi name and date of birth, the procedure to be performed as well as the plan for the location of the needle insertion were confirmed. The patient was brought into the procedure room and placed in the prone position on the fluoroscopy table. Standard monitors were placed and vital signs were observed throughout the procedure. The area of the lumbar spine was prepped with chloraprep and draped in a sterile manner. AP fluoroscopy was used to identify and katty bartons point at the targeted levels. The skin and subcutaneous tissues in these identified areas were anesthetized with 0.5%Lidocaine. A #22 gauge spinal needle was advanced toward the junction of the superior articular process and the transverse process, along the course of the medial branch. Satisfactory needle placement was confirmed by AP and oblique projections.   At the sacral alar level, the sacral alar region was visualized and the needle tip was positioned on the sacral alar at the base

## 2020-07-06 NOTE — PROGRESS NOTES
kg)   Height: 5' 11\" (1.803 m)       Physical Exam  Constitutional:       General: He is not in acute distress. Appearance: He is well-developed. HENT:      Head: Normocephalic and atraumatic. Right Ear: External ear normal.      Left Ear: External ear normal.      Nose: Nose normal.   Eyes:      General: No scleral icterus. Conjunctiva/sclera: Conjunctivae normal.      Pupils: Pupils are equal, round, and reactive to light. Neck:      Musculoskeletal: Normal range of motion and neck supple. Thyroid: No thyromegaly. Cardiovascular:      Rate and Rhythm: Normal rate and regular rhythm. Heart sounds: Normal heart sounds. No murmur. Pulmonary:      Effort: Pulmonary effort is normal. No accessory muscle usage or respiratory distress. Breath sounds: Normal breath sounds. No wheezing. Musculoskeletal: Normal range of motion. Skin:     General: Skin is warm and dry. Findings: No rash. Neurological:      Mental Status: He is alert and oriented to person, place, and time. Deep Tendon Reflexes: Reflexes are normal and symmetric. Comments: Diabetic foot exam: No lesions seen. Pulses: intact and symmetric,  Monofilament: intact 6 sites bilat   Psychiatric:         Speech: Speech normal.         Behavior: Behavior normal.         Assessment/Plan:      Lizeth Hicks was seen today for diabetes. Diagnoses and all orders for this visit:    Type 2 diabetes mellitus without complication, without long-term current use of insulin ([facility])  -     POCT glycosylated hemoglobin (Hb A1C)  -     Diabetic Foot Exam  -     POCT Microalbumin  -     COMPREHENSIVE METABOLIC PANEL; Future  -     CBC Auto Differential; Future    Hyperlipidemia associated with type 2 diabetes mellitus ([facility] [address]  -     LIPID PANEL;  Future    Class 3 severe obesity due to excess calories with serious comorbidity and body mass index (BMI) of 45.0 to 49.9 in adult [facility])    Smoker    Seasonal allergies  -     loratadine (CLARITIN) 10 MG tablet; Take 1 tablet by mouth daily    Coronary artery disease involving native heart without angina pectoris, unspecified vessel or lesion type  -     niacin (NIASPAN) 500 MG extended release tablet; TAKE 1 TABLET BY MOUTH EVERY DAY IN THE EVENING  -     clopidogrel (PLAVIX) 75 MG tablet; TAKE 1 TABLET BY MOUTH EVERY DAY    Erectile dysfunction, unspecified erectile dysfunction type  -     sildenafil (VIAGRA) 50 MG tablet; Take 1 tablet by mouth as needed for Erectile Dysfunction      As above. Call or go to ED immediately if symptoms worsen or persist.  F/u 3 months, or sooner if necessary. Educational materials and/or home exercises printed for patient's review and were included in patient instructions on his/her After Visit Summary and given to patient at the end of visit. Counseled regarding above diagnosis, including possible risks and complications,  especially if left uncontrolled. Counseled regarding the possible side effects, risks, benefits and alternatives to treatment; patient and/or guardian verbalizes understanding, agrees, feels comfortable with and wishes to proceed with above treatment plan. Advised patient to call with any new medication issues, and read all Rx info from pharmacy to assure aware of all possible risks and side effects of medication before taking. Reviewed age and gender appropriate health screening exams and vaccinations. Advised patient regarding importance of keeping up with recommended health maintenance and to schedule as soon as possible if overdue, as this is important in assessing for undiagnosed pathology, especially cancer, as well as protecting against potentially harmful/life threatening disease. Patient and/or guardian verbalizes understanding and agrees with above counseling, assessment and plan. All questions answered.     Chris Bianchi PA-C  7/6/2020    I have personally reviewed and updated the chief complaint, HPI, Past Medical, Family and Social History, as well as the above Review of Systems.

## 2020-07-07 NOTE — PROGRESS NOTES
[address]  [address] [facility], [address]    Follow up Note      Mimi Simental. Date of Visit:  2020    CC:  Patient presents for follow up   Chief Complaint   Patient presents with    Follow-up     lower back     HPI:    Pain is unchanged. Change in quality of symptoms:no.    Medication side effects:none. Recent diagnostic testing:none.   Recent interventional procedures:none.       He has been on anticoagulation medications to include ASA, NSAIDS and Plavix. The patient  has not been on herbal supplements.  The patient is diabetic.     Imagin/2019 lumbar xray -       2019 Right knee x-ray     Patient MRN:  [id number]   : 1960   Age: 61 years   Gender: Male   Order Date:  2019 9:13 AM   EXAM: XR KNEE RIGHT (1-2 VIEWS)   NUMBER OF IMAGES:  2 views   INDICATION: Z96.651 Status post total right knee replacement s/p right   TKA   COMPARISON: 2019       . Redemonstration of right total knee arthroplasty hardware   Alignment remains unchanged. No foreign body is identified.           Impression   Stable right knee arthroplasty hardware with no   significant change in alignment.       The exam has been dictated and signed by Sundeep Rockwell PA-C. Andrzej Cantu MD, Radiologist, have reviewed the images and   report and concur with these findings.      R knee xray 2018 -   Frontal and lateral weight-bearing views of the right knee demonstrate   severe medial femorotibial joint space narrowing with associated   subchondral sclerosis, marginal spurring and irregularity along the   proximal tibia, and genu varus angulation of the knee. Degenerative   enthesopathic changes are noted at the superior aspect of the patella   and posterior aspect of the femoral condyles. The patellofemoral joint   space is not well evaluated on the provided lateral view but may be   narrowed. No acute fracture is seen.  A small suprapatellar joint   effusion is 1 TABLET BY MOUTH TWICE A DAY 5/7/20  Yes Mello Tillman,    metFORMIN (GLUCOPHAGE) 500 MG tablet TAKE 1 TABLET BY MOUTH TWICE A DAY WITH MEALS 5/7/20  Yes Taylor Matias DO   triamterene-hydrochlorothiazide (LBGQGLW-89) 37.5-25 MG per tablet TAKE 1 TABLET BY MOUTH EVERY DAY 5/7/20  Yes Taylor Matias DO   acetaminophen (TYLENOL) 500 MG tablet Take 1 tablet by mouth every 6 hours as needed for Pain 5/7/20  Yes Mello Tillman DO   aspirin 325 MG EC tablet TAKE 1 TABLET BY MOUTH EVERY DAY 5/7/20  Yes Monika Matias DO   pioglitazone (ACTOS) 15 MG tablet TAKE 1 TABLET BY MOUTH EVERY DAY 5/7/20  Yes Taylor Matias DO   quinapril (ACCUPRIL) 40 MG tablet Take one tab po daily 5/7/20  Yes Monika Matias DO   omega-3 acid ethyl esters (LOVAZA) 1 g capsule TAKE 1 CAPSULE BY MOUTH TWICE A DAY 5/7/20  Yes Monika Matias DO   loratadine (CLARITIN) 10 MG tablet TAKE 1 TABLET BY MOUTH EVERY DAY 2/21/20  Yes Celso Velasqeuz PA-C   Tens Unit MISC by Does not apply route 5/7/19  Yes TRUDY Araujo   atorvastatin (LIPITOR) 80 MG tablet TAKE 1 TABLET BY MOUTH AT BEDTIME 10/22/18  Yes Celso Velasquez PA-C   carvedilol (COREG) 25 MG tablet TAKE ONE TABLET BY MOUTH TWICE DAILY (WITH MEALS) 10/22/18  Yes Celso Velasquez PA-C       Allergies   Allergen Reactions    Bee Venom Anaphylaxis       Social History     Socioeconomic History    Marital status:      Spouse name: Not on file    Number of children: Not on file    Years of education: Not on file    Highest education level: Not on file   Occupational History    Not on file   Social Needs    Financial resource strain: Not on file    Food insecurity     Worry: Not on file     Inability: Not on file    Transportation needs     Medical: Not on file     Non-medical: Not on file   Tobacco Use    Smoking status: Current Every Day Smoker     Packs/day: 0.25     Years: 39.00     Pack years: 9.75     Types: Cigarettes    Smokeless tobacco: Never Used   Substance and Sexual Activity    Alcohol use: No    Drug use: No    Sexual activity: Not on file   Lifestyle    Physical activity     Days per week: Not on file     Minutes per session: Not on file    Stress: Not on file   Relationships    Social connections     Talks on phone: Not on file     Gets together: Not on file     Attends Mu-ism service: Not on file     Active member of club or organization: Not on file     Attends meetings of clubs or organizations: Not on file     Relationship status: Not on file    Intimate partner violence     Fear of current or ex partner: Not on file     Emotionally abused: Not on file     Physically abused: Not on file     Forced sexual activity: Not on file   Other Topics Concern    Not on file   Social History Narrative    Not on file       Family History   Problem Relation Age of Onset    Diabetes Mother     Stroke Mother     Diabetes Father     No Known Problems Sister        REVIEW OF SYSTEMS:     Cristina Buck denies fever/chills, chest pain, shortness of breath, new bowel or bladder complaints. All other review of systems was negative. PHYSICAL EXAMINATION:      BP (!) 160/110   Pulse 89   Temp 98.5 °F (36.9 °C) (Oral)   Resp 18   Ht 5' 11\" (1.803 m)   Wt (!) 312 lb (141.5 kg)   SpO2 95%   BMI 43.52 kg/m²     General:      General appearance:pleasant and well-hydrated, in no discomfort and A & O x3  Build: Morbidly obese  Function:Rises from a seated position with difficulty    HEENT:    Head:normocephalic and atraumatic  Pupils:regular, round and equal.  Sclera: icterus absent    Lungs:    Breathing:Normal expansion. No accessory muscle use. Abdomen:    Shape:obese, non-distended and normal  Tenderness:none  Guarding:none    Lumbar spine:    Spine inspection:normal   CVA tenderness:No   Palpation: + midline TTP, + paraspinal TTP b/l.  + pain with facet loading.   Range of motion:abnormal moderately in lateral bending, flexion, extension rotation bilateral and is painful. Musculoskeletal:    SI joint tenderness:negative right, negative left              JONO test:not done right, not done left  Piriformis tenderness:negative right, negative left  Trochanteric bursa tenderness:negative right, negative left  SLR: negative right, negative left, sitting     Extremities:    Tremors:None bilaterally upper and lower  Range of motion:pain with internal rotation of hips negative. Intact:Yes  Varicose veins:absent   Pulses:radial pulse 2+ left  Cyanosis:none  Edema:Normal    Neurological:    Sensory:normal to light touch   Motor:   Right Quadriceps5/5          Left Quadriceps5/5           Right Gastrocnemius5/5    Left Gastrocnemius5/5  Right Ant Tibialis5/5  Left Ant Tibialis5/5    Gait:normal station    Dermatology:    Skin:no unusual rashes, no skin lesions    Impression:                Patient with lower back (along b/l SIJ) pain that radiates into the b/l hips              Lumbar xray shows significant degenerative changes (DDD and facet degeneration)              Rt hip xray shows mild degeneration R>L hips  On PLAVIX, ASA     OARRS reviewed  RF percocet 5/325 1 tab TID prn.  Ordered for 7/15/2020. Previously discussed taking medication as prescribed  Lumbar MRI ordered prev, were able to obtain PT notes, it seems this was again denied, will work on peer to peer considering above information, however, he is improving with the current tx plan  b/l L3,4,5 MNBB (pt on Plavix and ASA and will need to hold per WILLIAM guidelines) #1 with 100% relief x 6 hours, repeat gave minimal to no relief, will do #3 for clarification  Discussed smoking cessation strategies - he continues to work on reduction  Discussed weight loss - he is following with Dr. 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 \"  Has failed compounding pain cream when he used for his knee in the past.  States that it made his pain worse.               Treatment plan discussed with the patient       We discussed with the patient that combining opioids, benzodiazepines, alcohol, illicit drugs or sleep aids increases the risk of respiratory depression including death. We discussed that these medications may cause drowsiness, sedation or dizziness and have counseled the patient not to drive or operate machinery. We have discussed that these medications will be prescribed only by one provider. We have discussed with the patient about age related risk factors and have thoroughly discussed the importance of taking these medications as prescribed. The patient verbalizes understanding.     Cc:  Referring physician

## 2020-07-07 NOTE — PROGRESS NOTES
Do you currently have any of the following:    Fever: No  Headache:  No  Cough: No  Shortness of breath: No  Exposed to anyone with these symptoms: No         Es Shearing. presents to the [address] [place] [address] on 7/7/2020. Timothy Kilgore is complaining of pain lower back. The pain is constant. The pain is described as aching, throbbing, shooting, stabbing, sharp and tender. Pain is rated on his best day at a 3, on his worst day at a 10, and on average at a 8 on the VAS scale. He took his last dose of Percocet today. Any procedures since your last visit: yes, with 0 % relief. Pacemaker or defibrilator: No managed by . He is not on NSAIDS and is  on anticoagulation medications to include Plavix and is managed by Ernie Velazquez PA-C  .     BP (!) 160/110   Pulse 89   Temp 98.5 °F (36.9 °C) (Oral)   Resp 18   Ht 5' 11\" (1.803 m)   Wt (!) 312 lb (141.5 kg)   SpO2 95%   BMI 43.52 kg/m²      No LMP for male patient.

## 2020-08-06 NOTE — PROGRESS NOTES
Patricio Garcia. was read the following message We want to confirm that, for purposes of billing, this is a virtual visit with your provider for which we will submit a claim for reimbursement with your insurance company. You will be responsible for any copays, coinsurance amounts or other amounts not covered by your insurance company. If you do not accept this, unfortunately we will not be able to schedule a virtual visit with the provider. Do you accept? Beth Baker responded Yes . Patient states he had no releif after second procedure. Pain level today is a 9. Pain stays in his back.

## 2020-08-06 NOTE — PROGRESS NOTES
[address]  [address] [facility], [address] [place] [address]  [phone]    Tele health follow up Note      Merlene Lujan. Date of Visit:  2020    CC:  Tele health follow up   Chief Complaint   Patient presents with    Follow-up     back       Consent:  The patient and/or health care decision maker is aware that he/she may receive a bill for this tele-health service Doxy Me, depending on his insurance coverage, and has provided verbal consent to proceed: Yes  I have considered the risks of abuse, dependence, addiction and diversion. My patient is aware that they will need a follow-up visit (in-person or virtually) at the appropriate time indicated for continued medications. Further, my patient is aware that when this acute crisis has lifted, they will be expected to return for an in-person visit and all elements of standard local and hospital guidelines in order to continue this medication. Patient Location:Home   Physician location:Other [facility] address    HPI:    Pain is unchanged. Change in quality of symptoms:no.    Medication side effects:none. Recent diagnostic testing:none.   Recent interventional procedures:none.       He has been on anticoagulation medications to include ASA, NSAIDS and Plavix. The patient  has not been on herbal supplements.  The patient is diabetic.     Imagin/2019 lumbar xray -       2019 Right knee x-ray     Patient MRN:  [id number]   : 1960   Age: 61 years   Gender: Male   Order Date:  2019 9:13 AM   EXAM: XR KNEE RIGHT (1-2 VIEWS)   NUMBER OF IMAGES:  2 views   INDICATION: Z96.651 Status post total right knee replacement s/p right   TKA   COMPARISON: 2019       . Redemonstration of right total knee arthroplasty hardware   Alignment remains unchanged.    No foreign body is identified.           Impression   Stable right knee arthroplasty hardware with no   significant change in alignment.       The exam has been dictated and signed by Nati Dao PA-C. Jolly Coyle MD, Radiologist, have reviewed the images and   report and concur with these findings.      R knee xray 2018 -   Frontal and lateral weight-bearing views of the right knee demonstrate   severe medial femorotibial joint space narrowing with associated   subchondral sclerosis, marginal spurring and irregularity along the   proximal tibia, and genu varus angulation of the knee. Degenerative   enthesopathic changes are noted at the superior aspect of the patella   and posterior aspect of the femoral condyles. The patellofemoral joint   space is not well evaluated on the provided lateral view but may be   narrowed. No acute fracture is seen. A small suprapatellar joint   effusion is noted.      L knee xray 2018 -   Cemented left total arthroplasty without evidence for   hardware failure, change in alignment and a minimal amount of joint   fluid      Potential Aberrant Drug-Related Behavior: no      Urine Drug Screenin2018 consistent  10/2018 Inconsistent for oxycodone, noroxycodone.  Patient was prescribed norco at the time of the UDS.    2019  Inconsistent for oxycodone, noroxycodone.  Patient was prescribed norco at the time of the UDS. I did discuss this with the patient at length as these were 1 year apart and patient had surgery and was cared for other providers in the meantime.  Patient reports that he did not realize that he was not allowed to take previously prescribed pain medication.  He understands that if this occurs again, we will no longer be able to prescribe.     2019 consistent   2020 consistent     OARRS report:  2018 consistent  10/2018 consistent  2019 consistent  2019 consistent  2019 consistent  10/2019 consistent  2019 consistent  2019 consistent  2020 consistent  2020 consistent  3/2020 consistent          3/8724 consistent         2020 consistent         2408 consistent 7/2020 consistent     8/2020 consistent    Past Medical History: Reviewed    Past Surgical History: Reviewed     Home Medications: Reviewed    Allergies: Reviewed     Social History: Reviewed     REVIEW OF SYSTEMS:     Caitlin De Los Santos denies fever/chills, chest pain, shortness of breath, new bowel or bladder complaints. All other review of systems was negative. PHYSICAL EXAMINATION:      General:       A & O x3  Build: Morbidly obese    HEENT:    Head:normocephalic and atraumatic  Pupils:regular, round and equal.  Sclera: icterus absent    Lungs:    Breathing:non-labored    Neurological:     Motor:          No apparent weakness per patient       [address] [place] [address]    Dermatology:    Skin:no unusual rashes and no skin lesions    Impression:   Patient with lower back (along b/l SIJ) pain that radiates into the b/l hips              Lumbar xray shows significant degenerative changes (DDD and facet degeneration)              Rt hip xray shows mild degeneration R>L hips  On PLAVIX, ASA    Pt states his pain is not controlled at the current dose. Scott Griffin will check into the approval/denial of RFA and for the MRI, it appears [place] wants him to do PT before approving anything and he has done some PT and continues with a HEP since that time     OARRS reviewed  Increase percocet 7.5/325 1 tab TID prn.  Ordered for 8/14/2020. Discussed limited dosing.   Previously discussed taking medication as prescribed  Lumbar MRI ordered prev, were able to obtain PT notes, it seems this was again denied, will work on peer to peer considering above information, however, he is improving with the current tx plan  b/l L3,4,5 MNBB (pt on Plavix and ASA and will need to hold per WILLIAM guidelines) #1 with 100% relief x 6 hours, repeat gave minimal to no relief, #3 denied by insurance, will see if we can move forward with RFA  Discussed smoking cessation strategies - he continues to work on reduction  Discussed weight loss - he is following with Dr. 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 \"  Has failed compounding pain cream when he used for his knee in the past.  States that it made his pain worse.               Treatment plan discussed with the patient       We discussed with the patient that combining opioids, benzodiazepines, alcohol, illicit drugs or sleep aids increases the risk of respiratory depression including death. We discussed that these medications may cause drowsiness, sedation or dizziness and have counseled the patient not to drive or operate machinery. We have discussed that these medications will be prescribed only by one provider. We have discussed with the patient about age related risk factors and have thoroughly discussed the importance of taking these medications as prescribed. The patient verbalizes understanding. Plan:    We discussed with the patient that combining opioids, benzodiazepines, alcohol, illicit drugs or sleep aids increases the risk of respiratory depression including death. We discussed that these medications may cause drowsiness, sedation or dizziness and have counseled the patient not to drive or operate machinery. We have discussed that these medications will be prescribed only by one provider. We have discussed with the patient about age related risk factors and have thoroughly discussed the importance of taking these medications as prescribed. The patient verbalizes understanding. Patient advised regarding steps to help prevent the spread of COVID-19   SOURCE - https://zayda-riky.info/. html     1-Stay home except to get medical care  2-Clean your hands often for atleast 20 seconds, avoid touching: Avoid touching your eyes, nose, and mouth with unwashed hands.   3-Seek medical attention: Seek prompt medical attention if your illness is

## 2020-08-28 NOTE — PROGRESS NOTES
[facility]  INSTRUCTIONS    . ..........................................................................................................................................       ? Parking the day of Surgery is located in the [facility]. Upon entering the door, make an immediate right to the surgery reception desk    ? Bring photo ID and insurance card     ? You may have a light breakfast day of procedure    ? Wear loose comfortable clothing    ? Please follow instructions for medications as given per Dr's office     ? Stop blood thinners as per Dr's office instructions    ? You can expect a call the business day prior to procedure to notify you if your arrival time changes    ? Please arrange for     ?   Other instructions

## 2020-08-28 NOTE — PROGRESS NOTES
Have you been tested for COVID  Yes           Have you been told you were positive for COVID No  Have you had any known exposure to someone that is positive for COVID No  Do you have a cough                   No              Do you have shortness of breath No                 Do you have a sore throat            No                Are you having chills                    No                Are you having muscle aches. No                    Please come to the hospital wearing a mask and have your significant other wear a mask as well. Both of you should check your temperature before leaving to come here,  if it is 100 or higher please call [phone] for instruction.

## 2020-09-01 NOTE — PROGRESS NOTES
Patient admitted to recovery post procedure- no numbness noted to lower extremities  Patient free of pain, adhesive bandage to right back intact

## 2020-09-01 NOTE — PROGRESS NOTES
Patient assisted to sit/stand at side of bed.  Patient does not have any numbness and is pain free  Patient ambulated in room prior to being left unattended to dress

## 2020-09-01 NOTE — OP NOTE
2020    Patient: Elle Isabel. :  1960  Age:  61 y.o. Sex:  male     PRE-OPERATIVE DIAGNOSIS: Right Lumbar spondylosis, lumbar facet arthropathy. POST-OPERATIVE DIAGNOSIS: Same. PROCEDURE:  Fluoroscopic-guided Right  Lumbar facet medial branch radiofrequency ablation at levels L3,4,5 (anesthetizing the L4-5 and L5-S1 facet joints). SURGEON:  GARTH Vargas. ANESTHESIA: local    ESTIMATED BLOOD LOSS: None.  ______________________________________________________________________  HISTORY & PHYSICAL: Elle Ball presents today for fluoroscopic-guided Right lumbar facet medial branch radiofrequency ablation at levels L3,4,5. The potential complications of the procedure were explained to Columba Dougherty again today and he has elected to undergo the aforementioned procedure. Loi complete History & Physical examination were reviewed in depth, a copy of which is in the chart. DESCRIPTION OF PROCEDURE:    After confirming written and informed consent, a time-out was performed and Loi name and date of birth, the procedure to be performed as well as the plan for the location of the needle insertion were confirmed. The patient was brought into the procedure room and placed in the prone position on the fluoroscopy table. Standard monitors were placed and vital signs were observed throughout the procedure. The area of the lumbar spine and upper buttocks was sterilely prepped with chloraprep and draped in a sterile manner. AP fluoroscopy was used to identify and katty bartons point at the targeted area. A 22 gauge 10 mm radiofrequency probe was advanced toward each of these points. Once bone was contacted, negative aspiration for blood and CSF was confirmed, sensory stimulation was performed at 50 Hz and at 0.4 volts generating a pressure sensation. Motor stimulation < 2.0 volts elicited multifidus twitching without any radicular symptoms.  1 ml of 2% lidocaine was injected prior to lesioning, which was performed for 90 seconds at 90 degrees centigrade. Once the lesions were complete, a solution of 0.25% marcaine 3 cc and 40 mg DepoMedrol was injected and distributed equally through each probe. The probes were removed . The patient's back was cleaned and bandages were placed over the needle insertion sites. Disposition the patient tolerated the procedure well and there were no complications . Vital signs remained stable throughout the procedure. The patient was escorted to the recovery area where they remained until discharge and written discharge instructions for the procedure were given. Plan: Gael Titus will return to our pain management center as scheduled.      Mya Wynne, DO

## 2020-09-08 NOTE — PROGRESS NOTES
[address]  [address] [facility], [address]    Follow up Note      Es aJne. Date of Visit:  2020    CC:  Patient presents for follow up   Chief Complaint   Patient presents with    Follow-up     Right  Lumbar facet medial branch radiofrequency ablation at levels L3,4,5 on 2020    Lower Back Pain     HPI:    Pain is unchanged. Change in quality of symptoms:no.    Medication side effects:none. Recent diagnostic testing:none.   Recent interventional procedures:right L3,4,5 RFA with no relief at this point.       He has been on anticoagulation medications to include ASA, NSAIDS and Plavix. The patient  has not been on herbal supplements.  The patient is diabetic.     Imagin/2019 lumbar xray -       2019 Right knee x-ray     Patient MRN:  [id number]   : 1960   Age: 61 years   Gender: Male   Order Date:  2019 9:13 AM   EXAM: XR KNEE RIGHT (1-2 VIEWS)   NUMBER OF IMAGES:  2 views   INDICATION: Z96.651 Status post total right knee replacement s/p right   TKA   COMPARISON: 2019       . Redemonstration of right total knee arthroplasty hardware   Alignment remains unchanged. No foreign body is identified.           Impression   Stable right knee arthroplasty hardware with no   significant change in alignment.       The exam has been dictated and signed by Nati Dao PA-C. Jolly Coyle MD, Radiologist, have reviewed the images and   report and concur with these findings.      R knee xray 2018 -   Frontal and lateral weight-bearing views of the right knee demonstrate   severe medial femorotibial joint space narrowing with associated   subchondral sclerosis, marginal spurring and irregularity along the   proximal tibia, and genu varus angulation of the knee. Degenerative   enthesopathic changes are noted at the superior aspect of the patella   and posterior aspect of the femoral condyles.  The patellofemoral joint   space is not well evaluated on the provided lateral view but may be   narrowed. No acute fracture is seen. A small suprapatellar joint   effusion is noted.      L knee xray 2018 -   Cemented left total arthroplasty without evidence for   hardware failure, change in alignment and a minimal amount of joint   fluid      Potential Aberrant Drug-Related Behavior: no      Urine Drug Screenin2018 consistent  10/2018 Inconsistent for oxycodone, noroxycodone.  Patient was prescribed norco at the time of the UDS.    2019  Inconsistent for oxycodone, noroxycodone.  Patient was prescribed norco at the time of the UDS. I did discuss this with the patient at length as these were 1 year apart and patient had surgery and was cared for other providers in the meantime.  Patient reports that he did not realize that he was not allowed to take previously prescribed pain medication.  He understands that if this occurs again, we will no longer be able to prescribe.     2019 consistent   2020 consistent     OARRS report:  2018-2020 consistent    Past Medical History:   Diagnosis Date    Anticoagulant long-term use     Arthritis     CAD (coronary artery disease)     Chronic back pain     Depression     Dyslipidemia     Hiatal hernia 1979    History of ST elevation myocardial infarction (STEMI)     Hyperlipidemia     Hypertension     Low back pain     Lumbar radiculopathy 2019    Obesity     MANOLO on CPAP     Presence of stent in left circumflex coronary artery     Presence of stent in right coronary artery     Smoker     Type 2 diabetes mellitus without complication ([facility] [address]        Past Surgical History:   Procedure Laterality Date    ABDOMINAL AORTIC ANEURYSM REPAIR, ENDOVASCULAR N/A 2019    ENDOVASCULAR REPAIR ABDOMINAL AORTIC ANEURYSM performed by Cornelio Lebron MD at [facility] 3/12/2020    BILATERAL L3,L4,L5 MEDIAL BRANCH NERVE BLOCK performed by Tc Dobbs, DO at [address] Bilateral 6/11/2020    BILATERAL L3,L4,L5 MEDIAL NERVE BRANCH BLOCK #2 performed by Hubert Walton DO at [address] PLACEMENT  X4    CORONARY ANGIOPLASTY WITH STENT PLACEMENT  12/30/2011    Dr. Iman Eaton 1st OM 3.0 x 20 Ion    DIAGNOSTIC CARDIAC CATH LAB PROCEDURE  3/15/2005    [facility]. Mild to moderate CAD. Normal LV.  DIAGNOSTIC CARDIAC CATH LAB PROCEDURE  1/16/2007    [facility]. Cath/PTCA/DE stent of proximal and distal RCA.  DIAGNOSTIC CARDIAC CATH LAB PROCEDURE  2/21/2007    Cath/DE stent of proximal OM1 and proximal Cx.  DIAGNOSTIC CARDIAC CATH LAB PROCEDURE  12/30/2011    ANURADHA of mid OM branch of circumflex.  ECHO COMPL W DOP COLOR FLOW  12/30/2011         HERNIA REPAIR  2/2014    laparoscopic ventral hernia repain    JOINT REPLACEMENT Left 4/1/14    TKA-ed    JOINT REPLACEMENT Right 2018    KNEE    PAIN MANAGEMENT PROCEDURE Right 9/1/2020    RIGHT L3,4,5 MEDIAL BRANCH RADIOFREQUENCY ABLATION performed by Hubert Walton DO at [address] [facility] OFFICE/OUTPT VISIT,PROCEDURE ONLY Right 11/26/2018    RIGHT KNEE TOTAL ARTHROPLASTY performed by Dieudonne Grgeory DO at [address]       Prior to Admission medications    Medication Sig Start Date End Date Taking? Authorizing Provider   omega-3 acid ethyl esters (LOVAZA) 1 g capsule take 1 capsule twice a day 8/14/20  Yes Kathrine Zhang PA-C   oxyCODONE-acetaminophen (PERCOCET) 7.5-325 MG per tablet Take 1 tablet by mouth 3 times daily as needed for Pain for up to 30 days.  Intended supply: 30 days 8/14/20 9/13/20 Yes Hubert Walton DO   niacin (NIASPAN) 500 MG extended release tablet TAKE 1 TABLET BY MOUTH EVERY DAY IN THE EVENING 7/6/20  Yes Kathrine Zhang PA-C   clopidogrel (PLAVIX) 75 MG tablet TAKE 1 TABLET BY MOUTH EVERY DAY 7/6/20  Yes Kathrine Zhang PA-C   sildenafil (VIAGRA) 50 MG tablet Take 1 tablet by mouth as needed for Erectile Dysfunction 7/6/20  Yes Kathrine Zhang PA-C diclofenac (VOLTAREN) 75 MG EC tablet TAKE 1 TABLET BY MOUTH TWICE A DAY 5/7/20  Yes Monika Matias, DO   metFORMIN (GLUCOPHAGE) 500 MG tablet TAKE 1 TABLET BY MOUTH TWICE A DAY WITH MEALS 5/7/20  Yes Casey Matias, DO   triamterene-hydrochlorothiazide (LLPZTTX-47) 37.5-25 MG per tablet TAKE 1 TABLET BY MOUTH EVERY DAY 5/7/20  Yes Casey Matias, DO   acetaminophen (TYLENOL) 500 MG tablet Take 1 tablet by mouth every 6 hours as needed for Pain 5/7/20  Yes Dany Butter, DO   aspirin 325 MG EC tablet TAKE 1 TABLET BY MOUTH EVERY DAY 5/7/20  Yes Monika Matias, DO   pioglitazone (ACTOS) 15 MG tablet TAKE 1 TABLET BY MOUTH EVERY DAY 5/7/20  Yes Casey Matias, DO   quinapril (ACCUPRIL) 40 MG tablet Take one tab po daily 5/7/20  Yes Dany Butter, DO   Tens Unit MISC by Does not apply route 5/7/19  Yes TRUDY Prieto   atorvastatin (LIPITOR) 80 MG tablet TAKE 1 TABLET BY MOUTH AT BEDTIME 10/22/18  Yes Jabari Ling PA-C   carvedilol (COREG) 25 MG tablet TAKE ONE TABLET BY MOUTH TWICE DAILY (WITH MEALS) 10/22/18  Yes Jabari Ling PA-C   niacin (SLO-NIACIN) 500 MG extended release tablet take 1 tablet by mouth once daily 7/20/20   Jocelynn Byrne,    loratadine (CLARITIN) 10 MG tablet TAKE 1 TABLET BY MOUTH EVERY DAY  Patient not taking: Reported on 9/9/2020 2/21/20   Jabari Ling PA-C       Allergies   Allergen Reactions    Bee Venom Anaphylaxis       Social History     Socioeconomic History    Marital status:      Spouse name: Not on file    Number of children: Not on file    Years of education: Not on file    Highest education level: Not on file   Occupational History    Not on file   Social Needs    Financial resource strain: Not on file    Food insecurity     Worry: Not on file     Inability: Not on file    Transportation needs     Medical: Not on file     Non-medical: Not on file   Tobacco Use    Smoking status: Current Every Day Smoker     Packs/day: 0.25 Years: 39.00     Pack years: 9.75     Types: Cigarettes    Smokeless tobacco: Never Used   Substance and Sexual Activity    Alcohol use: No    Drug use: No    Sexual activity: Not on file   Lifestyle    Physical activity     Days per week: Not on file     Minutes per session: Not on file    Stress: Not on file   Relationships    Social connections     Talks on phone: Not on file     Gets together: Not on file     Attends Spiritism service: Not on file     Active member of club or organization: Not on file     Attends meetings of clubs or organizations: Not on file     Relationship status: Not on file    Intimate partner violence     Fear of current or ex partner: Not on file     Emotionally abused: Not on file     Physically abused: Not on file     Forced sexual activity: Not on file   Other Topics Concern    Not on file   Social History Narrative    Not on file       Family History   Problem Relation Age of Onset    Diabetes Mother     Stroke Mother     Diabetes Father     No Known Problems Sister        REVIEW OF SYSTEMS:     Beth Kenyettamarlyn denies fever/chills, chest pain, shortness of breath, new bowel or bladder complaints. All other review of systems was negative. PHYSICAL EXAMINATION:      /84   Pulse 78   Temp 97.6 °F (36.4 °C)   Resp 20     General:      General appearance:pleasant and well-hydrated, in no discomfort and A & O x3  Build: Morbidly obese  Function:Rises from a seated position with difficulty    HEENT:    Head:normocephalic and atraumatic  Pupils:regular, round and equal.  Sclera: icterus absent    Lungs:    Breathing:Normal expansion. No accessory muscle use. Abdomen:    Shape:obese, non-distended and normal  Tenderness:none  Guarding:none    Lumbar spine:    Spine inspection:normal   CVA tenderness:No   Palpation: + midline TTP, + paraspinal TTP b/l.  + pain with facet loading.   Range of motion:abnormal moderately in lateral bending, flexion, extension rotation bilateral and is painful. Musculoskeletal:    SI joint tenderness:negative right, negative left              JONO test:not done right, not done left  Piriformis tenderness:negative right, negative left  Trochanteric bursa tenderness:negative right, negative left  SLR: negative right, negative left, sitting     Extremities:    Tremors:None bilaterally upper and lower  Range of motion:pain with internal rotation of hips negative. Intact:Yes  Varicose veins:absent   Pulses:radial pulse 2+ left  Cyanosis:none  Edema:Normal    Neurological:    Sensory:normal to light touch   Motor:   Right Quadriceps5/5          Left Quadriceps5/5           Right Gastrocnemius5/5    Left Gastrocnemius5/5  Right Ant Tibialis5/5  Left Ant Tibialis5/5    Reflexes BLE diffusely 0-1+, s/p b/l TKR    Gait:normal station    Dermatology:    Skin:no unusual rashes, no skin lesions    Impression:                Patient with lower back (along b/l SIJ) pain that radiates into the b/l hips and anterior thighs              Lumbar xray shows significant degenerative changes (DDD and facet degeneration)              Rt hip xray shows mild degeneration R>L hips  On PLAVIX, ASA     Had an episode yesterday with severe LBP radiating into the RLE in L4 distribution with a shocking/burning pain. Pt states his pain is better controlled with the increased percocet dosing but admits to taking more than prescribed and running out a week early, discussed he CAN NOT do this and must take as prescribed  He states he cannot sleep due to pain     UDS next visit   OARRS reviewed  Continue percocet 7.5/325 1 tab TID prn.  Ordered for 9/13/2020. Discussed limited dosing.   Previously discussed taking medication as prescribed  Lumbar MRI ordered prev, were able to obtain PT notes, will have staff work on approval  Right L3,4,5 RFA without significant relief (pt on Plavix and ASA and will need to hold per WILLIAM guidelines)   Discussed smoking cessation strategies - he

## 2020-09-09 NOTE — PROGRESS NOTES
Do you currently have any of the following:    Fever: No  Headache:  No  Cough: No  Shortness of breath: No  Exposed to anyone with these symptoms: No         Vargas Colin. presents to the [facility] on 9/9/2020. Jose Antonio Leon is complaining of pain in his lower back. The pain is persistent. The pain is described as aching, stabbing, sharp and nagging. Pain is rated on his best day at a 5, on his worst day at a 10, and on average at a 8 on the VAS scale. He took his last dose of Percocet 2 days ago. Any procedures since your last visit: Yes,  Right lumbar facet medial branch radiofrequency ablation at levels L3,4,5 on 9/1/2020 with minimal relief currently. Pacemaker or defibrilator: No managed by N/A. He is not on NSAIDS and is  on anticoagulation medications to include ASA and Plavix and is managed by Dr Leny Neff. /84   Pulse 78   Temp 97.6 °F (36.4 °C)   Resp 20      No LMP for male patient.

## 2020-09-21 NOTE — TELEPHONE ENCOUNTER
Pt. Edwige Agarwal for his MRI Lumbar Spine and it could not be finished related to his stents were causing to much artifact. He stated that he stayed on the table as long as he could and it was too painful.  SCOT jacobs

## 2020-09-29 NOTE — TELEPHONE ENCOUNTER
Procedure approved and scheduled for [facility]/15/2020 at [facility]. [facility] notified.

## 2020-10-02 NOTE — TELEPHONE ENCOUNTER
Patient called in , jonna stated he has been having, increase muscle spasms. He wanted to know if he can have something called to help him. His next apt is 10-9, he feels he cant wait that long.

## 2020-10-06 NOTE — PROGRESS NOTES
DM2:   Patient is here to fu regarding DM2. Patient is  controlled. Taking all medications and tolerating well. Fasting sugars are running unkown. Patient is taking ASA and Ace Inhibitor/ARB. Patient is  on appropriately-dosed statin. LDL is  at goal.  BP is not  Controlled. He often has increased readings, that he attributes to pain. He is seeing Dr Chrystal Cox. No hypoglycemic episodes. Patient does not see Podiatry regularly. Saw an Eye Dr about 1 year ago. Patient is aware that it is necessary to see an Eye Dr yearly. Patient does smoke. Most recent labs reviewed with patient. Scrolling through labs, he does not have a tendency toward potassium abnormalities. He is usually in the mid normal range. Explained how important it is for close f/u on electrolytes as he is diabetic and on Maxzide. He agrees. Patient does have complaints of rhinitis, worse at night. Lab Results   Component Value Date    LABA1C 6.9 07/06/2020       Lab Results   Component Value Date    LDLCALC 57 07/06/2020        Patient's past medical, surgical, social and/or family history reviewed, updated in chart, and are non-contributory (unless otherwise stated). Medications and allergies also reviewed and updated in chart.       Review of Systems:  Constitutional:  No fever, no fatigue, no chills, no headaches, no weight change  Dermatology:  No rash, no mole, no dry or sensitive skin  ENT:  No cough, no sore throat, no sinus pain, + runny nose, no ear pain  Cardiology:  No chest pain, no palpitations, no leg edema, no shortness of breath, no PND  Gastroenterology:  No dysphagia, no abdominal pain, no nausea, no vomiting, no constipation, no diarrhea, no heartburn  Musculoskeletal:  No joint pain, no leg cramps, + back pain, no muscle aches  Respiratory:  No shortness of breath, no orthopnea, no wheezing, no WESLEY, no hemoptysis  Urology:  No blood in the urine, no urinary frequency, no urinary incontinence, no urinary urgency, no nocturia, no dysuria    Vitals:    10/06/20 0758 10/06/20 0818   BP: (!) 160/100 (!) 148/100   Site: Left Upper Arm    Position: Sitting    Cuff Size: Large Adult    Pulse: 80    Resp: 19    Temp: 97.7 °F (36.5 °C)    TempSrc: Infrared    SpO2: 96%    Weight: (!) 323 lb (146.5 kg)    Height: 5' 11\" (1.803 m)        Physical Exam  Constitutional:       General: He is not in acute distress. Appearance: He is well-developed. He is obese. HENT:      Head: Normocephalic and atraumatic. Right Ear: External ear normal.      Left Ear: External ear normal.      Nose: Nose normal.   Eyes:      General: No scleral icterus. Conjunctiva/sclera: Conjunctivae normal.      Pupils: Pupils are equal, round, and reactive to light. Neck:      Musculoskeletal: Normal range of motion and neck supple. Thyroid: No thyromegaly. Cardiovascular:      Rate and Rhythm: Normal rate and regular rhythm. Heart sounds: Normal heart sounds. No murmur. Pulmonary:      Effort: Pulmonary effort is normal. No accessory muscle usage or respiratory distress. Breath sounds: Normal breath sounds. No wheezing. Musculoskeletal: Normal range of motion. Right lower leg: Edema (trace) present. Left lower leg: Edema present. Skin:     General: Skin is warm and dry. Findings: No rash. Neurological:      Mental Status: He is alert and oriented to person, place, and time. Deep Tendon Reflexes: Reflexes are normal and symmetric. Psychiatric:         Speech: Speech normal.         Behavior: Behavior normal.         Assessment/Plan:      Maria Del Carmen Martinez was seen today for diabetes. Diagnoses and all orders for this visit:    Type 2 diabetes mellitus without complication, without long-term current use of insulin ([facility])  -     POCT glycosylated hemoglobin (Hb A1C)    Essential hypertension  -     CBC Auto Differential; Future  -     COMPREHENSIVE METABOLIC PANEL;  Future  -     triamterene-hydroCHLOROthiazide (MAXZIDE) 75-50 MG per tablet; Take 1 tablet by mouth daily    Hyperlipidemia, unspecified hyperlipidemia type  -     LIPID PANEL; Future    Need for influenza vaccination  -     INFLUENZA, QUADV, 3 YRS AND OLDER, IM PF, PREFILL SYR OR SDV, 0.5ML (AFLURIA QUADV, PF)    BMI 45.0-49.9, adult (HCC)    Chronic back pain, unspecified back location, unspecified back pain laterality    Allergic rhinitis, unspecified seasonality, unspecified trigger  -     loratadine (CLARITIN) 10 MG tablet; Take 1 tablet by mouth daily  -     fluticasone (FLONASE) 50 MCG/ACT nasal spray; 2 sprays by Nasal route daily     Most recent labs reviewed with patient. Scrolling through labs, he does not have a tendency toward potassium abnormalities. He is usually in the mid normal range. Explained how important it is for close f/u on electrolytes as he is diabetic and on Maxzide. He agrees. As above. Call or go to ED immediately if symptoms worsen or persist.  Return in about 2 weeks (around 10/20/2020). , or sooner if necessary. Educational materials and/or home exercises printed for patient's review and were included in patient instructions on his/her After Visit Summary and given to patient at the end of visit. Counseled regarding above diagnosis, including possible risks and complications,  especially if left uncontrolled. Counseled regarding the possible side effects, risks, benefits and alternatives to treatment; patient and/or guardian verbalizes understanding, agrees, feels comfortable with and wishes to proceed with above treatment plan. Advised patient to call with any new medication issues, and read all Rx info from pharmacy to assure aware of all possible risks and side effects of medication before taking. Reviewed age and gender appropriate health screening exams and vaccinations.   Advised patient regarding importance of keeping up with recommended health maintenance and to schedule as soon as possible if overdue, as this is important in assessing for undiagnosed pathology, especially cancer, as well as protecting against potentially harmful/life threatening disease. Patient and/or guardian verbalizes understanding and agrees with above counseling, assessment and plan. All questions answered. Janelle Kunz PA-C  10/6/2020    I have personally reviewed and updated the chief complaint, HPI, Past Medical, Family and Social History, as well as the above Review of Systems.

## 2020-10-07 NOTE — PROGRESS NOTES
[address] [facility]  [address]    Follow up Note      Lambert Catalan. Date of Visit:  10/9/2020    CC:  Patient presents for follow up   Chief Complaint   Patient presents with    Follow-up     HPI:    Pain is worse. Change in quality of symptoms:yes - having increased lumbar spasms.    Medication side effects:none. Recent diagnostic testing:none.   Recent interventional procedures:none.      He has been on anticoagulation medications to include ASA, NSAIDS and Plavix. The patient  has not been on herbal supplements.  The patient is diabetic.     Imagin/2019 lumbar xray -       2019 Right knee x-ray     Patient MRN:  [id number]   : 1960   Age: 61 years   Gender: Male   Order Date:  2019 9:13 AM   EXAM: XR KNEE RIGHT (1-2 VIEWS)   NUMBER OF IMAGES:  2 views   INDICATION: Z96.651 Status post total right knee replacement s/p right   TKA   COMPARISON: 2019       . Redemonstration of right total knee arthroplasty hardware   Alignment remains unchanged. No foreign body is identified.           Impression   Stable right knee arthroplasty hardware with no   significant change in alignment.       The exam has been dictated and signed by Jack Solis PA-C. Carolyn Britton MD, Radiologist, have reviewed the images and   report and concur with these findings.      R knee xray 2018 -   Frontal and lateral weight-bearing views of the right knee demonstrate   severe medial femorotibial joint space narrowing with associated   subchondral sclerosis, marginal spurring and irregularity along the   proximal tibia, and genu varus angulation of the knee. Degenerative   enthesopathic changes are noted at the superior aspect of the patella   and posterior aspect of the femoral condyles. The patellofemoral joint   space is not well evaluated on the provided lateral view but may be   narrowed. No acute fracture is seen.  A small suprapatellar joint Patient reports bilateral swelling on feet/ankle that started today.  Patient has catheter placed and has f/u with Urology on Monday.   Catheter was placed for gross hematuria - urine in bag is normal in color.  Patient reports he has sat most of the days this week due to catheter.  He can only elevate legs so much due to catheter and draining.  Just finished abx ciprofloxacin earlier today.  Denies chest pain, SOB, redness, pain, warmth, cough, fevers, blue/cold feet, s/s of infection, inability to walk or any other symptoms at this time.  Patient reports he will elevate his legs as much as he can and he will get up and walk around a bit more than he has been.  He has f/u with Urology on Monday and will talk to Urology if swelling persists then.  He was also advised of s/s that would warrant ER visit this weekend.  Evangelina IFNNEY RN     Reynaldo Bassett, DO   omega-3 acid ethyl esters (LOVAZA) 1 g capsule take 1 capsule twice a day 8/14/20  Yes Malia Medina PA-C   niacin (SLO-NIACIN) 500 MG extended release tablet take 1 tablet by mouth once daily 7/20/20  Yes Roslyn Byrne,    niacin (NIASPAN) 500 MG extended release tablet TAKE 1 TABLET BY MOUTH EVERY DAY IN THE EVENING 7/6/20  Yes Malia Medina PA-C   clopidogrel (PLAVIX) 75 MG tablet TAKE 1 TABLET BY MOUTH EVERY DAY 7/6/20  Yes Malia Medina PA-C   sildenafil (VIAGRA) 50 MG tablet Take 1 tablet by mouth as needed for Erectile Dysfunction 7/6/20  Yes Malia Medina PA-C   acetaminophen (TYLENOL) 500 MG tablet Take 1 tablet by mouth every 6 hours as needed for Pain 5/7/20  Yes Titus Tellez, DO   aspirin 325 MG EC tablet TAKE 1 TABLET BY MOUTH EVERY DAY 5/7/20  Yes Charlie Matias, DO   pioglitazone (ACTOS) 15 MG tablet TAKE 1 TABLET BY MOUTH EVERY DAY 5/7/20  Yes Charlie Matias, DO   quinapril (ACCUPRIL) 40 MG tablet Take one tab po daily 5/7/20  Yes Titus Tellez, DO   Tens Unit MISC by Does not apply route 5/7/19  Yes TRUDY Arango   atorvastatin (LIPITOR) 80 MG tablet TAKE 1 TABLET BY MOUTH AT BEDTIME 10/22/18  Yes Malia Medina PA-C   carvedilol (COREG) 25 MG tablet TAKE ONE TABLET BY MOUTH TWICE DAILY (WITH MEALS) 10/22/18  Yes Malia Medina PA-C       Allergies   Allergen Reactions    Bee Venom Anaphylaxis       Social History     Socioeconomic History    Marital status:      Spouse name: Not on file    Number of children: Not on file    Years of education: Not on file    Highest education level: Not on file   Occupational History    Not on file   Social Needs    Financial resource strain: Not on file    Food insecurity     Worry: Not on file     Inability: Not on file    Transportation needs     Medical: Not on file     Non-medical: Not on file   Tobacco Use    Smoking status: Current Every Day Smoker     Packs/day: 0.25     Years: 39.00     Pack years: 9.75     Types: Cigarettes    Smokeless tobacco: Never Used   Substance and Sexual Activity    Alcohol use: No    Drug use: No    Sexual activity: Not on file   Lifestyle    Physical activity     Days per week: Not on file     Minutes per session: Not on file    Stress: Not on file   Relationships    Social connections     Talks on phone: Not on file     Gets together: Not on file     Attends Yazdanism service: Not on file     Active member of club or organization: Not on file     Attends meetings of clubs or organizations: Not on file     Relationship status: Not on file    Intimate partner violence     Fear of current or ex partner: Not on file     Emotionally abused: Not on file     Physically abused: Not on file     Forced sexual activity: Not on file   Other Topics Concern    Not on file   Social History Narrative    Not on file       Family History   Problem Relation Age of Onset    Diabetes Mother     Stroke Mother     Diabetes Father     No Known Problems Sister        REVIEW OF SYSTEMS:     Nancy Farris denies fever/chills, chest pain, shortness of breath, new bowel or bladder complaints. All other review of systems was negative. PHYSICAL EXAMINATION:      /80 (Site: Left Upper Arm, Position: Sitting, Cuff Size: Large Adult)   Pulse 88   Temp 96.3 °F (35.7 °C)   Ht 5' 11\" (1.803 m)   Wt (!) 321 lb (145.6 kg)   SpO2 95%   BMI 44.77 kg/m²     General:      General appearance:pleasant and well-hydrated, in no discomfort and A & O x3  Build: Morbidly obese  Function:Rises from a seated position with difficulty    HEENT:    Head:normocephalic and atraumatic  Pupils:regular, round and equal.  Sclera: icterus absent    Lungs:    Breathing:Normal expansion. No accessory muscle use.     Abdomen:    Shape:obese, non-distended and normal  Tenderness:none  Guarding:none    Lumbar spine:    Spine inspection:normal   CVA tenderness:No   Palpation: + paraspinal TTP b/l   Range of motion:abnormal following with Dr. 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  States that it made his pain worse.               Treatment plan discussed with the patient       We discussed with the patient that combining opioids, benzodiazepines, alcohol, illicit drugs or sleep aids increases the risk of respiratory depression including death. We discussed that these medications may cause drowsiness, sedation or dizziness and have counseled the patient not to drive or operate machinery. We have discussed that these medications will be prescribed only by one provider. We have discussed with the patient about age related risk factors and have thoroughly discussed the importance of taking these medications as prescribed. The patient verbalizes understanding.     Cc:  Referring physician

## 2020-10-09 NOTE — PROGRESS NOTES
Do you currently have any of the following:    Fever: No  Headache:  No  Cough: No  Shortness of breath: No  Exposed to anyone with these symptoms: No         Ynes Mahnaz. presents to the [address] [place] [address] on 10/9/2020. Vanessa Marin is complaining of low back pain. The pain is constant. The pain is described as aching, throbbing, shooting and stabbing. Pain is rated on his best day at a 10, on his worst day at a 10, and on average at a 10 on the VAS scale. He took his last dose of Percocet  Today at 1200 pm.     Any procedures since your last visit: No    Pacemaker or defibrilator: No     He is not on NSAIDS and is  on anticoagulation medications to include ASA and Plavix and is managed by Cardiologist.     /80 (Site: Left Upper Arm, Position: Sitting, Cuff Size: Large Adult)   Pulse 88   Temp 96.3 °F (35.7 °C)   Ht 5' 11\" (1.803 m)   Wt (!) 321 lb (145.6 kg)   SpO2 95%   BMI 44.77 kg/m²      No LMP for male patient.

## 2020-10-15 NOTE — PROGRESS NOTES
Patient arrived to Radiology recovery room for observation until discharged. Vital signs, dressing and pain assessment will be done every 15 minutes or as needed. Presently patient is lying supine in bed in no discomfort. VSS.           1125: Patient discharged in stable condition, VSS, low back dressing CDI. Discharge instructions were reviewed and questions were answered. The patient verbalized understanding. Signed copy given to patient and copy placed in folder for Medical Records. Patient tolerated food and drink during recovery, dressed himself, and went to the restroom before being discharged. He was taken to the Radiology entrance to meet  in wheelchair and handed off in good condition.

## 2020-10-16 NOTE — PROGRESS NOTES
DM2:   Patient is here to fu regarding DM2 and HTN. Patient is  controlled. Taking all medications and tolerating well. We adjusted his Maxzide last visit. Today and on 10/15 apt, his bp was low. He denies dizziness or fatigue. No cp or sob. Fasting sugars are unknown, he doesn't check. Patient is taking ASA and Ace Inhibitor/ARB. Patient is  on appropriately-dosed statin. LDL is  at goal.  BP is  controlled. No hypoglycemic episodes. Patient does not see Podiatry regularly. Saw an Eye Dr 2019. Patient is aware that it is necessary to see an Eye Dr yearly. Patient does smoke. Most recent labs reviewed with patient. His cr went from 1.0 in July to 1.6 now. Patient does not have complaints or concerns today. Lab Results   Component Value Date    LABA1C 7.2 10/06/2020       Lab Results   Component Value Date    LDLCALC 41 10/06/2020        Patient's past medical, surgical, social and/or family history reviewed, updated in chart, and are non-contributory (unless otherwise stated). Medications and allergies also reviewed and updated in chart.       Review of Systems:  Constitutional:  No fever, no fatigue, no chills, no headaches, no weight change  Dermatology:  No rash, no mole, no dry or sensitive skin  ENT:  No cough, no sore throat, no sinus pain, no runny nose, no ear pain  Cardiology:  No chest pain, no palpitations, no leg edema, no shortness of breath, no PND  Gastroenterology:  No dysphagia, no abdominal pain, no nausea, no vomiting, no constipation, no diarrhea, no heartburn  Musculoskeletal:  No joint pain, no leg cramps,+ back pain, no muscle aches  Respiratory:  No shortness of breath, no orthopnea, no wheezing, no WESLEY, no hemoptysis  Urology:  No blood in the urine, no urinary frequency, no urinary incontinence, no urinary urgency, no nocturia, no dysuria    Vitals:    10/16/20 0827   BP: 98/64   Pulse: 88   Resp: 19   Temp: 96.3 °F (35.7 °C)   TempSrc: Infrared   SpO2: 92%   Weight: (!) 319 lb (144.7 kg)   Height: 5' 11\" (1.803 m)       Physical Exam  Constitutional:       General: He is not in acute distress. Appearance: He is well-developed. He is obese. HENT:      Head: Normocephalic and atraumatic. Right Ear: External ear normal.      Left Ear: External ear normal.      Nose: Nose normal.   Eyes:      General: No scleral icterus. Conjunctiva/sclera: Conjunctivae normal.      Pupils: Pupils are equal, round, and reactive to light. Neck:      Musculoskeletal: Normal range of motion and neck supple. Thyroid: No thyromegaly. Cardiovascular:      Rate and Rhythm: Normal rate and regular rhythm. Heart sounds: Normal heart sounds. No murmur. Pulmonary:      Effort: Pulmonary effort is normal. No accessory muscle usage or respiratory distress. Breath sounds: Normal breath sounds. No wheezing. Musculoskeletal: Normal range of motion. Skin:     General: Skin is warm and dry. Findings: No rash. Neurological:      Mental Status: He is alert and oriented to person, place, and time. Deep Tendon Reflexes: Reflexes are normal and symmetric. Psychiatric:         Speech: Speech normal.         Behavior: Behavior normal.         Assessment/Plan:      Shubham Thornton was seen today for diabetes. Diagnoses and all orders for this visit:    Essential hypertension  -     BASIC METABOLIC PANEL; Future  -     quinapril (ACCUPRIL) 10 MG tablet; Take 1 tablet by mouth daily    Type 2 diabetes mellitus without complication, without long-term current use of insulin (HCC)    Hyperlipidemia associated with type 2 diabetes mellitus (HCC)    Elevated serum creatinine    decrease the accupril. Recheck electrolytes. Close follow up. As above. Call or go to ED immediately if symptoms worsen or persist.  Return in about 1 week (around 10/23/2020). , or sooner if necessary. Adendum: labs reviewed. Cr increased to 2.6. k+5.3 and Na 131. Called and spoke to patient.  Unsure if he was taking both maxzide doses. Hold meds. Make sure he was not taking the 40 mg of quinapril either. He seems to understand now. He continues to [employer] fine. \" asked him to increase water intake. If sx present, have a low threshold for what takes him to the ED. Discussed via phone with Dr Coleman Donald. She agrees that as long as he has no sx, hydrate and hold meds and recheck on Monday. Educational materials and/or home exercises printed for patient's review and were included in patient instructions on his/her After Visit Summary and given to patient at the end of visit. Counseled regarding above diagnosis, including possible risks and complications,  especially if left uncontrolled. Counseled regarding the possible side effects, risks, benefits and alternatives to treatment; patient and/or guardian verbalizes understanding, agrees, feels comfortable with and wishes to proceed with above treatment plan. Advised patient to call with any new medication issues, and read all Rx info from pharmacy to assure aware of all possible risks and side effects of medication before taking. Reviewed age and gender appropriate health screening exams and vaccinations. Advised patient regarding importance of keeping up with recommended health maintenance and to schedule as soon as possible if overdue, as this is important in assessing for undiagnosed pathology, especially cancer, as well as protecting against potentially harmful/life threatening disease. Patient and/or guardian verbalizes understanding and agrees with above counseling, assessment and plan. All questions answered. Riley Whitlock PA-C  10/16/2020    I have personally reviewed and updated the chief complaint, HPI, Past Medical, Family and Social History, as well as the above Review of Systems.

## 2020-10-20 NOTE — TELEPHONE ENCOUNTER
Provider called so that we can contact patient with results . I called patient and notify of results as well as what provider wants him to do now until she sees him again     Patient was notified of results of his kidney function recovering he must continue  Taking quinapril 10 mg and do not take the maxzide. Must drink lots of water  And to write down  B/P reading starting today until he is seen on Friday oct.  23 at 8am

## 2020-10-22 NOTE — TELEPHONE ENCOUNTER
Called patient back. Will have patient follow up on 10/29/2020 to discuss further options. Gabapentin is an option but has kidney issues.

## 2020-10-23 PROBLEM — D62 ACUTE BLOOD LOSS AS CAUSE OF POSTOPERATIVE ANEMIA: Status: RESOLVED | Noted: 2019-06-29 | Resolved: 2020-01-01

## 2020-10-23 NOTE — PROGRESS NOTES
Hypertension:  Patient is here for follow up or ISIDRO and chronic hypertension. Maxzide d/c following the increase of cr to 2.6. acei decreased significantly as well. He also had a ct with contrast prior to the increased cr. He did hold his metformin. Takes meds as directed and tolerates them well. No symptoms from htn standpoint per ROS. Patient is  compliant with lifestyle modifications. Patient does smoke. He continues to [employer] fine. \" he is checking his bp at home and it has been very stable. Patient's past medical, surgical, social and/or family history reviewed, updated in chart, and are non-contributory (unless otherwise stated). Medications and allergies also reviewed and updated in chart. Review of Systems:  Constitutional:  No fever, no fatigue, no chills, no headaches, no weight change  Dermatology:  No rash, no mole, no dry or sensitive skin  ENT:  No cough, no sore throat, no sinus pain, no runny nose, no ear pain  Cardiology:  No chest pain, no palpitations, no leg edema, no shortness of breath, no PND  Gastroenterology:  No dysphagia, no abdominal pain, no nausea, no vomiting, no constipation, no diarrhea, no heartburn  Musculoskeletal:  No joint pain, no leg cramps, + back pain, no muscle aches  Respiratory:  No shortness of breath, no orthopnea, no wheezing, no WESLEY, no hemoptysis  Urology:  No blood in the urine, no urinary frequency, no urinary incontinence, no urinary urgency, no nocturia, no dysuria    Vitals:    10/23/20 0758   BP: 116/66   Pulse: 79   Resp: 18   Temp: 94.1 °F (34.5 °C)   TempSrc: Infrared   SpO2: 93%   Weight: (!) 314 lb (142.4 kg)   Height: 5' 11\" (1.803 m)       Physical Exam  Constitutional:       General: He is not in acute distress. Appearance: He is well-developed. He is obese. HENT:      Head: Normocephalic and atraumatic.       Right Ear: External ear normal.      Left Ear: External ear normal.      Nose: Nose normal.   Eyes:      General: No scleral icterus. Conjunctiva/sclera: Conjunctivae normal.      Pupils: Pupils are equal, round, and reactive to light. Neck:      Musculoskeletal: Normal range of motion and neck supple. Thyroid: No thyromegaly. Cardiovascular:      Rate and Rhythm: Normal rate and regular rhythm. Heart sounds: Normal heart sounds. No murmur. Pulmonary:      Effort: Pulmonary effort is normal. No accessory muscle usage or respiratory distress. Breath sounds: Normal breath sounds. No wheezing. Musculoskeletal: Normal range of motion. Right lower leg: No edema. Left lower leg: No edema. Skin:     General: Skin is warm and dry. Findings: No rash. Neurological:      Mental Status: He is alert and oriented to person, place, and time. Deep Tendon Reflexes: Reflexes are normal and symmetric. Psychiatric:         Speech: Speech normal.         Behavior: Behavior normal.         Assessment/Plan:      Connor Watkins was seen today for hypertension. Diagnoses and all orders for this visit:    Serum calcium elevated  -     COMPREHENSIVE METABOLIC PANEL; Future  -     CALCIUM, IONIZED; Future  -     PTH, INTACT; Future    Acute kidney injury ([address]  -improved    Essential hypertension  -stable on low dose acei    Smoker    Abdominal aortic aneurysm (AAA) without rupture ([address]  -consider u/s of renal a. As above. Call or go to ED immediately if symptoms worsen or persist.  Return in about 4 weeks (around 11/20/2020). , or sooner if necessary. Educational materials and/or home exercises printed for patient's review and were included in patient instructions on his/her After Visit Summary and given to patient at the end of visit. Counseled regarding above diagnosis, including possible risks and complications,  especially if left uncontrolled.     Counseled regarding the possible side effects, risks, benefits and alternatives to treatment; patient and/or guardian verbalizes understanding, agrees, feels comfortable with and wishes to proceed with above treatment plan. Advised patient to call with any new medication issues, and read all Rx info from pharmacy to assure aware of all possible risks and side effects of medication before taking. Reviewed age and gender appropriate health screening exams and vaccinations. Advised patient regarding importance of keeping up with recommended health maintenance and to schedule as soon as possible if overdue, as this is important in assessing for undiagnosed pathology, especially cancer, as well as protecting against potentially harmful/life threatening disease. Patient and/or guardian verbalizes understanding and agrees with above counseling, assessment and plan. All questions answered. Uriel Crawford PA-C  10/23/2020    I have personally reviewed and updated the chief complaint, HPI, Past Medical, Family and Social History, as well as the above Review of Systems.

## 2020-10-29 PROBLEM — M48.062 SPINAL STENOSIS OF LUMBAR REGION WITH NEUROGENIC CLAUDICATION: Status: ACTIVE | Noted: 2020-01-01

## 2020-10-29 PROBLEM — M54.42 CHRONIC BILATERAL LOW BACK PAIN WITH BILATERAL SCIATICA: Status: ACTIVE | Noted: 2019-06-11

## 2020-10-29 PROBLEM — M54.41 CHRONIC BILATERAL LOW BACK PAIN WITH BILATERAL SCIATICA: Status: ACTIVE | Noted: 2019-06-11

## 2020-10-29 NOTE — PROGRESS NOTES
Do you currently have any of the following:    Fever: No  Headache:  No  Cough: No  Shortness of breath: No  Exposed to anyone with these symptoms: No         Della Wiseman. presents to the [facility] on 10/29/2020. Deepika Omalley is complaining of pain in his lower back. The pain is constant. The pain is described as aching, stabbing, sharp, burning and spasms. Pain is rated on his best day at a 8, on his worst day at a 10, and on average at a 10 on the VAS scale. He took his last dose of Percocet today. Any procedures since your last visit: No.    Pacemaker or defibrilator: No managed by N/A. He is  on NSAIDS and is  on anticoagulation medications to include Plavix and is managed by Dr. Maikel Orellana. BP (!) 148/100   Pulse 83   Temp 96.5 °F (35.8 °C)   Resp 16   SpO2 96%      No LMP for male patient.

## 2020-10-29 NOTE — PROGRESS NOTES
[address] [facility]  [address] [id number] [facility]    Follow up Note      Drucilla Blizzard. Date of Visit:  10/29/2020    CC:  Patient presents for follow up   Chief Complaint   Patient presents with    Follow-up    Lower Back Pain     HPI:    Pain is worse. Change in quality of symptoms:yes - having increased lumbar spasms.    Medication side effects:none. Recent diagnostic testing:CT myelogram.   Recent interventional procedures:none.      He has been on anticoagulation medications to include ASA, NSAIDS and Plavix.  The patient [facility] not been on herbal supplements.  The patient is diabetic.     Imaging:   10/2020 CT myelogram lumbar -  FINDINGS:    There is mild lumbar levocurvature with apex centered at L3-L4.  There is    increased lordotic curvature of the lumbar spine with grade 1 anterolisthesis    at L5-S1.  The vertebral body heights are well maintained.  There is severe    intervertebral disc space narrowing, vacuum disc phenomenon, endplate    sclerosis and cystic changes.  To lesser extent, there is mild intervertebral    disc space narrowing at L1-L2 and L5-S1.  There is no acute compression    fracture.  There are multilevel ventral vertebral body osteophytes.  There is    symmetric appearance of the bilateral sacroiliac joints with mild    degenerative changes.         There is no intraspinal or paraspinal mass lesion observed.  The conus    medullaris terminates at L1.  The surrounding soft tissues demonstrates a    right renal mid axis 2.6 cm hypodense lesion, likely reflecting a cyst.    There is an aorto bi-iliac endo stent graft in place excluding an infrarenal    abdominal aortic fusiform aneurysm measuring up to 5.8 cm, partially    visualized.         Spinal levels:         At T12-L1, there is no significant spinal canal stenosis or neural foraminal    stenosis.         At L1-L2, there is a broad mild disc osteophyte complex and bilateral mild    facet arthropathy without significant spinal canal stenosis.         At L2-L3, there is a broad disc osteophyte complex, ligamentum flavum    thickening, prominent posterior epidural fat, and bilateral moderate facet    arthropathy contributing to moderate spinal canal stenosis and moderate    right/severe left bilateral neural foraminal stenosis.         At L3-L4, there is moderate left/mild to moderate right bilateral facet    arthropathy and mildly prominent posterior epidural fat without significant    spinal canal stenosis or neural foraminal stenosis.         At L4-L5, there is a mild disc bulge with a superimposed left extraforaminal    disc protrusion abutting the left extraforaminal L4 nerve root and bilateral    moderate facet arthropathy contributing to moderate right neural foraminal    stenosis and mild left neural foraminal stenosis.  There is no significant    spinal canal stenosis.         At L5-S1, there is a broad-based diffuse disc bulge and bilateral severe    facet arthropathy with vacuum disc phenomenon contributing to bilateral    severe neural foraminal stenosis.  There is no significant spinal canal    stenosis.               Impression    Multilevel lumbar spondylosis as detailed above, most significant at L2-L3    with moderate spinal canal stenosis and moderate right/severe left bilateral    neural foraminal stenosis as well as at L5-S1 with bilateral severe neural    foraminal stenosis.         L4-L5 left extraforaminal disc protrusion abutting the left extraforaminal L4    nerve root.               2019 lumbar xray -       2019 Right knee x-ray     Patient MRN:  [id number]   : 1960   Age: 61 years   Gender: Male   Order Date:  2019 9:13 AM   EXAM: XR KNEE RIGHT (1-2 VIEWS)   NUMBER OF IMAGES:  2 views   INDICATION: Z96.651 Status post total right knee replacement s/p right   TKA   COMPARISON: 2019       . Redemonstration of right total knee arthroplasty hardware   Alignment remains unchanged. No foreign body is identified.           Impression   Stable right knee arthroplasty hardware with no   significant change in alignment.       The exam has been dictated and signed by Jennifer Gant PA-C. Mckinley Lane MD, Radiologist, have reviewed the images and   report and concur with these findings.      R knee xray 2018 -   Frontal and lateral weight-bearing views of the right knee demonstrate   severe medial femorotibial joint space narrowing with associated   subchondral sclerosis, marginal spurring and irregularity along the   proximal tibia, and genu varus angulation of the knee. Degenerative   enthesopathic changes are noted at the superior aspect of the patella   and posterior aspect of the femoral condyles. The patellofemoral joint   space is not well evaluated on the provided lateral view but may be   narrowed. No acute fracture is seen. A small suprapatellar joint   effusion is noted.      L knee xray 2018 -   Cemented left total arthroplasty without evidence for   hardware failure, change in alignment and a minimal amount of joint   fluid      Potential Aberrant Drug-Related Behavior: no      Urine Drug Screenin2018 consistent  10/2018 Inconsistent for oxycodone, noroxycodone.  Patient was prescribed norco at the time of the UDS.    2019  Inconsistent for oxycodone, noroxycodone.  Patient was prescribed norco at the time of the UDS. I did discuss this with the patient at length as these were 1 year apart and patient had surgery and was cared for other providers in the meantime.  Patient reports that he did not realize that he was not allowed to take previously prescribed pain medication.  He understands that if this occurs again, we will no longer be able to prescribe.     2019 consistent   2020 consistent   10/2020 consistent    OARRS report:  2018-10/2020 consistent    Past Medical History:   Diagnosis Date    Anticoagulant long-term use     ARTHROPLASTY performed by Sven Lauren DO at [address]       Prior to Admission medications    Medication Sig Start Date End Date Taking? Authorizing Provider   diclofenac (VOLTAREN) 75 MG EC tablet TAKE 1 TABLET BY MOUTH TWICE A DAY 10/7/20  Yes Historical Provider, MD   oxyCODONE-acetaminophen (PERCOCET) 7.5-325 MG per tablet Take 1 tablet by mouth 3 times daily as needed for Pain for up to 30 days.  Intended supply: 30 days 10/13/20 11/12/20 Yes Linus Adams DO   tiZANidine (ZANAFLEX) 4 MG tablet Take 1 tablet by mouth 3 times daily as needed (spasms) 10/9/20 11/8/20 Yes Linus Adams DO   loratadine (CLARITIN) 10 MG tablet Take 1 tablet by mouth daily 10/6/20 11/5/20 Yes Malia Medina PA-C   fluticasone Samantha Redhead) 50 MCG/ACT nasal spray 2 sprays by Nasal route daily 10/6/20  Yes Malia Medina PA-C   omega-3 acid ethyl esters (LOVAZA) 1 g capsule take 1 capsule twice a day 8/14/20  Yes Malia Medina PA-C   niacin (NIASPAN) 500 MG extended release tablet TAKE 1 TABLET BY MOUTH EVERY DAY IN THE EVENING 7/6/20  Yes Malia Medina PA-C   clopidogrel (PLAVIX) 75 MG tablet TAKE 1 TABLET BY MOUTH EVERY DAY 7/6/20  Yes Malia Medina PA-C   pioglitazone (ACTOS) 15 MG tablet TAKE 1 TABLET BY MOUTH EVERY DAY 5/7/20  Yes Charlie Matias DO   atorvastatin (LIPITOR) 80 MG tablet TAKE 1 TABLET BY MOUTH AT BEDTIME 10/22/18  Yes Malia Medina PA-C   carvedilol (COREG) 25 MG tablet TAKE ONE TABLET BY MOUTH TWICE DAILY (WITH MEALS) 10/22/18  Yes Malia Medina PA-C   quinapril (ACCUPRIL) 10 MG tablet Take 1 tablet by mouth daily  Patient not taking: Reported on 10/29/2020 10/16/20   Malia Medina PA-C   metFORMIN (GLUCOPHAGE) 500 MG tablet TAKE 1 TABLET BY MOUTH TWICE A DAY WITH MEALS  Patient not taking: Reported on 10/29/2020 10/8/20   Kiesha Denny DO   niacin (SLO-NIACIN) 500 MG extended release tablet take 1 tablet by mouth once daily  Patient not taking: Reported on 10/29/2020 7/20/20   Kiesha Denny DO Relation Age of Onset    Diabetes Mother     Stroke Mother     Diabetes Father     No Known Problems Sister        REVIEW OF SYSTEMS:     Rayshawn Franco denies fever/chills, chest pain, shortness of breath, new bowel or bladder complaints. All other review of systems was negative. PHYSICAL EXAMINATION:      BP (!) 148/100   Pulse 83   Temp 96.5 °F (35.8 °C)   Resp 16   SpO2 96%     General:      General appearance:pleasant and well-hydrated, in no discomfort and A & O x3  Build: Morbidly obese  Function:Rises from a seated position with difficulty    HEENT:    Head:normocephalic and atraumatic  Pupils:regular, round and equal.  Sclera: icterus absent    Lungs:    Breathing:Normal expansion. No accessory muscle use. Abdomen:    Shape:obese, non-distended and normal  Tenderness:none  Guarding:none    Lumbar spine:    Spine inspection:increased muscle tension on the right   CVA tenderness:No   Palpation: + paraspinal TTP b/l   Range of motion:abnormal severely in lateral bending, flexion, extension rotation bilateral and is painful. Musculoskeletal:    SI joint tenderness:negative right, negative left              JONO test:not done right, not done left  Piriformis tenderness:negative right, negative left  Trochanteric bursa tenderness:negative right, negative left  SLR: negative right, negative left, sitting     Extremities:    Tremors:None bilaterally upper and lower  Range of motion:pain with internal rotation of hips negative.   Intact:Yes  Varicose veins:absent   Pulses:radial pulse 2+ left  Cyanosis:none  Edema:Normal    Neurological:    Sensory:normal to light touch   Motor:   Right Quadriceps5/5          Left Quadriceps5/5           Right Gastrocnemius5/5    Left Gastrocnemius5/5  Right Ant Tibialis5/5  Left Ant Tibialis5/5    Reflexes BLE diffusely 0-1+, s/p b/l TKR    Gait:normal station, with a cane    Dermatology:    Skin:no unusual rashes, no skin lesions    Impression:                Patient with lower back (along b/l SIJ) pain that radiates into the b/l hips and RLE in L4 distribution   2020 lumbar myelogram as above              Lumbar xray shows significant degenerative changes (DDD and facet degeneration)              Rt hip xray shows mild degeneration R>L hips  On PLAVIX, ASA     Severe LBP radiating into the RLE in L4 distribution with a shocking/burning pain, spasms in the lumbar spine are the most significant     Reviewed lumbar myelogram  Back brace - Heywood Habermann, he missed the appt and will r/s  UDS and OARRS reviewed  RF percocet 7.5/325 1 tab TID prn. Discussed limited dosing  RF tizanidine 4 mg tid prn #90  Add gabapentin 300 mg titration (can take up to 1400 mg per day with his GFR)  Failed Right L3,4,5 RFA   L4-5 IRENE #1 - r/b and procedure discussed (pt on Plavix and ASA and will need to hold per WILLIAM guidelines)   Referral to [address] [place] [address] for surgical consideration given severity of symptoms  Discussed smoking cessation strategies - he continues to work on reduction  Discussed weight loss - he is following with Dr. 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  States that it made his pain worse.               Treatment plan discussed with the patient       We discussed with the patient that combining opioids, benzodiazepines, alcohol, illicit drugs or sleep aids increases the risk of respiratory depression including death. We discussed that these medications may cause drowsiness, sedation or dizziness and have counseled the patient not to drive or operate machinery. We have discussed that these medications will be prescribed only by one provider. We have discussed with the patient about age related risk factors and have thoroughly discussed the importance of taking these medications as prescribed.  The patient verbalizes understanding.     Cc:  Referring physician

## 2020-11-05 NOTE — TELEPHONE ENCOUNTER
Mr. Lynne Folds called and stated that he can not take the gabapentin. His side effects are jerking movements, insomina and eyesight blurry. Please advise.

## 2020-11-05 NOTE — TELEPHONE ENCOUNTER
escribed lyrica.   Please explain it is a titration and he should take the lower dose script first.  thx.

## 2020-11-06 NOTE — TELEPHONE ENCOUNTER
Call to [facility]. that procedure was approved for 11/17/2020 and that the surgery center should call him a few days before for the pre op call and after 3:00 PM the business day before with the arrival time. Instructed Patricio to hold ibuprofen for 24 hours, naprosyn for 4 days and any aspirin containing products for 7 days. Instructed to call office back if any questions. Instructed of need for COVID-19 testing and self quarantining. Patricio verbalized understanding.     Patricio 's response to the following screening questions:    Fever: No  Headache:  No  Cough: No  Shortness of breath: No  Exposed to anyone with these symptoms: No

## 2020-11-13 NOTE — PROGRESS NOTES
Have you been tested for COVID  No           Have you been told you were positive for COVID No  Have you had any known exposure to someone that is positive for COVID No  Do you have a cough                   No              Do you have shortness of breath No                 Do you have a sore throat            No                Are you having chills                    No                Are you having muscle aches. No                    Please come to the hospital wearing a mask and have your significant other wear a mask as well. Both of you should check your temperature before leaving to come here,  if it is 100 or higher please call [phone] for instruction. [facility] PAIN MANAGEMENT  INSTRUCTIONS  . .......................................................................................................................................... [x] Parking the day of Surgery is located in the [facility].   Upon entering the door, someone will be there to greet you    [x]  Bring photo ID and insurance card     [x] You may have a light breakfast day of procedure    [x]  Wear loose comfortable clothing    [x]  Please follow instructions for medications as given per Dr's office     [x] Stop blood thinners as per Dr's office instructions    [x] You can expect a call the business day prior to procedure to notify you of your arrival time     [x] Please arrange for

## 2020-11-17 PROBLEM — J96.01 ACUTE RESPIRATORY FAILURE WITH HYPOXIA (HCC): Status: ACTIVE | Noted: 2020-01-01

## 2020-11-17 PROBLEM — D69.6 THROMBOCYTOPENIA (HCC): Status: ACTIVE | Noted: 2020-01-01

## 2020-11-17 PROBLEM — J18.9 COMMUNITY ACQUIRED PNEUMONIA OF LEFT UPPER LOBE OF LUNG: Status: ACTIVE | Noted: 2020-01-01

## 2020-11-17 NOTE — ED PROVIDER NOTES
The patient is a 61year-old-male with a history of CAD, DM-2, morbid obesity, chronic back pain, HTN, AAA s/p repair who presents to the Emergency Department complaining of shortness of breath. The patient was having an L4/L5 epidural with his pain management physician, Dr. Angel Daniels. She called and reports that his O2 dropped into the 80's during the procedure. The patient states he has been experiencing worsening shortness of breath over the past two weeks. He also has had a productive cough. He did not take anything for his symptoms and nothing makes it better or worse. He is a smoker, but has never seen a pulmonologist in the past. He is basically bed bound and only takes a couple steps occasionally. He lives at home with his wife. Dr. Angel Daniels was also concerned the patient was experiencing increased back pain/spasms. Denies any fever, chills, sick contacts, chest pain, nausea, vomiting, diarrhea, lightheadedness, dizziness, syncope, recent hospitalization, recent illness, or other acute symptoms or concerns. The history is provided by the patient. Review of Systems   Constitutional: Negative for chills, fatigue and fever. HENT: Negative for congestion and voice change. Eyes: Negative for photophobia and visual disturbance. Respiratory: Positive for cough, shortness of breath and wheezing. Cardiovascular: Negative for chest pain, palpitations and leg swelling. Gastrointestinal: Negative for abdominal pain, diarrhea, nausea and vomiting. Genitourinary: Negative for dysuria, flank pain and frequency. Musculoskeletal: Negative for back pain and neck pain. Skin: Negative for rash and wound. Neurological: Negative for dizziness, syncope, weakness, light-headedness and headaches. All other systems reviewed and are negative. Physical Exam  Vitals signs and nursing note reviewed. Constitutional:       General: He is not in acute distress. Appearance: He is well-developed.  He is morbidly obese. He is ill-appearing (chronically ill appearing). He is not toxic-appearing. HENT:      Head: Normocephalic and atraumatic. Nose: Nose normal.      Mouth/Throat:      Lips: Pink. No lesions. Mouth: Mucous membranes are moist.   Eyes:      General: Lids are normal.   Neck:      Musculoskeletal: Normal range of motion and neck supple. Cardiovascular:      Rate and Rhythm: Normal rate and regular rhythm. Heart sounds: Normal heart sounds, S1 normal and S2 normal. No murmur. Pulmonary:      Effort: Pulmonary effort is normal. No respiratory distress. Breath sounds: Decreased breath sounds (mildly diminished breath sounds at the bases) and wheezing (inspiratory and expiratory wheezing bilaterally) present. No rhonchi or rales. Abdominal:      General: Bowel sounds are normal.      Palpations: Abdomen is soft. Tenderness: There is no abdominal tenderness. There is no guarding or rebound. Musculoskeletal:      Right lower leg: Edema present. Left lower leg: Edema present. Skin:     General: Skin is warm and dry. Neurological:      Mental Status: He is alert and oriented to person, place, and time. Motor: No tremor or abnormal muscle tone. Coordination: Coordination normal.          Procedures     MDM  Number of Diagnoses or Management Options  Acute respiratory failure with hypoxia ([facility] [address]): Anemia, unspecified type:   Bilateral pleural effusion:   Pneumonia due to organism: Thrombocytopenia [facility]):   Diagnosis management comments: The patient is a 61year-old-male who presents to the Emergency Department complaining of shortness of breath and wheezing. He is hemodynamically stable, non-toxic, and in no acute distress but chronically ill appearing.  He is anemic with a hemoglobin of 7.8, no leukocytosis, thrombocytopenia of 60, mild hyperkalemia of 5.1, no lactic acidosis, BNP elevated over 2533, troponin negative, ekg with t wave inversions and flattening, CTA evident of pneumonia and pleural effusions but AAA appears stable. Treated the patient with 3 duonebs and a dose of solumedrol. Attempted patient off of NC O2 and he desaturated to 85%. Patient does not wear O2 at home. He was placed back on NC O2. Treated for community acquired pneumonia with Rocephin and Azithromycin. Consulted with hospitalist who accepted the patient for admission. Discussed results and plan of care with patient and he verbalized understanding and agreement to plan and admission. Amount and/or Complexity of Data Reviewed  Decide to obtain previous medical records or to obtain history from someone other than the patient: yes         ED Course as of Nov 18 0908   Tue Nov 17, 2020   1402 EKG: This EKG is signed and interpreted by me. Rate: 73  Rhythm: Sinus  Interpretation: Normal sinus rhythm, normal PA interval, normal QRS, normal QT interval, nonspecific T wave inversions and flattening in inferior leads appear new  Comparison: changes compared to previous EKG        [JA]   1606 Consulted with Dr. Daryl Billings, hospitalist, who accepted the patient for admission. [KG]   1714 I spoke with Dr. Breonna Gann. She states that she consulted with Dr. Severiano Lipschutz with hematology. Recommends giving the patient 1 pack of platelets now due to thrombocytopenia after recent epidural.  Hospitalist is aware of frequent neuro checks. Platelets ordered in the ED. [BN]   8647 Updated patient on findings of thrombocytopenia and anemia. Updated patient on plan to administer a pack of platelets. Discussed risks versus benefits of blood product transfusion. Discussed risks of infection, allergic reaction, and pulmonary edema. Discussed that I believe benefits outweigh risks. Patient demonstrates understanding and agreeable to blood product transfusion. [JA]   2200 I was called in the room as patient required more oxygen from his 4 L nasal cannula.   Patient was placed on 15 L high flow pulmonary edema. Discussed that I believe benefits outweigh risks. Patient demonstrates understanding and agreeable to blood product transfusion. [JA]   2200 I was called in the room as patient required more oxygen from his 4 L nasal cannula. Patient was placed on 15 L high flow saturating at 92%. He is not conversationally dyspneic. Lung sounds are rhonchorous throughout all lung fields. Patient be started on BiPAP, given Lasix, plus or minus a nitro drip. Patient will be able evaluated after medications given. [JV]   Wed Nov 18, 2020   0109 Patient evaluated bedside after being placed on BiPAP. Within Nitropaste on chest.  He is sitting in bed comfortably. Able to communicate by using gestures. Alert and oriented. In no acute distress. Still waiting for his bed upstairs. Patient has no other complaints at this time. [JV]      ED Course User Index  [JA] Wicho Mosher MD  [JV] Luz Maria Delgado MD  [KG] Laura Marte, DO       --------------------------------------------- PAST HISTORY ---------------------------------------------  Past Medical History:  has a past medical history of Anticoagulant long-term use, Arthritis, CAD (coronary artery disease), Chronic back pain, Depression, Dyslipidemia, Hiatal hernia, History of ST elevation myocardial infarction (STEMI), Hyperlipidemia, Hypertension, Low back pain, Lumbar radiculopathy, Obesity, MANOLO on CPAP, Presence of stent in left circumflex coronary artery, Presence of stent in right coronary artery, Smoker, and Type 2 diabetes mellitus without complication ([facility] [address] Past Surgical History:  has a past surgical history that includes Coronary angioplasty with stent (X4); ECHO Compl W Dop Color Flow (12/30/2011); Coronary angioplasty with stent (12/30/2011); Diagnostic Cardiac Cath Lab Procedure (3/15/2005); Diagnostic Cardiac Cath Lab Procedure (1/16/2007); Diagnostic Cardiac Cath Lab Procedure (2/21/2007);  Diagnostic Cardiac Cath Lab Procedure (12/30/2011); hernia repair (2/2014); pr office/outpt visit,procedure only (Right, 11/26/2018); joint replacement (Left, 4/1/14); joint replacement (Right, 2018); AAA repair, endovascular (N/A, 6/28/2019); Anesthesia Nerve Block (Bilateral, 3/12/2020); Anesthesia Nerve Block (Bilateral, 6/11/2020); Pain management procedure (Right, 9/1/2020); and Pain management procedure (N/A, 11/17/2020). Social History:  reports that he has been smoking cigarettes. He started smoking about 42 years ago. He has a 39.00 pack-year smoking history. He has never used smokeless tobacco. He reports that he does not drink alcohol or use drugs. Family History: family history includes Diabetes in his father and mother; No Known Problems in his sister; Stroke in his mother. The patients home medications have been reviewed.     Allergies: Bee venom    -------------------------------------------------- RESULTS -------------------------------------------------    LABS:  Results for orders placed or performed during the hospital encounter of 11/17/20   Respiratory Panel, Molecular, with COVID-19 (Restricted: peds pts or suitable admitted adults)    Specimen: Nasopharyngeal   Result Value Ref Range    Adenovirus by PCR Not Detected Not Detected    Bordetella parapertussis by PCR Not Detected Not Detected    Bordetella pertussis by PCR Not Detected Not Detected    Chlamydophilia pneumoniae by PCR Not Detected Not Detected    Coronavirus 229E by PCR Not Detected Not Detected    Coronavirus HKU1 by PCR Not Detected Not Detected    Coronavirus NL63 by PCR Not Detected Not Detected    Coronavirus OC43 by PCR Not Detected Not Detected    SARS-CoV-2, PCR Not Detected Not Detected    Human Metapneumovirus by PCR Not Detected Not Detected    Human Rhinovirus/Enterovirus by PCR Not Detected Not Detected    Influenza A by PCR Not Detected Not Detected    Influenza B by PCR Not Detected Not Detected    Mycoplasma pneumoniae by PCR Not Detected Not Detected    Parainfluenza Virus 1 by PCR Not Detected Not Detected    Parainfluenza Virus 2 by PCR Not Detected Not Detected    Parainfluenza Virus 3 by PCR Not Detected Not Detected    Parainfluenza Virus 4 by PCR Not Detected Not Detected    Respiratory Syncytial Virus by PCR Not Detected Not Detected   CBC auto differential   Result Value Ref Range    WBC 8.2 4.5 - 11.5 E9/L    RBC 2.92 (L) 3.80 - 5.80 E12/L    Hemoglobin 7.8 (L) 12.5 - 16.5 g/dL    Hematocrit 26.9 (L) 37.0 - 54.0 %    MCV 92.1 80.0 - 99.9 fL    MCH 26.7 26.0 - 35.0 pg    MCHC 29.0 (L) 32.0 - 34.5 %    RDW 16.6 (H) 11.5 - 15.0 fL    Platelets 60 (L) 268 - 450 E9/L    MPV 10.1 7.0 - 12.0 fL    Neutrophils % 72.0 43.0 - 80.0 %    Lymphocytes % 15.0 (L) 20.0 - 42.0 %    Monocytes % 13.0 (H) 2.0 - 12.0 %    Eosinophils % 0.0 0.0 - 6.0 %    Basophils % 0.0 0.0 - 2.0 %    Neutrophils Absolute 5.90 1.80 - 7.30 E9/L    Lymphocytes Absolute 1.23 (L) 1.50 - 4.00 E9/L    Monocytes Absolute 1.07 (H) 0.10 - 0.95 E9/L    Eosinophils Absolute 0.00 (L) 0.05 - 0.50 E9/L    Basophils Absolute 0.00 0.00 - 0.20 E9/L    nRBC 5.0 /100 WBC    Anisocytosis 1+     Polychromasia 1+     Hypochromia 1+     Poikilocytes 1+     Ovalocytes 1+     Basophilic Stippling 1+    Basic Metabolic Panel w/ Reflex to MG   Result Value Ref Range    Sodium 139 132 - 146 mmol/L    Potassium reflex Magnesium 5.1 (H) 3.5 - 5.0 mmol/L    Chloride 103 98 - 107 mmol/L    CO2 29 22 - 29 mmol/L    Anion Gap 7 7 - 16 mmol/L    Glucose 135 (H) 74 - 99 mg/dL    BUN 36 (H) 8 - 23 mg/dL    CREATININE 1.2 0.7 - 1.2 mg/dL    GFR Non-African American >60 >=60 mL/min/1.73    GFR African American >60     Calcium 9.1 8.6 - 10.2 mg/dL   Lactic Acid, Plasma   Result Value Ref Range    Lactic Acid 1.7 0.5 - 2.2 mmol/L   Brain Natriuretic Peptide   Result Value Ref Range    Pro-BNP 2,533 (H) 0 - 125 pg/mL   Troponin   Result Value Ref Range    Troponin <0.01 0.00 - 0.03 ng/mL   Platelet Confirmation   Result Value Ref Range    Platelet Confirmation CONFIRMED    Hemoglobin A1c   Result Value Ref Range    Hemoglobin A1C 7.7 (H) 4.0 - 5.6 %   CBC   Result Value Ref Range    WBC 10.8 4.5 - 11.5 E9/L    RBC 3.20 (L) 3.80 - 5.80 E12/L    Hemoglobin 8.5 (L) 12.5 - 16.5 g/dL    Hematocrit 29.6 (L) 37.0 - 54.0 %    MCV 92.5 80.0 - 99.9 fL    MCH 26.6 26.0 - 35.0 pg    MCHC 28.7 (L) 32.0 - 34.5 %    RDW 16.6 (H) 11.5 - 15.0 fL    Platelets 80 (L) 803 - 450 E9/L    MPV 10.4 7.0 - 12.0 fL   Basic Metabolic Panel   Result Value Ref Range    Sodium 137 132 - 146 mmol/L    Potassium 4.5 3.5 - 5.0 mmol/L    Chloride 98 98 - 107 mmol/L    CO2 31 (H) 22 - 29 mmol/L    Anion Gap 8 7 - 16 mmol/L    Glucose 146 (H) 74 - 99 mg/dL    BUN 31 (H) 8 - 23 mg/dL    CREATININE 1.1 0.7 - 1.2 mg/dL    GFR Non-African American >60 >=60 mL/min/1.73    GFR African American >60     Calcium 8.9 8.6 - 10.2 mg/dL   Platelet Confirmation   Result Value Ref Range    Platelet Confirmation SEE BELOW    Blood Gas, Arterial   Result Value Ref Range    Date Analyzed 20201118     Time Analyzed 0713     Source: Blood Arterial     pH, Blood Gas 7.207 (LL) 7.350 - 7.450    PCO2 81.7 (HH) 35.0 - 45.0 mmHg    PO2 97.1 75.0 - 100.0 mmHg    HCO3 31.7 (H) 22.0 - 26.0 mmol/L    B.E. 2.5 -3.0 - 3.0 mmol/L    O2 Sat 95.9 92.0 - 98.5 %    PO2/FIO2 0.97 mmHg/%    AaDO2 509.2 mmHg    RI(T) 524 %    O2Hb 94.1 94.0 - 97.0 %    COHb 1.6 (H) 0.0 - 1.5 %    MetHb 0.3 0.0 - 1.5 %    O2 Content 12.3 mL/dL    HHb 4.0 0.0 - 5.0 %    tHb (est) 9.2 (L) 11.5 - 16.5 g/dL    Mode BIPAP 15/5     FIO2 100.0 %    Rr Mechanical 12.0 b/min    Date Of Collection      Time Collected      Pt Temp 37.0 C     ID [id number]     Lab [id number]     Critical(s) Notified Called to Gale Pipes RN    Blood Gas, Arterial   Result Value Ref Range    Date Analyzed 20201118     Time Analyzed 0749     Source: Blood Arterial     pH, Blood Gas 7.213 (L) 7.350 - 7.450    PCO2 82.1 (HH) 35.0 - 45.0 mmHg    PO2 84.3 75.0 - 100.0 mmHg    HCO3 32.3 (H) 22.0 - 26.0 mmol/L    B.E. 3.2 (H) -3.0 - 3.0 mmol/L    O2 Sat 94.6 92.0 - 98.5 %    PO2/FIO2 0.84 mmHg/%    AaDO2 521.6 mmHg    RI(T) 619 %    O2Hb 92.8 (L) 94.0 - 97.0 %    COHb 1.6 (H) 0.0 - 1.5 %    MetHb 0.3 0.0 - 1.5 %    O2 Content 12.1 mL/dL    HHb 5.3 (H) 0.0 - 5.0 %    tHb (est) 9.2 (L) 11.5 - 16.5 g/dL    Mode AVAPS     FIO2 100.0 %    Rr Mechanical 20.0 b/min    Vt Mechanical 500.0 mL    Peep/Cpap 10.0 cmH2O    Date Of Collection      Time Collected      Pt Temp 37.0 C     ID [id number]     Lab [id number]     Critical(s) Notified Dr hooper at Team/RRT    Blood Gas, Arterial   Result Value Ref Range    Date Analyzed 20201118     Time Analyzed 0840     Source: Blood Arterial     pH, Blood Gas 7.216 (L) 7.350 - 7.450    PCO2 86.5 (HH) 35.0 - 45.0 mmHg    PO2 81.3 75.0 - 100.0 mmHg    HCO3 34.3 (H) 22.0 - 26.0 mmol/L    B.E. 5.0 (H) -3.0 - 3.0 mmol/L    O2 Sat 94.4 92.0 - 98.5 %    PO2/FIO2 1.02 mmHg/%    AaDO2 378.3 mmHg    RI(T) 465 %    O2Hb 92.7 (L) 94.0 - 97.0 %    COHb 1.5 0.0 - 1.5 %    MetHb 0.3 0.0 - 1.5 %    O2 Content 12.0 mL/dL    HHb 5.5 (H) 0.0 - 5.0 %    tHb (est) 9.1 (L) 11.5 - 16.5 g/dL    Mode AVAPS     FIO2 80.0 %    Rr Mechanical 20.0 b/min    Vt Mechanical 500.0 mL    Peep/Cpap 10.0 cmH2O    Date Of Collection      Time Collected      Pt Temp 37.0 C     ID 0101     Lab [id number]     Critical(s) Notified Handed report to Dr/RN    POCT Glucose   Result Value Ref Range    Meter Glucose 152 (H) 74 - 99 mg/dL   POCT Glucose   Result Value Ref Range    Meter Glucose 164 (H) 74 - 99 mg/dL   POCT Glucose   Result Value Ref Range    Meter Glucose 181 (H) 74 - 99 mg/dL   POCT Glucose   Result Value Ref Range    Meter Glucose 170 (H) 74 - 99 mg/dL   EKG 12 Lead   Result Value Ref Range    Ventricular Rate 73 BPM    Atrial Rate 73 BPM    P-R Interval 174 ms    QRS Duration 100 ms    Q-T Interval 366 ms    QTc Calculation (Bazett) 403 ms    P Axis 52 degrees    R Axis 30 degrees    T Axis 47 degrees   TYPE AND SCREEN   Result Value Ref Range    ABO/Rh O POS     Antibody Screen NEG    PREPARE PLATELETS, 1 Product   Result Value Ref Range    Product Code Blood Bank [id number]     Description Blood Bank      Unit Number [id number]     Dispense Status Blood Bank transfused        RADIOLOGY:  XR CHEST PORTABLE   Final Result   Left upper lobe masslike consolidation. Small to moderate left pleural effusion. CTA CHEST W CONTRAST   Final Result   1. New confluent opacities located in left upper lobe suggesting pneumonia,   atelectasis, or neoplasm. 2.  Patchy airspace opacities located in lingula suggesting pneumonia with   areas of atelectasis. 3.  Stenosis and occlusion are associated with left upper lobe segmental   bronchi and lingular segmental bronchi. 4.  Moderate to large left pleural effusion. 5.  Mediastinal lymphadenopathy. 6.  Stable fusiform infrarenal abdominal aortic aneurysm with endograft   repair. Aneurysm measures up to 6.4 cm. No evidence of endoleak or   retroperitoneal fluid. 7.  Diverticulosis. CTA ABDOMEN PELVIS W CONTRAST   Final Result   1. New confluent opacities located in left upper lobe suggesting pneumonia,   atelectasis, or neoplasm. 2.  Patchy airspace opacities located in lingula suggesting pneumonia with   areas of atelectasis. 3.  Stenosis and occlusion are associated with left upper lobe segmental   bronchi and lingular segmental bronchi. 4.  Moderate to large left pleural effusion. 5.  Mediastinal lymphadenopathy. 6.  Stable fusiform infrarenal abdominal aortic aneurysm with endograft   repair. Aneurysm measures up to 6.4 cm. No evidence of endoleak or   retroperitoneal fluid. 7.  Diverticulosis.       XR CHEST ABDOMEN NG PLACEMENT    (Results Pending)   XR CHEST 1 VIEW    (Results Pending)   XR CHEST PORTABLE    (Results Pending)       ------------------------- NURSING NOTES AND VITALS REVIEWED ---------------------------  Date / Time Roomed:  11/17/2020  1:13 PM  ED Bed Assignment:  [id number]/[id number]    The nursing notes within the ED encounter and vital signs as below have been reviewed.      Patient Vitals for the past 24 hrs:   BP Temp Temp src Pulse Resp SpO2 Height Weight   11/18/20 0646 (!) 171/94 97.8 °F (36.6 °C) Axillary 81 20 96 % -- --   11/18/20 0351 -- -- -- -- -- 94 % -- --   11/18/20 0345 -- -- -- -- -- (!) 89 % -- --   11/18/20 0330 -- -- -- -- 14 -- -- --   11/18/20 0130 -- -- -- -- -- -- -- (!) 323 lb 6.4 oz (146.7 kg)   11/18/20 0119 (!) 163/87 97 °F (36.1 °C) Axillary 79 21 97 % -- --   11/18/20 0118 -- -- -- -- 24 -- -- --   11/18/20 0044 136/76 97.8 °F (36.6 °C) Axillary 81 17 97 % -- --   11/18/20 0015 119/75 -- -- 81 -- 100 % -- --   11/17/20 2315 (!) 150/105 -- -- 83 -- 99 % -- --   11/17/20 2238 (!) 152/90 -- -- 80 18 100 % -- --   11/17/20 2224 (!) 159/87 -- -- 83 20 99 % -- --   11/17/20 2220 -- -- -- -- 19 -- -- --   11/17/20 2215 (!) 159/89 -- -- 80 -- 92 % -- --   11/17/20 2201 -- -- -- 81 -- -- -- --   11/17/20 2200 (!) 150/82 -- -- 82 24 92 % -- --   11/17/20 2158 (!) 162/106 98.8 °F (37.1 °C) Oral 83 24 91 % -- --   11/17/20 2146 -- -- -- -- 24 91 % -- --   11/17/20 2145 -- -- -- 81 24 (!) 89 % -- --   11/17/20 2141 -- -- -- 78 24 (!) 88 % -- --   11/17/20 2140 -- -- -- 90 24 90 % -- --   11/17/20 2136 -- -- -- -- 24 92 % -- --   11/17/20 2133 -- -- -- -- 24 (!) 87 % -- --   11/17/20 2115 (!) 125/92 -- -- 77 -- 91 % -- --   11/17/20 2033 128/81 98.5 °F (36.9 °C) Oral 76 16 93 % -- --   11/17/20 2022 (!) 148/90 98.4 °F (36.9 °C) Oral 84 16 93 % -- --   11/17/20 2017 137/77 98.6 °F (37 °C) Oral 70 16 93 % -- --   11/17/20 2012 120/79 98 °F (36.7 °C) -- 71 16 93 % -- --   11/17/20 2005 (!) 145/99 98.5 °F (36.9 °C) Oral 72 16 93 % -- --   11/17/20 1953 (!) 158/88 98.5 °F (36.9 °C) Oral 94 16 93 % -- --   11/17/20 1923 135/79 98.5 °F (36.9 °C) -- 72 16 92 % -- -- 11/17/20 1848 136/80 -- -- 74 16 96 % -- --   11/17/20 1813 130/82 99.2 °F (37.3 °C) Oral 94 15 97 % -- --   11/17/20 1657 130/81 99.2 °F (37.3 °C) Oral 96 18 96 % -- --   11/17/20 1601 -- -- -- -- -- (!) 85 % -- --   11/17/20 1316 (!) 120/108 98.6 °F (37 °C) -- 73 16 97 % 5' 11\" (1.803 m) (!) 321 lb (145.6 kg)       Oxygen Saturation Interpretation: Normal    ------------------------------------------ PROGRESS NOTES ------------------------------------------  Re-evaluation(s): Patients symptoms show no change  Repeat physical examination is not changed    Counseling:  I have spoken with the patient and discussed todays results, in addition to providing specific details for the plan of care and counseling regarding the diagnosis and prognosis. Their questions are answered at this time and they are agreeable with the plan of admission.    --------------------------------- ADDITIONAL PROVIDER NOTES ---------------------------------  Consultations: Spoke with Dr. Bhupendra Holcomb. Discussed case. They will admit the patient. This patient's ED course included: a personal history and physicial examination, re-evaluation prior to disposition, multiple bedside re-evaluations, IV medications, cardiac monitoring, continuous pulse oximetry and complex medical decision making and emergency management    This patient has remained hemodynamically stable during their ED course. Diagnosis:  1. Acute respiratory failure with hypoxia ([address]    2. Pneumonia due to organism    3. Bilateral pleural effusion    4. Thrombocytopenia ([address]    5. Anemia, unspecified type        Disposition:  Patient's disposition: Admit to telemetry  Patient's condition is serious.          Flores Villalta DO  Resident  11/17/20 2100       Flores Villalta DO  Resident  11/18/20 7897

## 2020-11-17 NOTE — PROGRESS NOTES
Database complete. Medications reconciled. Care plans and education initiated. Has walker at home but pt states he is bedridden at home and required a wheelchair today. Known to Dr. Inez Gutiérrez for Cardiology and Dr. Andrew Ashraf for Nephrology. 1ppd smoker requesting a nicotine patch.

## 2020-11-17 NOTE — H&P
[facility]        [address], [facility] [address]  Dept: [phone]    Procedure History & Physical      Lauren Guillermo. HPI:    Patient  is here for LBP BLE pain for L4-5 IRENE  Labs/imaging studies reviewed   All question and concerns addressed including R/B/A associated with the procedure    Past Medical History:   Diagnosis Date    Anticoagulant long-term use     Arthritis     CAD (coronary artery disease)     Chronic back pain     Depression     Dyslipidemia     Hiatal hernia 1979    History of ST elevation myocardial infarction (STEMI)     Hyperlipidemia     Hypertension     Low back pain     Lumbar radiculopathy 8/14/2019    Obesity     MANOLO on CPAP     Presence of stent in left circumflex coronary artery     Presence of stent in right coronary artery     Smoker     Type 2 diabetes mellitus without complication (HCC)        Past Surgical History:   Procedure Laterality Date    ABDOMINAL AORTIC ANEURYSM REPAIR, ENDOVASCULAR N/A 6/28/2019    ENDOVASCULAR REPAIR ABDOMINAL AORTIC ANEURYSM performed by Dian Corbett MD at [facility] Bilateral 3/12/2020    BILATERAL L3,L4,L5 MEDIAL BRANCH NERVE BLOCK performed by Korey Bowles DO at [facility] Bilateral 6/11/2020    BILATERAL L3,L4,L5 MEDIAL NERVE BRANCH BLOCK #2 performed by Korey Bowles DO at [facility] OR    CORONARY ANGIOPLASTY WITH STENT PLACEMENT  X4    CORONARY ANGIOPLASTY WITH STENT PLACEMENT  12/30/2011    Dr. Tamy Massey 1st OM 3.0 x 20 Ion    DIAGNOSTIC CARDIAC CATH LAB PROCEDURE  3/15/2005    [facility]. Mild to moderate CAD. Normal LV.  DIAGNOSTIC CARDIAC CATH LAB PROCEDURE  1/16/2007    [facility]. Cath/PTCA/DE stent of proximal and distal RCA.  DIAGNOSTIC CARDIAC CATH LAB PROCEDURE  2/21/2007    Cath/DE stent of proximal OM1 and proximal Cx.  DIAGNOSTIC CARDIAC CATH LAB PROCEDURE  12/30/2011    ANURADHA of mid OM branch of circumflex.      ECHO COMPL W DOP COLOR FLOW  12/30/2011         HERNIA REPAIR  2/2014    laparoscopic ventral hernia repain    JOINT REPLACEMENT Left 4/1/14    TKA-ed    JOINT REPLACEMENT Right 2018    KNEE    PAIN MANAGEMENT PROCEDURE Right 9/1/2020    RIGHT L3,4,5 MEDIAL BRANCH RADIOFREQUENCY ABLATION performed by Mando Brothers DO at [address] [facility] OFFICE/OUTPT VISIT,PROCEDURE ONLY Right 11/26/2018    RIGHT KNEE TOTAL ARTHROPLASTY performed by Ewa Hernandez DO at [address]       Prior to Admission medications    Medication Sig Start Date End Date Taking? Authorizing Provider   pioglitazone (ACTOS) 15 MG tablet TAKE 1 TABLET BY MOUTH EVERY DAY 11/6/20  Yes Darío Banegas PA-C   pregabalin (LYRICA) 150 MG capsule Take 1 capsule by mouth 2 times daily for 30 days. 11/12/20 12/12/20 Yes Mando Brothers DO   diclofenac (VOLTAREN) 75 MG EC tablet TAKE 1 TABLET BY MOUTH TWICE A DAY 10/7/20  Yes Historical Provider, MD   tiZANidine (ZANAFLEX) 4 MG tablet Take 1 tablet by mouth 3 times daily as needed (spasms) 10/29/20 11/28/20 Yes Mando Brothers DO   oxyCODONE-acetaminophen (PERCOCET) 7.5-325 MG per tablet Take 1 tablet by mouth 3 times daily as needed for Pain for up to 30 days.  Intended supply: 30 days 11/12/20 12/12/20 Yes Mando Brothers DO   fluticasone [facility]) 50 MCG/ACT nasal spray 2 sprays by Nasal route daily 10/6/20  Yes Darío Banegas PA-C   omega-3 acid ethyl esters (LOVAZA) 1 g capsule take 1 capsule twice a day 8/14/20  Yes Darío Banegas PA-C   niacin (SLO-NIACIN) 500 MG extended release tablet take 1 tablet by mouth once daily 7/20/20  Yes Roslyn Byrne DO   clopidogrel (PLAVIX) 75 MG tablet TAKE 1 TABLET BY MOUTH EVERY DAY 7/6/20  Yes Darío Banegas PA-C   sildenafil (VIAGRA) 50 MG tablet Take 1 tablet by mouth as needed for Erectile Dysfunction 7/6/20  Yes Darío Banegas PA-C   aspirin 325 MG EC tablet TAKE 1 TABLET BY MOUTH EVERY DAY 5/7/20  Yes Carina Leija DO   Tens Unit MISC by Does not apply route 5/7/19 Yes TRUDY Jones   atorvastatin (LIPITOR) 80 MG tablet TAKE 1 TABLET BY MOUTH AT BEDTIME 10/22/18  Yes Rachel Sung PA-C   carvedilol (COREG) 25 MG tablet TAKE ONE TABLET BY MOUTH TWICE DAILY (WITH MEALS) 10/22/18  Yes Rachel Sung PA-C   quinapril (ACCUPRIL) 10 MG tablet Take 1 tablet by mouth daily 10/16/20   Rachel Sung PA-C   metFORMIN (GLUCOPHAGE) 500 MG tablet TAKE 1 TABLET BY MOUTH TWICE A DAY WITH MEALS 10/8/20   Annette Wheatley, DO       Allergies   Allergen Reactions    Bee Venom Anaphylaxis       Social History     Socioeconomic History    Marital status:      Spouse name: Not on file    Number of children: Not on file    Years of education: Not on file    Highest education level: Not on file   Occupational History    Not on file   Social Needs    Financial resource strain: Not on file    Food insecurity     Worry: Not on file     Inability: Not on file    Transportation needs     Medical: Not on file     Non-medical: Not on file   Tobacco Use    Smoking status: Current Every Day Smoker     Packs/day: 0.25     Years: 39.00     Pack years: 9.75     Types: Cigarettes    Smokeless tobacco: Never Used   Substance and Sexual Activity    Alcohol use: No    Drug use: No    Sexual activity: Not Currently   Lifestyle    Physical activity     Days per week: Not on file     Minutes per session: Not on file    Stress: Not on file   Relationships    Social connections     Talks on phone: Not on file     Gets together: Not on file     Attends Rastafarian service: Not on file     Active member of club or organization: Not on file     Attends meetings of clubs or organizations: Not on file     Relationship status: Not on file    Intimate partner violence     Fear of current or ex partner: Not on file     Emotionally abused: Not on file     Physically abused: Not on file     Forced sexual activity: Not on file   Other Topics Concern    Not on file   Social History Narrative    Not on file       Family History   Problem Relation Age of Onset    Diabetes Mother     Stroke Mother     Diabetes Father     No Known Problems Sister          REVIEW OF SYSTEMS:    CONSTITUTIONAL:  negative for  fevers, chills, sweats and fatigue    RESPIRATORY:  negative for  dry cough, cough with sputum, dyspnea, wheezing and chest pain    CARDIOVASCULAR:  negative for chest pain, dyspnea, palpitations, syncope    GASTROINTESTINAL:  negative for nausea, vomiting, change in bowel habits, diarrhea, constipation and abdominal pain    MUSCULOSKELETAL: negative for muscle weakness    SKIN: negative for itching or rashes. BEHAVIOR/PSYCH:  negative for poor appetite, increased appetite, decreased sleep and poor concentration    All other systems negative      PHYSICAL EXAM:    VITALS:  /73   Pulse 75   Temp 97.1 °F (36.2 °C) (Temporal)   Resp 20   Ht 5' 11\" (1.803 m)   Wt (!) 314 lb (142.4 kg)   SpO2 91%   BMI 43.79 kg/m²     CONSTITUTIONAL:  awake, alert, cooperative, no apparent distress, and appears stated age    EYES: PERRLA, EOMI    LUNGS:  No increased work of breathing, no audible wheezing    CARDIOVASCULAR:  regular rate and rhythm    ABDOMEN:  Soft non tender non distended     EXTREMITIES: no signs of clubbing or cyanosis. MUSCULOSKELETAL: negative for flaccid muscle tone or spastic movements. SKIN: gross examination reveals no signs of rashes, or diaphoresis. NEURO: Cranial nerves II-XII grossly intact. No signs of agitated mood.        Assessment/Plan:    LBP BLE pain for L4-5 IRENE

## 2020-11-17 NOTE — OP NOTE
2020    Patient: Merlin Major. :  1960  Age:  61 y.o. Sex:  male     PRE-OPERATIVE DIAGNOSIS: Lumbar disc displacement, lumbar radiculopathy. POST-OPERATIVE DIAGNOSIS: Same. PROCEDURE: Fluoroscopic guided therapeutic lumbar epidural steroid injection at the L4-5 level (# 1). SURGEON: GARTH Lentz. ANESTHESIA: local    ESTIMATED BLOOD LOSS: None.  __________________________________________________    BRIEF HISTORY:  Merlin Major. comes in today for first lumbar epidural injection at L4-5 level. The potential complications of this procedure were discussed with him again today. He has elected to undergo the aforementioned procedure. Loi complete History & Physical examination were reviewed in depth, a copy of which is in the chart. DESCRIPTION OF PROCEDURE:    After confirming written and informed consent, a time-out was performed and Loi name and date of birth, the procedure to be performed as well as the plan for the location of the needle insertion were confirmed. The patient was brought into the procedure room and placed in the prone position on the fluoroscopy table. A pillow was placed under the patient's lower abdomen/upper pelvis to increase lumbar interlaminar space. Standard monitors were placed, and vital signs were observed throughout the procedure. The area of the lumbar spine was prepped with chloraprep and draped in a sterile manner. The L4-5 interspace was identified and marked under AP fluoroscopy. The skin and subcutaneous tissues at the above level were anesthestized with 0.5% lidocaine. With intermittent fluoroscopy, an # 18 gauge 3 1/2 tuohy epidural needle was inserted and directed toward the interlaminar space. The needle was slowly advanced using loss of resistance technique and 5 cc glass syringe until the tip of the epidural needle has passed through the ligamentum flavum and entered the epidural space.  AP and lateral fluoroscopic imaging was performed to verify that the epidural needle was properly placed. Negative aspiration of blood and CSF was confirmed. Two ml of Omnipaque 240 was used for confirmation of even epidural spread under both live lateral and live AP fluoroscopy. After negative aspiration, a solution of 0.5 % Lidocaine 3 ml and 40 mg DepoMedrol was easily injected. The needle was gently removed intact . The patient's back was cleaned and a Band-Aid was placed over the needle insertion point. Disposition the patient tolerated the procedure well and there were no complications . Vital signs remained stable throughout the procedure other than a low sp02=88 throughout the procedure. The patient was escorted to the recovery area where they remained until discharge and written discharge instructions for the procedure were given. Upon evaluation in the recovery room, pt's coloring was noted to be sallow (no scleral icterus), conjunctiva appeared to be pale, sp02 pre-op was 91% (prior procedures was 95%) but sp02 did not recover following the procedure. Patient denies any symptoms other than back pain and back spasms. Denies CP, SOB, N/V, HA, dizziness. Spoke to patient and wife stating I am concerned something undiagnosed is going on with the patient. Wife states she is also fearful of this and she has been concerned about him for some time. Upon eval of patient, he does appear to have some slight difficulty breathing and his face/neck appear edemetous to me. I discussed with patient and wife I am sending him to the ER for further evaluation. I called the ER and spoke to Dr. Duran Hanson about his history with me as well as his medical history (including of the repaired AAA) and gave her report of what is going on with Jennifer Dov Mena. Plan: Rayshawn Franco will return to our pain management center as scheduled.      Ken Lewis,

## 2020-11-17 NOTE — H&P
[facility] History and Physical    Admission Date  11/17/2020  1:13 PM  Chief Complaint Shortness of breath  Admit Dx   Acute respiratory failure with hypoxia (HCC) [J96.01]    Subjective  History of Present Illness  Malvina Gosselin is a 80-year-old male with past medical history pertinent for chronic back pain follows with physical medicine and rehab, NIDDM, CAD, hyperlipidemia, hypertension, obesity, obstructive sleep apnea, continued tobacco abuse, and likely underlying COPD who has been dealing with shortness of breath over the past 2 to 3 weeks. Throughout that time, the patient's symptoms have progressed a bit to the point where he was relatively dyspneic on exertion. Cough is persistent but nonproductive. The patient has had no fevers or chills. He has not sought outpatient care, does not typically follow with a physician. Patient was getting an epidural injection, noted to be short of breath, and recommended to come here. Additionally, we were called by the facility where the patient got his injections and informed that he was found to be thrombocytopenic with platelet count 60 and they typically do not perform the injections with platelets under 169. They recommend neurochecks every hour with recommendations to monitor for neurologic changes of the lower extremities.     Review of Systems - 12-point review of systems has been reviewed and is otherwise negative except as listed in the HPI    Past Medical History:   Diagnosis Date    Anticoagulant long-term use     Arthritis     CAD (coronary artery disease)     Chronic back pain     Depression     Dyslipidemia     Hiatal hernia 1979    History of ST elevation myocardial infarction (STEMI)     Hyperlipidemia     Hypertension     Low back pain     Lumbar radiculopathy 8/14/2019    Obesity     MANOLO on CPAP     Presence of stent in left circumflex coronary artery     Presence of stent in right coronary artery     Smoker     Type 2 diabetes mellitus without complication [facility])      Past Surgical History:   Procedure Laterality Date    ABDOMINAL AORTIC ANEURYSM REPAIR, ENDOVASCULAR N/A 6/28/2019    ENDOVASCULAR REPAIR ABDOMINAL AORTIC ANEURYSM performed by Richmond Macias MD at [address] [facility] 1 Bilateral 3/12/2020    BILATERAL L3,L4,L5 MEDIAL BRANCH NERVE BLOCK performed by Linus Adams DO at [address] [facility] 1 Bilateral 6/11/2020    BILATERAL L3,L4,L5 MEDIAL NERVE BRANCH BLOCK #2 performed by Linus Adams DO at [address] [facility] PLACEMENT  X4    CORONARY ANGIOPLASTY WITH STENT PLACEMENT  12/30/2011    Dr. Bahman Marie 1st OM 3.0 x 20 Ion    DIAGNOSTIC CARDIAC CATH LAB PROCEDURE  3/15/2005    [facility]. Mild to moderate CAD. Normal LV.  DIAGNOSTIC CARDIAC CATH LAB PROCEDURE  1/16/2007    [facility]. Cath/PTCA/DE stent of proximal and distal RCA.  DIAGNOSTIC CARDIAC CATH LAB PROCEDURE  2/21/2007    Cath/DE stent of proximal OM1 and proximal Cx.  DIAGNOSTIC CARDIAC CATH LAB PROCEDURE  12/30/2011    ANURADHA of mid OM branch of circumflex.  ECHO COMPL W DOP COLOR FLOW  12/30/2011         HERNIA REPAIR  2/2014    laparoscopic ventral hernia repain    JOINT REPLACEMENT Left 4/1/14    TKA-ed    JOINT REPLACEMENT Right 2018    KNEE    PAIN MANAGEMENT PROCEDURE Right 9/1/2020    RIGHT L3,4,5 MEDIAL BRANCH RADIOFREQUENCY ABLATION performed by Linus Adams DO at [facility] N/A 11/17/2020    L4-5 EPIDURAL STEROID INJECTION #1 performed by Linus Adams DO at [address] [facility] OFFICE/OUTPT VISIT,PROCEDURE ONLY Right 11/26/2018    RIGHT KNEE TOTAL ARTHROPLASTY performed by Sven Lauren DO at [address]     Prior to Admission medications    Medication Sig Start Date End Date Taking?  Authorizing Provider   pioglitazone (ACTOS) 15 MG tablet TAKE 1 TABLET BY MOUTH EVERY DAY 11/6/20   Malia Medina PA-C   pregabalin (LYRICA) 150 MG capsule Take 1 capsule by mouth 2 times daily for 30 days. 11/12/20 12/12/20  Inez Nicole DO   diclofenac (VOLTAREN) 75 MG EC tablet TAKE 1 TABLET BY MOUTH TWICE A DAY 10/7/20   Historical Provider, MD   tiZANidine (ZANAFLEX) 4 MG tablet Take 1 tablet by mouth 3 times daily as needed (spasms) 10/29/20 11/28/20  Inez Nicole DO   oxyCODONE-acetaminophen (PERCOCET) 7.5-325 MG per tablet Take 1 tablet by mouth 3 times daily as needed for Pain for up to 30 days. Intended supply: 30 days 11/12/20 12/12/20  Inez Nicole DO   quinapril (ACCUPRIL) 10 MG tablet Take 1 tablet by mouth daily 10/16/20   Merced Flores PA-C   metFORMIN (GLUCOPHAGE) 500 MG tablet TAKE 1 TABLET BY MOUTH TWICE A DAY WITH MEALS 10/8/20   Vasquez Campa DO   fluticasone (FLONASE) 50 MCG/ACT nasal spray 2 sprays by Nasal route daily 10/6/20   Merced Flores PA-C   omega-3 acid ethyl esters (LOVAZA) 1 g capsule take 1 capsule twice a day 8/14/20   Merced Flores PA-C   niacin (SLO-NIACIN) 500 MG extended release tablet take 1 tablet by mouth once daily 7/20/20   Vasquez Campa DO   clopidogrel (PLAVIX) 75 MG tablet TAKE 1 TABLET BY MOUTH EVERY DAY 7/6/20   Merced Flores PA-C   sildenafil (VIAGRA) 50 MG tablet Take 1 tablet by mouth as needed for Erectile Dysfunction 7/6/20   Merced Flores PA-C   aspirin 325 MG EC tablet TAKE 1 TABLET BY MOUTH EVERY DAY 5/7/20   Johnathan Matias DO   Tens Unit MISC by Does not apply route 5/7/19   TRUDY Shaver   atorvastatin (LIPITOR) 80 MG tablet TAKE 1 TABLET BY MOUTH AT BEDTIME 10/22/18   Merced Flores PA-C   carvedilol (COREG) 25 MG tablet TAKE ONE TABLET BY MOUTH TWICE DAILY (WITH MEALS) 10/22/18   Merced Flores PA-C     Allergies  Bee venom    Social History   reports that he has been smoking cigarettes. He has a 9.75 pack-year smoking history. He has never used smokeless tobacco. He reports that he does not drink alcohol or use drugs.     Family History  family history includes Diabetes in his father and mother; No Known Problems in his sister; Stroke in his mother. Objective  Physical Exam   General: well-developed, well-nourished, no acute distress, cooperative  Skin: warm, dry, intact, normal color without cyanosis  HEENT: normocephalic, atraumatic, mucous membranes normal  Respiratory: coarse and diminished to auscultation bilaterally worse on left without respiratory distress  Cardiovascular: regular rate and rhythm without murmur / rub / gallop  Abdominal: obese, soft, nontender, nondistended, normoactive bowel sounds  Extremities: no mottling, pulses intact, no edema  Neurologic: awake, alert, no focal deficits  Psychiatric: normal affect, cooperative    Labs  Recent Labs     11/17/20  1400      K 5.1*   CO2 29   BUN 36*   CREATININE 1.2   GLUCOSE 135*   CALCIUM 9.1   WBC 8.2   RBC 2.92*   HGB 7.8*   HCT 26.9*   PLT 60*     Radiology  CTA CHEST W CONTRAST   Final Result   1. New confluent opacities located in left upper lobe suggesting pneumonia,   atelectasis, or neoplasm. 2.  Patchy airspace opacities located in lingula suggesting pneumonia with   areas of atelectasis. 3.  Stenosis and occlusion are associated with left upper lobe segmental   bronchi and lingular segmental bronchi. 4.  Moderate to large left pleural effusion. 5.  Mediastinal lymphadenopathy. 6.  Stable fusiform infrarenal abdominal aortic aneurysm with endograft   repair. Aneurysm measures up to 6.4 cm. No evidence of endoleak or   retroperitoneal fluid. 7.  Diverticulosis. CTA ABDOMEN PELVIS W CONTRAST   Final Result   1. New confluent opacities located in left upper lobe suggesting pneumonia,   atelectasis, or neoplasm. 2.  Patchy airspace opacities located in lingula suggesting pneumonia with   areas of atelectasis. 3.  Stenosis and occlusion are associated with left upper lobe segmental   bronchi and lingular segmental bronchi. 4.  Moderate to large left pleural effusion.    5.  Mediastinal lymphadenopathy. 6.  Stable fusiform infrarenal abdominal aortic aneurysm with endograft   repair. Aneurysm measures up to 6.4 cm. No evidence of endoleak or   retroperitoneal fluid. 7.  Diverticulosis. Assessment / Plan  Acute hypoxic resp fail d/t CAP - does not meet sepsis criteria. SOB x2 weeks no fevers or chills. RFA negative (including COVID) in ED. CTA reviewed; L-sided pna generally inconsistent w COVID. Check sputum cx, Strep and Legionella antigens. Azithro / Rocephin given in ED; continue for now. Nebs, incentive spirometer, also IV Solumedrol as pt likely has underlying COPD. Does not follow w pulmonology and will consult    Thrombocytopenia - baseline ~180; 60 today. Likely d/t pna. Follow on labs. NIDDM - hold home orals, start BG checks and ISS ACHS. Target -180. Diabetic diet. Hypoglycemic protocol. Check a1c. Would expect some decompensation from steroids    Chronic back pain s/p epidural injection 11/17 - given with thrombocytopenia; ED staff has spoken w facility and they recommend q1h neuro checks to assess for LE neurologic changes that would suggest bleed.     CAD / stents - continue ASA, Plavix, statin    Tobacco abuse - nicotine patch    Code status  Full  DVT prophylaxis Lovenox  Disposition  Home    Electronically signed by Merlin Bleacher, DO on 11/17/2020 at 5:14 PM

## 2020-11-18 NOTE — PROGRESS NOTES
Patient seen and evaluated with Dr. Brett So. Intubated and sedated. Spoke to [facility]. Reports that he was moving his legs earlier today when he was agitated. Recommend CT lumbar spine to r/o epidural hematoma as we are unable to perform Q 1 hour neuro checks on him. Dr. Valarie Pedro to discuss with Dr. Kelly Bucio.

## 2020-11-18 NOTE — PROGRESS NOTES
Date: 11/17/2020    Time: 10:21 PM    Patient Placed On BIPAP/CPAP/ Non-Invasive Ventilation? Yes    If no must comment. Facial area red/color change? No           If YES are Blister/Lesion present? No   If yes must notify nursing staff  BIPAP/CPAP skin barrier?   Yes    Skin barrier type:mepilexlite       Comments:        Jeferson Ayala

## 2020-11-18 NOTE — PROGRESS NOTES
RRT called at approximately 7:30 am for patient who had been admitted 11/17/20 with acute hypoxic respiratory failure secondary to CAP. He was COVID negative. He does not have the official dx of COPD, however he does have a 39 pack year smoking history. He also has MANOLO, obesity, DM2 (A1C 7.7), CAD, HTN and HLD     Patient had increased work of breathing through the night and did require the addition of BiPAP to keep his O2 saturation above 90%. The night physician was notified of increased work of breathing, anxiousness and patient trying to remove BiPAP at 06:42. At that time the patient was placed on AVAPS and ABGs were performed. RRT called due to worsening respiratory status despite AVAPS. Upon arrival to the room, the patient is sitting straight up in the bed. He is using accessory muscles to breathe and cannot talk due to work of breathing. He is oriented to person and place. He denied chest pain. His BP was in the 130s/80s, and HR 72.  Pulse ox was at 98% with the AVAPS. There were significantly reduced breath sounds bilaterally with poor air movement. Normal S1/S2. Monitor showed NSR. Review of chart reveals improvement in the patient's pH but worsening CO2 and O2 levels with change from BiPAP to AVAPS :  ABG:  Results for Helio Gonzales (MRN [id number]) as of 11/18/2020 08:20   Ref.  Range 11/18/2020 07:13 11/18/2020 07:24 11/18/2020 07:49   Meter Glucose Latest Ref Range: 74 - 99 mg/dL  170 (H)    Source: Unknown Blood Arterial  Blood Arterial   pH, Blood Gas Latest Ref Range: 7.350 - 7.450  7.207 (LL)  7.213 (L)   PCO2 Latest Ref Range: 35.0 - 45.0 mmHg 81.7 (HH)  82.1 (HH)   pO2 Latest Ref Range: 75.0 - 100.0 mmHg 97.1  84.3   HCO3 Latest Ref Range: 22.0 - 26.0 mmol/L 31.7 (H)  32.3 (H)   Base Excess Latest Ref Range: -3.0 - 3.0 mmol/L 2.5  3.2 (H)   O2 Sat Latest Ref Range: 92.0 - 98.5 % 95.9  94.6   tHb (est) Latest Ref Range: 11.5 - 16.5 g/dL 9.2 (L)  9.2 (L)   O2Hb Latest Ref Range: 94.0 - 97.0 % 94.1  92.8 (L)   COHb Latest Ref Range: 0.0 - 1.5 % 1.6 (H)  1.6 (H)   MetHb Latest Ref Range: 0.0 - 1.5 % 0.3  0.3   HHb Latest Ref Range: 0.0 - 5.0 % 4.0  5.3 (H)   O2 Content Latest Units: mL/dL 12.3  12.1   AaDO2 Latest Units: mmHg 509.2  521.6   RI(T) Latest Units: % 524  619   FIO2 Latest Units: % 100.0  100.0   PO2/FIO2 Latest Units: mmHg/% 0.97  0.84   Pt Temp Latest Units: C 37.0  37.0   Critical(s) Notified Unknown Called to Michael Mercado RN, Dr present at Team/RRT   Time Analyzed Unknown [id number]  0749   Mode Unknown BIPAP 15/5  AVAPS   Peep/Cpap Latest Units: cmH2O   10.0   Vt Mechanical Latest Units: mL   500.0   Rr Mechanical Latest Units: b/min 12.0  20.0     CXR:      EXAMINATION:    ONE XRAY VIEW OF THE CHEST    11/18/2020 6:39 am    COMPARISON:    CT chest November 17, 2020. HISTORY:    ORDERING SYSTEM PROVIDED HISTORY: SOB    TECHNOLOGIST PROVIDED HISTORY:    Reason for exam:->SOB    FINDINGS:    The cardiac silhouette is within normal limits in size.  There is masslike    consolidation of the left upper lobe. Dewitte Peto is a small to moderate left    dependent pleural effusion. Dewitte Peto is no visible pneumothorax.  The pulmonary    vessels are within normal limits.         Impression    Left upper lobe masslike consolidation. Small to moderate left pleural effusion. I discussed the case with Dr. Jimmy Lobato with pulmonary critical care and she agreed that the patient would need intubation. The patient had stable vital signs and was moved to the ICU. Anesthesia was called for intubation. Critical care time:  28 minutes.

## 2020-11-18 NOTE — PROGRESS NOTES
Notified Dr Joanna Mendez of pt struggling to breathe, anxious, keeps taking BIPAP off. POX is 96% but using accessory muscles. Orders received.

## 2020-11-18 NOTE — ED PROVIDER NOTES
Follow-up physician ER note; asked to recheck the patient seemingly having more shortness of breath. Patient ordered IV Lasix and placed on BiPAP. Color is improving. COVID-19 test came back negative.

## 2020-11-18 NOTE — PLAN OF CARE
Problem: Falls - Risk of:  Goal: Will remain free from falls  Description: Will remain free from falls  11/18/2020 0246 by Sabine Ramsey RN  Outcome: Met This Shift  11/17/2020 1802 by Tariq Marina RN  Outcome: Met This Shift  Goal: Absence of physical injury  Description: Absence of physical injury  11/18/2020 0246 by Sabine Ramsey RN  Outcome: Met This Shift  11/17/2020 1802 by Tariq Marina RN  Outcome: Met This Shift     Problem: Skin Integrity:  Goal: Will show no infection signs and symptoms  Description: Will show no infection signs and symptoms  11/18/2020 0246 by Sabine Ramsey RN  Outcome: Met This Shift  11/17/2020 1802 by Tariq Marina RN  Outcome: Met This Shift  Goal: Absence of new skin breakdown  Description: Absence of new skin breakdown  11/18/2020 0246 by Sabine Ramsey RN  Outcome: Met This Shift  11/17/2020 1802 by Tariq Marina RN  Outcome: Met This Shift     Problem: Pain:  Description: Pain management should include both nonpharmacologic and pharmacologic interventions.   Goal: Pain level will decrease  Description: Pain level will decrease  11/18/2020 0246 by Sabine Ramsey RN  Outcome: Not Met This Shift  11/17/2020 1802 by Tariq Marina RN  Outcome: Met This Shift  Goal: Control of acute pain  Description: Control of acute pain  11/18/2020 0246 by Sabine Ramsey RN  Outcome: Met This Shift  11/17/2020 1802 by Tariq Marina RN  Outcome: Met This Shift  Goal: Control of chronic pain  Description: Control of chronic pain  11/18/2020 0246 by Sabine Ramsey RN  Outcome: Not Met This Shift  11/17/2020 1802 by Tariq Marina RN  Outcome: Met This Shift     Problem: Gas Exchange - Impaired  Goal: Able to breathe comfortably  Description: Able to breathe comfortably  11/18/2020 0246 by Sabine Ramsey RN  Outcome: Not Met This Shift  11/17/2020 1802 by Tariq Marina RN  Outcome: Ongoing     Problem: Fluid and Electrolyte Imbalance  Goal: Fluid and electrolyte balance are

## 2020-11-18 NOTE — PROGRESS NOTES
[facility]) Hospitalist Progress Note    Admission Date         11/17/2020  1:13 PM  Chief Complaint        Shortness of breath  Admit Dx                    Acute respiratory failure with hypoxia ([facility] [address] [J96.01]     Subjective  History of Present Illness  Pawan Kramer is a 80-year-old male with past medical history pertinent for chronic back pain follows with physical medicine and rehab, NIDDM, CAD, hyperlipidemia, hypertension, obesity, obstructive sleep apnea, continued tobacco abuse, and likely underlying COPD who has been dealing with shortness of breath over the past 2 to 3 weeks. Throughout that time, the patient's symptoms have progressed a bit to the point where he was relatively dyspneic on exertion. Cough is persistent but nonproductive. The patient has had no fevers or chills. He has not sought outpatient care, does not typically follow with a physician. Patient was getting an epidural injection, noted to be short of breath, and recommended to come here. Additionally, we were called by the facility where the patient got his injections and informed that he was found to be thrombocytopenic with platelet count 60 and they typically do not perform the injections with platelets under 485. They recommend neurochecks every hour with recommendations to monitor for neurologic changes of the lower extremities.     Interim History  Overnight, pt had required BiPAP though had issues with compliance and increased work of breathing. This culminated in a rapid response this morning with pt being transferred to ICU. ABGs show acidosis / hypoxia / hypercapnia despite NIPPV including AVAPS overnight with patient just having been endotracheally intubated.     Review of Systems - unable to obtain    Objective  Physical Exam  Vitals: /69   Pulse 72   Temp 97.5 °F (36.4 °C) (Axillary)   Resp 20   Ht 5' 11\" (1.803 m)   Wt (!) 323 lb 6.4 oz (146.7 kg)   SpO2 94%   BMI 45.11 kg/m²   General: well-developed, well-nourished, no acute distress, cooperative  Skin: warm, dry, intact, normal color without cyanosis  HEENT: normocephalic, atraumatic, mucous membranes normal; ET and NG tubes in place  Respiratory: extremely diminished / mechanical breath sounds  Cardiovascular: regular rate and rhythm without murmur / rub / gallop  Abdominal: obese, soft, nontender, nondistended, normoactive bowel sounds  Extremities: no mottling, pulses intact, no edema  Neurologic: intubated / sedated  Psychiatric: unable to assess    Assessment / Plan  Acute hypoxic resp fail d/t CAP - does not meet sepsis criteria. SOB x2 weeks no fevers or chills. RFA negative (including COVID) in ED. CTA reviewed; L-sided pna generally inconsistent w COVID. Check sputum cx, Strep and Legionella antigens. Azithro / Rocephin given in ED; continue for now. Nebs, incentive spirometer, also IV Solumedrol as pt likely has underlying COPD. Does not follow w pulmonology and will consult. S/p RRT this AM for resp distress; pt moved to ICU ABG showing acidosis / hypoxia / hypercapnia with increased work of breathing and pt s/p intubation AM of 11/18    Thrombocytopenia - baseline ~180; 60 in ED and 80 today. Likely d/t pna. Follow on labs.     NIDDM - hold home orals, start BG checks and ISS ACHS. Target -180. Diabetic diet. Hypoglycemic protocol. Check a1c - 7.7%.   Would expect some decompensation from steroids     Chronic back pain s/p epidural injection 11/17 - given with thrombocytopenia; ED staff has spoken w facility and they recommend q1h neuro checks to assess for LE neurologic changes that would suggest bleed.     CAD / stents - continue ASA, Plavix, statin     Tobacco abuse - nicotine patch     Code status               Full  DVT prophylaxis       Lovenox  Disposition                Home    Electronically signed by Sarah Longo DO on 11/18/2020 at 1:22 PM

## 2020-11-18 NOTE — PROGRESS NOTES
Spoke with Dr. Jordin Murphy about my concern for inability to do neuro checks, while patient intubated and sedated, in order to r/o epidural hematoma. Dr. Jordin Murphy will order lumbar CT scan to r/o epidural hematoma now that patient is stable and comfortably sedated.     Tanna Breen 27-Sep-2019 22:45

## 2020-11-18 NOTE — CONSULTS
Pulmonary Consultation    Admit Date: 11/17/2020  Requesting Physician: Isi Santos PA-C    CC:  Community-acquired pneumonia  HPI:  61years old obese male, heavy smoker who was sent to ER for an outpatient procedure unit because increased shortness of breath and cough. Patient was evaluated in ER and started antibiotics with analysis of community-acquired pneumonia. The patient was initially medically floor and pulmonary consult was requested. Because of progressive respiratory distress and hypoxemia the decision was to transfer the patient to intensive care units and proceed with endotracheal tube placement and full mechanical ventilatory support. After evaluation by intensivist the decision was to proceed with bedside bronchoscopy due to left upper lobe collapse. Findings the bronchoscopy show a very narrow left upper lobe most likely due to extrinsic compression no major mucous plug seen erythema noticed sample for microbiology and cytology were obtained. At this time we are able to obtain information from the patient directly due to patient current medical condition, patient is fully sedated on mechanical ventilatory support.     PMH:    Past Medical History:   Diagnosis Date    Anticoagulant long-term use     Arthritis     CAD (coronary artery disease)     Chronic back pain     Depression     Dyslipidemia     Hiatal hernia 1979    History of ST elevation myocardial infarction (STEMI)     Hyperlipidemia     Hypertension     Low back pain     Lumbar radiculopathy 8/14/2019    Obesity     MANOLO on CPAP     Presence of stent in left circumflex coronary artery     Presence of stent in right coronary artery     Smoker     Type 2 diabetes mellitus without complication (HCC)      PSH:   Past Surgical History:   Procedure Laterality Date    ABDOMINAL AORTIC ANEURYSM REPAIR, ENDOVASCULAR N/A 6/28/2019    ENDOVASCULAR REPAIR ABDOMINAL AORTIC ANEURYSM performed by Devin Sequeira MD at [address] [place] [address]  ANESTHESIA NERVE BLOCK Bilateral 3/12/2020    BILATERAL L3,L4,L5 MEDIAL BRANCH NERVE BLOCK performed by Korey Bowles DO at [address] Bilateral 6/11/2020    BILATERAL L3,L4,L5 MEDIAL NERVE BRANCH BLOCK #2 performed by Korey Bowles DO at [address] [place] [address] N/A 11/18/2020    BRONCHOSCOPY/TRANSBRONCHIAL NEEDLE BIOPSY performed by Pieter Majano MD at [facility]  X4    CORONARY ANGIOPLASTY WITH STENT PLACEMENT  12/30/2011    Dr. Tamy Massey 1st OM 3.0 x 20 Ion    DIAGNOSTIC CARDIAC CATH LAB PROCEDURE  3/15/2005    [facility]. Mild to moderate CAD. Normal LV.  DIAGNOSTIC CARDIAC CATH LAB PROCEDURE  1/16/2007    [facility]. Cath/PTCA/DE stent of proximal and distal RCA.  DIAGNOSTIC CARDIAC CATH LAB PROCEDURE  2/21/2007    Cath/DE stent of proximal OM1 and proximal Cx.  DIAGNOSTIC CARDIAC CATH LAB PROCEDURE  12/30/2011    ANURADHA of mid OM branch of circumflex.  ECHO COMPL W DOP COLOR FLOW  12/30/2011         HERNIA REPAIR  2/2014    laparoscopic ventral hernia repain    JOINT REPLACEMENT Left 4/1/14    TKA-ed    JOINT REPLACEMENT Right 2018    KNEE    PAIN MANAGEMENT PROCEDURE Right 9/1/2020    RIGHT L3,4,5 MEDIAL BRANCH RADIOFREQUENCY ABLATION performed by Korey Bowles DO at [address] N/A 11/17/2020    L4-5 EPIDURAL STEROID INJECTION #1 performed by Korey Bowles DO at [facility] OFFICE/OUTPT VISIT,PROCEDURE ONLY Right 11/26/2018    RIGHT KNEE TOTAL ARTHROPLASTY performed by Annie Valencia DO at [facility] OR          Respiratory ROS: positive for - shortness of breath Otherwise, a complete review of systems is undertaken and is negative.     Social History:  · Alcohol:   Social drinker History of Alcohol abuse  · Tobacco: ppd x 40 yrs  · Employment: no silica or asbestos exposure  Medications:     dextrose      fentaNYL 5 mcg/ml in 0.9%  ml infusion 200 mcg/hr (11/18/20 1352)    midazolam 7 mg/hr (20 1348)      atorvastatin  80 mg Oral Nightly    [Held by provider] clopidogrel  75 mg Oral Daily    fluticasone  2 spray Nasal Daily    pregabalin  150 mg Oral BID    sodium chloride flush  10 mL Intravenous 2 times per day    enoxaparin  40 mg Subcutaneous Daily    insulin glargine  0.25 Units/kg Subcutaneous Nightly    nicotine  1 patch Transdermal Daily    cefTRIAXone (ROCEPHIN) IV  2 g Intravenous Q24H    ipratropium-albuterol  1 ampule Inhalation Q4H    chlorhexidine  15 mL Mouth/Throat BID    pantoprazole  40 mg Intravenous Daily    And    sodium chloride (PF)  10 mL Intravenous Daily    methylPREDNISolone  40 mg Intravenous Q8H    levofloxacin  750 mg Intravenous Q24H    aspirin  325 mg Oral Daily    insulin lispro  0-12 Units Subcutaneous Q6H         Vitals:  Tmax:  VITALS:  BP 95/62   Pulse 73   Temp 97.8 °F (36.6 °C) (Axillary)   Resp 20   Ht 5' 11\" (1.803 m)   Wt (!) 316 lb 12.8 oz (143.7 kg)   SpO2 96%   BMI 44.18 kg/m²   24HR INTAKE/OUTPUT:      Intake/Output Summary (Last 24 hours) at 2020 1555  Last data filed at 2020 1400  Gross per 24 hour   Intake 1754 ml   Output 3070 ml   Net -1316 ml     CURRENT PULSE OXIMETRY:  SpO2: 96 %  24HR PULSE OXIMETRY RANGE:  SpO2  Av.6 %  Min: 85 %  Max: 100 %        EXAM:  General: No distress. Alert. Sedated. Obese  Eyes: PERRL. No sclera icterus. No conjunctival injection. ENT: No discharge. Pharynx clear. Endotracheal tube is in place  Neck: Trachea midline. Normal thyroid. Thick  Resp: No accessory muscle use. No crackles. Bronchial sounds noticed left more than right  CV: Regular rate. Regular rhythm. No mumur or rub. ABD: Non-tender. Non-distended. No masses. No organmegaly. Normal bowel sounds. Fatty, soft  Skin: Warm and dry. No nodule on exposed extremities. No rash on exposed extremities. Lymph: No cervical LAD. No supraclavicular LAD. Ext: No joint deformity. No clubbing.  No cyanosis. 1+ edema  Neuro: . Positive pupils/gag/corneals. Normal pain response. Lab Results:  CBC:   Recent Labs     11/17/20  1400 11/18/20  0211   WBC 8.2 10.8   HGB 7.8* 8.5*   HCT 26.9* 29.6*   MCV 92.1 92.5   PLT 60* 80*     BMP:   Recent Labs     11/17/20  1400 11/18/20  0211    137   K 5.1* 4.5    98   CO2 29 31*   BUN 36* 31*   CREATININE 1.2 1.1      ALB:3,BILIDIR:3,BILITOT:3,ALKPHOS:3)@  PT/INR: No results for input(s): PROTIME, INR in the last 72 hours. Cultures:  -    ABG: noted  Films:  CT : Left upper lobe collapse      Assessment:  · Acute on chronic respiratory failure  · Community-acquired pneumonia  · History heavy smoking and morbid obesity, cannot rule out underlying COPD, cannot rule out MANOLO      Plan:  · Agree with current plan. · IV fluids  · Antibiotic  · Cultures  · Bronchodilators and IV steroids  · Follow results of bronchoscopy  · Vent management as per CCM  · Discussed with CCM team      Thanks for letting us see this patient in consultation. Please contact us with any questions.     Francisco Javier Stewart M.D., F.C.C.P.                    pulp

## 2020-11-18 NOTE — PROGRESS NOTES
Pt called for assistance, stating he could not breathe. Returned BIPAP to 100% O2; pt seems to be less anxious and says that helped. Notified RT for breathing tx. Previously, during PO medication administration, attempted to place pt on nasal cannula only to have POX desat fairly quickly to 82%. Returned to BIPAP.

## 2020-11-18 NOTE — PATIENT CARE CONFERENCE
Intensive Care Daily Quality Rounding Checklist      ICU Team Members: Dr. Sumi Thornton, Pily Baptiste & Myles (residents), clinical pharmacist, charge nurse, bedside nurse, respiratory therapist    ICU Day #: NUMBER: 1    Intubation Date: November 18    Ventilator Day #: NUMBER: 1    Central Line Insertion Date: N/A        Day #: N/A     Arterial Line Insertion Date: N/A      Day #: N/A    DVT Prophylaxis: Lovenox    GI Prophylaxis: Protonix    Black Catheter Insertion Date: November 17       Day #: 2      Continued need (if yes, reason documented and discussed with physician): yes, need for accurate I+O's    Skin Issues/ Wounds and ordered treatment discussed on rounds: no issues    Goals/ Plans for the Day: Daily labs, wean vent as able, bronchoscopy, soft wrist restraints to prevent pulling tubes, add Versed drip

## 2020-11-18 NOTE — ANESTHESIA PROCEDURE NOTES
Airway  Date/Time: 11/18/2020 8:58 AM  Urgency: urgent    Difficult airway    General Information and Staff    Patient location during procedure: ICU  Anesthesiologist: Marlee Robert MD  Performed: anesthesiologist     Consent for Airway (if performed for an anesthetic, see related documentation for consents)  Consent: Verbal consent obtained.   Consent given by: patient      Indications and Patient Condition  Indications for airway management: hypercapnia and respiratory distress  Spontaneous ventilation: present  Sedation level: deep  Preoxygenated: yes  Patient position: sniffing  MILS not maintained throughout  Mask difficulty assessment: not attempted    Final Airway Details  Final airway type: endotracheal airway      Successful airway: ETT    Successful intubation technique: video laryngoscopy  Endotracheal tube insertion site: oral  Blade size: #4  ETT size (mm): 8.0  Cormack-Lehane Classification: grade I - full view of glottis  Placement verified by: chest auscultation and capnometry   Number of attempts at approach: 1  Ventilation between attempts: bag mask    Additional Comments  Propofol 100mg  Anectine 180mg  no

## 2020-11-18 NOTE — CARE COORDINATION
11/18/2020  Social Work Discharge Planning:COVID NEG. Pt is on a vent. IV ATB. Sw spoke to Pts spouse who informed they reside in a 2 story home with bed on the second floor. Prior to admission here spouse said they were going to go and stay with their son for awhile for assistance and being able to be on the main floor with no steps. Pt has used [facility] in the past and was using a cane up until a  couple of weeks ago in which he started using a ww. If PAVEL is needed spouse chose [facility] or [address] [place] [address]. Electronically signed by BRIEN Renae on 11/18/2020 at 9:19 AM

## 2020-11-18 NOTE — CONSULTS
or clear liquid status due to medical condition, intubation    Nutrition Interventions:   Food and/or Nutrient Delivery:  Continue NPO, Start Tube Feeding(TF recommendation: Diabetic 1.5 @ 60 ml/hr x 2 protein modulars daily)  Nutrition Education/Counseling:  No recommendation at this time   Coordination of Nutrition Care:  Continue to monitor while inpatient    Goals:  nutrition progression       Nutrition Monitoring and Evaluation:   Food/Nutrient Intake Outcomes:  Enteral Nutrition Intake/Tolerance  Physical Signs/Symptoms Outcomes:  Biochemical Data, GI Status, Fluid Status or Edema, Hemodynamic Status, Nutrition Focused Physical Findings, Skin, Weight     Discharge Planning:     Too soon to determine     Electronically signed by Anayeli Arechiga MS, RD, LD on 11/18/20 at 3:34 PM EST    Contact: [phone]

## 2020-11-18 NOTE — PROCEDURES
Bronchoscopy Procedure Note      Date of Procedure: 11/18/2020    Pre-op Diagnosis: left upper lobe atelectasis     Post-op Diagnosis: abnormal OMER airways    Bronchoscopist: Aba Haider MD    Assistants - bronchoscopy nurses     Anesthesia: versed and fentanyl infusions in ICU    Procedure: Flexible fiberoptic bronchoscopy, transbronchial needle aspiration left upper lobe     Estimated Blood Loss: Minimal    Complications: None    Procedure: The bronchoscope was introduced through the ETT and advanced throughout the tracheobronchial tree. The trachea was of normal caliber and the korina was normal.  The left upper lobe was significantly narrowed with abnormal erythematous mucosa. There is concern for extrinsic compression from tumor. Using a hutson needle transbronchial needle aspiration was completed from the left upper lobe. Three passes were completed and three samples obtained. The scope was withdrawn at the conclusion of the procedure. Patient tolerated the procedure well.     Start time: 13:25  End time: 13:38      Aba Haider MD  11/18/2020 1:39 PM

## 2020-11-18 NOTE — CONSULTS
Critical Care Admit/Consult Note         Patient - Germán Acosta. MRN -  [id number]   Acct # - [de-identified]   - 1960      Date of Admission -  2020  1:13 PM  Date of evaluation -  2020  0207/0207-A   Hospital Day - 1          ADMIT/CONSULT DETAILS     Reason for Admit/Consult   Acute hypoxic and hypercapnic respiratory failure    Consulting Service/Physician   Consulting - Merlin Bleacher, DO  Primary Care Physician - MARY ANNE Javier   The patient is a 61 y.o. male with significant past medical history of diabetes, MANOLO, back pain, hypertension, hyperlipidemia, STEMI's status post 5 stents, CAD, aortic aneurysm presenting to the the hospital initially on . He had a epidural performed yesterday for low back pain and after the procedure became very short of breath. He was satting 80% so was sent to the hospital for further evaluation. While in the ED patient was found to be thrombocytopenic as well as hypoxic on room air. He was placed on 4 L of oxygen but O2 sats improved. CTA of the chest demonstrating pneumonia and pleural effusions. He was given 1 dose of Rocephin and azithromycin while in the ER. Today,  RRT was called at 7:30 AM.  Patient was found to have increased work of breathing as well as hypercapnic on ABGs. He was on BiPAP all night with minimal improvement of his symptoms. During the RT he was switched to AVAPS. He was subsequently brought down to the ICU for further evaluation and care. While in the room patient was AOx4 and able to answer questions but was having increased work of breathing. We allowed patient to continue on AVAPS and plan for repeat ABGs. Repeat ABGs performed and demonstrate worsening of patient's respiratory acidosis and he was subsequently intubated for airway protection.       Past Medical History         Diagnosis Date    Anticoagulant long-term use     Arthritis     CAD (coronary artery disease)     Chronic back pain     Depression     Dyslipidemia     Hiatal hernia 1979    History of ST elevation myocardial infarction (STEMI)     Hyperlipidemia     Hypertension     Low back pain     Lumbar radiculopathy 8/14/2019    Obesity     MANOLO on CPAP     Presence of stent in left circumflex coronary artery     Presence of stent in right coronary artery     Smoker     Type 2 diabetes mellitus without complication [facility])         Past Surgical History           Procedure Laterality Date    ABDOMINAL AORTIC ANEURYSM REPAIR, ENDOVASCULAR N/A 6/28/2019    ENDOVASCULAR REPAIR ABDOMINAL AORTIC ANEURYSM performed by Magui Barber MD at [address] [place] [address] 1 Bilateral 3/12/2020    BILATERAL L3,L4,L5 MEDIAL BRANCH NERVE BLOCK performed by Gamaliel Hannon DO at [address] [place] [address] Bilateral 6/11/2020    BILATERAL L3,L4,L5 MEDIAL NERVE BRANCH BLOCK #2 performed by Gamaliel Hannon DO at [address] [place] [address] N/A 11/18/2020    BRONCHOSCOPY/TRANSBRONCHIAL NEEDLE BIOPSY performed by Lisa Boston MD at [place] [facility] STENT PLACEMENT  12/30/2011    Dr. García Sep 1st OM 3.0 x 20 Ion    DIAGNOSTIC CARDIAC CATH LAB PROCEDURE  3/15/2005    [facility]. Mild to moderate CAD. Normal LV.  DIAGNOSTIC CARDIAC CATH LAB PROCEDURE  1/16/2007    [facility]. Cath/PTCA/DE stent of proximal and distal RCA.  DIAGNOSTIC CARDIAC CATH LAB PROCEDURE  2/21/2007    Cath/DE stent of proximal OM1 and proximal Cx.  DIAGNOSTIC CARDIAC CATH LAB PROCEDURE  12/30/2011    ANURADHA of mid OM branch of circumflex.      ECHO COMPL W DOP COLOR FLOW  12/30/2011         HERNIA REPAIR  2/2014    laparoscopic ventral hernia repain    JOINT REPLACEMENT Left 4/1/14    TKA-ed    JOINT REPLACEMENT Right 2018    KNEE    PAIN MANAGEMENT PROCEDURE Right 9/1/2020    RIGHT L3,4,5 MEDIAL BRANCH RADIOFREQUENCY ABLATION performed by Gamaliel Hannon DO at [facility] PAIN MANAGEMENT PROCEDURE N/A 11/17/2020    L4-5 EPIDURAL STEROID INJECTION #1 performed by Ann Curran DO at [place] [address] OFFICE/OUTPT VISIT,PROCEDURE ONLY Right 11/26/2018    RIGHT KNEE TOTAL ARTHROPLASTY performed by Erick Griffin DO at [facility] OR       Current Medications   Current Medications    atorvastatin  80 mg Oral Nightly    [Held by provider] clopidogrel  75 mg Oral Daily    fluticasone  2 spray Nasal Daily    pregabalin  150 mg Oral BID    sodium chloride flush  10 mL Intravenous 2 times per day    enoxaparin  40 mg Subcutaneous Daily    insulin glargine  0.25 Units/kg Subcutaneous Nightly    nicotine  1 patch Transdermal Daily    cefTRIAXone (ROCEPHIN) IV  2 g Intravenous Q24H    ipratropium-albuterol  1 ampule Inhalation Q4H    chlorhexidine  15 mL Mouth/Throat BID    pantoprazole  40 mg Intravenous Daily    And    sodium chloride (PF)  10 mL Intravenous Daily    methylPREDNISolone  40 mg Intravenous Q8H    levofloxacin  750 mg Intravenous Q24H    aspirin  325 mg Oral Daily    insulin lispro  0-12 Units Subcutaneous Q6H     tiZANidine, sodium chloride flush, acetaminophen **OR** acetaminophen, polyethylene glycol, promethazine **OR** ondansetron, glucose, dextrose, glucagon (rDNA), dextrose, oxyCODONE-acetaminophen **AND** oxyCODONE, albuterol  IV Drips/Infusions   dextrose      fentaNYL 5 mcg/ml in 0.9%  ml infusion 200 mcg/hr (11/18/20 1352)    midazolam 7 mg/hr (11/18/20 1348)     Home Medications  Medications Prior to Admission: loratadine (CLARITIN) 10 MG capsule, Take 10 mg by mouth daily  pioglitazone (ACTOS) 15 MG tablet, TAKE 1 TABLET BY MOUTH EVERY DAY  pregabalin (LYRICA) 150 MG capsule, Take 1 capsule by mouth 2 times daily for 30 days.   diclofenac (VOLTAREN) 75 MG EC tablet, TAKE 1 TABLET BY MOUTH TWICE A DAY  tiZANidine (ZANAFLEX) 4 MG tablet, Take 1 tablet by mouth 3 times daily as needed (spasms)  oxyCODONE-acetaminophen (PERCOCET) 7.5-325 MG per tablet, Take 1 tablet by mouth 3 times daily as needed for Pain for up to 30 days. Intended supply: 30 days  quinapril (ACCUPRIL) 10 MG tablet, Take 1 tablet by mouth daily (Patient taking differently: Take 10 mg by mouth daily Wife states on hold)  metFORMIN (GLUCOPHAGE) 500 MG tablet, TAKE 1 TABLET BY MOUTH TWICE A DAY WITH MEALS (Patient taking differently: Wife states on hold)  fluticasone (FLONASE) 50 MCG/ACT nasal spray, 2 sprays by Nasal route daily  omega-3 acid ethyl esters (LOVAZA) 1 g capsule, take 1 capsule twice a day  niacin (SLO-NIACIN) 500 MG extended release tablet, take 1 tablet by mouth once daily  clopidogrel (PLAVIX) 75 MG tablet, TAKE 1 TABLET BY MOUTH EVERY DAY (Patient taking differently: Take 75 mg by mouth daily Has been on hold for procedure)  atorvastatin (LIPITOR) 80 MG tablet, TAKE 1 TABLET BY MOUTH AT BEDTIME  carvedilol (COREG) 25 MG tablet, TAKE ONE TABLET BY MOUTH TWICE DAILY (WITH MEALS)  sildenafil (VIAGRA) 50 MG tablet, Take 1 tablet by mouth as needed for Erectile Dysfunction  aspirin 325 MG EC tablet, TAKE 1 TABLET BY MOUTH EVERY DAY (Patient taking differently: Take 325 mg by mouth daily Has been on hold for procedure)  Tens Unit MISC, by Does not apply route    Diet/Nutrition   No diet orders on file    Allergies   Bee venom    Social History   Tobacco   reports that he has been smoking cigarettes. He started smoking about 42 years ago. He has a 39.00 pack-year smoking history. He has never used smokeless tobacco.    Alcohol     reports no history of alcohol use. Occupational history : Unknown    Family History         Problem Relation Age of Onset    Diabetes Mother     Stroke Mother     Diabetes Father     No Known Problems Sister        Sleep History   On CPAP at home    ROS   REVIEW OF SYSTEMS:  Please see HPI above. All bolded are positive. All un-bolded are negative.   Constitutional Symptoms:  fever, chills, fatigue, generalized weakness, diaphoresis, increase in thirst, loss of appetite  Eyes: vision change   Ears, Nose, Mouth, Throat: hearing loss, nasal congestion, sores in the mouth  Cardiovascular: chest pain, chest heaviness, palpitations  Respiratory: shortness of breath, wheezing, coughing  Gastrointestinal: abdominal pain, nausea, vomiting, diarrhea, constipation, melena, hematochezia, hematemesis  Genitourinary: dysuria, hematuria, or increase in frequency  Musculoskeletal: lower extremity edema, myalgias, arthralgias, back pain  Integumentary: rashes, itching   Neurological: headache, lightheadedness, dizziness, confusion, syncope, numbness, tingling, focal weakness  Psychiatric: depression, suicidal ideation, or anxiety  Endocrine: unintentional weight change  Hematologic/Lymphatic: lymphadenopathy, easy bruising, easy bleeding   Allergic/Immunologic: recurrent infections      Lines and Devices   Peripherals    Drains/Tubes/Outputs   ET tube, Black    Mechanical Ventilation Data   VENT SETTINGS (Comprehensive)  Vent Information  $Ventilation: $Initial Day  Equipment ID: [id number]  Vent Type: 840  Vent Mode: AC/VC  Vt Ordered: 500 mL  Rate Set: 20 bmp  Pressure Support: 0 cmH20  FiO2 : 80 %  SpO2: 96 %  SpO2/FiO2 ratio: 120  PEEP/CPAP: 10  I Time/ I Time %: 1 s  Humidification Source: Heated wire  Humidification Temp: 37  Humidification Temp Measured: 37  Mask Type: Full face mask  Mask Size: Medium  Additional Respiratory  Assessments  Pulse: 73  Resp: 20  SpO2: 96 %  Humidification Source: Heated wire  Humidification Temp: 37    ABG  Lab Results   Component Value Date    PH 7.404 11/18/2020    PCO2 46.4 11/18/2020    PO2 76.6 11/18/2020    HCO3 28.4 11/18/2020    O2SAT 96.0 11/18/2020     Lab Results   Component Value Date    MODE AC 11/18/2020         Vitals    height is 5' 11\" (1.803 m) and weight is 316 lb 12.8 oz (143.7 kg) (abnormal). His axillary temperature is 97.8 °F (36.6 °C). His blood pressure is 95/62 and his pulse is 73.  His respiration is 20 and oxygen saturation is 96%. Temperature Range: Temp: 97.8 °F (36.6 °C) Temp  Av.3 °F (36.8 °C)  Min: 97 °F (36.1 °C)  Max: 99.2 °F (37.3 °C)  BP Range:  Systolic (03JGN), DZN:511 , Min:68 , CNV:001     Diastolic (85YWL), OGL:39, Min:49, Max:106    Pulse Range: Pulse  Av  Min: 67  Max: 96  Respiration Range: Resp  Av.5  Min: 14  Max: 26  Current Pulse Ox[de-identified]  SpO2: 96 %  24HR Pulse Ox Range:  SpO2  Av.8 %  Min: 87 %  Max: 100 %  Oxygen Amount and Delivery: O2 Flow Rate (L/min): 14 L/min    I/O (24 Hours)    Patient Vitals for the past 8 hrs:   BP Temp Temp src Pulse Resp SpO2 Height Weight   20 1512 -- -- -- -- -- -- 5' \" (1.803 m) --   20 1500 95/62 -- -- 73 20 96 % -- --   20 1400 107/63 -- -- 73 19 94 % -- --   20 1300 112/69 -- -- 72 20 94 % -- --   20 1200 103/75 97.8 °F (36.6 °C) Axillary 70 20 96 % -- --   20 1100 93/62 -- -- 70 20 94 % -- --   20 1000 (!) 92/55 -- -- 67 16 90 % -- --   20 0900 (!) 68/49 -- -- 81 16 97 % 5' 11\" (1.803 m) (!) 316 lb 12.8 oz (143.7 kg)       Intake/Output Summary (Last 24 hours) at 2020 1611  Last data filed at 2020 1400  Gross per 24 hour   Intake 1754 ml   Output 3070 ml   Net -1316 ml     I/O last 3 completed shifts: In: 5222 [P. O.:60; I.V.:792; Blood:482; IV FOIFAYZXN:936]  Out: 1740 [Urine:2920; Emesis/NG output:150]   Date 20 0000 - 20   Shift 4066-4069 2483-9828 0602-0672 24 Hour Total   INTAKE   P.O.(mL/kg/hr) 60(0.1)   60   I. V.(mL/kg)  792(5.5)  792(5.5)   IV Piggyback(mL/kg)  150(1)  150(1)   Shift Total(mL/kg) 60(0.4) 942(6.6)  1002(7)   OUTPUT   Urine(mL/kg/hr) 1900(1.6) 450(0.4)  2350   Emesis/NG output(mL/kg)  150(1)  150(1)   Shift Total(mL/kg) 1900(13) 600(4.2)  2500(17.4)   Weight (kg) 146.7 143.7 143.7 143.7     Patient Vitals for the past 96 hrs (Last 3 readings):   Weight   20 0900 (!) 316 lb 12.8 oz (143.7 kg)   20 0130 (!) 323 lb 6.4 oz (146.7 kg)   11/17/20 1316 (!) 321 lb (145.6 kg)         Exam   PHYSICAL EXAM:  General: awake, alert, oriented to person, place, time, and purpose.   Moderate distress  Eyes: conjunctivae/corneas clear, sclera non icteric, EOMI  Ears: no obvious scars, no lesions, no masses, hearing intact  Mouth: mucous membranes moist, no obvious oral sores  Head: normocephalic, atraumatic  Neck: no JVD, no adenopathy, no thyromegaly, neck is supple, trachea is midline  Back: Puncture site over the lumbar region with bandage in place secondary to recent epidural  Chest: no pain on palpation  Lungs: Diminished in the bilateral bases and OMER  Heart: regular rate and regular rhythm  Abdomen: soft, obese, non-tender; bowel sounds normal; no masses, no organomegaly  Extremities: no lower extremity edema, extremities atraumatic, no cyanosis, no clubbing, 2+ pedal pulses palpated  Skin: normal color, normal texture, normal turgor, no rashes, no lesions  Neurologic: strength equal bilaterally, cranial nerves II-XII grossly intact    Data   Old records and images have been reviewed    Lab Results   CBC     Lab Results   Component Value Date    WBC 10.8 11/18/2020    RBC 3.20 11/18/2020    HGB 8.5 11/18/2020    HCT 29.6 11/18/2020    PLT 80 11/18/2020    MCV 92.5 11/18/2020    MCH 26.6 11/18/2020    MCHC 28.7 11/18/2020    RDW 16.6 11/18/2020    NRBC 5.0 11/17/2020    SEGSPCT 62 12/29/2011    LYMPHOPCT 15.0 11/17/2020    MONOPCT 13.0 11/17/2020    BASOPCT 0.0 11/17/2020    MONOSABS 1.07 11/17/2020    LYMPHSABS 1.23 11/17/2020    EOSABS 0.00 11/17/2020    BASOSABS 0.00 11/17/2020       BMP   Lab Results   Component Value Date     11/18/2020    K 4.5 11/18/2020    K 5.1 11/17/2020    CL 98 11/18/2020    CO2 31 11/18/2020    BUN 31 11/18/2020    CREATININE 1.1 11/18/2020    GLUCOSE 146 11/18/2020    GLUCOSE 120 12/31/2011    CALCIUM 8.9 11/18/2020       LFTS  Lab Results   Component Value Date    ALKPHOS 219 10/23/2020    ALT 77 10/23/2020 AST 42 10/23/2020    PROT 6.6 10/23/2020    BILITOT 0.2 10/23/2020    LABALBU 3.0 10/23/2020    LABALBU 4.5 12/28/2011       INR  No results for input(s): PROTIME, INR in the last 72 hours. APTT  No results for input(s): APTT in the last 72 hours. Lactic Acid  Lab Results   Component Value Date    LACTA 1.7 11/17/2020        BNP   No results for input(s): BNP in the last 72 hours. Cultures   No results for input(s): BC in the last 72 hours. No results for input(s): Amber Mike in the last 72 hours. No results for input(s): LABURIN in the last 72 hours. Radiology   11/18/2020  XR CHEST ABDOMEN NG PLACEMENT   Final Result   NG tube tip in the stomach. XR CHEST 1 VIEW   Final Result   1. The life-support lines and tubes are well positioned. 2. Masslike consolidative opacity in the left upper lung. Small left   effusion. XR CHEST PORTABLE   Final Result   Left upper lobe masslike consolidation. Small to moderate left pleural effusion. CTA CHEST W CONTRAST   Final Result   1. New confluent opacities located in left upper lobe suggesting pneumonia,   atelectasis, or neoplasm. 2.  Patchy airspace opacities located in lingula suggesting pneumonia with   areas of atelectasis. 3.  Stenosis and occlusion are associated with left upper lobe segmental   bronchi and lingular segmental bronchi. 4.  Moderate to large left pleural effusion. 5.  Mediastinal lymphadenopathy. 6.  Stable fusiform infrarenal abdominal aortic aneurysm with endograft   repair. Aneurysm measures up to 6.4 cm. No evidence of endoleak or   retroperitoneal fluid. 7.  Diverticulosis. CTA ABDOMEN PELVIS W CONTRAST   Final Result   1. New confluent opacities located in left upper lobe suggesting pneumonia,   atelectasis, or neoplasm. 2.  Patchy airspace opacities located in lingula suggesting pneumonia with   areas of atelectasis.    3.  Stenosis and occlusion are associated with left upper lobe segmental bronchi and lingular segmental bronchi. 4.  Moderate to large left pleural effusion. 5.  Mediastinal lymphadenopathy. 6.  Stable fusiform infrarenal abdominal aortic aneurysm with endograft   repair. Aneurysm measures up to 6.4 cm. No evidence of endoleak or   retroperitoneal fluid. 7.  Diverticulosis. XR CHEST PORTABLE    (Results Pending)         SYSTEMS ASSESSMENT    Neuro   Chronic back pain   -Epidural on 11/17    Sedated on the vent   -Versed, fentanyl    Respiratory   Acute hypoxic and hypercapnic respiratory failure   -Worsening ABG on AVAPS   -Intubated and ABGs improving   -Follow ABG   -Nebs, incentive spirometer   -Solu-Medrol 40    Community-acquired pneumonia   -Started on Rocephin and azithromycin--> which to Rocephin and Levaquin   -Strep and Legionella pending   -Respiratory culture pending   -Pro-Kael pending    Lung mass   -Status post bronc 11/18   -No obvious mucous plugging or purulence. Very erythematous and compressed airways, concern for mass   -Biopsies obtained secondary to patient being off of Plavix for greater than 1 week. Respiratory film array negative  Long, lifetime smoker--nicotine patch    Cardiovascular   CAD/stents   -Plavix is on hold for approximately 7 days per patient he had his epidural.  It has still been on hold secondary to thrombocytopenia in the ER. History of aortic aneurysm   -Status post endovascular repair   -Stable on most recent CT    Gastrointestinal   N.p.o.    Renal   No acute issues     Infectious Disease   Community-acquired pneumonia   -Rocephin and azithromycin x1 day   -Switch to Rocephin and Levaquin day 1   -Pro-Kael pending    -Sputum culture pending    Blood cultures pending    Hematology/Oncology   Thrombocytopenia   -60 yesterday in the ED. Given 1 unit of platelets. Improving today.    -Monitor    Endocrine   Diabetes   -Noninsulin-dependent   -Monitor sugars    msk   Chronic back pain   -Status post epidural on 11/17   -No multiple PCI, large AAA s/p endovascular repair  Patient's wife was updated  Discussed with Dr. Zarina Light    50 minutes of CCT spent with the patient, reviewing the chart including imaging studies, and discussing the case with other health care professionals. This time excludes procedures.      Melinda Chan MD

## 2020-11-19 NOTE — PROGRESS NOTES
Bubba Kiser MD at [address] [place] [address] 1 Bilateral 3/12/2020    BILATERAL L3,L4,L5 MEDIAL BRANCH NERVE BLOCK performed by Yomaira Quiroz DO at [address] [place] [address] 1 Bilateral 6/11/2020    BILATERAL L3,L4,L5 MEDIAL NERVE BRANCH BLOCK #2 performed by Yomaira Quiroz DO at [address] [place] [address] N/A 11/18/2020    BRONCHOSCOPY/TRANSBRONCHIAL NEEDLE BIOPSY performed by Jacobo Saldana MD at [address],[facility] STENT PLACEMENT  12/30/2011    Dr. Argelia Nickerson 1st OM 3.0 x 20 Ion    DIAGNOSTIC CARDIAC CATH LAB PROCEDURE  3/15/2005    [facility]. Mild to moderate CAD. Normal LV.  DIAGNOSTIC CARDIAC CATH LAB PROCEDURE  1/16/2007    [facility]. Cath/PTCA/DE stent of proximal and distal RCA.  DIAGNOSTIC CARDIAC CATH LAB PROCEDURE  2/21/2007    Cath/DE stent of proximal OM1 and proximal Cx.  DIAGNOSTIC CARDIAC CATH LAB PROCEDURE  12/30/2011    ANURADHA of mid OM branch of circumflex.      ECHO COMPL W DOP COLOR FLOW  12/30/2011         HERNIA REPAIR  2/2014    laparoscopic ventral hernia repain    JOINT REPLACEMENT Left 4/1/14    TKA-ed    JOINT REPLACEMENT Right 2018    KNEE    PAIN MANAGEMENT PROCEDURE Right 9/1/2020    RIGHT L3,4,5 MEDIAL BRANCH RADIOFREQUENCY ABLATION performed by Yomaira Quiroz DO at [facility] N/A 11/17/2020    L4-5 EPIDURAL STEROID INJECTION #1 performed by Yomaira Quiroz DO at [facility] OFFICE/OUTPT VISIT,PROCEDURE ONLY Right 11/26/2018    RIGHT KNEE TOTAL ARTHROPLASTY performed by Darnell Hernandez DO at [facility] OR          ·   Medications:     dextrose      fentaNYL 5 mcg/ml in 0.9%  ml infusion 200 mcg/hr (11/19/20 1450)    midazolam 8 mg/hr (11/19/20 1536)      atorvastatin  80 mg Oral Nightly    [Held by provider] clopidogrel  75 mg Oral Daily    fluticasone  2 spray Nasal Daily    pregabalin  150 mg Oral BID    sodium chloride flush  10 mL Intravenous 2 times per day    enoxaparin  40 mg Subcutaneous Daily    insulin glargine  0.25 Units/kg Subcutaneous Nightly    nicotine  1 patch Transdermal Daily    cefTRIAXone (ROCEPHIN) IV  2 g Intravenous Q24H    ipratropium-albuterol  1 ampule Inhalation Q4H    chlorhexidine  15 mL Mouth/Throat BID    pantoprazole  40 mg Intravenous Daily    And    sodium chloride (PF)  10 mL Intravenous Daily    methylPREDNISolone  40 mg Intravenous Q8H    levofloxacin  750 mg Intravenous Q24H    aspirin  325 mg Oral Daily    insulin lispro  0-12 Units Subcutaneous Q6H         Vitals:  Tmax:  VITALS:  /80   Pulse 86   Temp 99.2 °F (37.3 °C) (Axillary)   Resp 28   Ht 5' 11\" (1.803 m)   Wt (!) 313 lb 7.9 oz (142.2 kg)   SpO2 92%   BMI 43.72 kg/m²   24HR INTAKE/OUTPUT:      Intake/Output Summary (Last 24 hours) at 2020 1715  Last data filed at 2020 1442  Gross per 24 hour   Intake 1700 ml   Output 1420 ml   Net 280 ml     CURRENT PULSE OXIMETRY:  SpO2: 92 %  24HR PULSE OXIMETRY RANGE:  SpO2  Av.8 %  Min: 90 %  Max: 97 %        EXAM:  General: No distress. Alert. Sedated. Obese  Eyes: PERRL. No sclera icterus. No conjunctival injection. ENT: No discharge. Pharynx clear. Endotracheal tube is in place  Neck: Trachea midline. Normal thyroid. Thick  Resp: No accessory muscle use. No crackles. Bronchial sounds noticed left more than right  CV: Regular rate. Regular rhythm. No mumur or rub. ABD: Non-tender. Non-distended. No masses. No organmegaly. Normal bowel sounds. Fatty, soft  Skin: Warm and dry. No nodule on exposed extremities. No rash on exposed extremities. Lymph: No cervical LAD. No supraclavicular LAD. Ext: No joint deformity. No clubbing. No cyanosis. 1+ edema  Neuro: . Positive pupils/gag/corneals. Normal pain response.      Lab Results:  CBC:   Recent Labs     20  1400 20  0211 20  0620   WBC 8.2 10.8 8.5   HGB 7.8* 8.5* 7.5*   HCT 26.9* 29.6* 25.9*   MCV 92.1 92.5 91.5   PLT 60* 80* 68*     BMP:   Recent Labs     11/17/20  1400 11/18/20  0211 11/19/20  0620    137 137   K 5.1* 4.5 5.6*    98 101   CO2 29 31* 26   PHOS  --   --  2.3*   BUN 36* 31* 45*   CREATININE 1.2 1.1 1.1      ALB:3,BILIDIR:3,BILITOT:3,ALKPHOS:3)@  PT/INR: No results for input(s): PROTIME, INR in the last 72 hours. Cultures:  -    ABG: noted  Films:  CT : Left upper lobe collapse      Assessment:  · Acute on chronic respiratory failure  · Community-acquired pneumonia  · Left upper lobe collapse  · History heavy smoking and morbid obesity, cannot rule out underlying COPD, cannot rule out MANOLO      Plan:  · Agree with current plan. · IV fluids  · Antibiotic  · Cultures  · Bronchodilators and IV steroids  · Follow results of bronchoscopy  · Vent management as per CCM  · Discussed with CCM team      Thanks for letting us see this patient in consultation. Please contact us with any questions. Isabel Robin M.D., F.C.C.P.     Discussed with CCM                    pulp

## 2020-11-19 NOTE — PROGRESS NOTES
Becky Goldman Hospitalist   Progress Note    Admitting Date and Time: 11/17/2020  1:13 PM  Admit Dx: Acute respiratory failure with hypoxia ([facility] [J96.01]  Acute respiratory failure with hypoxia ([facility] [address] [J96.01]    Seen for follow on multiple problems as listed below. Subjective: Intubated , on vent , reviewed care. yesterday's events noted. Can not provide further details. ROS: Not possible sec to above.      atorvastatin  80 mg Oral Nightly    [Held by provider] clopidogrel  75 mg Oral Daily    fluticasone  2 spray Nasal Daily    pregabalin  150 mg Oral BID    sodium chloride flush  10 mL Intravenous 2 times per day    enoxaparin  40 mg Subcutaneous Daily    insulin glargine  0.25 Units/kg Subcutaneous Nightly    nicotine  1 patch Transdermal Daily    cefTRIAXone (ROCEPHIN) IV  2 g Intravenous Q24H    ipratropium-albuterol  1 ampule Inhalation Q4H    chlorhexidine  15 mL Mouth/Throat BID    pantoprazole  40 mg Intravenous Daily    And    sodium chloride (PF)  10 mL Intravenous Daily    methylPREDNISolone  40 mg Intravenous Q8H    levofloxacin  750 mg Intravenous Q24H    aspirin  325 mg Oral Daily    insulin lispro  0-12 Units Subcutaneous Q6H     tiZANidine, 4 mg, TID PRN  sodium chloride flush, 10 mL, PRN  acetaminophen, 650 mg, Q6H PRN    Or  acetaminophen, 650 mg, Q6H PRN  polyethylene glycol, 17 g, Daily PRN  promethazine, 12.5 mg, Q6H PRN    Or  ondansetron, 4 mg, Q6H PRN  glucose, 15 g, PRN  dextrose, 12.5 g, PRN  glucagon (rDNA), 1 mg, PRN  dextrose, 100 mL/hr, PRN  oxyCODONE-acetaminophen, 1 tablet, TID PRN    And  oxyCODONE, 2.5 mg, TID PRN  albuterol, 2.5 mg, Q4H PRN         Objective:    /83   Pulse 78   Temp 98.9 °F (37.2 °C) (Axillary)   Resp 20   Ht 5' 11\" (1.803 m)   Wt (!) 313 lb 7.9 oz (142.2 kg)   SpO2 96%   BMI 43.72 kg/m²   General Appearance: intubated on vent - sedated  Skin: warm and dry  Head: normocephalic and atraumatic  Neck: supple and non-tender without mass  Pulmonary/Chest: diminished throughout with few coarse BS  Cardiovascular: normal rate, regular rhythm, normal S1 and S2, no murmurs, rubs, clicks, or gallops, distal pulses intact, no carotid bruits  Abdomen: soft, non-tender, non-distended, normal bowel sounds, no masses or organomegaly  Extremities: no cyanosis, clubbing   Musculoskeletal: normal range of motion  Neurologic:  no cranial nerve deficit,  speech normal      Recent Labs     11/17/20  1400 11/18/20  0211 11/19/20  0620    137 137   K 5.1* 4.5 5.6*    98 101   CO2 29 31* 26   BUN 36* 31* 45*   CREATININE 1.2 1.1 1.1   GLUCOSE 135* 146* 173*   CALCIUM 9.1 8.9 9.0       Recent Labs     11/17/20  1400 11/18/20  0211 11/19/20  0620   WBC 8.2 10.8 8.5   RBC 2.92* 3.20* 2.83*   HGB 7.8* 8.5* 7.5*   HCT 26.9* 29.6* 25.9*   MCV 92.1 92.5 91.5   MCH 26.7 26.6 26.5   MCHC 29.0* 28.7* 29.0*   RDW 16.6* 16.6* 16.7*   PLT 60* 80* 68*   MPV 10.1 10.4 10.5       Labs and images reviewed     Radiology:   XR CHEST PORTABLE   Final Result   Increasing multifocal left lung pneumonia. CT LUMBAR SPINE WO CONTRAST   Final Result   1. No evidence for epidural hematoma on CT scan. 2. Suspected sacral insufficiency fracture. XR CHEST ABDOMEN NG PLACEMENT   Final Result   NG tube tip in the stomach. XR CHEST 1 VIEW   Final Result   1. The life-support lines and tubes are well positioned. 2. Masslike consolidative opacity in the left upper lung. Small left   effusion. XR CHEST PORTABLE   Final Result   Left upper lobe masslike consolidation. Small to moderate left pleural effusion. CTA CHEST W CONTRAST   Final Result   1. New confluent opacities located in left upper lobe suggesting pneumonia,   atelectasis, or neoplasm. 2.  Patchy airspace opacities located in lingula suggesting pneumonia with   areas of atelectasis.    3.  Stenosis and occlusion are associated with left upper lobe segmental   bronchi and lingular segmental bronchi. 4.  Moderate to large left pleural effusion. 5.  Mediastinal lymphadenopathy. 6.  Stable fusiform infrarenal abdominal aortic aneurysm with endograft   repair. Aneurysm measures up to 6.4 cm. No evidence of endoleak or   retroperitoneal fluid. 7.  Diverticulosis. CTA ABDOMEN PELVIS W CONTRAST   Final Result   1. New confluent opacities located in left upper lobe suggesting pneumonia,   atelectasis, or neoplasm. 2.  Patchy airspace opacities located in lingula suggesting pneumonia with   areas of atelectasis. 3.  Stenosis and occlusion are associated with left upper lobe segmental   bronchi and lingular segmental bronchi. 4.  Moderate to large left pleural effusion. 5.  Mediastinal lymphadenopathy. 6.  Stable fusiform infrarenal abdominal aortic aneurysm with endograft   repair. Aneurysm measures up to 6.4 cm. No evidence of endoleak or   retroperitoneal fluid. 7.  Diverticulosis. XR CHEST PORTABLE    (Results Pending)       Assessment:    Active Problems:    CAD (coronary artery disease)    Smoker    Non-insulin dependent type 2 diabetes mellitus ([address]    Morbid obesity with BMI of 40.0-44.9, adult (HCC)    Chronic back pain    Acute respiratory failure with hypoxia ([address]    Community acquired pneumonia of left upper lobe of lung    Thrombocytopenia ([address]  Resolved Problems:    * No resolved hospital problems. *      Plan:  Acute hypoxic and hypercapnic respiratory failure secondary to CAP-s/p intubation 11/18 . Was on IV Rocephin plus Zithromax now switched to Rocephin plus Levaquin, on IV steroids, nebulized treatments, ICS, critical care/pulmonary following. S/p bronc with biopsy of left upper lobe atelectasis/consolidation/mass. Respiratory panel negative. Covid negative. procal 0.23. Follow cxs. CAP - on IV Rocephin + Z max and other supportive rx.     Suspect COPD exacerbation with MANOLO -on  bronchodilators, IV steroids, vent support as per critical care. Will need out pt follow with pulm    Thrombocytopenia-chronic,s/p 1 U plts , monitor. NIDDM - On Lantus + ISS    Back pain status post epidural injection 11/17, CT spine negative for hematoma    Hypertension / Coronary artery status post PC I -on aspirin, Plavix ,statin    Hyperlipidemia - on stain     Tobacco use-has been counseled and is on nicotine patch. Does not have official COPD as a diagnosis-but has 39 pack years history of smoking.     On Lovenox for DVT prophylaxis  Full code        Electronically signed by Pamela Haskins MD on 11/19/2020 at 10:41 AM

## 2020-11-19 NOTE — PATIENT CARE CONFERENCE
Intensive Care Daily Quality Rounding Checklist        ICU Team Members: Dr. Nuvia Mosqueda, clinical pharmacist, charge nurse, bedside nurse, respiratory therapist     ICU Day #: NUMBER: 2     Intubation Date: November 18     Ventilator Day #: NUMBER: 2     Central Line Insertion Date: N/A                                                    Day #: N/A      Arterial Line Insertion Date: N/A                             Day #: N/A     DVT Prophylaxis: Lovenox    GI Prophylaxis: Protonix     Black Catheter Insertion Date: November 17                                        Day #: 3                             Continued need (if yes, reason documented and discussed with physician): yes, need for accurate I+O's     Skin Issues/ Wounds and ordered treatment discussed on rounds: no issues, SOS precautions     Goals/ Plans for the Day: Daily labs, wean vent as able, start tube feeds if to remain intubated, soft wrist restraints to prevent pulling tubes

## 2020-11-19 NOTE — PROGRESS NOTES
Critical Care Team - Daily Progress Note         Date and time: 11/19/2020 3:46 PM  Patient's name:  Lizz Jewell. Medical Record Number: [id number]  Patient's account/billing number: [de-identified]  Patient's YOB: 1960  Age: 61 y.o. Date of Admission: 11/17/2020  1:13 PM  Length of stay during current admission: 2      Primary Care Physician: Sumit Gardiner PA-C  ICU Attending Physician: Dr. Manoj Corea    Code Status: Full Code    Reason for ICU admission: acute hypoxic respiratory failure      SUBJECTIVE:     BRIEF HISTORY:   The patient is a 61 y.o. male with significant past medical history of diabetes, MANOLO, back pain, hypertension, hyperlipidemia, STEMI's status post 5 stents, CAD, aortic aneurysm presenting to the the hospital initially on 11/17. He had a epidural performed yesterday for low back pain and after the procedure became very short of breath. He was satting 80% so was sent to the hospital for further evaluation. While in the ED patient was found to be thrombocytopenic as well as hypoxic on room air. He was placed on 4 L of oxygen but O2 sats improved. CTA of the chest demonstrating pneumonia and pleural effusions. He was given 1 dose of Rocephin and azithromycin while in the ER.     On 11/18 RRT was called at 7:30 AM.  Patient was found to have increased work of breathing as well as hypercapnic on ABGs. He was on BiPAP all night with minimal improvement of his symptoms. During the RT he was switched to AVAPS. He was subsequently brought down to the ICU for further evaluation and care. He was not improving on AVAPS and subsequently intubated     A bronchoscopy was performed on 11/18 and biopsies were sent.        OVERNIGHT EVENTS:    11/19/20: Continues to be intubated, no acute events    CURRENT VENTILATION STATUS:     [x] Ventilator  [] BIPAP  [] Nasal Cannula [] Room Air        SECRETIONS : Amount:  [] Small [] Moderate  [] Large  [x] None  Color:     [] White [] Colored  [] Bloody    SEDATION:  RAAS Score:  [] Propofol gtt  [x] Versed gtt  [] Ativan gtt   [] No Sedation    PARALYZED:  [x] No    [] Yes      VASOPRESSORS:  [x] No    [] Yes    If yes -   [] Levophed       [] Dopamine     [] Vasopressin       [] Dobutamine  [] Phenylephrine         [] Epinephrine    CENTRAL LINES:     [x] No   [] Yes   (Date of Insertion:   )           If yes -     [] Right IJ     [] Left IJ [] Right Femoral [] Left Femoral                   [] Right Subclavian [] Left Subclavian       HUFF'S CATHETER:   [] No   [x] Yes  (Date of Insertion:   )     URINE OUTPUT:            [x] Good   [] Low              [] Anuric      OBJECTIVE:     VITAL SIGNS:  /80   Pulse 78   Temp 99.2 °F (37.3 °C) (Axillary)   Resp 20   Ht 5' 11\" (1.803 m)   Wt (!) 313 lb 7.9 oz (142.2 kg)   SpO2 94%   BMI 43.72 kg/m²   Tmax over 24 hours:  Temp (24hrs), Av.1 °F (37.3 °C), Min:98.6 °F (37 °C), Max:99.4 °F (37.4 °C)      Patient Vitals for the past 6 hrs:   BP Temp Temp src Pulse Resp SpO2   20 1400 132/80 -- -- 78 20 94 %   20 1325 -- -- -- -- 23 90 %   20 1300 127/85 -- -- 85 23 91 %   20 1200 127/78 99.2 °F (37.3 °C) Axillary 82 19 95 %   20 1100 132/80 -- -- 79 20 95 %   20 1000 126/83 -- -- 78 20 96 %         Intake/Output Summary (Last 24 hours) at 2020 1546  Last data filed at 2020 1442  Gross per 24 hour   Intake 1700 ml   Output 1420 ml   Net 280 ml     Wt Readings from Last 2 Encounters:   20 (!) 313 lb 7.9 oz (142.2 kg)   20 (!) 314 lb (142.4 kg)     Body mass index is 43.72 kg/m². PHYSICAL EXAMINATION:  General: Intubated and sedated  HEENT:  Normocephalic, atraumatic. Pupils pinpoint,  No scleral icterus. No conjunctival injection. No nasal discharge.   Neck:  Supple, without stridor, no JVD  Heart:  RRR, no murmurs, gallops, rubs  Lungs: Greatly diminished in left upper lobe  Abdomen: Obese, bowel sounds present, soft, )  Urine Culture:                   [x] None drawn      [] Negative             []  Positive (Details:  )  Sputum Culture:               [] None drawn       [] Negative             []  Positive (Details: Pending)   Endotracheal aspirate:     [] None drawn       [] Negative             []  Positive (Details: Pending)     Other pertinent Labs:     Radiology/Imaging:     Chest x-ray:  11/19/20:           ASSESSMENT:     Active Problems:    CAD (coronary artery disease)    Smoker    Non-insulin dependent type 2 diabetes mellitus ([place])    Morbid obesity with BMI of 40.0-44.9, adult ([place])    Chronic back pain    Acute respiratory failure with hypoxia ([place])    Community acquired pneumonia of left upper lobe of lung    Thrombocytopenia ([place])  Resolved Problems:    * No resolved hospital problems. *      Additional assessment:        PLAN:     WEAN PER PROTOCOL:  [x] No   [] Yes  [] N/A    DISCONTINUE ANY LABS:   [x] No   [] Yes    ICU PROPHYLAXIS:  Stress ulcer:  [x] PPI Agent  [] [id number] [] Sucralfate  [] Other:  VTE:   [x] Enoxaparin  [] Unfract.  Heparin Subcut  [] EPC Cuffs    NUTRITION:  [] NPO [] Tube Feeding (Specify: ) [] TPN  [x] PO (Diet: DIET TUBE FEED CONTINUOUS/CYCLIC NPO; Diabetic 1.5; Orogastric; Continuous; 20; 60)    HOME MEDICATIONS RECONCILED: [] No  [] Yes    INSULIN DRIP:   [x] No   [] Yes    CONSULTATION NEEDED:  [x] No   [] Yes    FAMILY UPDATED:    [x] No   [] Yes    TRANSFER OUT OF ICU:   [x] No   [] Yes    ADDITIONAL PLAN:  ASSESSMENT / PLAN:      Neuro   Chronic back pain              -Epidural on 11/17     Sedated on the vent              -Versed, fentanyl     Respiratory   Acute hypoxic and hypercapnic respiratory failure              -Worsening ABG on AVAPS              -Intubated and ABGs improving              -Follow ABG              -Nebs, incentive spirometer              -Solu-Medrol 40   -wean FiO2 as able   -metanebs     Community-acquired pneumonia              -Started on Rocephin and azithromycin--> switch to Rocephin and Levaquin              -Strep and Legionella pending              -Respiratory culture pending              -Pro-Kael pending     Lung mass              -Status post bronc 11/18              -No obvious mucous plugging or purulence. Very erythematous and compressed airways, concern for mass              -Biopsies obtained secondary to patient being off of Plavix for greater than 1 week. -follow results     Respiratory film array negative  Long, lifetime smoker--nicotine patch     Cardiovascular   CAD/stents              -Plavix is on hold for approximately 7 days per patient he had his epidural.  It has still been on hold secondary to thrombocytopenia in the ER.     History of aortic aneurysm              -Status post endovascular repair              -Stable on most recent CT     Gastrointestinal   Start tube feeds     Renal   Hypernatremia- free water bolus     Infectious Disease   Community-acquired pneumonia              -Rocephin and azithromycin x1 day              -Switch to Rocephin and Levaquin day 1              -Pro-Kael pending                       -Sputum culture pending     Blood cultures pending     Hematology/Oncology   Thrombocytopenia              -60 yesterday in the ED. Given 1 unit of platelets. Improving today. -Monitor     Endocrine   Diabetes              -Noninsulin-dependent              -Monitor sugars     msk   Chronic back pain              -Status post epidural on 11/17              -No erythematous region or fluctuance              -We will get CT of the lumbar spine     Social/Spiritual/DNR/Other   Full code       ______________________________________________________________________     ASSESSMENT/ PLAN   1. As above  2. Awaiting bronch results  3. Wean FiO2 as able  4. Start tube feeds  5. Await cultures  6. GI prophylaxis  7. DVT Prophylaxis  8.  Discuss case and plan with attending, Dr. Dotty Palacios: continue ICU level of care        Cassi Zee DO  Resident, PGY-2  11/19/2020  3:46 PM    I personally saw, examined and provided care for the patient. Radiographs, labs and medication list were reviewed by me independently. I spoke with bedside nursing, therapists and consultants. Critical care services and times documented are independent of procedures and multidisciplinary rounds with Residents. Additionally comprehensive, multidisciplinary rounds were conducted with the MICU team. The case was discussed in detail and plans for care were established. Review of Residents documentation was conducted and revisions were made as appropriate. I agree with the above documented exam, problem list and plan of care with the following additions:    Vent weaning as able  CXR with OMER collapse  Await TBNA  Continue steroids/nebs/abx  Lumbar CT without hematoma    36 minutes of CCT spent with the patient, reviewing the chart including imaging studies, and discussing the case with other health care professionals. This time excludes procedures.      Shin Gardner MD

## 2020-11-20 NOTE — PATIENT CARE CONFERENCE
Intensive Care Daily Quality Rounding Checklist        ICU Team Members: Dr. Nelda Mccollum, Pily Baptiste & Myles (residents), clinical pharmacist, charge nurse, bedside nurse, respiratory therapist     ICU Day #: NUMBER: 3     Intubation Date: November 18     Ventilator Day #: NUMBER: 3     Central Line Insertion Date: N/A                                                    IOP #: N/A      Arterial Line Insertion Date: N/A                             Day #: N/A     DVT Prophylaxis: Lovenox    GI Prophylaxis: Protonix     Black Catheter Insertion [facility]                                        Day #: 4                             Continued need (if yes, reason documented and discussed with physician): yes, need for accurate I+O's     Skin Issues/ Wounds and ordered treatment discussed on rounds: no issues, SOS precautions     Goals/ Plans for the Day: Daily labs, wean vent as able, soft wrist restraints to prevent pulling tubes, start Mucomyst and change aerosols to just Albuterol and no Atrovent

## 2020-11-20 NOTE — PROGRESS NOTES
N/A 6/28/2019    ENDOVASCULAR REPAIR ABDOMINAL AORTIC ANEURYSM performed by Albert Wilder MD at [address] Bilateral 3/12/2020    BILATERAL L3,L4,L5 MEDIAL BRANCH NERVE BLOCK performed by Ken Lewis DO at [address] Bilateral 6/11/2020    BILATERAL L3,L4,L5 MEDIAL NERVE BRANCH BLOCK #2 performed by Ken Lewis DO at [address] [place] [address] N/A 11/18/2020    BRONCHOSCOPY/TRANSBRONCHIAL NEEDLE BIOPSY performed by Rodrick Charles MD at [facility]  X4    CORONARY ANGIOPLASTY WITH STENT PLACEMENT  12/30/2011    Dr. Jyoti Rizvi 1st OM 3.0 x 20 Ion    DIAGNOSTIC CARDIAC CATH LAB PROCEDURE  3/15/2005    [facility]. Mild to moderate CAD. Normal LV.  DIAGNOSTIC CARDIAC CATH LAB PROCEDURE  1/16/2007    [facility]. Cath/PTCA/DE stent of proximal and distal RCA.  DIAGNOSTIC CARDIAC CATH LAB PROCEDURE  2/21/2007    Cath/DE stent of proximal OM1 and proximal Cx.  DIAGNOSTIC CARDIAC CATH LAB PROCEDURE  12/30/2011    ANURADHA of mid OM branch of circumflex.      ECHO COMPL W DOP COLOR FLOW  12/30/2011         HERNIA REPAIR  2/2014    laparoscopic ventral hernia repain    JOINT REPLACEMENT Left 4/1/14    TKA-ed    JOINT REPLACEMENT Right 2018    KNEE    PAIN MANAGEMENT PROCEDURE Right 9/1/2020    RIGHT L3,4,5 MEDIAL BRANCH RADIOFREQUENCY ABLATION performed by Ken Lewis DO at [facility] N/A 11/17/2020    L4-5 EPIDURAL STEROID INJECTION #1 performed by Ken Lewis DO at [address] [facility] OFFICE/OUTPT VISIT,PROCEDURE ONLY Right 11/26/2018    RIGHT KNEE TOTAL ARTHROPLASTY performed by DO Liliana at [place] [address] sodium chloride 75 mL/hr at 11/20/20 0819    dextrose      fentaNYL 5 mcg/ml in 0.9%  ml infusion 150 mcg/hr (11/20/20 1501)    midazolam 5 mg/hr (11/20/20 1403)      potassium & sodium phosphates  2 packet Oral 4x Daily    acetylcysteine  4 mL

## 2020-11-20 NOTE — PLAN OF CARE
Problem: Falls - Risk of:  Goal: Will remain free from falls  Description: Will remain free from falls  Outcome: Met This Shift  Goal: Absence of physical injury  Description: Absence of physical injury  Outcome: Met This Shift     Problem: Skin Integrity:  Goal: Absence of new skin breakdown  Description: Absence of new skin breakdown  Outcome: Met This Shift     Problem: Pain:  Goal: Control of acute pain  Description: Control of acute pain  Outcome: Met This Shift     Problem: Restraint Use - Nonviolent/Non-Self-Destructive Behavior:  Goal: Absence of restraint indications  Description: Absence of restraint indications  Outcome: Not Met This Shift  Goal: Absence of restraint-related injury  Description: Absence of restraint-related injury  Outcome: Met This Shift

## 2020-11-20 NOTE — PROGRESS NOTES
[] Nasal Cannula [] Room Air        SECRETIONS : Amount:  [] Small [x] Moderate  [] Large  [] None  Color:     [] White [x] Colored  [] Bloody    SEDATION:  RAAS Score:  [] Propofol gtt  [x] Versed gtt  [] Ativan gtt   [] No Sedation    PARALYZED:  [x] No    [] Yes      VASOPRESSORS:  [x] No    [] Yes    If yes -   [] Levophed       [] Dopamine     [] Vasopressin       [] Dobutamine  [] Phenylephrine         [] Epinephrine    CENTRAL LINES:     [x] No   [] Yes   (Date of Insertion:   )           If yes -     [] Right IJ     [] Left IJ [] Right Femoral [] Left Femoral                   [] Right Subclavian [] Left Subclavian       HUFF'S CATHETER:   [] No   [x] Yes  (Date of Insertion:   )     URINE OUTPUT:            [x] Good   [] Low              [] Anuric      OBJECTIVE:     VITAL SIGNS:  BP (!) 110/58   Pulse 88   Temp 99.6 °F (37.6 °C) (Axillary)   Resp 25   Ht 5' 11\" (1.803 m)   Wt (!) 317 lb 14.5 oz (144.2 kg)   SpO2 93%   BMI 44.34 kg/m²   Tmax over 24 hours:  Temp (24hrs), Av.5 °F (37.5 °C), Min:99.2 °F (37.3 °C), Max:100 °F (37.8 °C)      Patient Vitals for the past 6 hrs:   BP Pulse Resp SpO2   20 1700 (!) 110/58 88 25 93 %   20 1649 -- 85 19 94 %   20 1600 (!) 95/50 92 22 91 %   20 1500 107/68 83 20 91 %   20 1423 126/81 94 19 95 %   20 1400 126/81 90 19 --   20 1300 132/85 100 (!) 7 94 %         Intake/Output Summary (Last 24 hours) at 2020 1838  Last data filed at 2020 1423  Gross per 24 hour   Intake 3468.5 ml   Output 1250 ml   Net 2218.5 ml     Wt Readings from Last 2 Encounters:   20 (!) 317 lb 14.5 oz (144.2 kg)   20 (!) 314 lb (142.4 kg)     Body mass index is 44.34 kg/m². PHYSICAL EXAMINATION:  General: Intubated   HEENT:  Normocephalic, atraumatic. Pulls equal and reactive no scleral icterus. No conjunctival injection. No nasal discharge.   Neck:  Supple, without stridor, no JVD  Heart:  RRR, no murmurs, gallops, rubs  Lungs: Greatly diminished in left upper lobe  Abdomen: Obese, bowel sounds present, soft, non-tender, non-distended, no guarding or rigidity  Extremities:  No clubbing, cyanosis, mild 1+ edema bilaterally  Skin:  Warm and dry  Neuro: Following commands. Cranial nerves II through XII grossly intact.   No focal neurologic deficits  Breast: deferred  Rectal: deferred  Genitalia:  deferred     MEDICATIONS:    Scheduled Meds:   potassium & sodium phosphates  2 packet Oral 4x Daily    acetylcysteine  4 mL Inhalation BID    albuterol  2.5 mg Nebulization Q4H    Arformoterol Tartrate  15 mcg Nebulization BID    atorvastatin  80 mg Oral Nightly    [Held by provider] clopidogrel  75 mg Oral Daily    fluticasone  2 spray Nasal Daily    pregabalin  150 mg Oral BID    sodium chloride flush  10 mL Intravenous 2 times per day    enoxaparin  40 mg Subcutaneous Daily    insulin glargine  0.25 Units/kg Subcutaneous Nightly    nicotine  1 patch Transdermal Daily    cefTRIAXone (ROCEPHIN) IV  2 g Intravenous Q24H    chlorhexidine  15 mL Mouth/Throat BID    pantoprazole  40 mg Intravenous Daily    And    sodium chloride (PF)  10 mL Intravenous Daily    methylPREDNISolone  40 mg Intravenous Q8H    levofloxacin  750 mg Intravenous Q24H    aspirin  325 mg Oral Daily    insulin lispro  0-12 Units Subcutaneous Q6H     Continuous Infusions:   sodium chloride 75 mL/hr at 11/20/20 0819    dextrose      fentaNYL 5 mcg/ml in 0.9%  ml infusion 150 mcg/hr (11/20/20 1501)    midazolam 5 mg/hr (11/20/20 1403)     PRN Meds:   tiZANidine, 4 mg, TID PRN  sodium chloride flush, 10 mL, PRN  acetaminophen, 650 mg, Q6H PRN    Or  acetaminophen, 650 mg, Q6H PRN  polyethylene glycol, 17 g, Daily PRN  promethazine, 12.5 mg, Q6H PRN    Or  ondansetron, 4 mg, Q6H PRN  glucose, 15 g, PRN  dextrose, 12.5 g, PRN  glucagon (rDNA), 1 mg, PRN  dextrose, 100 mL/hr, PRN  oxyCODONE-acetaminophen, 1 tablet, TID PRN --    < > 310* 280*   AST  --    < > 24 21   ALT  --    < > 26 24    < > = values in this interval not displayed. Magnesium:   Lab Results   Component Value Date    MG 2.8 11/20/2020     Phosphorus:   Lab Results   Component Value Date    PHOS 1.9 11/20/2020     Ionized Calcium:   Lab Results   Component Value Date    CAION 1.33 10/23/2020        Urinalysis:     Troponin:   No results for input(s): TROPONINI in the last 72 hours. Microbiology:  Cultures during this admission:     Blood cultures:                 [x] None drawn      [] Negative             []  Positive (Details:  )  Urine Culture:                   [x] None drawn      [] Negative             []  Positive (Details:  )  Sputum Culture:               [] None drawn       [] Negative             []  Positive (Details: Pending)   Endotracheal aspirate:     [] None drawn       [] Negative             []  Positive (Details: Pending)     Other pertinent Labs:     Radiology/Imaging:     Chest x-ray:  11/19/20:           ASSESSMENT:     Active Problems:    CAD (coronary artery disease)    Smoker    Non-insulin dependent type 2 diabetes mellitus ([facility] [address]    Morbid obesity with BMI of 40.0-44.9, adult ([facility] [address]    Chronic back pain    Acute respiratory failure with hypoxia ([facility] [address]    Community acquired pneumonia of left upper lobe of lung    Thrombocytopenia ([facility] [address]  Resolved Problems:    * No resolved hospital problems. *      Additional assessment:        PLAN:     WEAN PER PROTOCOL:  [] No   [x] Yes  [] N/A    DISCONTINUE ANY LABS:   [x] No   [] Yes    ICU PROPHYLAXIS:  Stress ulcer:  [x] PPI Agent  [] [id number] [] Sucralfate  [] Other:  VTE:   [x] Enoxaparin  [] Unfract.  Heparin Subcut  [] EPC Cuffs    NUTRITION:  [] NPO [] Tube Feeding (Specify: ) [] TPN  [x] PO (Diet: DIET TUBE FEED CONTINUOUS/CYCLIC NPO; Diabetic 1.5; Orogastric; Continuous; 20; 60)    HOME MEDICATIONS RECONCILED: [] No  [] Yes    INSULIN DRIP:   [x] No   [] Yes    CONSULTATION NEEDED:  [x] No   [] Yes    FAMILY UPDATED:    [x] No   [] Yes    TRANSFER OUT OF ICU:   [x] No   [] Yes    ADDITIONAL PLAN:  ASSESSMENT / PLAN:      Neuro   Chronic back pain              -Epidural on 11/17     Sedated on the vent              -Versed, fentanyl     Respiratory   Acute hypoxic and hypercapnic respiratory failure              -Worsening ABG on AVAPS              -Intubated              -Follow ABG              -Albuterol, incentive spirometer              -Decrease steroid dose   -wean FiO2 as able   -metanebs   -Attempted pressure support trials and weaning today. ABG worsening. Placed back to [facility]   -Mucomyst     Community-acquired pneumonia              -Started on Rocephin and azithromycin--> switch to Rocephin and Levaquin              -Strep and Legionella negative              -Respiratory culture pending              -Pro-Kael pending      Lung mass              -Status post [facility] 11/18              -No obvious mucous plugging or purulence. Very erythematous and compressed airways, concern for mass              -Biopsies obtained secondary to patient being off of Plavix for greater than 1 week.    -follow results     Respiratory film array negative  Long, lifetime smoker--nicotine patch     Cardiovascular   CAD/stents              -Plavix is on hold for approximately 7 days per patient he had his epidural.  It has still been on hold secondary to thrombocytopenia in the ER.     History of aortic aneurysm              -Status post endovascular repair              -Stable on most recent CT     Gastrointestinal   Start tube feeds     Renal   Hypernatremia- free water bolus    beto   -IV fluids   -Monitor    hypophos   -Replace and monitor     Infectious Disease   Community-acquired pneumonia              -Rocephin and azithromycin x1 day              -Switch to Rocephin and Levaquin day 1              -Pro-Kael pending                       -Sputum culture pending     Blood cultures Charmayne Roys MD

## 2020-11-20 NOTE — PLAN OF CARE
Problem: Falls - Risk of:  Goal: Will remain free from falls  Description: Will remain free from falls  11/20/2020 0725 by Shantelle Ortiz RN  Outcome: Met This Shift  11/20/2020 0445 by Eulice Claude, RN  Outcome: Met This Shift  Goal: Absence of physical injury  Description: Absence of physical injury  11/20/2020 0725 by Shantelle Ortiz RN  Outcome: Met This Shift  11/20/2020 0445 by Eulice Claude, RN  Outcome: Met This Shift     Problem: Skin Integrity:  Goal: Will show no infection signs and symptoms  Description: Will show no infection signs and symptoms  Outcome: Met This Shift  Goal: Absence of new skin breakdown  Description: Absence of new skin breakdown  11/20/2020 0725 by Shantelle Ortiz RN  Outcome: Met This Shift  11/20/2020 0445 by Eulice Claude, RN  Outcome: Met This Shift     Problem: Pain:  Goal: Pain level will decrease  Description: Pain level will decrease  Outcome: Met This Shift  Goal: Control of acute pain  Description: Control of acute pain  11/20/2020 0725 by Shantelle Ortiz RN  Outcome: Met This Shift  11/20/2020 0445 by Eulice Claude, RN  Outcome: Met This Shift  Goal: Control of chronic pain  Description: Control of chronic pain  Outcome: Met This Shift     Problem: Gas Exchange - Impaired  Goal: Able to breathe comfortably  Description: Able to breathe comfortably  Outcome: Met This Shift     Problem: Fluid and Electrolyte Imbalance  Goal: Fluid and electrolyte balance are achieved/maintained  Outcome: Met This Shift     Problem: HH FLUID RETENTION-CHF  Goal: Absence of fluid overload signs and symptoms  Outcome: Met This Shift     Problem: HEMODYNAMIC STATUS  Goal: Hemoglobin within specified parameters  Outcome: Met This Shift     Problem: Bleeding - Risk of  Goal: Absence of active bleeding  Outcome: Met This Shift     Problem: MOBILITY  Goal: Mobility/activity is maintained at optimum level for patient  Outcome: Met This Shift     Problem: Restraint Use - Nonviolent/Non-Self-Destructive Behavior:  Goal: Absence of restraint indications  Description: Absence of restraint indications  11/20/2020 0725 by Phu Castillo RN  Outcome: Met This Shift  11/20/2020 0445 by Leonora Zaman RN  Outcome: Not Met This Shift  Goal: Absence of restraint-related injury  Description: Absence of restraint-related injury  11/20/2020 0725 by Phu Castillo RN  Outcome: Met This Shift  11/20/2020 0445 by Leonora Zaman RN  Outcome: Met This Shift

## 2020-11-20 NOTE — PROGRESS NOTES
- sedated,as above opens eyes intermittently  Skin: warm and dry  Head: normocephalic and atraumatic  Neck: supple and non-tender without mass  Pulmonary/Chest: diminished throughout with few coarse BS  Cardiovascular: normal rate, regular rhythm, normal S1 and S2, no murmurs, rubs, clicks, or gallops, distal pulses intact, no carotid bruits  Abdomen: soft, non-tender, non-distended, normal bowel sounds, no masses or organomegaly  Extremities: no cyanosis, clubbing   Musculoskeletal: normal range of motion  Neurologic:  Can not assess      Recent Labs     11/18/20 0211 11/19/20 0620 11/20/20  0625    137 142   K 4.5 5.6* 4.3   CL 98 101 105   CO2 31* 26 26   BUN 31* 45* 58*   CREATININE 1.1 1.1 1.3*   GLUCOSE 146* 173* 205*   CALCIUM 8.9 9.0 8.9       Recent Labs     11/18/20 0211 11/19/20 0620 11/20/20  0625   WBC 10.8 8.5 7.9   RBC 3.20* 2.83* 2.84*   HGB 8.5* 7.5* 7.5*   HCT 29.6* 25.9* 26.4*   MCV 92.5 91.5 93.0   MCH 26.6 26.5 26.4   MCHC 28.7* 29.0* 28.4*   RDW 16.6* 16.7* 16.3*   PLT 80* 68* 57*   MPV 10.4 10.5 10.8       Labs and images reviewed     Radiology:   XR CHEST PORTABLE   Final Result   Grossly stable opacities on the left. XR CHEST PORTABLE   Final Result   Increasing multifocal left lung pneumonia. CT LUMBAR SPINE WO CONTRAST   Final Result   1. No evidence for epidural hematoma on CT scan. 2. Suspected sacral insufficiency fracture. XR CHEST ABDOMEN NG PLACEMENT   Final Result   NG tube tip in the stomach. XR CHEST 1 VIEW   Final Result   1. The life-support lines and tubes are well positioned. 2. Masslike consolidative opacity in the left upper lung. Small left   effusion. XR CHEST PORTABLE   Final Result   Left upper lobe masslike consolidation. Small to moderate left pleural effusion. CTA CHEST W CONTRAST   Final Result   1. New confluent opacities located in left upper lobe suggesting pneumonia,   atelectasis, or neoplasm.    2.  Patchy negative. procal 0.23. Follow cxs. CAP - on IV Rocephin + Z max and other supportive rx. Suspect COPD exacerbation with MANOLO -on  bronchodilators, IV steroids, vent support as per critical care. Will need out pt follow with pulm    Thrombocytopenia-chronic,s/p 1 U plts , monitor. NIDDM - On Lantus + ISS    Back pain status post epidural injection 11/17, CT spine negative for hematoma    Hypertension / Coronary artery status post PC I -on aspirin, Plavix ,statin    Hyperlipidemia - on stain     Tobacco use-has been counseled and is on nicotine patch. Does not have official COPD as a diagnosis-but has 39 pack years history of smoking.     On Lovenox for DVT prophylaxis  Full code        Electronically signed by Kamilah Martines MD on 11/20/2020 at 1:19 PM

## 2020-11-21 NOTE — PATIENT CARE CONFERENCE
Intensive Care Daily Quality Rounding Checklist        ICU Team Members: Dr. Anais Major, Pily Baptiste & Myles (residents), bedside RN, nursing leadership     ICU Day #: NUMBER: 4     Intubation Date: November 18     Ventilator Day #: NUMBER: 4     Central Line Insertion Date: N/A                                                    PQY #: N/A      Arterial Line Insertion Date: N/A                             Day #: N/A     DVT Prophylaxis: Lovenox    GI Prophylaxis: Protonix     Black Catheter Insertion Loulou Carson                                        Day #: 5                             Continued need (if yes, reason documented and discussed with physician): yes, need for accurate I+O's     Skin Issues/ Wounds and ordered treatment discussed on rounds: no issues, SOS precautions     Goals/ Plans for the Day: Antibiotic changes, respiratory culture, increase sliding scale to high, restraints, no wean today, continue critical care management.

## 2020-11-21 NOTE — PROGRESS NOTES
Pulmonary Consultation    Admit Date: 11/17/2020  Requesting Physician: Tommy Everett PA-C    CC:  Community-acquired pneumonia  HPI:  61years old obese male, heavy smoker who was sent to ER for an outpatient procedure unit because increased shortness of breath and cough. Patient was evaluated in ER and started antibiotics with analysis of community-acquired pneumonia. The patient was initially medically floor and pulmonary consult was requested. Because of progressive respiratory distress and hypoxemia the decision was to transfer the patient to intensive care units and proceed with endotracheal tube placement and full mechanical ventilatory support. After evaluation by intensivist the decision was to proceed with bedside bronchoscopy due to left upper lobe collapse. Findings the bronchoscopy show a very narrow left upper lobe most likely due to extrinsic compression no major mucous plug seen erythema noticed sample for microbiology and cytology were obtained. November 20.no events overnight, still not able to be weaned to extubate failed first attempt this morning. November 21. Febrile, failed vent weaning as he required 60% FiO2 with PEEP of 10. Panculture.   Antibiotics change by [facility] team.    PMH:    Past Medical History:   Diagnosis Date    Anticoagulant long-term use     Arthritis     CAD (coronary artery disease)     Chronic back pain     Depression     Dyslipidemia     Hiatal hernia 1979    History of ST elevation myocardial infarction (STEMI)     Hyperlipidemia     Hypertension     Low back pain     Lumbar radiculopathy 8/14/2019    Obesity     MANOLO on CPAP     Presence of stent in left circumflex coronary artery     Presence of stent in right coronary artery     Smoker     Type 2 diabetes mellitus without complication (HCC)      PSH:   Past Surgical History:   Procedure Laterality Date    ABDOMINAL AORTIC ANEURYSM REPAIR, ENDOVASCULAR N/A 6/28/2019    ENDOVASCULAR REPAIR ABDOMINAL AORTIC ANEURYSM performed by Devin Sequeira MD at [address] [facility] Bilateral 3/12/2020    BILATERAL L3,L4,L5 MEDIAL BRANCH NERVE BLOCK performed by Jennifer Garcia DO at [address] [facility] Bilateral 6/11/2020    BILATERAL L3,L4,L5 MEDIAL NERVE BRANCH BLOCK #2 performed by Jennifer Garcia DO at [address] N/A 11/18/2020    BRONCHOSCOPY/TRANSBRONCHIAL NEEDLE BIOPSY performed by Melinda Chan MD at [address] [facility]  12/30/2011    Dr. Mona Arenas 1st OM 3.0 x 20 Ion    DIAGNOSTIC CARDIAC CATH LAB PROCEDURE  3/15/2005    [facility]. Mild to moderate CAD. Normal LV.  DIAGNOSTIC CARDIAC CATH LAB PROCEDURE  1/16/2007    [facility]. Cath/PTCA/DE stent of proximal and distal RCA.  DIAGNOSTIC CARDIAC CATH LAB PROCEDURE  2/21/2007    Cath/DE stent of proximal OM1 and proximal Cx.  DIAGNOSTIC CARDIAC CATH LAB PROCEDURE  12/30/2011    ANURADHA of mid OM branch of circumflex.      ECHO COMPL W DOP COLOR FLOW  12/30/2011         HERNIA REPAIR  2/2014    laparoscopic ventral hernia repain    JOINT REPLACEMENT Left 4/1/14    TKA-ed    JOINT REPLACEMENT Right 2018    KNEE    PAIN MANAGEMENT PROCEDURE Right 9/1/2020    RIGHT L3,4,5 MEDIAL BRANCH RADIOFREQUENCY ABLATION performed by Jennifer Garcia DO at [facility] N/A 11/17/2020    L4-5 EPIDURAL STEROID INJECTION #1 performed by Jennifer Garcia DO at [address] [facility] OFFICE/OUTPT VISIT,PROCEDURE ONLY Right 11/26/2018    RIGHT KNEE TOTAL ARTHROPLASTY performed by Archana Bautista DO at [place] [address] sodium chloride 75 mL/hr at 11/20/20 0819    dextrose      fentaNYL 5 mcg/ml in 0.9%  ml infusion 200 mcg/hr (11/21/20 1137)    midazolam 8 mg/hr (11/21/20 0857)      sodium phosphate IVPB  15 mmol Intravenous Once    cefepime  2 g Intravenous Q8H    insulin lispro  0-18 Units No cyanosis. 1+ edema  Neuro: . Positive pupils/gag/corneals. Normal pain response. Lab Results:  CBC:   Recent Labs     11/19/20 0620 11/20/20 0625 11/21/20  0530   WBC 8.5 7.9 8.0   HGB 7.5* 7.5* 7.4*   HCT 25.9* 26.4* 26.2*   MCV 91.5 93.0 94.9   PLT 68* 57* 48*     BMP:   Recent Labs     11/19/20 0620 11/20/20 0625 11/21/20  0530    142 144   K 5.6* 4.3 4.6    105 107   CO2 26 26 25   PHOS 2.3* 1.9* 2.4*   BUN 45* 58* 58*   CREATININE 1.1 1.3* 1.2      ALB:3,BILIDIR:3,BILITOT:3,ALKPHOS:3)@  PT/INR: No results for input(s): PROTIME, INR in the last 72 hours. Cultures:  -    ABG: noted  Films:  CT : Left upper lobe collapse      Assessment:  · Acute on chronic respiratory failure, day #4  · Community-acquired pneumonia  · History heavy smoking and morbid obesity, cannot rule out underlying COPD, cannot rule out MANOLO      Plan:  · Agree with current plan. · Antibiotic, changed to cefepime and vancomycin  · Cultures, no growth. Repeated  · Bronchodilators and IV steroids  · Follow results of bronchoscopy, cytology still pending  · Vent management as per CCM  · Discussed with CCM team  · To continue LABA with hope to improve bronchoconstriction      .     Francisco Javier Stewart M.D., F.C.C.P.                    pulp

## 2020-11-21 NOTE — PROGRESS NOTES
Becky Goldman Hospitalist   Progress Note    Admitting Date and Time: 11/17/2020  1:13 PM  Admit Dx: Acute respiratory failure with hypoxia ([facility] [address] [J96.01]  Acute respiratory failure with hypoxia ([facility] [address] [J96.01]    Seen for follow on multiple problems as listed below. Subjective: Intubated on vent , d/w bedside nurse , reviewed care. ROS: Not possible sec to above.      sodium phosphate IVPB  15 mmol Intravenous Once    cefepime  2 g Intravenous Q8H    insulin lispro  0-18 Units Subcutaneous Q6H    nicotine  1 patch Transdermal Daily    vancomycin  2,000 mg Intravenous Q18H    acetylcysteine  4 mL Inhalation BID    albuterol  2.5 mg Nebulization Q4H    Arformoterol Tartrate  15 mcg Nebulization BID    atorvastatin  80 mg Oral Nightly    [Held by provider] clopidogrel  75 mg Oral Daily    fluticasone  2 spray Nasal Daily    pregabalin  150 mg Oral BID    sodium chloride flush  10 mL Intravenous 2 times per day    enoxaparin  40 mg Subcutaneous Daily    insulin glargine  0.25 Units/kg Subcutaneous Nightly    chlorhexidine  15 mL Mouth/Throat BID    pantoprazole  40 mg Intravenous Daily    And    sodium chloride (PF)  10 mL Intravenous Daily    methylPREDNISolone  40 mg Intravenous Q8H    aspirin  325 mg Oral Daily     tiZANidine, 4 mg, TID PRN  sodium chloride flush, 10 mL, PRN  acetaminophen, 650 mg, Q6H PRN    Or  acetaminophen, 650 mg, Q6H PRN  polyethylene glycol, 17 g, Daily PRN  promethazine, 12.5 mg, Q6H PRN    Or  ondansetron, 4 mg, Q6H PRN  glucose, 15 g, PRN  dextrose, 12.5 g, PRN  glucagon (rDNA), 1 mg, PRN  dextrose, 100 mL/hr, PRN  oxyCODONE-acetaminophen, 1 tablet, TID PRN    And  oxyCODONE, 2.5 mg, TID PRN  albuterol, 2.5 mg, Q4H PRN         Objective:    /66   Pulse 86   Temp 99.2 °F (37.3 °C) (Axillary)   Resp 20   Ht 5' 11\" (1.803 m)   Wt (!) 317 lb 14.5 oz (144.2 kg)   SpO2 92%   BMI 44.34 kg/m²   General Appearance: intubated on vent - sedated,as above opens eyes intermittently  Skin: warm and dry  Head: normocephalic and atraumatic  Neck: supple and non-tender without mass  Pulmonary/Chest: diminished throughout with few coarse BS  Cardiovascular: normal rate, regular rhythm, normal S1 and S2, no murmurs, rubs, clicks, or gallops, distal pulses intact, no carotid bruits  Abdomen: soft, non-tender, non-distended, normal bowel sounds, no masses or organomegaly  Extremities: no cyanosis, clubbing   Musculoskeletal: normal range of motion  Neurologic:  Can not assess      Recent Labs     11/19/20 0620 11/20/20 0625 11/21/20  0530    142 144   K 5.6* 4.3 4.6    105 107   CO2 26 26 25   BUN 45* 58* 58*   CREATININE 1.1 1.3* 1.2   GLUCOSE 173* 205* 183*   CALCIUM 9.0 8.9 8.4*       Recent Labs     11/19/20 0620 11/20/20 0625 11/21/20  0530   WBC 8.5 7.9 8.0   RBC 2.83* 2.84* 2.76*   HGB 7.5* 7.5* 7.4*   HCT 25.9* 26.4* 26.2*   MCV 91.5 93.0 94.9   MCH 26.5 26.4 26.8   MCHC 29.0* 28.4* 28.2*   RDW 16.7* 16.3* 16.7*   PLT 68* 57* 48*   MPV 10.5 10.8 10.2       Labs and images reviewed     Radiology:   XR CHEST PORTABLE   Final Result   Unchanged large opacity/consolidation in left upper lobe. Unchanged airspace   disease in left lower lobe. XR CHEST PORTABLE   Final Result   Grossly stable opacities on the left. XR CHEST PORTABLE   Final Result   Increasing multifocal left lung pneumonia. CT LUMBAR SPINE WO CONTRAST   Final Result   1. No evidence for epidural hematoma on CT scan. 2. Suspected sacral insufficiency fracture. XR CHEST ABDOMEN NG PLACEMENT   Final Result   NG tube tip in the stomach. XR CHEST 1 VIEW   Final Result   1. The life-support lines and tubes are well positioned. 2. Masslike consolidative opacity in the left upper lung. Small left   effusion. XR CHEST PORTABLE   Final Result   Left upper lobe masslike consolidation. Small to moderate left pleural effusion.       CTA CHEST W CONTRAST   Final Result   1. New confluent opacities located in left upper lobe suggesting pneumonia,   atelectasis, or neoplasm. 2.  Patchy airspace opacities located in lingula suggesting pneumonia with   areas of atelectasis. 3.  Stenosis and occlusion are associated with left upper lobe segmental   bronchi and lingular segmental bronchi. 4.  Moderate to large left pleural effusion. 5.  Mediastinal lymphadenopathy. 6.  Stable fusiform infrarenal abdominal aortic aneurysm with endograft   repair. Aneurysm measures up to 6.4 cm. No evidence of endoleak or   retroperitoneal fluid. 7.  Diverticulosis. CTA ABDOMEN PELVIS W CONTRAST   Final Result   1. New confluent opacities located in left upper lobe suggesting pneumonia,   atelectasis, or neoplasm. 2.  Patchy airspace opacities located in lingula suggesting pneumonia with   areas of atelectasis. 3.  Stenosis and occlusion are associated with left upper lobe segmental   bronchi and lingular segmental bronchi. 4.  Moderate to large left pleural effusion. 5.  Mediastinal lymphadenopathy. 6.  Stable fusiform infrarenal abdominal aortic aneurysm with endograft   repair. Aneurysm measures up to 6.4 cm. No evidence of endoleak or   retroperitoneal fluid. 7.  Diverticulosis. XR CHEST PORTABLE    (Results Pending)       Assessment:    Active Problems:    CAD (coronary artery disease)    Smoker    Non-insulin dependent type 2 diabetes mellitus ([address]    Morbid obesity with BMI of 40.0-44.9, adult (HCC)    Chronic back pain    Acute respiratory failure with hypoxia ([address]    Community acquired pneumonia of left upper lobe of lung    Thrombocytopenia ([address]  Resolved Problems:    * No resolved hospital problems. *      Plan:  Acute hypoxic and hypercapnic respiratory failure secondary to CAP-s/p intubation 11/18 .   Was on IV Rocephin plus Zithromax now switched to Rocephin plus Levaquin, on IV steroids, nebulized treatments, critical care/pulmonary following. S/p [facility] with biopsy of left upper lobe atelectasis/consolidation/mass. Respiratory panel negative. Covid negative. procal 0.23. Follow cxs. CAP - on IV Rocephin + Z max and other supportive rx. Suspect COPD exacerbation with MANOLO -on  bronchodilators, IV steroids, vent support as per critical care. Will need out pt follow with pulm    Thrombocytopenia-chronic,s/p 1 U plts , monitor. NIDDM - On Lantus + ISS    Back pain status post epidural injection 11/17, CT spine negative for hematoma    Hypertension / Coronary artery status post PC I -on aspirin, Plavix ,statin    Hyperlipidemia - on stain     Tobacco use-has been counseled and is on nicotine patch. Does not have official COPD as a diagnosis-but has 39 pack years history of smoking.     On Lovenox for DVT prophylaxis  Full code        Electronically signed by Omer Orourke MD on 11/21/2020 at 12:55 PM

## 2020-11-21 NOTE — PROGRESS NOTES
Critical Care Team - Daily Progress Note         Date and time: 11/21/2020 5:56 PM  Patient's name:  Sean Shin. Medical Record Number: [id number]  Patient's account/billing number: [de-identified]  Patient's YOB: 1960  Age: 61 y.o. Date of Admission: 11/17/2020  1:13 PM  Length of stay during current admission: 4      Primary Care Physician: Umm Meng PA-C  ICU Attending Physician: Dr. Sejal Torres    Code Status: Full Code    Reason for ICU admission: acute hypoxic respiratory failure      SUBJECTIVE:     BRIEF HISTORY:   The patient is a 61 y.o. male with significant past medical history of diabetes, MANOLO, back pain, hypertension, hyperlipidemia, STEMI's status post 5 stents, CAD, aortic aneurysm presenting to the the hospital initially on 11/17. He had a epidural performed yesterday for low back pain and after the procedure became very short of breath. He was satting 80% so was sent to the hospital for further evaluation. While in the ED patient was found to be thrombocytopenic as well as hypoxic on room air. He was placed on 4 L of oxygen but O2 sats improved. CTA of the chest demonstrating pneumonia and pleural effusions. He was given 1 dose of Rocephin and azithromycin while in the ER.     On 11/18 RRT was called at 7:30 AM.  Patient was found to have increased work of breathing as well as hypercapnic on ABGs. He was on BiPAP all night with minimal improvement of his symptoms. During the RT he was switched to AVAPS. He was subsequently brought down to the ICU for further evaluation and care. He was not improving on AVAPS and subsequently intubated     A bronchoscopy was performed on 11/18 and biopsies were sent. OVERNIGHT EVENTS:    11/19/20: Continues to be intubated, no acute events  11/20/20: Attempted weaning trials. Patient on pressure support for a few hours. ABGs worsening. Placed on it back on AC/VC  11/21/20: More rhonchorous today. More secretions. CURRENT VENTILATION STATUS:     [x] Ventilator  [] BIPAP  [] Nasal Cannula [] Room Air        SECRETIONS : Amount:  [] Small [x] Moderate  [] Large  [] None  Color:     [] White [x] Colored  [] Bloody    SEDATION:  RAAS Score:  [] Propofol gtt  [x] Versed gtt  [] Ativan gtt   [] No Sedation    PARALYZED:  [x] No    [] Yes      VASOPRESSORS:  [x] No    [] Yes    If yes -   [] Levophed       [] Dopamine     [] Vasopressin       [] Dobutamine  [] Phenylephrine         [] Epinephrine    CENTRAL LINES:     [x] No   [] Yes   (Date of Insertion:   )           If yes -     [] Right IJ     [] Left IJ [] Right Femoral [] Left Femoral                   [] Right Subclavian [] Left Subclavian       HUFF'S CATHETER:   [] No   [x] Yes  (Date of Insertion:   )     URINE OUTPUT:            [x] Good   [] Low              [] Anuric      OBJECTIVE:     VITAL SIGNS:  /68   Pulse 81   Temp 100.1 °F (37.8 °C) (Axillary)   Resp 20    5' 11\" (1.803 m)   Wt (!) 317 lb 14.5 oz (144.2 kg)   SpO2 92%   BMI 44.34 kg/m²   Tmax over 24 hours:  Temp (24hrs), Av.5 °F (37.5 °C), Min:99.2 °F (37.3 °C), Max:100.1 °F (37.8 °C)      Patient Vitals for the past 6 hrs:   BP Temp Temp src Pulse Resp SpO2   20 1631 -- -- -- -- 20 92 %   20 1627 -- -- -- 81 19 92 %   20 1500 114/68 -- -- 81 20 91 %   20 1400 (!) 103/57 -- -- 93 20 91 %   20 1350 -- -- -- 87 29 92 %   20 1300 119/72 -- -- 80 20 92 %   20 1230 119/72 -- -- 79 19 --   20 1200 103/66 100.1 °F (37.8 °C) Axillary 77 19 93 %         Intake/Output Summary (Last 24 hours) at 2020 1756  Last data filed at 2020 1500  Gross per 24 hour   Intake 5164 ml   Output 2105 ml   Net 3059 ml     Wt Readings from Last 2 Encounters:   20 (!) 317 lb 14.5 oz (144.2 kg)   20 (!) 314 lb (142.4 kg)     Body mass index is 44.34 kg/m². PHYSICAL EXAMINATION:  General: Intubated   HEENT:  Normocephalic, atraumatic. Pulls equal and reactive no scleral icterus. No conjunctival injection. No nasal discharge. Neck:  Supple, without stridor, no JVD  Heart:  RRR, no murmurs, gallops, rubs  Lungs: Greatly diminished in left upper lobe, rhonchorous throughout the left lung  Abdomen: Obese, bowel sounds present, soft, non-tender, non-distended, no guarding or rigidity  Extremities:  No clubbing, cyanosis, mild 1+ edema bilaterally  Skin:  Warm and dry  Neuro: Following commands. Cranial nerves II through XII grossly intact.   No focal neurologic deficits  Breast: deferred  Rectal: deferred  Genitalia:  deferred     MEDICATIONS:    Scheduled Meds:   cefepime  2 g Intravenous Q8H    insulin lispro  0-18 Units Subcutaneous Q6H    nicotine  1 patch Transdermal Daily    vancomycin  2,000 mg Intravenous Q18H    acetylcysteine  4 mL Inhalation BID    albuterol  2.5 mg Nebulization Q4H    Arformoterol Tartrate  15 mcg Nebulization BID    atorvastatin  80 mg Oral Nightly    [Held by provider] clopidogrel  75 mg Oral Daily    fluticasone  2 spray Nasal Daily    pregabalin  150 mg Oral BID    sodium chloride flush  10 mL Intravenous 2 times per day    enoxaparin  40 mg Subcutaneous Daily    insulin glargine  0.25 Units/kg Subcutaneous Nightly    chlorhexidine  15 mL Mouth/Throat BID    pantoprazole  40 mg Intravenous Daily    And    sodium chloride (PF)  10 mL Intravenous Daily    methylPREDNISolone  40 mg Intravenous Q8H    aspirin  325 mg Oral Daily     Continuous Infusions:   sodium chloride 75 mL/hr at 11/20/20 0819    dextrose      fentaNYL 5 mcg/ml in 0.9%  ml infusion 200 mcg/hr (11/21/20 1653)    midazolam 8 mg/hr (11/21/20 0857)     PRN Meds:   tiZANidine, 4 mg, TID PRN  sodium chloride flush, 10 mL, PRN  acetaminophen, 650 mg, Q6H PRN    Or  acetaminophen, 650 mg, Q6H PRN  polyethylene glycol, 17 g, Daily PRN  promethazine, 12.5 mg, Q6H PRN    Or  ondansetron, 4 mg, Q6H PRN  glucose, 15 g, PRN  dextrose, 12.5 g, PRN  glucagon (rDNA), 1 mg, PRN  dextrose, 100 mL/hr, PRN  oxyCODONE-acetaminophen, 1 tablet, TID PRN    And  oxyCODONE, 2.5 mg, TID PRN  albuterol, 2.5 mg, Q4H PRN        VENT SETTINGS (Comprehensive) (if applicable):  Vent Information  $Ventilation: $Subsequent Day  Skin Assessment: Clean, dry, & intact  Equipment ID: [id number]  Vent Type: [phone]  Vent Mode: AC/VC  Vt Ordered: 500 mL  Rate Set: 20 bmp  Peak Flow: 65 L/min  Pressure Support: 0 cmH20  FiO2 : 60 %  SpO2: 92 %  SpO2/FiO2 ratio: 153.33  Sensitivity: 3  PEEP/CPAP: 10  I Time/ I Time %: 0 s  Humidification Source: Heated wire  Humidification Temp: 37(37)  Humidification Temp Measured: 37  Circuit Condensation: Drained  Mask Type: Full face mask  Mask Size: Medium  Additional Respiratory  Assessments  Pulse: 81  Resp: 20  SpO2: 92 %  Position: Semi-Jeff's  Humidification Source: Heated wire  Humidification Temp: 37(37)  Circuit Condensation: Drained  Oral Care: Mouth suctioned  Subglottic Suction Done?: Yes  Cuff Pressure (cm H2O): 29 cm H2O    ABGs:   Recent Labs     11/21/20  0546   PH 7.369   PCO2 47.7*   PO2 68.8*   HCO3 26.9*   BE 1.3   O2SAT 91.8*       Laboratory findings:    Complete Blood Count:   Recent Labs     11/19/20 0620 11/20/20 0625 11/21/20  0530   WBC 8.5 7.9 8.0   HGB 7.5* 7.5* 7.4*   HCT 25.9* 26.4* 26.2*   PLT 68* 57* 48*        Last 3 Blood Glucose:   Recent Labs     11/19/20 0620 11/20/20 0625 11/21/20  0530   GLUCOSE 173* 205* 183*        PT/INR:    Lab Results   Component Value Date    PROTIME 11.4 10/15/2020    PROTIME 11.1 12/30/2011    INR 1.0 10/15/2020     PTT:    Lab Results   Component Value Date    APTT 29.0 12/28/2011       Comprehensive Metabolic Profile:   Recent Labs     11/19/20 0620 11/20/20 0625 11/21/20  0530    142 144   K 5.6* 4.3 4.6    105 107   CO2 26 26 25   BUN 45* 58* 58*   CREATININE 1.1 1.3* 1.2   GLUCOSE 173* 205* 183*   CALCIUM 9.0 8.9 8.4*   PROT 6.3* 6.0* 5.8*   LABALBU 2.9* 3. 1* 3.0*   BILITOT 0.3 0.2 0.4   ALKPHOS 310* 280* 224*   AST 24 21 23   ALT 26 24 26      Magnesium:   Lab Results   Component Value Date    MG 2.8 11/21/2020     Phosphorus:   Lab Results   Component Value Date    PHOS 2.4 11/21/2020     Ionized Calcium:   Lab Results   Component Value Date    CAION 1.33 10/23/2020        Urinalysis:     Troponin:   No results for input(s): TROPONINI in the last 72 hours. Microbiology:  Cultures during this admission:     Blood cultures:                 [x] None drawn      [] Negative             []  Positive (Details:  )  Urine Culture:                   [x] None drawn      [] Negative             []  Positive (Details:  )  Sputum Culture:               [] None drawn       [] Negative             []  Positive (Details: Pending)   Endotracheal aspirate:     [] None drawn       [] Negative             []  Positive (Details: Pending)     Other pertinent Labs:     Radiology/Imaging:     Chest x-ray:  11/19/20:           ASSESSMENT:     Active Problems:    CAD (coronary artery disease)    Smoker    Non-insulin dependent type 2 diabetes mellitus ([facility] [address]    Morbid obesity with BMI of 40.0-44.9, adult ([facility] [address]    Chronic back pain    Acute respiratory failure with hypoxia ([facility] [address]    Community acquired pneumonia of left upper lobe of lung    Thrombocytopenia ([facility] [address]  Resolved Problems:    * No resolved hospital problems. *      Additional assessment:        PLAN:     WEAN PER PROTOCOL:  [] No   [x] Yes  [] N/A    DISCONTINUE ANY LABS:   [x] No   [] Yes    ICU PROPHYLAXIS:  Stress ulcer:  [x] PPI Agent  [] Q2Bchfp [] Sucralfate  [] Other:  VTE:   [x] Enoxaparin  [] Unfract.  Heparin Subcut  [] EPC Cuffs    NUTRITION:  [] NPO [] Tube Feeding (Specify: ) [] TPN  [x] PO (Diet: DIET TUBE FEED CONTINUOUS/CYCLIC NPO; Diabetic 1.5; Orogastric; Continuous; 20; 60)    HOME MEDICATIONS RECONCILED: [] No  [] Yes    INSULIN DRIP:   [x] No   [] Yes    CONSULTATION NEEDED:  [x] No   [x] Yes     FAMILY Thrombocytopenia              - received platelets x1              -Monitor      Endocrine   Diabetes              -Noninsulin-dependent              -Monitor sugars   -Increase to high-dose sliding scale    msk   Chronic back pain              -Status post epidural on 11/17              -No erythematous region or fluctuance              -CT with no evidence of epidural hematoma     Social/Spiritual/DNR/Other   Full code     Dr. Jami Gan talked with wife extensively today about symptoms, diagnosis and where we are currently with treatment. Patient remains on the ventilator as he has more rhonchorous lung sounds today and did not tolerate weaning yesterday. Patient's wife is requesting a second opinion and wants patient to be transferred to [facility]. Call made to transfer line and they have no beds a complaint made at this time. Will reach back to wife and discuss possibility of going to [facility] clinic remote site.  ______________________________________________________________________     ASSESSMENT/ PLAN   1. As above  2. Awaiting bronch results  3. Switch antibiotics to Vanco and cefepime  4. Increase sliding scale insulin  5. Initiating possible transfer to [facility] clinic  6. GI prophylaxis  7. DVT Prophylaxis  8. Discuss case and plan with attending, Dr. Ayala Mac: continue ICU level of care      Erica Potter DO  Resident, PGY-2  11/21/2020  5:56 PM    I personally saw, examined and provided care for the patient. Radiographs, labs and medication list were reviewed by me independently. I spoke with bedside nursing, therapists and consultants. Critical care services and times documented are independent of procedures and multidisciplinary rounds with Residents. Additionally comprehensive, multidisciplinary rounds were conducted with the MICU team. The case was discussed in detail and plans for care were established.  Review of Residents documentation was conducted and revisions were made as

## 2020-11-21 NOTE — PROGRESS NOTES
Pharmacy Consultation Note  (Antibiotic Dosing and Monitoring)    Initial consult date: 2020  Consulting physician: Dr Derrick Vieyra  Drug(s): Vancomycin  Indication: Empiric antibiotics for respiratory process    Ht Readings from Last 1 Encounters:   20 5' 11\" (1.803 m)     Wt Readings from Last 1 Encounters:   20 (!) 317 lb 14.5 oz (144.2 kg)       Age/  Gender Actual BW IBW Adj BW  Allergy Information   61 y.o. male 144.2 kg 75.3 kg 102.9  Bee venom               Date  WBC BUN/CR Drug/Dose Time   Given Level(s)   (Time) Comments   2020  Day #1 8 58/1.2 Vancomycin 2000 mg IV q18h <1300>                                  Estimated Creatinine Clearance: 95 mL/min (based on SCr of 1.2 mg/dL). Intake/Output Summary (Last 24 hours) at 2020 1155  Last data filed at 2020 0600  Gross per 24 hour   Intake 4428.5 ml   Output 1825 ml   Net 2603.5 ml     Urine output over the last 24 hours: 0.5 mL/kg/hr (1875 mL total)    Temp max: Temp (24hrs), Av.4 °F (37.4 °C), Min:99.2 °F (37.3 °C), Max:99.6 °F (37.6 °C)      Cultures:  available culture and sensitivity results were reviewed in [facility]   Respiratory Panel - Negative   Legionella Urinary Antigen - Presumptive negative   Strep pneumoniae Urinary Antigen - Presumptive negative   MRSA Screen - No MRSA colonization   Respiratory cx (spuex) - No growth final    IV lines:   Peripheral IV 20 g posterior R wrist   Peripheral IV 20 g L AC    Assessment:  · 62 yo M admitted  after having SOB following an epidural and found to be hypoxic in the ED. CTA of the chest demonstrated pneumonia and pleural effusions.   An RRT was called on  for worsening hypoxia and hypercapnic respiratory failure and the patient was intubated  · Pt currently on antibiotics for respiratory process - Cefepime and Vancomycin day #1  · Consulted by Dr. Rafa Porter to dose/monitor Vancomycin  · Goal trough level:  15-20 mcg/mL  · SCr 1.2 today    Plan:  · Will order Vancomycin 2000 mg IV q18h  · Will order a Vancomycin trough level once pt reaches steady state and will adjust dose as needed  · Will monitor renal function closely    Will continue to follow. Thank you for the consult.     Gallo Delaney PharmD, BCPS, [facility]  11/21/2020  12:06 PM  Pager: [phone]

## 2020-11-22 NOTE — PROGRESS NOTES
[facility] Hospitalist   Progress Note    Admitting Date and Time: 11/17/2020  1:13 PM  Admit Dx: Acute respiratory failure with hypoxia ([address] [J96.01]  Acute respiratory failure with hypoxia ([address] [J96.01]    Seen for follow on multiple problems as listed below. Subjective:  Seen in ICU , intubated on vent , awake ,making eye contact. Reviewed care. ROS: Not possible sec to above.      cefepime  2 g Intravenous Q8H    insulin lispro  0-18 Units Subcutaneous Q6H    nicotine  1 patch Transdermal Daily    vancomycin  2,000 mg Intravenous Q18H    acetylcysteine  4 mL Inhalation BID    albuterol  2.5 mg Nebulization Q4H    Arformoterol Tartrate  15 mcg Nebulization BID    atorvastatin  80 mg Oral Nightly    [Held by provider] clopidogrel  75 mg Oral Daily    fluticasone  2 spray Nasal Daily    pregabalin  150 mg Oral BID    sodium chloride flush  10 mL Intravenous 2 times per day    enoxaparin  40 mg Subcutaneous Daily    insulin glargine  0.25 Units/kg Subcutaneous Nightly    chlorhexidine  15 mL Mouth/Throat BID    pantoprazole  40 mg Intravenous Daily    And    sodium chloride (PF)  10 mL Intravenous Daily    methylPREDNISolone  40 mg Intravenous Q8H    aspirin  325 mg Oral Daily     tiZANidine, 4 mg, TID PRN  sodium chloride flush, 10 mL, PRN  acetaminophen, 650 mg, Q6H PRN    Or  acetaminophen, 650 mg, Q6H PRN  polyethylene glycol, 17 g, Daily PRN  promethazine, 12.5 mg, Q6H PRN    Or  ondansetron, 4 mg, Q6H PRN  glucose, 15 g, PRN  dextrose, 12.5 g, PRN  glucagon (rDNA), 1 mg, PRN  dextrose, 100 mL/hr, PRN  oxyCODONE-acetaminophen, 1 tablet, TID PRN    And  oxyCODONE, 2.5 mg, TID PRN  albuterol, 2.5 mg, Q4H PRN         Objective:    /73   Pulse 87   Temp 99.2 °F (37.3 °C) (Axillary)   Resp 20   Ht 5' 11\" (1.803 m)   Wt (!) 334 lb 10.5 oz (151.8 kg)   SpO2 92%   BMI 46.68 kg/m²   General Appearance: intubated on vent - sedated,as above opens eyes intermittently  Skin: warm and dry  Head: normocephalic and atraumatic  Neck: supple and non-tender without mass  Pulmonary/Chest: diminished throughout with few coarse BS  Cardiovascular: normal rate, regular rhythm, normal S1 and S2, no murmurs, rubs, clicks, or gallops, distal pulses intact, no carotid bruits  Abdomen: soft, non-tender, non-distended, normal bowel sounds, no masses or organomegaly  Extremities: no cyanosis, clubbing   Musculoskeletal: normal range of motion  Neurologic:  Can not assess      Recent Labs     11/20/20 0625 11/21/20 0530 11/22/20  0515    144 145   K 4.3 4.6 4.9    107 111*   CO2 26 25 24   BUN 58* 58* 57*   CREATININE 1.3* 1.2 1.1   GLUCOSE 205* 183* 198*   CALCIUM 8.9 8.4* 8.3*       Recent Labs     11/20/20 0625 11/21/20 0530 11/22/20  0515   WBC 7.9 8.0 8.4   RBC 2.84* 2.76* 2.78*   HGB 7.5* 7.4* 7.5*   HCT 26.4* 26.2* 26.1*   MCV 93.0 94.9 93.9   MCH 26.4 26.8 27.0   MCHC 28.4* 28.2* 28.7*   RDW 16.3* 16.7* 16.9*   PLT 57* 48* 49*   MPV 10.8 10.2 11.9       Labs and images reviewed     Radiology:   XR CHEST PORTABLE   Final Result   No significant change in dense airspace opacity of the left upper lobe and   patchy left lower lobe airspace opacities. Enteric tube tip is not well visualized due to underpenetration. The tip may   be at the gastroesophageal junction. This could be confirmed with KUB   centered on the upper abdomen. XR CHEST PORTABLE   Final Result   Unchanged large opacity/consolidation in left upper lobe. Unchanged airspace   disease in left lower lobe. XR CHEST PORTABLE   Final Result   Grossly stable opacities on the left. XR CHEST PORTABLE   Final Result   Increasing multifocal left lung pneumonia. CT LUMBAR SPINE WO CONTRAST   Final Result   1. No evidence for epidural hematoma on CT scan. 2. Suspected sacral insufficiency fracture. XR CHEST ABDOMEN NG PLACEMENT   Final Result   NG tube tip in the stomach.

## 2020-11-22 NOTE — PLAN OF CARE
Problem: Falls - Risk of:  Goal: Will remain free from falls  Description: Will remain free from falls  Outcome: Ongoing  Goal: Absence of physical injury  Description: Absence of physical injury  Outcome: Ongoing     Problem: Skin Integrity:  Goal: Will show no infection signs and symptoms  Description: Will show no infection signs and symptoms  Outcome: Ongoing  Goal: Absence of new skin breakdown  Description: Absence of new skin breakdown  Outcome: Ongoing     Problem: Pain:  Goal: Pain level will decrease  Description: Pain level will decrease  Outcome: Ongoing  Goal: Control of acute pain  Description: Control of acute pain  Outcome: Ongoing  Goal: Control of chronic pain  Description: Control of chronic pain  Outcome: Ongoing     Problem: Gas Exchange - Impaired  Goal: Able to breathe comfortably  Description: Able to breathe comfortably  Outcome: Ongoing     Problem: Fluid and Electrolyte Imbalance  Goal: Fluid and electrolyte balance are achieved/maintained  Outcome: Ongoing     Problem: HH FLUID RETENTION-CHF  Goal: Absence of fluid overload signs and symptoms  Outcome: Ongoing     Problem: HEMODYNAMIC STATUS  Goal: Hemoglobin within specified parameters  Outcome: Ongoing     Problem: Bleeding - Risk of  Goal: Absence of active bleeding  Outcome: Ongoing     Problem: MOBILITY  Goal: Mobility/activity is maintained at optimum level for patient  Outcome: Ongoing     Problem: Restraint Use - Nonviolent/Non-Self-Destructive Behavior:  Goal: Absence of restraint indications  Description: Absence of restraint indications  Outcome: Ongoing  Goal: Absence of restraint-related injury  Description: Absence of restraint-related injury  Outcome: Ongoing

## 2020-11-22 NOTE — PROGRESS NOTES
Critical Care Team - Daily Progress Note         Date and time: 11/22/2020 3:01 PM  Patient's name:  Denise Stinson. Medical Record Number: [id number]  Patient's account/billing number: [de-identified]  Patient's YOB: 1960  Age: 61 y.o. Date of Admission: 11/17/2020  1:13 PM  Length of stay during current admission: 5      Primary Care Physician: Susannah Galindo PA-C  ICU Attending Physician: Dr. Giorgio Franklin    Code Status: Full Code    Reason for ICU admission: acute hypoxic respiratory failure      SUBJECTIVE:     BRIEF HISTORY:   The patient is a 61 y.o. male with significant past medical history of diabetes, MANOLO, back pain, hypertension, hyperlipidemia, STEMI's status post 5 stents, CAD, aortic aneurysm presenting to the the hospital initially on 11/17. He had a epidural performed yesterday for low back pain and after the procedure became very short of breath. He was satting 80% so was sent to the hospital for further evaluation. While in the ED patient was found to be thrombocytopenic as well as hypoxic on room air. He was placed on 4 L of oxygen but O2 sats improved. CTA of the chest demonstrating pneumonia and pleural effusions. He was given 1 dose of Rocephin and azithromycin while in the ER.     On 11/18 RRT was called at 7:30 AM.  Patient was found to have increased work of breathing as well as hypercapnic on ABGs. He was on BiPAP all night with minimal improvement of his symptoms. During the RT he was switched to AVAPS. He was subsequently brought down to the ICU for further evaluation and care. He was not improving on AVAPS and subsequently intubated     A bronchoscopy was performed on 11/18 and biopsies were sent. OVERNIGHT EVENTS:    11/19/20: Continues to be intubated, no acute events  11/20/20: Attempted weaning trials. Patient on pressure support for a few hours. ABGs worsening. Placed on it back on AC/VC  11/21/20: More rhonchorous today. More secretions. 20; still having moist secretions, gram positive cocci bacteremia     CURRENT VENTILATION STATUS:     [x] Ventilator  [] BIPAP  [] Nasal Cannula [] Room Air        SECRETIONS : Amount:  [] Small [x] Moderate  [] Large  [] None  Color:     [] White [x] Colored  [] Bloody    SEDATION:  RAAS Score:  [] Propofol gtt  [x] Versed gtt  [] Ativan gtt   [] No Sedation    PARALYZED:  [x] No    [] Yes      VASOPRESSORS:  [x] No    [] Yes    If yes -   [] Levophed       [] Dopamine     [] Vasopressin       [] Dobutamine  [] Phenylephrine         [] Epinephrine    CENTRAL LINES:     [x] No   [] Yes   (Date of Insertion:   )           If yes -     [] Right IJ     [] Left IJ [] Right Femoral [] Left Femoral                   [] Right Subclavian [] Left Subclavian       HUFF'S CATHETER:   [] No   [x] Yes  (Date of Insertion:   )     URINE OUTPUT:            [x] Good   [] Low              [] Anuric      OBJECTIVE:     VITAL SIGNS:  BP (!) 146/86   Pulse 81   Temp 99.2 °F (37.3 °C) (Axillary)   Resp 20   Ht 5' 11\" (1.803 m)   Wt (!) 334 lb 10.5 oz (151.8 kg)   SpO2 90%   BMI 46.68 kg/m²   Tmax over 24 hours:  Temp (24hrs), Av.8 °F (37.1 °C), Min:98.1 °F (36.7 °C), Max:99.2 °F (37.3 °C)      Patient Vitals for the past 6 hrs:   BP Pulse Resp SpO2   20 1244 -- 81 20 90 %   20 1100 (!) 146/86 84 20 91 %   20 1000 117/73 87 20 92 %   20 0953 -- 93 19 92 %   20 0952 -- -- 19 92 %   20 0951 -- -- 11 91 %         Intake/Output Summary (Last 24 hours) at 2020 1501  Last data filed at 2020 1000  Gross per 24 hour   Intake 3541 ml   Output 1465 ml   Net 2076 ml     Wt Readings from Last 2 Encounters:   20 (!) 334 lb 10.5 oz (151.8 kg)   20 (!) 314 lb (142.4 kg)     Body mass index is 46.68 kg/m². PHYSICAL EXAMINATION:  General: Intubated   HEENT:  Normocephalic, atraumatic. Pulls equal and reactive no scleral icterus. No conjunctival injection.  No nasal discharge. Neck:  Supple, without stridor, no JVD  Heart:  RRR, no murmurs, gallops, rubs  Lungs: Greatly diminished in left upper lobe, rhonchorous throughout the remainder of the lung fields   Abdomen: Obese, bowel sounds present, soft, non-tender, non-distended, no guarding or rigidity  Extremities:  No clubbing, cyanosis, mild 1+ edema bilaterally  Skin:  Warm and dry  Neuro: Following commands. Cranial nerves II through XII grossly intact.   No focal neurologic deficits  Breast: deferred  Rectal: deferred  Genitalia:  deferred     MEDICATIONS:    Scheduled Meds:   cefepime  2 g Intravenous Q8H    insulin lispro  0-18 Units Subcutaneous Q6H    nicotine  1 patch Transdermal Daily    vancomycin  2,000 mg Intravenous Q18H    acetylcysteine  4 mL Inhalation BID    albuterol  2.5 mg Nebulization Q4H    Arformoterol Tartrate  15 mcg Nebulization BID    atorvastatin  80 mg Oral Nightly    [Held by provider] clopidogrel  75 mg Oral Daily    fluticasone  2 spray Nasal Daily    pregabalin  150 mg Oral BID    sodium chloride flush  10 mL Intravenous 2 times per day    enoxaparin  40 mg Subcutaneous Daily    insulin glargine  0.25 Units/kg Subcutaneous Nightly    chlorhexidine  15 mL Mouth/Throat BID    pantoprazole  40 mg Intravenous Daily    And    sodium chloride (PF)  10 mL Intravenous Daily    methylPREDNISolone  40 mg Intravenous Q8H    aspirin  325 mg Oral Daily     Continuous Infusions:   dextrose      fentaNYL 5 mcg/ml in 0.9%  ml infusion 200 mcg/hr (11/22/20 1419)    midazolam 10 mg/hr (11/22/20 0548)     PRN Meds:   tiZANidine, 4 mg, TID PRN  sodium chloride flush, 10 mL, PRN  acetaminophen, 650 mg, Q6H PRN    Or  acetaminophen, 650 mg, Q6H PRN  polyethylene glycol, 17 g, Daily PRN  promethazine, 12.5 mg, Q6H PRN    Or  ondansetron, 4 mg, Q6H PRN  glucose, 15 g, PRN  dextrose, 12.5 g, PRN  glucagon (rDNA), 1 mg, PRN  dextrose, 100 mL/hr, PRN  oxyCODONE-acetaminophen, 1 tablet, TID PRN    And  oxyCODONE, 2.5 mg, TID PRN  albuterol, 2.5 mg, Q4H PRN        VENT SETTINGS (Comprehensive) (if applicable):  Vent Information  $Ventilation: $Subsequent Day  Skin Assessment: Clean, dry, & intact  Equipment ID: [id number]  Vent Type: [id number]  Vent Mode: AC/VC  Vt Ordered: 500 mL  Rate Set: 20 bmp  Peak Flow: 65 L/min  Pressure Support: 0 cmH20  FiO2 : 60 %  SpO2: 90 %  SpO2/FiO2 ratio: 150  Sensitivity: 3  PEEP/CPAP: 10  I Time/ I Time %: 0 s  Humidification Source: Heated wire  Humidification Temp: 37  Humidification Temp Measured: 36.9  Circuit Condensation: Drained  Mask Type: Full face mask  Mask Size: Medium  Additional Respiratory  Assessments  Pulse: 81  Resp: 20  SpO2: 90 %  Position: Semi-Jeff's  Humidification Source: Heated wire  Humidification Temp: 37  Circuit Condensation: Drained  Oral Care: Mouth swabbed, Mouth suctioned  Subglottic Suction Done?: Yes  Cuff Pressure (cm H2O): 29 cm H2O    ABGs:   Recent Labs     11/22/20  0539   PH 7.340*   PCO2 49.3*   PO2 69.4*   HCO3 26.0   BE 0.0   O2SAT 92.0       Laboratory findings:    Complete Blood Count:   Recent Labs     11/20/20 0625 11/21/20 0530 11/22/20  0515   WBC 7.9 8.0 8.4   HGB 7.5* 7.4* 7.5*   HCT 26.4* 26.2* 26.1*   PLT 57* 48* 49*        Last 3 Blood Glucose:   Recent Labs     11/20/20 0625 11/21/20 0530 11/22/20  0515   GLUCOSE 205* 183* 198*        PT/INR:    Lab Results   Component Value Date    PROTIME 11.4 10/15/2020    PROTIME 11.1 12/30/2011    INR 1.0 10/15/2020     PTT:    Lab Results   Component Value Date    APTT 29.0 12/28/2011       Comprehensive Metabolic Profile:   Recent Labs     11/20/20 0625 11/21/20 0530 11/22/20  0515    144 145   K 4.3 4.6 4.9    107 111*   CO2 26 25 24   BUN 58* 58* 57*   CREATININE 1.3* 1.2 1.1   GLUCOSE 205* 183* 198*   CALCIUM 8.9 8.4* 8.3*   PROT 6.0* 5.8* 5.8*   LABALBU 3.1* 3.0* 2.8*   BILITOT 0.2 0.4 0.3   ALKPHOS 280* 224* 217*   AST 21 23 30   ALT 24 26 30 Magnesium:   Lab Results   Component Value Date    MG 2.8 11/22/2020     Phosphorus:   Lab Results   Component Value Date    PHOS 2.5 11/22/2020     Ionized Calcium:   Lab Results   Component Value Date    CAION 1.33 10/23/2020        Urinalysis:     Troponin:   No results for input(s): TROPONINI in the last 72 hours. Microbiology:  GPC blood   Respiratory culture no growth    Radiology/Imaging:     Chest x-ray:     Impression    Nasogastric tube is present with side hole at level of gastroesophageal    junction.  This tube could be advanced approximately 4 or 5 cm.                 PLAN:     WEAN PER PROTOCOL:  [] No   [x] Yes  [] N/A    DISCONTINUE ANY LABS:   [x] No   [] Yes    ICU PROPHYLAXIS:  Stress ulcer:  [x] PPI Agent  [] [id number] [] Sucralfate  [] Other:  VTE:   [x] Enoxaparin  [] Unfract. Heparin Subcut  [] EPC Cuffs    NUTRITION:  [] NPO [x] Tube Feeding (Specify: ) [] TPN  [] PO (Diet: DIET TUBE FEED CONTINUOUS/CYCLIC NPO; Diabetic 1.5; Orogastric; Continuous; 20; 60)    HOME MEDICATIONS RECONCILED: [] No  [x] Yes    INSULIN DRIP:   [x] No   [] Yes    CONSULTATION NEEDED:  [x] No   [] Yes     FAMILY UPDATED:    [] No   [x] Yes    TRANSFER OUT OF ICU:   [x] No   [] Yes    ASSESSMENT:     Neuro   Chronic back pain              -Epidural on 11/17, no hematoma seen on CT lumbar spine      Sedated on the vent              -Versed, fentanyl     Respiratory   Acute hypoxic and hypercapnic respiratory failure       -continue full vent support.   PSV when secretions improve              -Follow ABG              -Albuterol, incentive spirometer              -solumedrol   -wean FiO2 as able   -metanebs   -Mucomyst   -bedside L thoracentesis today with only a small amount of pleural fluid      Community-acquired pneumonia              -antibiotics  Have been broadened              -Strep and Legionella negative              -Respiratory culture pending              -Pro-Kael 0.23     Lung mass OMER -Status post [facility] 11/18 with TBNA              -No obvious mucous plugging or purulence. Very erythematous and compressed airways, concern for mass              -await results of TBNA     Respiratory film array negative  Long, lifetime smoker--nicotine patch     Cardiovascular   CAD/stents              -Plavix is on hold for approximately 7 days per patient he had his epidural.  It has still been on hold in case he needs further procedures.      History of aortic aneurysm              -Status post endovascular repair              -Stable on most recent CT     Gastrointestinal   Tube feeding      Renal     beto-improved         Infectious Disease   Community-acquired pneumonia              -cefepime and Vanco day 2              -Pro-Kael 0.23                       -    Blood cultures gram positive cocci - await speciation      Hematology/Oncology   Thrombocytopenia              - received platelets x1              -Monitor      Endocrine   Diabetes              -Noninsulin-dependent              -Monitor sugars   -Increase to high-dose sliding scale    msk   Chronic back pain              -Status post epidural on 11/17              -No erythematous region or fluctuance              -CT with no evidence of epidural hematoma     Social/Spiritual/DNR/Other   Full code   ______________________________________________________________________     ASSESSMENT/ PLAN   1. As above  2. Awaiting bronch results  3. Vanco and cefepime  4. Initiating possible transfer to [facility]  5. GI prophylaxis  6. DVT Prophylaxis  7. Bedside US with only a small amount of pleural fluid      Dispo: continue ICU level of care    35 minutes of CCT spent with the patient, reviewing the chart including imaging studies, and discussing the case with other health care professionals. This time excludes procedures.      Geovanny Naidu MD

## 2020-11-22 NOTE — PROGRESS NOTES
#2  · Consulted by Dr. Jaqui Cortez to dose/monitor Vancomycin  · Goal trough level:  15-20 mcg/mL  · SCr 1.1 today    Plan:  · Will continue vancomycin 2000 mg IV every 18 hours  · Will check vancomycin trough level once pt reaches steady state and will adjust dose as needed  · Will monitor renal function closely    Dionisio Mcknight PharmD, [facility]  11/22/2020  8:50 AM

## 2020-11-23 NOTE — CARE COORDINATION
11/23/2020  Social Work Discharge Planning:SW received a call (spent 36 minutes on the phone) from Pts spouse and she was very upset regarding Pts underlying issues and feels he isnt being treated aggressively enough. Spouse asked for Pt advocates' phone number and also said she would like Pt transferred to either [facility] or [facility], since she was informed that [facility] is unable to accept Pt due to lock down for COVID. Electronically signed by BRIEN Hsu on 11/23/2020 at 11:04 AM

## 2020-11-23 NOTE — CARE COORDINATION
COVID negative 11/17. Vent/ intubated since 11/18-FIO2 60%, PEEP 10 . On sedation, iv abxs. Per access center, [facility] denied referral. From home w/ wife PTA-uses walker,cane. Previous PAVEL choices per  note were [facility] and [address] [facility]. Discharge plan unknown pending progress. Will follow.   Mamta Walls

## 2020-11-23 NOTE — PROGRESS NOTES
Spoke with Dr. Nessa Johnson, from [facility] in [facility] [employer] [facility]. They are happy to accept the patient but will need a repeat covid swab. We will call the transfer center back with COVID results.

## 2020-11-23 NOTE — PROGRESS NOTES
Becky Goldman Hospitalist   Progress Note    Admitting Date and Time: 11/17/2020  1:13 PM  Admit Dx: Acute respiratory failure with hypoxia ([facility] [address] [J96.01]  Acute respiratory failure with hypoxia ([facility] [address] [J96.01]    Seen for follow on multiple problems as listed below. Subjective: Intubated on vent, reviewed care with bedside nurse. Hemonc consulted as per wife's request for  Thrombocytopenia. ROS: Not possible sec to above.      sodium phosphate IVPB  15 mmol Intravenous Once    cefepime  2 g Intravenous Q8H    insulin lispro  0-18 Units Subcutaneous Q6H    nicotine  1 patch Transdermal Daily    acetylcysteine  4 mL Inhalation BID    albuterol  2.5 mg Nebulization Q4H    Arformoterol Tartrate  15 mcg Nebulization BID    atorvastatin  80 mg Oral Nightly    [Held by provider] clopidogrel  75 mg Oral Daily    fluticasone  2 spray Nasal Daily    pregabalin  150 mg Oral BID    sodium chloride flush  10 mL Intravenous 2 times per day    [Held by provider] enoxaparin  40 mg Subcutaneous Daily    insulin glargine  0.25 Units/kg Subcutaneous Nightly    chlorhexidine  15 mL Mouth/Throat BID    pantoprazole  40 mg Intravenous Daily    And    sodium chloride (PF)  10 mL Intravenous Daily    methylPREDNISolone  40 mg Intravenous Q8H    aspirin  325 mg Oral Daily     tiZANidine, 4 mg, TID PRN  sodium chloride flush, 10 mL, PRN  acetaminophen, 650 mg, Q6H PRN    Or  acetaminophen, 650 mg, Q6H PRN  polyethylene glycol, 17 g, Daily PRN  promethazine, 12.5 mg, Q6H PRN    Or  ondansetron, 4 mg, Q6H PRN  glucose, 15 g, PRN  dextrose, 12.5 g, PRN  glucagon (rDNA), 1 mg, PRN  dextrose, 100 mL/hr, PRN  oxyCODONE-acetaminophen, 1 tablet, TID PRN    And  oxyCODONE, 2.5 mg, TID PRN  albuterol, 2.5 mg, Q4H PRN         Objective:    /78   Pulse 103   Temp 100.8 °F (38.2 °C) (Axillary)   Resp 11   Ht 5' 11\" (1.803 m)   Wt (!) 334 lb 10.5 oz (151.8 kg)   SpO2 90%   BMI 46.68 kg/m²   General Appearance: intubated on vent - sedated  Skin: warm and dry  Head: normocephalic and atraumatic  Neck: supple and non-tender without mass  Pulmonary/Chest: diminished throughout with few coarse BS  Cardiovascular: normal rate, regular rhythm, normal S1 and S2  Abdomen: soft, non-tender, non-distended, normal bowel sounds, no masses or organomegaly  Extremities: no cyanosis, clubbing   Musculoskeletal: normal range of motion  Neurologic:  Can not assess      Recent Labs     11/21/20 0530 11/22/20  0515 11/23/20  0525    145 146   K 4.6 4.9 5.1*    111* 111*   CO2 25 24 24   BUN 58* 57* 55*   CREATININE 1.2 1.1 1.1   GLUCOSE 183* 198* 200*   CALCIUM 8.4* 8.3* 8.5*       Recent Labs     11/21/20 0530 11/22/20 0515 11/23/20  0525   WBC 8.0 8.4 10.1   RBC 2.76* 2.78* 2.93*   HGB 7.4* 7.5* 7.8*   HCT 26.2* 26.1* 27.6*   MCV 94.9 93.9 94.2   MCH 26.8 27.0 26.6   MCHC 28.2* 28.7* 28.3*   RDW 16.7* 16.9* 17.0*   PLT 48* 49* 39*   MPV 10.2 11.9 10.7       Labs and images reviewed     Radiology:   XR ABDOMEN (KUB) (SINGLE AP VIEW)   Final Result   1. There is no bowel obstruction, ileus or free air. XR CHEST PORTABLE   Final Result   1. No interval change in the dense consolidation seen within the left upper   lobe. 2. Minimal right lung base atelectasis. XR ABDOMEN FOR NG/OG/NE TUBE PLACEMENT   Final Result   Tip of NG tube at the level of the left hemidiaphragm, no change. XR CHEST ABDOMEN NG PLACEMENT   Final Result   Nasogastric tube is present with side hole at level of gastroesophageal   junction. This tube could be advanced approximately 4 or 5 cm. XR CHEST PORTABLE   Final Result   No significant change in dense airspace opacity of the left upper lobe and   patchy left lower lobe airspace opacities. Enteric tube tip is not well visualized due to underpenetration. The tip may   be at the gastroesophageal junction. This could be confirmed with KUB   centered on the upper abdomen. XR CHEST PORTABLE   Final Result   Unchanged large opacity/consolidation in left upper lobe. Unchanged airspace   disease in left lower lobe. XR CHEST PORTABLE   Final Result   Grossly stable opacities on the left. XR CHEST PORTABLE   Final Result   Increasing multifocal left lung pneumonia. CT LUMBAR SPINE WO CONTRAST   Final Result   1. No evidence for epidural hematoma on CT scan. 2. Suspected sacral insufficiency fracture. XR CHEST ABDOMEN NG PLACEMENT   Final Result   NG tube tip in the stomach. XR CHEST 1 VIEW   Final Result   1. The life-support lines and tubes are well positioned. 2. Masslike consolidative opacity in the left upper lung. Small left   effusion. XR CHEST PORTABLE   Final Result   Left upper lobe masslike consolidation. Small to moderate left pleural effusion. CTA CHEST W CONTRAST   Final Result   1. New confluent opacities located in left upper lobe suggesting pneumonia,   atelectasis, or neoplasm. 2.  Patchy airspace opacities located in lingula suggesting pneumonia with   areas of atelectasis. 3.  Stenosis and occlusion are associated with left upper lobe segmental   bronchi and lingular segmental bronchi. 4.  Moderate to large left pleural effusion. 5.  Mediastinal lymphadenopathy. 6.  Stable fusiform infrarenal abdominal aortic aneurysm with endograft   repair. Aneurysm measures up to 6.4 cm. No evidence of endoleak or   retroperitoneal fluid. 7.  Diverticulosis. CTA ABDOMEN PELVIS W CONTRAST   Final Result   1. New confluent opacities located in left upper lobe suggesting pneumonia,   atelectasis, or neoplasm. 2.  Patchy airspace opacities located in lingula suggesting pneumonia with   areas of atelectasis. 3.  Stenosis and occlusion are associated with left upper lobe segmental   bronchi and lingular segmental bronchi. 4.  Moderate to large left pleural effusion. 5.  Mediastinal lymphadenopathy.    6.  Stable fusiform infrarenal abdominal aortic aneurysm with endograft   repair. Aneurysm measures up to 6.4 cm. No evidence of endoleak or   retroperitoneal fluid. 7.  Diverticulosis. XR CHEST PORTABLE    (Results Pending)       Assessment:    Active Problems:    CAD (coronary artery disease)    Smoker    Non-insulin dependent type 2 diabetes mellitus ([address]    Morbid obesity with BMI of 40.0-44.9, adult (HCC)    Chronic back pain    Acute respiratory failure with hypoxia ([address]    Community acquired pneumonia of left upper lobe of lung    Thrombocytopenia ([facility] [address]  Resolved Problems:    * No resolved hospital problems. *      Plan:  Acute hypoxic and hypercapnic respiratory failure secondary to CAP-s/p intubation 11/18 . Was on IV Rocephin plus Zithromax now switched to Rocephin plus Levaquin, on IV steroids, nebulized treatments, critical care/pulmonary following. S/p [facility] with biopsy of left upper lobe atelectasis/consolidation/mass. Respiratory panel negative. Covid negative. procal 0.23. Follow cxs. CC managing. CAP - on IV Rocephin + Levaq and other supportive rx. Suspect COPD exacerbation with MANOLO -on  bronchodilators, IV steroids, vent support as per critical care. Will need out pt follow with pulm    Thrombocytopenia-chronic,s/p 1 U plts , monitor. NIDDM - On Lantus + ISS    Back pain status post epidural injection 11/17, CT spine negative for hematoma    Hypertension / Coronary artery status post PC I -on aspirin, Plavix ,statin    Hyperlipidemia - on stain     Tobacco use-has been counseled and is on nicotine patch. Does not have official COPD as a diagnosis-but has 39 pack years history of smoking. Out pt follow for sleep studies as well as PFTs    On Lovenox for DVT prophylaxis  Full code    Wife wanted transfer [facility] has  denied .     Electronically signed by Makayla Espinoza MD on 11/23/2020 at 1:25 PM

## 2020-11-23 NOTE — PROCEDURES
Midline  Catheter insertion date 11/23/2020     Product Number:  [id number]   Lot No: [id number]   Gauge: 4.5 fr   Lumen: single   L Basilic    Vein Diameter : 0.45 cm   Upper arm circumference (10CM ABOVE AC): 38 cm   Catheter Length : 15 cm   Internal Length: 14 cm   External Catheter Length: 1 cm   Ultrasound Used:yes    : Imer Dsouza RN

## 2020-11-23 NOTE — PROGRESS NOTES
ENDOVASCULAR N/A 6/28/2019    ENDOVASCULAR REPAIR ABDOMINAL AORTIC ANEURYSM performed by Gerda Beltran MD at [facility] Bilateral 3/12/2020    BILATERAL L3,L4,L5 MEDIAL BRANCH NERVE BLOCK performed by Kirsty Lama DO at [facility] Bilateral 6/11/2020    BILATERAL L3,L4,L5 MEDIAL NERVE BRANCH BLOCK #2 performed by Kirsty Lama DO at [address] [place] [address] N/A 11/18/2020    BRONCHOSCOPY/TRANSBRONCHIAL NEEDLE BIOPSY performed by Aurora Rowe MD at [facility]  X4    CORONARY ANGIOPLASTY WITH STENT PLACEMENT  12/30/2011    Dr. Cookie Hrap 1st OM 3.0 x 20 Ion    DIAGNOSTIC CARDIAC CATH LAB PROCEDURE  3/15/2005    [facility]. Mild to moderate CAD. Normal LV.  DIAGNOSTIC CARDIAC CATH LAB PROCEDURE  1/16/2007    [facility]. Cath/PTCA/DE stent of proximal and distal RCA.  DIAGNOSTIC CARDIAC CATH LAB PROCEDURE  2/21/2007    Cath/DE stent of proximal OM1 and proximal Cx.  DIAGNOSTIC CARDIAC CATH LAB PROCEDURE  12/30/2011    ANURADHA of mid OM branch of circumflex.      ECHO COMPL W DOP COLOR FLOW  12/30/2011         HERNIA REPAIR  2/2014    laparoscopic ventral hernia repain    JOINT REPLACEMENT Left 4/1/14    TKA-ed    JOINT REPLACEMENT Right 2018    KNEE    PAIN MANAGEMENT PROCEDURE Right 9/1/2020    RIGHT L3,4,5 MEDIAL BRANCH RADIOFREQUENCY ABLATION performed by Kirsty Lama DO at [facility] N/A 11/17/2020    L4-5 EPIDURAL STEROID INJECTION #1 performed by Kirsty Lama DO at [address] [facility] OFFICE/OUTPT VISIT,PROCEDURE ONLY Right 11/26/2018    RIGHT KNEE TOTAL ARTHROPLASTY performed by Zuleyma Caicedo DO at [facility] OR               fentaNYL 5 mcg/ml in 0.9%  ml infusion 200 mcg/hr (11/23/20 1104)    propofol 10 mcg/kg/min (11/23/20 1227)    dextrose      midazolam 8 mg/hr (11/23/20 0229)      sodium phosphate IVPB  15 mmol Intravenous Once    cefepime  2 g Intravenous Q8H    insulin lispro  0-18 Units Subcutaneous Q6H    nicotine  1 patch Transdermal Daily    acetylcysteine  4 mL Inhalation BID    albuterol  2.5 mg Nebulization Q4H    Arformoterol Tartrate  15 mcg Nebulization BID    atorvastatin  80 mg Oral Nightly    [Held by provider] clopidogrel  75 mg Oral Daily    fluticasone  2 spray Nasal Daily    pregabalin  150 mg Oral BID    sodium chloride flush  10 mL Intravenous 2 times per day    [Held by provider] enoxaparin  40 mg Subcutaneous Daily    insulin glargine  0.25 Units/kg Subcutaneous Nightly    chlorhexidine  15 mL Mouth/Throat BID    pantoprazole  40 mg Intravenous Daily    And    sodium chloride (PF)  10 mL Intravenous Daily    methylPREDNISolone  40 mg Intravenous Q8H    aspirin  325 mg Oral Daily         Vitals:  Tmax:  VITALS:  /78   Pulse 103   Temp 100.8 °F (38.2 °C) (Axillary)   Resp 11   Ht 5' 11\" (1.803 m)   Wt (!) 334 lb 10.5 oz (151.8 kg)   SpO2 90%   BMI 46.68 kg/m²   24HR INTAKE/OUTPUT:      Intake/Output Summary (Last 24 hours) at 2020 1259  Last data filed at 2020 1100  Gross per 24 hour   Intake 3005 ml   Output 1990 ml   Net 1015 ml     CURRENT PULSE OXIMETRY:  SpO2: 90 %  24HR PULSE OXIMETRY RANGE:  SpO2  Av.8 %  Min: 90 %  Max: 94 %        EXAM:  General: No distress. Alert. Sedated. Obese  Eyes: PERRL. No sclera icterus. No conjunctival injection. ENT: No discharge. Pharynx clear. Endotracheal tube is in place. Secretions ++  Neck: Trachea midline. Normal thyroid. Thick  Resp: No accessory muscle use. No crackles. Bronchial sounds noticed left more than right. Fairly prolonged expiratory phase noticed  CV: Regular rate. Regular rhythm. No mumur or rub. ABD: Non-tender. Non-distended. No masses. No organmegaly. Normal bowel sounds. Fatty, soft  Skin: Warm and dry. No nodule on exposed extremities. No rash on exposed extremities. Lymph: No cervical LAD. No supraclavicular LAD.    Ext: No joint deformity. No clubbing. No cyanosis. 1+ edema  Neuro: . Positive pupils/gag/corneals. Normal pain response. Lab Results:  CBC:   Recent Labs     11/21/20 0530 11/22/20 0515 11/23/20 0525   WBC 8.0 8.4 10.1   HGB 7.4* 7.5* 7.8*   HCT 26.2* 26.1* 27.6*   MCV 94.9 93.9 94.2   PLT 48* 49* 39*     BMP:   Recent Labs     11/21/20 0530 11/22/20 0515 11/23/20 0525    145 146   K 4.6 4.9 5.1*    111* 111*   CO2 25 24 24   PHOS 2.4* 2.5 2.3*   BUN 58* 57* 55*   CREATININE 1.2 1.1 1.1      ALB:3,BILIDIR:3,BILITOT:3,ALKPHOS:3)@  PT/INR: No results for input(s): PROTIME, INR in the last 72 hours. Cultures:  -    ABG: noted  Films:  CT : Left upper lobe collapse      Assessment:  · Acute on chronic respiratory failure, day #6  · Community-acquired pneumonia  · History heavy smoking and morbid obesity, cannot rule out underlying COPD, cannot rule out MANOLO      Plan:  · Agree with current plan. · Antibiotic,  cefepime #3  · Cultures, candida  · Bronchodilators and IV steroids  · Follow results of bronchoscopy, cytology still pending  · Vent management as per CCM  · Discussed with CCM team  · To continue LABA with hope to improve bronchoconstriction      .     Jaky Mejia M.D., F.C.C.P.                    pulp

## 2020-11-23 NOTE — PATIENT CARE CONFERENCE
Intensive Care Daily Quality Rounding Checklist        ICU Team Members:      ICU Day #: NUMBER: 6     Intubation Date: November 18     Ventilator Day #: NUMBER: 6     Central Line Insertion Date: N/A                                                    MLJ #: N/A      Arterial Line Insertion Date: N/A                             Day #: N/A     DVT Prophylaxis: Lovenox    GI Prophylaxis: Protonix     Black Catheter Insertion Manpreet David                                        Day #: 7                             Continued need (if yes, reason documented and discussed with physician): yes, need for accurate I+O's     Skin Issues/ Wounds and ordered treatment discussed on rounds: no issues, SOS precautions     Goals/ Plans for the Day:  Initiate transfer to [facility], continue critical care management, [facility] transfer denied to main and all secondary sites as well family updated, start prop gtt, consult hemonc

## 2020-11-23 NOTE — PROGRESS NOTES
Spoke with [facility]. Updated about patient, diagnosis, and family wanting to pursue transfer. They took down all info and will call back if patient is accepted and when/if they have a bed.

## 2020-11-23 NOTE — PLAN OF CARE
Problem: Falls - Risk of:  Goal: Will remain free from falls  Description: Will remain free from falls  11/23/2020 0910 by Kendal Garcia RN  Outcome: Met This Shift  11/23/2020 0031 by Magdalena Dooley RN  Outcome: Met This Shift  Goal: Absence of physical injury  Description: Absence of physical injury  11/23/2020 0910 by Kendal Garcia RN  Outcome: Met This Shift  11/23/2020 0031 by Magdalena Dooley RN  Outcome: Met This Shift

## 2020-11-23 NOTE — PROGRESS NOTES
Phone call received from [facility]. They spoke to the MICU attending and the attending states they are declining the patient for transfer/acceptance at this time as they are tight and do not have beds at this time.

## 2020-11-23 NOTE — CONSULTS
[facility]  Hematology/Oncology  Consult      Patient Name: Lauren Guillermo. YOB: 1960  PCP: Comfort Gann PA-C   Referring Provider:      Reason for Consultation:   Chief Complaint   Patient presents with    Shortness of Breath     patient sent from surgery after having epideral injection L4-L5. SOB has been for past two weeks        History of Present Illness: This is a 60 yo male patient with a past medical history of CAD, DM type 2, morbid obesity, chronic back pain, HTN, AAA s/p repair who set to the emergency room by Dr. Becca Knight for complaints of shortness of breath and decreased O2 during an epidural procedure. Patient was having an L4/L5 epidural for pain management done by Dr. Becca Knight when his O2 dropped into the 80's during the procedure. Patient also reported having worsening shortness of breath over the past two weeks with a productive cough. Patient is basically bed bound only taking occasional steps. CTA of the chest, A/P showed new confluent opacities located in left upper lobe suggesting pneumonia, atelectasis, or neoplasm. Patchy airspace opacities located in lingula suggesting pneumonia with areas of atelectasis. Stenosis and occlusion are associated with left upper lobe segmental bronchi and lingular segmental bronchi. Moderate to large left pleural effusion. Mediastinal lymphadenopathy. Stable fusiform infrarenal abdominal aortic aneurysm with endograft repair. Aneurysm measures up to 6.4 cm. No evidence of endoleak or retroperitoneal fluid. He was given 1 dose of Rocephin and azithromycin while in the ER. CBC since admission has shown anemia and thrombocytopenia. 11/18 an RRT was called and was subsequently intubated. He also had a bronchoscopy done and bxs of left upper lobe were sent.        Diagnostic Data:     Past Medical History:   Diagnosis Date    Anticoagulant long-term use     Arthritis     CAD (coronary artery disease)     Chronic back pain     Depression     Dyslipidemia     Hiatal hernia 1979    History of ST elevation myocardial infarction (STEMI)     Hyperlipidemia     Hypertension     Low back pain     Lumbar radiculopathy 8/14/2019    Obesity     MANOLO on CPAP     Presence of stent in left circumflex coronary artery     Presence of stent in right coronary artery     Smoker     Type 2 diabetes mellitus without complication [facility])        Patient Active Problem List    Diagnosis Date Noted    Acute respiratory failure with hypoxia ([address] 11/17/2020    Community acquired pneumonia of left upper lobe of lung 11/17/2020    Thrombocytopenia ([address] 11/17/2020    Spinal stenosis of lumbar region with neurogenic claudication 10/29/2020    Obesity     Chronic back pain     Lumbosacral spondylosis without myelopathy 12/11/2019    Lumbar radiculopathy 08/14/2019    AAA (abdominal aortic aneurysm) ([address] 06/28/2019    Chronic bilateral low back pain with bilateral sciatica 06/11/2019    Right hip pain 06/11/2019    DDD (degenerative disc disease), lumbar 06/11/2019    Status post total right knee replacement 11/26/2018    Morbid obesity with BMI of 40.0-44.9, adult ([address] 11/26/2018    Chronic pain syndrome 07/27/2018    Chronic pain of right knee 07/27/2018    Non-insulin dependent type 2 diabetes mellitus ([address] 03/16/2018    Aortic valve stenosis 02/15/2018    Primary osteoarthritis of right knee 02/15/2018    Presence of stent in left circumflex coronary artery     Smoker     CAD (coronary artery disease) 12/28/2011    HTN (hypertension) 12/28/2011    Hyperlipemia 12/28/2011        Past Surgical History:   Procedure Laterality Date    ABDOMINAL AORTIC ANEURYSM REPAIR, ENDOVASCULAR N/A 6/28/2019    ENDOVASCULAR REPAIR ABDOMINAL AORTIC ANEURYSM performed by Jackson Bertrand MD at [address] Bilateral 3/12/2020    BILATERAL L3,L4,L5 MEDIAL BRANCH NERVE BLOCK performed by Shannan Serrano DO at [address] ANESTHESIA NERVE BLOCK Bilateral 6/11/2020    BILATERAL L3,L4,L5 MEDIAL NERVE BRANCH BLOCK #2 performed by Tyrone Barrett DO at [address] [place] [address] N/A 11/18/2020    BRONCHOSCOPY/TRANSBRONCHIAL NEEDLE BIOPSY performed by Geovanny Naidu MD at [address] [place] [address]  12/30/2011    Dr. Lulu Wheeler 1st OM 3.0 x 20 Ion    DIAGNOSTIC CARDIAC CATH LAB PROCEDURE  3/15/2005    [facility]. Mild to moderate CAD. Normal LV.  DIAGNOSTIC CARDIAC CATH LAB PROCEDURE  1/16/2007    [facility]. Cath/PTCA/DE stent of proximal and distal RCA.  DIAGNOSTIC CARDIAC CATH LAB PROCEDURE  2/21/2007    Cath/DE stent of proximal OM1 and proximal Cx.  DIAGNOSTIC CARDIAC CATH LAB PROCEDURE  12/30/2011    ANURADHA of mid OM branch of circumflex.  ECHO COMPL W DOP COLOR FLOW  12/30/2011         HERNIA REPAIR  2/2014    laparoscopic ventral hernia repain    JOINT REPLACEMENT Left 4/1/14    TKA-ed    JOINT REPLACEMENT Right 2018    KNEE    PAIN MANAGEMENT PROCEDURE Right 9/1/2020    RIGHT L3,4,5 MEDIAL BRANCH RADIOFREQUENCY ABLATION performed by Tyrone Barrett DO at [facility] N/A 11/17/2020    L4-5 EPIDURAL STEROID INJECTION #1 performed by Tyrone Barrett DO at [place] [address] OFFICE/OUTPT VISIT,PROCEDURE ONLY Right 11/26/2018    RIGHT KNEE TOTAL ARTHROPLASTY performed by Margaret Ornelas DO at [facility] OR       Family History  Family History   Problem Relation Age of Onset    Diabetes Mother     Stroke Mother     Diabetes Father     No Known Problems Sister        Social History    TOBACCO:   reports that he has been smoking cigarettes. He started smoking about 42 years ago. He has a 39.00 pack-year smoking history. He has never used smokeless tobacco.  ETOH:   reports no history of alcohol use. Home Medications  Prior to Admission medications    Medication Sig Start Date End Date Taking?  Authorizing Provider loratadine (CLARITIN) 10 MG capsule Take 10 mg by mouth daily   Yes Historical Provider, MD   pioglitazone (ACTOS) 15 MG tablet TAKE 1 TABLET BY MOUTH EVERY DAY 11/6/20  Yes Carrington Potter PA-C   pregabalin (LYRICA) 150 MG capsule Take 1 capsule by mouth 2 times daily for 30 days. 11/12/20 12/12/20 Yes Gamaliel Hannon DO   diclofenac (VOLTAREN) 75 MG EC tablet TAKE 1 TABLET BY MOUTH TWICE A DAY 10/7/20  Yes Historical Provider, MD   tiZANidine (ZANAFLEX) 4 MG tablet Take 1 tablet by mouth 3 times daily as needed (spasms) 10/29/20 11/28/20 Yes Gamaliel Hannon,    oxyCODONE-acetaminophen (PERCOCET) 7.5-325 MG per tablet Take 1 tablet by mouth 3 times daily as needed for Pain for up to 30 days.  Intended supply: 30 days 11/12/20 12/12/20 Yes Gamaliel Hannon,    quinapril (ACCUPRIL) 10 MG tablet Take 1 tablet by mouth daily  Patient taking differently: Take 10 mg by mouth daily Wife states on hold 10/16/20  Yes Carrington Potter PA-C   metFORMIN (GLUCOPHAGE) 500 MG tablet TAKE 1 TABLET BY MOUTH TWICE A DAY WITH MEALS  Patient taking differently: Wife states on hold 10/8/20  Yes Destiny Mejia DO   fluticasone (FLONASE) 50 MCG/ACT nasal spray 2 sprays by Nasal route daily 10/6/20  Yes Carrington Potter PA-C   omega-3 acid ethyl esters (LOVAZA) 1 g capsule take 1 capsule twice a day 8/14/20  Yes Carrington Potter PA-C   niacin (SLO-NIACIN) 500 MG extended release tablet take 1 tablet by mouth once daily 7/20/20  Yes Destiny Mejia DO   clopidogrel (PLAVIX) 75 MG tablet TAKE 1 TABLET BY MOUTH EVERY DAY  Patient taking differently: Take 75 mg by mouth daily Has been on hold for procedure 7/6/20  Yes Carrington Potter PA-C   atorvastatin (LIPITOR) 80 MG tablet TAKE 1 TABLET BY MOUTH AT BEDTIME 10/22/18  Yes Carrington Potter PA-C   carvedilol (COREG) 25 MG tablet TAKE ONE TABLET BY MOUTH TWICE DAILY (WITH MEALS) 10/22/18  Yes Carrington Potter PA-C   sildenafil (VIAGRA) 50 MG tablet Take 1 tablet by mouth as needed for Erectile Dysfunction 7/6/20   Ray Smoker, MARY ANNE   aspirin 325 MG EC tablet TAKE 1 TABLET BY MOUTH EVERY DAY  Patient taking differently: Take 325 mg by mouth daily Has been on hold for procedure 5/7/20   [address], DO   Tens Unit MISC by Does not apply route 5/7/19   Esther Given, PA       Allergies  Allergies   Allergen Reactions    Bee Venom Anaphylaxis       Review of Systems: patient is intubated and sedated and not responsive to questions. Objective  /78   Pulse 103   Temp 99.9 °F (37.7 °C)   Resp 11   Ht 5' 11\" (1.803 m)   Wt (!) 334 lb 10.5 oz (151.8 kg)   SpO2 90%   BMI 46.68 kg/m²     Physical Exam:   Performance Status:  Head and neck: PERRLA, EOMI . Sclera non icteric. Oropharynx: Clear  Neck: no JVD,  no adenopathy  Heart: Regular rate and regular rhythm, no murmur  Lungs:Disffuse inspiratory and expiratory rhonchi   Extremities: No edema,no cyanosis, no clubbing. Abdomen: Soft, non-tender;no masses, no organomegaly  Skin: No rash. Neurologic:Cranial nerves grossly intact. No focal motor or sensory deficits .     Recent Laboratory Data-   Lab Results   Component Value Date    WBC 10.1 11/23/2020    HGB 7.8 (L) 11/23/2020    HCT 27.6 (L) 11/23/2020    MCV 94.2 11/23/2020    PLT 39 (L) 11/23/2020    LYMPHOPCT 10.3 (L) 11/23/2020    RBC 2.93 (L) 11/23/2020    MCH 26.6 11/23/2020    MCHC 28.3 (L) 11/23/2020    RDW 17.0 (H) 11/23/2020    NEUTOPHILPCT 85.0 (H) 11/23/2020    MONOPCT 1.9 (L) 11/23/2020    BASOPCT 0.0 11/23/2020    NEUTROABS 8.89 (H) 11/23/2020    LYMPHSABS 1.01 (L) 11/23/2020    MONOSABS 0.20 11/23/2020    EOSABS 0.00 (L) 11/23/2020    BASOSABS 0.00 11/23/2020       Lab Results   Component Value Date     11/23/2020    K 5.1 (H) 11/23/2020     (H) 11/23/2020    CO2 24 11/23/2020    BUN 55 (H) 11/23/2020    CREATININE 1.1 11/23/2020    GLUCOSE 200 (H) 11/23/2020    CALCIUM 8.5 (L) 11/23/2020    PROT 5.9 (L) 11/23/2020    LABALBU 2.9 (L) 11/23/2020    BILITOT 0.4 11/23/2020    ALKPHOS 215 (H) 11/23/2020    AST 45 (H) 11/23/2020    ALT 32 11/23/2020    LABGLOM >60 11/23/2020    GFRAA >60 11/23/2020       No results found for: IRON, TIBC, FERRITIN        Radiology-    Xr Abdomen (kub) (single Ap View)    Result Date: 11/23/2020  EXAMINATION: ONE SUPINE XRAY VIEW(S) OF THE ABDOMEN 11/23/2020 9:38 am COMPARISON: None HISTORY: ORDERING SYSTEM PROVIDED HISTORY: abdominal pain TECHNOLOGIST PROVIDED HISTORY: Reason for exam:->abdominal pain FINDINGS: There is a nonobstructive bowel gas pattern. There are no abnormal air-fluid levels. There are no calculi overlying the renal shadows. There is an NG tube within the stomach. There is a stent graft seen within the abdominal aorta. 1. There is no bowel obstruction, ileus or free air. Ct Lumbar Spine Wo Contrast    Result Date: 11/19/2020  EXAMINATION: CT OF THE LUMBAR SPINE WITHOUT CONTRAST  11/18/2020 TECHNIQUE: CT of the lumbar spine was performed without the administration of intravenous contrast. Multiplanar reformatted images are provided for review. Dose modulation, iterative reconstruction, and/or weight based adjustment of the mA/kV was utilized to reduce the radiation dose to as low as reasonably achievable. COMPARISON: 11/17/2020 HISTORY: ORDERING SYSTEM PROVIDED HISTORY: exclude epidural hematoma TECHNOLOGIST PROVIDED HISTORY: Reason for exam:->exclude epidural hematoma Chronic back pain, recent epidural placement FINDINGS: BONES/ALIGNMENT: Vertebral body heights are preserved without evidence for lumbar fracture. However, there is irregular lucency and sclerosis in the upper sacral ala bilaterally. There is minimal retrolisthesis at L2-L3. Alignment is otherwise normal. DEGENERATIVE CHANGES: There is disc space narrowing and vacuum disc phenomenon at L2-L3. Disc space heights are otherwise preserved. There is diffuse facet arthropathy.  SOFT TISSUES/RETROPERITONEUM: No evidence for epidural hematoma is identified. 1. No evidence for epidural hematoma on CT scan. 2. Suspected sacral insufficiency fracture. Xr Chest Portable    Result Date: 11/23/2020  EXAMINATION: ONE XRAY VIEW OF THE CHEST 11/23/2020 7:08 am COMPARISON: 11/20/2020 HISTORY: ORDERING SYSTEM PROVIDED HISTORY: resp failure TECHNOLOGIST PROVIDED HISTORY: Reason for exam:->resp failure FINDINGS: There is no interval change in the persistent dense consolidation within the left upper lobe. The left lower lobe is clear. The right lung is clear. There is minimal atelectasis seen within the right lung base. The cardiac silhouette is within normals. 1. No interval change in the dense consolidation seen within the left upper lobe. 2. Minimal right lung base atelectasis. Xr Chest Portable    Result Date: 11/22/2020  EXAMINATION: ONE XRAY VIEW OF THE CHEST 11/22/2020 6:28 am COMPARISON: 11/21/2020 HISTORY: ORDERING SYSTEM PROVIDED HISTORY: resp failure TECHNOLOGIST PROVIDED HISTORY: Reason for exam:->resp failure FINDINGS: The endotracheal tube is stable. The enteric tube tip is not well visualized but may be at the gastroesophageal junction. There is stable left upper lobe dense opacity. There are continued patchy opacities in the left lower lobe. There may be small pleural effusions. No pneumothorax is seen. No significant change in dense airspace opacity of the left upper lobe and patchy left lower lobe airspace opacities. Enteric tube tip is not well visualized due to underpenetration. The tip may be at the gastroesophageal junction. This could be confirmed with KUB centered on the upper abdomen. Xr Chest Portable    Result Date: 11/21/2020  EXAMINATION: ONE XRAY VIEW OF THE CHEST 11/21/2020 7:37 am COMPARISON: 11/20/2020 HISTORY: ORDERING SYSTEM PROVIDED HISTORY: resp failure TECHNOLOGIST PROVIDED HISTORY: Reason for exam:->resp failure FINDINGS: Endotracheal tube and nasogastric tube are unchanged.  Large opacity in the left upper lobe is unchanged. There is additional airspace disease in the left lower lobe which is stable. There is no pneumothorax. Small pleural effusions are not excluded. Unchanged large opacity/consolidation in left upper lobe. Unchanged airspace disease in left lower lobe. Xr Chest Portable    Result Date: 11/20/2020  EXAMINATION: ONE XRAY VIEW OF THE CHEST 11/20/2020 6:33 am COMPARISON: 11/19/2020 HISTORY: ORDERING SYSTEM PROVIDED HISTORY: resp failure TECHNOLOGIST PROVIDED HISTORY: Reason for exam:->resp failure FINDINGS: Endotracheal and enteric tubes remain in place. There has been no appreciable change in opacities throughout the left lung, most pronounced in the left apex. The right lung is clear. The heart size is at the upper limits of normal.  There is no discernible pneumothorax. Grossly stable opacities on the left. Xr Chest Portable    Result Date: 11/19/2020  EXAMINATION: ONE XRAY VIEW OF THE CHEST 11/19/2020 6:23 am COMPARISON: 11/18/2020 HISTORY: ORDERING SYSTEM PROVIDED HISTORY: resp failure TECHNOLOGIST PROVIDED HISTORY: Reason for exam:->resp failure FINDINGS: Evaluation is limited by the patient's body habitus and the portable technique. The right lung apex was partially excluded. There is increased dense consolidation in the left upper lobe. There is also medial left basilar airspace opacity obscuring the hemidiaphragm. The heart size is mildly enlarged. There is no discernible pneumothorax. Endotracheal and enteric tubes are again seen. Increasing multifocal left lung pneumonia. Xr Chest Portable    Result Date: 11/18/2020  EXAMINATION: ONE XRAY VIEW OF THE CHEST 11/18/2020 6:39 am COMPARISON: CT chest November 17, 2020. HISTORY: ORDERING SYSTEM PROVIDED HISTORY: SOB TECHNOLOGIST PROVIDED HISTORY: Reason for exam:->SOB FINDINGS: The cardiac silhouette is within normal limits in size. There is masslike consolidation of the left upper lobe.   There is a small to moderate left dependent pleural effusion. There is no visible pneumothorax. The pulmonary vessels are within normal limits. Left upper lobe masslike consolidation. Small to moderate left pleural effusion. Xr Chest 1 View    Result Date: 11/18/2020  EXAMINATION: ONE XRAY VIEW OF THE CHEST 11/18/2020 9:06 am COMPARISON: 11/18/2020 HISTORY: ORDERING SYSTEM PROVIDED HISTORY: intubation TECHNOLOGIST PROVIDED HISTORY: Reason for exam:->intubation Reason for exam:->portable FINDINGS: Compared to the chest radiograph from earlier today, 6:44 a.m., there is interval placement of an endotracheal tube, the tip of which is approximately 4.1 cm above the korina. Nasogastric tube is present but is suboptimally visualized due to motion artifact and relative underpenetration of the study. The abdominal radiograph demonstrates it to be well positioned, with the tip in the upper abdomen. Prominent masslike consolidative opacity in the upper left lung are grossly stable. No pneumothorax is seen. Layering left pleural effusion. 1.  The life-support lines and tubes are well positioned. 2. Masslike consolidative opacity in the left upper lung. Small left effusion. Cta Chest W Contrast    Result Date: 11/17/2020  EXAMINATION: CTA OF THE CHEST 11/17/2020 3:18 pm TECHNIQUE: CTA of the chest was performed after the administration of intravenous contrast.  Multiplanar reformatted images are provided for review. MIP images are provided for review. Dose modulation, iterative reconstruction, and/or weight based adjustment of the mA/kV was utilized to reduce the radiation dose to as low as reasonably achievable.; CTA of the abdomen and pelvis was performed with the administration of intravenous contrast. Multiplanar reformatted images are provided for review. MIP images are provided for review.  Dose modulation, iterative reconstruction, and/or weight based adjustment of the mA/kV was utilized to reduce the radiation dose to as low as reasonably achievable. COMPARISON: None. HISTORY: ORDERING SYSTEM PROVIDED HISTORY: aneurysm, cp, sob TECHNOLOGIST PROVIDED HISTORY: Reason for exam:->aneurysm, cp, sob FINDINGS: CTA chest: There are confluent opacities located in left upper lobe. Patchy airspace opacities located in lingula. There is moderate to large left pleural effusion. Peribronchial thickening extensor in left hilar location into the left lung base. There is subsegmental atelectasis in posterior right lower lobe with adjacent ground-glass opacities. There is pulmonary vascular congestion. The heart is mildly enlarged. There is mediastinal lymphadenopathy. Ascending thoracic aorta measures 3.7 cm in diameter. Descending thoracic aorta measures 3 cm in diameter. No evidence of thoracic aortic aneurysm or dissection. Distal descending thoracic aorta is tortuous. CTA abdomen/pelvis: There is evidence of endograft repair involving the abdominal aorta. There is redemonstration of fusiform abdominal aortic aneurysm measuring up to 6.4 cm. This finding is stable since prior. No evidence of endoleak. No retroperitoneal fluid collections. Iliac limbs of endograft appear patent. Common iliac arteries are tortuous. Common femoral arteries are patent. Numerous diverticula associated with the left colon and sigmoid colon. No evidence of acute diverticulitis. Liver, gallbladder, pancreas, and spleen are grossly unremarkable however there is motion artifact limiting this examination. There is no hydronephrosis. Cyst associated with the right kidney is stable since previous examination as well as cyst associated with the left kidney appearing stable since prior. Appendix is visualized and normal.    1.  New confluent opacities located in left upper lobe suggesting pneumonia, atelectasis, or neoplasm. 2.  Patchy airspace opacities located in lingula suggesting pneumonia with areas of atelectasis.  3.  Stenosis and occlusion are associated with left upper lobe segmental bronchi and lingular segmental bronchi. 4.  Moderate to large left pleural effusion. 5.  Mediastinal lymphadenopathy. 6.  Stable fusiform infrarenal abdominal aortic aneurysm with endograft repair. Aneurysm measures up to 6.4 cm. No evidence of endoleak or retroperitoneal fluid. 7.  Diverticulosis. Cta Abdomen Pelvis W Contrast    Result Date: 11/17/2020  EXAMINATION: CTA OF THE CHEST 11/17/2020 3:18 pm TECHNIQUE: CTA of the chest was performed after the administration of intravenous contrast.  Multiplanar reformatted images are provided for review. MIP images are provided for review. Dose modulation, iterative reconstruction, and/or weight based adjustment of the mA/kV was utilized to reduce the radiation dose to as low as reasonably achievable.; CTA of the abdomen and pelvis was performed with the administration of intravenous contrast. Multiplanar reformatted images are provided for review. MIP images are provided for review. Dose modulation, iterative reconstruction, and/or weight based adjustment of the mA/kV was utilized to reduce the radiation dose to as low as reasonably achievable. COMPARISON: None. HISTORY: ORDERING SYSTEM PROVIDED HISTORY: aneurysm, cp, sob TECHNOLOGIST PROVIDED HISTORY: Reason for exam:->aneurysm, cp, sob FINDINGS: CTA chest: There are confluent opacities located in left upper lobe. Patchy airspace opacities located in lingula. There is moderate to large left pleural effusion. Peribronchial thickening extensor in left hilar location into the left lung base. There is subsegmental atelectasis in posterior right lower lobe with adjacent ground-glass opacities. There is pulmonary vascular congestion. The heart is mildly enlarged. There is mediastinal lymphadenopathy. Ascending thoracic aorta measures 3.7 cm in diameter. Descending thoracic aorta measures 3 cm in diameter. No evidence of thoracic aortic aneurysm or dissection.   Distal descending thoracic aorta is tortuous. CTA abdomen/pelvis: There is evidence of endograft repair involving the abdominal aorta. There is redemonstration of fusiform abdominal aortic aneurysm measuring up to 6.4 cm. This finding is stable since prior. No evidence of endoleak. No retroperitoneal fluid collections. Iliac limbs of endograft appear patent. Common iliac arteries are tortuous. Common femoral arteries are patent. Numerous diverticula associated with the left colon and sigmoid colon. No evidence of acute diverticulitis. Liver, gallbladder, pancreas, and spleen are grossly unremarkable however there is motion artifact limiting this examination. There is no hydronephrosis. Cyst associated with the right kidney is stable since previous examination as well as cyst associated with the left kidney appearing stable since prior. Appendix is visualized and normal.    1.  New confluent opacities located in left upper lobe suggesting pneumonia, atelectasis, or neoplasm. 2.  Patchy airspace opacities located in lingula suggesting pneumonia with areas of atelectasis. 3.  Stenosis and occlusion are associated with left upper lobe segmental bronchi and lingular segmental bronchi. 4.  Moderate to large left pleural effusion. 5.  Mediastinal lymphadenopathy. 6.  Stable fusiform infrarenal abdominal aortic aneurysm with endograft repair. Aneurysm measures up to 6.4 cm. No evidence of endoleak or retroperitoneal fluid. 7.  Diverticulosis. Xr Abdomen For Ng/og/ne Tube Placement    Result Date: 11/22/2020  EXAMINATION: ONE SUPINE XRAY VIEW(S) OF THE ABDOMEN 11/22/2020 5:06 pm COMPARISON: November 22, 2020, 4 hours prior. HISTORY: ORDERING SYSTEM PROVIDED HISTORY: Confirmation of course of NG/OG/NE tube and location of tip of tube TECHNOLOGIST PROVIDED HISTORY: Reason for exam:->Confirmation of course of NG/OG/NE tube and location of tip of tube Portable? ->Yes FINDINGS: Tip of the NG tube noted at the level of the diaphragm, no significant change. The right side and the inferior part of the abdomen is not included in the study. There is opacification of the visualized left upper lung. Tip of NG tube at the level of the left hemidiaphragm, no change. Xr Chest Abdomen Ng Placement    Result Date: 11/22/2020  EXAMINATION: ONE SUPINE XRAY VIEW(S) OF THE ABDOMEN 11/22/2020 12:52 pm COMPARISON: November 18. HISTORY: ORDERING SYSTEM PROVIDED HISTORY: OG placement TECHNOLOGIST PROVIDED HISTORY: Reason for exam:->OG placement FINDINGS: Nasogastric tube is present. Side hole appears to be at level of gastroesophageal junction. This tube could be advanced approximately 4 or 5 cm. Nasogastric tube is present with side hole at level of gastroesophageal junction. This tube could be advanced approximately 4 or 5 cm. Xr Chest Abdomen Ng Placement    Result Date: 11/18/2020  EXAMINATION: ONE SUPINE XRAY VIEW(S) OF THE ABDOMEN 11/18/2020 9:07 am COMPARISON: 11/18/2020, about 2 hours earlier HISTORY: ORDERING SYSTEM PROVIDED HISTORY: NGT TECHNOLOGIST PROVIDED HISTORY: Reason for exam:->NGT Reason for exam:->portable NG tube placement FINDINGS: There is an NG tube with the tip in the stomach. An ET tube is again noted in satisfactory position. There is extensive opacity in the left lung, most likely pneumonia. Increased markings also seen in the visualized portion of the right lung. The heart is enlarged. NG tube tip in the stomach. Fluoro For Surgical Procedures    Result Date: 11/17/2020  EXAMINATION: SPOT FLUOROSCOPIC IMAGES 11/17/2020 12:03 pm TECHNIQUE: Fluoroscopy was provided by the radiology department for procedure. Radiologist was not present during examination. FLUOROSCOPY DOSE AND TYPE OR TIME AND EXPOSURES: Total dose:26.99 mGy COMPARISON: None HISTORY: ORDERING SYSTEM PROVIDED HISTORY: Pain management TECHNOLOGIST PROVIDED HISTORY: Reason for exam:->L4-5 Epidural steroid injection #1 Intraprocedural imaging. FINDINGS: 3 intraoperative fluoroscopic images were obtained during L4-5 epidural steroid injection. Intraprocedural fluoroscopic spot images as above. See separate procedure report for more information. ASSESSMENT/PLAN :    60 yo male  CAD  HTN  AAA s/p repair  DM type 2  Morbid obesity  Chronic back pain  Thrombocytopenia, Anemia   OMER opacity s/p biopsy    - S/p bronch with cytology pending  - OMER PNA vs neoplasm  - On rocpehin and levaquin   - CBC showing Hgb 7.8 with MCV 94, Platelets 39  - Will follow cyto  - Cytopenias likely due to combination of PNA and abc, with myelosuppression. Thrombocytopenia has continued to slowly progress. CBC was WNL on 10/6/20, suggesting an acute process rather than a primary bone marrow process  - Transfuse for Hgb <7, platelets <63  - Smear showed subtle toxic granulation of neutrophils  - Check iron profile and B12/folate    Thank you for this consult. Please call with further questions or concerns      MATY Khan - CNP  Electronically signed 11/23/2020 at 1:32 PM  Pt seen and examined.  Note edited to reflect updates  Serafin Philip MD

## 2020-11-23 NOTE — PROGRESS NOTES
Pharmacy Consultation Note  (Antibiotic Dosing and Monitoring)    Initial consult date: 11/21/2020  Consulting physician: Dr. Rani Baptiste  Drug(s): Vancomycin  Indication: Empiric antibiotics for respiratory process    Note vancomycin discontinued. Clinical pharmacy will sign-off. Please re-consult if we can be of further assistance.     Zola Brunner, PharmD, BCCCP  11/23/2020  12:09 PM

## 2020-11-23 NOTE — PROGRESS NOTES
Critical Care Team - Daily Progress Note         Date and time: 11/23/2020 5:11 PM  Patient's name:  Liana Neil. Medical Record Number: [id number]  Patient's account/billing number: [de-identified]  Patient's YOB: 1960  Age: 61 y.o. Date of Admission: 11/17/2020  1:13 PM  Length of stay during current admission: 6      Primary Care Physician: Uriel Crawford PA-C  ICU Attending Physician: Dr. Carlita Patricio    Code Status: Full Code    Reason for ICU admission: acute hypoxic respiratory failure      SUBJECTIVE:     BRIEF HISTORY:   The patient is a 61 y.o. male with significant past medical history of diabetes, MANOLO, back pain, hypertension, hyperlipidemia, STEMI's status post 5 stents, CAD, aortic aneurysm presenting to the the hospital initially on 11/17. He had a epidural performed yesterday for low back pain and after the procedure became very short of breath. He was satting 80% so was sent to the hospital for further evaluation. While in the ED patient was found to be thrombocytopenic as well as hypoxic on room air. He was placed on 4 L of oxygen but O2 sats improved. CTA of the chest demonstrating pneumonia and pleural effusions. He was given 1 dose of Rocephin and azithromycin while in the ER.     On 11/18 RRT was called at 7:30 AM.  Patient was found to have increased work of breathing as well as hypercapnic on ABGs. He was on BiPAP all night with minimal improvement of his symptoms. During the RT he was switched to AVAPS. He was subsequently brought down to the ICU for further evaluation and care. He was not improving on AVAPS and subsequently intubated     A bronchoscopy was performed on 11/18 and biopsies were sent. OVERNIGHT EVENTS:    11/19/20: Continues to be intubated, no acute events  11/20/20: Attempted weaning trials. Patient on pressure support for a few hours. ABGs worsening. Placed on it back on AC/VC  11/21/20: More rhonchorous today. More secretions. 20; still having moist secretions, gram positive cocci bacteremia   20: continued secretions, complaining of mild abdominal pain today.  multiple attempts made to transfer patient per wifes request    CURRENT VENTILATION STATUS:     [x] Ventilator  [] BIPAP  [] Nasal Cannula [] Room Air        SECRETIONS : Amount:  [] Small [x] Moderate  [] Large  [] None  Color:     [] White [x] Colored  [] Bloody    SEDATION:  RAAS Score:  [] Propofol gtt  [x] Versed gtt  [] Ativan gtt   [] No Sedation    PARALYZED:  [x] No    [] Yes      VASOPRESSORS:  [x] No    [] Yes    If yes -   [] Levophed       [] Dopamine     [] Vasopressin       [] Dobutamine  [] Phenylephrine         [] Epinephrine    CENTRAL LINES:     [x] No   [] Yes   (Date of Insertion:   )           If yes -     [] Right IJ     [] Left IJ [] Right Femoral [] Left Femoral                   [] Right Subclavian [] Left Subclavian       HUFF'S CATHETER:   [] No   [x] Yes  (Date of Insertion:   )     URINE OUTPUT:            [x] Good   [] Low              [] Anuric      OBJECTIVE:     VITAL SIGNS:  /81   Pulse 88   Temp 100.3 °F (37.9 °C) (Axillary)   Resp 20   Ht 5' 11\" (1.803 m)   Wt (!) 334 lb 10.5 oz (151.8 kg)   SpO2 94%   BMI 46.68 kg/m²   Tmax over 24 hours:  Temp (24hrs), Av.4 °F (38 °C), Min:99.7 °F (37.6 °C), Max:101.1 °F (38.4 °C)      Patient Vitals for the past 6 hrs:   BP Temp Temp src Pulse Resp SpO2   20 1640 -- -- -- -- 20 94 %   20 1639 -- -- -- 88 20 94 %   20 1600 120/81 100.3 °F (37.9 °C) Axillary 89 20 93 %   20 1500 113/81 -- -- 95 20 93 %   20 1300 (!) 165 -- -- 98 20 92 %   20 1220 (!) 165 -- -- -- -- --   20 1200 111/76 99.9 °F (37.7 °C) Axillary 121 18 91 %   20 1130 -- -- -- 103 11 90 %         Intake/Output Summary (Last 24 hours) at 2020 1711  Last data filed at 2020 1600  Gross per 24 hour   Intake 2681 ml   Output 1910 ml   Net 771 ml Wt Readings from Last 2 Encounters:   11/22/20 (!) 334 lb 10.5 oz (151.8 kg)   11/17/20 (!) 314 lb (142.4 kg)     Body mass index is 46.68 kg/m². PHYSICAL EXAMINATION:  General: Intubated   HEENT:  Normocephalic, atraumatic. Pulls equal and reactive no scleral icterus. No conjunctival injection. No nasal discharge. Neck:  Supple, without stridor, no JVD  Heart:  RRR, no murmurs, gallops, rubs  Lungs: Greatly diminished in left upper lobe, rhonchorous throughout the remainder of the lung fields   Abdomen: Obese, bowel sounds present, soft, non-tender, non-distended, no guarding or rigidity. Not tender to palpation  Extremities:  No clubbing, cyanosis, mild 1+ edema bilaterally  Skin:  Warm and dry  Neuro: Following commands. Cranial nerves II through XII grossly intact.   No focal neurologic deficits  Breast: deferred  Rectal: deferred  Genitalia:  deferred     MEDICATIONS:    Scheduled Meds:   heparin flush  1 mL Intravenous 2 times per day    cefepime  2 g Intravenous Q8H    insulin lispro  0-18 Units Subcutaneous Q6H    nicotine  1 patch Transdermal Daily    acetylcysteine  4 mL Inhalation BID    albuterol  2.5 mg Nebulization Q4H    Arformoterol Tartrate  15 mcg Nebulization BID    atorvastatin  80 mg Oral Nightly    [Held by provider] clopidogrel  75 mg Oral Daily    fluticasone  2 spray Nasal Daily    pregabalin  150 mg Oral BID    sodium chloride flush  10 mL Intravenous 2 times per day    [Held by provider] enoxaparin  40 mg Subcutaneous Daily    insulin glargine  0.25 Units/kg Subcutaneous Nightly    chlorhexidine  15 mL Mouth/Throat BID    pantoprazole  40 mg Intravenous Daily    And    sodium chloride (PF)  10 mL Intravenous Daily    methylPREDNISolone  40 mg Intravenous Q8H    aspirin  325 mg Oral Daily     Continuous Infusions:   fentaNYL 5 mcg/ml in 0.9%  ml infusion 150 mcg/hr (11/23/20 1645)    propofol 10 mcg/kg/min (11/23/20 1227)    dextrose      midazolam 6 mg/hr (11/23/20 1935)     PRN Meds:   sodium chloride flush, 10 mL, PRN  heparin flush, 1 mL, PRN  sodium phosphate IVPB, 0.16 mmol/kg, PRN    Or  sodium phosphate IVPB, 0.32 mmol/kg, PRN  magnesium sulfate, 1 g, PRN  potassium chloride, 20 mEq, PRN  tiZANidine, 4 mg, TID PRN  sodium chloride flush, 10 mL, PRN  acetaminophen, 650 mg, Q6H PRN    Or  acetaminophen, 650 mg, Q6H PRN  polyethylene glycol, 17 g, Daily PRN  promethazine, 12.5 mg, Q6H PRN    Or  ondansetron, 4 mg, Q6H PRN  glucose, 15 g, PRN  dextrose, 12.5 g, PRN  glucagon (rDNA), 1 mg, PRN  dextrose, 100 mL/hr, PRN  oxyCODONE-acetaminophen, 1 tablet, TID PRN    And  oxyCODONE, 2.5 mg, TID PRN  albuterol, 2.5 mg, Q4H PRN        VENT SETTINGS (Comprehensive) (if applicable):  Vent Information  $Ventilation: $Subsequent Day  Skin Assessment: Clean, dry, & intact  Equipment ID: [id number]  Vent Type: [id number]  Vent Mode: AC/VC  Vt Ordered: 500 mL  Rate Set: 20 bmp  Peak Flow: 65 L/min  Pressure Support: 0 cmH20  FiO2 : 60 %  SpO2: 94 %  SpO2/FiO2 ratio: 156.67  Sensitivity: 3  PEEP/CPAP: 10  I Time/ I Time %: 0 s  Humidification Source: Heated wire  Humidification Temp: 37  Humidification Temp Measured: 36.9  Circuit Condensation: Drained  Mask Type: Full face mask  Mask Size: Medium  Additional Respiratory  Assessments  Pulse: 88  Resp: 20  SpO2: 94 %  Position: Semi-Jeff's  Humidification Source: Heated wire  Humidification Temp: 37  Circuit Condensation: Drained  Oral Care: Mouth swabbed, Mouth moisturizer, Mouth suctioned  Subglottic Suction Done?: Yes  Cuff Pressure (cm H2O): 29 cm H2O    ABGs:   Recent Labs     11/23/20  0531   PH 7.344*   PCO2 50.1*   PO2 68.5*   HCO3 26.7*   BE 0.7   O2SAT 92.0       Laboratory findings:    Complete Blood Count:   Recent Labs     11/21/20  0530 11/22/20  0515 11/23/20  0525   WBC 8.0 8.4 10.1   HGB 7.4* 7.5* 7.8*   HCT 26.2* 26.1* 27.6*   PLT 48* 49* 39*        Last 3 Blood Glucose:   Recent Labs     11/21/20  0530 11/22/20  0515 11/23/20  0525   GLUCOSE 183* 198* 200*        PT/INR:    Lab Results   Component Value Date    PROTIME 11.4 10/15/2020    PROTIME 11.1 12/30/2011    INR 1.0 10/15/2020     PTT:    Lab Results   Component Value Date    APTT 29.0 12/28/2011       Comprehensive Metabolic Profile:   Recent Labs     11/21/20  0530 11/22/20  0515 11/23/20  0525    145 146   K 4.6 4.9 5.1*    111* 111*   CO2 25 24 24   BUN 58* 57* 55*   CREATININE 1.2 1.1 1.1   GLUCOSE 183* 198* 200*   CALCIUM 8.4* 8.3* 8.5*   PROT 5.8* 5.8* 5.9*   LABALBU 3.0* 2.8* 2.9*   BILITOT 0.4 0.3 0.4   ALKPHOS 224* 217* 215*   AST 23 30 45*   ALT 26 30 32      Magnesium:   Lab Results   Component Value Date    MG 3.1 11/23/2020     Phosphorus:   Lab Results   Component Value Date    PHOS 2.3 11/23/2020     Ionized Calcium:   Lab Results   Component Value Date    CAION 1.33 10/23/2020        Urinalysis:     Troponin:   No results for input(s): TROPONINI in the last 72 hours. Microbiology:  GPC blood   Respiratory culture no growth    Radiology/Imaging:     Chest x-ray:  11/23  Impression    1. No interval change in the dense consolidation seen within the left upper    lobe. 2. Minimal right lung base atelectasis.               PLAN:     WEAN PER PROTOCOL:  [] No   [x] Yes  [] N/A    DISCONTINUE ANY LABS:   [x] No   [] Yes    ICU PROPHYLAXIS:  Stress ulcer:  [x] PPI Agent  [] Q1Rbvsc [] Sucralfate  [] Other:  VTE:   [x] Enoxaparin  [] Unfract.  Heparin Subcut  [] EPC Cuffs    NUTRITION:  [] NPO [x] Tube Feeding (Specify: ) [] TPN  [x] PO (Diet: DIET TUBE FEED CONTINUOUS/CYCLIC NPO; Diabetic 1.5; Orogastric; Continuous; 20; 60)    HOME MEDICATIONS RECONCILED: [] No  [x] Yes    INSULIN DRIP:   [x] No   [] Yes    CONSULTATION NEEDED:  [x] No   [] Yes     FAMILY UPDATED:    [] No   [x] Yes    TRANSFER OUT OF ICU:   [x] No   [] Yes    ASSESSMENT:     Neuro   Chronic back pain              -Epidural on 11/17, no hematoma seen on CT lumbar spine      Sedated on the vent              -Versed, fentanyl, propofol     Respiratory   Acute hypoxic and hypercapnic respiratory failure       -continue full vent support. Pressure support trial today became tachcardic and tachypnic, switched back to [address]              -Follow ABG              -Albuterol, incentive spirometer              -solumedrol   -wean FiO2 as able   -metanebs   -Mucomyst   -bedside L thoracentesis 11/22 with only a small amount of pleural fluid, not amendable to drainage   -advance ETT     Community-acquired pneumonia              -antibiotics  Have been broadened              -Strep and Legionella negative              -Respiratory culture pending              -Pro-Kael 0.23     Lung mass OMER              -Status post bronc 11/18 with TBNA              -No obvious mucous plugging or purulence. Very erythematous and compressed airways, concern for mass              -await results of TBNA     Respiratory film array negative  Long, lifetime smoker--nicotine patch     Cardiovascular   CAD/stents              -Plavix is on hold for approximately 7 days per patient he had his epidural.  It has still been on hold in case he needs further procedures.      History of aortic aneurysm              -Status post endovascular repair              -Stable on most recent CT     Gastrointestinal   Tube feeding   Abdominal pain- no peritoneal signs. KUB unremarkable     Renal   beto-improved  Hyperkalemia- monitor  hypophos- replace and monitor     Infectious Disease   Community-acquired pneumonia              -cefepime and Vanco day 3              -Pro-Kael 0.23            Blood cultures gram positive cocci   -coag neg staph. Most likely contaminant.  Stop vanco.     Hematology/Oncology   Thrombocytopenia              - received platelets x1              -heme/onc consult     Endocrine   Diabetes              -Noninsulin-dependent              -Monitor sugars   -Increase to high-dose sliding scale    msk Chronic back pain              -Status post epidural on 11/17              -No erythematous region or fluctuance              -CT with no evidence of epidural hematoma     Social/Spiritual/DNR/Other   Full code    Multiple attempts at transferring patient to outside facility. Denied by [facility]. [facility] do not accept his insurance. Attempting transfer to [facility].    ______________________________________________________________________     ASSESSMENT/ PLAN   1. As above  2. Awaiting bronch results  3. Heme/onc consult  4. Stop Vanco  5. Advance ETT  6. Start ptopofol  7. Initiating possible transfer to [facility].  and [facility] denied. 8. GI prophylaxis  9. DVT Prophylaxis  10. Case discussed with Dr. Lauren Mai: continue ICU level of care    I personally saw, examined and provided care for the patient. Radiographs, labs and medication list were reviewed by me independently. I spoke with bedside nursing, therapists and consultants. Critical care services and times documented are independent of procedures and multidisciplinary rounds with Residents. Additionally comprehensive, multidisciplinary rounds were conducted with the MICU team. The case was discussed in detail and plans for care were established. Review of Residents documentation was conducted and revisions were made as appropriate. I agree with the above documented exam, problem list and plan of care. Per family will attempt transfer to another hospital   Full vent support   Await biopsy results     Caryn Yepez M.D.    Pulmonary/Critical Care Medicine   35 min cct excluding procedures

## 2020-11-24 NOTE — PROGRESS NOTES
Anticoagulant long-term use     Arthritis     CAD (coronary artery disease)     Chronic back pain     Depression     Dyslipidemia     Hiatal hernia 1979    History of ST elevation myocardial infarction (STEMI)     Hyperlipidemia     Hypertension     Low back pain     Lumbar radiculopathy 8/14/2019    Obesity     MANOLO on CPAP     Presence of stent in left circumflex coronary artery     Presence of stent in right coronary artery     Smoker     Type 2 diabetes mellitus without complication [facility])        Patient Active Problem List    Diagnosis Date Noted    Acute respiratory failure with hypoxia ([address] 11/17/2020    Community acquired pneumonia of left upper lobe of lung 11/17/2020    Thrombocytopenia ([address] 11/17/2020    Spinal stenosis of lumbar region with neurogenic claudication 10/29/2020    Obesity     Chronic back pain     Lumbosacral spondylosis without myelopathy 12/11/2019    Lumbar radiculopathy 08/14/2019    AAA (abdominal aortic aneurysm) ([address] 06/28/2019    Chronic bilateral low back pain with bilateral sciatica 06/11/2019    Right hip pain 06/11/2019    DDD (degenerative disc disease), lumbar 06/11/2019    Status post total right knee replacement 11/26/2018    Morbid obesity with BMI of 40.0-44.9, adult ([address] 11/26/2018    Chronic pain syndrome 07/27/2018    Chronic pain of right knee 07/27/2018    Non-insulin dependent type 2 diabetes mellitus ([address] 03/16/2018    Aortic valve stenosis 02/15/2018    Primary osteoarthritis of right knee 02/15/2018    Presence of stent in left circumflex coronary artery     Smoker     CAD (coronary artery disease) 12/28/2011    HTN (hypertension) 12/28/2011    Hyperlipemia 12/28/2011        Past Surgical History:   Procedure Laterality Date    ABDOMINAL AORTIC ANEURYSM REPAIR, ENDOVASCULAR N/A 6/28/2019    ENDOVASCULAR REPAIR ABDOMINAL AORTIC ANEURYSM performed by Romayne Spence, MD at [facility] reports no history of alcohol use. Home Medications  Prior to Admission medications    Medication Sig Start Date End Date Taking? Authorizing Provider   loratadine (CLARITIN) 10 MG capsule Take 10 mg by mouth daily   Yes Historical Provider, MD   pioglitazone (ACTOS) 15 MG tablet TAKE 1 TABLET BY MOUTH EVERY DAY 11/6/20  Yes Valdo Mathur PA-C   pregabalin (LYRICA) 150 MG capsule Take 1 capsule by mouth 2 times daily for 30 days. 11/12/20 12/12/20 Yes Manuela Dance, DO   diclofenac (VOLTAREN) 75 MG EC tablet TAKE 1 TABLET BY MOUTH TWICE A DAY 10/7/20  Yes Historical Provider, MD   tiZANidine (ZANAFLEX) 4 MG tablet Take 1 tablet by mouth 3 times daily as needed (spasms) 10/29/20 11/28/20 Yes Manuela Dance, DO   oxyCODONE-acetaminophen (PERCOCET) 7.5-325 MG per tablet Take 1 tablet by mouth 3 times daily as needed for Pain for up to 30 days.  Intended supply: 30 days 11/12/20 12/12/20 Yes Manuela Dance, DO   quinapril (ACCUPRIL) 10 MG tablet Take 1 tablet by mouth daily  Patient taking differently: Take 10 mg by mouth daily Wife states on hold 10/16/20  Yes Valdo Mathur PA-C   metFORMIN (GLUCOPHAGE) 500 MG tablet TAKE 1 TABLET BY MOUTH TWICE A DAY WITH MEALS  Patient taking differently: Wife states on hold 10/8/20  Yes Chayito Stevenson DO   fluticasone (FLONASE) 50 MCG/ACT nasal spray 2 sprays by Nasal route daily 10/6/20  Yes Valdo Mathur PA-C   omega-3 acid ethyl esters (LOVAZA) 1 g capsule take 1 capsule twice a day 8/14/20  Yes Valdo Mathur PA-C   niacin (SLO-NIACIN) 500 MG extended release tablet take 1 tablet by mouth once daily 7/20/20  Yes Chayito Stevenson DO   clopidogrel (PLAVIX) 75 MG tablet TAKE 1 TABLET BY MOUTH EVERY DAY  Patient taking differently: Take 75 mg by mouth daily Has been on hold for procedure 7/6/20  Yes Valdo Mathur PA-C   atorvastatin (LIPITOR) 80 MG tablet TAKE 1 TABLET BY MOUTH AT BEDTIME 10/22/18  Yes Valdo Mathur PA-C   carvedilol (COREG) 25 MG tablet TAKE ONE TABLET BY MOUTH TWICE DAILY (WITH MEALS) 10/22/18  Yes Ray Smoker, MARY ANNE   sildenafil (VIAGRA) 50 MG tablet Take 1 tablet by mouth as needed for Erectile Dysfunction 7/6/20   Ray Smoker, MARY ANNE   aspirin 325 MG EC tablet TAKE 1 TABLET BY MOUTH EVERY DAY  Patient taking differently: Take 325 mg by mouth daily Has been on hold for procedure 5/7/20   Manus Perks, DO   Tens Unit MISC by Does not apply route 5/7/19   Jenniferie Given, PA       Allergies  Allergies   Allergen Reactions    Bee Venom Anaphylaxis       Review of Systems: patient is intubated and sedated and not responsive to questions. Objective  BP (!) 154/57   Pulse 100   Temp 99.1 °F (37.3 °C) (Axillary)   Resp 20   Ht 5' 11\" (1.803 m)   Wt (!) 334 lb 10.5 oz (151.8 kg)   SpO2 91%   BMI 46.68 kg/m²     Physical Exam:   Performance Status:  Head and neck: PERRLA, EOMI . Sclera non icteric. Oropharynx: Clear  Neck: no JVD,  no adenopathy  Heart: Regular rate and regular rhythm, no murmur  Lungs:Disffuse inspiratory and expiratory rhonchi   Extremities: No edema,no cyanosis, no clubbing. Abdomen: Soft, non-tender;no masses, no organomegaly  Skin: No rash. Neurologic:Cranial nerves grossly intact. No focal motor or sensory deficits .     Recent Laboratory Data-   Lab Results   Component Value Date    WBC 9.7 11/24/2020    HGB 7.6 (L) 11/24/2020    HCT 27.3 (L) 11/24/2020    MCV 95.1 11/24/2020    PLT 34 (L) 11/24/2020    LYMPHOPCT 18.0 (L) 11/24/2020    RBC 2.87 (L) 11/24/2020    MCH 26.5 11/24/2020    MCHC 27.8 (L) 11/24/2020    RDW 16.7 (H) 11/24/2020    NEUTOPHILPCT 75.7 11/24/2020    MONOPCT 2.7 11/24/2020    BASOPCT 0.0 11/24/2020    NEUTROABS 7.57 (H) 11/24/2020    LYMPHSABS 1.84 11/24/2020    MONOSABS 0.29 11/24/2020    EOSABS 0.00 (L) 11/24/2020    BASOSABS 0.00 11/24/2020       Lab Results   Component Value Date     (H) 11/24/2020    K 5.1 (H) 11/24/2020     (H) 11/24/2020    CO2 26 11/24/2020    BUN 58 (H) 11/24/2020 CREATININE 1.1 11/24/2020    GLUCOSE 171 (H) 11/24/2020    CALCIUM 8.7 11/24/2020    PROT 5.8 (L) 11/24/2020    LABALBU 3.0 (L) 11/24/2020    BILITOT 0.4 11/24/2020    ALKPHOS 202 (H) 11/24/2020    AST 49 (H) 11/24/2020    ALT 34 11/24/2020    LABGLOM >60 11/24/2020    GFRAA >60 11/24/2020       No results found for: IRON, TIBC, FERRITIN        Radiology-    Xr Abdomen (kub) (single Ap View)    Result Date: 11/23/2020  EXAMINATION: ONE SUPINE XRAY VIEW(S) OF THE ABDOMEN 11/23/2020 9:38 am COMPARISON: None HISTORY: ORDERING SYSTEM PROVIDED HISTORY: abdominal pain TECHNOLOGIST PROVIDED HISTORY: Reason for exam:->abdominal pain FINDINGS: There is a nonobstructive bowel gas pattern. There are no abnormal air-fluid levels. There are no calculi overlying the renal shadows. There is an NG tube within the stomach. There is a stent graft seen within the abdominal aorta. 1. There is no bowel obstruction, ileus or free air. Ct Lumbar Spine Wo Contrast    Result Date: 11/19/2020  EXAMINATION: CT OF THE LUMBAR SPINE WITHOUT CONTRAST  11/18/2020 TECHNIQUE: CT of the lumbar spine was performed without the administration of intravenous contrast. Multiplanar reformatted images are provided for review. Dose modulation, iterative reconstruction, and/or weight based adjustment of the mA/kV was utilized to reduce the radiation dose to as low as reasonably achievable. COMPARISON: 11/17/2020 HISTORY: ORDERING SYSTEM PROVIDED HISTORY: exclude epidural hematoma TECHNOLOGIST PROVIDED HISTORY: Reason for exam:->exclude epidural hematoma Chronic back pain, recent epidural placement FINDINGS: BONES/ALIGNMENT: Vertebral body heights are preserved without evidence for lumbar fracture. However, there is irregular lucency and sclerosis in the upper sacral ala bilaterally. There is minimal retrolisthesis at L2-L3. Alignment is otherwise normal. DEGENERATIVE CHANGES: There is disc space narrowing and vacuum disc phenomenon at L2-L3.   Disc space heights are otherwise preserved. There is diffuse facet arthropathy. SOFT TISSUES/RETROPERITONEUM: No evidence for epidural hematoma is identified. 1. No evidence for epidural hematoma on CT scan. 2. Suspected sacral insufficiency fracture. Xr Chest Portable    Result Date: 11/23/2020  EXAMINATION: ONE XRAY VIEW OF THE CHEST 11/23/2020 7:08 am COMPARISON: 11/20/2020 HISTORY: ORDERING SYSTEM PROVIDED HISTORY: resp failure TECHNOLOGIST PROVIDED HISTORY: Reason for exam:->resp failure FINDINGS: There is no interval change in the persistent dense consolidation within the left upper lobe. The left lower lobe is clear. The right lung is clear. There is minimal atelectasis seen within the right lung base. The cardiac silhouette is within normals. 1. No interval change in the dense consolidation seen within the left upper lobe. 2. Minimal right lung base atelectasis. Xr Chest Portable    Result Date: 11/22/2020  EXAMINATION: ONE XRAY VIEW OF THE CHEST 11/22/2020 6:28 am COMPARISON: 11/21/2020 HISTORY: ORDERING SYSTEM PROVIDED HISTORY: resp failure TECHNOLOGIST PROVIDED HISTORY: Reason for exam:->resp failure FINDINGS: The endotracheal tube is stable. The enteric tube tip is not well visualized but may be at the gastroesophageal junction. There is stable left upper lobe dense opacity. There are continued patchy opacities in the left lower lobe. There may be small pleural effusions. No pneumothorax is seen. No significant change in dense airspace opacity of the left upper lobe and patchy left lower lobe airspace opacities. Enteric tube tip is not well visualized due to underpenetration. The tip may be at the gastroesophageal junction. This could be confirmed with KUB centered on the upper abdomen.     Xr Chest Portable    Result Date: 11/21/2020  EXAMINATION: ONE XRAY VIEW OF THE CHEST 11/21/2020 7:37 am COMPARISON: 11/20/2020 HISTORY: ORDERING SYSTEM PROVIDED HISTORY: resp failure TECHNOLOGIST PROVIDED HISTORY: Reason for exam:->resp failure FINDINGS: Endotracheal tube and nasogastric tube are unchanged. Large opacity in the left upper lobe is unchanged. There is additional airspace disease in the left lower lobe which is stable. There is no pneumothorax. Small pleural effusions are not excluded. Unchanged large opacity/consolidation in left upper lobe. Unchanged airspace disease in left lower lobe. Xr Chest Portable    Result Date: 11/20/2020  EXAMINATION: ONE XRAY VIEW OF THE CHEST 11/20/2020 6:33 am COMPARISON: 11/19/2020 HISTORY: ORDERING SYSTEM PROVIDED HISTORY: resp failure TECHNOLOGIST PROVIDED HISTORY: Reason for exam:->resp failure FINDINGS: Endotracheal and enteric tubes remain in place. There has been no appreciable change in opacities throughout the left lung, most pronounced in the left apex. The right lung is clear. The heart size is at the upper limits of normal.  There is no discernible pneumothorax. Grossly stable opacities on the left. Xr Chest Portable    Result Date: 11/19/2020  EXAMINATION: ONE XRAY VIEW OF THE CHEST 11/19/2020 6:23 am COMPARISON: 11/18/2020 HISTORY: ORDERING SYSTEM PROVIDED HISTORY: resp failure TECHNOLOGIST PROVIDED HISTORY: Reason for exam:->resp failure FINDINGS: Evaluation is limited by the patient's body habitus and the portable technique. The right lung apex was partially excluded. There is increased dense consolidation in the left upper lobe. There is also medial left basilar airspace opacity obscuring the hemidiaphragm. The heart size is mildly enlarged. There is no discernible pneumothorax. Endotracheal and enteric tubes are again seen. Increasing multifocal left lung pneumonia. Xr Chest Portable    Result Date: 11/18/2020  EXAMINATION: ONE XRAY VIEW OF THE CHEST 11/18/2020 6:39 am COMPARISON: CT chest November 17, 2020.  HISTORY: ORDERING SYSTEM PROVIDED HISTORY: SOB TECHNOLOGIST PROVIDED HISTORY: Reason for exam:->SOB FINDINGS: Dose modulation, iterative reconstruction, and/or weight based adjustment of the mA/kV was utilized to reduce the radiation dose to as low as reasonably achievable. COMPARISON: None. HISTORY: ORDERING SYSTEM PROVIDED HISTORY: aneurysm, cp, sob TECHNOLOGIST PROVIDED HISTORY: Reason for exam:->aneurysm, cp, sob FINDINGS: CTA chest: There are confluent opacities located in left upper lobe. Patchy airspace opacities located in lingula. There is moderate to large left pleural effusion. Peribronchial thickening extensor in left hilar location into the left lung base. There is subsegmental atelectasis in posterior right lower lobe with adjacent ground-glass opacities. There is pulmonary vascular congestion. The heart is mildly enlarged. There is mediastinal lymphadenopathy. Ascending thoracic aorta measures 3.7 cm in diameter. Descending thoracic aorta measures 3 cm in diameter. No evidence of thoracic aortic aneurysm or dissection. Distal descending thoracic aorta is tortuous. CTA abdomen/pelvis: There is evidence of endograft repair involving the abdominal aorta. There is redemonstration of fusiform abdominal aortic aneurysm measuring up to 6.4 cm. This finding is stable since prior. No evidence of endoleak. No retroperitoneal fluid collections. Iliac limbs of endograft appear patent. Common iliac arteries are tortuous. Common femoral arteries are patent. Numerous diverticula associated with the left colon and sigmoid colon. No evidence of acute diverticulitis. Liver, gallbladder, pancreas, and spleen are grossly unremarkable however there is motion artifact limiting this examination. There is no hydronephrosis. Cyst associated with the right kidney is stable since previous examination as well as cyst associated with the left kidney appearing stable since prior. Appendix is visualized and normal.    1.  New confluent opacities located in left upper lobe suggesting pneumonia, atelectasis, or neoplasm. 2.  Patchy airspace opacities located in lingula suggesting pneumonia with areas of atelectasis. 3.  Stenosis and occlusion are associated with left upper lobe segmental bronchi and lingular segmental bronchi. 4.  Moderate to large left pleural effusion. 5.  Mediastinal lymphadenopathy. 6.  Stable fusiform infrarenal abdominal aortic aneurysm with endograft repair. Aneurysm measures up to 6.4 cm. No evidence of endoleak or retroperitoneal fluid. 7.  Diverticulosis. Cta Abdomen Pelvis W Contrast    Result Date: 11/17/2020  EXAMINATION: CTA OF THE CHEST 11/17/2020 3:18 pm TECHNIQUE: CTA of the chest was performed after the administration of intravenous contrast.  Multiplanar reformatted images are provided for review. MIP images are provided for review. Dose modulation, iterative reconstruction, and/or weight based adjustment of the mA/kV was utilized to reduce the radiation dose to as low as reasonably achievable.; CTA of the abdomen and pelvis was performed with the administration of intravenous contrast. Multiplanar reformatted images are provided for review. MIP images are provided for review. Dose modulation, iterative reconstruction, and/or weight based adjustment of the mA/kV was utilized to reduce the radiation dose to as low as reasonably achievable. COMPARISON: None. HISTORY: ORDERING SYSTEM PROVIDED HISTORY: aneurysm, cp, sob TECHNOLOGIST PROVIDED HISTORY: Reason for exam:->aneurysm, cp, sob FINDINGS: CTA chest: There are confluent opacities located in left upper lobe. Patchy airspace opacities located in lingula. There is moderate to large left pleural effusion. Peribronchial thickening extensor in left hilar location into the left lung base. There is subsegmental atelectasis in posterior right lower lobe with adjacent ground-glass opacities. There is pulmonary vascular congestion. The heart is mildly enlarged. There is mediastinal lymphadenopathy.   Ascending thoracic aorta measures 3.7 cm in diameter. Descending thoracic aorta measures 3 cm in diameter. No evidence of thoracic aortic aneurysm or dissection. Distal descending thoracic aorta is tortuous. CTA abdomen/pelvis: There is evidence of endograft repair involving the abdominal aorta. There is redemonstration of fusiform abdominal aortic aneurysm measuring up to 6.4 cm. This finding is stable since prior. No evidence of endoleak. No retroperitoneal fluid collections. Iliac limbs of endograft appear patent. Common iliac arteries are tortuous. Common femoral arteries are patent. Numerous diverticula associated with the left colon and sigmoid colon. No evidence of acute diverticulitis. Liver, gallbladder, pancreas, and spleen are grossly unremarkable however there is motion artifact limiting this examination. There is no hydronephrosis. Cyst associated with the right kidney is stable since previous examination as well as cyst associated with the left kidney appearing stable since prior. Appendix is visualized and normal.    1.  New confluent opacities located in left upper lobe suggesting pneumonia, atelectasis, or neoplasm. 2.  Patchy airspace opacities located in lingula suggesting pneumonia with areas of atelectasis. 3.  Stenosis and occlusion are associated with left upper lobe segmental bronchi and lingular segmental bronchi. 4.  Moderate to large left pleural effusion. 5.  Mediastinal lymphadenopathy. 6.  Stable fusiform infrarenal abdominal aortic aneurysm with endograft repair. Aneurysm measures up to 6.4 cm. No evidence of endoleak or retroperitoneal fluid. 7.  Diverticulosis. Xr Abdomen For Ng/og/ne Tube Placement    Result Date: 11/22/2020  EXAMINATION: ONE SUPINE XRAY VIEW(S) OF THE ABDOMEN 11/22/2020 5:06 pm COMPARISON: November 22, 2020, 4 hours prior.  HISTORY: ORDERING SYSTEM PROVIDED HISTORY: Confirmation of course of NG/OG/NE tube and location of tip of tube TECHNOLOGIST PROVIDED HISTORY: Reason for exam:->Confirmation of course of NG/OG/NE tube and location of tip of tube Portable? ->Yes FINDINGS: Tip of the NG tube noted at the level of the diaphragm, no significant change. The right side and the inferior part of the abdomen is not included in the study. There is opacification of the visualized left upper lung. Tip of NG tube at the level of the left hemidiaphragm, no change. Xr Chest Abdomen Ng Placement    Result Date: 11/22/2020  EXAMINATION: ONE SUPINE XRAY VIEW(S) OF THE ABDOMEN 11/22/2020 12:52 pm COMPARISON: November 18. HISTORY: ORDERING SYSTEM PROVIDED HISTORY: OG placement TECHNOLOGIST PROVIDED HISTORY: Reason for exam:->OG placement FINDINGS: Nasogastric tube is present. Side hole appears to be at level of gastroesophageal junction. This tube could be advanced approximately 4 or 5 cm. Nasogastric tube is present with side hole at level of gastroesophageal junction. This tube could be advanced approximately 4 or 5 cm. Xr Chest Abdomen Ng Placement    Result Date: 11/18/2020  EXAMINATION: ONE SUPINE XRAY VIEW(S) OF THE ABDOMEN 11/18/2020 9:07 am COMPARISON: 11/18/2020, about 2 hours earlier HISTORY: ORDERING SYSTEM PROVIDED HISTORY: NGT TECHNOLOGIST PROVIDED HISTORY: Reason for exam:->NGT Reason for exam:->portable NG tube placement FINDINGS: There is an NG tube with the tip in the stomach. An ET tube is again noted in satisfactory position. There is extensive opacity in the left lung, most likely pneumonia. Increased markings also seen in the visualized portion of the right lung. The heart is enlarged. NG tube tip in the stomach. Fluoro For Surgical Procedures    Result Date: 11/17/2020  EXAMINATION: SPOT FLUOROSCOPIC IMAGES 11/17/2020 12:03 pm TECHNIQUE: Fluoroscopy was provided by the radiology department for procedure. Radiologist was not present during examination.  FLUOROSCOPY DOSE AND TYPE OR TIME AND EXPOSURES: Total dose:26.99 mGy COMPARISON: None HISTORY: ORDERING artery disease, obstructive sleep apnea, obesity, type 2 diabetes mellitus and he presently has pneumonia with hypoxia and respiratory failure and remains intubated and sedated. Overall prognosis poor  We will follow    Thank you for this consult. Please call with further questions or concerns      MATY Molina CNP  Electronically signed 11/24/2020 at 1:29 PM  Pt seen and examined.  Note edited to reflect updates  Belinda Sanches MD

## 2020-11-24 NOTE — PROGRESS NOTES
History:   Procedure Laterality Date    ABDOMINAL AORTIC ANEURYSM REPAIR, ENDOVASCULAR N/A 6/28/2019    ENDOVASCULAR REPAIR ABDOMINAL AORTIC ANEURYSM performed by Lanita Riedel, MD at [address] [facility] 1 Bilateral 3/12/2020    BILATERAL L3,L4,L5 MEDIAL BRANCH NERVE BLOCK performed by Mando Brothers DO at [address] [facility] 1 Bilateral 6/11/2020    BILATERAL L3,L4,L5 MEDIAL NERVE BRANCH BLOCK #2 performed by Mando Brothers DO at [address] [place] [address] N/A 11/18/2020    BRONCHOSCOPY/TRANSBRONCHIAL NEEDLE BIOPSY performed by Madelin Garzon MD at [facility]  X4    CORONARY ANGIOPLASTY WITH STENT PLACEMENT  12/30/2011    Dr. Temo Kahn 1st OM 3.0 x 20 Ion    DIAGNOSTIC CARDIAC CATH LAB PROCEDURE  3/15/2005    [facility]. Mild to moderate CAD. Normal LV.  DIAGNOSTIC CARDIAC CATH LAB PROCEDURE  1/16/2007    [facility]. Cath/PTCA/DE stent of proximal and distal RCA.  DIAGNOSTIC CARDIAC CATH LAB PROCEDURE  2/21/2007    Cath/DE stent of proximal OM1 and proximal Cx.  DIAGNOSTIC CARDIAC CATH LAB PROCEDURE  12/30/2011    ANURADHA of mid OM branch of circumflex.      ECHO COMPL W DOP COLOR FLOW  12/30/2011         HC INSERT PICC CATH, 5/> YRS - MIDLINE  11/23/2020         HERNIA REPAIR  2/2014    laparoscopic ventral hernia repain    JOINT REPLACEMENT Left 4/1/14    TKA-ed    JOINT REPLACEMENT Right 2018    KNEE    PAIN MANAGEMENT PROCEDURE Right 9/1/2020    RIGHT L3,4,5 MEDIAL BRANCH RADIOFREQUENCY ABLATION performed by Mando Brothers DO at [facility] N/A 11/17/2020    L4-5 EPIDURAL STEROID INJECTION #1 performed by Mando Brothers DO at [address] [facility] OFFICE/OUTPT VISIT,PROCEDURE ONLY Right 11/26/2018    RIGHT KNEE TOTAL ARTHROPLASTY performed by Ewa Hernandez DO at [facility] OR               fentaNYL 5 mcg/ml in 0.9%  ml infusion 200 mcg/hr (11/24/20 1232)    propofol Stopped (11/24/20 1140)    dextrose      midazolam 10 mg/hr (20 1445)      QUEtiapine  100 mg Oral BID    furosemide  60 mg Intravenous TID    methylPREDNISolone  40 mg Intravenous Q12H    heparin flush  1 mL Intravenous 2 times per day    cefepime  2 g Intravenous Q8H    insulin lispro  0-18 Units Subcutaneous Q6H    nicotine  1 patch Transdermal Daily    acetylcysteine  4 mL Inhalation BID    albuterol  2.5 mg Nebulization Q4H    Arformoterol Tartrate  15 mcg Nebulization BID    atorvastatin  80 mg Oral Nightly    [Held by provider] clopidogrel  75 mg Oral Daily    fluticasone  2 spray Nasal Daily    pregabalin  150 mg Oral BID    sodium chloride flush  10 mL Intravenous 2 times per day    [Held by provider] enoxaparin  40 mg Subcutaneous Daily    insulin glargine  0.25 Units/kg Subcutaneous Nightly    chlorhexidine  15 mL Mouth/Throat BID    pantoprazole  40 mg Intravenous Daily    And    sodium chloride (PF)  10 mL Intravenous Daily    aspirin  325 mg Oral Daily         Vitals:  Tmax:  VITALS:  BP (!) 154/57   Pulse 105   Temp 99.1 °F (37.3 °C) (Axillary)   Resp 22   Ht 5' 11\" (1.803 m)   Wt (!) 334 lb 10.5 oz (151.8 kg)   SpO2 91%   BMI 46.68 kg/m²   24HR INTAKE/OUTPUT:      Intake/Output Summary (Last 24 hours) at 2020 1501  Last data filed at 2020 1300  Gross per 24 hour   Intake 1731 ml   Output 1425 ml   Net 306 ml     CURRENT PULSE OXIMETRY:  SpO2: 91 %  24HR PULSE OXIMETRY RANGE:  SpO2  Av.8 %  Min: 90 %  Max: 95 %        EXAM:  General: No distress. Alert. Sedated. Obese  Eyes: PERRL. No sclera icterus. No conjunctival injection. ENT: No discharge. Pharynx clear. Endotracheal tube is in place. Secretions ++  Neck: Trachea midline. Normal thyroid. Thick  Resp: No accessory muscle use. No crackles. Bronchial sounds noticed left more than right. Fairly prolonged expiratory phase noticed  CV: Regular rate. Regular rhythm. No mumur or rub. ABD: Non-tender. Non-distended.  No masses. No organmegaly. Normal bowel sounds. Fatty, soft  Skin: Warm and dry. No nodule on exposed extremities. No rash on exposed extremities. Lymph: No cervical LAD. No supraclavicular LAD. Ext: No joint deformity. No clubbing. No cyanosis. 1+ edema  Neuro: . Positive pupils/gag/corneals. Normal pain response. Lab Results:  CBC:   Recent Labs     11/22/20 0515 11/23/20 0525 11/24/20  0510   WBC 8.4 10.1 9.7   HGB 7.5* 7.8* 7.6*   HCT 26.1* 27.6* 27.3*   MCV 93.9 94.2 95.1   PLT 49* 39* 34*     BMP:   Recent Labs     11/22/20 0515 11/23/20 0525 11/24/20  0510    146 147*   K 4.9 5.1* 5.1*   * 111* 112*   CO2 24 24 26   PHOS 2.5 2.3* 2.5   BUN 57* 55* 58*   CREATININE 1.1 1.1 1.1      ALB:3,BILIDIR:3,BILITOT:3,ALKPHOS:3)@  PT/INR: No results for input(s): PROTIME, INR in the last 72 hours. Cultures:  -    ABG: noted  Films:  CT : Left upper lobe collapse      Assessment:  · Acute on chronic respiratory failure, day #7  · Community-acquired pneumonia  · History heavy smoking and morbid obesity, cannot rule out underlying COPD, cannot rule out MANOLO      Plan:  · Agree with current plan. · Antibiotic,  cefepime #4  · Cultures, candida  · UA and UC to follow  · Bronchodilators and IV steroids  · Follow results of bronchoscopy, cytology with malignant cells, possible non-small cell lung cancer. Heme-onc consult requested. · Vent management as per CCM  · Discussed with CCM team  · To continue LABA with hope to improve bronchoconstriction      .     Jaky Mejia M.D., F.C.C.P.                    pulp

## 2020-11-24 NOTE — PATIENT CARE CONFERENCE
Intensive Care Daily Quality Rounding Checklist        ICU Team Members:      ICU Day #: NUMBER: 7     Intubation Date: November 18     Ventilator Day #: NUMBER: 7     Central Line Insertion Date: N/A                                                    IAF #: N/A      Arterial Line Insertion Date: N/A                             Day #: N/A     DVT Prophylaxis: Lovenox    GI Prophylaxis: Protonix     Black Catheter Insertion Manpreet David                                        Day #: 8                             Continued need (if yes, reason documented and discussed with physician): yes, need for accurate I+O's     Skin Issues/ Wounds and ordered treatment discussed on rounds: no issues, SOS precautions     Goals/ Plans for the Day: wean vent, start lasix, nephrology consult, increase free water, wean steroids

## 2020-11-24 NOTE — PLAN OF CARE
Problem: Falls - Risk of:  Goal: Will remain free from falls  Description: Will remain free from falls  11/24/2020 0951 by Suzie Banegas RN  Outcome: Met This Shift  11/24/2020 0008 by Palomo Shanks RN  Outcome: Met This Shift  Goal: Absence of physical injury  Description: Absence of physical injury  11/24/2020 0951 by Suzie Banegas RN  Outcome: Met This Shift  11/24/2020 0008 by Palomo Shanks RN  Outcome: Met This Shift     Problem: Skin Integrity:  Goal: Will show no infection signs and symptoms  Description: Will show no infection signs and symptoms  11/24/2020 0951 by Suzie Banegas RN  Outcome: Met This Shift  11/24/2020 0008 by Palomo Shanks RN  Outcome: Met This Shift  Goal: Absence of new skin breakdown  Description: Absence of new skin breakdown  11/24/2020 0951 by Suzie Banegas RN  Outcome: Met This Shift  11/24/2020 0008 by Palomo Shanks RN  Outcome: Met This Shift     Problem: Pain:  Goal: Pain level will decrease  Description: Pain level will decrease  11/24/2020 0951 by Suzie Banegas RN  Outcome: Met This Shift  11/24/2020 0008 by Palomo Shanks RN  Outcome: Met This Shift  Goal: Control of acute pain  Description: Control of acute pain  11/24/2020 0951 by Suzie Banegas RN  Outcome: Met This Shift  11/24/2020 0008 by Palomo Shanks RN  Outcome: Met This Shift  Goal: Control of chronic pain  Description: Control of chronic pain  11/24/2020 0951 by Suzie Banegas RN  Outcome: Met This Shift  11/24/2020 0008 by Palomo Shanks RN  Outcome: Met This Shift     Problem: Restraint Use - Nonviolent/Non-Self-Destructive Behavior:  Goal: Absence of restraint indications  Description: Absence of restraint indications  11/24/2020 0951 by Suzie Banegas RN  Outcome: Met This Shift  11/24/2020 0008 by Palomo Shanks RN  Outcome: Met This Shift  Goal: Absence of restraint-related injury  Description: Absence of restraint-related injury  11/24/2020 0951 by Suzie Banegas RN  Outcome: Met This Shift  11/24/2020 0008 by Lola Acevedo RN  Outcome: Met This Shift     Problem: Gas Exchange - Impaired  Goal: Able to breathe comfortably  Description: Able to breathe comfortably  11/24/2020 0951 by Francheska Lee RN  Outcome: Met This Shift  11/24/2020 0008 by Lola Acevedo RN  Outcome: Met This Shift     Problem: Fluid and Electrolyte Imbalance  Goal: Fluid and electrolyte balance are achieved/maintained  11/24/2020 0951 by Francheska Lee RN  Outcome: Met This Shift  11/24/2020 0008 by Lola Acevedo RN  Outcome: Met This Shift     Problem: HH FLUID RETENTION-CHF  Goal: Absence of fluid overload signs and symptoms  11/24/2020 0951 by Francheska Lee RN  Outcome: Met This Shift  11/24/2020 0008 by Lola Acevedo RN  Outcome: Met This Shift     Problem: HEMODYNAMIC STATUS  Goal: Hemoglobin within specified parameters  11/24/2020 0951 by Francheska Lee RN  Outcome: Met This Shift  11/24/2020 0008 by Lola Acevedo RN  Outcome: Met This Shift     Problem: Bleeding - Risk of  Goal: Absence of active bleeding  11/24/2020 0951 by Francheska Lee RN  Outcome: Met This Shift  11/24/2020 0008 by Lola Acevedo RN  Outcome: Met This Shift     Problem: MOBILITY  Goal: Mobility/activity is maintained at optimum level for patient  11/24/2020 0951 by Francheska Lee RN  Outcome: Met This Shift  11/24/2020 0008 by Lola Acevedo RN  Outcome: Met This Shift

## 2020-11-24 NOTE — CARE COORDINATION
COVID negative 11/17. Vent/ intubated since 11/18-FIO2 60%, PEEP 10 . On sedation, iv abxs. [facility], [facility] and [facility] unable to accept referral. From home w/ wife PTA-uses walker,cane. Previous [facility] choices per SW note were [address] and [address] Discharge plan unknown pending progress. [facility] and [facility] referrals made.  Will follow Wilmar Hope

## 2020-11-24 NOTE — CONSULTS
Anticoagulant long-term use     Arthritis     CAD (coronary artery disease)     Chronic back pain     Depression     Dyslipidemia     Hiatal hernia 1979    History of ST elevation myocardial infarction (STEMI)     Hyperlipidemia     Hypertension     Low back pain     Lumbar radiculopathy 8/14/2019    Obesity     MANOLO on CPAP     Presence of stent in left circumflex coronary artery     Presence of stent in right coronary artery     Smoker     Type 2 diabetes mellitus without complication [facility])        Past Surgical History:   Procedure Laterality Date    ABDOMINAL AORTIC ANEURYSM REPAIR, ENDOVASCULAR N/A 6/28/2019    ENDOVASCULAR REPAIR ABDOMINAL AORTIC ANEURYSM performed by Jackson Bertrand MD at [address] [place] [address] Bilateral 3/12/2020    BILATERAL L3,L4,L5 MEDIAL BRANCH NERVE BLOCK performed by Shannan Serrano DO at [address] [place] [address] Bilateral 6/11/2020    BILATERAL L3,L4,L5 MEDIAL NERVE BRANCH BLOCK #2 performed by Shannan Serrano DO at [address] [place] [address] N/A 11/18/2020    BRONCHOSCOPY/TRANSBRONCHIAL NEEDLE BIOPSY performed by Kajal Garsia MD at [address] [place] [address]  12/30/2011    Dr. Diggs Mars 1st OM 3.0 x 20 Ion    DIAGNOSTIC CARDIAC CATH LAB PROCEDURE  3/15/2005    [facility]. Mild to moderate CAD. Normal LV.  DIAGNOSTIC CARDIAC CATH LAB PROCEDURE  1/16/2007    [facility]. Cath/PTCA/DE stent of proximal and distal RCA.  DIAGNOSTIC CARDIAC CATH LAB PROCEDURE  2/21/2007    Cath/DE stent of proximal OM1 and proximal Cx.  DIAGNOSTIC CARDIAC CATH LAB PROCEDURE  12/30/2011    ANURADHA of mid OM branch of circumflex.      ECHO COMPL W DOP COLOR FLOW  12/30/2011         HC INSERT PICC CATH, 5/> YRS - MIDLINE  11/23/2020         HERNIA REPAIR  2/2014    laparoscopic ventral hernia repain    JOINT REPLACEMENT Left 4/1/14    TKA-ed    JOINT REPLACEMENT Right 2018    KNEE    PAIN MANAGEMENT PROCEDURE Right 9/1/2020    RIGHT L3,4,5 MEDIAL BRANCH RADIOFREQUENCY ABLATION performed by Paul Davenport DO at [facility] N/A 11/17/2020    L4-5 EPIDURAL STEROID INJECTION #1 performed by Paul Davenport DO at [address] [facility] OFFICE/OUTPT VISIT,PROCEDURE ONLY Right 11/26/2018    RIGHT KNEE TOTAL ARTHROPLASTY performed by Sari Alexander DO at [facility] OR       Family History   Problem Relation Age of Onset    Diabetes Mother     Stroke Mother     Diabetes Father     No Known Problems Sister         reports that he has been smoking cigarettes. He started smoking about 42 years ago. He has a 39.00 pack-year smoking history. He has never used smokeless tobacco. He reports that he does not drink alcohol or use drugs.     Allergies:  Bee venom    Current Medications:    QUEtiapine (SEROQUEL) tablet 100 mg, BID  furosemide (LASIX) injection 60 mg, TID  methylPREDNISolone sodium (SOLU-MEDROL) injection 40 mg, Q12H  fentaNYL 5 mcg/ml in 0.9%  ml infusion, Continuous  propofol injection, Titrated  sodium chloride flush 0.9 % injection 10 mL, PRN  heparin flush 100 UNIT/ML injection 100 Units, 2 times per day  heparin flush 100 UNIT/ML injection 100 Units, PRN  sodium phosphate 24.3 mmol in dextrose 5 % 250 mL IVPB, PRN    Or  sodium phosphate 48.57 mmol in dextrose 5 % 250 mL IVPB, PRN  magnesium sulfate 1 g in dextrose 5% 100 mL IVPB, PRN  potassium chloride 20 mEq/50 mL IVPB (Central Line), PRN  cefepime (MAXIPIME) 2 g IVPB extended (mini-bag), Q8H  insulin lispro (HUMALOG) injection vial 0-18 Units, Q6H  nicotine (NICODERM CQ) 21 MG/24HR 1 patch, Daily  acetylcysteine (MUCOMYST) 10 % solution 400 mg, BID  albuterol (PROVENTIL) nebulizer solution 2.5 mg, Q4H  Arformoterol Tartrate (BROVANA) nebulizer solution 15 mcg, BID  atorvastatin (LIPITOR) tablet 80 mg, Nightly  [Held by provider] clopidogrel (PLAVIX) tablet 75 mg, Daily  fluticasone (FLONASE) 50 MCG/ACT nasal spray 2 spray, Daily  pregabalin (LYRICA) capsule 150 mg, BID  tiZANidine (ZANAFLEX) tablet 4 mg, TID PRN  sodium chloride flush 0.9 % injection 10 mL, 2 times per day  acetaminophen (TYLENOL) tablet 650 mg, Q6H PRN    Or  acetaminophen (TYLENOL) suppository 650 mg, Q6H PRN  polyethylene glycol (GLYCOLAX) packet 17 g, Daily PRN  promethazine (PHENERGAN) tablet 12.5 mg, Q6H PRN    Or  ondansetron (ZOFRAN) injection 4 mg, Q6H PRN  [Held by provider] enoxaparin (LOVENOX) injection 40 mg, Daily  insulin glargine (LANTUS) injection vial 36 Units, Nightly  glucose (GLUTOSE) 40 % oral gel 15 g, PRN  dextrose 50 % IV solution, PRN  glucagon (rDNA) injection 1 mg, PRN  dextrose 5 % solution, PRN  oxyCODONE-acetaminophen (PERCOCET) 5-325 MG per tablet 1 tablet, TID PRN    And  oxyCODONE (ROXICODONE) immediate release tablet 2.5 mg, TID PRN  albuterol (PROVENTIL) nebulizer solution 2.5 mg, Q4H PRN  chlorhexidine (PERIDEX) 0.12 % solution 15 mL, BID  pantoprazole (PROTONIX) injection 40 mg, Daily    And  sodium chloride (PF) 0.9 % injection 10 mL, Daily  midazolam (VERSED) 1 mg/mL in D5W infusion, Continuous  aspirin tablet 325 mg, Daily        Review of Systems:   Unable to obtain as he is intubated and sedated    Physical exam:   Constitutional:    Vitals: BP (!) 142/107   Pulse 106   Temp 99.1 °F (37.3 °C) (Axillary)   Resp 20   Ht 5' 11\" (1.803 m)   Wt (!) 334 lb 10.5 oz (151.8 kg)   SpO2 92%   BMI 46.68 kg/m²        Patient seen and examined bedside in the ICU, he is intubated and sedated, no acute distress  Skin: no rash, turgor wnl  Heent:  eomi, mmm  Neck: no bruits or jvd noted  Cardiovascular:  S1, S2 without m/r/g  Respiratory: CTA B without w/r/r, decreased breath sounds in bases  Abdomen:  +bs, soft, nt, Black catheter draining clear urine  Ext: 3+ lower extremity edema  Psychiatric: mood and affect appropriate  Musculoskeletal:  Rom, muscular strength intact    Data:   Labs:  CBC:   Lab Results   Component Value Date    WBC 9.7 11/24/2020    RBC 2.87 11/24/2020    HGB 7.6 11/24/2020    HCT 27.3 11/24/2020    MCV 95.1 11/24/2020    MCH 26.5 11/24/2020    MCHC 27.8 11/24/2020    RDW 16.7 11/24/2020    PLT 34 11/24/2020    MPV 11.3 11/24/2020     CMP:    Lab Results   Component Value Date     11/24/2020    K 5.1 11/24/2020    K 5.1 11/17/2020     11/24/2020    CO2 26 11/24/2020    BUN 58 11/24/2020    CREATININE 1.1 11/24/2020    GFRAA >60 11/24/2020    LABGLOM >60 11/24/2020    GLUCOSE 171 11/24/2020    GLUCOSE 120 12/31/2011    PROT 5.8 11/24/2020    LABALBU 3.0 11/24/2020    LABALBU 4.5 12/28/2011    CALCIUM 8.7 11/24/2020    BILITOT 0.4 11/24/2020    ALKPHOS 202 11/24/2020    AST 49 11/24/2020    ALT 34 11/24/2020     BMP:    Lab Results   Component Value Date     11/24/2020    K 5.1 11/24/2020    K 5.1 11/17/2020     11/24/2020    CO2 26 11/24/2020    BUN 58 11/24/2020    LABALBU 3.0 11/24/2020    LABALBU 4.5 12/28/2011    CREATININE 1.1 11/24/2020    CALCIUM 8.7 11/24/2020    GFRAA >60 11/24/2020    LABGLOM >60 11/24/2020    GLUCOSE 171 11/24/2020    GLUCOSE 120 12/31/2011     Calcium:    Lab Results   Component Value Date    CALCIUM 8.7 11/24/2020     Phosphorus:    Lab Results   Component Value Date    PHOS 2.5 11/24/2020     Uric Acid:  No results found for: URICACID  U/A:    Lab Results   Component Value Date    NITRU Negative 11/24/2020    COLORU Yellow 11/24/2020    PHUR 5.5 11/24/2020    WBCUA 2-5 11/24/2020    RBCUA >20 11/24/2020    MUCUS Present 11/24/2020    BACTERIA MODERATE 11/24/2020    CLARITYU CLOUDY 11/24/2020    SPECGRAV 1.025 11/24/2020    LEUKOCYTESUR Negative 11/24/2020    UROBILINOGEN 0.2 11/24/2020    BILIRUBINUR Negative 11/24/2020    BLOODU LARGE 11/24/2020    GLUCOSEU Negative 11/24/2020    KETUA Negative 11/24/2020    AMORPHOUS MANY 11/24/2020        Imaging:  Xr Abdomen (kub) (single Ap View)    Result Date: 11/23/2020  EXAMINATION: ONE SUPINE XRAY VIEW(S) OF THE ABDOMEN 11/23/2020 9:38 am COMPARISON: None HISTORY: ORDERING SYSTEM PROVIDED HISTORY: abdominal pain TECHNOLOGIST PROVIDED HISTORY: Reason for exam:->abdominal pain FINDINGS: There is a nonobstructive bowel gas pattern. There are no abnormal air-fluid levels. There are no calculi overlying the renal shadows. There is an NG tube within the stomach. There is a stent graft seen within the abdominal aorta. 1. There is no bowel obstruction, ileus or free air. Ct Lumbar Spine Wo Contrast    Result Date: 11/19/2020  EXAMINATION: CT OF THE LUMBAR SPINE WITHOUT CONTRAST  11/18/2020 TECHNIQUE: CT of the lumbar spine was performed without the administration of intravenous contrast. Multiplanar reformatted images are provided for review. Dose modulation, iterative reconstruction, and/or weight based adjustment of the mA/kV was utilized to reduce the radiation dose to as low as reasonably achievable. COMPARISON: 11/17/2020 HISTORY: ORDERING SYSTEM PROVIDED HISTORY: exclude epidural hematoma TECHNOLOGIST PROVIDED HISTORY: Reason for exam:->exclude epidural hematoma Chronic back pain, recent epidural placement FINDINGS: BONES/ALIGNMENT: Vertebral body heights are preserved without evidence for lumbar fracture. However, there is irregular lucency and sclerosis in the upper sacral ala bilaterally. There is minimal retrolisthesis at L2-L3. Alignment is otherwise normal. DEGENERATIVE CHANGES: There is disc space narrowing and vacuum disc phenomenon at L2-L3. Disc space heights are otherwise preserved. There is diffuse facet arthropathy. SOFT TISSUES/RETROPERITONEUM: No evidence for epidural hematoma is identified. 1. No evidence for epidural hematoma on CT scan. 2. Suspected sacral insufficiency fracture.     Xr Chest Portable    Result Date: 11/24/2020  EXAMINATION: ONE XRAY VIEW OF THE CHEST 11/24/2020 6:36 am COMPARISON: 11/23/2020 HISTORY: ORDERING SYSTEM PROVIDED HISTORY: resp failure TECHNOLOGIST PROVIDED HISTORY: Reason for exam:->resp failure FINDINGS: The heart is borderline enlarged. Dense opacity remains in the mid and upper left lung unchanged. Moderate amount of opacity present in the lower right lung. Small amount of opacity present in the left lung base favored to be due to atelectasis. No abnormal pulmonary vascular congestion. ET tube and NG tube unchanged. Persistent dense opacity in the upper left lung unchanged. Moderate opacity in the right lung base worrisome for pneumonia. Probable mild left basilar atelectasis. No abnormal vascular congestion. Xr Chest Portable    Result Date: 11/23/2020  EXAMINATION: ONE XRAY VIEW OF THE CHEST 11/23/2020 4:54 pm COMPARISON: Multiple prior chest series with the most recent dated November 23, 2020 at [facility]: ETT adjusted TECHNOLOGIST PROVIDED HISTORY: Reason for exam:->ETT adjusted Reason for exam:->ETT advanced to 28 cm FINDINGS: Medical support devices: Endotracheal tube terminates in the mid to distal thoracic trachea. Enteric tube extends subdiaphragmatic and off the field of view. Lungs: Stable left upper lung complete opacification. Persistent right lower lung opacity. Slight elevation of the right hemidiaphragm is stable. Left lower lung not imaged. Cardiomediastinal silhouette and pulmonary vascularity: There appears to be atherosclerotic disease and prominence of the cardiac silhouette. Osseous and soft tissue structures: No acute findings. 1.  Unchanged left upper lung complete opacification. 2.  Elevation of the right hemidiaphragm and right lower lung opacity. 3.  Medical support devices as above.     Xr Chest Portable    Result Date: 11/23/2020  EXAMINATION: ONE XRAY VIEW OF THE CHEST 11/23/2020 7:08 am COMPARISON: 11/20/2020 HISTORY: ORDERING SYSTEM PROVIDED HISTORY: resp failure TECHNOLOGIST PROVIDED HISTORY: Reason for exam:->resp failure FINDINGS: There is no interval change in the persistent dense consolidation within the left upper lobe. The left lower lobe is clear. The right lung is clear. There is minimal atelectasis seen within the right lung base. The cardiac silhouette is within normals. 1. No interval change in the dense consolidation seen within the left upper lobe. 2. Minimal right lung base atelectasis. Xr Chest Portable    Result Date: 11/22/2020  EXAMINATION: ONE XRAY VIEW OF THE CHEST 11/22/2020 6:28 am COMPARISON: 11/21/2020 HISTORY: ORDERING SYSTEM PROVIDED HISTORY: resp failure TECHNOLOGIST PROVIDED HISTORY: Reason for exam:->resp failure FINDINGS: The endotracheal tube is stable. The enteric tube tip is not well visualized but may be at the gastroesophageal junction. There is stable left upper lobe dense opacity. There are continued patchy opacities in the left lower lobe. There may be small pleural effusions. No pneumothorax is seen. No significant change in dense airspace opacity of the left upper lobe and patchy left lower lobe airspace opacities. Enteric tube tip is not well visualized due to underpenetration. The tip may be at the gastroesophageal junction. This could be confirmed with KUB centered on the upper abdomen. Xr Chest Portable    Result Date: 11/21/2020  EXAMINATION: ONE XRAY VIEW OF THE CHEST 11/21/2020 7:37 am COMPARISON: 11/20/2020 HISTORY: ORDERING SYSTEM PROVIDED HISTORY: resp failure TECHNOLOGIST PROVIDED HISTORY: Reason for exam:->resp failure FINDINGS: Endotracheal tube and nasogastric tube are unchanged. Large opacity in the left upper lobe is unchanged. There is additional airspace disease in the left lower lobe which is stable. There is no pneumothorax. Small pleural effusions are not excluded. Unchanged large opacity/consolidation in left upper lobe. Unchanged airspace disease in left lower lobe.     Xr Chest Portable    Result Date: 11/20/2020  EXAMINATION: ONE XRAY VIEW OF THE CHEST 11/20/2020 6:33 am COMPARISON: 11/19/2020 HISTORY: ORDERING SYSTEM PROVIDED HISTORY: resp failure TECHNOLOGIST PROVIDED HISTORY: Reason for exam:->resp failure FINDINGS: Endotracheal and enteric tubes remain in place. There has been no appreciable change in opacities throughout the left lung, most pronounced in the left apex. The right lung is clear. The heart size is at the upper limits of normal.  There is no discernible pneumothorax. Grossly stable opacities on the left. Xr Chest Portable    Result Date: 11/19/2020  EXAMINATION: ONE XRAY VIEW OF THE CHEST 11/19/2020 6:23 am COMPARISON: 11/18/2020 HISTORY: ORDERING SYSTEM PROVIDED HISTORY: resp failure TECHNOLOGIST PROVIDED HISTORY: Reason for exam:->resp failure FINDINGS: Evaluation is limited by the patient's body habitus and the portable technique. The right lung apex was partially excluded. There is increased dense consolidation in the left upper lobe. There is also medial left basilar airspace opacity obscuring the hemidiaphragm. The heart size is mildly enlarged. There is no discernible pneumothorax. Endotracheal and enteric tubes are again seen. Increasing multifocal left lung pneumonia. Xr Chest Portable    Result Date: 11/18/2020  EXAMINATION: ONE XRAY VIEW OF THE CHEST 11/18/2020 6:39 am COMPARISON: CT chest November 17, 2020. HISTORY: ORDERING SYSTEM PROVIDED HISTORY: SOB TECHNOLOGIST PROVIDED HISTORY: Reason for exam:->SOB FINDINGS: The cardiac silhouette is within normal limits in size. There is masslike consolidation of the left upper lobe. There is a small to moderate left dependent pleural effusion. There is no visible pneumothorax. The pulmonary vessels are within normal limits. Left upper lobe masslike consolidation. Small to moderate left pleural effusion.     Xr Chest 1 View    Result Date: 11/18/2020  EXAMINATION: ONE XRAY VIEW OF THE CHEST 11/18/2020 9:06 am COMPARISON: 11/18/2020 HISTORY: ORDERING SYSTEM PROVIDED HISTORY: intubation TECHNOLOGIST PROVIDED HISTORY: Reason for exam:->intubation Reason for exam:->portable FINDINGS: Compared to the chest radiograph from earlier today, 6:44 a.m., there is interval placement of an endotracheal tube, the tip of which is approximately 4.1 cm above the kroina. Nasogastric tube is present but is suboptimally visualized due to motion artifact and relative underpenetration of the study. The abdominal radiograph demonstrates it to be well positioned, with the tip in the upper abdomen. Prominent masslike consolidative opacity in the upper left lung are grossly stable. No pneumothorax is seen. Layering left pleural effusion. 1.  The life-support lines and tubes are well positioned. 2. Masslike consolidative opacity in the left upper lung. Small left effusion. Cta Chest W Contrast    Result Date: 11/17/2020  EXAMINATION: CTA OF THE CHEST 11/17/2020 3:18 pm TECHNIQUE: CTA of the chest was performed after the administration of intravenous contrast.  Multiplanar reformatted images are provided for review. MIP images are provided for review. Dose modulation, iterative reconstruction, and/or weight based adjustment of the mA/kV was utilized to reduce the radiation dose to as low as reasonably achievable.; CTA of the abdomen and pelvis was performed with the administration of intravenous contrast. Multiplanar reformatted images are provided for review. MIP images are provided for review. Dose modulation, iterative reconstruction, and/or weight based adjustment of the mA/kV was utilized to reduce the radiation dose to as low as reasonably achievable. COMPARISON: None. HISTORY: ORDERING SYSTEM PROVIDED HISTORY: aneurysm, cp, sob TECHNOLOGIST PROVIDED HISTORY: Reason for exam:->aneurysm, cp, sob FINDINGS: CTA chest: There are confluent opacities located in left upper lobe. Patchy airspace opacities located in lingula. There is moderate to large left pleural effusion.   Peribronchial thickening extensor in left hilar location into the THE CHEST 11/17/2020 3:18 pm TECHNIQUE: CTA of the chest was performed after the administration of intravenous contrast.  Multiplanar reformatted images are provided for review. MIP images are provided for review. Dose modulation, iterative reconstruction, and/or weight based adjustment of the mA/kV was utilized to reduce the radiation dose to as low as reasonably achievable.; CTA of the abdomen and pelvis was performed with the administration of intravenous contrast. Multiplanar reformatted images are provided for review. MIP images are provided for review. Dose modulation, iterative reconstruction, and/or weight based adjustment of the mA/kV was utilized to reduce the radiation dose to as low as reasonably achievable. COMPARISON: None. HISTORY: ORDERING SYSTEM PROVIDED HISTORY: aneurysm, cp, sob TECHNOLOGIST PROVIDED HISTORY: Reason for exam:->aneurysm, cp, sob FINDINGS: CTA chest: There are confluent opacities located in left upper lobe. Patchy airspace opacities located in lingula. There is moderate to large left pleural effusion. Peribronchial thickening extensor in left hilar location into the left lung base. There is subsegmental atelectasis in posterior right lower lobe with adjacent ground-glass opacities. There is pulmonary vascular congestion. The heart is mildly enlarged. There is mediastinal lymphadenopathy. Ascending thoracic aorta measures 3.7 cm in diameter. Descending thoracic aorta measures 3 cm in diameter. No evidence of thoracic aortic aneurysm or dissection. Distal descending thoracic aorta is tortuous. CTA abdomen/pelvis: There is evidence of endograft repair involving the abdominal aorta. There is redemonstration of fusiform abdominal aortic aneurysm measuring up to 6.4 cm. This finding is stable since prior. No evidence of endoleak. No retroperitoneal fluid collections. Iliac limbs of endograft appear patent. Common iliac arteries are tortuous.   Common femoral arteries are patent. Numerous diverticula associated with the left colon and sigmoid colon. No evidence of acute diverticulitis. Liver, gallbladder, pancreas, and spleen are grossly unremarkable however there is motion artifact limiting this examination. There is no hydronephrosis. Cyst associated with the right kidney is stable since previous examination as well as cyst associated with the left kidney appearing stable since prior. Appendix is visualized and normal.    1.  New confluent opacities located in left upper lobe suggesting pneumonia, atelectasis, or neoplasm. 2.  Patchy airspace opacities located in lingula suggesting pneumonia with areas of atelectasis. 3.  Stenosis and occlusion are associated with left upper lobe segmental bronchi and lingular segmental bronchi. 4.  Moderate to large left pleural effusion. 5.  Mediastinal lymphadenopathy. 6.  Stable fusiform infrarenal abdominal aortic aneurysm with endograft repair. Aneurysm measures up to 6.4 cm. No evidence of endoleak or retroperitoneal fluid. 7.  Diverticulosis. Xr Abdomen For Ng/og/ne Tube Placement    Result Date: 11/22/2020  EXAMINATION: ONE SUPINE XRAY VIEW(S) OF THE ABDOMEN 11/22/2020 5:06 pm COMPARISON: November 22, 2020, 4 hours prior. HISTORY: ORDERING SYSTEM PROVIDED HISTORY: Confirmation of course of NG/OG/NE tube and location of tip of tube TECHNOLOGIST PROVIDED HISTORY: Reason for exam:->Confirmation of course of NG/OG/NE tube and location of tip of tube Portable? ->Yes FINDINGS: Tip of the NG tube noted at the level of the diaphragm, no significant change. The right side and the inferior part of the abdomen is not included in the study. There is opacification of the visualized left upper lung. Tip of NG tube at the level of the left hemidiaphragm, no change. Xr Chest Abdomen Ng Placement    Result Date: 11/22/2020  EXAMINATION: ONE SUPINE XRAY VIEW(S) OF THE ABDOMEN 11/22/2020 12:52 pm COMPARISON: November 18.  HISTORY: ORDERING SYSTEM PROVIDED HISTORY: OG placement TECHNOLOGIST PROVIDED HISTORY: Reason for exam:->OG placement FINDINGS: Nasogastric tube is present. Side hole appears to be at level of gastroesophageal junction. This tube could be advanced approximately 4 or 5 cm. Nasogastric tube is present with side hole at level of gastroesophageal junction. This tube could be advanced approximately 4 or 5 cm. Xr Chest Abdomen Ng Placement    Result Date: 11/18/2020  EXAMINATION: ONE SUPINE XRAY VIEW(S) OF THE ABDOMEN 11/18/2020 9:07 am COMPARISON: 11/18/2020, about 2 hours earlier HISTORY: ORDERING SYSTEM PROVIDED HISTORY: NGT TECHNOLOGIST PROVIDED HISTORY: Reason for exam:->NGT Reason for exam:->portable NG tube placement FINDINGS: There is an NG tube with the tip in the stomach. An ET tube is again noted in satisfactory position. There is extensive opacity in the left lung, most likely pneumonia. Increased markings also seen in the visualized portion of the right lung. The heart is enlarged. NG tube tip in the stomach. Fluoro For Surgical Procedures    Result Date: 11/17/2020  EXAMINATION: SPOT FLUOROSCOPIC IMAGES 11/17/2020 12:03 pm TECHNIQUE: Fluoroscopy was provided by the radiology department for procedure. Radiologist was not present during examination. FLUOROSCOPY DOSE AND TYPE OR TIME AND EXPOSURES: Total dose:26.99 mGy COMPARISON: None HISTORY: ORDERING SYSTEM PROVIDED HISTORY: Pain management TECHNOLOGIST PROVIDED HISTORY: Reason for exam:->L4-5 Epidural steroid injection #1 Intraprocedural imaging. FINDINGS: 3 intraoperative fluoroscopic images were obtained during L4-5 epidural steroid injection. Intraprocedural fluoroscopic spot images as above. See separate procedure report for more information. Assessment  1. Acute hypoxic hypercapnic respiratory secondary to community-acquired pneumonia, on mechanical ventilation  2.  Anasarca, multifactorial secondary to hypoalbuminemia, aggressive IVF, anemia  3. Hypernatremia secondary to free water deficit, loop diuretic use. Calculated free water deficit is 3.75 L  4. Hyperkalemia  5. Anemia, normocytic, oncology on board  6. Severe thrombocytopenia    Plan  1. Obtain urine for random electrolytes, urine creatinine, urea and protein  2. Give albumin 25 g IV twice daily for 4 doses  3. Start Lasix 40 mg IV twice daily  4. Increase free water flushes to 50 ml/h  5. Continue to monitor the renal function and urine output closely      Thank you Dr. Kimmy Jarquin for allowing us to participate in care of [facility].             Electronically signed by Mercy Horvath MD on 11/24/2020 at 3:28 PM

## 2020-11-24 NOTE — PROGRESS NOTES
Becky Goldman Hospitalist   Progress Note    Admitting Date and Time: 11/17/2020  1:13 PM  Admit Dx: Acute respiratory failure with hypoxia ([address] [J96.01]  Acute respiratory failure with hypoxia ([address] [J96.01]    Seen for follow on multiple problems as listed below. Subjective:. Seen in ICU , intubated on vent , reviewed care with bedside nurse, overnight with fever 101 , currently on VM + cefepime , will have pan cx with next temp spike. ROS: Not possible sec to above.      QUEtiapine  100 mg Oral BID    furosemide  60 mg Intravenous TID    methylPREDNISolone  40 mg Intravenous Q12H    heparin flush  1 mL Intravenous 2 times per day    cefepime  2 g Intravenous Q8H    insulin lispro  0-18 Units Subcutaneous Q6H    nicotine  1 patch Transdermal Daily    acetylcysteine  4 mL Inhalation BID    albuterol  2.5 mg Nebulization Q4H    Arformoterol Tartrate  15 mcg Nebulization BID    atorvastatin  80 mg Oral Nightly    [Held by provider] clopidogrel  75 mg Oral Daily    fluticasone  2 spray Nasal Daily    pregabalin  150 mg Oral BID    sodium chloride flush  10 mL Intravenous 2 times per day    [Held by provider] enoxaparin  40 mg Subcutaneous Daily    insulin glargine  0.25 Units/kg Subcutaneous Nightly    chlorhexidine  15 mL Mouth/Throat BID    pantoprazole  40 mg Intravenous Daily    And    sodium chloride (PF)  10 mL Intravenous Daily    aspirin  325 mg Oral Daily     sodium chloride flush, 10 mL, PRN  heparin flush, 1 mL, PRN  sodium phosphate IVPB, 0.16 mmol/kg, PRN    Or  sodium phosphate IVPB, 0.32 mmol/kg, PRN  magnesium sulfate, 1 g, PRN  potassium chloride, 20 mEq, PRN  tiZANidine, 4 mg, TID PRN  acetaminophen, 650 mg, Q6H PRN    Or  acetaminophen, 650 mg, Q6H PRN  polyethylene glycol, 17 g, Daily PRN  promethazine, 12.5 mg, Q6H PRN    Or  ondansetron, 4 mg, Q6H PRN  glucose, 15 g, PRN  dextrose, 12.5 g, PRN  glucagon (rDNA), 1 mg, PRN  dextrose, 100 mL/hr, PRN  oxyCODONE-acetaminophen, 1 tablet, TID PRN    And  oxyCODONE, 2.5 mg, TID PRN  albuterol, 2.5 mg, Q4H PRN         Objective:    BP (!) 154/57   Pulse 105   Temp 99.1 °F (37.3 °C) (Axillary)   Resp 22   Ht 5' 11\" (1.803 m)   Wt (!) 334 lb 10.5 oz (151.8 kg)   SpO2 91%   BMI 46.68 kg/m²   General Appearance: intubated on vent - sedated  Skin: warm and dry  Head: normocephalic and atraumatic  Neck: supple and non-tender without mass  Pulmonary/Chest: diminished throughout with few coarse BS  Cardiovascular: normal rate, regular rhythm, normal S1 and S2  Abdomen: soft, non-tender, non-distended, normal bowel sounds, no masses or organomegaly  Extremities: no cyanosis, clubbing   Musculoskeletal: normal range of motion  Neurologic:  Can not assess      Recent Labs     11/22/20  0515 11/23/20  0525 11/24/20  0510    146 147*   K 4.9 5.1* 5.1*   * 111* 112*   CO2 24 24 26   BUN 57* 55* 58*   CREATININE 1.1 1.1 1.1   GLUCOSE 198* 200* 171*   CALCIUM 8.3* 8.5* 8.7       Recent Labs     11/22/20  0515 11/23/20  0525 11/24/20  0510   WBC 8.4 10.1 9.7   RBC 2.78* 2.93* 2.87*   HGB 7.5* 7.8* 7.6*   HCT 26.1* 27.6* 27.3*   MCV 93.9 94.2 95.1   MCH 27.0 26.6 26.5   MCHC 28.7* 28.3* 27.8*   RDW 16.9* 17.0* 16.7*   PLT 49* 39* 34*   MPV 11.9 10.7 11.3       Labs and images reviewed     Radiology:   XR CHEST PORTABLE   Final Result   Persistent dense opacity in the upper left lung unchanged. Moderate opacity   in the right lung base worrisome for pneumonia. Probable mild left basilar   atelectasis. No abnormal vascular congestion. XR CHEST PORTABLE   Final Result   1. Unchanged left upper lung complete opacification. 2.  Elevation of the right hemidiaphragm and right lower lung opacity. 3.  Medical support devices as above. XR ABDOMEN (KUB) (SINGLE AP VIEW)   Final Result   1. There is no bowel obstruction, ileus or free air. XR CHEST PORTABLE   Final Result   1.  No interval change in segmental bronchi. 4.  Moderate to large left pleural effusion. 5.  Mediastinal lymphadenopathy. 6.  Stable fusiform infrarenal abdominal aortic aneurysm with endograft   repair. Aneurysm measures up to 6.4 cm. No evidence of endoleak or   retroperitoneal fluid. 7.  Diverticulosis. CTA ABDOMEN PELVIS W CONTRAST   Final Result   1. New confluent opacities located in left upper lobe suggesting pneumonia,   atelectasis, or neoplasm. 2.  Patchy airspace opacities located in lingula suggesting pneumonia with   areas of atelectasis. 3.  Stenosis and occlusion are associated with left upper lobe segmental   bronchi and lingular segmental bronchi. 4.  Moderate to large left pleural effusion. 5.  Mediastinal lymphadenopathy. 6.  Stable fusiform infrarenal abdominal aortic aneurysm with endograft   repair. Aneurysm measures up to 6.4 cm. No evidence of endoleak or   retroperitoneal fluid. 7.  Diverticulosis. XR CHEST PORTABLE    (Results Pending)   US DUP LOWER EXTREMITIES BILATERAL VENOUS    (Results Pending)   US DUP UPPER EXTREMITIES BILATERAL VENOUS    (Results Pending)       Assessment:    Active Problems:    CAD (coronary artery disease)    Smoker    Non-insulin dependent type 2 diabetes mellitus ([address]    Morbid obesity with BMI of 40.0-44.9, adult (HCC)    Chronic back pain    Acute respiratory failure with hypoxia ([address]    Community acquired pneumonia of left upper lobe of lung    Thrombocytopenia ([address]  Resolved Problems:    * No resolved hospital problems. *      Plan:  Acute hypoxic and hypercapnic respiratory failure secondary to CAP-s/p intubation 11/18 . Was on IV Rocephin plus Zithromax now switched to Rocephin plus Levaquin, on IV steroids, nebulized treatments, critical care/pulmonary following. S/p bronc with biopsy of left upper lobe atelectasis/consolidation/mass , bx + for malignancy , hemonc following. Respiratory panel negative. Covid negative. procal 0.23. Follow cxs.CC managing. CAP - on IV Rocephin + Levaq and other supportive rx. With fever was started on IV VM + Cefepime was given , noted cefepime continuedill follow cxs. Suspect COPD exacerbation with MANOLO -on  bronchodilators, IV steroids, vent support as per critical care. Will need out pt follow with pulm    Thrombocytopenia-chronic,s/p 1 U plts , monitor. Hemonc has been consulted and following. NIDDM - On Lantus + ISS    Back pain status post epidural injection 11/17, CT spine negative for hematoma    Hypertension / Coronary artery status post PC I -on aspirin, Plavix ,statin    Hyperlipidemia - on stain     Tobacco use-has been counseled and is on nicotine patch. Does not have official COPD as a diagnosis-but has 39 pack years history of smoking. Out pt follow for sleep studies as well as PFTs    On Lovenox for DVT prophylaxis  Full code    Wife wanted transfer [facility] has  denied .     Electronically signed by Marcelino English MD on 11/24/2020 at 2:32 PM

## 2020-11-24 NOTE — PROGRESS NOTES
11/24/2020  11:43 AM      Comprehensive Nutrition Assessment    Type and Reason for Visit:  Reassess    Nutrition Recommendations/Plan: Continue current TF    Nutrition Assessment:  Pt remains intubated/sedated in ICU. TF for nutrition support. Malnutrition Assessment:  Malnutrition Status: At risk for malnutrition (Comment)    Context:  Acute Illness     Findings of the 6 clinical characteristics of malnutrition:  Energy Intake:  Mild decrease in energy intake (Comment)  Weight Loss:  Unable to assess     Body Fat Loss:  Unable to assess     Muscle Mass Loss:  Unable to assess    Fluid Accumulation:  No significant fluid accumulation     Strength:  Not Performed    Estimated Daily Nutrient Needs:  Energy (kcal):  ; Weight Used for Energy Requirements:  Admission     Protein (g):  110-120 (1.4-1.6 g/kg);  Weight Used for Protein Requirements:  Ideal        Fluid (ml/day):  Per Critical Care; Method Used for Fluid Requirements:  Other (Comment)      Nutrition Related Findings:  intubated/sedated, +I/O 8L, +1-+2 edema, OGT to TF, obese abd +BS      Wounds:  Skin Tears(blanchable redness)       Current Nutrition Therapies:    Current Tube Feeding (TF) Orders:  · Feeding Route: Orogastric  · Formula: 1.5 Diabetic  · Schedule: Continuous  · Additives/Modulars: (none)  · Water Flushes: 100 ml Q8 hr  · Current TF & Flush Orders Provides: at goal  · Goal TF & Flush Orders Provides: 1440 ml/d, 2160 rena, 119 g pro, 1393 ml total free water (total daily calories TF+propofol = 2522 rena total    Additional Calorie Sources:   propofol = 362 rena    Anthropometric Measures:  · Height: 5' 11\" (180.3 cm)  · Current Body Weight: 334 lb (151.5 kg)(11/22)   · Admission Body Weight: 323 lb (146.5 kg)(11/18 actual)    · Usual Body Weight: (UTO)     · Ideal Body Weight: 172 lbs; % Ideal Body Weight 183.7 %   · BMI: 46.6  · BMI Categories: Obese Class 3 (BMI 40.0 or greater)       Nutrition Diagnosis:   · Inadequate oral intake related to impaired respiratory function as evidenced by NPO or clear liquid status due to medical condition, intubation      Nutrition Interventions:   Food and/or Nutrient Delivery:  Continue Current Tube Feeding, Continue NPO  Nutrition Education/Counseling:  Education not indicated   Coordination of Nutrition Care:  Continue to monitor while inpatient    Goals:  Pt eleni EN at goal rate       Nutrition Monitoring and Evaluation:   Food/Nutrient Intake Outcomes:  Enteral Nutrition Intake/Tolerance  Physical Signs/Symptoms Outcomes:  Biochemical Data, GI Status, Fluid Status or Edema, Hemodynamic Status, Nutrition Focused Physical Findings, Skin, Weight     Discharge Planning:     Too soon to determine     Electronically signed by Manjula Arenas RD, CNSC, LD on 11/24/20 at 11:43 AM EST    Contact: [phone]

## 2020-11-24 NOTE — PROGRESS NOTES
20; still having moist secretions, gram positive cocci bacteremia   20: continued secretions, complaining of mild abdominal pain today. multiple attempts made to transfer patient per wifes request  20: start diuresis. Wean propofol and add seroquel for agitation. Increase free water. Biopsy results back concerning for small cell lung cancer. Febrile.     CURRENT VENTILATION STATUS:     [x] Ventilator  [] BIPAP  [] Nasal Cannula [] Room Air        SECRETIONS : Amount:  [] Small [x] Moderate  [] Large  [] None  Color:     [] White [x] Colored  [] Bloody    SEDATION:  RAAS Score:  [] Propofol gtt  [x] Versed gtt  [] Ativan gtt   [] No Sedation    PARALYZED:  [x] No    [] Yes      VASOPRESSORS:  [x] No    [] Yes    If yes -   [] Levophed       [] Dopamine     [] Vasopressin       [] Dobutamine  [] Phenylephrine         [] Epinephrine    CENTRAL LINES:     [x] No   [] Yes   (Date of Insertion:   )           If yes -     [] Right IJ     [] Left IJ [] Right Femoral [] Left Femoral                   [] Right Subclavian [] Left Subclavian       HUFF'S CATHETER:   [] No   [x] Yes  (Date of Insertion:   )     URINE OUTPUT:            [x] Good   [] Low              [] Anuric      OBJECTIVE:     VITAL SIGNS:  BP (!) 141/54   Pulse 100   Temp 100.4 °F (38 °C) (Axillary)   Resp 20   Ht 5' 11\" (1.803 m)   Wt (!) 334 lb 10.5 oz (151.8 kg)   SpO2 93%   BMI 46.68 kg/m²   Tmax over 24 hours:  Temp (24hrs), Av.3 °F (37.9 °C), Min:99.1 °F (37.3 °C), Max:101.1 °F (38.4 °C)      Patient Vitals for the past 6 hrs:   BP Temp Temp src Pulse Resp SpO2   20 1700 (!) 141/54 -- -- 100 20 93 %   20 1600 (!) 142/64 100.4 °F (38 °C) Axillary 105 19 93 %   20 1500 (!) 142/107 -- -- 106 20 92 %   20 1400 (!) 154/57 -- -- 105 22 91 %   20 1342 -- -- -- 106 19 90 %   20 1300 (!) 154/ -- -- 100 20 91 %         Intake/Output Summary (Last 24 hours) at 2020 1834  Last data filed at 11/24/2020 1400  Gross per 24 hour   Intake 3000 ml   Output 2805 ml   Net 195 ml     Wt Readings from Last 2 Encounters:   11/22/20 (!) 334 lb 10.5 oz (151.8 kg)   11/17/20 (!) 314 lb (142.4 kg)     Body mass index is 46.68 kg/m². PHYSICAL EXAMINATION:  General: Intubated   HEENT:  Normocephalic, atraumatic. Pulls equal and reactive no scleral icterus. No conjunctival injection. No nasal discharge. Neck:  Supple, without stridor, no JVD  Heart:  RRR, no murmurs, gallops, rubs  Lungs: Greatly diminished in left upper lobe, rhonchorous throughout the remainder of the lung fields   Abdomen: Obese, bowel sounds present, soft, non-tender, non-distended, no guarding or rigidity. Not tender to palpation  Extremities:  No clubbing, cyanosis,1+ edema bilaterally  Skin:  Warm and dry  Neuro: Following commands. Cranial nerves II through XII grossly intact.   No focal neurologic deficits  Breast: deferred  Rectal: deferred  Genitalia:  deferred     MEDICATIONS:    Scheduled Meds:   methylPREDNISolone  40 mg Intravenous Q12H    albumin human  25 g Intravenous BID    sucralfate  1 g Oral 4 times per day    bumetanide  1 mg Intravenous Once    heparin flush  1 mL Intravenous 2 times per day    cefepime  2 g Intravenous Q8H    insulin lispro  0-18 Units Subcutaneous Q6H    nicotine  1 patch Transdermal Daily    acetylcysteine  4 mL Inhalation BID    albuterol  2.5 mg Nebulization Q4H    Arformoterol Tartrate  15 mcg Nebulization BID    atorvastatin  80 mg Oral Nightly    [Held by provider] clopidogrel  75 mg Oral Daily    fluticasone  2 spray Nasal Daily    sodium chloride flush  10 mL Intravenous 2 times per day    [Held by provider] enoxaparin  40 mg Subcutaneous Daily    insulin glargine  0.25 Units/kg Subcutaneous Nightly    chlorhexidine  15 mL Mouth/Throat BID    sodium chloride (PF)  10 mL Intravenous Daily    aspirin  325 mg Oral Daily     Continuous Infusions:   bumetanide 0.1 mg/mL infusion 0.2 mg/hr (11/24/20 1713)    fentaNYL 5 mcg/ml in 0.9%  ml infusion 200 mcg/hr (11/24/20 1604)    propofol Stopped (11/24/20 1140)    dextrose      midazolam 10 mg/hr (11/24/20 1445)     PRN Meds:   sodium chloride flush, 10 mL, PRN  heparin flush, 1 mL, PRN  sodium phosphate IVPB, 0.16 mmol/kg, PRN    Or  sodium phosphate IVPB, 0.32 mmol/kg, PRN  magnesium sulfate, 1 g, PRN  potassium chloride, 20 mEq, PRN  tiZANidine, 4 mg, TID PRN  acetaminophen, 650 mg, Q6H PRN    Or  acetaminophen, 650 mg, Q6H PRN  polyethylene glycol, 17 g, Daily PRN  promethazine, 12.5 mg, Q6H PRN    Or  ondansetron, 4 mg, Q6H PRN  glucose, 15 g, PRN  dextrose, 12.5 g, PRN  glucagon (rDNA), 1 mg, PRN  dextrose, 100 mL/hr, PRN  oxyCODONE-acetaminophen, 1 tablet, TID PRN    And  oxyCODONE, 2.5 mg, TID PRN  albuterol, 2.5 mg, Q4H PRN        VENT SETTINGS (Comprehensive) (if applicable):  Vent Information  $Ventilation: $Subsequent Day  Skin Assessment: Clean, dry, & intact  Equipment ID: [id number]  Vent Type: [id number]  Vent Mode: AC/VC  Vt Ordered: 500 mL  Rate Set: 20 bmp  Peak Flow: 65 L/min  Pressure Support: 0 cmH20  FiO2 : 60 %  SpO2: 93 %  SpO2/FiO2 ratio: 155  Sensitivity: 0  PEEP/CPAP: 10  I Time/ I Time %: 0 s  Humidification Source: Heated wire  Humidification Temp: 37  Humidification Temp Measured: 37  Circuit Condensation: Drained  Mask Type: Full face mask  Mask Size: Medium  Additional Respiratory  Assessments  Pulse: 100  Resp: 20  SpO2: 93 %  Position: Semi-Jeff's  Humidification Source: Heated wire  Humidification Temp: 37  Circuit Condensation: Drained  Oral Care: Mouth swabbed, Mouth moisturizer, Mouth suctioned  Subglottic Suction Done?: Yes  Cuff Pressure (cm H2O): 29 cm H2O    ABGs:   Recent Labs     11/24/20 0527   PH 7.374   PCO2 46.8*   PO2 68.4*   HCO3 26.7*   BE 1.2   O2SAT 92.3       Laboratory findings:    Complete Blood Count:   Recent Labs     11/22/20  0515 11/23/20  0525 11/24/20  0510   WBC Yes    TRANSFER OUT OF ICU:   [x] No   [] Yes    ASSESSMENT:     Neuro   Chronic back pain              -Epidural on 11/17, no hematoma seen on CT lumbar spine      Sedated on the vent              -Versed, fentanyl. Wean off propofol and add Seroquel     Respiratory   Acute hypoxic and hypercapnic respiratory failure       -continue full vent support. Pressure support trial today became tachcardic and tachypnic, switched back to [address]              -Follow ABG              -Albuterol, incentive spirometer              -solumedrol decrease to q12   -wean FiO2 as able   -metanebs   -Mucomyst   -bedside L thoracentesis 11/22 with only a small amount of pleural fluid, not amendable to drainage     Community-acquired pneumonia              -antibiotics  Have been broadened              -Strep and Legionella negative              -Respiratory culture pending              -Pro-Kael 0.23     Lung mass OMER              -Status post bronc 11/18 with TBNA              -No obvious mucous plugging or purulence. Very erythematous and compressed airways, concern for mass              -Cytology concerning for small cell lung cancer. Heme-onc on board.     Respiratory film array negative  Long, lifetime smoker--nicotine patch     Cardiovascular   CAD/stents              -Plavix is on hold for approximately 7 days per patient he had his epidural.  It has still been on hold in case he needs further procedures.      History of aortic aneurysm              -Status post endovascular repair              -Stable on most recent CT     Gastrointestinal   Tube feeding   Abdominal pain- no peritoneal signs.  KUB unremarkable     Renal   beto-improved  Hyperkalemia- monitor  hypophos- resolved    Fluid overload   -up 10L   -nphro consulted   -bumex drip    Hypernatremia   -4.5L free water deficits   -increase free water to 250q6     Infectious Disease   Community-acquired pneumonia              -cefepime day4               -Pro-Kael 0.23 Blood cultures gram positive cocci   -coag neg staph. Most likely contaminant. Stop vanco.     Hematology/Oncology   Thrombocytopenia              - received platelets x1              -heme/onc following   -most llikely 2/2 neoplasm and infection   -Transfuse for Hgb <7, platelets <71    Small cell lung cancer   -biopsy results   -heme/onc and pulmonology for further work up and recommendations    Low grade fevers and low platelets ultrasounds to r/o clot     Endocrine   Diabetes              -Noninsulin-dependent              -Monitor sugars   -Increase to high-dose sliding scale    msk   Chronic back pain              -Status post epidural on 11/17              -No erythematous region or fluctuance              -CT with no evidence of epidural hematoma     Social/Spiritual/DNR/Other   Full code    Multiple attempts at transferring patient to outside facility. Denied by [facility]. [facility] do not accept his insurance. Attempting transfer to [facility].      ______________________________________________________________________     ASSESSMENT/ PLAN   1. As above  2. Bronch concerning for small cell lung cancer, recs appreciated  3. nephro consulted, bumex drip  4. Start ptopofol  5. Initiating possible transfer to [facility].  and [facility] denied. 6. GI prophylaxis  7. DVT Prophylaxis  8. Case discussed with Dr. Lauren Mai: continue ICU level of care    I personally saw, examined and provided care for the patient. Radiographs, labs and medication list were reviewed by me independently. I spoke with bedside nursing, therapists and consultants. Critical care services and times documented are independent of procedures and multidisciplinary rounds with Residents. Additionally comprehensive, multidisciplinary rounds were conducted with the MICU team. The case was discussed in detail and plans for care were established. Review of Residents documentation was conducted and revisions were made as appropriate.  I agree with the above documented exam, problem list and plan of care. Small cell lung cancer per oncology   Pulmonary following   Diuresis   Attempt psv in am     Lis Painter M.D.    Pulmonary/Critical Care Medicine   36 min cct excluding procedures

## 2020-11-24 NOTE — PLAN OF CARE
Problem: Inadequate oral food/beverage intake (NI-2.1)  Goal: Food and/or Nutrient Delivery  Pt will tolerate TF at goal rate while EN needed for nutrition support  Description: Individualized approach for food/nutrient provision.   Outcome: Met This Shift

## 2020-11-25 NOTE — CARE COORDINATION
CASE MANAGEMENT. .. COVID NEG 11/17/2020. Chart reviewed. Patient in room 428 (ICU). ID following for fevers. Iv cefipime stopped and iv unasyn started. Continues on iv lasix 40mg bid, iv spa bid, and iv solumedrol q12hrs. Remains intubated on vent -weaning as tolerated -  with iv fentanyl and iv versed. Receiving 1 unit PRBC for hgb 7.2. Called and spoke with patients wife. We discussed hospital plan of care and discharge plans. States her goal is for Mr Hugh Andersen to be weaned off the vent and to return home. I  reassured her that is our goal also, but sometimes a patient needs an LTAC before going home. I explained to her the difference between LTAC and  [facility]. Gave her choices [facility] or [facility]. She voices an understanding, but states \"Im not making a decision right now\". Will cont to follow along and assist with needs accordingly.

## 2020-11-25 NOTE — PROGRESS NOTES
Pt. Is weaning on PS of 10. Versed is at 2mg/hr. Pt. Is tolerating the wean with some diaphragmatic breathing o2 sats are 92%. 1 unit of PRBC's infusing.  VSS

## 2020-11-25 NOTE — PROGRESS NOTES
Physician Progress Note      PATIENT:               Satnam Cruz  [facility] #:                  [id number]  :                       1960  ADMIT DATE:       2020 1:13 PM  [address] [place] DATE:  RESPONDING  PROVIDER #:        Darrel Baker MD          QUERY TEXT:    Patient admitted with Acute Respiratory Failure. Noted documentation of Acute   Kidney Injury in ICU progress notes -. Please document in progress   notes and discharge summary:    The medical record reflects the following:  Risk Factors: intubated since   Clinical Indicators: BUN/Cr/GFR: 49/1.6/53 (October) -> 36/1.2/>60   (admission)-> 58/1.3/56 -> 55/1.1/>60, per ICU -   \". ..beto-improved. Eduar Jacob Eduar James \"  Treatment: serial lab monitoring, IVF    Defined by Kidney Disease Improving Global Outcomes (KDIGO) clinical practice   guideline for acute kidney injury:  -Increase in SCr by greater than or equal to 0.3 mg/dl within 48 hours; or  -Increase in SCr to greater than or equal to 1.5 times baseline, which is   known or presumed to have occurred within the prior 7 days; or  -Urine volume < 0.5ml/kg/h for 6 hours    Thank you,  Shahana Elizabeth RN, BSN, CDIS  Clinical Documentation Improvement  [email or web address]  Options provided:  -- Acute kidney injury evidenced by, Please document evidence as well as   baseline creatinine, if known. -- Currently resolved acute kidney injury was evidenced by, Please document   evidence as well as baseline creatinine, if known. -- Acute kidney injury ruled out after study  -- Other - I will add my own diagnosis  -- Disagree - Not applicable / Not valid  -- Disagree - Clinically unable to determine / Unknown  -- Refer to Clinical Documentation Reviewer    PROVIDER RESPONSE TEXT:    Acute kidney injury was ruled out after study.     Query created by: Maria Loya on 2020 8:22 AM      Electronically signed by:  Darrel Baker MD 2020 9:36 AM

## 2020-11-25 NOTE — PROGRESS NOTES
[facility]  Hematology/Oncology  Consult      Patient Name: Shirley Vines. YOB: 1960  PCP: Jessica Brandt PA-C   Referring Provider:      Reason for Consultation:   Chief Complaint   Patient presents with    Shortness of Breath     patient sent from surgery after having epideral injection L4-L5. SOB has been for past two weeks     Subjective: Wife not at bed side while rounding. Patient continues to be intubated and sedated however respiratory was in the room doing trial with patient to breath on his own and he tolerated well. History of Present Illness: This is a 60 yo male patient with a past medical history of CAD, DM type 2, morbid obesity, chronic back pain, HTN, AAA s/p repair who set to the emergency room by Dr. Rosalva Alexis for complaints of shortness of breath and decreased O2 during an epidural procedure. Patient was having an L4/L5 epidural for pain management done by Dr. Rosalva Alexis when his O2 dropped into the 80's during the procedure. Patient also reported having worsening shortness of breath over the past two weeks with a productive cough. Patient is basically bed bound only taking occasional steps. CTA of the chest, A/P showed new confluent opacities located in left upper lobe suggesting pneumonia, atelectasis, or neoplasm. Patchy airspace opacities located in lingula suggesting pneumonia with areas of atelectasis. Stenosis and occlusion are associated with left upper lobe segmental bronchi and lingular segmental bronchi. Moderate to large left pleural effusion. Mediastinal lymphadenopathy. Stable fusiform infrarenal abdominal aortic aneurysm with endograft repair. Aneurysm measures up to 6.4 cm. No evidence of endoleak or retroperitoneal fluid. He was given 1 dose of Rocephin and azithromycin while in the ER. CBC since admission has shown anemia and thrombocytopenia. 11/18 an RRT was called and was subsequently intubated.   He also had a bronchoscopy done and bxs of left upper lobe were sent.        Diagnostic Data:     Past Medical History:   Diagnosis Date    Anticoagulant long-term use     Arthritis     CAD (coronary artery disease)     Chronic back pain     Depression     Dyslipidemia     Hiatal hernia 1979    History of ST elevation myocardial infarction (STEMI)     Hyperlipidemia     Hypertension     Low back pain     Lumbar radiculopathy 8/14/2019    Obesity     MANOLO on CPAP     Presence of stent in left circumflex coronary artery     Presence of stent in right coronary artery     Smoker     Type 2 diabetes mellitus without complication [facility])        Patient Active Problem List    Diagnosis Date Noted    Acute respiratory failure with hypoxia ([address] 11/17/2020    Community acquired pneumonia of left upper lobe of lung 11/17/2020    Thrombocytopenia ([address] 11/17/2020    Spinal stenosis of lumbar region with neurogenic claudication 10/29/2020    Obesity     Chronic back pain     Lumbosacral spondylosis without myelopathy 12/11/2019    Lumbar radiculopathy 08/14/2019    AAA (abdominal aortic aneurysm) ([address] 06/28/2019    Chronic bilateral low back pain with bilateral sciatica 06/11/2019    Right hip pain 06/11/2019    DDD (degenerative disc disease), lumbar 06/11/2019    Status post total right knee replacement 11/26/2018    Morbid obesity with BMI of 40.0-44.9, adult ([address] 11/26/2018    Chronic pain syndrome 07/27/2018    Chronic pain of right knee 07/27/2018    Non-insulin dependent type 2 diabetes mellitus ([address] 03/16/2018    Aortic valve stenosis 02/15/2018    Primary osteoarthritis of right knee 02/15/2018    Presence of stent in left circumflex coronary artery     Smoker     CAD (coronary artery disease) 12/28/2011    HTN (hypertension) 12/28/2011    Hyperlipemia 12/28/2011        Past Surgical History:   Procedure Laterality Date    ABDOMINAL AORTIC ANEURYSM REPAIR, ENDOVASCULAR N/A 6/28/2019    ENDOVASCULAR REPAIR ABDOMINAL AORTIC ANEURYSM performed by Umair Cobb MD at [address] [facility] 1 Bilateral 3/12/2020    BILATERAL L3,L4,L5 MEDIAL BRANCH NERVE BLOCK performed by Az Martinez DO at [address] [facility] 1 Bilateral 6/11/2020    BILATERAL L3,L4,L5 MEDIAL NERVE BRANCH BLOCK #2 performed by Az Martinez DO at [address] [place] [address] N/A 11/18/2020    BRONCHOSCOPY/TRANSBRONCHIAL NEEDLE BIOPSY performed by Shin Gardner MD at [address] [place] [address]  12/30/2011    Dr. Reba Dumont 1st OM 3.0 x 20 Ion    DIAGNOSTIC CARDIAC CATH LAB PROCEDURE  3/15/2005    [facility]. Mild to moderate CAD. Normal LV.  DIAGNOSTIC CARDIAC CATH LAB PROCEDURE  1/16/2007    [facility]. Cath/PTCA/DE stent of proximal and distal RCA.  DIAGNOSTIC CARDIAC CATH LAB PROCEDURE  2/21/2007    Cath/DE stent of proximal OM1 and proximal Cx.  DIAGNOSTIC CARDIAC CATH LAB PROCEDURE  12/30/2011    ANURADHA of mid OM branch of circumflex.  ECHO COMPL W DOP COLOR FLOW  12/30/2011         HC INSERT PICC CATH, 5/> YRS - MIDLINE  11/23/2020         HERNIA REPAIR  2/2014    laparoscopic ventral hernia repain    JOINT REPLACEMENT Left 4/1/14    TKA-ed    JOINT REPLACEMENT Right 2018    KNEE    PAIN MANAGEMENT PROCEDURE Right 9/1/2020    RIGHT L3,4,5 MEDIAL BRANCH RADIOFREQUENCY ABLATION performed by Az Martinez DO at [facility] N/A 11/17/2020    L4-5 EPIDURAL STEROID INJECTION #1 performed by Az Martinez DO at [address] [facility] OFFICE/OUTPT VISIT,PROCEDURE ONLY Right 11/26/2018    RIGHT KNEE TOTAL ARTHROPLASTY performed by Marcela Yañez DO at [facility] OR       Family History  Family History   Problem Relation Age of Onset    Diabetes Mother     Stroke Mother     Diabetes Father     No Known Problems Sister        Social History    TOBACCO:   reports that he has been smoking cigarettes.  He started smoking about 42 years ago. He has a 39.00 pack-year smoking history. He has never used smokeless tobacco.  ETOH:   reports no history of alcohol use. Home Medications  Prior to Admission medications    Medication Sig Start Date End Date Taking? Authorizing Provider   loratadine (CLARITIN) 10 MG capsule Take 10 mg by mouth daily   Yes Historical Provider, MD   pioglitazone (ACTOS) 15 MG tablet TAKE 1 TABLET BY MOUTH EVERY DAY 11/6/20  Yes Emerald Panchal PA-C   pregabalin (LYRICA) 150 MG capsule Take 1 capsule by mouth 2 times daily for 30 days. 11/12/20 12/12/20 Yes Yvan Gamez DO   diclofenac (VOLTAREN) 75 MG EC tablet TAKE 1 TABLET BY MOUTH TWICE A DAY 10/7/20  Yes Historical Provider, MD   tiZANidine (ZANAFLEX) 4 MG tablet Take 1 tablet by mouth 3 times daily as needed (spasms) 10/29/20 11/28/20 Yes Yvan Gamez DO   oxyCODONE-acetaminophen (PERCOCET) 7.5-325 MG per tablet Take 1 tablet by mouth 3 times daily as needed for Pain for up to 30 days.  Intended supply: 30 days 11/12/20 12/12/20 Yes Yvan Gamez DO   quinapril (ACCUPRIL) 10 MG tablet Take 1 tablet by mouth daily  Patient taking differently: Take 10 mg by mouth daily Wife states on hold 10/16/20  Yes Emerald Panchal PA-C   metFORMIN (GLUCOPHAGE) 500 MG tablet TAKE 1 TABLET BY MOUTH TWICE A DAY WITH MEALS  Patient taking differently: Wife states on hold 10/8/20  Yes Mae Payton DO   fluticasone (FLONASE) 50 MCG/ACT nasal spray 2 sprays by Nasal route daily 10/6/20  Yes Emerald Panchal PA-C   omega-3 acid ethyl esters (LOVAZA) 1 g capsule take 1 capsule twice a day 8/14/20  Yes Emerald Panchal PA-C   niacin (SLO-NIACIN) 500 MG extended release tablet take 1 tablet by mouth once daily 7/20/20  Yes aMe Payton DO   clopidogrel (PLAVIX) 75 MG tablet TAKE 1 TABLET BY MOUTH EVERY DAY  Patient taking differently: Take 75 mg by mouth daily Has been on hold for procedure 7/6/20  Yes Emerald Panchal PA-C   atorvastatin (LIPITOR) 80 MG tablet TAKE 1 TABLET BY MOUTH AT BEDTIME 10/22/18  Yes Kaitlin Gamble PA-C   carvedilol (COREG) 25 MG tablet TAKE ONE TABLET BY MOUTH TWICE DAILY (WITH MEALS) 10/22/18  Yes Kaitlin Gamble PA-C   sildenafil (VIAGRA) 50 MG tablet Take 1 tablet by mouth as needed for Erectile Dysfunction 7/6/20   Kaitlin Gamble PA-C   aspirin 325 MG EC tablet TAKE 1 TABLET BY MOUTH EVERY DAY  Patient taking differently: Take 325 mg by mouth daily Has been on hold for procedure 5/7/20   [address], DO   Tens Unit MISC by Does not apply route 5/7/19   TRUDY Dinero       Allergies  Allergies   Allergen Reactions    Bee Venom Anaphylaxis       Review of Systems: patient is intubated and sedated and not responsive to questions. Objective  /73   Pulse 95   Temp 101.1 °F (38.4 °C) (Axillary)   Resp 20   Ht 5' 11\" (1.803 m)   Wt (!) 323 lb 3.1 oz (146.6 kg)   SpO2 94%   BMI 45.08 kg/m²     Physical Exam:   Performance Status:  Head and neck: PERRLA, EOMI . Sclera non icteric. Oropharynx: Clear  Neck: no JVD,  no adenopathy  Heart: Regular rate and regular rhythm, no murmur  Lungs:Disffuse inspiratory and expiratory rhonchi   Extremities: No edema,no cyanosis, no clubbing. Abdomen: Soft, non-tender;no masses, no organomegaly  Skin: No rash. Neurologic:Cranial nerves grossly intact. No focal motor or sensory deficits .     Recent Laboratory Data-   Lab Results   Component Value Date    WBC 9.3 11/25/2020    HGB 7.2 (L) 11/25/2020    HCT 25.7 (L) 11/25/2020    MCV 94.1 11/25/2020    PLT 38 (L) 11/25/2020    LYMPHOPCT 16.1 (L) 11/25/2020    RBC 2.73 (L) 11/25/2020    MCH 26.4 11/25/2020    MCHC 28.0 (L) 11/25/2020    RDW 16.7 (H) 11/25/2020    NEUTOPHILPCT 69.5 11/25/2020    MONOPCT 7.5 11/25/2020    BASOPCT 0.4 11/25/2020    NEUTROABS 6.44 11/25/2020    LYMPHSABS 1.49 (L) 11/25/2020    MONOSABS 0.70 11/25/2020    EOSABS 0.04 (L) 11/25/2020    BASOSABS 0.04 11/25/2020       Lab Results   Component Value Date     (H) 11/25/2020 K 4.3 11/25/2020     11/25/2020    CO2 30 (H) 11/25/2020    BUN 76 (H) 11/25/2020    CREATININE 1.5 (H) 11/25/2020    GLUCOSE 235 (H) 11/25/2020    CALCIUM 8.4 (L) 11/25/2020    PROT 5.8 (L) 11/25/2020    LABALBU 3.1 (L) 11/25/2020    BILITOT 0.5 11/25/2020    ALKPHOS 185 (H) 11/25/2020    AST 48 (H) 11/25/2020    ALT 38 11/25/2020    LABGLOM 48 11/25/2020    GFRAA 58 11/25/2020       No results found for: IRON, TIBC, FERRITIN        Radiology-    Xr Abdomen (kub) (single Ap View)    Result Date: 11/23/2020  EXAMINATION: ONE SUPINE XRAY VIEW(S) OF THE ABDOMEN 11/23/2020 9:38 am COMPARISON: None HISTORY: ORDERING SYSTEM PROVIDED HISTORY: abdominal pain TECHNOLOGIST PROVIDED HISTORY: Reason for exam:->abdominal pain FINDINGS: There is a nonobstructive bowel gas pattern. There are no abnormal air-fluid levels. There are no calculi overlying the renal shadows. There is an NG tube within the stomach. There is a stent graft seen within the abdominal aorta. 1. There is no bowel obstruction, ileus or free air. Ct Lumbar Spine Wo Contrast    Result Date: 11/19/2020  EXAMINATION: CT OF THE LUMBAR SPINE WITHOUT CONTRAST  11/18/2020 TECHNIQUE: CT of the lumbar spine was performed without the administration of intravenous contrast. Multiplanar reformatted images are provided for review. Dose modulation, iterative reconstruction, and/or weight based adjustment of the mA/kV was utilized to reduce the radiation dose to as low as reasonably achievable. COMPARISON: 11/17/2020 HISTORY: ORDERING SYSTEM PROVIDED HISTORY: exclude epidural hematoma TECHNOLOGIST PROVIDED HISTORY: Reason for exam:->exclude epidural hematoma Chronic back pain, recent epidural placement FINDINGS: BONES/ALIGNMENT: Vertebral body heights are preserved without evidence for lumbar fracture. However, there is irregular lucency and sclerosis in the upper sacral ala bilaterally. There is minimal retrolisthesis at L2-L3.  Alignment is otherwise normal. DEGENERATIVE CHANGES: There is disc space narrowing and vacuum disc phenomenon at L2-L3. Disc space heights are otherwise preserved. There is diffuse facet arthropathy. SOFT TISSUES/RETROPERITONEUM: No evidence for epidural hematoma is identified. 1. No evidence for epidural hematoma on CT scan. 2. Suspected sacral insufficiency fracture. Xr Chest Portable    Result Date: 11/23/2020  EXAMINATION: ONE XRAY VIEW OF THE CHEST 11/23/2020 7:08 am COMPARISON: 11/20/2020 HISTORY: ORDERING SYSTEM PROVIDED HISTORY: resp failure TECHNOLOGIST PROVIDED HISTORY: Reason for exam:->resp failure FINDINGS: There is no interval change in the persistent dense consolidation within the left upper lobe. The left lower lobe is clear. The right lung is clear. There is minimal atelectasis seen within the right lung base. The cardiac silhouette is within normals. 1. No interval change in the dense consolidation seen within the left upper lobe. 2. Minimal right lung base atelectasis. Xr Chest Portable    Result Date: 11/22/2020  EXAMINATION: ONE XRAY VIEW OF THE CHEST 11/22/2020 6:28 am COMPARISON: 11/21/2020 HISTORY: ORDERING SYSTEM PROVIDED HISTORY: resp failure TECHNOLOGIST PROVIDED HISTORY: Reason for exam:->resp failure FINDINGS: The endotracheal tube is stable. The enteric tube tip is not well visualized but may be at the gastroesophageal junction. There is stable left upper lobe dense opacity. There are continued patchy opacities in the left lower lobe. There may be small pleural effusions. No pneumothorax is seen. No significant change in dense airspace opacity of the left upper lobe and patchy left lower lobe airspace opacities. Enteric tube tip is not well visualized due to underpenetration. The tip may be at the gastroesophageal junction. This could be confirmed with KUB centered on the upper abdomen.     Xr Chest Portable    Result Date: 11/21/2020  EXAMINATION: ONE XRAY VIEW OF THE CHEST 11/21/2020 7:37 am COMPARISON: 11/20/2020 HISTORY: ORDERING SYSTEM PROVIDED HISTORY: resp failure TECHNOLOGIST PROVIDED HISTORY: Reason for exam:->resp failure FINDINGS: Endotracheal tube and nasogastric tube are unchanged. Large opacity in the left upper lobe is unchanged. There is additional airspace disease in the left lower lobe which is stable. There is no pneumothorax. Small pleural effusions are not excluded. Unchanged large opacity/consolidation in left upper lobe. Unchanged airspace disease in left lower lobe. Xr Chest Portable    Result Date: 11/20/2020  EXAMINATION: ONE XRAY VIEW OF THE CHEST 11/20/2020 6:33 am COMPARISON: 11/19/2020 HISTORY: ORDERING SYSTEM PROVIDED HISTORY: resp failure TECHNOLOGIST PROVIDED HISTORY: Reason for exam:->resp failure FINDINGS: Endotracheal and enteric tubes remain in place. There has been no appreciable change in opacities throughout the left lung, most pronounced in the left apex. The right lung is clear. The heart size is at the upper limits of normal.  There is no discernible pneumothorax. Grossly stable opacities on the left. Xr Chest Portable    Result Date: 11/19/2020  EXAMINATION: ONE XRAY VIEW OF THE CHEST 11/19/2020 6:23 am COMPARISON: 11/18/2020 HISTORY: ORDERING SYSTEM PROVIDED HISTORY: resp failure TECHNOLOGIST PROVIDED HISTORY: Reason for exam:->resp failure FINDINGS: Evaluation is limited by the patient's body habitus and the portable technique. The right lung apex was partially excluded. There is increased dense consolidation in the left upper lobe. There is also medial left basilar airspace opacity obscuring the hemidiaphragm. The heart size is mildly enlarged. There is no discernible pneumothorax. Endotracheal and enteric tubes are again seen. Increasing multifocal left lung pneumonia. Xr Chest Portable    Result Date: 11/18/2020  EXAMINATION: ONE XRAY VIEW OF THE CHEST 11/18/2020 6:39 am COMPARISON: CT chest November 17, 2020.  HISTORY: ORDERING SYSTEM PROVIDED HISTORY: SOB TECHNOLOGIST PROVIDED HISTORY: Reason for exam:->SOB FINDINGS: The cardiac silhouette is within normal limits in size. There is masslike consolidation of the left upper lobe. There is a small to moderate left dependent pleural effusion. There is no visible pneumothorax. The pulmonary vessels are within normal limits. Left upper lobe masslike consolidation. Small to moderate left pleural effusion. Xr Chest 1 View    Result Date: 11/18/2020  EXAMINATION: ONE XRAY VIEW OF THE CHEST 11/18/2020 9:06 am COMPARISON: 11/18/2020 HISTORY: ORDERING SYSTEM PROVIDED HISTORY: intubation TECHNOLOGIST PROVIDED HISTORY: Reason for exam:->intubation Reason for exam:->portable FINDINGS: Compared to the chest radiograph from earlier today, 6:44 a.m., there is interval placement of an endotracheal tube, the tip of which is approximately 4.1 cm above the korina. Nasogastric tube is present but is suboptimally visualized due to motion artifact and relative underpenetration of the study. The abdominal radiograph demonstrates it to be well positioned, with the tip in the upper abdomen. Prominent masslike consolidative opacity in the upper left lung are grossly stable. No pneumothorax is seen. Layering left pleural effusion. 1.  The life-support lines and tubes are well positioned. 2. Masslike consolidative opacity in the left upper lung. Small left effusion. Cta Chest W Contrast    Result Date: 11/17/2020  EXAMINATION: CTA OF THE CHEST 11/17/2020 3:18 pm TECHNIQUE: CTA of the chest was performed after the administration of intravenous contrast.  Multiplanar reformatted images are provided for review. MIP images are provided for review.  Dose modulation, iterative reconstruction, and/or weight based adjustment of the mA/kV was utilized to reduce the radiation dose to as low as reasonably achievable.; CTA of the abdomen and pelvis was performed with the administration of intravenous normal.    1.  New confluent opacities located in left upper lobe suggesting pneumonia, atelectasis, or neoplasm. 2.  Patchy airspace opacities located in lingula suggesting pneumonia with areas of atelectasis. 3.  Stenosis and occlusion are associated with left upper lobe segmental bronchi and lingular segmental bronchi. 4.  Moderate to large left pleural effusion. 5.  Mediastinal lymphadenopathy. 6.  Stable fusiform infrarenal abdominal aortic aneurysm with endograft repair. Aneurysm measures up to 6.4 cm. No evidence of endoleak or retroperitoneal fluid. 7.  Diverticulosis. Cta Abdomen Pelvis W Contrast    Result Date: 11/17/2020  EXAMINATION: CTA OF THE CHEST 11/17/2020 3:18 pm TECHNIQUE: CTA of the chest was performed after the administration of intravenous contrast.  Multiplanar reformatted images are provided for review. MIP images are provided for review. Dose modulation, iterative reconstruction, and/or weight based adjustment of the mA/kV was utilized to reduce the radiation dose to as low as reasonably achievable.; CTA of the abdomen and pelvis was performed with the administration of intravenous contrast. Multiplanar reformatted images are provided for review. MIP images are provided for review. Dose modulation, iterative reconstruction, and/or weight based adjustment of the mA/kV was utilized to reduce the radiation dose to as low as reasonably achievable. COMPARISON: None. HISTORY: ORDERING SYSTEM PROVIDED HISTORY: aneurysm, cp, sob TECHNOLOGIST PROVIDED HISTORY: Reason for exam:->aneurysm, cp, sob FINDINGS: CTA chest: There are confluent opacities located in left upper lobe. Patchy airspace opacities located in lingula. There is moderate to large left pleural effusion. Peribronchial thickening extensor in left hilar location into the left lung base. There is subsegmental atelectasis in posterior right lower lobe with adjacent ground-glass opacities. There is pulmonary vascular congestion. The heart is mildly enlarged. There is mediastinal lymphadenopathy. Ascending thoracic aorta measures 3.7 cm in diameter. Descending thoracic aorta measures 3 cm in diameter. No evidence of thoracic aortic aneurysm or dissection. Distal descending thoracic aorta is tortuous. CTA abdomen/pelvis: There is evidence of endograft repair involving the abdominal aorta. There is redemonstration of fusiform abdominal aortic aneurysm measuring up to 6.4 cm. This finding is stable since prior. No evidence of endoleak. No retroperitoneal fluid collections. Iliac limbs of endograft appear patent. Common iliac arteries are tortuous. Common femoral arteries are patent. Numerous diverticula associated with the left colon and sigmoid colon. No evidence of acute diverticulitis. Liver, gallbladder, pancreas, and spleen are grossly unremarkable however there is motion artifact limiting this examination. There is no hydronephrosis. Cyst associated with the right kidney is stable since previous examination as well as cyst associated with the left kidney appearing stable since prior. Appendix is visualized and normal.    1.  New confluent opacities located in left upper lobe suggesting pneumonia, atelectasis, or neoplasm. 2.  Patchy airspace opacities located in lingula suggesting pneumonia with areas of atelectasis. 3.  Stenosis and occlusion are associated with left upper lobe segmental bronchi and lingular segmental bronchi. 4.  Moderate to large left pleural effusion. 5.  Mediastinal lymphadenopathy. 6.  Stable fusiform infrarenal abdominal aortic aneurysm with endograft repair. Aneurysm measures up to 6.4 cm. No evidence of endoleak or retroperitoneal fluid. 7.  Diverticulosis. Xr Abdomen For Ng/og/ne Tube Placement    Result Date: 11/22/2020  EXAMINATION: ONE SUPINE XRAY VIEW(S) OF THE ABDOMEN 11/22/2020 5:06 pm COMPARISON: November 22, 2020, 4 hours prior.  HISTORY: ORDERING SYSTEM PROVIDED HISTORY: Confirmation of course of NG/OG/NE tube and location of tip of tube TECHNOLOGIST PROVIDED HISTORY: Reason for exam:->Confirmation of course of NG/OG/NE tube and location of tip of tube Portable? ->Yes FINDINGS: Tip of the NG tube noted at the level of the diaphragm, no significant change. The right side and the inferior part of the abdomen is not included in the study. There is opacification of the visualized left upper lung. Tip of NG tube at the level of the left hemidiaphragm, no change. Xr Chest Abdomen Ng Placement    Result Date: 11/22/2020  EXAMINATION: ONE SUPINE XRAY VIEW(S) OF THE ABDOMEN 11/22/2020 12:52 pm COMPARISON: November 18. HISTORY: ORDERING SYSTEM PROVIDED HISTORY: OG placement TECHNOLOGIST PROVIDED HISTORY: Reason for exam:->OG placement FINDINGS: Nasogastric tube is present. Side hole appears to be at level of gastroesophageal junction. This tube could be advanced approximately 4 or 5 cm. Nasogastric tube is present with side hole at level of gastroesophageal junction. This tube could be advanced approximately 4 or 5 cm. Xr Chest Abdomen Ng Placement    Result Date: 11/18/2020  EXAMINATION: ONE SUPINE XRAY VIEW(S) OF THE ABDOMEN 11/18/2020 9:07 am COMPARISON: 11/18/2020, about 2 hours earlier HISTORY: ORDERING SYSTEM PROVIDED HISTORY: NGT TECHNOLOGIST PROVIDED HISTORY: Reason for exam:->NGT Reason for exam:->portable NG tube placement FINDINGS: There is an NG tube with the tip in the stomach. An ET tube is again noted in satisfactory position. There is extensive opacity in the left lung, most likely pneumonia. Increased markings also seen in the visualized portion of the right lung. The heart is enlarged. NG tube tip in the stomach. Fluoro For Surgical Procedures    Result Date: 11/17/2020  EXAMINATION: SPOT FLUOROSCOPIC IMAGES 11/17/2020 12:03 pm TECHNIQUE: Fluoroscopy was provided by the radiology department for procedure. Radiologist was not present during examination. FLUOROSCOPY DOSE AND TYPE OR TIME AND EXPOSURES: Total dose:26.99 mGy COMPARISON: None HISTORY: ORDERING SYSTEM PROVIDED HISTORY: Pain management TECHNOLOGIST PROVIDED HISTORY: Reason for exam:->L4-5 Epidural steroid injection #1 Intraprocedural imaging. FINDINGS: 3 intraoperative fluoroscopic images were obtained during L4-5 epidural steroid injection. Intraprocedural fluoroscopic spot images as above. See separate procedure report for more information. ASSESSMENT/PLAN :    60 yo male  CAD  HTN  AAA s/p repair  DM type 2  Morbid obesity  Chronic back pain  Thrombocytopenia, Anemia   OMER opacity s/p biopsy    - S/p bronch with cytology pending  - OMER PNA vs neoplasm  - On rocpehin and levaquin   - CBC showing Hgb 7.8 with MCV 94, Platelets 39  - Will follow cyto  - Cytopenias likely due to combination of PNA and abc, with myelosuppression. Thrombocytopenia has continued to slowly progress. CBC was WNL on 10/6/20, suggesting an acute process rather than a primary bone marrow process  - Transfuse for Hgb <7, platelets <74  - Smear showed subtle toxic granulation of neutrophils  - Check iron profile and B12/folate    11/24/20  - OMER bronch bx pathology:  DIAGNOSIS   POSITIVE FOR MALIGNANT CELLS   Carcinoma, most compatible with small cell carcinoma, see comment. Monolayer shows atypical crushed cells, and many lymphocytes/histiocytes   with anthracotic pigment. Cell block shows abundant crushed malignant cells. COMMENT:   Immunostains stain the malignant cells as follows:   Pankeratin and TTF1:  Positive   Chromogranin:  Positive weakly   Synaptophysin:  Negative   CK7:  Negative   This immunoprofile is most compatible with small cell carcinoma. 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   His CT scan of abdomen and pelvis did not show evidence of intra-abdominal metastasis. He has multiple comorbid conditions including obesity, coronary artery disease, obstructive sleep apnea, obesity, type 2 diabetes mellitus and he presently has pneumonia with hypoxia and respiratory failure and remains intubated and sedated. 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   - Today's Hgb 7.2 Plts 38   - Patient still intubated but hopefully will be able to extubated  - Once stable, MRI brain to complete staging   - If he improves clinically and has improvement in ECOG, he will be considered for systemic therapy  - L pleural effusions likely malignant  - ID following, now on unasyn  - Given his current ECOG and comorbidities along with persistent fevers and abx requirements, not eligible for inpatient emergent chemotherapy. Will get rad onc on board as inpatient urgent palliative XRT could be considered to improved respiratory status if he has improvement in above      MATY Mcgraw - CNP  Electronically signed 11/25/2020 at 9:47 AM  Pt seen and examined.  Note edited to reflect updates  Gladies Spurling, MD

## 2020-11-25 NOTE — CONSULTS
[facility]  [facility]  Consultation Note     Admit Date: 11/17/2020  1:13 PM    Reason for Consult:   Fever    Attending Physician:  Gage Randle MD    HISTORY OF PRESENT ILLNESS:             The history is obtained from extensive review of available past medical records. The patient is a 61 y.o. male who presents to the ED on 11/17/2020. He has a history of morbid obesity, diabetes, chronic back pain, for which she receives epidural injections. He was short of breath and complaining pain while having an epidural injection. Oxygen saturation dropped in the 80s. He was sent to the ED and was admitted to the ICU with a diagnosis of acute respiratory failure with hypoxia, pneumonia, bilateral effusion, thrombocytopenia and anemia. A chest CT showed left upper lobe opacities, suggesting pneumonia, atelectasis versus neoplasia. He was treated with Azithromycin and Ceftriaxone. Antibiotics were changed to Levofloxacin and Ceftriaxone. He underwent a bronchoscopy on 11/18/2020. Respiratory panel was negative on admission. Urinary antigens were negative. Cefepime was started on 11/21/2020. Blood cultures on 11/21/2020 showed CoNS in 1 of 2 sets. Initial respiratory culture showed OP aristides absent. Repeat cultures showed Candida albicans and OP aristides absent. The patient has been having nonremitting fevers for the last 3 to 4 days. ID was asked to see him in consultation.     Past Medical History:        Diagnosis Date    Anticoagulant long-term use     Arthritis     CAD (coronary artery disease)     Chronic back pain     Depression     Dyslipidemia     Hiatal hernia 1979    History of ST elevation myocardial infarction (STEMI)     Hyperlipidemia     Hypertension     Low back pain     Lumbar radiculopathy 8/14/2019    Obesity     MANOLO on CPAP     Presence of stent in left circumflex coronary artery     Presence of stent in right coronary artery     Smoker     Type 2 diabetes mellitus without complication [facility])      Past Surgical History:        Procedure Laterality Date    ABDOMINAL AORTIC ANEURYSM REPAIR, ENDOVASCULAR N/A 6/28/2019    ENDOVASCULAR REPAIR ABDOMINAL [facility]ORTIC [facility] 3/12/2020    BILATERAL L3,L4,L5 MEDIAL BRANCH NERVE BLOCK performed by Jeferson Bruce DO at [facility] Bilateral 6/11/2020    BILATERAL L3,L4,L5 MEDIAL NERVE BRANCH BLOCK #2 performed by Jeferson Bruce DO at [address] [place] [address] N/A 11/18/2020    BRONCHOSCOPY/TRANSBRONCHIAL NEEDLE BIOPSY performed by Lissett Meek MD at [facility] 19  X4    CORONARY ANGIOPLASTY WITH STENT PLACEMENT  12/30/2011    Dr. [place]er Place 1st OM 3.0 x 20 Ion    DIAGNOSTIC CARDIAC CATH LAB PROCEDURE  3/15/2005    [facility]. Mild to moderate CAD. Normal LV.  DIAGNOSTIC CARDIAC CATH LAB PROCEDURE  1/16/2007    [facility]. Cath/PTCA/DE stent of proximal and distal RCA.  DIAGNOSTIC CARDIAC CATH LAB PROCEDURE  2/21/2007    Cath/DE stent of proximal OM1 and proximal Cx.  DIAGNOSTIC CARDIAC CATH LAB PROCEDURE  12/30/2011    ANURADHA of mid OM branch of circumflex.      ECHO COMPL W DOP COLOR FLOW  12/30/2011         HC INSERT PICC CATH, 5/> YRS - MIDLINE  11/23/2020         HERNIA REPAIR  2/2014    laparoscopic ventral hernia repain    JOINT REPLACEMENT Left 4/1/14    TKA-ed    JOINT REPLACEMENT Right 2018    KNEE    PAIN MANAGEMENT PROCEDURE Right 9/1/2020    RIGHT L3,4,5 MEDIAL BRANCH RADIOFREQUENCY ABLATION performed by Jeferson Bruce DO at [facility] N/A 11/17/2020    L4-5 EPIDURAL STEROID INJECTION #1 performed by Jeferson Bruce DO at [address] [facility] OFFICE/OUTPT VISIT,PROCEDURE ONLY Right 11/26/2018    RIGHT KNEE TOTAL ARTHROPLASTY performed by Kenneth Benedict DO at [facility] OR     Current Medications:   Scheduled Meds:   furosemide  40 mg Intravenous BID    sodium chloride  20 mL Intravenous Once    methylPREDNISolone  40 mg Intravenous Q12H    albumin human  25 g Intravenous BID    sucralfate  1 g Oral 4 times per day    heparin flush  1 mL Intravenous 2 times per day    cefepime  2 g Intravenous Q8H    insulin lispro  0-18 Units Subcutaneous Q6H    nicotine  1 patch Transdermal Daily    acetylcysteine  4 mL Inhalation BID    albuterol  2.5 mg Nebulization Q4H    Arformoterol Tartrate  15 mcg Nebulization BID    atorvastatin  80 mg Oral Nightly    [Held by provider] clopidogrel  75 mg Oral Daily    fluticasone  2 spray Nasal Daily    sodium chloride flush  10 mL Intravenous 2 times per day    [Held by provider] enoxaparin  40 mg Subcutaneous Daily    insulin glargine  0.25 Units/kg Subcutaneous Nightly    chlorhexidine  15 mL Mouth/Throat BID    sodium chloride (PF)  10 mL Intravenous Daily    aspirin  325 mg Oral Daily     Continuous Infusions:   fentaNYL 5 mcg/ml in 0.9%  ml infusion 200 mcg/hr (11/25/20 1000)    propofol Stopped (11/24/20 1140)    dextrose      midazolam 6 mg/hr (11/25/20 1041)     PRN Meds:sodium chloride flush, heparin flush, sodium phosphate IVPB **OR** sodium phosphate IVPB, magnesium sulfate, potassium chloride, tiZANidine, acetaminophen **OR** acetaminophen, polyethylene glycol, promethazine **OR** ondansetron, glucose, dextrose, glucagon (rDNA), dextrose, oxyCODONE-acetaminophen **AND** oxyCODONE, albuterol    Allergies:  Bee venom    Social History:   Social History     Socioeconomic History    Marital status:      Spouse name: None    Number of children: None    Years of education: None    Highest education level: None   Occupational History    None   Social Needs    Financial resource strain: None    Food insecurity     Worry: None     Inability: None    Transportation needs     Medical: None     Non-medical: None   Tobacco Use    Smoking status: Current Every Day Smoker     Packs/day: 1.00     Years: 39.00     Pack years: 39.00     Types: Cigarettes     Start date: 11/17/1978    Smokeless tobacco: Never Used   Substance and Sexual Activity    Alcohol use: No    Drug use: No    Sexual activity: Not Currently     Partners: Female   Lifestyle    Physical activity     Days per week: None     Minutes per session: None    Stress: None   Relationships    Social connections     Talks on phone: None     Gets together: None     Attends Evangelical service: None     Active member of club or organization: None     Attends meetings of clubs or organizations: None     Relationship status: None    Intimate partner violence     Fear of current or ex partner: None     Emotionally abused: None     Physically abused: None     Forced sexual activity: None   Other Topics Concern    None   Social History Narrative    None          Family History:       Problem Relation Age of Onset    Diabetes Mother     Stroke Mother     Diabetes Father     No Known Problems Sister    . Otherwise non-pertinent to the chief complaint. REVIEW OF SYSTEMS:    A 10 point review of systems cannot be obtained from the patient given his status of intubation and sedation. ET tube. PHYSICAL EXAM:    Vitals:   /69   Pulse 97   Temp 101.1 °F (38.4 °C) (Axillary)   Resp 20   Ht 5' 11\" (1.803 m)   Wt (!) 323 lb 3.1 oz (146.6 kg)   SpO2 95%   BMI 45.08 kg/m²   Constitutional: The patient is awake, alert, and oriented. Skin: Warm and dry. No rashes were noted. HEENT: Eyes show round, and reactive pupils. No jaundice. Moist mucous membranes, no ulcerations, no thrush. OG tube. Neck: Supple to movements. No lymphadenopathy. Chest: No use of accessory muscles to breathe. Symmetrical expansion. Auscultation reveals no wheezing, crackles, or rhonchi. Cardiovascular: S1 and S2 are rhythmic and regular. No murmurs appreciated. Abdomen: Positive bowel sounds to auscultation.  Benign to palpation. No masses felt. No hepatosplenomegaly. Extremities: No clubbing, no cyanosis, no edema. Lines: Left midline 11/23/2020. Black catheter.     CBC+dif:  Recent Labs     11/23/20  0525 11/24/20  0510 11/25/20  0533   WBC 10.1 9.7 9.3   HGB 7.8* 7.6* 7.2*   HCT 27.6* 27.3* 25.7*   MCV 94.2 95.1 94.1   PLT 39* 34* 38*   NEUTROABS 8.89* 7.57* 6.44     No results found for: CRP   No results found for: CRPHS  No results found for: SEDRATE  Lab Results   Component Value Date    ALT 38 11/25/2020    AST 48 (H) 11/25/2020    ALKPHOS 185 (H) 11/25/2020    BILITOT 0.5 11/25/2020     Lab Results   Component Value Date     11/25/2020    K 4.3 11/25/2020    K 5.1 11/17/2020     11/25/2020    CO2 30 11/25/2020    BUN 76 11/25/2020    CREATININE 1.5 11/25/2020    GFRAA 58 11/25/2020    LABGLOM 48 11/25/2020    GLUCOSE 235 11/25/2020    GLUCOSE 120 12/31/2011    PROT 5.8 11/25/2020    LABALBU 3.1 11/25/2020    LABALBU 4.5 12/28/2011    CALCIUM 8.4 11/25/2020    BILITOT 0.5 11/25/2020    ALKPHOS 185 11/25/2020    AST 48 11/25/2020    ALT 38 11/25/2020       Lab Results   Component Value Date    PROTIME 11.4 10/15/2020    PROTIME 11.1 12/30/2011    INR 1.0 10/15/2020       Lab Results   Component Value Date    TSH 1.090 02/15/2018       Lab Results   Component Value Date    COLORU Yellow 11/24/2020    PHUR 5.5 11/24/2020    WBCUA 2-5 11/24/2020    RBCUA >20 11/24/2020    MUCUS Present 11/24/2020    BACTERIA MODERATE 11/24/2020    CLARITYU CLOUDY 11/24/2020    SPECGRAV 1.025 11/24/2020    LEUKOCYTESUR Negative 11/24/2020    UROBILINOGEN 0.2 11/24/2020    BILIRUBINUR Negative 11/24/2020    BLOODU LARGE 11/24/2020    GLUCOSEU Negative 11/24/2020    AMORPHOUS MANY 11/24/2020       Lab Results   Component Value Date    HCO3 30.2 11/25/2020    BE 5.1 11/25/2020    O2SAT 95.9 11/25/2020    PH 7.417 11/25/2020    PCO2 47.9 11/25/2020    PO2 86.2 11/25/2020     Radiology:  Noted    Microbiology:  Pending  No results for input(s): BC in the last 72 hours. No results for input(s): ORG in the last 72 hours. No results for input(s): Amber Mike in the last 72 hours. No results for input(s): STREPNEUMAGU in the last 72 hours. No results for input(s): LP1UAG in the last 72 hours. No results for input(s): ASO in the last 72 hours. Recent Labs     11/24/20 1940   CULTRESP Growth not present, incubation continues  Oral Pharyngeal Iris absent       No results for input(s): PROCAL in the last 72 hours. Assessment:  · Acute respiratory failure  · Nonremitting fevers. This pattern does not look like an infectious cause for fevers but rather a drug-induced fever. It could be caused by cefepime or any other drug that he has been recently exposed to  · Left upper lobe cancer  · Probable postobstructive pneumonia    Plan:    · Since Cefepime is obviously not covering the fevers they would be prudent to discontinue it  · Start Unasyn  · Check cultures, baseline ESR, CRP  · Will follow with you    Thank you for having us see this patient in consultation. I will be discussing this case with the treating physicians.     Km Mayen  12:08 PM  11/25/2020

## 2020-11-25 NOTE — PROGRESS NOTES
Pulmonary Consultation    Admit Date: 11/17/2020  Requesting Physician: Zhane Cruz PA-C    CC:  Community-acquired pneumonia  HPI:  61years old obese male, heavy smoker who was sent to ER for an outpatient procedure unit because increased shortness of breath and cough. Patient was evaluated in ER and started antibiotics with analysis of community-acquired pneumonia. The patient was initially medically floor and pulmonary consult was requested. Because of progressive respiratory distress and hypoxemia the decision was to transfer the patient to intensive care units and proceed with endotracheal tube placement and full mechanical ventilatory support. After evaluation by intensivist the decision was to proceed with bedside bronchoscopy due to left upper lobe collapse. Findings the bronchoscopy show a very narrow left upper lobe most likely due to extrinsic compression no major mucous plug seen erythema noticed sample for microbiology and cytology were obtained. November 20.no events overnight, still not able to be weaned to extubate failed first attempt this morning. November 21. Febrile, failed vent weaning as he required 60% FiO2 with PEEP of 10. Panculture. Antibiotics change by CCM team.  November 23. Febrile. Failed vent weaning this am,  November 24. Still febrile, failing vent weaning. Still requires 60% FiO2  November 25. Still febrile, antibiotics change by ID. Started on vent weaning with CPAP pressure support 10 PEEP of 8, tidal volume between 500-700, comfortable.     PMH:    Past Medical History:   Diagnosis Date    Anticoagulant long-term use     Arthritis     CAD (coronary artery disease)     Chronic back pain     Depression     Dyslipidemia     Hiatal hernia 1979    History of ST elevation myocardial infarction (STEMI)     Hyperlipidemia     Hypertension     Low back pain     Lumbar radiculopathy 8/14/2019    Obesity     MANOLO on CPAP     Presence of stent in left circumflex coronary artery     Presence of stent in right coronary artery     Smoker     Type 2 diabetes mellitus without complication (HCC)      PSH:   Past Surgical History:   Procedure Laterality Date    ABDOMINAL AORTIC ANEURYSM REPAIR, ENDOVASCULAR N/A 6/28/2019    ENDOVASCULAR REPAIR ABDOMINAL AORTIC ANEURYSM performed by Romayne Spence, MD at [address] Bilateral 3/12/2020    BILATERAL L3,L4,L5 MEDIAL BRANCH NERVE BLOCK performed by Lisandra Banerjee DO at [address] Bilateral 6/11/2020    BILATERAL L3,L4,L5 MEDIAL NERVE BRANCH BLOCK #2 performed by Lisandra Banerjee DO at [address] [place] [address] N/A 11/18/2020    BRONCHOSCOPY/TRANSBRONCHIAL NEEDLE BIOPSY performed by Donis Farris MD at [facility] 391  X4    CORONARY ANGIOPLASTY WITH STENT PLACEMENT  12/30/2011    Dr. Omero Domingo 1st OM 3.0 x 20 Ion    DIAGNOSTIC CARDIAC CATH LAB PROCEDURE  3/15/2005    [facility]. Mild to moderate CAD. Normal LV.  DIAGNOSTIC CARDIAC CATH LAB PROCEDURE  1/16/2007    [facility]. Cath/PTCA/DE stent of proximal and distal RCA.  DIAGNOSTIC CARDIAC CATH LAB PROCEDURE  2/21/2007    Cath/DE stent of proximal OM1 and proximal Cx.  DIAGNOSTIC CARDIAC CATH LAB PROCEDURE  12/30/2011    ANURADHA of mid OM branch of circumflex.      ECHO COMPL W DOP COLOR FLOW  12/30/2011         HC INSERT PICC CATH, 5/> YRS - MIDLINE  11/23/2020         HERNIA REPAIR  2/2014    laparoscopic ventral hernia repain    JOINT REPLACEMENT Left 4/1/14    TKA-ed    JOINT REPLACEMENT Right 2018    KNEE    PAIN MANAGEMENT PROCEDURE Right 9/1/2020    RIGHT L3,4,5 MEDIAL BRANCH RADIOFREQUENCY ABLATION performed by Lisandra Banerjee DO at [facility] N/A 11/17/2020    L4-5 EPIDURAL STEROID INJECTION #1 performed by Lisandra Banerjee DO at [address] OFFICE/OUTPT VISIT,PROCEDURE ONLY Right 11/26/2018    RIGHT KNEE TOTAL ARTHROPLASTY performed by Lisa Griffith, DO at [facility] 48 fentaNYL 5 mcg/ml in 0.9%  ml infusion 200 mcg/hr (20 1000)    propofol Stopped (20 1140)    dextrose      midazolam 6 mg/hr (20 1041)      furosemide  40 mg Intravenous BID    sodium chloride  20 mL Intravenous Once    ampicillin-sulbactam  3 g Intravenous Q6H    methylPREDNISolone  40 mg Intravenous Q12H    albumin human  25 g Intravenous BID    sucralfate  1 g Oral 4 times per day    heparin flush  1 mL Intravenous 2 times per day    insulin lispro  0-18 Units Subcutaneous Q6H    nicotine  1 patch Transdermal Daily    acetylcysteine  4 mL Inhalation BID    albuterol  2.5 mg Nebulization Q4H    Arformoterol Tartrate  15 mcg Nebulization BID    atorvastatin  80 mg Oral Nightly    [Held by provider] clopidogrel  75 mg Oral Daily    fluticasone  2 spray Nasal Daily    sodium chloride flush  10 mL Intravenous 2 times per day    [Held by provider] enoxaparin  40 mg Subcutaneous Daily    insulin glargine  0.25 Units/kg Subcutaneous Nightly    chlorhexidine  15 mL Mouth/Throat BID    sodium chloride (PF)  10 mL Intravenous Daily    aspirin  325 mg Oral Daily         Vitals:  Tmax:  VITALS:  BP (!) 142/82   Pulse 101   Temp 101.7 °F (38.7 °C) (Axillary)   Resp 10   Ht 5' 11\" (1.803 m)   Wt (!) 323 lb 3.1 oz (146.6 kg)   SpO2 91%   BMI 45.08 kg/m²   24HR INTAKE/OUTPUT:      Intake/Output Summary (Last 24 hours) at 2020 1743  Last data filed at 2020 1630  Gross per 24 hour   Intake 5562.5 ml   Output 6950 ml   Net -1387.5 ml     CURRENT PULSE OXIMETRY:  SpO2: 91 %  24HR PULSE OXIMETRY RANGE:  SpO2  Av.3 %  Min: 85 %  Max: 95 %        EXAM:  General: No distress. Alert. Sedated. Obese  Eyes: PERRL. No sclera icterus. No conjunctival injection. ENT: No discharge. Pharynx clear. Endotracheal tube is in place. Secretions ++  Neck: Trachea midline. Normal thyroid. Thick  Resp: No accessory muscle use.

## 2020-11-25 NOTE — PROGRESS NOTES
Becky Goldman Hospitalist   Progress Note    Admitting Date and Time: 11/17/2020  1:13 PM  Admit Dx: Acute respiratory failure with hypoxia ([facility] [address] [J96.01]  Acute respiratory failure with hypoxia ([facility] [address] [J96.01]    Seen for follow on multiple problems as listed below. Subjective:. Intubated on vent , continues with fever , cultures pending , ID is consulted , d/w bedside nurse. Reviewed care . Reviewed consultants notes. ROS: Not possible sec to above.      methylPREDNISolone  40 mg Intravenous Q12H    albumin human  25 g Intravenous BID    sucralfate  1 g Oral 4 times per day    heparin flush  1 mL Intravenous 2 times per day    cefepime  2 g Intravenous Q8H    insulin lispro  0-18 Units Subcutaneous Q6H    nicotine  1 patch Transdermal Daily    acetylcysteine  4 mL Inhalation BID    albuterol  2.5 mg Nebulization Q4H    Arformoterol Tartrate  15 mcg Nebulization BID    atorvastatin  80 mg Oral Nightly    [Held by provider] clopidogrel  75 mg Oral Daily    fluticasone  2 spray Nasal Daily    sodium chloride flush  10 mL Intravenous 2 times per day    [Held by provider] enoxaparin  40 mg Subcutaneous Daily    insulin glargine  0.25 Units/kg Subcutaneous Nightly    chlorhexidine  15 mL Mouth/Throat BID    sodium chloride (PF)  10 mL Intravenous Daily    aspirin  325 mg Oral Daily     sodium chloride flush, 10 mL, PRN  heparin flush, 1 mL, PRN  sodium phosphate IVPB, 0.16 mmol/kg, PRN    Or  sodium phosphate IVPB, 0.32 mmol/kg, PRN  magnesium sulfate, 1 g, PRN  potassium chloride, 20 mEq, PRN  tiZANidine, 4 mg, TID PRN  acetaminophen, 650 mg, Q6H PRN    Or  acetaminophen, 650 mg, Q6H PRN  polyethylene glycol, 17 g, Daily PRN  promethazine, 12.5 mg, Q6H PRN    Or  ondansetron, 4 mg, Q6H PRN  glucose, 15 g, PRN  dextrose, 12.5 g, PRN  glucagon (rDNA), 1 mg, PRN  dextrose, 100 mL/hr, PRN  oxyCODONE-acetaminophen, 1 tablet, TID PRN    And  oxyCODONE, 2.5 mg, TID PRN  albuterol, 2.5 mg, Q4H PRN         Objective:    /69   Pulse 97   Temp 101.1 °F (38.4 °C) (Axillary)   Resp 20   Ht 5' 11\" (1.803 m)   Wt (!) 323 lb 3.1 oz (146.6 kg)   SpO2 95%   BMI 45.08 kg/m²   General Appearance: intubated on vent - sedated  Skin: warm and dry  Head: normocephalic and atraumatic  Neck: supple and non-tender without mass  Pulmonary/Chest: diminished throughout with few diffuse  coarse BS  Cardiovascular: normal rate, regular rhythm, normal S1 and S2  Abdomen: soft, non-tender, non-distended, normal bowel sounds, no masses or organomegaly  Extremities: no cyanosis, clubbing   Musculoskeletal: normal range of motion  Neurologic:  Can not assess      Recent Labs     11/24/20  0510 11/24/20  2325 11/25/20  0533   * 146 147*   K 5.1* 4.4 4.3   * 108* 107   CO2 26 29 30*   BUN 58* 71* 76*   CREATININE 1.1 1.4* 1.5*   GLUCOSE 171* 173* 235*   CALCIUM 8.7 8.5* 8.4*       Recent Labs     11/23/20  0525 11/24/20  0510 11/25/20  0533   WBC 10.1 9.7 9.3   RBC 2.93* 2.87* 2.73*   HGB 7.8* 7.6* 7.2*   HCT 27.6* 27.3* 25.7*   MCV 94.2 95.1 94.1   MCH 26.6 26.5 26.4   MCHC 28.3* 27.8* 28.0*   RDW 17.0* 16.7* 16.7*   PLT 39* 34* 38*   MPV 10.7 11.3 13.1*       Labs and images reviewed     Radiology:   XR CHEST PORTABLE   Final Result   1. No interval change in the dense consolidation seen within the left upper   lobe. US DUP UPPER EXTREMITIES BILATERAL VENOUS   Final Result   1. Partially occlusive thrombus in the left axillary vein. Some flow is   identified within this vessel on color Doppler interrogation. 2.  No evidence of deep venous thrombosis in the right upper extremity. These results were called by Dr. Jean Alexander to the patients nurse GOPI Antonio on 11/24/2020 at 20:13.      US DUP LOWER EXTREMITIES BILATERAL VENOUS   Final Result   No definite evidence of deep venous thrombosis in the bilateral lower   extremities given the limitations detailed above.   Mild bilateral superficial   soft tissue edema noted. XR CHEST PORTABLE   Final Result   Persistent dense opacity in the upper left lung unchanged. Moderate opacity   in the right lung base worrisome for pneumonia. Probable mild left basilar   atelectasis. No abnormal vascular congestion. XR CHEST PORTABLE   Final Result   1. Unchanged left upper lung complete opacification. 2.  Elevation of the right hemidiaphragm and right lower lung opacity. 3.  Medical support devices as above. XR ABDOMEN (KUB) (SINGLE AP VIEW)   Final Result   1. There is no bowel obstruction, ileus or free air. XR CHEST PORTABLE   Final Result   1. No interval change in the dense consolidation seen within the left upper   lobe. 2. Minimal right lung base atelectasis. XR ABDOMEN FOR NG/OG/NE TUBE PLACEMENT   Final Result   Tip of NG tube at the level of the left hemidiaphragm, no change. XR CHEST ABDOMEN NG PLACEMENT   Final Result   Nasogastric tube is present with side hole at level of gastroesophageal   junction. This tube could be advanced approximately 4 or 5 cm. XR CHEST PORTABLE   Final Result   No significant change in dense airspace opacity of the left upper lobe and   patchy left lower lobe airspace opacities. Enteric tube tip is not well visualized due to underpenetration. The tip may   be at the gastroesophageal junction. This could be confirmed with KUB   centered on the upper abdomen. XR CHEST PORTABLE   Final Result   Unchanged large opacity/consolidation in left upper lobe. Unchanged airspace   disease in left lower lobe. XR CHEST PORTABLE   Final Result   Grossly stable opacities on the left. XR CHEST PORTABLE   Final Result   Increasing multifocal left lung pneumonia. CT LUMBAR SPINE WO CONTRAST   Final Result   1. No evidence for epidural hematoma on CT scan. 2. Suspected sacral insufficiency fracture.       XR CHEST ABDOMEN NG PLACEMENT   Final Result   NG tube tip in the stomach. XR CHEST 1 VIEW   Final Result   1. The life-support lines and tubes are well positioned. 2. Masslike consolidative opacity in the left upper lung. Small left   effusion. XR CHEST PORTABLE   Final Result   Left upper lobe masslike consolidation. Small to moderate left pleural effusion. CTA CHEST W CONTRAST   Final Result   1. New confluent opacities located in left upper lobe suggesting pneumonia,   atelectasis, or neoplasm. 2.  Patchy airspace opacities located in lingula suggesting pneumonia with   areas of atelectasis. 3.  Stenosis and occlusion are associated with left upper lobe segmental   bronchi and lingular segmental bronchi. 4.  Moderate to large left pleural effusion. 5.  Mediastinal lymphadenopathy. 6.  Stable fusiform infrarenal abdominal aortic aneurysm with endograft   repair. Aneurysm measures up to 6.4 cm. No evidence of endoleak or   retroperitoneal fluid. 7.  Diverticulosis. CTA ABDOMEN PELVIS W CONTRAST   Final Result   1. New confluent opacities located in left upper lobe suggesting pneumonia,   atelectasis, or neoplasm. 2.  Patchy airspace opacities located in lingula suggesting pneumonia with   areas of atelectasis. 3.  Stenosis and occlusion are associated with left upper lobe segmental   bronchi and lingular segmental bronchi. 4.  Moderate to large left pleural effusion. 5.  Mediastinal lymphadenopathy. 6.  Stable fusiform infrarenal abdominal aortic aneurysm with endograft   repair. Aneurysm measures up to 6.4 cm. No evidence of endoleak or   retroperitoneal fluid. 7.  Diverticulosis.       XR CHEST PORTABLE    (Results Pending)       Assessment:    Active Problems:    CAD (coronary artery disease)    Smoker    Non-insulin dependent type 2 diabetes mellitus ([address]    Morbid obesity with BMI of 40.0-44.9, adult (HCC)    Chronic back pain    Acute respiratory failure with hypoxia ([address]    Community acquired pneumonia of left upper lobe of lung    Thrombocytopenia ([facility] [address]  Resolved Problems:    * No resolved hospital problems. *      Plan:  Acute hypoxic and hypercapnic respiratory failure secondary to CAP-s/p intubation 11/18 . Was on IV Rocephin plus Zithromax now switched to Rocephin plus Levaquin, on IV steroids, nebulized treatments, critical care/pulmonary following. S/p [facility] with biopsy of left upper lobe atelectasis/consolidation/mass , bx + for small cell lung ca . Respiratory panel negative. Covid negative. procal 0.23. CC managing. Volume OL , anasarca- multifactorial sec to hypoalbuminemia , aggressive IVFs ,anemia - renal following. On IV lasix  , albumin as per renal    Hypernatremia sec to free water deficits- managed as per renal - free water flushes increased     CAP - on IV Rocephin + Levaq and other supportive rx. With new  fever was started on IV VM + Cefepime  , blood cx + for  coag neg staph likely contaminant  , cefepime continued, follow cxs. Due to persistent fever ID is consulted. Left lung ca as per bronch bx  With large pleural effusion  - hem onc & pulm following. Suspect COPD exacerbation with MANOLO -on  bronchodilators, IV steroids, vent support as per critical care. Will need out pt follow with pulm    Anemia / Thrombocytopenia-chronic,s/p 1 U plts . Hemonc has been consulted and following. Transfuse for PLTs <20 and Hgb <7    NIDDM - On Lantus + ISS    Back pain status post epidural injection 11/17, CT spine negative for hematoma    Hypertension / Coronary artery status post PC I -on aspirin, Plavix ,statin, plavix on hold asper CC. BB on hold sec to  BP on lower side. Hyperlipidemia - on statin     Tobacco use-has been counseled and is on nicotine patch. Does not have official COPD as a diagnosis-but has 39 pack years history of smoking. Out pt follow for sleep studies as well as PFTs    On Lovenox for DVT prophylaxis  Full code    Wife wanted transfer [facility] has  denied .     Electronically signed by Delphine Garcia MD on 11/25/2020 at 11:07 AM

## 2020-11-25 NOTE — PROGRESS NOTES
DIAGNOSTIC CARDIAC CATH LAB PROCEDURE  1/16/2007    [facility]. Cath/PTCA/DE stent of proximal and distal RCA.  DIAGNOSTIC CARDIAC CATH LAB PROCEDURE  2/21/2007    Cath/DE stent of proximal OM1 and proximal Cx.  DIAGNOSTIC CARDIAC CATH LAB PROCEDURE  12/30/2011    ANURADHA of mid OM branch of circumflex.  ECHO COMPL W DOP COLOR FLOW  12/30/2011         HC INSERT PICC CATH, 5/> YRS - MIDLINE  11/23/2020         HERNIA REPAIR  2/2014    laparoscopic ventral hernia repain    JOINT REPLACEMENT Left 4/1/14    TKA-ed    JOINT REPLACEMENT Right 2018    KNEE    PAIN MANAGEMENT PROCEDURE Right 9/1/2020    RIGHT L3,4,5 MEDIAL BRANCH RADIOFREQUENCY ABLATION performed by Manju Unger DO at [facility] 1772 N/A 11/17/2020    L4-5 EPIDURAL STEROID INJECTION #1 performed by Manju Unger DO at [address] [facility] OFFICE/OUTPT VISIT,PROCEDURE ONLY Right 11/26/2018    RIGHT KNEE TOTAL ARTHROPLASTY performed by Claudio Rea DO at [facility] OR       Family History   Problem Relation Age of Onset    Diabetes Mother     Stroke Mother     Diabetes Father     No Known Problems Sister         reports that he has been smoking cigarettes. He started smoking about 42 years ago. He has a 39.00 pack-year smoking history. He has never used smokeless tobacco. He reports that he does not drink alcohol or use drugs.     Allergies:  Bee venom    Current Medications:    furosemide (LASIX) injection 40 mg, BID  0.9 % sodium chloride bolus, Once  ampicillin-sulbactam (UNASYN) 3 g ivpb minibag, Q6H  methylPREDNISolone sodium (SOLU-MEDROL) injection 40 mg, Q12H  albumin human 25 % IV solution 25 g, BID  sucralfate (CARAFATE) tablet 1 g, 4 times per day  fentaNYL 5 mcg/ml in 0.9%  ml infusion, Continuous  propofol injection, Titrated  sodium chloride flush 0.9 % injection 10 mL, PRN  heparin flush 100 UNIT/ML injection 100 Units, 2 times per day  heparin flush 100 UNIT/ML injection 100 Units, PRN  sodium phosphate 24.3 mmol in dextrose 5 % 250 mL IVPB, PRN    Or  sodium phosphate 48.57 mmol in dextrose 5 % 250 mL IVPB, PRN  magnesium sulfate 1 g in dextrose 5% 100 mL IVPB, PRN  potassium chloride 20 mEq/50 mL IVPB (Central Line), PRN  insulin lispro (HUMALOG) injection vial 0-18 Units, Q6H  nicotine (NICODERM CQ) 21 MG/24HR 1 patch, Daily  acetylcysteine (MUCOMYST) 10 % solution 400 mg, BID  albuterol (PROVENTIL) nebulizer solution 2.5 mg, Q4H  Arformoterol Tartrate (BROVANA) nebulizer solution 15 mcg, BID  atorvastatin (LIPITOR) tablet 80 mg, Nightly  [Held by provider] clopidogrel (PLAVIX) tablet 75 mg, Daily  fluticasone (FLONASE) 50 MCG/ACT nasal spray 2 spray, Daily  tiZANidine (ZANAFLEX) tablet 4 mg, TID PRN  sodium chloride flush 0.9 % injection 10 mL, 2 times per day  acetaminophen (TYLENOL) tablet 650 mg, Q6H PRN    Or  acetaminophen (TYLENOL) suppository 650 mg, Q6H PRN  polyethylene glycol (GLYCOLAX) packet 17 g, Daily PRN  promethazine (PHENERGAN) tablet 12.5 mg, Q6H PRN    Or  ondansetron (ZOFRAN) injection 4 mg, Q6H PRN  [Held by provider] enoxaparin (LOVENOX) injection 40 mg, Daily  insulin glargine (LANTUS) injection vial 36 Units, Nightly  glucose (GLUTOSE) 40 % oral gel 15 g, PRN  dextrose 50 % IV solution, PRN  glucagon (rDNA) injection 1 mg, PRN  dextrose 5 % solution, PRN  oxyCODONE-acetaminophen (PERCOCET) 5-325 MG per tablet 1 tablet, TID PRN    And  oxyCODONE (ROXICODONE) immediate release tablet 2.5 mg, TID PRN  albuterol (PROVENTIL) nebulizer solution 2.5 mg, Q4H PRN  chlorhexidine (PERIDEX) 0.12 % solution 15 mL, BID  sodium chloride (PF) 0.9 % injection 10 mL, Daily  midazolam (VERSED) 1 mg/mL in D5W infusion, Continuous  aspirin tablet 325 mg, Daily        Review of Systems:   Unable to obtain as he is intubated and sedated    Physical exam:   Constitutional:    Vitals: /79   Pulse 104   Temp 101.7 °F (38.7 °C) (Axillary)   Resp 12   Ht 5' 11\" (1.803 m)   Wt (!) 323 lb 3.1 oz CHANGES: There is disc space narrowing and vacuum disc phenomenon at L2-L3. Disc space heights are otherwise preserved. There is diffuse facet arthropathy. SOFT TISSUES/RETROPERITONEUM: No evidence for epidural hematoma is identified. 1. No evidence for epidural hematoma on CT scan. 2. Suspected sacral insufficiency fracture. Xr Chest Portable    Result Date: 11/24/2020  EXAMINATION: ONE XRAY VIEW OF THE CHEST 11/24/2020 6:36 am COMPARISON: 11/23/2020 HISTORY: ORDERING SYSTEM PROVIDED HISTORY: resp failure TECHNOLOGIST PROVIDED HISTORY: Reason for exam:->resp failure FINDINGS: The heart is borderline enlarged. Dense opacity remains in the mid and upper left lung unchanged. Moderate amount of opacity present in the lower right lung. Small amount of opacity present in the left lung base favored to be due to atelectasis. No abnormal pulmonary vascular congestion. ET tube and NG tube unchanged. Persistent dense opacity in the upper left lung unchanged. Moderate opacity in the right lung base worrisome for pneumonia. Probable mild left basilar atelectasis. No abnormal vascular congestion. Xr Chest Portable    Result Date: 11/23/2020  EXAMINATION: ONE XRAY VIEW OF THE CHEST 11/23/2020 4:54 pm COMPARISON: Multiple prior chest series with the most recent dated November 23, 2020 at [address] [facility]: ETT adjusted TECHNOLOGIST PROVIDED HISTORY: Reason for exam:->ETT adjusted Reason for exam:->ETT advanced to 28 cm FINDINGS: Medical support devices: Endotracheal tube terminates in the mid to distal thoracic trachea. Enteric tube extends subdiaphragmatic and off the field of view. Lungs: Stable left upper lung complete opacification. Persistent right lower lung opacity. Slight elevation of the right hemidiaphragm is stable. Left lower lung not imaged.  Cardiomediastinal silhouette and pulmonary vascularity: There appears to be atherosclerotic disease and prominence of the cardiac silhouette. Osseous and soft tissue structures: No acute findings. 1.  Unchanged left upper lung complete opacification. 2.  Elevation of the right hemidiaphragm and right lower lung opacity. 3.  Medical support devices as above. Xr Chest Portable    Result Date: 11/23/2020  EXAMINATION: ONE XRAY VIEW OF THE CHEST 11/23/2020 7:08 am COMPARISON: 11/20/2020 HISTORY: ORDERING SYSTEM PROVIDED HISTORY: resp failure TECHNOLOGIST PROVIDED HISTORY: Reason for exam:->resp failure FINDINGS: There is no interval change in the persistent dense consolidation within the left upper lobe. The left lower lobe is clear. The right lung is clear. There is minimal atelectasis seen within the right lung base. The cardiac silhouette is within normals. 1. No interval change in the dense consolidation seen within the left upper lobe. 2. Minimal right lung base atelectasis. Xr Chest Portable    Result Date: 11/22/2020  EXAMINATION: ONE XRAY VIEW OF THE CHEST 11/22/2020 6:28 am COMPARISON: 11/21/2020 HISTORY: ORDERING SYSTEM PROVIDED HISTORY: resp failure TECHNOLOGIST PROVIDED HISTORY: Reason for exam:->resp failure FINDINGS: The endotracheal tube is stable. The enteric tube tip is not well visualized but may be at the gastroesophageal junction. There is stable left upper lobe dense opacity. There are continued patchy opacities in the left lower lobe. There may be small pleural effusions. No pneumothorax is seen. No significant change in dense airspace opacity of the left upper lobe and patchy left lower lobe airspace opacities. Enteric tube tip is not well visualized due to underpenetration. The tip may be at the gastroesophageal junction. This could be confirmed with KUB centered on the upper abdomen.     Xr Chest Portable    Result Date: 11/21/2020  EXAMINATION: ONE XRAY VIEW OF THE CHEST 11/21/2020 7:37 am COMPARISON: 11/20/2020 HISTORY: ORDERING SYSTEM PROVIDED HISTORY: resp failure TECHNOLOGIST PROVIDED HISTORY: Reason for exam:->resp failure FINDINGS: Endotracheal tube and nasogastric tube are unchanged. Large opacity in the left upper lobe is unchanged. There is additional airspace disease in the left lower lobe which is stable. There is no pneumothorax. Small pleural effusions are not excluded. Unchanged large opacity/consolidation in left upper lobe. Unchanged airspace disease in left lower lobe. Xr Chest Portable    Result Date: 11/20/2020  EXAMINATION: ONE XRAY VIEW OF THE CHEST 11/20/2020 6:33 am COMPARISON: 11/19/2020 HISTORY: ORDERING SYSTEM PROVIDED HISTORY: resp failure TECHNOLOGIST PROVIDED HISTORY: Reason for exam:->resp failure FINDINGS: Endotracheal and enteric tubes remain in place. There has been no appreciable change in opacities throughout the left lung, most pronounced in the left apex. The right lung is clear. The heart size is at the upper limits of normal.  There is no discernible pneumothorax. Grossly stable opacities on the left. Xr Chest Portable    Result Date: 11/19/2020  EXAMINATION: ONE XRAY VIEW OF THE CHEST 11/19/2020 6:23 am COMPARISON: 11/18/2020 HISTORY: ORDERING SYSTEM PROVIDED HISTORY: resp failure TECHNOLOGIST PROVIDED HISTORY: Reason for exam:->resp failure FINDINGS: Evaluation is limited by the patient's body habitus and the portable technique. The right lung apex was partially excluded. There is increased dense consolidation in the left upper lobe. There is also medial left basilar airspace opacity obscuring the hemidiaphragm. The heart size is mildly enlarged. There is no discernible pneumothorax. Endotracheal and enteric tubes are again seen. Increasing multifocal left lung pneumonia. Xr Chest Portable    Result Date: 11/18/2020  EXAMINATION: ONE XRAY VIEW OF THE CHEST 11/18/2020 6:39 am COMPARISON: CT chest November 17, 2020.  HISTORY: ORDERING SYSTEM PROVIDED HISTORY: SOB TECHNOLOGIST PROVIDED HISTORY: Reason for exam:->SOB FINDINGS: The cardiac silhouette is within normal limits in size. There is masslike consolidation of the left upper lobe. There is a small to moderate left dependent pleural effusion. There is no visible pneumothorax. The pulmonary vessels are within normal limits. Left upper lobe masslike consolidation. Small to moderate left pleural effusion. Xr Chest 1 View    Result Date: 11/18/2020  EXAMINATION: ONE XRAY VIEW OF THE CHEST 11/18/2020 9:06 am COMPARISON: 11/18/2020 HISTORY: ORDERING SYSTEM PROVIDED HISTORY: intubation TECHNOLOGIST PROVIDED HISTORY: Reason for exam:->intubation Reason for exam:->portable FINDINGS: Compared to the chest radiograph from earlier today, 6:44 a.m., there is interval placement of an endotracheal tube, the tip of which is approximately 4.1 cm above the korina. Nasogastric tube is present but is suboptimally visualized due to motion artifact and relative underpenetration of the study. The abdominal radiograph demonstrates it to be well positioned, with the tip in the upper abdomen. Prominent masslike consolidative opacity in the upper left lung are grossly stable. No pneumothorax is seen. Layering left pleural effusion. 1.  The life-support lines and tubes are well positioned. 2. Masslike consolidative opacity in the left upper lung. Small left effusion. Cta Chest W Contrast    Result Date: 11/17/2020  EXAMINATION: CTA OF THE CHEST 11/17/2020 3:18 pm TECHNIQUE: CTA of the chest was performed after the administration of intravenous contrast.  Multiplanar reformatted images are provided for review. MIP images are provided for review. Dose modulation, iterative reconstruction, and/or weight based adjustment of the mA/kV was utilized to reduce the radiation dose to as low as reasonably achievable.; CTA of the abdomen and pelvis was performed with the administration of intravenous contrast. Multiplanar reformatted images are provided for review. MIP images are provided for review.  Dose modulation, iterative reconstruction, and/or weight based adjustment of the mA/kV was utilized to reduce the radiation dose to as low as reasonably achievable. COMPARISON: None. HISTORY: ORDERING SYSTEM PROVIDED HISTORY: aneurysm, cp, sob TECHNOLOGIST PROVIDED HISTORY: Reason for exam:->aneurysm, cp, sob FINDINGS: CTA chest: There are confluent opacities located in left upper lobe. Patchy airspace opacities located in lingula. There is moderate to large left pleural effusion. Peribronchial thickening extensor in left hilar location into the left lung base. There is subsegmental atelectasis in posterior right lower lobe with adjacent ground-glass opacities. There is pulmonary vascular congestion. The heart is mildly enlarged. There is mediastinal lymphadenopathy. Ascending thoracic aorta measures 3.7 cm in diameter. Descending thoracic aorta measures 3 cm in diameter. No evidence of thoracic aortic aneurysm or dissection. Distal descending thoracic aorta is tortuous. CTA abdomen/pelvis: There is evidence of endograft repair involving the abdominal aorta. There is redemonstration of fusiform abdominal aortic aneurysm measuring up to 6.4 cm. This finding is stable since prior. No evidence of endoleak. No retroperitoneal fluid collections. Iliac limbs of endograft appear patent. Common iliac arteries are tortuous. Common femoral arteries are patent. Numerous diverticula associated with the left colon and sigmoid colon. No evidence of acute diverticulitis. Liver, gallbladder, pancreas, and spleen are grossly unremarkable however there is motion artifact limiting this examination. There is no hydronephrosis. Cyst associated with the right kidney is stable since previous examination as well as cyst associated with the left kidney appearing stable since prior. Appendix is visualized and normal.    1.  New confluent opacities located in left upper lobe suggesting pneumonia, atelectasis, or neoplasm.  2. diameter. Descending thoracic aorta measures 3 cm in diameter. No evidence of thoracic aortic aneurysm or dissection. Distal descending thoracic aorta is tortuous. CTA abdomen/pelvis: There is evidence of endograft repair involving the abdominal aorta. There is redemonstration of fusiform abdominal aortic aneurysm measuring up to 6.4 cm. This finding is stable since prior. No evidence of endoleak. No retroperitoneal fluid collections. Iliac limbs of endograft appear patent. Common iliac arteries are tortuous. Common femoral arteries are patent. Numerous diverticula associated with the left colon and sigmoid colon. No evidence of acute diverticulitis. Liver, gallbladder, pancreas, and spleen are grossly unremarkable however there is motion artifact limiting this examination. There is no hydronephrosis. Cyst associated with the right kidney is stable since previous examination as well as cyst associated with the left kidney appearing stable since prior. Appendix is visualized and normal.    1.  New confluent opacities located in left upper lobe suggesting pneumonia, atelectasis, or neoplasm. 2.  Patchy airspace opacities located in lingula suggesting pneumonia with areas of atelectasis. 3.  Stenosis and occlusion are associated with left upper lobe segmental bronchi and lingular segmental bronchi. 4.  Moderate to large left pleural effusion. 5.  Mediastinal lymphadenopathy. 6.  Stable fusiform infrarenal abdominal aortic aneurysm with endograft repair. Aneurysm measures up to 6.4 cm. No evidence of endoleak or retroperitoneal fluid. 7.  Diverticulosis. Xr Abdomen For Ng/og/ne Tube Placement    Result Date: 11/22/2020  EXAMINATION: ONE SUPINE XRAY VIEW(S) OF THE ABDOMEN 11/22/2020 5:06 pm COMPARISON: November 22, 2020, 4 hours prior.  HISTORY: ORDERING SYSTEM PROVIDED HISTORY: Confirmation of course of NG/OG/NE tube and location of tip of tube TECHNOLOGIST PROVIDED HISTORY: Reason for exam:->Confirmation of course of NG/OG/NE tube and location of tip of tube Portable? ->Yes FINDINGS: Tip of the NG tube noted at the level of the diaphragm, no significant change. The right side and the inferior part of the abdomen is not included in the study. There is opacification of the visualized left upper lung. Tip of NG tube at the level of the left hemidiaphragm, no change. Xr Chest Abdomen Ng Placement    Result Date: 11/22/2020  EXAMINATION: ONE SUPINE XRAY VIEW(S) OF THE ABDOMEN 11/22/2020 12:52 pm COMPARISON: November 18. HISTORY: ORDERING SYSTEM PROVIDED HISTORY: OG placement TECHNOLOGIST PROVIDED HISTORY: Reason for exam:->OG placement FINDINGS: Nasogastric tube is present. Side hole appears to be at level of gastroesophageal junction. This tube could be advanced approximately 4 or 5 cm. Nasogastric tube is present with side hole at level of gastroesophageal junction. This tube could be advanced approximately 4 or 5 cm. Xr Chest Abdomen Ng Placement    Result Date: 11/18/2020  EXAMINATION: ONE SUPINE XRAY VIEW(S) OF THE ABDOMEN 11/18/2020 9:07 am COMPARISON: 11/18/2020, about 2 hours earlier HISTORY: ORDERING SYSTEM PROVIDED HISTORY: NGT TECHNOLOGIST PROVIDED HISTORY: Reason for exam:->NGT Reason for exam:->portable NG tube placement FINDINGS: There is an NG tube with the tip in the stomach. An ET tube is again noted in satisfactory position. There is extensive opacity in the left lung, most likely pneumonia. Increased markings also seen in the visualized portion of the right lung. The heart is enlarged. NG tube tip in the stomach. Fluoro For Surgical Procedures    Result Date: 11/17/2020  EXAMINATION: SPOT FLUOROSCOPIC IMAGES 11/17/2020 12:03 pm TECHNIQUE: Fluoroscopy was provided by the radiology department for procedure. Radiologist was not present during examination.  FLUOROSCOPY DOSE AND TYPE OR TIME AND EXPOSURES: Total dose:26.99 mGy COMPARISON: None HISTORY: ORDERING SYSTEM PROVIDED HISTORY: Pain management TECHNOLOGIST PROVIDED HISTORY: Reason for exam:->L4-5 Epidural steroid injection #1 Intraprocedural imaging. FINDINGS: 3 intraoperative fluoroscopic images were obtained during L4-5 epidural steroid injection. Intraprocedural fluoroscopic spot images as above. See separate procedure report for more information. Assessment  1. Acute hypoxic hypercapnic respiratory secondary to community-acquired pneumonia, on mechanical ventilation  2. Acute kidney injury secondary to overt diuresis, questionable underlying chronic kidney disease with evidence of proteinuria  3. Anasarca, multifactorial secondary to hypoalbuminemia, aggressive IVF, anemia  4. Hypernatremia secondary to free water deficit, loop diuretic use. Calculated free water deficit is 3.75 L  5. Hyperkalemia  6. Anemia, normocytic, oncology on board  7. Severe thrombocytopenia    Plan    1. Continue with albumin 25 g IV twice daily for 4 doses  2. Lasix 40 mg IV twice daily  3.  free water flushes to 50 ml/h, continue to monitor the sodium levels, repeat BMP at 6 PM tonight  4. Continue to monitor the renal function and urine output closely      Thank you Dr. Sumit Simmons for allowing us to participate in care of Denise Stinson.             Electronically signed by Sohail Bedoya MD on 11/25/2020 at 2:26 PM

## 2020-11-25 NOTE — PROGRESS NOTES
Critical Care Team - Daily Progress Note         Date and time: 11/25/2020 8:27 AM  Patient's name:  Mariela Lobato. Medical Record Number: [id number]  Patient's account/billing number: [de-identified]  Patient's YOB: 1960  Age: 61 y.o. Date of Admission: 11/17/2020  1:13 PM  Length of stay during current admission: 8      Primary Care Physician: Umu Mistry PA-C  ICU Attending Physician: Dr. Naomi Martinez    Code Status: Full Code    Reason for ICU admission: acute hypoxic respiratory failure      SUBJECTIVE:     BRIEF HISTORY:   The patient is a 61 y.o. male with significant past medical history of diabetes, MANOLO, back pain, hypertension, hyperlipidemia, STEMI's status post 5 stents, CAD, aortic aneurysm presenting to the the hospital initially on 11/17. He had a epidural performed yesterday for low back pain and after the procedure became very short of breath. He was satting 80% so was sent to the hospital for further evaluation. While in the ED patient was found to be thrombocytopenic as well as hypoxic on room air. He was placed on 4 L of oxygen but O2 sats improved. CTA of the chest demonstrating pneumonia and pleural effusions. He was given 1 dose of Rocephin and azithromycin while in the ER.     On 11/18 RRT was called at 7:30 AM.  Patient was found to have increased work of breathing as well as hypercapnic on ABGs. He was on BiPAP all night with minimal improvement of his symptoms. During the RT he was switched to AVAPS. He was subsequently brought down to the ICU for further evaluation and care. He was not improving on AVAPS and subsequently intubated     A bronchoscopy was performed on 11/18 and biopsies were sent. OVERNIGHT EVENTS:    11/19/20: Continues to be intubated, no acute events  11/20/20: Attempted weaning trials. Patient on pressure support for a few hours. ABGs worsening. Placed on it back on AC/VC  11/21/20: More rhonchorous today. More secretions. 20; still having moist secretions, gram positive cocci bacteremia   20: continued secretions, complaining of mild abdominal pain today. multiple attempts made to transfer patient per wifes request  20: start diuresis. Wean propofol and add seroquel for agitation. Increase free water. Biopsy results back concerning for small cell lung cancer. Febrile.    febrile re cultured overnight , t max 101, ID consulted, will attempt weaning again today    CURRENT VENTILATION STATUS:     [x] Ventilator  [] BIPAP  [] Nasal Cannula [] Room Air        SECRETIONS : Amount:  [] Small [x] Moderate  [] Large  [] None  Color:     [] White [x] Colored  [] Bloody    SEDATION:  RAAS Score:  [] Propofol gtt  [x] Versed gtt  [] Ativan gtt   [] No Sedation    PARALYZED:  [x] No    [] Yes      VASOPRESSORS:  [x] No    [] Yes    If yes -   [] Levophed       [] Dopamine     [] Vasopressin       [] Dobutamine  [] Phenylephrine         [] Epinephrine    CENTRAL LINES:     [x] No   [] Yes   (Date of Insertion:   )           If yes -     [] Right IJ     [] Left IJ [] Right Femoral [] Left Femoral                   [] Right Subclavian [] Left Subclavian       HUFF'S CATHETER:   [] No   [x] Yes  (Date of Insertion:   )     URINE OUTPUT:            [x] Good   [] Low              [] Anuric      OBJECTIVE:     VITAL SIGNS:  /72   Pulse 99   Temp 101.1 °F (38.4 °C) (Axillary)   Resp 20   Ht 5' 11\" (1.803 m)   Wt (!) 323 lb 3.1 oz (146.6 kg)   SpO2 93%   BMI 45.08 kg/m²   Tmax over 24 hours:  Temp (24hrs), Av.6 °F (38.1 °C), Min:99.1 °F (37.3 °C), Max:101.4 °F (38.6 °C)      Patient Vitals for the past 6 hrs:   BP Temp Temp src Pulse Resp SpO2 Weight   20 0800 118/72 101.1 °F (38.4 °C) Axillary 99 20 93 % --   20 0700 117/72 -- -- 100 20 91 % --   20 0600 113/72 -- -- 98 20 92 % --   20 0507 111/71 -- -- 99 20 91 % --   20 0400 116/74 101.4 °F (38.6 °C) Axillary 100 20 91 % (!) 323 lb 3.1 oz (146.6 kg)   11/25/20 0302 -- -- -- -- -- 90 % --   11/25/20 0300 119/73 -- -- 100 20 90 % --         Intake/Output Summary (Last 24 hours) at 11/25/2020 0827  Last data filed at 11/25/2020 9630  Gross per 24 hour   Intake 4667 ml   Output 6995 ml   Net -2328 ml     Wt Readings from Last 2 Encounters:   11/25/20 (!) 323 lb 3.1 oz (146.6 kg)   11/17/20 (!) 314 lb (142.4 kg)     Body mass index is 45.08 kg/m². PHYSICAL EXAMINATION:  General: Intubated   HEENT:  Normocephalic, atraumatic. Pulls equal and reactive no scleral icterus. No conjunctival injection. No nasal discharge. Neck:  Supple, without stridor, no JVD  Heart:  RRR, no murmurs, gallops, rubs  Lungs: Greatly diminished in left upper lobe, rhonchorous throughout the remainder of the lung fields   Abdomen: Obese, bowel sounds present, soft, non-tender, non-distended, no guarding or rigidity. Not tender to palpation  Extremities:  No clubbing, cyanosis,1+ edema bilaterally  Skin:  Warm and dry  Neuro: Following commands. Cranial nerves II through XII grossly intact.   No focal neurologic deficits  Breast: deferred  Rectal: deferred  Genitalia:  deferred     MEDICATIONS:    Scheduled Meds:   methylPREDNISolone  40 mg Intravenous Q12H    albumin human  25 g Intravenous BID    sucralfate  1 g Oral 4 times per day    heparin flush  1 mL Intravenous 2 times per day    cefepime  2 g Intravenous Q8H    insulin lispro  0-18 Units Subcutaneous Q6H    nicotine  1 patch Transdermal Daily    acetylcysteine  4 mL Inhalation BID    albuterol  2.5 mg Nebulization Q4H    Arformoterol Tartrate  15 mcg Nebulization BID    atorvastatin  80 mg Oral Nightly    [Held by provider] clopidogrel  75 mg Oral Daily    fluticasone  2 spray Nasal Daily    sodium chloride flush  10 mL Intravenous 2 times per day    [Held by provider] enoxaparin  40 mg Subcutaneous Daily    insulin glargine  0.25 Units/kg Subcutaneous Nightly    chlorhexidine  15 mL Mouth/Throat BID    sodium chloride (PF)  10 mL Intravenous Daily    aspirin  325 mg Oral Daily     Continuous Infusions:   bumetanide 0.1 mg/mL infusion 0.2 mg/hr (11/24/20 1713)    fentaNYL 5 mcg/ml in 0.9%  ml infusion 200 mcg/hr (11/25/20 0313)    propofol Stopped (11/24/20 1140)    dextrose      midazolam 10 mg/hr (11/24/20 1931)     PRN Meds:   sodium chloride flush, 10 mL, PRN  heparin flush, 1 mL, PRN  sodium phosphate IVPB, 0.16 mmol/kg, PRN    Or  sodium phosphate IVPB, 0.32 mmol/kg, PRN  magnesium sulfate, 1 g, PRN  potassium chloride, 20 mEq, PRN  tiZANidine, 4 mg, TID PRN  acetaminophen, 650 mg, Q6H PRN    Or  acetaminophen, 650 mg, Q6H PRN  polyethylene glycol, 17 g, Daily PRN  promethazine, 12.5 mg, Q6H PRN    Or  ondansetron, 4 mg, Q6H PRN  glucose, 15 g, PRN  dextrose, 12.5 g, PRN  glucagon (rDNA), 1 mg, PRN  dextrose, 100 mL/hr, PRN  oxyCODONE-acetaminophen, 1 tablet, TID PRN    And  oxyCODONE, 2.5 mg, TID PRN  albuterol, 2.5 mg, Q4H PRN        VENT SETTINGS (Comprehensive) (if applicable):  Vent Information  $Ventilation: $Subsequent Day  Skin Assessment: Clean, dry, & intact  Equipment ID: [id number]  Vent Type: [id number]  Vent Mode: AC/VC  Vt Ordered: 500 mL  Rate Set: 20 bmp  Peak Flow: 65 L/min  Pressure Support: 0 cmH20  FiO2 : 60 %  SpO2: 93 %  SpO2/FiO2 ratio: 155  Sensitivity: 3  PEEP/CPAP: 10  I Time/ I Time %: 0 s  Humidification Source: Heated wire  Humidification Temp: 37  Humidification Temp Measured: 37  Circuit Condensation: Drained  Mask Type: Full face mask  Mask Size: Medium  Additional Respiratory  Assessments  Pulse: 99  Resp: 20  SpO2: 93 %  Position: Semi-Jeff's  Humidification Source: Heated wire  Humidification Temp: 37  Circuit Condensation: Drained  Oral Care: Mouth swabbed, Mouth moisturizer, Mouth suctioned  Subglottic Suction Done?: Yes  Cuff Pressure (cm H2O): 29 cm H2O    ABGs:   Recent Labs     11/25/20  0642   PH 7.417   PCO2 47.9*   PO2 86.2   HCO3 30.2*   BE 5.1*   O2SAT 95.9       Laboratory findings:    Complete Blood Count:   Recent Labs     11/23/20  0525 11/24/20  0510 11/25/20  0533   WBC 10.1 9.7 9.3   HGB 7.8* 7.6* 7.2*   HCT 27.6* 27.3* 25.7*   PLT 39* 34* 38*        Last 3 Blood Glucose:   Recent Labs     11/24/20  0510 11/24/20  2325 11/25/20  0533   GLUCOSE 171* 173* 235*        PT/INR:    Lab Results   Component Value Date    PROTIME 11.4 10/15/2020    PROTIME 11.1 12/30/2011    INR 1.0 10/15/2020     PTT:    Lab Results   Component Value Date    APTT 29.0 12/28/2011       Comprehensive Metabolic Profile:   Recent Labs     11/24/20 0510 11/24/20 2325 11/25/20  0533   * 146 147*   K 5.1* 4.4 4.3   * 108* 107   CO2 26 29 30*   BUN 58* 71* 76*   CREATININE 1.1 1.4* 1.5*   GLUCOSE 171* 173* 235*   CALCIUM 8.7 8.5* 8.4*   PROT 5.8*  --  5.8*   LABALBU 3.0*  --  3.1*   BILITOT 0.4  --  0.5   ALKPHOS 202*  --  185*   AST 49*  --  48*   ALT 34  --  38      Magnesium:   Lab Results   Component Value Date    MG 2.8 11/25/2020     Phosphorus:   Lab Results   Component Value Date    PHOS 3.1 11/25/2020     Ionized Calcium:   Lab Results   Component Value Date    CAION 1.33 10/23/2020        Urinalysis:     Troponin:   No results for input(s): TROPONINI in the last 72 hours. Microbiology:  GPC blood   Respiratory culture no growth    Radiology/Imaging:     Chest x-ray:  11/23  Impression    1. No interval change in the dense consolidation seen within the left upper    lobe. 2. Minimal right lung base atelectasis.               PLAN:     WEAN PER PROTOCOL:  [] No   [x] Yes  [] N/A    DISCONTINUE ANY LABS:   [x] No   [] Yes    ICU PROPHYLAXIS:  Stress ulcer:  [x] PPI Agent  [] O6Swdet [] Sucralfate  [] Other:  VTE:   [x] Enoxaparin  [] Unfract.  Heparin Subcut  [] EPC Cuffs    NUTRITION:  [] NPO [x] Tube Feeding (Specify: ) [] TPN  [x] PO (Diet: DIET TUBE FEED CONTINUOUS/CYCLIC NPO; Diabetic 1.5; Orogastric; Continuous; 20; 60)    HOME MEDICATIONS RECONCILED: [] No  [x] Yes    INSULIN DRIP:   [x] No   [] Yes    CONSULTATION NEEDED:  [x] No   [] Yes     FAMILY UPDATED:    [] No   [x] Yes    TRANSFER OUT OF ICU:   [x] No   [] Yes    ASSESSMENT:     Neuro   Chronic back pain              -Epidural on 11/17, no hematoma seen on CT lumbar spine      Sedated on the vent              -Versed, fentanyl. off propofol  Added yesterday  Seroquel 100 mg was given on dose , d/c by renal      Respiratory   Acute hypoxic and hypercapnic respiratory failure       -continue full vent support. Pressure support trial today became tachcardic and tachypnic, switched back to [address]              -Follow ABG              -Albuterol, incentive spirometer              -solumedrol decrease to q12   -wean FiO2 as able   -metanebs   -Mucomyst   -bedside L thoracentesis 11/22 with only a small amount of pleural fluid, not amendable to drainage     Community-acquired pneumonia              -antibiotics  Have been broadened              -Strep and Legionella negative              -Respiratory culture pending              -Pro-Kael 0.23     Lung mass OMER              -Status post bronc 11/18 with TBNA              -No obvious mucous plugging or purulence. Very erythematous and compressed airways, concern for mass              -Cytology concerning for small cell lung cancer. Heme-onc on board.     Respiratory film array negative  Long, lifetime smoker--nicotine patch     Cardiovascular   CAD/stents              -Plavix is on hold for approximately 7 days per patient he had his epidural.  It has still been on hold in case he needs further procedures.      History of aortic aneurysm              -Status post endovascular repair              -Stable on most recent CT     Gastrointestinal   Tube feeding   Abdominal pain- no peritoneal signs.  KUB unremarkable     Renal   Nephrology following   beto  Hyperkalemia- monitor  hypophos- resolved    Fluid overload   -bumex drip    Hypernatremia   -4.5L free water deficits   -increase free water to 250q6     Infectious Disease   ID consulted for persistent fevers   Community-acquired pneumonia  Re cultured  overnight , 101.4 t max overnight               -cefepime day 5              -Pro-Kael 0.23            Blood cultures gram positive cocci   -coag neg staph. Most likely contaminant. Stop vanco.     Hematology/Oncology   Thrombocytopenia              - received platelets x1              -heme/onc following   -most llikely 2/2 neoplasm and infection   -Transfuse for Hgb <7, platelets <79, today 38     Small cell lung cancer   -biopsy results   -heme/onc and pulmonology for further work up and recommendations    Low grade fevers and low platelets ultrasounds to r/o clot     Endocrine   Diabetes              -Noninsulin-dependent              -Monitor sugars   -Increase to high-dose sliding scale    msk   Chronic back pain              -Status post epidural on 11/17              -No erythematous region or fluctuance              -CT with no evidence of epidural hematoma     Social/Spiritual/DNR/Other   Full code    Multiple attempts at transferring patient to outside facility. Denied by [facility]. [facility] do not accept his insurance. Attempting transfer to [facility].      ______________________________________________________________________     ASSESSMENT/ PLAN   1. As above  2. Bronch concerning for small cell lung cancer, recs appreciated  3. nephro following managing  bumex drip  4. Initiating possible transfer to [facility].  and [facility] denied. 5. GI prophylaxis  6. DVT Prophylaxis  7. ID  Consulted   8. Recultured overnight pending culture  9. Wean  versed  try PS today , decrease FiO2 to 50%, RASS -1, 0  10. Case discussed with Dr. Kirk Bartlett: continue ICU level of care  Electronically signed by MATY Arias on 11/25/2020 at 8:28 AM     I personally saw, examined and provided care for the patient.  Radiographs, labs and medication list were reviewed by me independently. I spoke with bedside nursing, therapists and consultants. Critical care services and times documented are independent of procedures and multidisciplinary rounds with Residents. Additionally comprehensive, multidisciplinary rounds were conducted with the MICU team. The case was discussed in detail and plans for care were established. Review of Residents documentation was conducted and revisions were made as appropriate. I agree with the above documented exam, problem list and plan of care. Latoya Whalen M.D.    Pulmonary/Critical Care Medicine   31 min cct excluding procedures

## 2020-11-26 NOTE — PROGRESS NOTES
Nephrology Consult Note    Patient's Name: Shirley Vines.  12:31 PM  11/26/2020    Nephrologist: Dr. Dalton Bender MD    Reason for Consult: Renal is consulted for volume management  Requesting Physician:  Dr. Elie Sacks    Chief Complaint:  SOB    History Obtained From: EMR as the patient is intubated, sedated and no family member is present today    Sub: Patient seen and examined bedside in the ICU, he is on 90% FiO2. This a.m. I started him on Bumex drip at 0.5 mg/h    Past Medical History:   Diagnosis Date    Anticoagulant long-term use     Arthritis     CAD (coronary artery disease)     Chronic back pain     Depression     Dyslipidemia     Hiatal hernia 1979    History of ST elevation myocardial infarction (STEMI)     Hyperlipidemia     Hypertension     Low back pain     Lumbar radiculopathy 8/14/2019    Obesity     MANOLO on CPAP     Presence of stent in left circumflex coronary artery     Presence of stent in right coronary artery     Smoker     Type 2 diabetes mellitus without complication (HCC)        Past Surgical History:   Procedure Laterality Date    ABDOMINAL AORTIC ANEURYSM REPAIR, ENDOVASCULAR N/A 6/28/2019    ENDOVASCULAR REPAIR ABDOMINAL [facility]ORTIC [facility] 3/12/2020    BILATERAL L3,L4,L5 MEDIAL BRANCH NERVE BLOCK [facility] Bilateral 6/11/2020    BILATERAL L3,L4,L5 MEDIAL NERVE BRANCH BLOCK #2 performed by Aiden Portillo DO at [address] N/A 11/18/2020    BRONCHOSCOPY/TRANSBRONCHIAL NEEDLE BIOPSY performed by Jaja Morales MD at [address] [facility] PLACEMENT  X4    CORONARY ANGIOPLASTY WITH STENT PLACEMENT  12/30/2011    Dr. Brenda Zamora [facility] OM 3.0 x 20 Ion    DIAGNOSTIC CARDIAC CATH LAB PROCEDURE  3/15/2005    [facility]. Mild to moderate CAD. Normal LV.     DIAGNOSTIC CARDIAC CATH LAB PROCEDURE  1/16/2007 [facility]. Cath/PTCA/DE stent of proximal and distal RCA.  DIAGNOSTIC CARDIAC CATH LAB PROCEDURE  2/21/2007    Cath/DE stent of proximal OM1 and proximal Cx.  DIAGNOSTIC CARDIAC CATH LAB PROCEDURE  12/30/2011    ANURADHA of mid OM branch of circumflex.  ECHO COMPL W DOP COLOR FLOW  12/30/2011         HC INSERT PICC CATH, 5/> YRS - MIDLINE  11/23/2020         HERNIA REPAIR  2/2014    laparoscopic ventral hernia repain    JOINT REPLACEMENT Left 4/1/14    TKA-ed    JOINT REPLACEMENT Right 2018    KNEE    PAIN MANAGEMENT PROCEDURE Right 9/1/2020    RIGHT L3,4,5 MEDIAL BRANCH RADIOFREQUENCY ABLATION performed by Carlton Moore DO at [facility] [address] N/A 11/17/2020    L4-5 EPIDURAL STEROID INJECTION #1 performed by Carlton Moore DO at [facility] OFFICE/OUTPT VISIT,PROCEDURE ONLY Right 11/26/2018    RIGHT KNEE TOTAL ARTHROPLASTY performed by Budd Denver, DO at [facility] OR       Family History   Problem Relation Age of Onset    Diabetes Mother     Stroke Mother     Diabetes Father     No Known Problems Sister         reports that he has been smoking cigarettes. He started smoking about 42 years ago. He has a 39.00 pack-year smoking history. He has never used smokeless tobacco. He reports that he does not drink alcohol or use drugs.     Allergies:  Bee venom    Current Medications:    bumetanide (BUMEX) 12.5 mg in sodium chloride 0.9 % 125 mL infusion, Continuous  0.9 % sodium chloride bolus, Once  ampicillin-sulbactam (UNASYN) 3 g ivpb minibag, Q6H  acetaminophen (TYLENOL) tablet 650 mg, Q4H PRN    Or  acetaminophen (TYLENOL) suppository 650 mg, Q4H PRN  methylPREDNISolone sodium (SOLU-MEDROL) injection 40 mg, Q12H  sucralfate (CARAFATE) tablet 1 g, 4 times per day  fentaNYL 5 mcg/ml in 0.9%  ml infusion, Continuous  propofol injection, Titrated  sodium chloride flush 0.9 % injection 10 mL, PRN  heparin flush 100 UNIT/ML injection 100 Units, 2 times per day  heparin flush 100 UNIT/ML injection 100 Units, PRN  sodium phosphate 24.3 mmol in dextrose 5 % 250 mL IVPB, PRN    Or  sodium phosphate 48.57 mmol in dextrose 5 % 250 mL IVPB, PRN  magnesium sulfate 1 g in dextrose 5% 100 mL IVPB, PRN  potassium chloride 20 mEq/50 mL IVPB (Central Line), PRN  insulin lispro (HUMALOG) injection vial 0-18 Units, Q6H  nicotine (NICODERM CQ) 21 MG/24HR 1 patch, Daily  acetylcysteine (MUCOMYST) 10 % solution 400 mg, BID  albuterol (PROVENTIL) nebulizer solution 2.5 mg, Q4H  Arformoterol Tartrate (BROVANA) nebulizer solution 15 mcg, BID  atorvastatin (LIPITOR) tablet 80 mg, Nightly  [Held by provider] clopidogrel (PLAVIX) tablet 75 mg, Daily  fluticasone (FLONASE) 50 MCG/ACT nasal spray 2 spray, Daily  tiZANidine (ZANAFLEX) tablet 4 mg, TID PRN  sodium chloride flush 0.9 % injection 10 mL, 2 times per day  polyethylene glycol (GLYCOLAX) packet 17 g, Daily PRN  promethazine (PHENERGAN) tablet 12.5 mg, Q6H PRN    Or  ondansetron (ZOFRAN) injection 4 mg, Q6H PRN  [Held by provider] enoxaparin (LOVENOX) injection 40 mg, Daily  insulin glargine (LANTUS) injection vial 36 Units, Nightly  glucose (GLUTOSE) 40 % oral gel 15 g, PRN  dextrose 50 % IV solution, PRN  glucagon (rDNA) injection 1 mg, PRN  dextrose 5 % solution, PRN  oxyCODONE-acetaminophen (PERCOCET) 5-325 MG per tablet 1 tablet, TID PRN    And  oxyCODONE (ROXICODONE) immediate release tablet 2.5 mg, TID PRN  albuterol (PROVENTIL) nebulizer solution 2.5 mg, Q4H PRN  chlorhexidine (PERIDEX) 0.12 % solution 15 mL, BID  sodium chloride (PF) 0.9 % injection 10 mL, Daily  midazolam (VERSED) 1 mg/mL in D5W infusion, Continuous  aspirin tablet 325 mg, Daily        Review of Systems:   Unable to obtain as he is intubated and sedated    Physical exam:   Constitutional:    Vitals: /78   Pulse 78   Temp 100.2 °F (37.9 °C) (Bladder)   Resp 20   Ht 5' 11\" (1.803 m)   Wt (!) 319 lb 10.7 oz (145 kg)   SpO2 97%   BMI 44.58 kg/m²        Patient seen and examined bedside , he is intubated and sedated, FiO2 requirement is at 90%, no acute distress  Skin: no rash, turgor wnl  Heent:  eomi, mmm  Neck: no bruits or jvd noted  Cardiovascular:  S1, S2 without m/r/g  Respiratory: CTA B without w/r/r, decreased breath sounds in bases  Abdomen:  +bs, soft, nt, Black catheter draining clear urine  Ext: 3+ lower extremity edema  Psychiatric: mood and affect appropriate  Musculoskeletal:  Rom, muscular strength intact    Data:   Labs:  CBC:   Lab Results   Component Value Date    WBC 8.4 11/26/2020    RBC 2.77 11/26/2020    HGB 7.5 11/26/2020    HCT 26.0 11/26/2020    MCV 93.9 11/26/2020    MCH 27.1 11/26/2020    MCHC 28.8 11/26/2020    RDW 16.3 11/26/2020    PLT 29 11/26/2020    MPV NOT CALC 11/26/2020     CMP:    Lab Results   Component Value Date     11/26/2020    K 3.8 11/26/2020    K 5.1 11/17/2020     11/26/2020    CO2 29 11/26/2020    BUN 95 11/26/2020    CREATININE 1.6 11/26/2020    GFRAA 53 11/26/2020    LABGLOM 44 11/26/2020    GLUCOSE 228 11/26/2020    GLUCOSE 120 12/31/2011    PROT 5.8 11/26/2020    LABALBU 3.3 11/26/2020    LABALBU 4.5 12/28/2011    CALCIUM 8.2 11/26/2020    BILITOT 0.5 11/26/2020    ALKPHOS 149 11/26/2020    AST 47 11/26/2020    ALT 38 11/26/2020     BMP:    Lab Results   Component Value Date     11/26/2020    K 3.8 11/26/2020    K 5.1 11/17/2020     11/26/2020    CO2 29 11/26/2020    BUN 95 11/26/2020    LABALBU 3.3 11/26/2020    LABALBU 4.5 12/28/2011    CREATININE 1.6 11/26/2020    CALCIUM 8.2 11/26/2020    GFRAA 53 11/26/2020    LABGLOM 44 11/26/2020    GLUCOSE 228 11/26/2020    GLUCOSE 120 12/31/2011     Calcium:    Lab Results   Component Value Date    CALCIUM 8.2 11/26/2020     Phosphorus:    Lab Results   Component Value Date    PHOS 3.6 11/26/2020     Uric Acid:  No results found for: URICACID  U/A:    Lab Results   Component Value Date    NITRU Negative 11/24/2020    COLORU Yellow 11/24/2020    PHUR 5.5 narrowing and vacuum disc phenomenon at L2-L3. Disc space heights are otherwise preserved. There is diffuse facet arthropathy. SOFT TISSUES/RETROPERITONEUM: No evidence for epidural hematoma is identified. 1. No evidence for epidural hematoma on CT scan. 2. Suspected sacral insufficiency fracture. Xr Chest Portable    Result Date: 11/24/2020  EXAMINATION: ONE XRAY VIEW OF THE CHEST 11/24/2020 6:36 am COMPARISON: 11/23/2020 HISTORY: ORDERING SYSTEM PROVIDED HISTORY: resp failure TECHNOLOGIST PROVIDED HISTORY: Reason for exam:->resp failure FINDINGS: The heart is borderline enlarged. Dense opacity remains in the mid and upper left lung unchanged. Moderate amount of opacity present in the lower right lung. Small amount of opacity present in the left lung base favored to be due to atelectasis. No abnormal pulmonary vascular congestion. ET tube and NG tube unchanged. Persistent dense opacity in the upper left lung unchanged. Moderate opacity in the right lung base worrisome for pneumonia. Probable mild left basilar atelectasis. No abnormal vascular congestion. Xr Chest Portable    Result Date: 11/23/2020  EXAMINATION: ONE XRAY VIEW OF THE CHEST 11/23/2020 4:54 pm COMPARISON: Multiple prior chest series with the most recent dated November 23, 2020 at [address]: ETT adjusted TECHNOLOGIST PROVIDED HISTORY: Reason for exam:->ETT adjusted Reason for exam:->ETT advanced to 28 cm FINDINGS: Medical support devices: Endotracheal tube terminates in the mid to distal thoracic trachea. Enteric tube extends subdiaphragmatic and off the field of view. Lungs: Stable left upper lung complete opacification. Persistent right lower lung opacity. Slight elevation of the right hemidiaphragm is stable. Left lower lung not imaged. Cardiomediastinal silhouette and pulmonary vascularity: There appears to be atherosclerotic disease and prominence of the cardiac silhouette.  Osseous and soft tissue structures: No acute findings. 1.  Unchanged left upper lung complete opacification. 2.  Elevation of the right hemidiaphragm and right lower lung opacity. 3.  Medical support devices as above. Xr Chest Portable    Result Date: 11/23/2020  EXAMINATION: ONE XRAY VIEW OF THE CHEST 11/23/2020 7:08 am COMPARISON: 11/20/2020 HISTORY: ORDERING SYSTEM PROVIDED HISTORY: resp failure TECHNOLOGIST PROVIDED HISTORY: Reason for exam:->resp failure FINDINGS: There is no interval change in the persistent dense consolidation within the left upper lobe. The left lower lobe is clear. The right lung is clear. There is minimal atelectasis seen within the right lung base. The cardiac silhouette is within normals. 1. No interval change in the dense consolidation seen within the left upper lobe. 2. Minimal right lung base atelectasis. Xr Chest Portable    Result Date: 11/22/2020  EXAMINATION: ONE XRAY VIEW OF THE CHEST 11/22/2020 6:28 am COMPARISON: 11/21/2020 HISTORY: ORDERING SYSTEM PROVIDED HISTORY: resp failure TECHNOLOGIST PROVIDED HISTORY: Reason for exam:->resp failure FINDINGS: The endotracheal tube is stable. The enteric tube tip is not well visualized but may be at the gastroesophageal junction. There is stable left upper lobe dense opacity. There are continued patchy opacities in the left lower lobe. There may be small pleural effusions. No pneumothorax is seen. No significant change in dense airspace opacity of the left upper lobe and patchy left lower lobe airspace opacities. Enteric tube tip is not well visualized due to underpenetration. The tip may be at the gastroesophageal junction. This could be confirmed with KUB centered on the upper abdomen.     Xr Chest Portable    Result Date: 11/21/2020  EXAMINATION: ONE XRAY VIEW OF THE CHEST 11/21/2020 7:37 am COMPARISON: 11/20/2020 HISTORY: ORDERING SYSTEM PROVIDED HISTORY: resp failure TECHNOLOGIST PROVIDED HISTORY: Reason for exam:->resp limits in size. There is masslike consolidation of the left upper lobe. There is a small to moderate left dependent pleural effusion. There is no visible pneumothorax. The pulmonary vessels are within normal limits. Left upper lobe masslike consolidation. Small to moderate left pleural effusion. Xr Chest 1 View    Result Date: 11/18/2020  EXAMINATION: ONE XRAY VIEW OF THE CHEST 11/18/2020 9:06 am COMPARISON: 11/18/2020 HISTORY: ORDERING SYSTEM PROVIDED HISTORY: intubation TECHNOLOGIST PROVIDED HISTORY: Reason for exam:->intubation Reason for exam:->portable FINDINGS: Compared to the chest radiograph from earlier today, 6:44 a.m., there is interval placement of an endotracheal tube, the tip of which is approximately 4.1 cm above the korina. Nasogastric tube is present but is suboptimally visualized due to motion artifact and relative underpenetration of the study. The abdominal radiograph demonstrates it to be well positioned, with the tip in the upper abdomen. Prominent masslike consolidative opacity in the upper left lung are grossly stable. No pneumothorax is seen. Layering left pleural effusion. 1.  The life-support lines and tubes are well positioned. 2. Masslike consolidative opacity in the left upper lung. Small left effusion. Cta Chest W Contrast    Result Date: 11/17/2020  EXAMINATION: CTA OF THE CHEST 11/17/2020 3:18 pm TECHNIQUE: CTA of the chest was performed after the administration of intravenous contrast.  Multiplanar reformatted images are provided for review. MIP images are provided for review. Dose modulation, iterative reconstruction, and/or weight based adjustment of the mA/kV was utilized to reduce the radiation dose to as low as reasonably achievable.; CTA of the abdomen and pelvis was performed with the administration of intravenous contrast. Multiplanar reformatted images are provided for review. MIP images are provided for review.  Dose modulation, iterative reconstruction, and/or weight based adjustment of the mA/kV was utilized to reduce the radiation dose to as low as reasonably achievable. COMPARISON: None. HISTORY: ORDERING SYSTEM PROVIDED HISTORY: aneurysm, cp, sob TECHNOLOGIST PROVIDED HISTORY: Reason for exam:->aneurysm, cp, sob FINDINGS: CTA chest: There are confluent opacities located in left upper lobe. Patchy airspace opacities located in lingula. There is moderate to large left pleural effusion. Peribronchial thickening extensor in left hilar location into the left lung base. There is subsegmental atelectasis in posterior right lower lobe with adjacent ground-glass opacities. There is pulmonary vascular congestion. The heart is mildly enlarged. There is mediastinal lymphadenopathy. Ascending thoracic aorta measures 3.7 cm in diameter. Descending thoracic aorta measures 3 cm in diameter. No evidence of thoracic aortic aneurysm or dissection. Distal descending thoracic aorta is tortuous. CTA abdomen/pelvis: There is evidence of endograft repair involving the abdominal aorta. There is redemonstration of fusiform abdominal aortic aneurysm measuring up to 6.4 cm. This finding is stable since prior. No evidence of endoleak. No retroperitoneal fluid collections. Iliac limbs of endograft appear patent. Common iliac arteries are tortuous. Common femoral arteries are patent. Numerous diverticula associated with the left colon and sigmoid colon. No evidence of acute diverticulitis. Liver, gallbladder, pancreas, and spleen are grossly unremarkable however there is motion artifact limiting this examination. There is no hydronephrosis. Cyst associated with the right kidney is stable since previous examination as well as cyst associated with the left kidney appearing stable since prior. Appendix is visualized and normal.    1.  New confluent opacities located in left upper lobe suggesting pneumonia, atelectasis, or neoplasm.  2.  Patchy airspace opacities located in lingula suggesting pneumonia with areas of atelectasis. 3.  Stenosis and occlusion are associated with left upper lobe segmental bronchi and lingular segmental bronchi. 4.  Moderate to large left pleural effusion. 5.  Mediastinal lymphadenopathy. 6.  Stable fusiform infrarenal abdominal aortic aneurysm with endograft repair. Aneurysm measures up to 6.4 cm. No evidence of endoleak or retroperitoneal fluid. 7.  Diverticulosis. Cta Abdomen Pelvis W Contrast    Result Date: 11/17/2020  EXAMINATION: CTA OF THE CHEST 11/17/2020 3:18 pm TECHNIQUE: CTA of the chest was performed after the administration of intravenous contrast.  Multiplanar reformatted images are provided for review. MIP images are provided for review. Dose modulation, iterative reconstruction, and/or weight based adjustment of the mA/kV was utilized to reduce the radiation dose to as low as reasonably achievable.; CTA of the abdomen and pelvis was performed with the administration of intravenous contrast. Multiplanar reformatted images are provided for review. MIP images are provided for review. Dose modulation, iterative reconstruction, and/or weight based adjustment of the mA/kV was utilized to reduce the radiation dose to as low as reasonably achievable. COMPARISON: None. HISTORY: ORDERING SYSTEM PROVIDED HISTORY: aneurysm, cp, sob TECHNOLOGIST PROVIDED HISTORY: Reason for exam:->aneurysm, cp, sob FINDINGS: CTA chest: There are confluent opacities located in left upper lobe. Patchy airspace opacities located in lingula. There is moderate to large left pleural effusion. Peribronchial thickening extensor in left hilar location into the left lung base. There is subsegmental atelectasis in posterior right lower lobe with adjacent ground-glass opacities. There is pulmonary vascular congestion. The heart is mildly enlarged. There is mediastinal lymphadenopathy. Ascending thoracic aorta measures 3.7 cm in diameter. course of NG/OG/NE tube and location of tip of tube Portable? ->Yes FINDINGS: Tip of the NG tube noted at the level of the diaphragm, no significant change. The right side and the inferior part of the abdomen is not included in the study. There is opacification of the visualized left upper lung. Tip of NG tube at the level of the left hemidiaphragm, no change. Xr Chest Abdomen Ng Placement    Result Date: 11/22/2020  EXAMINATION: ONE SUPINE XRAY VIEW(S) OF THE ABDOMEN 11/22/2020 12:52 pm COMPARISON: November 18. HISTORY: ORDERING SYSTEM PROVIDED HISTORY: OG placement TECHNOLOGIST PROVIDED HISTORY: Reason for exam:->OG placement FINDINGS: Nasogastric tube is present. Side hole appears to be at level of gastroesophageal junction. This tube could be advanced approximately 4 or 5 cm. Nasogastric tube is present with side hole at level of gastroesophageal junction. This tube could be advanced approximately 4 or 5 cm. Xr Chest Abdomen Ng Placement    Result Date: 11/18/2020  EXAMINATION: ONE SUPINE XRAY VIEW(S) OF THE ABDOMEN 11/18/2020 9:07 am COMPARISON: 11/18/2020, about 2 hours earlier HISTORY: ORDERING SYSTEM PROVIDED HISTORY: NGT TECHNOLOGIST PROVIDED HISTORY: Reason for exam:->NGT Reason for exam:->portable NG tube placement FINDINGS: There is an NG tube with the tip in the stomach. An ET tube is again noted in satisfactory position. There is extensive opacity in the left lung, most likely pneumonia. Increased markings also seen in the visualized portion of the right lung. The heart is enlarged. NG tube tip in the stomach. Fluoro For Surgical Procedures    Result Date: 11/17/2020  EXAMINATION: SPOT FLUOROSCOPIC IMAGES 11/17/2020 12:03 pm TECHNIQUE: Fluoroscopy was provided by the radiology department for procedure. Radiologist was not present during examination.  FLUOROSCOPY DOSE AND TYPE OR TIME AND EXPOSURES: Total dose:26.99 mGy COMPARISON: None HISTORY: ORDERING SYSTEM PROVIDED HISTORY: Pain management TECHNOLOGIST PROVIDED HISTORY: Reason for exam:->L4-5 Epidural steroid injection #1 Intraprocedural imaging. FINDINGS: 3 intraoperative fluoroscopic images were obtained during L4-5 epidural steroid injection. Intraprocedural fluoroscopic spot images as above. See separate procedure report for more information. Assessment  1. Acute hypoxic hypercapnic respiratory secondary to community-acquired pneumonia, on mechanical ventilation  2. Acute kidney injury secondary to overt diuresis, questionable underlying chronic kidney disease with evidence of proteinuria  3. Anasarca, multifactorial secondary to hypoalbuminemia, aggressive IVF, anemia  4. Hypernatremia secondary to free water deficit, loop diuretic use. Calculated free water deficit is 3.75 L  5. Hyperkalemia  6. Anemia, normocytic, oncology on board  7. Severe thrombocytopenia    Plan    1. Start Bumex drip at 0.5 mg/h for worsening volume overload, chest x-ray results reviewed consistent with worsening pulmonary edema  2. Increase free water flushes to 75 mL/h  3.  Add potassium chloride 20 mEq twice daily via NG tube  4. Continue to monitor the renal function and urine output closely  5. Obtain anemia panel with a.m. labs      Thank you Dr. Melody Pena for allowing us to participate in care of Lizz Jewell.             Electronically signed by Doreen Lynn MD on 11/26/2020 at 12:31 PM

## 2020-11-26 NOTE — PROGRESS NOTES
Critical Care Team - Daily Progress Note         Date and time: 11/26/2020 3:24 PM  Patient's name:  Yazmin Koehler. Medical Record Number: [id number]  Patient's account/billing number: [de-identified]  Patient's YOB: 1960  Age: 61 y.o. Date of Admission: 11/17/2020  1:13 PM  Length of stay during current admission: 9      Primary Care Physician: Douglas Falcon PA-C  ICU Attending Physician: Dr. Spicer Coma    Code Status: Full Code    Reason for ICU admission: acute hypoxic respiratory failure      SUBJECTIVE:     BRIEF HISTORY:   The patient is a 61 y.o. male with significant past medical history of diabetes, MANOLO, back pain, hypertension, hyperlipidemia, STEMI's status post 5 stents, CAD, aortic aneurysm presenting to the the hospital initially on 11/17. He had a epidural performed yesterday for low back pain and after the procedure became very short of breath. He was satting 80% so was sent to the hospital for further evaluation. While in the ED patient was found to be thrombocytopenic as well as hypoxic on room air. He was placed on 4 L of oxygen but O2 sats improved. CTA of the chest demonstrating pneumonia and pleural effusions. He was given 1 dose of Rocephin and azithromycin while in the ER.     On 11/18 RRT was called at 7:30 AM.  Patient was found to have increased work of breathing as well as hypercapnic on ABGs. He was on BiPAP all night with minimal improvement of his symptoms. During the RT he was switched to AVAPS. He was subsequently brought down to the ICU for further evaluation and care. He was not improving on AVAPS and subsequently intubated     A bronchoscopy was performed on 11/18 and biopsies were sent. OVERNIGHT EVENTS:    11/19/20: Continues to be intubated, no acute events  11/20/20: Attempted weaning trials. Patient on pressure support for a few hours. ABGs worsening. Placed on it back on AC/VC  11/21/20: More rhonchorous today. More secretions. 20; still having moist secretions, gram positive cocci bacteremia   20: continued secretions, complaining of mild abdominal pain today. multiple attempts made to transfer patient per wifes request  20: start diuresis. Wean propofol and add seroquel for agitation. Increase free water. Biopsy results back concerning for small cell lung cancer. Febrile.  febrile re cultured overnight , t max 101, ID consulted, will attempt weaning again today  : continues with fevers, tmax 101.  ID added levaquin yesterday, bronch today    CURRENT VENTILATION STATUS:     [x] Ventilator  [] BIPAP  [] Nasal Cannula [] Room Air        SECRETIONS : Amount:  [] Small [x] Moderate  [] Large  [] None  Color:     [] White [x] Colored  [] Bloody    SEDATION:  RAAS Score:  [] Propofol gtt  [x] Versed gtt  [] Ativan gtt   [] No Sedation    PARALYZED:  [x] No    [] Yes      VASOPRESSORS:  [x] No    [] Yes    If yes -   [] Levophed       [] Dopamine     [] Vasopressin       [] Dobutamine  [] Phenylephrine         [] Epinephrine    CENTRAL LINES:     [x] No   [] Yes   (Date of Insertion:   )           If yes -     [] Right IJ     [] Left IJ [] Right Femoral [] Left Femoral                   [] Right Subclavian [] Left Subclavian       HUFF'S CATHETER:   [] No   [x] Yes  (Date of Insertion:   )     URINE OUTPUT:            [x] Good   [] Low              [] Anuric      OBJECTIVE:     VITAL SIGNS:  /81   Pulse 82   Temp 98.8 °F (37.1 °C) (Bladder)   Resp 19   Ht 5' 11\" (1.803 m)   Wt (!) 319 lb 10.7 oz (145 kg)   SpO2 93%   BMI 44.58 kg/m²   Tmax over 24 hours:  Temp (24hrs), Av.3 °F (37.9 °C), Min:98.8 °F (37.1 °C), Max:102 °F (38.9 °C)      Patient Vitals for the past 6 hrs:   BP Temp Temp src Pulse Resp SpO2   20 1400 126/81 98.8 °F (37.1 °C) Bladder 82 19 93 %   20 1300 92/60 99 °F (37.2 °C) Bladder 76 15 97 %   20 1229 -- -- -- 78 20 97 %   20 1200 127/82 99.3 °F (37.4 °C) Bladder 78 20 97 %   11/26/20 1100 119/78 -- -- 79 20 96 %   11/26/20 1000 (!) 137/90 -- -- 81 20 95 %         Intake/Output Summary (Last 24 hours) at 11/26/2020 1524  Last data filed at 11/26/2020 1400  Gross per 24 hour   Intake 4306.5 ml   Output 3900 ml   Net 406.5 ml     Wt Readings from Last 2 Encounters:   11/26/20 (!) 319 lb 10.7 oz (145 kg)   11/17/20 (!) 314 lb (142.4 kg)     Body mass index is 44.58 kg/m². PHYSICAL EXAMINATION:  General: Intubated   HEENT:  Normocephalic, atraumatic. Pulls equal and reactive no scleral icterus. No conjunctival injection. No nasal discharge. Neck:  Supple, without stridor, no JVD  Heart:  RRR, no murmurs, gallops, rubs  Lungs: Greatly diminished in left upper lobe, rhonchorous throughout the remainder of the lung fields   Abdomen: Obese, bowel sounds present, soft, non-tender, non-distended, no guarding or rigidity. Not tender to palpation  Extremities:  No clubbing, cyanosis,1+ edema bilaterally. Palpable radial and DP pulses b/l upper and lower extremieties  Skin:  Warm and dry, cool  Neuro: Following commands. Cranial nerves II through XII grossly intact.   No focal neurologic deficits  Breast: deferred  Rectal: deferred  Genitalia:  deferred     MEDICATIONS:    Scheduled Meds:   sodium chloride  20 mL Intravenous Once    ampicillin-sulbactam  3 g Intravenous Q6H    methylPREDNISolone  40 mg Intravenous Q12H    sucralfate  1 g Oral 4 times per day    heparin flush  1 mL Intravenous 2 times per day    insulin lispro  0-18 Units Subcutaneous Q6H    nicotine  1 patch Transdermal Daily    acetylcysteine  4 mL Inhalation BID    albuterol  2.5 mg Nebulization Q4H    Arformoterol Tartrate  15 mcg Nebulization BID    atorvastatin  80 mg Oral Nightly    [Held by provider] clopidogrel  75 mg Oral Daily    fluticasone  2 spray Nasal Daily    sodium chloride flush  10 mL Intravenous 2 times per day    [Held by provider] enoxaparin  40 mg Subcutaneous Daily    insulin glargine  0.25 Units/kg Subcutaneous Nightly    chlorhexidine  15 mL Mouth/Throat BID    sodium chloride (PF)  10 mL Intravenous Daily    aspirin  325 mg Oral Daily     Continuous Infusions:   bumetanide 0.1 mg/mL infusion 0.5 mg/hr (11/26/20 1339)    fentaNYL 5 mcg/ml in 0.9%  ml infusion 200 mcg/hr (11/26/20 1334)    propofol Stopped (11/24/20 1140)    dextrose      midazolam 4 mg/hr (11/26/20 0328)     PRN Meds:   acetaminophen, 650 mg, Q4H PRN    Or  acetaminophen, 650 mg, Q4H PRN  sodium chloride flush, 10 mL, PRN  heparin flush, 1 mL, PRN  sodium phosphate IVPB, 0.16 mmol/kg, PRN    Or  sodium phosphate IVPB, 0.32 mmol/kg, PRN  magnesium sulfate, 1 g, PRN  potassium chloride, 20 mEq, PRN  tiZANidine, 4 mg, TID PRN  polyethylene glycol, 17 g, Daily PRN  promethazine, 12.5 mg, Q6H PRN    Or  ondansetron, 4 mg, Q6H PRN  glucose, 15 g, PRN  dextrose, 12.5 g, PRN  glucagon (rDNA), 1 mg, PRN  dextrose, 100 mL/hr, PRN  oxyCODONE-acetaminophen, 1 tablet, TID PRN    And  oxyCODONE, 2.5 mg, TID PRN  albuterol, 2.5 mg, Q4H PRN        VENT SETTINGS (Comprehensive) (if applicable):  Vent Information  $Ventilation: $Subsequent Day  Skin Assessment: Clean, dry, & intact  Equipment ID: [id number]  Equipment Changed: (S) Humidification(water bag)  Vent Type: [id number]  Vent Mode: AC/VC  Vt Ordered: 500 mL  Rate Set: 20 bmp  Peak Flow: 60 L/min  Pressure Support: 0 cmH20  FiO2 : 90 %  SpO2: 93 %  SpO2/FiO2 ratio: 103.33  Sensitivity: 3  PEEP/CPAP: 10  I Time/ I Time %: 0 s  Humidification Source: Heated wire  Humidification Temp: 37  Humidification Temp Measured: 36.8  Circuit Condensation: Drained  Mask Type: Full face mask  Mask Size: Medium  Additional Respiratory  Assessments  Pulse: 82  Resp: 19  SpO2: 93 %  Position: Semi-Jeff's  Humidification Source: Heated wire  Humidification Temp: 37  Circuit Condensation: Drained  Oral Care: Mouth suctioned, Mouth swabbed  Subglottic Suction Done?: Yes  Cuff Pressure (cm H2O): 29 cm H2O    ABGs:   Recent Labs     11/26/20  0506   PH 7.377   PCO2 51.5*   PO2 77.0   HCO3 29.6*   BE 3.8*   O2SAT 94.1       Laboratory findings:    Complete Blood Count:   Recent Labs     11/25/20  0533 11/25/20 2011 11/26/20  0500   WBC 9.3 9.3 8.4   HGB 7.2* 8.1* 7.5*   HCT 25.7* 28.2* 26.0*   PLT 38* 29* 29*        Last 3 Blood Glucose:   Recent Labs     11/25/20  1400 11/25/20 2130 11/26/20  0500   GLUCOSE 182* 206* 228*        PT/INR:    Lab Results   Component Value Date    PROTIME 11.6 11/26/2020    PROTIME 11.1 12/30/2011    INR 1.0 11/26/2020     PTT:    Lab Results   Component Value Date    APTT 29.0 12/28/2011       Comprehensive Metabolic Profile:   Recent Labs     11/25/20  1400 11/25/20 2130 11/26/20  0500   * 148* 149*   K 3.9 3.8 3.8   * 106 106   CO2 31* 32* 29   BUN 84* 90* 95*   CREATININE 1.6* 1.7* 1.6*   GLUCOSE 182* 206* 228*   CALCIUM 8.0* 8.3* 8.2*   PROT  --   --  5.8*   LABALBU  --   --  3.3*   BILITOT  --   --  0.5   ALKPHOS  --   --  149*   AST  --   --  47*   ALT  --   --  38      Magnesium:   Lab Results   Component Value Date    MG 2.9 11/26/2020     Phosphorus:   Lab Results   Component Value Date    PHOS 3.6 11/26/2020     Ionized Calcium:   Lab Results   Component Value Date    CAION 1.33 10/23/2020        Urinalysis:     Troponin:   No results for input(s): TROPONINI in the last 72 hours. Microbiology:  GPC blood   Respiratory culture no growth    Radiology/Imaging:     Chest x-ray:  11/26     Impression    1. Significant worsening of the bilateral airspace disease with new findings    of CHF involving the right lung and left lower lobe and worsening of the left    upper lobe airspace disease. PLAN:     WEAN PER PROTOCOL:  [] No   [x] Yes  [] N/A    DISCONTINUE ANY LABS:   [x] No   [] Yes    ICU PROPHYLAXIS:  Stress ulcer:  [x] PPI Agent  [] [id number] [] Sucralfate  [] Other:  VTE:   [x] Enoxaparin  [] Unfract.  Heparin Subcut  [] EPC Cuffs    NUTRITION:  [] NPO [x] Tube Feeding (Specify: ) [] TPN  [x] PO (Diet: DIET TUBE FEED CONTINUOUS/CYCLIC NPO; Diabetic 1.5; Orogastric; Continuous; 20; 60)    HOME MEDICATIONS RECONCILED: [] No  [x] Yes    INSULIN DRIP:   [x] No   [] Yes    CONSULTATION NEEDED:  [x] No   [] Yes     FAMILY UPDATED:    [] No   [x] Yes    TRANSFER OUT OF ICU:   [x] No   [] Yes    ASSESSMENT:     Neuro   Chronic back pain              -Epidural on 11/17, no hematoma seen on CT lumbar spine      Sedated on the vent              -Versed, fentanyl. off propofol  Added yesterday  Seroquel 100 mg was given on dose , d/c by renal      Respiratory   Acute hypoxic and hypercapnic respiratory failure       -continue full vent support. Pressure support trial              -Follow ABG              -Albuterol, incentive spirometer              -solumedrol decrease to q12   -wean FiO2 as able   -metanebs   -Mucomyst   -bedside L thoracentesis 11/22 with only a small amount of pleural fluid, not amendable to drainage   -somewhat worsened CXR, febrile, postobstructive pna? Bronch today   -bronch showing extrinsic compression from mass, no mucus plug     Community-acquired pneumonia              -Strep and Legionella negative              -Respiratory culture pending              -Pro-Kael 0.23   -switched to unasyn     Lung mass OMER              -Status post bronc 11/18 with TBNA              -No obvious mucous plugging or purulence. Very erythematous and compressed airways, concern for mass              -Cytology concerning for small cell lung cancer.   Heme-onc on board.     Respiratory film array negative  Long, lifetime smoker--nicotine patch     Cardiovascular   CAD/stents              -Plavix is on hold for approximately 7 days per patient he had his epidural.  It has still been on hold in case he needs further procedures and thrombocytopenia     History of aortic aneurysm              -Status post endovascular repair              -Stable on most recent CT     Gastrointestinal   Tube feeding   Abdominal pain- no peritoneal signs. KUB unremarkable     Renal   Nephrology following   Hyperkalemia- resolved  hypophos- resolved    Fluid overload   -lasix per nephro, stop 2/2 isidro? Hypernatremia   -5.6L free water deficit   -increase free water   -nephro following    ISIDRO   -nephro following and recs appreciated   -stop lasix? -urine studies     Infectious Disease   ID consulted for persistent fevers   Community-acquired pneumonia   -recultured              -respiratory cx growing candida, light growth   -switched to unasyn         Blood cultures gram positive cocci   -coag neg staph. Most likely contaminant. Stop vanco.    Blood and urine cultures pending     Hematology/Oncology   Thrombocytopenia              - received platelets x1              -heme/onc following   -most llikely 2/2 neoplasm, drugs and infection   -Transfuse for Hgb <7, platelets <88, today    Small cell lung cancer   -biopsy results   -heme/onc and pulmonology for further work up and recommendations   -not inpatient emergent chemo candidate   -rad/onc consults, MRI brain when stable    ultrasound- clot in right axillary. No anticoagulation 2/2 thrombocytopenia. Consider CTA and vasc consult?     Endocrine   Diabetes              -Noninsulin-dependent              -Monitor sugars   -Increase to high-dose sliding scale    msk   Chronic back pain              -Status post epidural on 11/17              -No erythematous region or fluctuance              -CT with no evidence of epidural hematoma     Social/Spiritual/DNR/Other   Full code    Multiple attempts at transferring patient to outside facility. Denied by [facility]. [facility] do not accept his insurance. Attempting transfer to [facility]. Dispo: continue ICU level of care  Electronically signed by Elsy Kwan DO on 11/26/2020 at 3:24 PM     I personally saw, examined and provided care for the patient.  Radiographs, labs and medication list were reviewed by me independently. I spoke with bedside nursing, therapists and consultants. Critical care services and times documented are independent of procedures and multidisciplinary rounds with Residents. Additionally comprehensive, multidisciplinary rounds were conducted with the MICU team. The case was discussed in detail and plans for care were established. Review of Residents documentation was conducted and revisions were made as appropriate. I agree with the above documented exam, problem list and plan of care. Bronchoscopy unrevealing, left upper lobe is obstructed by tumor airways clear   Abx for post obstructive pna   Diuresis per nephrology   Adjust vent as tolerated   Ivelisse Torres M.D.    Pulmonary/Critical Care Medicine   37 min cct excluding procedures

## 2020-11-26 NOTE — PLAN OF CARE
Problem: Falls - Risk of:  Goal: Will remain free from falls  Description: Will remain free from falls  Outcome: Met This Shift  Goal: Absence of physical injury  Description: Absence of physical injury  Outcome: Met This Shift     Problem: Skin Integrity:  Goal: Will show no infection signs and symptoms  Description: Will show no infection signs and symptoms  Outcome: Met This Shift  Goal: Absence of new skin breakdown  Description: Absence of new skin breakdown  Outcome: Met This Shift     Problem: Pain:  Goal: Pain level will decrease  Description: Pain level will decrease  Outcome: Met This Shift  Goal: Control of acute pain  Description: Control of acute pain  Outcome: Met This Shift     Problem: Gas Exchange - Impaired  Goal: Able to breathe comfortably  Description: Able to breathe comfortably  Outcome: Met This Shift     Problem: Fluid and Electrolyte Imbalance  Goal: Fluid and electrolyte balance are achieved/maintained  Outcome: Met This Shift     Problem: HH FLUID RETENTION-CHF  Goal: Absence of fluid overload signs and symptoms  Outcome: Met This Shift     Problem: HEMODYNAMIC STATUS  Goal: Hemoglobin within specified parameters  Outcome: Met This Shift     Problem: Bleeding - Risk of  Goal: Absence of active bleeding  Outcome: Met This Shift     Problem: Restraint Use - Nonviolent/Non-Self-Destructive Behavior:  Goal: Absence of restraint-related injury  Description: Absence of restraint-related injury  Outcome: Met This Shift

## 2020-11-26 NOTE — PROGRESS NOTES
Pt. Resting quietly in bed. Opens eyes when name called. Pt. Is able to follow simple commands. VSS  Remains on the cooling blanket temp 99.9 at this time.

## 2020-11-26 NOTE — PROGRESS NOTES
[facility]  Hematology/Oncology  Consult      Patient Name: Merlin Major. YOB: 1960  PCP: Raghav Osorio PA-C   Referring Provider:      Reason for Consultation:   Chief Complaint   Patient presents with    Shortness of Breath     patient sent from surgery after having epideral injection L4-L5. SOB has been for past two weeks     Subjective: Remains intubated and back in ICU    History of Present Illness: This is a 62 yo male patient with a past medical history of CAD, DM type 2, morbid obesity, chronic back pain, HTN, AAA s/p repair who set to the emergency room by Dr. Jay Parekh for complaints of shortness of breath and decreased O2 during an epidural procedure. Patient was having an L4/L5 epidural for pain management done by Dr. Jay Parekh when his O2 dropped into the 80's during the procedure. Patient also reported having worsening shortness of breath over the past two weeks with a productive cough. Patient is basically bed bound only taking occasional steps. CTA of the chest, A/P showed new confluent opacities located in left upper lobe suggesting pneumonia, atelectasis, or neoplasm. Patchy airspace opacities located in lingula suggesting pneumonia with areas of atelectasis. Stenosis and occlusion are associated with left upper lobe segmental bronchi and lingular segmental bronchi. Moderate to large left pleural effusion. Mediastinal lymphadenopathy. Stable fusiform infrarenal abdominal aortic aneurysm with endograft repair. Aneurysm measures up to 6.4 cm. No evidence of endoleak or retroperitoneal fluid. He was given 1 dose of Rocephin and azithromycin while in the ER. CBC since admission has shown anemia and thrombocytopenia. 11/18 an RRT was called and was subsequently intubated. He also had a bronchoscopy done and bxs of left upper lobe were sent.        Diagnostic Data:     Past Medical History:   Diagnosis Date    Anticoagulant long-term use     Arthritis     CAD (coronary artery disease)     Chronic back pain     Depression     Dyslipidemia     Hiatal hernia 1979    History of ST elevation myocardial infarction (STEMI)     Hyperlipidemia     Hypertension     Low back pain     Lumbar radiculopathy 8/14/2019    Obesity     MANOLO on CPAP     Presence of stent in left circumflex coronary artery     Presence of stent in right coronary artery     Smoker     Type 2 diabetes mellitus without complication [facility])        Patient Active Problem List    Diagnosis Date Noted    Acute respiratory failure with hypoxia ([address] 11/17/2020    Community acquired pneumonia of left upper lobe of lung 11/17/2020    Thrombocytopenia ([address] 11/17/2020    Spinal stenosis of lumbar region with neurogenic claudication 10/29/2020    Obesity     Chronic back pain     Lumbosacral spondylosis without myelopathy 12/11/2019    Lumbar radiculopathy 08/14/2019    AAA (abdominal aortic aneurysm) ([address] 06/28/2019    Chronic bilateral low back pain with bilateral sciatica 06/11/2019    Right hip pain 06/11/2019    DDD (degenerative disc disease), lumbar 06/11/2019    Status post total right knee replacement 11/26/2018    Morbid obesity with BMI of 40.0-44.9, adult ([address] 11/26/2018    Chronic pain syndrome 07/27/2018    Chronic pain of right knee 07/27/2018    Non-insulin dependent type 2 diabetes mellitus ([address] 03/16/2018    Aortic valve stenosis 02/15/2018    Primary osteoarthritis of right knee 02/15/2018    Presence of stent in left circumflex coronary artery     Smoker     CAD (coronary artery disease) 12/28/2011    HTN (hypertension) 12/28/2011    Hyperlipemia 12/28/2011        Past Surgical History:   Procedure Laterality Date    ABDOMINAL AORTIC ANEURYSM REPAIR, ENDOVASCULAR N/A 6/28/2019    ENDOVASCULAR REPAIR ABDOMINAL AORTIC ANEURYSM performed by Fercho Dallas MD at [address] Bilateral 3/12/2020    BILATERAL L3,L4,L5 MEDIAL BRANCH NERVE BLOCK performed by Kanika Gutierrez DO at [address] Bilateral 6/11/2020    BILATERAL L3,L4,L5 MEDIAL NERVE BRANCH BLOCK #2 performed by Kanika Gutierrez DO at [address] [place] [address] N/A 11/18/2020    BRONCHOSCOPY/TRANSBRONCHIAL NEEDLE BIOPSY performed by Raiza Ovalle MD at [address] [place] [address]  12/30/2011    Dr. Ame Ochoa 1st OM 3.0 x 20 Ion    DIAGNOSTIC CARDIAC CATH LAB PROCEDURE  3/15/2005    [facility]. Mild to moderate CAD. Normal LV.  DIAGNOSTIC CARDIAC CATH LAB PROCEDURE  1/16/2007    [facility]. Cath/PTCA/DE stent of proximal and distal RCA.  DIAGNOSTIC CARDIAC CATH LAB PROCEDURE  2/21/2007    Cath/DE stent of proximal OM1 and proximal Cx.  DIAGNOSTIC CARDIAC CATH LAB PROCEDURE  12/30/2011    ANURADHA of mid OM branch of circumflex.  ECHO COMPL W DOP COLOR FLOW  12/30/2011         HC INSERT PICC CATH, 5/> YRS - MIDLINE  11/23/2020         HERNIA REPAIR  2/2014    laparoscopic ventral hernia repain    JOINT REPLACEMENT Left 4/1/14    TKA-ed    JOINT REPLACEMENT Right 2018    KNEE    PAIN MANAGEMENT PROCEDURE Right 9/1/2020    RIGHT L3,4,5 MEDIAL BRANCH RADIOFREQUENCY ABLATION performed by Kanika Gutierrez DO at [facility] N/A 11/17/2020    L4-5 EPIDURAL STEROID INJECTION #1 performed by Kanika Gutierrez DO at [address] [facility] OFFICE/OUTPT VISIT,PROCEDURE ONLY Right 11/26/2018    RIGHT KNEE TOTAL ARTHROPLASTY performed by Laraine Harada, DO at [facility]       Family History  Family History   Problem Relation Age of Onset    Diabetes Mother     Stroke Mother     Diabetes Father     No Known Problems Sister        Social History    TOBACCO:   reports that he has been smoking cigarettes. He started smoking about 42 years ago. He has a 39.00 pack-year smoking history. He has never used smokeless tobacco.  ETOH:   reports no history of alcohol use.     Home Medications  Prior to Admission medications    Medication Sig Start Date End Date Taking? Authorizing Provider   loratadine (CLARITIN) 10 MG capsule Take 10 mg by mouth daily   Yes Historical Provider, MD   pioglitazone (ACTOS) 15 MG tablet TAKE 1 TABLET BY MOUTH EVERY DAY 11/6/20  Yes Adriana Lincoln PA-C   pregabalin (LYRICA) 150 MG capsule Take 1 capsule by mouth 2 times daily for 30 days. 11/12/20 12/12/20 Yes Shannan Serrano DO   diclofenac (VOLTAREN) 75 MG EC tablet TAKE 1 TABLET BY MOUTH TWICE A DAY 10/7/20  Yes Historical Provider, MD   tiZANidine (ZANAFLEX) 4 MG tablet Take 1 tablet by mouth 3 times daily as needed (spasms) 10/29/20 11/28/20 Yes Shannan Serrano DO   oxyCODONE-acetaminophen (PERCOCET) 7.5-325 MG per tablet Take 1 tablet by mouth 3 times daily as needed for Pain for up to 30 days.  Intended supply: 30 days 11/12/20 12/12/20 Yes Shannan Serrano DO   quinapril (ACCUPRIL) 10 MG tablet Take 1 tablet by mouth daily  Patient taking differently: Take 10 mg by mouth daily Wife states on hold 10/16/20  Yes Adriana Lincoln PA-C   metFORMIN (GLUCOPHAGE) 500 MG tablet TAKE 1 TABLET BY MOUTH TWICE A DAY WITH MEALS  Patient taking differently: Wife states on hold 10/8/20  Yes Dexter Machuca DO   fluticasone (FLONASE) 50 MCG/ACT nasal spray 2 sprays by Nasal route daily 10/6/20  Yes Adriana Lincoln PA-C   omega-3 acid ethyl esters (LOVAZA) 1 g capsule take 1 capsule twice a day 8/14/20  Yes Adriana Lincoln PA-C   niacin (SLO-NIACIN) 500 MG extended release tablet take 1 tablet by mouth once daily 7/20/20  Yes Dexter Machuca DO   clopidogrel (PLAVIX) 75 MG tablet TAKE 1 TABLET BY MOUTH EVERY DAY  Patient taking differently: Take 75 mg by mouth daily Has been on hold for procedure 7/6/20  Yes Adriana Lincoln PA-C   atorvastatin (LIPITOR) 80 MG tablet TAKE 1 TABLET BY MOUTH AT BEDTIME 10/22/18  Yes Adriana Lincoln PA-C   carvedilol (COREG) 25 MG tablet TAKE ONE TABLET BY MOUTH TWICE DAILY (WITH MEALS) 10/22/18  Yes Collette Epps PA-C   sildenafil (VIAGRA) 50 MG tablet Take 1 tablet by mouth as needed for Erectile Dysfunction 7/6/20   Collette Epps PA-C   aspirin 325 MG EC tablet TAKE 1 TABLET BY MOUTH EVERY DAY  Patient taking differently: Take 325 mg by mouth daily Has been on hold for procedure 5/7/20   [address], DO   Tens Unit MISC by Does not apply route 5/7/19   TRUDY Sullivan       Allergies  Allergies   Allergen Reactions    Bee Venom Anaphylaxis       Review of Systems: patient is intubated and sedated and not responsive to questions. Objective  /78   Pulse 84   Temp 100.2 °F (37.9 °C) (Bladder)   Resp 20   Ht 5' 11\" (1.803 m)   Wt (!) 319 lb 10.7 oz (145 kg)   SpO2 96%   BMI 44.58 kg/m²     Physical Exam:   Performance Status:  Head and neck: PERRLA, EOMI . Sclera non icteric. Oropharynx: Clear  Neck: no JVD,  no adenopathy  Heart: Regular rate and regular rhythm, no murmur  Lungs:Disffuse inspiratory and expiratory rhonchi   Extremities: No edema,no cyanosis, no clubbing. Abdomen: Soft, non-tender;no masses, no organomegaly  Skin: No rash. Neurologic:Cranial nerves grossly intact. No focal motor or sensory deficits .     Recent Laboratory Data-   Lab Results   Component Value Date    WBC 8.4 11/26/2020    HGB 7.5 (L) 11/26/2020    HCT 26.0 (L) 11/26/2020    MCV 93.9 11/26/2020    PLT 29 (L) 11/26/2020    LYMPHOPCT 23.0 11/26/2020    RBC 2.77 (L) 11/26/2020    MCH 27.1 11/26/2020    MCHC 28.8 (L) 11/26/2020    RDW 16.3 (H) 11/26/2020    NEUTOPHILPCT 69.0 11/26/2020    MONOPCT 8.0 11/26/2020    BASOPCT 0.0 11/26/2020    NEUTROABS 5.80 11/26/2020    LYMPHSABS 1.93 11/26/2020    MONOSABS 0.67 11/26/2020    EOSABS 0.00 (L) 11/26/2020    BASOSABS 0.00 11/26/2020       Lab Results   Component Value Date     (H) 11/26/2020    K 3.8 11/26/2020     11/26/2020    CO2 29 11/26/2020    BUN 95 (H) 11/26/2020    CREATININE 1.6 (H) 11/26/2020    GLUCOSE 228 (H) 11/26/2020 CALCIUM 8.2 (L) 11/26/2020    PROT 5.8 (L) 11/26/2020    LABALBU 3.3 (L) 11/26/2020    BILITOT 0.5 11/26/2020    ALKPHOS 149 (H) 11/26/2020    AST 47 (H) 11/26/2020    ALT 38 11/26/2020    LABGLOM 44 11/26/2020    GFRAA 53 11/26/2020       No results found for: IRON, TIBC, FERRITIN        Radiology-    Xr Abdomen (kub) (single Ap View)    Result Date: 11/23/2020  EXAMINATION: ONE SUPINE XRAY VIEW(S) OF THE ABDOMEN 11/23/2020 9:38 am COMPARISON: None HISTORY: ORDERING SYSTEM PROVIDED HISTORY: abdominal pain TECHNOLOGIST PROVIDED HISTORY: Reason for exam:->abdominal pain FINDINGS: There is a nonobstructive bowel gas pattern. There are no abnormal air-fluid levels. There are no calculi overlying the renal shadows. There is an NG tube within the stomach. There is a stent graft seen within the abdominal aorta. 1. There is no bowel obstruction, ileus or free air. Ct Lumbar Spine Wo Contrast    Result Date: 11/19/2020  EXAMINATION: CT OF THE LUMBAR SPINE WITHOUT CONTRAST  11/18/2020 TECHNIQUE: CT of the lumbar spine was performed without the administration of intravenous contrast. Multiplanar reformatted images are provided for review. Dose modulation, iterative reconstruction, and/or weight based adjustment of the mA/kV was utilized to reduce the radiation dose to as low as reasonably achievable. COMPARISON: 11/17/2020 HISTORY: ORDERING SYSTEM PROVIDED HISTORY: exclude epidural hematoma TECHNOLOGIST PROVIDED HISTORY: Reason for exam:->exclude epidural hematoma Chronic back pain, recent epidural placement FINDINGS: BONES/ALIGNMENT: Vertebral body heights are preserved without evidence for lumbar fracture. However, there is irregular lucency and sclerosis in the upper sacral ala bilaterally. There is minimal retrolisthesis at L2-L3. Alignment is otherwise normal. DEGENERATIVE CHANGES: There is disc space narrowing and vacuum disc phenomenon at L2-L3. Disc space heights are otherwise preserved.   There is diffuse facet arthropathy. SOFT TISSUES/RETROPERITONEUM: No evidence for epidural hematoma is identified. 1. No evidence for epidural hematoma on CT scan. 2. Suspected sacral insufficiency fracture. Xr Chest Portable    Result Date: 11/23/2020  EXAMINATION: ONE XRAY VIEW OF THE CHEST 11/23/2020 7:08 am COMPARISON: 11/20/2020 HISTORY: ORDERING SYSTEM PROVIDED HISTORY: resp failure TECHNOLOGIST PROVIDED HISTORY: Reason for exam:->resp failure FINDINGS: There is no interval change in the persistent dense consolidation within the left upper lobe. The left lower lobe is clear. The right lung is clear. There is minimal atelectasis seen within the right lung base. The cardiac silhouette is within normals. 1. No interval change in the dense consolidation seen within the left upper lobe. 2. Minimal right lung base atelectasis. Xr Chest Portable    Result Date: 11/22/2020  EXAMINATION: ONE XRAY VIEW OF THE CHEST 11/22/2020 6:28 am COMPARISON: 11/21/2020 HISTORY: ORDERING SYSTEM PROVIDED HISTORY: resp failure TECHNOLOGIST PROVIDED HISTORY: Reason for exam:->resp failure FINDINGS: The endotracheal tube is stable. The enteric tube tip is not well visualized but may be at the gastroesophageal junction. There is stable left upper lobe dense opacity. There are continued patchy opacities in the left lower lobe. There may be small pleural effusions. No pneumothorax is seen. No significant change in dense airspace opacity of the left upper lobe and patchy left lower lobe airspace opacities. Enteric tube tip is not well visualized due to underpenetration. The tip may be at the gastroesophageal junction. This could be confirmed with KUB centered on the upper abdomen.     Xr Chest Portable    Result Date: 11/21/2020  EXAMINATION: ONE XRAY VIEW OF THE CHEST 11/21/2020 7:37 am COMPARISON: 11/20/2020 HISTORY: ORDERING SYSTEM PROVIDED HISTORY: resp failure TECHNOLOGIST PROVIDED HISTORY: Reason for exam:->resp failure FINDINGS: Endotracheal tube and nasogastric tube are unchanged. Large opacity in the left upper lobe is unchanged. There is additional airspace disease in the left lower lobe which is stable. There is no pneumothorax. Small pleural effusions are not excluded. Unchanged large opacity/consolidation in left upper lobe. Unchanged airspace disease in left lower lobe. Xr Chest Portable    Result Date: 11/20/2020  EXAMINATION: ONE XRAY VIEW OF THE CHEST 11/20/2020 6:33 am COMPARISON: 11/19/2020 HISTORY: ORDERING SYSTEM PROVIDED HISTORY: resp failure TECHNOLOGIST PROVIDED HISTORY: Reason for exam:->resp failure FINDINGS: Endotracheal and enteric tubes remain in place. There has been no appreciable change in opacities throughout the left lung, most pronounced in the left apex. The right lung is clear. The heart size is at the upper limits of normal.  There is no discernible pneumothorax. Grossly stable opacities on the left. Xr Chest Portable    Result Date: 11/19/2020  EXAMINATION: ONE XRAY VIEW OF THE CHEST 11/19/2020 6:23 am COMPARISON: 11/18/2020 HISTORY: ORDERING SYSTEM PROVIDED HISTORY: resp failure TECHNOLOGIST PROVIDED HISTORY: Reason for exam:->resp failure FINDINGS: Evaluation is limited by the patient's body habitus and the portable technique. The right lung apex was partially excluded. There is increased dense consolidation in the left upper lobe. There is also medial left basilar airspace opacity obscuring the hemidiaphragm. The heart size is mildly enlarged. There is no discernible pneumothorax. Endotracheal and enteric tubes are again seen. Increasing multifocal left lung pneumonia. Xr Chest Portable    Result Date: 11/18/2020  EXAMINATION: ONE XRAY VIEW OF THE CHEST 11/18/2020 6:39 am COMPARISON: CT chest November 17, 2020. HISTORY: ORDERING SYSTEM PROVIDED HISTORY: SOB TECHNOLOGIST PROVIDED HISTORY: Reason for exam:->SOB FINDINGS: The cardiac silhouette is within normal limits in size. There is masslike consolidation of the left upper lobe. There is a small to moderate left dependent pleural effusion. There is no visible pneumothorax. The pulmonary vessels are within normal limits. Left upper lobe masslike consolidation. Small to moderate left pleural effusion. Xr Chest 1 View    Result Date: 11/18/2020  EXAMINATION: ONE XRAY VIEW OF THE CHEST 11/18/2020 9:06 am COMPARISON: 11/18/2020 HISTORY: ORDERING SYSTEM PROVIDED HISTORY: intubation TECHNOLOGIST PROVIDED HISTORY: Reason for exam:->intubation Reason for exam:->portable FINDINGS: Compared to the chest radiograph from earlier today, 6:44 a.m., there is interval placement of an endotracheal tube, the tip of which is approximately 4.1 cm above the korina. Nasogastric tube is present but is suboptimally visualized due to motion artifact and relative underpenetration of the study. The abdominal radiograph demonstrates it to be well positioned, with the tip in the upper abdomen. Prominent masslike consolidative opacity in the upper left lung are grossly stable. No pneumothorax is seen. Layering left pleural effusion. 1.  The life-support lines and tubes are well positioned. 2. Masslike consolidative opacity in the left upper lung. Small left effusion. Cta Chest W Contrast    Result Date: 11/17/2020  EXAMINATION: CTA OF THE CHEST 11/17/2020 3:18 pm TECHNIQUE: CTA of the chest was performed after the administration of intravenous contrast.  Multiplanar reformatted images are provided for review. MIP images are provided for review. Dose modulation, iterative reconstruction, and/or weight based adjustment of the mA/kV was utilized to reduce the radiation dose to as low as reasonably achievable.; CTA of the abdomen and pelvis was performed with the administration of intravenous contrast. Multiplanar reformatted images are provided for review. MIP images are provided for review.  Dose modulation, iterative reconstruction, and/or weight based adjustment of the mA/kV was utilized to reduce the radiation dose to as low as reasonably achievable. COMPARISON: None. HISTORY: ORDERING SYSTEM PROVIDED HISTORY: aneurysm, cp, sob TECHNOLOGIST PROVIDED HISTORY: Reason for exam:->aneurysm, cp, sob FINDINGS: CTA chest: There are confluent opacities located in left upper lobe. Patchy airspace opacities located in lingula. There is moderate to large left pleural effusion. Peribronchial thickening extensor in left hilar location into the left lung base. There is subsegmental atelectasis in posterior right lower lobe with adjacent ground-glass opacities. There is pulmonary vascular congestion. The heart is mildly enlarged. There is mediastinal lymphadenopathy. Ascending thoracic aorta measures 3.7 cm in diameter. Descending thoracic aorta measures 3 cm in diameter. No evidence of thoracic aortic aneurysm or dissection. Distal descending thoracic aorta is tortuous. CTA abdomen/pelvis: There is evidence of endograft repair involving the abdominal aorta. There is redemonstration of fusiform abdominal aortic aneurysm measuring up to 6.4 cm. This finding is stable since prior. No evidence of endoleak. No retroperitoneal fluid collections. Iliac limbs of endograft appear patent. Common iliac arteries are tortuous. Common femoral arteries are patent. Numerous diverticula associated with the left colon and sigmoid colon. No evidence of acute diverticulitis. Liver, gallbladder, pancreas, and spleen are grossly unremarkable however there is motion artifact limiting this examination. There is no hydronephrosis. Cyst associated with the right kidney is stable since previous examination as well as cyst associated with the left kidney appearing stable since prior. Appendix is visualized and normal.    1.  New confluent opacities located in left upper lobe suggesting pneumonia, atelectasis, or neoplasm.  2.  Patchy airspace opacities located in lingula suggesting pneumonia with areas of atelectasis. 3.  Stenosis and occlusion are associated with left upper lobe segmental bronchi and lingular segmental bronchi. 4.  Moderate to large left pleural effusion. 5.  Mediastinal lymphadenopathy. 6.  Stable fusiform infrarenal abdominal aortic aneurysm with endograft repair. Aneurysm measures up to 6.4 cm. No evidence of endoleak or retroperitoneal fluid. 7.  Diverticulosis. Cta Abdomen Pelvis W Contrast    Result Date: 11/17/2020  EXAMINATION: CTA OF THE CHEST 11/17/2020 3:18 pm TECHNIQUE: CTA of the chest was performed after the administration of intravenous contrast.  Multiplanar reformatted images are provided for review. MIP images are provided for review. Dose modulation, iterative reconstruction, and/or weight based adjustment of the mA/kV was utilized to reduce the radiation dose to as low as reasonably achievable.; CTA of the abdomen and pelvis was performed with the administration of intravenous contrast. Multiplanar reformatted images are provided for review. MIP images are provided for review. Dose modulation, iterative reconstruction, and/or weight based adjustment of the mA/kV was utilized to reduce the radiation dose to as low as reasonably achievable. COMPARISON: None. HISTORY: ORDERING SYSTEM PROVIDED HISTORY: aneurysm, cp, sob TECHNOLOGIST PROVIDED HISTORY: Reason for exam:->aneurysm, cp, sob FINDINGS: CTA chest: There are confluent opacities located in left upper lobe. Patchy airspace opacities located in lingula. There is moderate to large left pleural effusion. Peribronchial thickening extensor in left hilar location into the left lung base. There is subsegmental atelectasis in posterior right lower lobe with adjacent ground-glass opacities. There is pulmonary vascular congestion. The heart is mildly enlarged. There is mediastinal lymphadenopathy. Ascending thoracic aorta measures 3.7 cm in diameter.  Descending thoracic aorta measures 3 tip of tube Portable? ->Yes FINDINGS: Tip of the NG tube noted at the level of the diaphragm, no significant change. The right side and the inferior part of the abdomen is not included in the study. There is opacification of the visualized left upper lung. Tip of NG tube at the level of the left hemidiaphragm, no change. Xr Chest Abdomen Ng Placement    Result Date: 11/22/2020  EXAMINATION: ONE SUPINE XRAY VIEW(S) OF THE ABDOMEN 11/22/2020 12:52 pm COMPARISON: November 18. HISTORY: ORDERING SYSTEM PROVIDED HISTORY: OG placement TECHNOLOGIST PROVIDED HISTORY: Reason for exam:->OG placement FINDINGS: Nasogastric tube is present. Side hole appears to be at level of gastroesophageal junction. This tube could be advanced approximately 4 or 5 cm. Nasogastric tube is present with side hole at level of gastroesophageal junction. This tube could be advanced approximately 4 or 5 cm. Xr Chest Abdomen Ng Placement    Result Date: 11/18/2020  EXAMINATION: ONE SUPINE XRAY VIEW(S) OF THE ABDOMEN 11/18/2020 9:07 am COMPARISON: 11/18/2020, about 2 hours earlier HISTORY: ORDERING SYSTEM PROVIDED HISTORY: NGT TECHNOLOGIST PROVIDED HISTORY: Reason for exam:->NGT Reason for exam:->portable NG tube placement FINDINGS: There is an NG tube with the tip in the stomach. An ET tube is again noted in satisfactory position. There is extensive opacity in the left lung, most likely pneumonia. Increased markings also seen in the visualized portion of the right lung. The heart is enlarged. NG tube tip in the stomach. Fluoro For Surgical Procedures    Result Date: 11/17/2020  EXAMINATION: SPOT FLUOROSCOPIC IMAGES 11/17/2020 12:03 pm TECHNIQUE: Fluoroscopy was provided by the radiology department for procedure. Radiologist was not present during examination.  FLUOROSCOPY DOSE AND TYPE OR TIME AND EXPOSURES: Total dose:26.99 mGy COMPARISON: None HISTORY: ORDERING SYSTEM PROVIDED HISTORY: Pain management TECHNOLOGIST PROVIDED HISTORY: Reason for exam:->L4-5 Epidural steroid injection #1 Intraprocedural imaging. FINDINGS: 3 intraoperative fluoroscopic images were obtained during L4-5 epidural steroid injection. Intraprocedural fluoroscopic spot images as above. See separate procedure report for more information. ASSESSMENT/PLAN :    60 yo male  CAD  HTN  AAA s/p repair  DM type 2  Morbid obesity  Chronic back pain  Thrombocytopenia, Anemia   OMER opacity s/p biopsy    - S/p bronch with cytology pending  - OMER PNA vs neoplasm  - On rocpehin and levaquin   - CBC showing Hgb 7.8 with MCV 94, Platelets 39  - Will follow cyto  - Cytopenias likely due to combination of PNA and abc, with myelosuppression. Thrombocytopenia has continued to slowly progress. CBC was WNL on 10/6/20, suggesting an acute process rather than a primary bone marrow process  - Transfuse for Hgb <7, platelets <46  - Smear showed subtle toxic granulation of neutrophils  - Check iron profile and B12/folate    11/24/20  - [facility] bronch bx pathology:  DIAGNOSIS   POSITIVE FOR MALIGNANT CELLS   Carcinoma, most compatible with small cell carcinoma, see comment. Monolayer shows atypical crushed cells, and many lymphocytes/histiocytes   with anthracotic pigment. Cell block shows abundant crushed malignant cells. COMMENT:   Immunostains stain the malignant cells as follows:   Pankeratin and TTF1:  Positive   Chromogranin:  Positive weakly   Synaptophysin:  Negative   CK7:  Negative   This immunoprofile is most compatible with small cell carcinoma. 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 His CT scan of abdomen and pelvis did not show evidence of intra-abdominal metastasis.   He has multiple comorbid conditions including obesity, coronary artery disease, obstructive sleep apnea, obesity, type 2 diabetes mellitus and he presently has pneumonia with hypoxia and respiratory failure and remains intubated and sedated. 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   - Today's Hgb 7.2 Plts 38   - Patient still intubated but hopefully will be able to extubated  - Once stable, MRI brain to complete staging   - If he improves clinically and has improvement in ECOG, he will be considered for systemic therapy  - L pleural effusions likely malignant  - ID following, now on unasyn  - Given his current ECOG and comorbidities along with persistent fevers and abx requirements, not eligible for inpatient emergent chemotherapy. Will get rad onc on board as inpatient urgent palliative XRT could be considered to improved respiratory status if he has improvement in above    11/26/20  - Remains intubated, back in ICU  - CBC with further drop in platelets to 29. Likely combination of myelosuppression from acute illness and abx exposure  - If he has improvement in his clinic course, eventually will need MRI brain as above  - Therapy plan as above. He will need significant improvement clinically prior to discussion regarding chemotherapy. Rad onc consult pending, again will likely need improvement prior to proceeding with therapy. Cultures have no grown a source, but patient remains febrile (possibly drug fever and ID following and managing abx)  - Will follow.  Further recs pending clinical course      Nancy Jensen MD  Electronically signed 11/26/2020 at 9:58 AM

## 2020-11-26 NOTE — PROGRESS NOTES
Tranferred from [address] [place] [address] back to icu in stable condition. Will monitor.

## 2020-11-26 NOTE — PROGRESS NOTES
[id number] [place] [address] [facility]  [facility]  Progress Note    SUBJECTIVE:  Chief Complaint   Patient presents with    Shortness of Breath     patient sent from surgery after having epideral injection L4-L5. SOB has been for past two weeks     The patient is still intubated and sedated. He is still in the ICU. Tolerating antibiotics. Still having fevers. Review of systems:  As stated above in the chief complaint, otherwise negative.     Medications:  Scheduled Meds:   furosemide  40 mg Intravenous BID    sodium chloride  20 mL Intravenous Once    ampicillin-sulbactam  3 g Intravenous Q6H    methylPREDNISolone  40 mg Intravenous Q12H    albumin human  25 g Intravenous BID    sucralfate  1 g Oral 4 times per day    heparin flush  1 mL Intravenous 2 times per day    insulin lispro  0-18 Units Subcutaneous Q6H    nicotine  1 patch Transdermal Daily    acetylcysteine  4 mL Inhalation BID    albuterol  2.5 mg Nebulization Q4H    Arformoterol Tartrate  15 mcg Nebulization BID    atorvastatin  80 mg Oral Nightly    [Held by provider] clopidogrel  75 mg Oral Daily    fluticasone  2 spray Nasal Daily    sodium chloride flush  10 mL Intravenous 2 times per day    [Held by provider] enoxaparin  40 mg Subcutaneous Daily    insulin glargine  0.25 Units/kg Subcutaneous Nightly    chlorhexidine  15 mL Mouth/Throat BID    sodium chloride (PF)  10 mL Intravenous Daily    aspirin  325 mg Oral Daily     Continuous Infusions:   fentaNYL 5 mcg/ml in 0.9%  ml infusion 200 mcg/hr (11/26/20 1610)    propofol Stopped (11/24/20 1140)    dextrose      midazolam 4 mg/hr (11/26/20 0328)     PRN Meds:acetaminophen **OR** acetaminophen, sodium chloride flush, heparin flush, sodium phosphate IVPB **OR** sodium phosphate IVPB, magnesium sulfate, potassium chloride, tiZANidine, polyethylene glycol, promethazine **OR** ondansetron, glucose, dextrose, glucagon (rDNA), dextrose, oxyCODONE-acetaminophen **AND** oxyCODONE, albuterol    OBJECTIVE:  /75   Pulse 81   Temp 100.4 °F (38 °C) (Bladder)   Resp 20   Ht 5' 11\" (1.803 m)   Wt (!) 319 lb 10.7 oz (145 kg)   SpO2 95%   BMI 44.58 kg/m²   Temp  Av °F (38.3 °C)  Min: 98.9 °F (37.2 °C)  Max: 102 °F (38.9 °C)  Constitutional: The patient is lying in bed in the ICU. He is intubated and sedated. Morbidly obese. FiO2 up to 100%. PEEP 10. Skin: Warm and dry. No rashes were noted. HEENT: Round and reactive pupils. Moist mucous membranes. No ulcerations or thrush. ET tube. OG tube. Neck: Supple to movements. Chest: No use of accessory muscles to breathe. Symmetrical expansion. No wheezing, crackles or rhonchi. Cardiovascular: S1 and S2 are rhythmic and regular. No murmurs appreciated. Abdomen: Positive bowel sounds to auscultation. Benign to palpation. Extremities: No clubbing, no cyanosis, no edema. Lines: Left midline 2020. Black catheter.     Laboratory and Tests Review:  Lab Results   Component Value Date    WBC 8.4 2020    WBC 9.3 2020    WBC 9.3 2020    HGB 7.5 (L) 2020    HCT 26.0 (L) 2020    MCV 93.9 2020    PLT 29 (L) 2020     Lab Results   Component Value Date    NEUTROABS 5.80 2020    NEUTROABS 6.44 2020    NEUTROABS 7.57 (H) 2020     No results found for: [facility]  Lab Results   Component Value Date    ALT 38 2020    AST 47 (H) 2020    ALKPHOS 149 (H) 2020    BILITOT 0.5 2020     Lab Results   Component Value Date     2020    K 3.8 2020    K 5.1 2020     2020    CO2 29 2020    BUN 95 2020    CREATININE 1.6 2020    CREATININE 1.7 2020    CREATININE 1.6 2020    GFRAA 53 2020    LABGLOM 44 2020    GLUCOSE 228 2020    GLUCOSE 120 2011    PROT 5.8 2020    LABALBU 3.3 2020    LABALBU 4.5 2011    CALCIUM 8.2 2020    BILITOT 0.5

## 2020-11-26 NOTE — PROGRESS NOTES
[address] brought patient belongings of sweat pant/flannel jacket, grey t shirt, blue underwear and shoes and dentures to room.

## 2020-11-26 NOTE — PROCEDURES
Bronchoscopy Inpatient Procedure Note    Date of Procedure: 11/26/2020    Pre-op Diagnosis: left upper lobe opacity    Post-op Diagnosis: patent airways     Bronchoscopist: BETHANY JACOBSON      Anesthesia: versed gtt     Procedure: Flexible fiberoptic bronchoscopy    Estimated Blood Loss: none     Complications: None    Indications and History    (Please see today's progress notes for the latest issues,  physical exam and lab data)    Consent to Procedure  The risks, benefits, complications, treatment options and expected outcomes were discussed with the patient and/or POA  The possibilities of reaction to medication, pulmonary aspiration, perforation of a viscus, bleeding, failure to diagnose a condition and creating a complication requiring transfusion or operation were discussed with the patient who freely signed the consent. Description of Procedure  The patient was intubated on mechanical ventilation and placed on 100% oxygen. Carol Ann Weaver was monitored by the Critical Nursing and Respiratory therapy staff and the standard ICU monitoring devices. Patricio Jacinto and the procedure were verified as Flexible Fiberoptic Bronchoscopy. A Time Out was held and the above information confirmed. The patient was placed in the appropriate position. The bronchoscope was then passed into the trachea via the ET tube. Lidocaine 2% solution 2 ml at a time was applied topically to the korina. After careful inspection of the tracheal, the bronchoscope was sequentially passed into all segments of the left and right endobronchial trees to the second and/or third divisions. The left upper lobe was significantly narrowed with abnormal erythematous mucosa. There is extrinsic compression from tumor. There was no mucous plugging and airways were patent to at least 75%. Caryn Yepez M.D.    Pulmonary/Critical Care Medicine

## 2020-11-27 NOTE — CARE COORDINATION
COVID negative 11/17. Vent/ intubated since 11/18- FIO2 60% , PEEP 12. Bumex drip stopped. On iv abx. Bronch from 11/24 showed + malignancy- oncology following- per note needs improvement before initiating chemotherapy. Full code. [facility] following(back door)- will need to discuss further discharge planning when pt stabilizes.   Ira

## 2020-11-27 NOTE — PROGRESS NOTES
patient SP02 dropped to 84% when doing metaneb, treatment stopped and aerosols done in aerogen through vent.

## 2020-11-27 NOTE — PROGRESS NOTES
[facility]  Hematology/Oncology  Consult      Patient Name: Liana Neil. YOB: 1960  PCP: Uriel Crawford PA-C   Referring Provider:      Reason for Consultation:   Chief Complaint   Patient presents with    Shortness of Breath     patient sent from surgery after having epideral injection L4-L5. SOB has been for past two weeks     Subjective: Remains intubated and in ICU    History of Present Illness: This is a 60 yo male patient with a past medical history of CAD, DM type 2, morbid obesity, chronic back pain, HTN, AAA s/p repair who set to the emergency room by Dr. Jazmin De Leon for complaints of shortness of breath and decreased O2 during an epidural procedure. Patient was having an L4/L5 epidural for pain management done by Dr. Jazmin De Leon when his O2 dropped into the 80's during the procedure. Patient also reported having worsening shortness of breath over the past two weeks with a productive cough. Patient is basically bed bound only taking occasional steps. CTA of the chest, A/P showed new confluent opacities located in left upper lobe suggesting pneumonia, atelectasis, or neoplasm. Patchy airspace opacities located in lingula suggesting pneumonia with areas of atelectasis. Stenosis and occlusion are associated with left upper lobe segmental bronchi and lingular segmental bronchi. Moderate to large left pleural effusion. Mediastinal lymphadenopathy. Stable fusiform infrarenal abdominal aortic aneurysm with endograft repair. Aneurysm measures up to 6.4 cm. No evidence of endoleak or retroperitoneal fluid. He was given 1 dose of Rocephin and azithromycin while in the ER. CBC since admission has shown anemia and thrombocytopenia. 11/18 an RRT was called and was subsequently intubated. He also had a bronchoscopy done and bxs of left upper lobe were sent.        Diagnostic Data:     Past Medical History:   Diagnosis Date    Anticoagulant long-term use     Arthritis     CAD (coronary artery disease)     Chronic back pain     Depression     Dyslipidemia     Hiatal hernia 1979    History of ST elevation myocardial infarction (STEMI)     Hyperlipidemia     Hypertension     Low back pain     Lumbar radiculopathy 8/14/2019    Obesity     MANOLO on CPAP     Presence of stent in left circumflex coronary artery     Presence of stent in right coronary artery     Smoker     Type 2 diabetes mellitus without complication [facility])        Patient Active Problem List    Diagnosis Date Noted    Acute respiratory failure with hypoxia ([address] 11/17/2020    Community acquired pneumonia of left upper lobe of lung 11/17/2020    Thrombocytopenia ([address] 11/17/2020    Spinal stenosis of lumbar region with neurogenic claudication 10/29/2020    Obesity     Chronic back pain     Lumbosacral spondylosis without myelopathy 12/11/2019    Lumbar radiculopathy 08/14/2019    AAA (abdominal aortic aneurysm) ([address] 06/28/2019    Chronic bilateral low back pain with bilateral sciatica 06/11/2019    Right hip pain 06/11/2019    DDD (degenerative disc disease), lumbar 06/11/2019    Status post total right knee replacement 11/26/2018    Morbid obesity with BMI of 40.0-44.9, adult ([address] 11/26/2018    Chronic pain syndrome 07/27/2018    Chronic pain of right knee 07/27/2018    Non-insulin dependent type 2 diabetes mellitus ([address] 03/16/2018    Aortic valve stenosis 02/15/2018    Primary osteoarthritis of right knee 02/15/2018    Presence of stent in left circumflex coronary artery     Smoker     CAD (coronary artery disease) 12/28/2011    HTN (hypertension) 12/28/2011    Hyperlipemia 12/28/2011        Past Surgical History:   Procedure Laterality Date    ABDOMINAL AORTIC ANEURYSM REPAIR, ENDOVASCULAR N/A 6/28/2019    ENDOVASCULAR REPAIR ABDOMINAL AORTIC ANEURYSM performed by Verna Velazquez MD at [address] [place] [address],[address] Bilateral 3/12/2020    BILATERAL L3,L4,L5 MEDIAL BRANCH NERVE BLOCK Medications  Prior to Admission medications    Medication Sig Start Date End Date Taking? Authorizing Provider   loratadine (CLARITIN) 10 MG capsule Take 10 mg by mouth daily   Yes Historical Provider, MD   pioglitazone (ACTOS) 15 MG tablet TAKE 1 TABLET BY MOUTH EVERY DAY 11/6/20  Yes Malia Medina PA-C   pregabalin (LYRICA) 150 MG capsule Take 1 capsule by mouth 2 times daily for 30 days. 11/12/20 12/12/20 Yes Linus Adams DO   diclofenac (VOLTAREN) 75 MG EC tablet TAKE 1 TABLET BY MOUTH TWICE A DAY 10/7/20  Yes Historical Provider, MD   tiZANidine (ZANAFLEX) 4 MG tablet Take 1 tablet by mouth 3 times daily as needed (spasms) 10/29/20 11/28/20 Yes Linus Adams DO   oxyCODONE-acetaminophen (PERCOCET) 7.5-325 MG per tablet Take 1 tablet by mouth 3 times daily as needed for Pain for up to 30 days.  Intended supply: 30 days 11/12/20 12/12/20 Yes Linus Adams DO   quinapril (ACCUPRIL) 10 MG tablet Take 1 tablet by mouth daily  Patient taking differently: Take 10 mg by mouth daily Wife states on hold 10/16/20  Yes Malia Medina PA-C   metFORMIN (GLUCOPHAGE) 500 MG tablet TAKE 1 TABLET BY MOUTH TWICE A DAY WITH MEALS  Patient taking differently: Wife states on hold 10/8/20  Yes Kiesha Denny DO   fluticasone (FLONASE) 50 MCG/ACT nasal spray 2 sprays by Nasal route daily 10/6/20  Yes Malia Medina PA-C   omega-3 acid ethyl esters (LOVAZA) 1 g capsule take 1 capsule twice a day 8/14/20  Yes Malia Medina PA-C   niacin (SLO-NIACIN) 500 MG extended release tablet take 1 tablet by mouth once daily 7/20/20  Yes Kiesha Denny DO   clopidogrel (PLAVIX) 75 MG tablet TAKE 1 TABLET BY MOUTH EVERY DAY  Patient taking differently: Take 75 mg by mouth daily Has been on hold for procedure 7/6/20  Yes Malia Medina PA-C   atorvastatin (LIPITOR) 80 MG tablet TAKE 1 TABLET BY MOUTH AT BEDTIME 10/22/18  Yes Malia Medina PA-C   carvedilol (COREG) 25 MG tablet TAKE ONE TABLET BY MOUTH TWICE DAILY (WITH MEALS) 10/22/18  Yes Valdo Mathur PA-C   sildenafil (VIAGRA) 50 MG tablet Take 1 tablet by mouth as needed for Erectile Dysfunction 7/6/20   Valdo Mathur PA-C   aspirin 325 MG EC tablet TAKE 1 TABLET BY MOUTH EVERY DAY  Patient taking differently: Take 325 mg by mouth daily Has been on hold for procedure 5/7/20   [address], DO   Tens Unit MISC by Does not apply route 5/7/19   TRUDY Mast       Allergies  Allergies   Allergen Reactions    Bee Venom Anaphylaxis       Review of Systems: patient is intubated and sedated and not responsive to questions. Objective  /82   Pulse 102   Temp 99.3 °F (37.4 °C) (Bladder)   Resp 19   Ht 5' 11\" (1.803 m)   Wt (!) 319 lb 7.1 oz (144.9 kg)   SpO2 (!) 88%   BMI 44.55 kg/m²     Physical Exam:   Performance Status:  Head and neck: PERRLA, EOMI . Sclera non icteric. Oropharynx: Clear  Neck: no JVD,  no adenopathy  Heart: Regular rate and regular rhythm, no murmur  Lungs:Disffuse inspiratory and expiratory rhonchi   Extremities: No edema,no cyanosis, no clubbing. Abdomen: Soft, non-tender;no masses, no organomegaly  Skin: No rash. Neurologic:Cranial nerves grossly intact. No focal motor or sensory deficits .     Recent Laboratory Data-   Lab Results   Component Value Date    WBC 8.2 11/27/2020    HGB 8.1 (L) 11/27/2020    HCT 27.4 (L) 11/27/2020    MCV 92.6 11/27/2020    PLT 33 (L) 11/27/2020    LYMPHOPCT 15.4 (L) 11/27/2020    RBC 2.96 (L) 11/27/2020    MCH 27.4 11/27/2020    MCHC 29.6 (L) 11/27/2020    RDW 16.3 (H) 11/27/2020    NEUTOPHILPCT 78.2 11/27/2020    MONOPCT 5.5 11/27/2020    BASOPCT 0.0 11/27/2020    NEUTROABS 6.40 11/27/2020    LYMPHSABS 1.23 (L) 11/27/2020    MONOSABS 0.49 11/27/2020    EOSABS 0.07 11/27/2020    BASOSABS 0.00 11/27/2020       Lab Results   Component Value Date     (H) 11/27/2020    K 3.3 (L) 11/27/2020     11/27/2020    CO2 35 (H) 11/27/2020     (H) 11/27/2020    CREATININE 1.5 (H) 11/27/2020    GLUCOSE 197 (H) 11/27/2020    CALCIUM 8.7 11/27/2020    PROT 6.0 (L) 11/27/2020    LABALBU 3.5 11/27/2020    BILITOT 0.8 11/27/2020    ALKPHOS 144 (H) 11/27/2020    AST 40 (H) 11/27/2020    ALT 37 11/27/2020    LABGLOM 48 11/27/2020    GFRAA 58 11/27/2020       No results found for: IRON, TIBC, FERRITIN        Radiology-    Xr Abdomen (kub) (single Ap View)    Result Date: 11/23/2020  EXAMINATION: ONE SUPINE XRAY VIEW(S) OF THE ABDOMEN 11/23/2020 9:38 am COMPARISON: None HISTORY: ORDERING SYSTEM PROVIDED HISTORY: abdominal pain TECHNOLOGIST PROVIDED HISTORY: Reason for exam:->abdominal pain FINDINGS: There is a nonobstructive bowel gas pattern. There are no abnormal air-fluid levels. There are no calculi overlying the renal shadows. There is an NG tube within the stomach. There is a stent graft seen within the abdominal aorta. 1. There is no bowel obstruction, ileus or free air. Ct Lumbar Spine Wo Contrast    Result Date: 11/19/2020  EXAMINATION: CT OF THE LUMBAR SPINE WITHOUT CONTRAST  11/18/2020 TECHNIQUE: CT of the lumbar spine was performed without the administration of intravenous contrast. Multiplanar reformatted images are provided for review. Dose modulation, iterative reconstruction, and/or weight based adjustment of the mA/kV was utilized to reduce the radiation dose to as low as reasonably achievable. COMPARISON: 11/17/2020 HISTORY: ORDERING SYSTEM PROVIDED HISTORY: exclude epidural hematoma TECHNOLOGIST PROVIDED HISTORY: Reason for exam:->exclude epidural hematoma Chronic back pain, recent epidural placement FINDINGS: BONES/ALIGNMENT: Vertebral body heights are preserved without evidence for lumbar fracture. However, there is irregular lucency and sclerosis in the upper sacral ala bilaterally. There is minimal retrolisthesis at L2-L3. Alignment is otherwise normal. DEGENERATIVE CHANGES: There is disc space narrowing and vacuum disc phenomenon at L2-L3. Disc space heights are otherwise preserved.   There is diffuse facet arthropathy. SOFT TISSUES/RETROPERITONEUM: No evidence for epidural hematoma is identified. 1. No evidence for epidural hematoma on CT scan. 2. Suspected sacral insufficiency fracture. Xr Chest Portable    Result Date: 11/23/2020  EXAMINATION: ONE XRAY VIEW OF THE CHEST 11/23/2020 7:08 am COMPARISON: 11/20/2020 HISTORY: ORDERING SYSTEM PROVIDED HISTORY: resp failure TECHNOLOGIST PROVIDED HISTORY: Reason for exam:->resp failure FINDINGS: There is no interval change in the persistent dense consolidation within the left upper lobe. The left lower lobe is clear. The right lung is clear. There is minimal atelectasis seen within the right lung base. The cardiac silhouette is within normals. 1. No interval change in the dense consolidation seen within the left upper lobe. 2. Minimal right lung base atelectasis. Xr Chest Portable    Result Date: 11/22/2020  EXAMINATION: ONE XRAY VIEW OF THE CHEST 11/22/2020 6:28 am COMPARISON: 11/21/2020 HISTORY: ORDERING SYSTEM PROVIDED HISTORY: resp failure TECHNOLOGIST PROVIDED HISTORY: Reason for exam:->resp failure FINDINGS: The endotracheal tube is stable. The enteric tube tip is not well visualized but may be at the gastroesophageal junction. There is stable left upper lobe dense opacity. There are continued patchy opacities in the left lower lobe. There may be small pleural effusions. No pneumothorax is seen. No significant change in dense airspace opacity of the left upper lobe and patchy left lower lobe airspace opacities. Enteric tube tip is not well visualized due to underpenetration. The tip may be at the gastroesophageal junction. This could be confirmed with KUB centered on the upper abdomen.     Xr Chest Portable    Result Date: 11/21/2020  EXAMINATION: ONE XRAY VIEW OF THE CHEST 11/21/2020 7:37 am COMPARISON: 11/20/2020 HISTORY: ORDERING SYSTEM PROVIDED HISTORY: resp failure TECHNOLOGIST PROVIDED HISTORY: Reason for exam:->resp failure FINDINGS: Endotracheal tube and nasogastric tube are unchanged. Large opacity in the left upper lobe is unchanged. There is additional airspace disease in the left lower lobe which is stable. There is no pneumothorax. Small pleural effusions are not excluded. Unchanged large opacity/consolidation in left upper lobe. Unchanged airspace disease in left lower lobe. Xr Chest Portable    Result Date: 11/20/2020  EXAMINATION: ONE XRAY VIEW OF THE CHEST 11/20/2020 6:33 am COMPARISON: 11/19/2020 HISTORY: ORDERING SYSTEM PROVIDED HISTORY: resp failure TECHNOLOGIST PROVIDED HISTORY: Reason for exam:->resp failure FINDINGS: Endotracheal and enteric tubes remain in place. There has been no appreciable change in opacities throughout the left lung, most pronounced in the left apex. The right lung is clear. The heart size is at the upper limits of normal.  There is no discernible pneumothorax. Grossly stable opacities on the left. Xr Chest Portable    Result Date: 11/19/2020  EXAMINATION: ONE XRAY VIEW OF THE CHEST 11/19/2020 6:23 am COMPARISON: 11/18/2020 HISTORY: ORDERING SYSTEM PROVIDED HISTORY: resp failure TECHNOLOGIST PROVIDED HISTORY: Reason for exam:->resp failure FINDINGS: Evaluation is limited by the patient's body habitus and the portable technique. The right lung apex was partially excluded. There is increased dense consolidation in the left upper lobe. There is also medial left basilar airspace opacity obscuring the hemidiaphragm. The heart size is mildly enlarged. There is no discernible pneumothorax. Endotracheal and enteric tubes are again seen. Increasing multifocal left lung pneumonia. Xr Chest Portable    Result Date: 11/18/2020  EXAMINATION: ONE XRAY VIEW OF THE CHEST 11/18/2020 6:39 am COMPARISON: CT chest November 17, 2020.  HISTORY: ORDERING SYSTEM PROVIDED HISTORY: SOB TECHNOLOGIST PROVIDED HISTORY: Reason for exam:->SOB FINDINGS: The cardiac silhouette is within normal limits in size. There is masslike consolidation of the left upper lobe. There is a small to moderate left dependent pleural effusion. There is no visible pneumothorax. The pulmonary vessels are within normal limits. Left upper lobe masslike consolidation. Small to moderate left pleural effusion. Xr Chest 1 View    Result Date: 11/18/2020  EXAMINATION: ONE XRAY VIEW OF THE CHEST 11/18/2020 9:06 am COMPARISON: 11/18/2020 HISTORY: ORDERING SYSTEM PROVIDED HISTORY: intubation TECHNOLOGIST PROVIDED HISTORY: Reason for exam:->intubation Reason for exam:->portable FINDINGS: Compared to the chest radiograph from earlier today, 6:44 a.m., there is interval placement of an endotracheal tube, the tip of which is approximately 4.1 cm above the korina. Nasogastric tube is present but is suboptimally visualized due to motion artifact and relative underpenetration of the study. The abdominal radiograph demonstrates it to be well positioned, with the tip in the upper abdomen. Prominent masslike consolidative opacity in the upper left lung are grossly stable. No pneumothorax is seen. Layering left pleural effusion. 1.  The life-support lines and tubes are well positioned. 2. Masslike consolidative opacity in the left upper lung. Small left effusion. Cta Chest W Contrast    Result Date: 11/17/2020  EXAMINATION: CTA OF THE CHEST 11/17/2020 3:18 pm TECHNIQUE: CTA of the chest was performed after the administration of intravenous contrast.  Multiplanar reformatted images are provided for review. MIP images are provided for review. Dose modulation, iterative reconstruction, and/or weight based adjustment of the mA/kV was utilized to reduce the radiation dose to as low as reasonably achievable.; CTA of the abdomen and pelvis was performed with the administration of intravenous contrast. Multiplanar reformatted images are provided for review. MIP images are provided for review.  Dose modulation, iterative reconstruction, and/or weight based adjustment of the mA/kV was utilized to reduce the radiation dose to as low as reasonably achievable. COMPARISON: None. HISTORY: ORDERING SYSTEM PROVIDED HISTORY: aneurysm, cp, sob TECHNOLOGIST PROVIDED HISTORY: Reason for exam:->aneurysm, cp, sob FINDINGS: CTA chest: There are confluent opacities located in left upper lobe. Patchy airspace opacities located in lingula. There is moderate to large left pleural effusion. Peribronchial thickening extensor in left hilar location into the left lung base. There is subsegmental atelectasis in posterior right lower lobe with adjacent ground-glass opacities. There is pulmonary vascular congestion. The heart is mildly enlarged. There is mediastinal lymphadenopathy. Ascending thoracic aorta measures 3.7 cm in diameter. Descending thoracic aorta measures 3 cm in diameter. No evidence of thoracic aortic aneurysm or dissection. Distal descending thoracic aorta is tortuous. CTA abdomen/pelvis: There is evidence of endograft repair involving the abdominal aorta. There is redemonstration of fusiform abdominal aortic aneurysm measuring up to 6.4 cm. This finding is stable since prior. No evidence of endoleak. No retroperitoneal fluid collections. Iliac limbs of endograft appear patent. Common iliac arteries are tortuous. Common femoral arteries are patent. Numerous diverticula associated with the left colon and sigmoid colon. No evidence of acute diverticulitis. Liver, gallbladder, pancreas, and spleen are grossly unremarkable however there is motion artifact limiting this examination. There is no hydronephrosis. Cyst associated with the right kidney is stable since previous examination as well as cyst associated with the left kidney appearing stable since prior. Appendix is visualized and normal.    1.  New confluent opacities located in left upper lobe suggesting pneumonia, atelectasis, or neoplasm.  2.  Patchy airspace opacities located in lingula suggesting pneumonia with areas of atelectasis. 3.  Stenosis and occlusion are associated with left upper lobe segmental bronchi and lingular segmental bronchi. 4.  Moderate to large left pleural effusion. 5.  Mediastinal lymphadenopathy. 6.  Stable fusiform infrarenal abdominal aortic aneurysm with endograft repair. Aneurysm measures up to 6.4 cm. No evidence of endoleak or retroperitoneal fluid. 7.  Diverticulosis. Cta Abdomen Pelvis W Contrast    Result Date: 11/17/2020  EXAMINATION: CTA OF THE CHEST 11/17/2020 3:18 pm TECHNIQUE: CTA of the chest was performed after the administration of intravenous contrast.  Multiplanar reformatted images are provided for review. MIP images are provided for review. Dose modulation, iterative reconstruction, and/or weight based adjustment of the mA/kV was utilized to reduce the radiation dose to as low as reasonably achievable.; CTA of the abdomen and pelvis was performed with the administration of intravenous contrast. Multiplanar reformatted images are provided for review. MIP images are provided for review. Dose modulation, iterative reconstruction, and/or weight based adjustment of the mA/kV was utilized to reduce the radiation dose to as low as reasonably achievable. COMPARISON: None. HISTORY: ORDERING SYSTEM PROVIDED HISTORY: aneurysm, cp, sob TECHNOLOGIST PROVIDED HISTORY: Reason for exam:->aneurysm, cp, sob FINDINGS: CTA chest: There are confluent opacities located in left upper lobe. Patchy airspace opacities located in lingula. There is moderate to large left pleural effusion. Peribronchial thickening extensor in left hilar location into the left lung base. There is subsegmental atelectasis in posterior right lower lobe with adjacent ground-glass opacities. There is pulmonary vascular congestion. The heart is mildly enlarged. There is mediastinal lymphadenopathy. Ascending thoracic aorta measures 3.7 cm in diameter.  Descending thoracic aorta measures 3 cm in diameter. No evidence of thoracic aortic aneurysm or dissection. Distal descending thoracic aorta is tortuous. CTA abdomen/pelvis: There is evidence of endograft repair involving the abdominal aorta. There is redemonstration of fusiform abdominal aortic aneurysm measuring up to 6.4 cm. This finding is stable since prior. No evidence of endoleak. No retroperitoneal fluid collections. Iliac limbs of endograft appear patent. Common iliac arteries are tortuous. Common femoral arteries are patent. Numerous diverticula associated with the left colon and sigmoid colon. No evidence of acute diverticulitis. Liver, gallbladder, pancreas, and spleen are grossly unremarkable however there is motion artifact limiting this examination. There is no hydronephrosis. Cyst associated with the right kidney is stable since previous examination as well as cyst associated with the left kidney appearing stable since prior. Appendix is visualized and normal.    1.  New confluent opacities located in left upper lobe suggesting pneumonia, atelectasis, or neoplasm. 2.  Patchy airspace opacities located in lingula suggesting pneumonia with areas of atelectasis. 3.  Stenosis and occlusion are associated with left upper lobe segmental bronchi and lingular segmental bronchi. 4.  Moderate to large left pleural effusion. 5.  Mediastinal lymphadenopathy. 6.  Stable fusiform infrarenal abdominal aortic aneurysm with endograft repair. Aneurysm measures up to 6.4 cm. No evidence of endoleak or retroperitoneal fluid. 7.  Diverticulosis. Xr Abdomen For Ng/og/ne Tube Placement    Result Date: 11/22/2020  EXAMINATION: ONE SUPINE XRAY VIEW(S) OF THE ABDOMEN 11/22/2020 5:06 pm COMPARISON: November 22, 2020, 4 hours prior.  HISTORY: ORDERING SYSTEM PROVIDED HISTORY: Confirmation of course of NG/OG/NE tube and location of tip of tube TECHNOLOGIST PROVIDED HISTORY: Reason for exam:->Confirmation of course of NG/OG/NE tube and location of tip of tube Portable? ->Yes FINDINGS: Tip of the NG tube noted at the level of the diaphragm, no significant change. The right side and the inferior part of the abdomen is not included in the study. There is opacification of the visualized left upper lung. Tip of NG tube at the level of the left hemidiaphragm, no change. Xr Chest Abdomen Ng Placement    Result Date: 11/22/2020  EXAMINATION: ONE SUPINE XRAY VIEW(S) OF THE ABDOMEN 11/22/2020 12:52 pm COMPARISON: November 18. HISTORY: ORDERING SYSTEM PROVIDED HISTORY: OG placement TECHNOLOGIST PROVIDED HISTORY: Reason for exam:->OG placement FINDINGS: Nasogastric tube is present. Side hole appears to be at level of gastroesophageal junction. This tube could be advanced approximately 4 or 5 cm. Nasogastric tube is present with side hole at level of gastroesophageal junction. This tube could be advanced approximately 4 or 5 cm. Xr Chest Abdomen Ng Placement    Result Date: 11/18/2020  EXAMINATION: ONE SUPINE XRAY VIEW(S) OF THE ABDOMEN 11/18/2020 9:07 am COMPARISON: 11/18/2020, about 2 hours earlier HISTORY: ORDERING SYSTEM PROVIDED HISTORY: NGT TECHNOLOGIST PROVIDED HISTORY: Reason for exam:->NGT Reason for exam:->portable NG tube placement FINDINGS: There is an NG tube with the tip in the stomach. An ET tube is again noted in satisfactory position. There is extensive opacity in the left lung, most likely pneumonia. Increased markings also seen in the visualized portion of the right lung. The heart is enlarged. NG tube tip in the stomach. Fluoro For Surgical Procedures    Result Date: 11/17/2020  EXAMINATION: SPOT FLUOROSCOPIC IMAGES 11/17/2020 12:03 pm TECHNIQUE: Fluoroscopy was provided by the radiology department for procedure. Radiologist was not present during examination.  FLUOROSCOPY DOSE AND TYPE OR TIME AND EXPOSURES: Total dose:26.99 mGy COMPARISON: None HISTORY: ORDERING SYSTEM PROVIDED HISTORY: Pain management TECHNOLOGIST PROVIDED HISTORY: Reason for exam:->L4-5 Epidural steroid injection #1 Intraprocedural imaging. FINDINGS: 3 intraoperative fluoroscopic images were obtained during L4-5 epidural steroid injection. Intraprocedural fluoroscopic spot images as above. See separate procedure report for more information. ASSESSMENT/PLAN :    60 yo male  CAD  HTN  AAA s/p repair  DM type 2  Morbid obesity  Chronic back pain  Thrombocytopenia, Anemia   OMER opacity s/p biopsy    - S/p bronch with cytology pending  - OMER PNA vs neoplasm  - On rocpehin and levaquin   - CBC showing Hgb 7.8 with MCV 94, Platelets 39  - Will follow cyto  - Cytopenias likely due to combination of PNA and abc, with myelosuppression. Thrombocytopenia has continued to slowly progress. CBC was WNL on 10/6/20, suggesting an acute process rather than a primary bone marrow process  - Transfuse for Hgb <7, platelets <90  - Smear showed subtle toxic granulation of neutrophils  - Check iron profile and B12/folate    11/24/20  - [facility] bronch bx pathology:  DIAGNOSIS   POSITIVE FOR MALIGNANT CELLS   Carcinoma, most compatible with small cell carcinoma, see comment. Monolayer shows atypical crushed cells, and many lymphocytes/histiocytes   with anthracotic pigment. Cell block shows abundant crushed malignant cells. COMMENT:   Immunostains stain the malignant cells as follows:   Pankeratin and TTF1:  Positive   Chromogranin:  Positive weakly   Synaptophysin:  Negative   CK7:  Negative   This immunoprofile is most compatible with small cell carcinoma. 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 His CT scan of abdomen and pelvis did not show evidence of intra-abdominal metastasis.   He has multiple comorbid conditions including obesity, coronary artery disease, obstructive sleep apnea, obesity,

## 2020-11-27 NOTE — PATIENT CARE CONFERENCE
.  Intensive Care Daily Quality Rounding Checklist      ICU Team Members:  Dr. Radha Zavaleta, Pily Baptiste & Myles (residents), charge nurse, bedside nurse    ICU Day #: NUMBER: 10    Intubation Date: November 18    Ventilator Day #: NUMBER: 10    Central Line Insertion Date: November 23        Day #: NUMBER: 5     Arterial Line Insertion Date:  n/a      Day #: n/a    DVT Prophylaxis: asa/lovenox and plavix held    GI Prophylaxis: ON DIET    Black Catheter Insertion Date: November 17       Day #: 11      Continued need (if yes, reason documented and discussed with physician): yes, strict I/o intubated/sedated/bumex drip    Skin Issues/ Wounds and ordered treatment discussed on rounds: no issues/abrasions elbows bilaterally    Goals/ Plans for the Day: Daily labs, wean 02, wean sedation, soft restraints to prevent pulling tubes, wean vent

## 2020-11-27 NOTE — PLAN OF CARE
Problem: Falls - Risk of:  Goal: Will remain free from falls  Description: Will remain free from falls  Outcome: Met This Shift  Goal: Absence of physical injury  Description: Absence of physical injury  Outcome: Met This Shift     Problem: Skin Integrity:  Goal: Will show no infection signs and symptoms  Description: Will show no infection signs and symptoms  Outcome: Met This Shift  Goal: Absence of new skin breakdown  Description: Absence of new skin breakdown  Outcome: Met This Shift     Problem: Pain:  Goal: Pain level will decrease  Description: Pain level will decrease  Outcome: Met This Shift  Goal: Control of acute pain  Description: Control of acute pain  Outcome: Met This Shift  Goal: Control of chronic pain  Description: Control of chronic pain  Outcome: Met This Shift     Problem: Gas Exchange - Impaired  Goal: Able to breathe comfortably  Description: Able to breathe comfortably  Outcome: Met This Shift     Problem: Fluid and Electrolyte Imbalance  Goal: Fluid and electrolyte balance are achieved/maintained  Outcome: Met This Shift     Problem: HH FLUID RETENTION-CHF  Goal: Absence of fluid overload signs and symptoms  Outcome: Met This Shift     Problem: HEMODYNAMIC STATUS  Goal: Hemoglobin within specified parameters  Outcome: Met This Shift     Problem: Bleeding - Risk of  Goal: Absence of active bleeding  Outcome: Met This Shift     Problem: MOBILITY  Goal: Mobility/activity is maintained at optimum level for patient  Outcome: Met This Shift     Problem: Restraint Use - Nonviolent/Non-Self-Destructive Behavior:  Goal: Absence of restraint-related injury  Description: Absence of restraint-related injury  Outcome: Met This Shift

## 2020-11-27 NOTE — PROGRESS NOTES
[facility] Progress Note    Admitting Date and Time: 11/17/2020  1:13 PM  Admit Dx: Acute respiratory failure with hypoxia ([address] [J96.01]  Acute respiratory failure with hypoxia (HCC) [J96.01]    Subjective:  Patient is being followed for Acute respiratory failure with hypoxia ([address] [J96.01]  Acute respiratory failure with hypoxia ([address] [J96.01]     Brief history per pulmonary critical care: \"The patient is a 57 y. o. male with significant past medical history of diabetes, MANOLO, back pain, hypertension, hyperlipidemia, STEMI's status post 5 stents, CAD, aortic aneurysm presenting to the the hospital initially on 11/17. [facility] had a epidural performed yesterday for low back pain and after the procedure became very short of breath.  He was satting 80% so was sent to the hospital for further evaluation.  While in the ED patient was found to be thrombocytopenic as well as hypoxic on room air.  He was placed on 4 L of oxygen but O2 sats improved.  CTA of the chest demonstrating pneumonia and pleural effusions.  He was given 1 dose of Rocephin and azithromycin while in the ER.     On 11/18 RRT was called at 7:30 AM. Nellie Landau was found to have increased work of breathing as well as hypercapnic on ABGs.  He was on BiPAP all night with minimal improvement of his symptoms.  During the RT he was switched to [place] was subsequently brought down to the ICU for further evaluation and care. He was not improving on AVAPS and subsequently intubated     A bronchoscopy was performed on 11/18 and biopsies were sent. \"    Pt remains intubated. He does follow with his eyes. Answers yes/no questions. ROS: denies fever, chills, cp, sob, n/v, HA unless stated above.       potassium bicarb-citric acid  20 mEq Per NG tube BID    sodium chloride  20 mL Intravenous Once    ampicillin-sulbactam  3 g Intravenous Q6H    methylPREDNISolone  40 mg Intravenous Q12H    sucralfate  1 g Oral 4 times per day    heparin flush  1 mL Intravenous 2 times per day    insulin lispro  0-18 Units Subcutaneous Q6H    nicotine  1 patch Transdermal Daily    acetylcysteine  4 mL Inhalation BID    albuterol  2.5 mg Nebulization Q4H    Arformoterol Tartrate  15 mcg Nebulization BID    atorvastatin  80 mg Oral Nightly    [Held by provider] clopidogrel  75 mg Oral Daily    fluticasone  2 spray Nasal Daily    sodium chloride flush  10 mL Intravenous 2 times per day    [Held by provider] enoxaparin  40 mg Subcutaneous Daily    insulin glargine  0.25 Units/kg Subcutaneous Nightly    chlorhexidine  15 mL Mouth/Throat BID    sodium chloride (PF)  10 mL Intravenous Daily    aspirin  325 mg Oral Daily     acetaminophen, 650 mg, Q4H PRN    Or  acetaminophen, 650 mg, Q4H PRN  sodium chloride flush, 10 mL, PRN  heparin flush, 1 mL, PRN  sodium phosphate IVPB, 0.16 mmol/kg, PRN    Or  sodium phosphate IVPB, 0.32 mmol/kg, PRN  magnesium sulfate, 1 g, PRN  potassium chloride, 20 mEq, PRN  tiZANidine, 4 mg, TID PRN  polyethylene glycol, 17 g, Daily PRN  promethazine, 12.5 mg, Q6H PRN    Or  ondansetron, 4 mg, Q6H PRN  glucose, 15 g, PRN  dextrose, 12.5 g, PRN  glucagon (rDNA), 1 mg, PRN  dextrose, 100 mL/hr, PRN  oxyCODONE-acetaminophen, 1 tablet, TID PRN    And  oxyCODONE, 2.5 mg, TID PRN  albuterol, 2.5 mg, Q4H PRN         Objective:    /80   Pulse 89   Temp 99.3 °F (37.4 °C) (Bladder)   Resp 19   Ht 5' 11\" (1.803 m)   Wt (!) 319 lb 7.1 oz (144.9 kg)   SpO2 (!) 88%   BMI 44.55 kg/m²     General Appearance: alert and in no acute distress  Skin: cool and dry, especially the extremities  Head: normocephalic and atraumatic  Eyes: pupils equal, round, and reactive to light, extraocular eye movements intact, conjunctivae normal  Neck: neck supple and non tender without mass   Pulmonary/Chest: clear to auscultation bilaterally- no wheezes, rales or rhonchi, normal air movement, no respiratory distress  Cardiovascular: normal rate, normal S1 and S2 and no carotid bruits  Abdomen: soft, non-tender, non-distended, normal bowel sounds, no masses or organomegaly  Extremities: no cyanosis, no clubbing and no edema        Recent Labs     11/26/20  1510 11/26/20  2150 11/27/20  0535   * 151* 152*   K 3.4* 2.9* 3.3*    104 104   CO2 33* 34* 35*   * 103* 106*   CREATININE 1.4* 1.8* 1.5*   GLUCOSE 225* 175* 197*   CALCIUM 8.5* 8.6 8.7       Recent Labs     11/25/20 2011 11/26/20  0500 11/27/20  0535   WBC 9.3 8.4 8.2   RBC 3.02* 2.77* 2.96*   HGB 8.1* 7.5* 8.1*   HCT 28.2* 26.0* 27.4*   MCV 93.4 93.9 92.6   MCH 26.8 27.1 27.4   MCHC 28.7* 28.8* 29.6*   RDW 16.5* 16.3* 16.3*   PLT 29* 29* 33*   MPV 11.1 NOT CALC 12.0         Assessment:    Active Problems:    CAD (coronary artery disease)    Smoker    Non-insulin dependent type 2 diabetes mellitus ([facility]    Morbid obesity with BMI of 40.0-44.9, adult (HCC)    Chronic back pain    Acute respiratory failure with hypoxia ([facility]    Community acquired pneumonia of left upper lobe of lung    Thrombocytopenia ([facility]  Resolved Problems:    * No resolved hospital problems. *      Plan:  1. Acute hypoxic resp failure due to CAP  -  Left sided pneumonia on admission  -  Full vent support  -  Pulm/cc following  -  Solumedrol now at q12 h  -  Nebs  -  Thoracentesis performed 11/22/20 with only small amount of pleural fluid  -  Respiratory cultures pending; 24 hours no growth  -  Bronchoscopy performed on 11/18/20    2. CAP  -  Continue antibiotics; currently on unasyn  - strep and legionella negative  - procalcitonin 0.23  -  resp culture neg x 24 hours  -  ID was consulted  -  ?drug induced fevers-cefepime discontinued as result    3. Hx CAD with stent placement  -  plavix of currently on hold  -  Hx aortic aneurysm s/p endovascular repair    4. Hypoalbuminemia  -  With anasarca/volume overload  -  Renal continues to follow  -  IV lasix  -  Albumin per renal    5.   Hypernatremia-2/2 free water deficits; slight worsening  -  Renal following  -  Free water flushes    6. Left lung CA-with pleural effusion  -  Hem/onc and pulmonology following  -  Rad/onc consulted by pulm. 7.  NIDDM- continue lantus and ISS    8. Chronic back pain, s/p epidural injection 11/17/20; CT spine neg for hematoma    9. Essential hypertension-  BB currently on hold due to low BP    10. Mixed hyperlipidemia with hx CAD  -  Continue statin    11. Suspect COPD with MANOLO  -  Morbid Obesity and 39 pack year smoking history  -  Patient will require sleep studies and PFTs as outpatient    12. LUE DVT-partially occlusive  -   Hematology on board  -  Cannot anticoagulate due to low platelets    NOTE: This report was transcribed using voice recognition software. Every effort was made to ensure accuracy; however, inadvertent computerized transcription errors may be present.   Electronically signed by Imer Gustafson MD on 11/27/2020 at 8:18 AM

## 2020-11-27 NOTE — PROGRESS NOTES
(rDNA), dextrose, oxyCODONE-acetaminophen **AND** oxyCODONE, albuterol    OBJECTIVE:  /80   Pulse 89   Temp 99.3 °F (37.4 °C) (Bladder)   Resp 19   Ht 5' 11\" (1.803 m)   Wt (!) 319 lb 7.1 oz (144.9 kg)   SpO2 (!) 88%   BMI 44.55 kg/m²   Temp  Av.2 °F (37.3 °C)  Min: 98.6 °F (37 °C)  Max: 100 °F (37.8 °C)  Constitutional: The patient is lying in bed in the ICU. He is intubated and sedated. Opens eyes. Shakes and nods head. No distress. Morbidly obese. FiO2 down to 60%. PEEP 10. Skin: Warm and dry. No rashes were noted. HEENT: Round and reactive pupils. Moist mucous membranes. No ulcerations or thrush. ET tube. OG tube. Neck: Supple to movements. Chest: No use of accessory muscles to breathe. Symmetrical expansion. No wheezing, crackles or rhonchi. Cardiovascular: Heart sounds rhythmic and regular. Abdomen: Large, round and nondistended. Positive bowel sounds to auscultation. Benign to palpation. Extremities: Minimal edema. Lines: Left midline 2020. Black catheter.     Laboratory and Tests Review:  Lab Results   Component Value Date    WBC 8.2 2020    WBC 8.4 2020    WBC 9.3 2020    HGB 8.1 (L) 2020    HCT 27.4 (L) 2020    MCV 92.6 2020    PLT 33 (L) 2020     Lab Results   Component Value Date    NEUTROABS 6.40 2020    NEUTROABS 5.80 2020    NEUTROABS 6.44 2020     No results found for: [facility]  Lab Results   Component Value Date    ALT 37 2020    AST 40 (H) 2020    ALKPHOS 144 (H) 2020    BILITOT 0.8 2020     Lab Results   Component Value Date     2020    K 3.3 2020    K 5.1 2020     2020    CO2 35 2020     2020    CREATININE 1.5 2020    CREATININE 1.8 2020    CREATININE 1.4 2020    GFRAA 58 2020    LABGLOM 48 2020    GLUCOSE 197 2020    GLUCOSE 120 2011    PROT 6.0 2020    LABALBU 3.5 Scalpel Size: 15 blade

## 2020-11-27 NOTE — PROGRESS NOTES
Nephrology Consult Note    Patient's Name: Iggy Mcdaniels  3:47 PM  11/27/2020    Nephrologist: Dr. Jovon Gar MD    Reason for Consult: Renal is consulted for volume management  Requesting Physician:  Dr. Raegan Rizvi    Chief Complaint:  SOB    History Obtained From: EMR as the patient is intubated, sedated and no family member is present today    Sub: Patient seen and examined bedside in the ICU, he is on 60% FiO2. UO is excellent  He is awake today    Past Medical History:   Diagnosis Date    Anticoagulant long-term use     Arthritis     CAD (coronary artery disease)     Chronic back pain     Depression     Dyslipidemia     Hiatal hernia 1979    History of ST elevation myocardial infarction (STEMI)     Hyperlipidemia     Hypertension     Low back pain     Lumbar radiculopathy 8/14/2019    Obesity     MANOLO on CPAP     Presence of stent in left circumflex coronary artery     Presence of stent in right coronary artery     Smoker     Type 2 diabetes mellitus without complication (HCC)        Past Surgical History:   Procedure Laterality Date    ABDOMINAL AORTIC ANEURYSM REPAIR, ENDOVASCULAR N/A 6/28/2019    ENDOVASCULAR REPAIR ABDOMINAL AORTIC ANEURYSM performed by Nya Sanchez MD at [address] [place] [address] Bilateral 3/12/2020    BILATERAL L3,L4,L5 MEDIAL BRANCH NERVE BLOCK performed by Inez Nicole DO at [address] [place] [address] Bilateral 6/11/2020    BILATERAL L3,L4,L5 MEDIAL NERVE BRANCH BLOCK #2 performed by Inez Nicole DO at [address] [place] [address] N/A 11/18/2020    BRONCHOSCOPY/TRANSBRONCHIAL NEEDLE BIOPSY performed by Chaparro Rahman MD at [address] [place] [address] PLACEMENT  X4    CORONARY ANGIOPLASTY WITH STENT PLACEMENT  12/30/2011    Dr. Jessenia Senior 1st OM 3.0 x 20 Ion    DIAGNOSTIC CARDIAC CATH LAB PROCEDURE  3/15/2005    [facility]. Mild to moderate CAD. Normal LV.  DIAGNOSTIC CARDIAC CATH LAB PROCEDURE  1/16/2007    [facility]. Cath/PTCA/DE stent of proximal and distal RCA.  DIAGNOSTIC CARDIAC CATH LAB PROCEDURE  2/21/2007    Cath/DE stent of proximal OM1 and proximal Cx.  DIAGNOSTIC CARDIAC CATH LAB PROCEDURE  12/30/2011    ANURADHA of mid OM branch of circumflex.  ECHO COMPL W DOP COLOR FLOW  12/30/2011         HC INSERT PICC CATH, 5/> YRS - MIDLINE  11/23/2020         HERNIA REPAIR  2/2014    laparoscopic ventral hernia repain    JOINT REPLACEMENT Left 4/1/14    TKA-ed    JOINT REPLACEMENT Right 2018    KNEE    PAIN MANAGEMENT PROCEDURE Right 9/1/2020    RIGHT L3,4,5 MEDIAL BRANCH RADIOFREQUENCY ABLATION performed by Rissa Kiser DO at [address] [place] [address] N/A 11/17/2020    L4-5 EPIDURAL STEROID INJECTION #1 performed by Rissa Kiser DO at [place] [address] OFFICE/OUTPT VISIT,PROCEDURE ONLY Right 11/26/2018    RIGHT KNEE TOTAL ARTHROPLASTY performed by Ginger Rivera DO at [facility]       Family History   Problem Relation Age of Onset    Diabetes Mother     Stroke Mother     Diabetes Father     No Known Problems Sister         reports that he has been smoking cigarettes. He started smoking about 42 years ago. He has a 39.00 pack-year smoking history. He has never used smokeless tobacco. He reports that he does not drink alcohol or use drugs.     Allergies:  Bee venom    Current Medications:    potassium chloride 10 mEq/100 mL IVPB (Peripheral Line), PRN  pantoprazole (PROTONIX) injection 40 mg, Daily    And  sodium chloride (PF) 0.9 % injection 10 mL, Daily  potassium bicarb-citric acid (EFFER-K) effervescent tablet 40 mEq, BID  0.9 % sodium chloride bolus, Once  ampicillin-sulbactam (UNASYN) 3 g ivpb minibag, Q6H  acetaminophen (TYLENOL) tablet 650 mg, Q4H PRN    Or  acetaminophen (TYLENOL) suppository 650 mg, Q4H PRN  methylPREDNISolone sodium (SOLU-MEDROL) injection 40 mg, Q12H  sucralfate (CARAFATE) tablet 1 g, 4 times per day  fentaNYL 5 mcg/ml in 0.9%  ml infusion, lb 7.1 oz (144.9 kg)   SpO2 91%   BMI 44.55 kg/m²        Patient seen and examined bedside , he is intubated and awake, FiO2 requirement is at 50%, no acute distress  Skin: no rash, turgor wnl  Heent:  eomi, mmm  Neck: no bruits or jvd noted  Cardiovascular:  S1, S2 without m/r/g  Respiratory: CTA B without w/r/r, decreased breath sounds in bases  Abdomen:  +bs, soft, nt, Black catheter draining clear urine  Ext: 3+ lower extremity edema  Psychiatric: mood and affect appropriate  Musculoskeletal:  Rom, muscular strength intact    Data:   Labs:  CBC:   Lab Results   Component Value Date    WBC 8.2 11/27/2020    RBC 2.96 11/27/2020    HGB 8.1 11/27/2020    HCT 27.4 11/27/2020    MCV 92.6 11/27/2020    MCH 27.4 11/27/2020    MCHC 29.6 11/27/2020    RDW 16.3 11/27/2020    PLT 33 11/27/2020    MPV 12.0 11/27/2020     CMP:    Lab Results   Component Value Date     11/27/2020    K 3.3 11/27/2020    K 5.1 11/17/2020     11/27/2020    CO2 35 11/27/2020     11/27/2020    CREATININE 1.5 11/27/2020    GFRAA 58 11/27/2020    LABGLOM 48 11/27/2020    GLUCOSE 197 11/27/2020    GLUCOSE 120 12/31/2011    PROT 6.0 11/27/2020    LABALBU 3.5 11/27/2020    LABALBU 4.5 12/28/2011    CALCIUM 8.7 11/27/2020    BILITOT 0.8 11/27/2020    ALKPHOS 144 11/27/2020    AST 40 11/27/2020    ALT 37 11/27/2020     BMP:    Lab Results   Component Value Date     11/27/2020    K 3.3 11/27/2020    K 5.1 11/17/2020     11/27/2020    CO2 35 11/27/2020     11/27/2020    LABALBU 3.5 11/27/2020    LABALBU 4.5 12/28/2011    CREATININE 1.5 11/27/2020    CALCIUM 8.7 11/27/2020    GFRAA 58 11/27/2020    LABGLOM 48 11/27/2020    GLUCOSE 197 11/27/2020    GLUCOSE 120 12/31/2011     Calcium:    Lab Results   Component Value Date    CALCIUM 8.7 11/27/2020     Phosphorus:    Lab Results   Component Value Date    PHOS 3.6 11/27/2020     Uric Acid:  No results found for: URICACID  U/A:    Lab Results   Component Value Date NITRU Negative 11/24/2020    COLORU Yellow 11/24/2020    PHUR 5.5 11/24/2020    WBCUA 2-5 11/24/2020    RBCUA >20 11/24/2020    MUCUS Present 11/24/2020    BACTERIA MODERATE 11/24/2020    CLARITYU CLOUDY 11/24/2020    SPECGRAV 1.025 11/24/2020    LEUKOCYTESUR Negative 11/24/2020    UROBILINOGEN 0.2 11/24/2020    BILIRUBINUR Negative 11/24/2020    BLOODU LARGE 11/24/2020    GLUCOSEU Negative 11/24/2020    KETUA Negative 11/24/2020    AMORPHOUS MANY 11/24/2020        Imaging:  Xr Abdomen (kub) (single Ap View)    Result Date: 11/23/2020  EXAMINATION: ONE SUPINE XRAY VIEW(S) OF THE ABDOMEN 11/23/2020 9:38 am COMPARISON: None HISTORY: ORDERING SYSTEM PROVIDED HISTORY: abdominal pain TECHNOLOGIST PROVIDED HISTORY: Reason for exam:->abdominal pain FINDINGS: There is a nonobstructive bowel gas pattern. There are no abnormal air-fluid levels. There are no calculi overlying the renal shadows. There is an NG tube within the stomach. There is a stent graft seen within the abdominal aorta. 1. There is no bowel obstruction, ileus or free air. Ct Lumbar Spine Wo Contrast    Result Date: 11/19/2020  EXAMINATION: CT OF THE LUMBAR SPINE WITHOUT CONTRAST  11/18/2020 TECHNIQUE: CT of the lumbar spine was performed without the administration of intravenous contrast. Multiplanar reformatted images are provided for review. Dose modulation, iterative reconstruction, and/or weight based adjustment of the mA/kV was utilized to reduce the radiation dose to as low as reasonably achievable. COMPARISON: 11/17/2020 HISTORY: ORDERING SYSTEM PROVIDED HISTORY: exclude epidural hematoma TECHNOLOGIST PROVIDED HISTORY: Reason for exam:->exclude epidural hematoma Chronic back pain, recent epidural placement FINDINGS: BONES/ALIGNMENT: Vertebral body heights are preserved without evidence for lumbar fracture. However, there is irregular lucency and sclerosis in the upper sacral ala bilaterally. There is minimal retrolisthesis at L2-L3. Alignment is otherwise normal. DEGENERATIVE CHANGES: There is disc space narrowing and vacuum disc phenomenon at L2-L3. Disc space heights are otherwise preserved. There is diffuse facet arthropathy. SOFT TISSUES/RETROPERITONEUM: No evidence for epidural hematoma is identified. 1. No evidence for epidural hematoma on CT scan. 2. Suspected sacral insufficiency fracture. Xr Chest Portable    Result Date: 11/24/2020  EXAMINATION: ONE XRAY VIEW OF THE CHEST 11/24/2020 6:36 am COMPARISON: 11/23/2020 HISTORY: ORDERING SYSTEM PROVIDED HISTORY: resp failure TECHNOLOGIST PROVIDED HISTORY: Reason for exam:->resp failure FINDINGS: The heart is borderline enlarged. Dense opacity remains in the mid and upper left lung unchanged. Moderate amount of opacity present in the lower right lung. Small amount of opacity present in the left lung base favored to be due to atelectasis. No abnormal pulmonary vascular congestion. ET tube and NG tube unchanged. Persistent dense opacity in the upper left lung unchanged. Moderate opacity in the right lung base worrisome for pneumonia. Probable mild left basilar atelectasis. No abnormal vascular congestion. Xr Chest Portable    Result Date: 11/23/2020  EXAMINATION: ONE XRAY VIEW OF THE CHEST 11/23/2020 4:54 pm COMPARISON: Multiple prior chest series with the most recent dated November 23, 2020 at [address] [facility] [address]: ETT adjusted TECHNOLOGIST PROVIDED HISTORY: Reason for exam:->ETT adjusted Reason for exam:->ETT advanced to 28 cm FINDINGS: Medical support devices: Endotracheal tube terminates in the mid to distal thoracic trachea. Enteric tube extends subdiaphragmatic and off the field of view. Lungs: Stable left upper lung complete opacification. Persistent right lower lung opacity. Slight elevation of the right hemidiaphragm is stable. Left lower lung not imaged.  Cardiomediastinal silhouette and pulmonary vascularity: There appears to be atherosclerotic disease and prominence of the cardiac silhouette. Osseous and soft tissue structures: No acute findings. 1.  Unchanged left upper lung complete opacification. 2.  Elevation of the right hemidiaphragm and right lower lung opacity. 3.  Medical support devices as above. Xr Chest Portable    Result Date: 11/23/2020  EXAMINATION: ONE XRAY VIEW OF THE CHEST 11/23/2020 7:08 am COMPARISON: 11/20/2020 HISTORY: ORDERING SYSTEM PROVIDED HISTORY: resp failure TECHNOLOGIST PROVIDED HISTORY: Reason for exam:->resp failure FINDINGS: There is no interval change in the persistent dense consolidation within the left upper lobe. The left lower lobe is clear. The right lung is clear. There is minimal atelectasis seen within the right lung base. The cardiac silhouette is within normals. 1. No interval change in the dense consolidation seen within the left upper lobe. 2. Minimal right lung base atelectasis. Xr Chest Portable    Result Date: 11/22/2020  EXAMINATION: ONE XRAY VIEW OF THE CHEST 11/22/2020 6:28 am COMPARISON: 11/21/2020 HISTORY: ORDERING SYSTEM PROVIDED HISTORY: resp failure TECHNOLOGIST PROVIDED HISTORY: Reason for exam:->resp failure FINDINGS: The endotracheal tube is stable. The enteric tube tip is not well visualized but may be at the gastroesophageal junction. There is stable left upper lobe dense opacity. There are continued patchy opacities in the left lower lobe. There may be small pleural effusions. No pneumothorax is seen. No significant change in dense airspace opacity of the left upper lobe and patchy left lower lobe airspace opacities. Enteric tube tip is not well visualized due to underpenetration. The tip may be at the gastroesophageal junction. This could be confirmed with KUB centered on the upper abdomen.     Xr Chest Portable    Result Date: 11/21/2020  EXAMINATION: ONE XRAY VIEW OF THE CHEST 11/21/2020 7:37 am COMPARISON: 11/20/2020 HISTORY: ORDERING SYSTEM PROVIDED HISTORY: resp failure TECHNOLOGIST PROVIDED HISTORY: Reason for exam:->resp failure FINDINGS: Endotracheal tube and nasogastric tube are unchanged. Large opacity in the left upper lobe is unchanged. There is additional airspace disease in the left lower lobe which is stable. There is no pneumothorax. Small pleural effusions are not excluded. Unchanged large opacity/consolidation in left upper lobe. Unchanged airspace disease in left lower lobe. Xr Chest Portable    Result Date: 11/20/2020  EXAMINATION: ONE XRAY VIEW OF THE CHEST 11/20/2020 6:33 am COMPARISON: 11/19/2020 HISTORY: ORDERING SYSTEM PROVIDED HISTORY: resp failure TECHNOLOGIST PROVIDED HISTORY: Reason for exam:->resp failure FINDINGS: Endotracheal and enteric tubes remain in place. There has been no appreciable change in opacities throughout the left lung, most pronounced in the left apex. The right lung is clear. The heart size is at the upper limits of normal.  There is no discernible pneumothorax. Grossly stable opacities on the left. Xr Chest Portable    Result Date: 11/19/2020  EXAMINATION: ONE XRAY VIEW OF THE CHEST 11/19/2020 6:23 am COMPARISON: 11/18/2020 HISTORY: ORDERING SYSTEM PROVIDED HISTORY: resp failure TECHNOLOGIST PROVIDED HISTORY: Reason for exam:->resp failure FINDINGS: Evaluation is limited by the patient's body habitus and the portable technique. The right lung apex was partially excluded. There is increased dense consolidation in the left upper lobe. There is also medial left basilar airspace opacity obscuring the hemidiaphragm. The heart size is mildly enlarged. There is no discernible pneumothorax. Endotracheal and enteric tubes are again seen. Increasing multifocal left lung pneumonia. Xr Chest Portable    Result Date: 11/18/2020  EXAMINATION: ONE XRAY VIEW OF THE CHEST 11/18/2020 6:39 am COMPARISON: CT chest November 17, 2020.  HISTORY: ORDERING SYSTEM PROVIDED HISTORY: SOB TECHNOLOGIST PROVIDED HISTORY: Reason for exam:->SOB FINDINGS: The cardiac silhouette is within normal limits in size. There is masslike consolidation of the left upper lobe. There is a small to moderate left dependent pleural effusion. There is no visible pneumothorax. The pulmonary vessels are within normal limits. Left upper lobe masslike consolidation. Small to moderate left pleural effusion. Xr Chest 1 View    Result Date: 11/18/2020  EXAMINATION: ONE XRAY VIEW OF THE CHEST 11/18/2020 9:06 am COMPARISON: 11/18/2020 HISTORY: ORDERING SYSTEM PROVIDED HISTORY: intubation TECHNOLOGIST PROVIDED HISTORY: Reason for exam:->intubation Reason for exam:->portable FINDINGS: Compared to the chest radiograph from earlier today, 6:44 a.m., there is interval placement of an endotracheal tube, the tip of which is approximately 4.1 cm above the korina. Nasogastric tube is present but is suboptimally visualized due to motion artifact and relative underpenetration of the study. The abdominal radiograph demonstrates it to be well positioned, with the tip in the upper abdomen. Prominent masslike consolidative opacity in the upper left lung are grossly stable. No pneumothorax is seen. Layering left pleural effusion. 1.  The life-support lines and tubes are well positioned. 2. Masslike consolidative opacity in the left upper lung. Small left effusion. Cta Chest W Contrast    Result Date: 11/17/2020  EXAMINATION: CTA OF THE CHEST 11/17/2020 3:18 pm TECHNIQUE: CTA of the chest was performed after the administration of intravenous contrast.  Multiplanar reformatted images are provided for review. MIP images are provided for review.  Dose modulation, iterative reconstruction, and/or weight based adjustment of the mA/kV was utilized to reduce the radiation dose to as low as reasonably achievable.; CTA of the abdomen and pelvis was performed with the administration of intravenous contrast. Multiplanar reformatted images are provided for review. MIP images are provided for review. Dose modulation, iterative reconstruction, and/or weight based adjustment of the mA/kV was utilized to reduce the radiation dose to as low as reasonably achievable. COMPARISON: None. HISTORY: ORDERING SYSTEM PROVIDED HISTORY: aneurysm, cp, sob TECHNOLOGIST PROVIDED HISTORY: Reason for exam:->aneurysm, cp, sob FINDINGS: CTA chest: There are confluent opacities located in left upper lobe. Patchy airspace opacities located in lingula. There is moderate to large left pleural effusion. Peribronchial thickening extensor in left hilar location into the left lung base. There is subsegmental atelectasis in posterior right lower lobe with adjacent ground-glass opacities. There is pulmonary vascular congestion. The heart is mildly enlarged. There is mediastinal lymphadenopathy. Ascending thoracic aorta measures 3.7 cm in diameter. Descending thoracic aorta measures 3 cm in diameter. No evidence of thoracic aortic aneurysm or dissection. Distal descending thoracic aorta is tortuous. CTA abdomen/pelvis: There is evidence of endograft repair involving the abdominal aorta. There is redemonstration of fusiform abdominal aortic aneurysm measuring up to 6.4 cm. This finding is stable since prior. No evidence of endoleak. No retroperitoneal fluid collections. Iliac limbs of endograft appear patent. Common iliac arteries are tortuous. Common femoral arteries are patent. Numerous diverticula associated with the left colon and sigmoid colon. No evidence of acute diverticulitis. Liver, gallbladder, pancreas, and spleen are grossly unremarkable however there is motion artifact limiting this examination. There is no hydronephrosis. Cyst associated with the right kidney is stable since previous examination as well as cyst associated with the left kidney appearing stable since prior.   Appendix is visualized and normal.    1.  New confluent opacities located in left upper lobe suggesting pneumonia, atelectasis, or neoplasm. 2.  Patchy airspace opacities located in lingula suggesting pneumonia with areas of atelectasis. 3.  Stenosis and occlusion are associated with left upper lobe segmental bronchi and lingular segmental bronchi. 4.  Moderate to large left pleural effusion. 5.  Mediastinal lymphadenopathy. 6.  Stable fusiform infrarenal abdominal aortic aneurysm with endograft repair. Aneurysm measures up to 6.4 cm. No evidence of endoleak or retroperitoneal fluid. 7.  Diverticulosis. Cta Abdomen Pelvis W Contrast    Result Date: 11/17/2020  EXAMINATION: CTA OF THE CHEST 11/17/2020 3:18 pm TECHNIQUE: CTA of the chest was performed after the administration of intravenous contrast.  Multiplanar reformatted images are provided for review. MIP images are provided for review. Dose modulation, iterative reconstruction, and/or weight based adjustment of the mA/kV was utilized to reduce the radiation dose to as low as reasonably achievable.; CTA of the abdomen and pelvis was performed with the administration of intravenous contrast. Multiplanar reformatted images are provided for review. MIP images are provided for review. Dose modulation, iterative reconstruction, and/or weight based adjustment of the mA/kV was utilized to reduce the radiation dose to as low as reasonably achievable. COMPARISON: None. HISTORY: ORDERING SYSTEM PROVIDED HISTORY: aneurysm, cp, sob TECHNOLOGIST PROVIDED HISTORY: Reason for exam:->aneurysm, cp, sob FINDINGS: CTA chest: There are confluent opacities located in left upper lobe. Patchy airspace opacities located in lingula. There is moderate to large left pleural effusion. Peribronchial thickening extensor in left hilar location into the left lung base. There is subsegmental atelectasis in posterior right lower lobe with adjacent ground-glass opacities. There is pulmonary vascular congestion. The heart is mildly enlarged.   There is mediastinal lymphadenopathy. Ascending thoracic aorta measures 3.7 cm in diameter. Descending thoracic aorta measures 3 cm in diameter. No evidence of thoracic aortic aneurysm or dissection. Distal descending thoracic aorta is tortuous. CTA abdomen/pelvis: There is evidence of endograft repair involving the abdominal aorta. There is redemonstration of fusiform abdominal aortic aneurysm measuring up to 6.4 cm. This finding is stable since prior. No evidence of endoleak. No retroperitoneal fluid collections. Iliac limbs of endograft appear patent. Common iliac arteries are tortuous. Common femoral arteries are patent. Numerous diverticula associated with the left colon and sigmoid colon. No evidence of acute diverticulitis. Liver, gallbladder, pancreas, and spleen are grossly unremarkable however there is motion artifact limiting this examination. There is no hydronephrosis. Cyst associated with the right kidney is stable since previous examination as well as cyst associated with the left kidney appearing stable since prior. Appendix is visualized and normal.    1.  New confluent opacities located in left upper lobe suggesting pneumonia, atelectasis, or neoplasm. 2.  Patchy airspace opacities located in lingula suggesting pneumonia with areas of atelectasis. 3.  Stenosis and occlusion are associated with left upper lobe segmental bronchi and lingular segmental bronchi. 4.  Moderate to large left pleural effusion. 5.  Mediastinal lymphadenopathy. 6.  Stable fusiform infrarenal abdominal aortic aneurysm with endograft repair. Aneurysm measures up to 6.4 cm. No evidence of endoleak or retroperitoneal fluid. 7.  Diverticulosis. Xr Abdomen For Ng/og/ne Tube Placement    Result Date: 11/22/2020  EXAMINATION: ONE SUPINE XRAY VIEW(S) OF THE ABDOMEN 11/22/2020 5:06 pm COMPARISON: November 22, 2020, 4 hours prior.  HISTORY: ORDERING SYSTEM PROVIDED HISTORY: Confirmation of course of NG/OG/NE tube and location of tip of tube TECHNOLOGIST PROVIDED HISTORY: Reason for exam:->Confirmation of course of NG/OG/NE tube and location of tip of tube Portable? ->Yes FINDINGS: Tip of the NG tube noted at the level of the diaphragm, no significant change. The right side and the inferior part of the abdomen is not included in the study. There is opacification of the visualized left upper lung. Tip of NG tube at the level of the left hemidiaphragm, no change. Xr Chest Abdomen Ng Placement    Result Date: 11/22/2020  EXAMINATION: ONE SUPINE XRAY VIEW(S) OF THE ABDOMEN 11/22/2020 12:52 pm COMPARISON: November 18. HISTORY: ORDERING SYSTEM PROVIDED HISTORY: OG placement TECHNOLOGIST PROVIDED HISTORY: Reason for exam:->OG placement FINDINGS: Nasogastric tube is present. Side hole appears to be at level of gastroesophageal junction. This tube could be advanced approximately 4 or 5 cm. Nasogastric tube is present with side hole at level of gastroesophageal junction. This tube could be advanced approximately 4 or 5 cm. Xr Chest Abdomen Ng Placement    Result Date: 11/18/2020  EXAMINATION: ONE SUPINE XRAY VIEW(S) OF THE ABDOMEN 11/18/2020 9:07 am COMPARISON: 11/18/2020, about 2 hours earlier HISTORY: ORDERING SYSTEM PROVIDED HISTORY: NGT TECHNOLOGIST PROVIDED HISTORY: Reason for exam:->NGT Reason for exam:->portable NG tube placement FINDINGS: There is an NG tube with the tip in the stomach. An ET tube is again noted in satisfactory position. There is extensive opacity in the left lung, most likely pneumonia. Increased markings also seen in the visualized portion of the right lung. The heart is enlarged. NG tube tip in the stomach. Fluoro For Surgical Procedures    Result Date: 11/17/2020  EXAMINATION: SPOT FLUOROSCOPIC IMAGES 11/17/2020 12:03 pm TECHNIQUE: Fluoroscopy was provided by the radiology department for procedure. Radiologist was not present during examination.  FLUOROSCOPY DOSE AND TYPE OR TIME AND EXPOSURES: Total dose:26.99 mGy COMPARISON: None HISTORY: ORDERING SYSTEM PROVIDED HISTORY: Pain management TECHNOLOGIST PROVIDED HISTORY: Reason for exam:->L4-5 Epidural steroid injection #1 Intraprocedural imaging. FINDINGS: 3 intraoperative fluoroscopic images were obtained during L4-5 epidural steroid injection. Intraprocedural fluoroscopic spot images as above. See separate procedure report for more information. Assessment  1. Acute hypoxic hypercapnic respiratory secondary to community-acquired pneumonia, on mechanical ventilation  2. Acute kidney injury secondary to overt diuresis, questionable underlying chronic kidney disease with evidence of proteinuria  3. Anasarca, multifactorial secondary to hypoalbuminemia, aggressive IVF, anemia  4. Hypernatremia secondary to free water deficit, loop diuretic use. Calculated free water deficit is 3.75 L  5. Hyperkalemia  6. Anemia, normocytic, oncology on board  7. Severe thrombocytopenia    Plan    1. Stop IV Bumex infusion with worsening azotemia  2. Increase free water flushes to 100 ml/hr  3. Additional 20 meq of KCL IV  4. Stop q 8 hour BMP checks        Thank you Dr. Mikayla Marquez for allowing us to participate in care of Laine Smiley.             Electronically signed by Bonifacio Spain MD on 11/27/2020 at 3:47 PM

## 2020-11-27 NOTE — PROGRESS NOTES
Pulmonary progress note    Admit Date: 11/17/2020  Requesting Physician: Ton Restrepo PA-C    CC:  Community-acquired pneumonia  HPI:  61years old obese male, heavy smoker who was sent to ER for an outpatient procedure unit because increased shortness of breath and cough. Patient was evaluated in ER and started antibiotics with analysis of community-acquired pneumonia. The patient was initially medically floor and pulmonary consult was requested. Because of progressive respiratory distress and hypoxemia the decision was to transfer the patient to intensive care units and proceed with endotracheal tube placement and full mechanical ventilatory support. After evaluation by intensivist the decision was to proceed with bedside bronchoscopy due to left upper lobe collapse. Findings the bronchoscopy show a very narrow left upper lobe most likely due to extrinsic compression no major mucous plug seen erythema noticed sample for microbiology and cytology were obtained. November 20.no events overnight, still not able to be weaned to extubate failed first attempt this morning. November 21. Febrile, failed vent weaning as he required 60% FiO2 with PEEP of 10. Panculture. Antibiotics change by CCM team.  November 23. Febrile. Failed vent weaning this am,  November 24. Still febrile, failing vent weaning. Still requires 60% FiO2  November 25. Still febrile, antibiotics change by ID. Started on vent weaning with CPAP pressure support 10 PEEP of 8, tidal volume between 500-700, comfortable. November 27.   100.5 T-max.   Still require FiO2 60%, full mechanical ventilatory support    PMH:    Past Medical History:   Diagnosis Date    Anticoagulant long-term use     Arthritis     CAD (coronary artery disease)     Chronic back pain     Depression     Dyslipidemia     Hiatal hernia 1979    History of ST elevation myocardial infarction (STEMI)     Hyperlipidemia     Hypertension     Low back pain     Lumbar radiculopathy 8/14/2019    Obesity     MANOLO on CPAP     Presence of stent in left circumflex coronary artery     Presence of stent in right coronary artery     Smoker     Type 2 diabetes mellitus without complication (HCC)      PSH:   Past Surgical History:   Procedure Laterality Date    ABDOMINAL AORTIC ANEURYSM REPAIR, ENDOVASCULAR N/A 6/28/2019    ENDOVASCULAR REPAIR ABDOMINAL AORTIC ANEURYSM performed by Verna Velazquez MD at [facility] Bilateral 3/12/2020    BILATERAL L3,L4,L5 MEDIAL BRANCH NERVE BLOCK performed by Ann Curran DO at [facility] Bilateral 6/11/2020    BILATERAL L3,L4,L5 MEDIAL NERVE BRANCH BLOCK #2 performed by Ann Curran DO at [address] [facility] N/A 11/18/2020    BRONCHOSCOPY/TRANSBRONCHIAL NEEDLE BIOPSY performed by Ricco Mcclain MD at [address] [place] [address]  12/30/2011    Dr. Spencer Rodriguez 1st OM 3.0 x 20 Ion    DIAGNOSTIC CARDIAC CATH LAB PROCEDURE  3/15/2005    [facility]. Mild to moderate CAD. Normal LV.  DIAGNOSTIC CARDIAC CATH LAB PROCEDURE  1/16/2007    [facility]. Cath/PTCA/DE stent of proximal and distal RCA.  DIAGNOSTIC CARDIAC CATH LAB PROCEDURE  2/21/2007    Cath/DE stent of proximal OM1 and proximal Cx.  DIAGNOSTIC CARDIAC CATH LAB PROCEDURE  12/30/2011    ANURADHA of mid OM branch of circumflex.      ECHO COMPL W DOP COLOR FLOW  12/30/2011         HC INSERT PICC CATH, 5/> YRS - MIDLINE  11/23/2020         HERNIA REPAIR  2/2014    laparoscopic ventral hernia repain    JOINT REPLACEMENT Left 4/1/14    TKA-ed    JOINT REPLACEMENT Right 2018    KNEE    PAIN MANAGEMENT PROCEDURE Right 9/1/2020    RIGHT L3,4,5 MEDIAL BRANCH RADIOFREQUENCY ABLATION performed by Ann Curran DO at [address] [facility] N/A 11/17/2020    L4-5 EPIDURAL STEROID INJECTION #1 performed by Ann Curran DO at [place] [address] OFFICE/OUTPT VISIT,PROCEDURE ONLY Right 2018    RIGHT KNEE TOTAL ARTHROPLASTY performed by Darnell Hernandez DO at [place] [address] fentaNYL 5 mcg/ml in 0.9%  ml infusion 200 mcg/hr (20 1628)    propofol Stopped (20 1140)    dextrose      midazolam 8 mg/hr (20 1300)      pantoprazole  40 mg Intravenous Daily    And    sodium chloride (PF)  10 mL Intravenous Daily    potassium bicarb-citric acid  40 mEq Per NG tube BID    potassium chloride  10 mEq Intravenous Q1H    sodium chloride  20 mL Intravenous Once    ampicillin-sulbactam  3 g Intravenous Q6H    methylPREDNISolone  40 mg Intravenous Q12H    sucralfate  1 g Oral 4 times per day    heparin flush  1 mL Intravenous 2 times per day    insulin lispro  0-18 Units Subcutaneous Q6H    nicotine  1 patch Transdermal Daily    acetylcysteine  4 mL Inhalation BID    albuterol  2.5 mg Nebulization Q4H    Arformoterol Tartrate  15 mcg Nebulization BID    atorvastatin  80 mg Oral Nightly    [Held by provider] clopidogrel  75 mg Oral Daily    fluticasone  2 spray Nasal Daily    sodium chloride flush  10 mL Intravenous 2 times per day    [Held by provider] enoxaparin  40 mg Subcutaneous Daily    insulin glargine  0.25 Units/kg Subcutaneous Nightly    chlorhexidine  15 mL Mouth/Throat BID    sodium chloride (PF)  10 mL Intravenous Daily         Vitals:  Tmax:  VITALS:  /83   Pulse 103   Temp 100.5 °F (38.1 °C)   Resp 19   Ht 5' 11\" (1.803 m)   Wt (!) 319 lb 7.1 oz (144.9 kg)   SpO2 92%   BMI 44.55 kg/m²   24HR INTAKE/OUTPUT:      Intake/Output Summary (Last 24 hours) at 2020 1653  Last data filed at 2020 1628  Gross per 24 hour   Intake 4147 ml   Output 8150 ml   Net -4003 ml     CURRENT PULSE OXIMETRY:  SpO2: 92 %  24HR PULSE OXIMETRY RANGE:  SpO2  Av.5 %  Min: 88 %  Max: 97 %        EXAM:  General: No distress. Alert. Sedated. Obese  Eyes: PERRL. No sclera icterus.  No conjunctival injection. ENT: No discharge. Pharynx clear. Endotracheal tube is in place. Secretions +  Neck: Trachea midline. Normal thyroid. Thick  Resp: No accessory muscle use. No crackles. Bronchial sounds noticed left more than right. Fairly prolonged expiratory phase noticed  CV: Regular rate. Regular rhythm. No mumur or rub. ABD: Non-tender. Non-distended. No masses. No organmegaly. Normal bowel sounds. Fatty, soft  Skin: Warm and dry. No nodule on exposed extremities. No rash on exposed extremities. Lymph: No cervical LAD. No supraclavicular LAD. Ext: No joint deformity. No clubbing. No cyanosis. 1+ edema  Neuro: . Positive pupils/gag/corneals. Normal pain response. Lab Results:  CBC:   Recent Labs     11/25/20 2011 11/26/20 0500 11/27/20  0535   WBC 9.3 8.4 8.2   HGB 8.1* 7.5* 8.1*   HCT 28.2* 26.0* 27.4*   MCV 93.4 93.9 92.6   PLT 29* 29* 33*     BMP:   Recent Labs     11/25/20  0533  11/26/20  0500 11/26/20  1510 11/26/20  2150 11/27/20  0535   *   < > 149* 151* 151* 152*   K 4.3   < > 3.8 3.4* 2.9* 3.3*      < > 106 106 104 104   CO2 30*   < > 29 33* 34* 35*   PHOS 3.1  --  3.6  --   --  3.6   BUN 76*   < > 95* 101* 103* 106*   CREATININE 1.5*   < > 1.6* 1.4* 1.8* 1.5*    < > = values in this interval not displayed. ALB:3,BILIDIR:3,BILITOT:3,ALKPHOS:3)@  PT/INR:   Recent Labs     11/26/20 0500   PROTIME 11.6   INR 1.0     Cultures:  -    ABG: noted  Films: Unchanged, left upper lobe opacity  CT : Left upper lobe collapse      Assessment:  · Acute on chronic respiratory failure, day #10  · Community-acquired pneumonia  · History heavy smoking and morbid obesity, cannot rule out underlying COPD, cannot rule out MANOLO      Plan:  · Agree with current plan. · Antibiotic, Unasyn day #  · Bronchodilators and IV steroids  · Left upper lobe bronchoscopy, cytology with malignant cells, possible non-small cell lung cancer.   Heme-onc consult appreciated not a candidate for urgent inpatient chemotherapy. Radiation oncology consult requested  · Vent management as per CCM  · Discussed with CCM team  · To continue LABA with hope to improve bronchoconstriction      .     Rodrigue Hope M.D., F.C.C.P.                    pulp

## 2020-11-27 NOTE — PROGRESS NOTES
Critical Care Team - Daily Progress Note         Date and time: 11/27/2020 6:52 PM  Patient's name:  Della Wiseman. Medical Record Number: [id number]  Patient's account/billing number: [de-identified]  Patient's YOB: 1960  Age: 61 y.o. Date of Admission: 11/17/2020  1:13 PM  Length of stay during current admission: 10      Primary Care Physician: Aftab Stevenson PA-C  ICU Attending Physician: Dr. Sandra Hahn    Code Status: Full Code    Reason for ICU admission: acute hypoxic respiratory failure      SUBJECTIVE:     BRIEF HISTORY:   The patient is a 61 y.o. male with significant past medical history of diabetes, MANOLO, back pain, hypertension, hyperlipidemia, STEMI's status post 5 stents, CAD, aortic aneurysm presenting to the the hospital initially on 11/17. He had a epidural performed yesterday for low back pain and after the procedure became very short of breath. He was satting 80% so was sent to the hospital for further evaluation. While in the ED patient was found to be thrombocytopenic as well as hypoxic on room air. He was placed on 4 L of oxygen but O2 sats improved. CTA of the chest demonstrating pneumonia and pleural effusions. He was given 1 dose of Rocephin and azithromycin while in the ER.     On 11/18 RRT was called at 7:30 AM.  Patient was found to have increased work of breathing as well as hypercapnic on ABGs. He was on BiPAP all night with minimal improvement of his symptoms. During the RT he was switched to AVAPS. He was subsequently brought down to the ICU for further evaluation and care. He was not improving on AVAPS and subsequently intubated     A bronchoscopy was performed on 11/18 and biopsies were sent. OVERNIGHT EVENTS:    11/19/20: Continues to be intubated, no acute events  11/20/20: Attempted weaning trials. Patient on pressure support for a few hours. ABGs worsening. Placed on it back on AC/VC  11/21/20: More rhonchorous today. More secretions. 20; still having moist secretions, gram positive cocci bacteremia   20: continued secretions, complaining of mild abdominal pain today. multiple attempts made to transfer patient per wifes request  20: start diuresis. Wean propofol and add seroquel for agitation. Increase free water. Biopsy results back concerning for small cell lung cancer. Febrile.  febrile re cultured overnight , t max 101, ID consulted, will attempt weaning again today  : continues with fevers, tmax 101. ID added levaquin yesterday, bronch today  : low grade fevers improved. Not tolerating vent weaning, continued diruesis throughout the day.  Stopped by renal with worsening bun    CURRENT VENTILATION STATUS:     [x] Ventilator  [] BIPAP  [] Nasal Cannula [] Room Air        SECRETIONS : Amount:  [] Small [x] Moderate  [] Large  [] None  Color:     [] White [x] Colored  [] Bloody    SEDATION:  RAAS Score:  [] Propofol gtt  [x] Versed gtt  [] Ativan gtt   [] No Sedation    PARALYZED:  [x] No    [] Yes      VASOPRESSORS:  [x] No    [] Yes    If yes -   [] Levophed       [] Dopamine     [] Vasopressin       [] Dobutamine  [] Phenylephrine         [] Epinephrine    CENTRAL LINES:     [x] No   [] Yes   (Date of Insertion:   )           If yes -     [] Right IJ     [] Left IJ [] Right Femoral [] Left Femoral                   [] Right Subclavian [] Left Subclavian       HUFF'S CATHETER:   [] No   [x] Yes  (Date of Insertion:   )     URINE OUTPUT:            [x] Good   [] Low              [] Anuric      OBJECTIVE:     VITAL SIGNS:  BP (!) 140/84   Pulse 100   Temp 100.5 °F (38.1 °C) (Bladder)   Resp 21   Ht 5' 11\" (1.803 m)   Wt (!) 319 lb 7.1 oz (144.9 kg)   SpO2 92%   BMI 44.55 kg/m²   Tmax over 24 hours:  Temp (24hrs), Av.7 °F (37.6 °C), Min:98.7 °F (37.1 °C), Max:100.5 °F (38.1 °C)      Patient Vitals for the past 6 hrs:   BP Temp Temp src Pulse Resp SpO2   20 1800 (!) 140/84 -- -- 100 21 92 % 11/27/20 1700 126/83 -- -- 109 24 93 %   11/27/20 1618 -- -- -- 103 19 92 %   11/27/20 1600 120/75 100.5 °F (38.1 °C) Bladder 102 19 91 %   11/27/20 1500 133/79 -- -- 102 29 91 %   11/27/20 1400 136/83 100.5 °F (38.1 °C) -- 103 13 91 %   11/27/20 1300 134/78 -- -- 106 20 93 %         Intake/Output Summary (Last 24 hours) at 11/27/2020 1852  Last data filed at 11/27/2020 1839  Gross per 24 hour   Intake 4247 ml   Output 8450 ml   Net -4203 ml     Wt Readings from Last 2 Encounters:   11/27/20 (!) 319 lb 7.1 oz (144.9 kg)   11/17/20 (!) 314 lb (142.4 kg)     Body mass index is 44.55 kg/m². PHYSICAL EXAMINATION:  General: Intubated   HEENT:  Normocephalic, atraumatic. Pulls equal and reactive no scleral icterus. No conjunctival injection. No nasal discharge. Neck:  Supple, without stridor, no JVD  Heart:  RRR, no murmurs, gallops, rubs  Lungs: Greatly diminished in left upper lobe, rhonchorous throughout the remainder of the lung fields   Abdomen: Obese, bowel sounds present, soft, non-tender, non-distended, no guarding or rigidity. Not tender to palpation  Extremities:  No clubbing, cyanosis,1+ edema bilaterally. Palpable radial and DP pulses b/l upper and lower extremieties  Skin:  Warm and dry,  Neuro: Following commands. Cranial nerves II through XII grossly intact.   No focal neurologic deficits  Breast: deferred  Rectal: deferred  Genitalia:  deferred     MEDICATIONS:    Scheduled Meds:   potassium bicarb-citric acid  40 mEq Per NG tube BID    sodium chloride  20 mL Intravenous Once    ampicillin-sulbactam  3 g Intravenous Q6H    methylPREDNISolone  40 mg Intravenous Q12H    sucralfate  1 g Oral 4 times per day    heparin flush  1 mL Intravenous 2 times per day    insulin lispro  0-18 Units Subcutaneous Q6H    nicotine  1 patch Transdermal Daily    acetylcysteine  4 mL Inhalation BID    albuterol  2.5 mg Nebulization Q4H    Arformoterol Tartrate  15 mcg Nebulization BID    atorvastatin  80 mg Oral Nightly    [Held by provider] clopidogrel  75 mg Oral Daily    fluticasone  2 spray Nasal Daily    sodium chloride flush  10 mL Intravenous 2 times per day    [Held by provider] enoxaparin  40 mg Subcutaneous Daily    insulin glargine  0.25 Units/kg Subcutaneous Nightly    chlorhexidine  15 mL Mouth/Throat BID    sodium chloride (PF)  10 mL Intravenous Daily     Continuous Infusions:   fentaNYL 5 mcg/ml in 0.9%  ml infusion 200 mcg/hr (11/27/20 1628)    propofol Stopped (11/24/20 1140)    dextrose      midazolam 8 mg/hr (11/27/20 1300)     PRN Meds:   potassium chloride, 10 mEq, PRN  acetaminophen, 650 mg, Q4H PRN    Or  acetaminophen, 650 mg, Q4H PRN  sodium chloride flush, 10 mL, PRN  heparin flush, 1 mL, PRN  sodium phosphate IVPB, 0.16 mmol/kg, PRN    Or  sodium phosphate IVPB, 0.32 mmol/kg, PRN  magnesium sulfate, 1 g, PRN  tiZANidine, 4 mg, TID PRN  polyethylene glycol, 17 g, Daily PRN  promethazine, 12.5 mg, Q6H PRN    Or  ondansetron, 4 mg, Q6H PRN  glucose, 15 g, PRN  dextrose, 12.5 g, PRN  glucagon (rDNA), 1 mg, PRN  dextrose, 100 mL/hr, PRN  oxyCODONE-acetaminophen, 1 tablet, TID PRN    And  oxyCODONE, 2.5 mg, TID PRN  albuterol, 2.5 mg, Q4H PRN        VENT SETTINGS (Comprehensive) (if applicable):  Vent Information  $Ventilation: $Subsequent Day  Skin Assessment: Clean, dry, & intact  Equipment ID: [id number]  Equipment Changed: (S) Humidification(water bag)  Vent Type: 840  Vent Mode: AC/VC  Vt Ordered: 450 mL  Rate Set: 20 bmp  Peak Flow: 60 L/min  Pressure Support: 0 cmH20  FiO2 : 60 %  SpO2: 92 %  SpO2/FiO2 ratio: 153.33  Sensitivity: 3  PEEP/CPAP: 12  I Time/ I Time %: 0 s  Humidification Source: Heated wire  Humidification Temp: 37  Humidification Temp Measured: 36.7  Circuit Condensation: Drained  Mask Type: Full face mask  Mask Size: Medium  Additional Respiratory  Assessments  Pulse: 100  Resp: 21  SpO2: 92 %  Position: Semi-Jeff's  Humidification Source: Heated wire  Humidification Temp: 37  Circuit Condensation: Drained  Oral Care: Mouth swabbed, Mouth moisturizer, Mouth suctioned, Suction toothette  Subglottic Suction Done?: Yes  Cuff Pressure (cm H2O): 29 cm H2O    ABGs:   Recent Labs     11/27/20  0556   PH 7.429   PCO2 46.2*   PO2 57.9*   HCO3 29.9*   BE 5.0*   O2SAT 87.8*       Laboratory findings:    Complete Blood Count:   Recent Labs     11/25/20 2011 11/26/20  0500 11/27/20  0535   WBC 9.3 8.4 8.2   HGB 8.1* 7.5* 8.1*   HCT 28.2* 26.0* 27.4*   PLT 29* 29* 33*        Last 3 Blood Glucose:   Recent Labs     11/26/20  1510 11/26/20 2150 11/27/20  0535   GLUCOSE 225* 175* 197*        PT/INR:    Lab Results   Component Value Date    PROTIME 11.6 11/26/2020    PROTIME 11.1 12/30/2011    INR 1.0 11/26/2020     PTT:    Lab Results   Component Value Date    APTT 29.0 12/28/2011       Comprehensive Metabolic Profile:   Recent Labs     11/26/20  1510 11/26/20 2150 11/27/20  0535   * 151* 152*   K 3.4* 2.9* 3.3*    104 104   CO2 33* 34* 35*   * 103* 106*   CREATININE 1.4* 1.8* 1.5*   GLUCOSE 225* 175* 197*   CALCIUM 8.5* 8.6 8.7   PROT  --   --  6.0*   LABALBU  --   --  3.5   BILITOT  --   --  0.8   ALKPHOS  --   --  144*   AST  --   --  40*   ALT  --   --  37      Magnesium:   Lab Results   Component Value Date    MG 2.6 11/27/2020     Phosphorus:   Lab Results   Component Value Date    PHOS 3.6 11/27/2020     Ionized Calcium:   Lab Results   Component Value Date    CAION 1.33 10/23/2020        Urinalysis:     Troponin:   No results for input(s): TROPONINI in the last 72 hours. Microbiology:  GPC blood   Respiratory culture no growth    Radiology/Imaging:     Chest x-ray:  11/26     Impression    1. Significant worsening of the bilateral airspace disease with new findings    of CHF involving the right lung and left lower lobe and worsening of the left    upper lobe airspace disease.           PLAN:     WEAN PER PROTOCOL:  [] No   [x] Yes  [] N/A    DISCONTINUE ANY LABS:   [x] No   [] Yes    ICU PROPHYLAXIS:  Stress ulcer:  [x] PPI Agent  [] [id number] [] Sucralfate  [] Other:  VTE:   [x] Enoxaparin  [] Unfract. Heparin Subcut  [] EPC Cuffs    NUTRITION:  [] NPO [x] Tube Feeding (Specify: ) [] TPN  [x] PO (Diet: DIET TUBE FEED CONTINUOUS/CYCLIC NPO; Diabetic 1.5; Orogastric; Continuous; 20; 40)    HOME MEDICATIONS RECONCILED: [] No  [x] Yes    INSULIN DRIP:   [x] No   [] Yes    CONSULTATION NEEDED:  [x] No   [] Yes     FAMILY UPDATED:    [] No   [] Yes    TRANSFER OUT OF ICU:   [x] No   [] Yes    ASSESSMENT:     Neuro   Chronic back pain              -Epidural on 11/17, no hematoma seen on CT lumbar spine      Sedated on the vent              -Versed, fentanyl. Seroquel 100 mg was given on dose , d/c by renal      Respiratory   Acute hypoxic and hypercapnic respiratory failure       -continue full vent support. Pressure support trials              -Follow ABG              -Albuterol, incentive spirometer              -solumedrol q12   -wean FiO2 as able   -metanebs   -Mucomyst   -bedside L thoracentesis 11/22 with only a small amount of pleural fluid, not amendable to drainage   -bronch 11/27 showing extrinsic compression from mass, no mucus plug     Community-acquired pneumonia post obstructive pna              -Strep and Legionella negative              -Respiratory culture pending              -Pro-Kael 0.23   -continue unasyn     Lung mass OMER              -Status post bronc 11/18 with TBNA              -No obvious mucous plugging or purulence. Very erythematous and compressed airways, concern for mass              -Cytology concerning for small cell lung cancer.   Heme-onc on board.     Respiratory film array negative  Long, lifetime smoker--nicotine patch     Cardiovascular   CAD/stents              -Plavix is on hold for approximately 7 days per patient he had his epidural.  It has still been on hold in case he needs further procedures and thrombocytopenia     History of aortic aneurysm              -Status post endovascular repair              -Stable on most recent CT    Get ECHO. Less concern for PE as HR and bp stable, no change in o2 requirement since admission     Gastrointestinal   Tube feeding   Abdominal pain-resolved. no peritoneal signs. KUB unremarkable  PPI held from nephro and started on carafate, most likely 2/2 platelets?     Renal   Nephrology following   Hyperkalemia- resolved  hypophos- resolved    Hypernatremia   -free water deficit   -increase free water   -nephro following    ISIDRO   -nephro following and recs appreciated   -stop bumex drip     Infectious Disease     Community-acquired pneumonia   -recultured              -respiratory cx growing candida, light growth   -switched to unasyn         Blood cultures gram positive cocci   -coag neg staph. Most likely contaminant. Stop vanco.    Blood and urine cultures pending     Hematology/Oncology   Thrombocytopenia              - received platelets x1              -heme/onc following   -most llikely 2/2 neoplasm, drugs and infection   -Transfuse for Hgb <7, platelets <13    Small cell lung cancer   -biopsy results   -heme/onc and pulmonology for further work up and recommendations   -not inpatient emergent chemo candidate   -rad/onc consults, MRI brain when stable    DVT   -left axillary vein   -platelets 33, no anticoagulation at this time until platelets rise     Endocrine   Diabetes              -Noninsulin-dependent              -Monitor sugars   -Increase to high-dose sliding scale    msk   Chronic back pain              -Status post epidural on 11/17              -No erythematous region or fluctuance              -CT with no evidence of epidural hematoma     Social/Spiritual/DNR/Other   Full code    Multiple attempts at transferring patient to outside facility. Denied by [facility]. [facility] do not accept his insurance and decline at [facility] as well.        Dispo: continue ICU level of care  Electronically signed by Yvette Sanders DO on 11/27/2020 at 6:52 PM     I personally saw, examined and provided care for the patient. Radiographs, labs and medication list were reviewed by me independently. I spoke with bedside nursing, therapists and consultants. Critical care services and times documented are independent of procedures and multidisciplinary rounds with Residents. Additionally comprehensive, multidisciplinary rounds were conducted with the MICU team. The case was discussed in detail and plans for care were established. Review of Residents documentation was conducted and revisions were made as appropriate. I agree with the above documented exam, problem list and plan of care. Wean fio2 as tolerated   Diuresis and lytes per nephrology   reyes is tumor not mucous plugging. No need for any other interventions with scope doubt xrt would be of benefit   Appreciate hemonc input   psv trial as tolerated   Jennifer Hamm M.D.    Pulmonary/Critical Care Medicine   35 min cct excluding procedures

## 2020-11-28 NOTE — PROGRESS NOTES
Nephrology Consult Note    Patient's Name: Mariela Lobato.  11:49 AM  11/28/2020    Nephrologist: Dr. Lázaro Aguilar MD    Reason for Consult: Renal is consulted for volume management  Requesting Physician:  Dr. Naomi Martinez    Chief Complaint:  SOB    History Obtained From: EMR as the patient is intubated, sedated and no family member is present today    Sub: Patient seen and examined bedside in the ICU, he is on 50% FiO2. UO is excellent off of Diuretics  He is awake today      Past Medical History:   Diagnosis Date    Anticoagulant long-term use     Arthritis     CAD (coronary artery disease)     Chronic back pain     Depression     Dyslipidemia     Hiatal hernia 1979    History of ST elevation myocardial infarction (STEMI)     Hyperlipidemia     Hypertension     Low back pain     Lumbar radiculopathy 8/14/2019    Obesity     MANOLO on CPAP     Presence of stent in left circumflex coronary artery     Presence of stent in right coronary artery     Smoker     Type 2 diabetes mellitus without complication (HCC)        Past Surgical History:   Procedure Laterality Date    ABDOMINAL AORTIC ANEURYSM REPAIR, ENDOVASCULAR N/A 6/28/2019    ENDOVASCULAR REPAIR ABDOMINAL AORTIC ANEURYSM performed by Jabari Interiano MD at [address] [place] [address] Bilateral 3/12/2020    BILATERAL L3,L4,L5 MEDIAL BRANCH NERVE BLOCK performed by Hina Saha DO at [address] [place] [address] Bilateral 6/11/2020    BILATERAL L3,L4,L5 MEDIAL NERVE BRANCH BLOCK #2 performed by Hina Saha DO at [facility] N/A 11/18/2020    BRONCHOSCOPY/TRANSBRONCHIAL NEEDLE BIOPSY performed by Kitty Sandy MD at [address] [place] [address] PLACEMENT  X4    CORONARY ANGIOPLASTY WITH STENT PLACEMENT  12/30/2011    Dr. Cici Ruiz 1st OM 3.0 x 20 Ion    DIAGNOSTIC CARDIAC CATH LAB PROCEDURE  3/15/2005    [facility]. Mild to moderate CAD. Normal LV.     DIAGNOSTIC CARDIAC CATH LAB PROCEDURE 1/16/2007    [facility]. Cath/PTCA/DE stent of proximal and distal RCA.  DIAGNOSTIC CARDIAC CATH LAB PROCEDURE  2/21/2007    Cath/DE stent of proximal OM1 and proximal Cx.  DIAGNOSTIC CARDIAC CATH LAB PROCEDURE  12/30/2011    ANURADHA of mid OM branch of circumflex.  ECHO COMPL W DOP COLOR FLOW  12/30/2011         HC INSERT PICC CATH, 5/> YRS - MIDLINE  11/23/2020         HERNIA REPAIR  2/2014    laparoscopic ventral hernia repain    JOINT REPLACEMENT Left 4/1/14    TKA-ed    JOINT REPLACEMENT Right 2018    KNEE    PAIN MANAGEMENT PROCEDURE Right 9/1/2020    RIGHT L3,4,5 MEDIAL BRANCH RADIOFREQUENCY ABLATION performed by Lencho Samano DO at [address] [place] [address] St N/A 11/17/2020    L4-5 EPIDURAL STEROID INJECTION #1 performed by Lencho Samano DO at [facility] 80 OFFICE/OUTPT VISIT,PROCEDURE ONLY Right 11/26/2018    RIGHT KNEE TOTAL ARTHROPLASTY performed by Vana Harada, DO at [facility] OR       Family History   Problem Relation Age of Onset    Diabetes Mother     Stroke Mother     Diabetes Father     No Known Problems Sister         reports that he has been smoking cigarettes. He started smoking about 42 years ago. He has a 39.00 pack-year smoking history. He has never used smokeless tobacco. He reports that he does not drink alcohol or use drugs.     Allergies:  Bee venom    Current Medications:    potassium chloride 10 mEq/100 mL IVPB (Peripheral Line), PRN  potassium bicarb-citric acid (EFFER-K) effervescent tablet 40 mEq, BID  0.9 % sodium chloride bolus, Once  ampicillin-sulbactam (UNASYN) 3 g ivpb minibag, Q6H  acetaminophen (TYLENOL) tablet 650 mg, Q4H PRN    Or  acetaminophen (TYLENOL) suppository 650 mg, Q4H PRN  methylPREDNISolone sodium (SOLU-MEDROL) injection 40 mg, Q12H  sucralfate (CARAFATE) tablet 1 g, 4 times per day  fentaNYL 5 mcg/ml in 0.9%  ml infusion, Continuous  propofol injection, Titrated  sodium chloride flush 0.9 % injection 10 mL, PRN  heparin flush 100 UNIT/ML injection 100 Units, 2 times per day  heparin flush 100 UNIT/ML injection 100 Units, PRN  sodium phosphate 24.3 mmol in dextrose 5 % 250 mL IVPB, PRN    Or  sodium phosphate 48.57 mmol in dextrose 5 % 250 mL IVPB, PRN  magnesium sulfate 1 g in dextrose 5% 100 mL IVPB, PRN  insulin lispro (HUMALOG) injection vial 0-18 Units, Q6H  nicotine (NICODERM CQ) 21 MG/24HR 1 patch, Daily  acetylcysteine (MUCOMYST) 10 % solution 400 mg, BID  albuterol (PROVENTIL) nebulizer solution 2.5 mg, Q4H  Arformoterol Tartrate (BROVANA) nebulizer solution 15 mcg, BID  atorvastatin (LIPITOR) tablet 80 mg, Nightly  [Held by provider] clopidogrel (PLAVIX) tablet 75 mg, Daily  fluticasone (FLONASE) 50 MCG/ACT nasal spray 2 spray, Daily  tiZANidine (ZANAFLEX) tablet 4 mg, TID PRN  sodium chloride flush 0.9 % injection 10 mL, 2 times per day  polyethylene glycol (GLYCOLAX) packet 17 g, Daily PRN  promethazine (PHENERGAN) tablet 12.5 mg, Q6H PRN    Or  ondansetron (ZOFRAN) injection 4 mg, Q6H PRN  [Held by provider] enoxaparin (LOVENOX) injection 40 mg, Daily  insulin glargine (LANTUS) injection vial 36 Units, Nightly  glucose (GLUTOSE) 40 % oral gel 15 g, PRN  dextrose 50 % IV solution, PRN  glucagon (rDNA) injection 1 mg, PRN  dextrose 5 % solution, PRN  oxyCODONE-acetaminophen (PERCOCET) 5-325 MG per tablet 1 tablet, TID PRN    And  oxyCODONE (ROXICODONE) immediate release tablet 2.5 mg, TID PRN  albuterol (PROVENTIL) nebulizer solution 2.5 mg, Q4H PRN  chlorhexidine (PERIDEX) 0.12 % solution 15 mL, BID  midazolam (VERSED) 1 mg/mL in D5W infusion, Continuous        Review of Systems:   Unable to obtain as he is intubated and sedated    Physical exam:   Constitutional:    Vitals: /87   Pulse 96   Temp 99 °F (37.2 °C) (Bladder)   Resp 21   Ht 5' 11\" (1.803 m)   Wt (!) 319 lb 7.1 oz (144.9 kg)   SpO2 93%   BMI 44.55 kg/m²        Patient seen and examined bedside , he is intubated and awake, FiO2 requirement is at 50%, no acute distress  Skin: no rash, turgor wnl  Heent:  eomi, mmm  Neck: no bruits or jvd noted  Cardiovascular:  S1, S2 without m/r/g  Respiratory: CTA B without w/r/r, decreased breath sounds in bases  Abdomen:  +bs, soft, nt, Black catheter draining clear urine  Ext: 3+ lower extremity edema  Psychiatric: mood and affect appropriate  Musculoskeletal:  Rom, muscular strength intact    Data:   Labs:  CBC:   Lab Results   Component Value Date    WBC 7.2 11/28/2020    RBC 2.92 11/28/2020    HGB 7.9 11/28/2020    HCT 26.8 11/28/2020    MCV 91.8 11/28/2020    MCH 27.1 11/28/2020    MCHC 29.5 11/28/2020    RDW 16.6 11/28/2020    PLT 31 11/28/2020    MPV 13.3 11/28/2020     CMP:    Lab Results   Component Value Date     11/28/2020    K 3.9 11/28/2020    K 5.1 11/17/2020     11/28/2020    CO2 35 11/28/2020     11/28/2020    CREATININE 1.3 11/28/2020    GFRAA >60 11/28/2020    LABGLOM 56 11/28/2020    GLUCOSE 174 11/28/2020    GLUCOSE 120 12/31/2011    PROT 5.6 11/28/2020    LABALBU 3.2 11/28/2020    LABALBU 4.5 12/28/2011    CALCIUM 8.7 11/28/2020    BILITOT 0.6 11/28/2020    ALKPHOS 136 11/28/2020    AST 46 11/28/2020    ALT 37 11/28/2020     BMP:    Lab Results   Component Value Date     11/28/2020    K 3.9 11/28/2020    K 5.1 11/17/2020     11/28/2020    CO2 35 11/28/2020     11/28/2020    LABALBU 3.2 11/28/2020    LABALBU 4.5 12/28/2011    CREATININE 1.3 11/28/2020    CALCIUM 8.7 11/28/2020    GFRAA >60 11/28/2020    LABGLOM 56 11/28/2020    GLUCOSE 174 11/28/2020    GLUCOSE 120 12/31/2011     Calcium:    Lab Results   Component Value Date    CALCIUM 8.7 11/28/2020     Phosphorus:    Lab Results   Component Value Date    PHOS 3.1 11/28/2020     Uric Acid:  No results found for: URICACID  U/A:    Lab Results   Component Value Date    NITRU Negative 11/24/2020    COLORU Yellow 11/24/2020    PHUR 5.5 11/24/2020    WBCUA 2-5 11/24/2020    RBCUA >20 11/24/2020    MUCUS Present 11/24/2020    BACTERIA MODERATE 11/24/2020    CLARITYU CLOUDY 11/24/2020    SPECGRAV 1.025 11/24/2020    LEUKOCYTESUR Negative 11/24/2020    UROBILINOGEN 0.2 11/24/2020    BILIRUBINUR Negative 11/24/2020    BLOODU LARGE 11/24/2020    GLUCOSEU Negative 11/24/2020    KETUA Negative 11/24/2020    AMORPHOUS MANY 11/24/2020        Imaging:  Xr Abdomen (kub) (single Ap View)    Result Date: 11/23/2020  EXAMINATION: ONE SUPINE XRAY VIEW(S) OF THE ABDOMEN 11/23/2020 9:38 am COMPARISON: None HISTORY: ORDERING SYSTEM PROVIDED HISTORY: abdominal pain TECHNOLOGIST PROVIDED HISTORY: Reason for exam:->abdominal pain FINDINGS: There is a nonobstructive bowel gas pattern. There are no abnormal air-fluid levels. There are no calculi overlying the renal shadows. There is an NG tube within the stomach. There is a stent graft seen within the abdominal aorta. 1. There is no bowel obstruction, ileus or free air. Ct Lumbar Spine Wo Contrast    Result Date: 11/19/2020  EXAMINATION: CT OF THE LUMBAR SPINE WITHOUT CONTRAST  11/18/2020 TECHNIQUE: CT of the lumbar spine was performed without the administration of intravenous contrast. Multiplanar reformatted images are provided for review. Dose modulation, iterative reconstruction, and/or weight based adjustment of the mA/kV was utilized to reduce the radiation dose to as low as reasonably achievable. COMPARISON: 11/17/2020 HISTORY: ORDERING SYSTEM PROVIDED HISTORY: exclude epidural hematoma TECHNOLOGIST PROVIDED HISTORY: Reason for exam:->exclude epidural hematoma Chronic back pain, recent epidural placement FINDINGS: BONES/ALIGNMENT: Vertebral body heights are preserved without evidence for lumbar fracture. However, there is irregular lucency and sclerosis in the upper sacral ala bilaterally. There is minimal retrolisthesis at L2-L3. Alignment is otherwise normal. DEGENERATIVE CHANGES: There is disc space narrowing and vacuum disc phenomenon at L2-L3.   Disc space heights are otherwise preserved. There is diffuse facet arthropathy. SOFT TISSUES/RETROPERITONEUM: No evidence for epidural hematoma is identified. 1. No evidence for epidural hematoma on CT scan. 2. Suspected sacral insufficiency fracture. Xr Chest Portable    Result Date: 11/24/2020  EXAMINATION: ONE XRAY VIEW OF THE CHEST 11/24/2020 6:36 am COMPARISON: 11/23/2020 HISTORY: ORDERING SYSTEM PROVIDED HISTORY: resp failure TECHNOLOGIST PROVIDED HISTORY: Reason for exam:->resp failure FINDINGS: The heart is borderline enlarged. Dense opacity remains in the mid and upper left lung unchanged. Moderate amount of opacity present in the lower right lung. Small amount of opacity present in the left lung base favored to be due to atelectasis. No abnormal pulmonary vascular congestion. ET tube and NG tube unchanged. Persistent dense opacity in the upper left lung unchanged. Moderate opacity in the right lung base worrisome for pneumonia. Probable mild left basilar atelectasis. No abnormal vascular congestion. Xr Chest Portable    Result Date: 11/23/2020  EXAMINATION: ONE XRAY VIEW OF THE CHEST 11/23/2020 4:54 pm COMPARISON: Multiple prior chest series with the most recent dated November 23, 2020 at [facility]: ETT adjusted TECHNOLOGIST PROVIDED HISTORY: Reason for exam:->ETT adjusted Reason for exam:->ETT advanced to 28 cm FINDINGS: Medical support devices: Endotracheal tube terminates in the mid to distal thoracic trachea. Enteric tube extends subdiaphragmatic and off the field of view. Lungs: Stable left upper lung complete opacification. Persistent right lower lung opacity. Slight elevation of the right hemidiaphragm is stable. Left lower lung not imaged. Cardiomediastinal silhouette and pulmonary vascularity: There appears to be atherosclerotic disease and prominence of the cardiac silhouette. Osseous and soft tissue structures: No acute findings.     1.  Unchanged left upper lung complete opacification. 2.  Elevation of the right hemidiaphragm and right lower lung opacity. 3.  Medical support devices as above. Xr Chest Portable    Result Date: 11/23/2020  EXAMINATION: ONE XRAY VIEW OF THE CHEST 11/23/2020 7:08 am COMPARISON: 11/20/2020 HISTORY: ORDERING SYSTEM PROVIDED HISTORY: resp failure TECHNOLOGIST PROVIDED HISTORY: Reason for exam:->resp failure FINDINGS: There is no interval change in the persistent dense consolidation within the left upper lobe. The left lower lobe is clear. The right lung is clear. There is minimal atelectasis seen within the right lung base. The cardiac silhouette is within normals. 1. No interval change in the dense consolidation seen within the left upper lobe. 2. Minimal right lung base atelectasis. Xr Chest Portable    Result Date: 11/22/2020  EXAMINATION: ONE XRAY VIEW OF THE CHEST 11/22/2020 6:28 am COMPARISON: 11/21/2020 HISTORY: ORDERING SYSTEM PROVIDED HISTORY: resp failure TECHNOLOGIST PROVIDED HISTORY: Reason for exam:->resp failure FINDINGS: The endotracheal tube is stable. The enteric tube tip is not well visualized but may be at the gastroesophageal junction. There is stable left upper lobe dense opacity. There are continued patchy opacities in the left lower lobe. There may be small pleural effusions. No pneumothorax is seen. No significant change in dense airspace opacity of the left upper lobe and patchy left lower lobe airspace opacities. Enteric tube tip is not well visualized due to underpenetration. The tip may be at the gastroesophageal junction. This could be confirmed with KUB centered on the upper abdomen. Xr Chest Portable    Result Date: 11/21/2020  EXAMINATION: ONE XRAY VIEW OF THE CHEST 11/21/2020 7:37 am COMPARISON: 11/20/2020 HISTORY: ORDERING SYSTEM PROVIDED HISTORY: resp failure TECHNOLOGIST PROVIDED HISTORY: Reason for exam:->resp failure FINDINGS: Endotracheal tube and nasogastric tube are unchanged. Large opacity in the left upper lobe is unchanged. There is additional airspace disease in the left lower lobe which is stable. There is no pneumothorax. Small pleural effusions are not excluded. Unchanged large opacity/consolidation in left upper lobe. Unchanged airspace disease in left lower lobe. Xr Chest Portable    Result Date: 11/20/2020  EXAMINATION: ONE XRAY VIEW OF THE CHEST 11/20/2020 6:33 am COMPARISON: 11/19/2020 HISTORY: ORDERING SYSTEM PROVIDED HISTORY: resp failure TECHNOLOGIST PROVIDED HISTORY: Reason for exam:->resp failure FINDINGS: Endotracheal and enteric tubes remain in place. There has been no appreciable change in opacities throughout the left lung, most pronounced in the left apex. The right lung is clear. The heart size is at the upper limits of normal.  There is no discernible pneumothorax. Grossly stable opacities on the left. Xr Chest Portable    Result Date: 11/19/2020  EXAMINATION: ONE XRAY VIEW OF THE CHEST 11/19/2020 6:23 am COMPARISON: 11/18/2020 HISTORY: ORDERING SYSTEM PROVIDED HISTORY: resp failure TECHNOLOGIST PROVIDED HISTORY: Reason for exam:->resp failure FINDINGS: Evaluation is limited by the patient's body habitus and the portable technique. The right lung apex was partially excluded. There is increased dense consolidation in the left upper lobe. There is also medial left basilar airspace opacity obscuring the hemidiaphragm. The heart size is mildly enlarged. There is no discernible pneumothorax. Endotracheal and enteric tubes are again seen. Increasing multifocal left lung pneumonia. Xr Chest Portable    Result Date: 11/18/2020  EXAMINATION: ONE XRAY VIEW OF THE CHEST 11/18/2020 6:39 am COMPARISON: CT chest November 17, 2020. HISTORY: ORDERING SYSTEM PROVIDED HISTORY: SOB TECHNOLOGIST PROVIDED HISTORY: Reason for exam:->SOB FINDINGS: The cardiac silhouette is within normal limits in size. There is masslike consolidation of the left upper lobe. There is a small to moderate left dependent pleural effusion. There is no visible pneumothorax. The pulmonary vessels are within normal limits. Left upper lobe masslike consolidation. Small to moderate left pleural effusion. Xr Chest 1 View    Result Date: 11/18/2020  EXAMINATION: ONE XRAY VIEW OF THE CHEST 11/18/2020 9:06 am COMPARISON: 11/18/2020 HISTORY: ORDERING SYSTEM PROVIDED HISTORY: intubation TECHNOLOGIST PROVIDED HISTORY: Reason for exam:->intubation Reason for exam:->portable FINDINGS: Compared to the chest radiograph from earlier today, 6:44 a.m., there is interval placement of an endotracheal tube, the tip of which is approximately 4.1 cm above the korina. Nasogastric tube is present but is suboptimally visualized due to motion artifact and relative underpenetration of the study. The abdominal radiograph demonstrates it to be well positioned, with the tip in the upper abdomen. Prominent masslike consolidative opacity in the upper left lung are grossly stable. No pneumothorax is seen. Layering left pleural effusion. 1.  The life-support lines and tubes are well positioned. 2. Masslike consolidative opacity in the left upper lung. Small left effusion. Cta Chest W Contrast    Result Date: 11/17/2020  EXAMINATION: CTA OF THE CHEST 11/17/2020 3:18 pm TECHNIQUE: CTA of the chest was performed after the administration of intravenous contrast.  Multiplanar reformatted images are provided for review. MIP images are provided for review. Dose modulation, iterative reconstruction, and/or weight based adjustment of the mA/kV was utilized to reduce the radiation dose to as low as reasonably achievable.; CTA of the abdomen and pelvis was performed with the administration of intravenous contrast. Multiplanar reformatted images are provided for review. MIP images are provided for review.  Dose modulation, iterative reconstruction, and/or weight based adjustment of the mA/kV was utilized to reduce the radiation dose to as low as reasonably achievable. COMPARISON: None. HISTORY: ORDERING SYSTEM PROVIDED HISTORY: aneurysm, cp, sob TECHNOLOGIST PROVIDED HISTORY: Reason for exam:->aneurysm, cp, sob FINDINGS: CTA chest: There are confluent opacities located in left upper lobe. Patchy airspace opacities located in lingula. There is moderate to large left pleural effusion. Peribronchial thickening extensor in left hilar location into the left lung base. There is subsegmental atelectasis in posterior right lower lobe with adjacent ground-glass opacities. There is pulmonary vascular congestion. The heart is mildly enlarged. There is mediastinal lymphadenopathy. Ascending thoracic aorta measures 3.7 cm in diameter. Descending thoracic aorta measures 3 cm in diameter. No evidence of thoracic aortic aneurysm or dissection. Distal descending thoracic aorta is tortuous. CTA abdomen/pelvis: There is evidence of endograft repair involving the abdominal aorta. There is redemonstration of fusiform abdominal aortic aneurysm measuring up to 6.4 cm. This finding is stable since prior. No evidence of endoleak. No retroperitoneal fluid collections. Iliac limbs of endograft appear patent. Common iliac arteries are tortuous. Common femoral arteries are patent. Numerous diverticula associated with the left colon and sigmoid colon. No evidence of acute diverticulitis. Liver, gallbladder, pancreas, and spleen are grossly unremarkable however there is motion artifact limiting this examination. There is no hydronephrosis. Cyst associated with the right kidney is stable since previous examination as well as cyst associated with the left kidney appearing stable since prior. Appendix is visualized and normal.    1.  New confluent opacities located in left upper lobe suggesting pneumonia, atelectasis, or neoplasm. 2.  Patchy airspace opacities located in lingula suggesting pneumonia with areas of atelectasis.  3.  Stenosis and dissection. Distal descending thoracic aorta is tortuous. CTA abdomen/pelvis: There is evidence of endograft repair involving the abdominal aorta. There is redemonstration of fusiform abdominal aortic aneurysm measuring up to 6.4 cm. This finding is stable since prior. No evidence of endoleak. No retroperitoneal fluid collections. Iliac limbs of endograft appear patent. Common iliac arteries are tortuous. Common femoral arteries are patent. Numerous diverticula associated with the left colon and sigmoid colon. No evidence of acute diverticulitis. Liver, gallbladder, pancreas, and spleen are grossly unremarkable however there is motion artifact limiting this examination. There is no hydronephrosis. Cyst associated with the right kidney is stable since previous examination as well as cyst associated with the left kidney appearing stable since prior. Appendix is visualized and normal.    1.  New confluent opacities located in left upper lobe suggesting pneumonia, atelectasis, or neoplasm. 2.  Patchy airspace opacities located in lingula suggesting pneumonia with areas of atelectasis. 3.  Stenosis and occlusion are associated with left upper lobe segmental bronchi and lingular segmental bronchi. 4.  Moderate to large left pleural effusion. 5.  Mediastinal lymphadenopathy. 6.  Stable fusiform infrarenal abdominal aortic aneurysm with endograft repair. Aneurysm measures up to 6.4 cm. No evidence of endoleak or retroperitoneal fluid. 7.  Diverticulosis. Xr Abdomen For Ng/og/ne Tube Placement    Result Date: 11/22/2020  EXAMINATION: ONE SUPINE XRAY VIEW(S) OF THE ABDOMEN 11/22/2020 5:06 pm COMPARISON: November 22, 2020, 4 hours prior. HISTORY: ORDERING SYSTEM PROVIDED HISTORY: Confirmation of course of NG/OG/NE tube and location of tip of tube TECHNOLOGIST PROVIDED HISTORY: Reason for exam:->Confirmation of course of NG/OG/NE tube and location of tip of tube Portable? ->Yes FINDINGS: Tip of the NG tube noted at the level of the diaphragm, no significant change. The right side and the inferior part of the abdomen is not included in the study. There is opacification of the visualized left upper lung. Tip of NG tube at the level of the left hemidiaphragm, no change. Xr Chest Abdomen Ng Placement    Result Date: 11/22/2020  EXAMINATION: ONE SUPINE XRAY VIEW(S) OF THE ABDOMEN 11/22/2020 12:52 pm COMPARISON: November 18. HISTORY: ORDERING SYSTEM PROVIDED HISTORY: OG placement TECHNOLOGIST PROVIDED HISTORY: Reason for exam:->OG placement FINDINGS: Nasogastric tube is present. Side hole appears to be at level of gastroesophageal junction. This tube could be advanced approximately 4 or 5 cm. Nasogastric tube is present with side hole at level of gastroesophageal junction. This tube could be advanced approximately 4 or 5 cm. Xr Chest Abdomen Ng Placement    Result Date: 11/18/2020  EXAMINATION: ONE SUPINE XRAY VIEW(S) OF THE ABDOMEN 11/18/2020 9:07 am COMPARISON: 11/18/2020, about 2 hours earlier HISTORY: ORDERING SYSTEM PROVIDED HISTORY: NGT TECHNOLOGIST PROVIDED HISTORY: Reason for exam:->NGT Reason for exam:->portable NG tube placement FINDINGS: There is an NG tube with the tip in the stomach. An ET tube is again noted in satisfactory position. There is extensive opacity in the left lung, most likely pneumonia. Increased markings also seen in the visualized portion of the right lung. The heart is enlarged. NG tube tip in the stomach. Fluoro For Surgical Procedures    Result Date: 11/17/2020  EXAMINATION: SPOT FLUOROSCOPIC IMAGES 11/17/2020 12:03 pm TECHNIQUE: Fluoroscopy was provided by the radiology department for procedure. Radiologist was not present during examination.  FLUOROSCOPY DOSE AND TYPE OR TIME AND EXPOSURES: Total dose:26.99 mGy COMPARISON: None HISTORY: ORDERING SYSTEM PROVIDED HISTORY: Pain management TECHNOLOGIST PROVIDED HISTORY: Reason for exam:->L4-5 Epidural steroid injection #1 Intraprocedural imaging. FINDINGS: 3 intraoperative fluoroscopic images were obtained during L4-5 epidural steroid injection. Intraprocedural fluoroscopic spot images as above. See separate procedure report for more information. Assessment  1. Acute hypoxic hypercapnic respiratory secondary to community-acquired pneumonia, on mechanical ventilation  2. Acute kidney injury secondary to overt diuresis, questionable underlying chronic kidney disease with evidence of proteinuria  3. Anasarca, multifactorial secondary to hypoalbuminemia, aggressive IVF, anemia  4. Hypernatremia secondary to free water deficit, loop diuretic use. Calculated free water deficit is 3.75 L  5. Hyperkalemia  6. Anemia, normocytic, oncology on board  7. Severe thrombocytopenia    Plan    1. Start IV D5W at 50 ml/hr  2. C/W Free water flushes at 100 ml/hr  3. Repeat sodium at 7 pm, nurse to call if >150  4. Renal function has stabilized with adequate UO, continue to hold diuretics        Thank you Dr. Josemanuel Tubbs for allowing us to participate in care of Sean Shin.             Electronically signed by Moises Haywood MD on 11/28/2020 at 11:49 AM

## 2020-11-28 NOTE — PROGRESS NOTES
Critical Care Team - Daily Progress Note         Date and time: 2020 4:28 PM  Patient's name:  Germán Acosta. Medical Record Number: [id number]  Patient's account/billing number: [de-identified]  Patient's YOB: 1960  Age: 61 y.o.   Date of Admission: 2020  1:13 PM  Length of stay during current admission: 11      Primary Care Physician: Arelis Mosquera PA-C  ICU Attending Physician: Dr. Mary Bucio    Code Status: Full Code    Reason for ICU admission: acute hypoxic respiratory failure      SUBJECTIVE:     Diuresed appropriately    Full vent support   Awake and following commands   psv trial this am          CURRENT VENTILATION STATUS:     [x] Ventilator  [] BIPAP  [] Nasal Cannula [] Room Air        SECRETIONS : Amount:  [] Small [x] Moderate  [] Large  [] None  Color:     [] White [x] Colored  [] Bloody    SEDATION:  RAAS Score:  [] Propofol gtt  [x] Versed gtt  [] Ativan gtt   [] No Sedation    PARALYZED:  [x] No    [] Yes      VASOPRESSORS:  [x] No    [] Yes    If yes -   [] Levophed       [] Dopamine     [] Vasopressin       [] Dobutamine  [] Phenylephrine         [] Epinephrine    CENTRAL LINES:     [x] No   [] Yes   (Date of Insertion:   )           If yes -     [] Right IJ     [] Left IJ [] Right Femoral [] Left Femoral                   [] Right Subclavian [] Left Subclavian       HUFF'S CATHETER:   [] No   [x] Yes  (Date of Insertion:   )     URINE OUTPUT:            [x] Good   [] Low              [] Anuric      OBJECTIVE:     VITAL SIGNS:  BP (!) 131/101   Pulse 123   Temp 99 °F (37.2 °C) (Bladder)   Resp 18   Ht 5' 11\" (1.803 m)   Wt (!) 319 lb 7.1 oz (144.9 kg)   SpO2 90%   BMI 44.55 kg/m²   Tmax over 24 hours:  Temp (24hrs), Av °F (37.2 °C), Min:98.3 °F (36.8 °C), Max:99.5 °F (37.5 °C)      Patient Vitals for the past 6 hrs:   BP Temp Temp src Pulse Resp SpO2   20 1600 (!) 131/101 -- -- 123 18 90 %   11/28/20 1500 (!) 146/84 -- -- 117 27 90 % 11/28/20 1427 -- -- -- 119 22 90 %   11/28/20 1400 135/86 -- -- 119 (!) 61 90 %   11/28/20 1300 132/87 -- -- 110 17 91 %   11/28/20 1200 -- 99 °F (37.2 °C) Bladder 97 18 93 %   11/28/20 1131 122/87 -- -- 115 (!) 35 (!) 86 %   11/28/20 1100 122/87 -- -- 90 19 93 %   11/28/20 1030 122/87 -- -- 96 21 93 %         Intake/Output Summary (Last 24 hours) at 11/28/2020 1628  Last data filed at 11/28/2020 1224  Gross per 24 hour   Intake 2160 ml   Output 2025 ml   Net 135 ml     Wt Readings from Last 2 Encounters:   11/27/20 (!) 319 lb 7.1 oz (144.9 kg)   11/17/20 (!) 314 lb (142.4 kg)     Body mass index is 44.55 kg/m². PHYSICAL EXAMINATION:  General: Intubated   HEENT:  Normocephalic, atraumatic. Pulls equal and reactive no scleral icterus. No conjunctival injection. No nasal discharge. Neck:  Supple, without stridor, no JVD  Heart:  RRR, no murmurs, gallops, rubs  Lungs: Greatly diminished in left upper lobe, rhonchorous throughout the remainder of the lung fields   Abdomen: Obese, bowel sounds present, soft, non-tender, non-distended, no guarding or rigidity. Not tender to palpation  Extremities:  No clubbing, cyanosis,1+ edema bilaterally. Palpable radial and DP pulses b/l upper and lower extremieties  Skin:  Warm and dry,  Neuro: Following commands. Cranial nerves II through XII grossly intact.   No focal neurologic deficits  Breast: deferred  Rectal: deferred  Genitalia:  deferred     MEDICATIONS:    Scheduled Meds:   potassium bicarb-citric acid  40 mEq Per NG tube BID    sodium chloride  20 mL Intravenous Once    ampicillin-sulbactam  3 g Intravenous Q6H    methylPREDNISolone  40 mg Intravenous Q12H    sucralfate  1 g Oral 4 times per day    heparin flush  1 mL Intravenous 2 times per day    insulin lispro  0-18 Units Subcutaneous Q6H    nicotine  1 patch Transdermal Daily    acetylcysteine  4 mL Inhalation BID    albuterol  2.5 mg Nebulization Q4H    Arformoterol Tartrate  15 mcg Nebulization Pressure support trials              -Follow ABG              -Albuterol, incentive spirometer              -solumedrol q12   -wean FiO2 as able   -metanebs   -Mucomyst   -bedside L thoracentesis 11/22 with only a small amount of pleural fluid, not amendable to drainage   -bronch 11/27 showing extrinsic compression from mass, no mucus plug     Community-acquired pneumonia post obstructive pna              -Strep and Legionella negative              -Respiratory culture pending              -Pro-Kael 0.23   -continue unasyn     Lung mass OMER              -Status post bronc 11/18 with TBNA              -No obvious mucous plugging or purulence. Very erythematous and compressed airways, concern for mass              -Cytology concerning for small cell lung cancer. Heme-onc on board.     Respiratory film array negative  Long, lifetime smoker--nicotine patch     Cardiovascular   CAD/stents              -Plavix is on hold for approximately 7 days per patient he had his epidural.  It has still been on hold in case he needs further procedures and thrombocytopenia     History of aortic aneurysm              -Status post endovascular repair              -Stable on most recent CT    Get ECHO. Less concern for PE as HR and bp stable, no change in o2 requirement since admission     Gastrointestinal   Tube feeding   Abdominal pain-resolved. no peritoneal signs. KUB unremarkable  PPI held from nephro and started on carafate, most likely 2/2 platelets?     Renal   Nephrology following   Hyperkalemia- resolved  hypophos- resolved    Hypernatremia   -free water deficit   -increase free water   -nephro following    ISIDRO   -nephro following and recs appreciated   -stop bumex drip     Infectious Disease     Community-acquired pneumonia   -recultured              -respiratory cx growing candida, light growth   -switched to unasyn         Blood cultures gram positive cocci   -coag neg staph. Most likely contaminant.  Stop vanco.    Blood and urine cultures pending     Hematology/Oncology   Thrombocytopenia              - received platelets x1              -heme/onc following   -most llikely 2/2 neoplasm, drugs and infection   -Transfuse for Hgb <7, platelets <34    Small cell lung cancer   -biopsy results   -heme/onc and pulmonology for further work up and recommendations   -not inpatient emergent chemo candidate   -rad/onc consults, MRI brain when stable    DVT   -left axillary vein   -platelets 33, no anticoagulation at this time until platelets rise     Endocrine   Diabetes              -Noninsulin-dependent              -Monitor sugars   -Increase to high-dose sliding scale    msk   Chronic back pain              -Status post epidural on 11/17              -No erythematous region or fluctuance              -CT with no evidence of epidural hematoma     Social/Spiritual/DNR/Other   Full code      Dispo: continue ICU level of care    Rodger Smith M.D.    Pulmonary/Critical Care Medicine   36 min cct excluding procedures

## 2020-11-28 NOTE — PLAN OF CARE
Problem: Falls - Risk of:  Goal: Absence of physical injury  Description: Absence of physical injury  Outcome: Met This Shift     Problem: Skin Integrity:  Goal: Absence of new skin breakdown  Description: Absence of new skin breakdown  Outcome: Met This Shift     Problem: Pain:  Goal: Control of acute pain  Description: Control of acute pain  Outcome: Met This Shift     Problem: HH FLUID RETENTION-CHF  Goal: Absence of fluid overload signs and symptoms  Outcome: Met This Shift

## 2020-11-28 NOTE — PROGRESS NOTES
Spontaneous Parameters performed    VT = 637 ml  f = 20  B/M  Ve = 10.5 L/M  NIF = -31  cmH2O  VC = .72 L  RSBI = 31    Evaluated with PS 5 over PEEP of 12    Performed by Gelacio Tan

## 2020-11-28 NOTE — PROGRESS NOTES
[facility] Progress Note    Admitting Date and Time: 11/17/2020  1:13 PM  Admit Dx: Acute respiratory failure with hypoxia ([address] [J96.01]  Acute respiratory failure with hypoxia (HCC) [J96.01]    Subjective:  Patient is being followed for Acute respiratory failure with hypoxia ([address] [J96.01]  Acute respiratory failure with hypoxia ([address] [J96.01]     Brief history per pulmonary critical care: \"The patient is a 57 y. o. male with significant past medical history of diabetes, MANOLO, back pain, hypertension, hyperlipidemia, STEMI's status post 5 stents, CAD, aortic aneurysm presenting to the the hospital initially on 11/17. Anni Wild had a epidural performed for low back pain and after the procedure became very short of breath.  He was satting 80% so was sent to the hospital for further evaluation.  While in the ED patient was found to be thrombocytopenic as well as hypoxic on room air.  He was placed on 4 L of oxygen but O2 sats improved.  CTA of the chest demonstrating pneumonia and pleural effusions.  He was given 1 dose of Rocephin and azithromycin while in the ER.     On 11/18 RRT was called at 7:30 AM. Ruiz Thornton was found to have increased work of breathing as well as hypercapnic on ABGs.  He was on BiPAP all night with minimal improvement of his symptoms.  During the RT he was switched to [place] was subsequently brought down to the ICU for further evaluation and care. He was not improving on AVAPS and subsequently intubated.     A bronchoscopy was performed on 11/18 and biopsies were sent. \"    Pt remains intubated. He follows commands and follows with his eyes. ROS: denies fever, chills, cp, sob, n/v, HA unless stated above.       potassium bicarb-citric acid  40 mEq Per NG tube BID    sodium chloride  20 mL Intravenous Once    ampicillin-sulbactam  3 g Intravenous Q6H    methylPREDNISolone  40 mg Intravenous Q12H    sucralfate  1 g Oral 4 times per day    heparin flush  1 mL Intravenous 2 times per day    insulin lispro  0-18 Units Subcutaneous Q6H    nicotine  1 patch Transdermal Daily    acetylcysteine  4 mL Inhalation BID    albuterol  2.5 mg Nebulization Q4H    Arformoterol Tartrate  15 mcg Nebulization BID    atorvastatin  80 mg Oral Nightly    [Held by provider] clopidogrel  75 mg Oral Daily    fluticasone  2 spray Nasal Daily    sodium chloride flush  10 mL Intravenous 2 times per day    [Held by provider] enoxaparin  40 mg Subcutaneous Daily    insulin glargine  0.25 Units/kg Subcutaneous Nightly    chlorhexidine  15 mL Mouth/Throat BID    sodium chloride (PF)  10 mL Intravenous Daily     potassium chloride, 10 mEq, PRN  acetaminophen, 650 mg, Q4H PRN    Or  acetaminophen, 650 mg, Q4H PRN  sodium chloride flush, 10 mL, PRN  heparin flush, 1 mL, PRN  sodium phosphate IVPB, 0.16 mmol/kg, PRN    Or  sodium phosphate IVPB, 0.32 mmol/kg, PRN  magnesium sulfate, 1 g, PRN  tiZANidine, 4 mg, TID PRN  polyethylene glycol, 17 g, Daily PRN  promethazine, 12.5 mg, Q6H PRN    Or  ondansetron, 4 mg, Q6H PRN  glucose, 15 g, PRN  dextrose, 12.5 g, PRN  glucagon (rDNA), 1 mg, PRN  dextrose, 100 mL/hr, PRN  oxyCODONE-acetaminophen, 1 tablet, TID PRN    And  oxyCODONE, 2.5 mg, TID PRN  albuterol, 2.5 mg, Q4H PRN         Objective:    BP (!) 147/97   Pulse 92   Temp 99.1 °F (37.3 °C) (Bladder)   Resp 21   Ht 5' 11\" (1.803 m)   Wt (!) 319 lb 7.1 oz (144.9 kg)   SpO2 94%   BMI 44.55 kg/m²     General Appearance: alert and in no acute distress  Skin: cool and dry, especially the extremities  Head: normocephalic and atraumatic  Eyes: pupils equal, round, and reactive to light, extraocular eye movements intact, conjunctivae normal  Neck: neck supple and non tender without mass   Pulmonary/Chest: clear to auscultation bilaterally- no wheezes, rales or rhonchi, normal air movement, no respiratory distress  Cardiovascular: normal rate, normal S1 and S2 and no carotid bruits  Abdomen: soft, non-tender, non-distended, normal bowel sounds, no masses or organomegaly  Extremities: no cyanosis, no clubbing and no edema        Recent Labs     11/26/20  2150 11/27/20  0535 11/28/20  0550   * 152* 151*   K 2.9* 3.3* 3.9    104 103   CO2 34* 35* 35*   * 106* 100*   CREATININE 1.8* 1.5* 1.3*   GLUCOSE 175* 197* 174*   CALCIUM 8.6 8.7 8.7       Recent Labs     11/26/20  0500 11/27/20  0535 11/28/20  0550   WBC 8.4 8.2 7.2   RBC 2.77* 2.96* 2.92*   HGB 7.5* 8.1* 7.9*   HCT 26.0* 27.4* 26.8*   MCV 93.9 92.6 91.8   MCH 27.1 27.4 27.1   MCHC 28.8* 29.6* 29.5*   RDW 16.3* 16.3* 16.6*   PLT 29* 33* 31*   MPV NOT CALC 12.0 13.3*         Assessment:    Active Problems:    CAD (coronary artery disease)    Smoker    Non-insulin dependent type 2 diabetes mellitus ([facility]    Morbid obesity with BMI of 40.0-44.9, adult (HCC)    Chronic back pain    Acute respiratory failure with hypoxia ([facility]    Community acquired pneumonia of left upper lobe of lung    Thrombocytopenia (HCC)  Resolved Problems:    * No resolved hospital problems. *      Plan:  1. Acute hypoxic resp failure due to CAP  -  Left sided pneumonia on admission  -  Full vent support  -  Pulm/cc following  -  Solumedrol now at q12 h  -  Nebs  -  Thoracentesis performed 11/22/20 with only small amount of pleural fluid  -  Respiratory cultures pending; 24 hours no growth  -  Bronchoscopy performed on 11/18/20    2. CAP  -  Continue antibiotics; currently on unasyn  - strep and legionella negative  - procalcitonin 0.23  -  resp culture neg x 24 hours  -  ID was consulted  -  ?drug induced fevers-cefepime discontinued as result    3. Hx CAD with stent placement  -  plavix of currently on hold  -  Hx aortic aneurysm s/p endovascular repair    4. Hypoalbuminemia  -  With anasarca/volume overload  -  Renal continues to follow  -  IV diuretics discontinued  -  Albumin per renal    5.   Hypernatremia-2/2 free water deficits; slight worsening  - Renal following  -  Free water flushes    6. Left lung CA-with pleural effusion; Most consistent with small cell lung cancer  -  Hem/onc and pulmonology following  -  Rad/onc consulted by pulm. 7.  NIDDM- continue lantus and ISS    8. Chronic back pain, s/p epidural injection 11/17/20; CT spine neg for hematoma    9. Essential hypertension-  BB currently on hold due to low BP    10. Mixed hyperlipidemia with hx CAD  -  Continue statin    11. Suspect COPD with MANOLO  -  Morbid Obesity and 39 pack year smoking history  -  Patient will require sleep studies and PFTs as outpatient    12. LUE DVT-partially occlusive  -   Hematology on board  -  Cannot anticoagulate due to low platelets    13. Hypokalemia-nephrology following and repleting as needed. NOTE: This report was transcribed using voice recognition software. Every effort was made to ensure accuracy; however, inadvertent computerized transcription errors may be present.   Electronically signed by Saulo Baig MD on 11/28/2020 at 7:48 AM

## 2020-11-28 NOTE — PROGRESS NOTES
[address] [place] [address] [facility]  [facility]  Progress Note    SUBJECTIVE:  Chief Complaint   Patient presents with    Shortness of Breath     patient sent from surgery after having epideral injection L4-L5. SOB has been for past two weeks     No major changes. The patient is still in the ICU. He is tolerating antibiotics. Temperatures are trending downwards. Review of systems:  As stated above in the chief complaint, otherwise negative.     Medications:  Scheduled Meds:   potassium bicarb-citric acid  40 mEq Per NG tube BID    sodium chloride  20 mL Intravenous Once    ampicillin-sulbactam  3 g Intravenous Q6H    methylPREDNISolone  40 mg Intravenous Q12H    sucralfate  1 g Oral 4 times per day    heparin flush  1 mL Intravenous 2 times per day    insulin lispro  0-18 Units Subcutaneous Q6H    nicotine  1 patch Transdermal Daily    acetylcysteine  4 mL Inhalation BID    albuterol  2.5 mg Nebulization Q4H    Arformoterol Tartrate  15 mcg Nebulization BID    atorvastatin  80 mg Oral Nightly    [Held by provider] clopidogrel  75 mg Oral Daily    fluticasone  2 spray Nasal Daily    sodium chloride flush  10 mL Intravenous 2 times per day    [Held by provider] enoxaparin  40 mg Subcutaneous Daily    insulin glargine  0.25 Units/kg Subcutaneous Nightly    chlorhexidine  15 mL Mouth/Throat BID    sodium chloride (PF)  10 mL Intravenous Daily     Continuous Infusions:   fentaNYL 5 mcg/ml in 0.9%  ml infusion 200 mcg/hr (11/28/20 0534)    propofol Stopped (11/24/20 1140)    dextrose      midazolam 8 mg/hr (11/27/20 2032)     PRN Meds:potassium chloride, acetaminophen **OR** acetaminophen, sodium chloride flush, heparin flush, sodium phosphate IVPB **OR** sodium phosphate IVPB, magnesium sulfate, tiZANidine, polyethylene glycol, promethazine **OR** ondansetron, glucose, dextrose, glucagon (rDNA), dextrose, oxyCODONE-acetaminophen **AND** oxyCODONE, albuterol    OBJECTIVE:  BP 128/83   Pulse 93   Temp 99 °F (37.2 °C) (Bladder)   Resp 22   Ht 5' 11\" (1.803 m)   Wt (!) 319 lb 7.1 oz (144.9 kg)   SpO2 98%   BMI 44.55 kg/m²   Temp  Av.6 °F (37.6 °C)  Min: 98.3 °F (36.8 °C)  Max: 100.5 °F (38.1 °C)  Constitutional: The patient is lying in bed in the ICU. Opens eyes. No distress. Restrained. FiO2 down to 50%. PEEP 12. Skin: Warm and dry. No rashes were noted. HEENT: Round and reactive pupils. Moist mucous membranes. No ulcerations or thrush. ET tube. OG tube. Neck: Supple to movements. Chest: No coarse breath sounds. No crackles. Cardiovascular: Heart sounds rhythmic and regular. Abdomen: Large, round and nondistended. Positive bowel sounds to auscultation. Benign to palpation. Extremities: Minimal edema. Lines: Left midline 2020. Black catheter.     Laboratory and Tests Review:  Lab Results   Component Value Date    WBC 7.2 2020    WBC 8.2 2020    WBC 8.4 2020    HGB 7.9 (L) 2020    HCT 26.8 (L) 2020    MCV 91.8 2020    PLT 31 (L) 2020     Lab Results   Component Value Date    NEUTROABS 5.49 2020    NEUTROABS 6.40 2020    NEUTROABS 5.80 2020     No results found for: [facility]  Lab Results   Component Value Date    ALT 37 2020    AST 46 (H) 2020    ALKPHOS 136 (H) 2020    BILITOT 0.6 2020     Lab Results   Component Value Date     2020    K 3.9 2020    K 5.1 2020     2020    CO2 35 2020     2020    CREATININE 1.3 2020    CREATININE 1.5 2020    CREATININE 1.8 2020    GFRAA >60 2020    LABGLOM 56 2020    GLUCOSE 174 2020    GLUCOSE 120 2011    PROT 5.6 2020    LABALBU 3.2 2020    LABALBU 4.5 2011    CALCIUM 8.7 2020    BILITOT 0.6 2020    ALKPHOS 136 2020    AST 46 2020    ALT 37 2020     Lab Results   Component Value Date    CRP 2.5 (H) 11/25/2020     Lab Results   Component Value Date    SEDRATE 80 (H) 11/25/2020     Radiology:  Rest x-ray reviewed. No changes    Microbiology:   Respiratory panel (including SARS-CoV-2: Not detected  Streptococcus pneumoniae/Legionella urine Ag: negative  Nares screen MRSA: Negative  Urine culture 11/21/2020: Negative  Respiratory culture 11/18/2020: OP aristides absent. Respiratory culture 11/21/2020: OP aristides absent, Candida albicans  Respiratory culture 11/24/2020: OP aristides absent, Candida albicans  Blood cultures 11/21/2020 CoNS in 1 of 4 bottles   Blood cultures 11/24/2020: Negative so far    ASSESSMENT:  · Acute respiratory failure  · Left upper lobe cancer. Being followed by oncology. Not eligible for emergent chemotherapy  · Probable obstructive pneumonia  · Fever secondary to obstructive pneumonia, improving. Temperatures continue to trend downward  · Candida colonization of airways  · CoNS bacteremia.   This is a contaminant  · Acute kidney injury, improving    PLAN:  · Continue Unasyn  · No need to treat Candida colonization at this point  · No need to treat CoNS bacteremia   · Review final blood culture  · We will follow with you    Alejandro Magaña  9:29 AM  11/28/2020

## 2020-11-28 NOTE — PATIENT CARE CONFERENCE
ICU Team Members: Dr. Betzy Villela, bedside RN, nursing leadership     ICU Day #: NUMBER: 11     Intubation Date: November 18,2020     Ventilator Day #: NUMBER: 11     Central Line Insertion Date: November 23,2020                                                    Day #: NUMBER: 6      Arterial Line Insertion Date:  n/a                             Day #: n/a     DVT Prophylaxis: asa/lovenox and plavix held    GI Prophylaxis: ON DIET     Black Catheter Insertion Date: November 17,2020                                        Day #: 12                             Continued need (if yes, reason documented and discussed with physician): yes, strict I/o intubated/sedated     Skin Issues/ Wounds and ordered treatment discussed on rounds: no issues/abrasions elbows bilaterally     Goals/ Plans for the Day: Daily labs, wean 02, wean sedation, soft restraints to prevent pulling tubes, wean vent

## 2020-11-29 NOTE — PATIENT CARE CONFERENCE
Intensive Care Daily Quality Rounding Checklist      ICU Team Members: Dr. Sachi Hernandez, Dr. Erum Schwarz (resident), charge nurse, bedside nurse, respiratory therapist     ICU Day #: NUMBER: 12     Intubation Date: November 18,2020     [facility] Day [facility]: [id number] November 23,2020                                                    Day #: NUMBER: 7      Arterial Line Insertion Date:  n/a                             Day #: n/a     DVT Prophylaxis: PCDs    GI Prophylaxis: ON DIET     Black Catheter Insertion Date: November 17,2020                                        Day #: 13                             Continued need (if yes, reason documented and discussed with physician): yes, strict I/o intubated/sedated     Skin Issues/ Wounds and ordered treatment discussed on rounds: no issues/abrasions elbows bilaterally     Goals/ Plans for the Day: Daily labs, wean 02, wean sedation, soft restraints to prevent pulling tubes, wean vent

## 2020-11-29 NOTE — PROGRESS NOTES
[facility]  Hematology/Oncology  Consult      Patient Name: Della Wiseman. YOB: 1960  PCP: Aftab Stevenson PA-C   Referring Provider:      Reason for Consultation:   Chief Complaint   Patient presents with    Shortness of Breath     patient sent from surgery after having epideral injection L4-L5. SOB has been for past two weeks     Subjective: Remains intubated and in ICU. Alert and follows with eyes    History of Present Illness: This is a 60 yo male patient with a past medical history of CAD, DM type 2, morbid obesity, chronic back pain, HTN, AAA s/p repair who set to the emergency room by Dr. Logan Osuna for complaints of shortness of breath and decreased O2 during an epidural procedure. Patient was having an L4/L5 epidural for pain management done by Dr. Logan Osuna when his O2 dropped into the 80's during the procedure. Patient also reported having worsening shortness of breath over the past two weeks with a productive cough. Patient is basically bed bound only taking occasional steps. CTA of the chest, A/P showed new confluent opacities located in left upper lobe suggesting pneumonia, atelectasis, or neoplasm. Patchy airspace opacities located in lingula suggesting pneumonia with areas of atelectasis. Stenosis and occlusion are associated with left upper lobe segmental bronchi and lingular segmental bronchi. Moderate to large left pleural effusion. Mediastinal lymphadenopathy. Stable fusiform infrarenal abdominal aortic aneurysm with endograft repair. Aneurysm measures up to 6.4 cm. No evidence of endoleak or retroperitoneal fluid. He was given 1 dose of Rocephin and azithromycin while in the ER. CBC since admission has shown anemia and thrombocytopenia. 11/18 an RRT was called and was subsequently intubated. He also had a bronchoscopy done and bxs of left upper lobe were sent.        Diagnostic Data:     Past Medical History:   Diagnosis Date    Anticoagulant long-term use     Arthritis     CAD (coronary artery disease)     Chronic back pain     Depression     Dyslipidemia     Hiatal hernia 1979    History of ST elevation myocardial infarction (STEMI)     Hyperlipidemia     Hypertension     Low back pain     Lumbar radiculopathy 8/14/2019    Obesity     MANOLO on CPAP     Presence of stent in left circumflex coronary artery     Presence of stent in right coronary artery     Smoker     Type 2 diabetes mellitus without complication [facility])        Patient Active Problem List    Diagnosis Date Noted    Acute respiratory failure with hypoxia ([address] 11/17/2020    Community acquired pneumonia of left upper lobe of lung 11/17/2020    Thrombocytopenia ([address] 11/17/2020    Spinal stenosis of lumbar region with neurogenic claudication 10/29/2020    Obesity     Chronic back pain     Lumbosacral spondylosis without myelopathy 12/11/2019    Lumbar radiculopathy 08/14/2019    AAA (abdominal aortic aneurysm) ([address] 06/28/2019    Chronic bilateral low back pain with bilateral sciatica 06/11/2019    Right hip pain 06/11/2019    DDD (degenerative disc disease), lumbar 06/11/2019    Status post total right knee replacement 11/26/2018    Morbid obesity with BMI of 40.0-44.9, adult ([address] 11/26/2018    Chronic pain syndrome 07/27/2018    Chronic pain of right knee 07/27/2018    Non-insulin dependent type 2 diabetes mellitus ([address] 03/16/2018    Aortic valve stenosis 02/15/2018    Primary osteoarthritis of right knee 02/15/2018    Presence of stent in left circumflex coronary artery     Smoker     CAD (coronary artery disease) 12/28/2011    HTN (hypertension) 12/28/2011    Hyperlipemia 12/28/2011        Past Surgical History:   Procedure Laterality Date    ABDOMINAL AORTIC ANEURYSM REPAIR, ENDOVASCULAR N/A 6/28/2019    ENDOVASCULAR REPAIR ABDOMINAL AORTIC ANEURYSM performed by Romayne Spence, MD at [address] [place] [address],[address] Bilateral 3/12/2020    BILATERAL L3,L4,L5 MEDIAL BRANCH NERVE BLOCK performed by Paul Davenport DO at [address] Bilateral 6/11/2020    BILATERAL L3,L4,L5 MEDIAL NERVE BRANCH BLOCK #2 performed by Paul Davenport DO at [address] [place] [address] N/A 11/18/2020    BRONCHOSCOPY/TRANSBRONCHIAL NEEDLE BIOPSY performed by Janiya Yin MD at [address] [place] [address]  12/30/2011    Dr. Blanca Zheng 1st OM 3.0 x 20 Ion    DIAGNOSTIC CARDIAC CATH LAB PROCEDURE  3/15/2005    [facility]. Mild to moderate CAD. Normal LV.  DIAGNOSTIC CARDIAC CATH LAB PROCEDURE  1/16/2007    [facility]. Cath/PTCA/DE stent of proximal and distal RCA.  DIAGNOSTIC CARDIAC CATH LAB PROCEDURE  2/21/2007    Cath/DE stent of proximal OM1 and proximal Cx.  DIAGNOSTIC CARDIAC CATH LAB PROCEDURE  12/30/2011    ANURADHA of mid OM branch of circumflex.  ECHO COMPL W DOP COLOR FLOW  12/30/2011         HC INSERT PICC CATH, 5/> YRS - MIDLINE  11/23/2020         HERNIA REPAIR  2/2014    laparoscopic ventral hernia repain    JOINT REPLACEMENT Left 4/1/14    TKA-ed    JOINT REPLACEMENT Right 2018    KNEE    PAIN MANAGEMENT PROCEDURE Right 9/1/2020    RIGHT L3,4,5 MEDIAL BRANCH RADIOFREQUENCY ABLATION performed by Paul Davenport DO at [facility] N/A 11/17/2020    L4-5 EPIDURAL STEROID INJECTION #1 performed by Paul Davenport DO at [address] [place] [address] OFFICE/OUTPT VISIT,PROCEDURE ONLY Right 11/26/2018    RIGHT KNEE TOTAL ARTHROPLASTY performed by Sari Alexander DO at [facility] OR       Family History  Family History   Problem Relation Age of Onset    Diabetes Mother     Stroke Mother     Diabetes Father     No Known Problems Sister        Social History    TOBACCO:   reports that he has been smoking cigarettes. He started smoking about 42 years ago. He has a 39.00 pack-year smoking history.  He has never used smokeless tobacco.  ETOH:   reports no history of alcohol use.    Home Medications  Prior to Admission medications    Medication Sig Start Date End Date Taking? Authorizing Provider   loratadine (CLARITIN) 10 MG capsule Take 10 mg by mouth daily   Yes Historical Provider, MD   pioglitazone (ACTOS) 15 MG tablet TAKE 1 TABLET BY MOUTH EVERY DAY 11/6/20  Yes Sarah Richard PA-C   pregabalin (LYRICA) 150 MG capsule Take 1 capsule by mouth 2 times daily for 30 days. 11/12/20 12/12/20 Yes Elena Gregorio DO   diclofenac (VOLTAREN) 75 MG EC tablet TAKE 1 TABLET BY MOUTH TWICE A DAY 10/7/20  Yes Historical Provider, MD   oxyCODONE-acetaminophen (PERCOCET) 7.5-325 MG per tablet Take 1 tablet by mouth 3 times daily as needed for Pain for up to 30 days.  Intended supply: 30 days 11/12/20 12/12/20 Yes Elena Gregorio DO   quinapril (ACCUPRIL) 10 MG tablet Take 1 tablet by mouth daily  Patient taking differently: Take 10 mg by mouth daily Wife states on hold 10/16/20  Yes Sarah Richard PA-C   metFORMIN (GLUCOPHAGE) 500 MG tablet TAKE 1 TABLET BY MOUTH TWICE A DAY WITH MEALS  Patient taking differently: Wife states on hold 10/8/20  Yes Honorio Daniels DO   fluticasone (FLONASE) 50 MCG/ACT nasal spray 2 sprays by Nasal route daily 10/6/20  Yes Sarah Richard PA-C   omega-3 acid ethyl esters (LOVAZA) 1 g capsule take 1 capsule twice a day 8/14/20  Yes Sarah Richard PA-C   niacin (SLO-NIACIN) 500 MG extended release tablet take 1 tablet by mouth once daily 7/20/20  Yes Honorio Daniels DO   clopidogrel (PLAVIX) 75 MG tablet TAKE 1 TABLET BY MOUTH EVERY DAY  Patient taking differently: Take 75 mg by mouth daily Has been on hold for procedure 7/6/20  Yes Sarah Richard PA-C   atorvastatin (LIPITOR) 80 MG tablet TAKE 1 TABLET BY MOUTH AT BEDTIME 10/22/18  Yes Sarah Richard PA-C   carvedilol (COREG) 25 MG tablet TAKE ONE TABLET BY MOUTH TWICE DAILY (WITH MEALS) 10/22/18  Yes Sarah Richard PA-C   sildenafil (VIAGRA) 50 MG tablet Take 1 tablet by mouth as needed for Erectile Dysfunction 7/6/20 Irwin Hunt PA-C   aspirin 325 MG EC tablet TAKE 1 TABLET BY MOUTH EVERY DAY  Patient taking differently: Take 325 mg by mouth daily Has been on hold for procedure 5/7/20   S[place] Diallo,    Tens Unit MISC by Does not apply route 5/7/19   TRUDY [place]       Allergies  Allergies   Allergen Reactions    Bee Venom Anaphylaxis       Review of Systems: patient is intubated and sedated and not responsive to questions. Objective  /73   Pulse 92   Temp 100.4 °F (38 °C)   Resp 20   Ht 5' 11\" (1.803 m)   Wt (!) 319 lb 7.1 oz (144.9 kg)   SpO2 91%   BMI 44.55 kg/m²     Physical Exam:   Performance Status:  Head and neck: PERRLA, EOMI . Sclera non icteric. Oropharynx: Clear  Neck: no JVD,  no adenopathy  Heart: Regular rate and regular rhythm, no murmur  Lungs:Disffuse inspiratory and expiratory rhonchi   Extremities: No edema,no cyanosis, no clubbing. Abdomen: Soft, non-tender;no masses, no organomegaly  Skin: No rash. Neurologic:Cranial nerves grossly intact. No focal motor or sensory deficits .     Recent Laboratory Data-   Lab Results   Component Value Date    WBC 7.4 11/29/2020    HGB 7.0 (L) 11/29/2020    HCT 25.1 (L) 11/29/2020    MCV 94.7 11/29/2020    PLT 32 (L) 11/29/2020    LYMPHOPCT 10.0 (L) 11/29/2020    RBC 2.65 (L) 11/29/2020    MCH 26.4 11/29/2020    MCHC 27.9 (L) 11/29/2020    RDW 16.8 (H) 11/29/2020    NEUTOPHILPCT 85.0 (H) 11/29/2020    MONOPCT 3.0 11/29/2020    BASOPCT 0.0 11/29/2020    NEUTROABS 6.36 11/29/2020    LYMPHSABS 0.74 (L) 11/29/2020    MONOSABS 0.22 11/29/2020    EOSABS 0.07 11/29/2020    BASOSABS 0.00 11/29/2020       Lab Results   Component Value Date     (H) 11/29/2020    K 3.5 11/29/2020     11/29/2020    CO2 39 (H) 11/29/2020    BUN 89 (H) 11/29/2020    CREATININE 1.3 (H) 11/29/2020    GLUCOSE 233 (H) 11/29/2020    CALCIUM 8.6 11/29/2020    PROT 5.3 (L) 11/29/2020    LABALBU 3.0 (L) 11/29/2020    BILITOT 0.7 11/29/2020    ALKPHOS 138 (H) 11/29/2020    AST 45 (H) 11/29/2020    ALT 34 11/29/2020    LABGLOM 56 11/29/2020    GFRAA >60 11/29/2020       No results found for: IRON, TIBC, FERRITIN        Radiology-    Xr Abdomen (kub) (single Ap View)    Result Date: 11/23/2020  EXAMINATION: ONE SUPINE XRAY VIEW(S) OF THE ABDOMEN 11/23/2020 9:38 am COMPARISON: None HISTORY: ORDERING SYSTEM PROVIDED HISTORY: abdominal pain TECHNOLOGIST PROVIDED HISTORY: Reason for exam:->abdominal pain FINDINGS: There is a nonobstructive bowel gas pattern. There are no abnormal air-fluid levels. There are no calculi overlying the renal shadows. There is an NG tube within the stomach. There is a stent graft seen within the abdominal aorta. 1. There is no bowel obstruction, ileus or free air. Ct Lumbar Spine Wo Contrast    Result Date: 11/19/2020  EXAMINATION: CT OF THE LUMBAR SPINE WITHOUT CONTRAST  11/18/2020 TECHNIQUE: CT of the lumbar spine was performed without the administration of intravenous contrast. Multiplanar reformatted images are provided for review. Dose modulation, iterative reconstruction, and/or weight based adjustment of the mA/kV was utilized to reduce the radiation dose to as low as reasonably achievable. COMPARISON: 11/17/2020 HISTORY: ORDERING SYSTEM PROVIDED HISTORY: exclude epidural hematoma TECHNOLOGIST PROVIDED HISTORY: Reason for exam:->exclude epidural hematoma Chronic back pain, recent epidural placement FINDINGS: BONES/ALIGNMENT: Vertebral body heights are preserved without evidence for lumbar fracture. However, there is irregular lucency and sclerosis in the upper sacral ala bilaterally. There is minimal retrolisthesis at L2-L3. Alignment is otherwise normal. DEGENERATIVE CHANGES: There is disc space narrowing and vacuum disc phenomenon at L2-L3. Disc space heights are otherwise preserved. There is diffuse facet arthropathy. SOFT TISSUES/RETROPERITONEUM: No evidence for epidural hematoma is identified.     1. No evidence for epidural hematoma on CT scan. 2. Suspected sacral insufficiency fracture. Xr Chest Portable    Result Date: 11/23/2020  EXAMINATION: ONE XRAY VIEW OF THE CHEST 11/23/2020 7:08 am COMPARISON: 11/20/2020 HISTORY: ORDERING SYSTEM PROVIDED HISTORY: resp failure TECHNOLOGIST PROVIDED HISTORY: Reason for exam:->resp failure FINDINGS: There is no interval change in the persistent dense consolidation within the left upper lobe. The left lower lobe is clear. The right lung is clear. There is minimal atelectasis seen within the right lung base. The cardiac silhouette is within normals. 1. No interval change in the dense consolidation seen within the left upper lobe. 2. Minimal right lung base atelectasis. Xr Chest Portable    Result Date: 11/22/2020  EXAMINATION: ONE XRAY VIEW OF THE CHEST 11/22/2020 6:28 am COMPARISON: 11/21/2020 HISTORY: ORDERING SYSTEM PROVIDED HISTORY: resp failure TECHNOLOGIST PROVIDED HISTORY: Reason for exam:->resp failure FINDINGS: The endotracheal tube is stable. The enteric tube tip is not well visualized but may be at the gastroesophageal junction. There is stable left upper lobe dense opacity. There are continued patchy opacities in the left lower lobe. There may be small pleural effusions. No pneumothorax is seen. No significant change in dense airspace opacity of the left upper lobe and patchy left lower lobe airspace opacities. Enteric tube tip is not well visualized due to underpenetration. The tip may be at the gastroesophageal junction. This could be confirmed with KUB centered on the upper abdomen. Xr Chest Portable    Result Date: 11/21/2020  EXAMINATION: ONE XRAY VIEW OF THE CHEST 11/21/2020 7:37 am COMPARISON: 11/20/2020 HISTORY: ORDERING SYSTEM PROVIDED HISTORY: resp failure TECHNOLOGIST PROVIDED HISTORY: Reason for exam:->resp failure FINDINGS: Endotracheal tube and nasogastric tube are unchanged. Large opacity in the left upper lobe is unchanged.   There is additional airspace disease in the left lower lobe which is stable. There is no pneumothorax. Small pleural effusions are not excluded. Unchanged large opacity/consolidation in left upper lobe. Unchanged airspace disease in left lower lobe. Xr Chest Portable    Result Date: 11/20/2020  EXAMINATION: ONE XRAY VIEW OF THE CHEST 11/20/2020 6:33 am COMPARISON: 11/19/2020 HISTORY: ORDERING SYSTEM PROVIDED HISTORY: resp failure TECHNOLOGIST PROVIDED HISTORY: Reason for exam:->resp failure FINDINGS: Endotracheal and enteric tubes remain in place. There has been no appreciable change in opacities throughout the left lung, most pronounced in the left apex. The right lung is clear. The heart size is at the upper limits of normal.  There is no discernible pneumothorax. Grossly stable opacities on the left. Xr Chest Portable    Result Date: 11/19/2020  EXAMINATION: ONE XRAY VIEW OF THE CHEST 11/19/2020 6:23 am COMPARISON: 11/18/2020 HISTORY: ORDERING SYSTEM PROVIDED HISTORY: resp failure TECHNOLOGIST PROVIDED HISTORY: Reason for exam:->resp failure FINDINGS: Evaluation is limited by the patient's body habitus and the portable technique. The right lung apex was partially excluded. There is increased dense consolidation in the left upper lobe. There is also medial left basilar airspace opacity obscuring the hemidiaphragm. The heart size is mildly enlarged. There is no discernible pneumothorax. Endotracheal and enteric tubes are again seen. Increasing multifocal left lung pneumonia. Xr Chest Portable    Result Date: 11/18/2020  EXAMINATION: ONE XRAY VIEW OF THE CHEST 11/18/2020 6:39 am COMPARISON: CT chest November 17, 2020. HISTORY: ORDERING SYSTEM PROVIDED HISTORY: SOB TECHNOLOGIST PROVIDED HISTORY: Reason for exam:->SOB FINDINGS: The cardiac silhouette is within normal limits in size. There is masslike consolidation of the left upper lobe. There is a small to moderate left dependent pleural effusion.   There is no visible pneumothorax. The pulmonary vessels are within normal limits. Left upper lobe masslike consolidation. Small to moderate left pleural effusion. Xr Chest 1 View    Result Date: 11/18/2020  EXAMINATION: ONE XRAY VIEW OF THE CHEST 11/18/2020 9:06 am COMPARISON: 11/18/2020 HISTORY: ORDERING SYSTEM PROVIDED HISTORY: intubation TECHNOLOGIST PROVIDED HISTORY: Reason for exam:->intubation Reason for exam:->portable FINDINGS: Compared to the chest radiograph from earlier today, 6:44 a.m., there is interval placement of an endotracheal tube, the tip of which is approximately 4.1 cm above the korina. Nasogastric tube is present but is suboptimally visualized due to motion artifact and relative underpenetration of the study. The abdominal radiograph demonstrates it to be well positioned, with the tip in the upper abdomen. Prominent masslike consolidative opacity in the upper left lung are grossly stable. No pneumothorax is seen. Layering left pleural effusion. 1.  The life-support lines and tubes are well positioned. 2. Masslike consolidative opacity in the left upper lung. Small left effusion. Cta Chest W Contrast    Result Date: 11/17/2020  EXAMINATION: CTA OF THE CHEST 11/17/2020 3:18 pm TECHNIQUE: CTA of the chest was performed after the administration of intravenous contrast.  Multiplanar reformatted images are provided for review. MIP images are provided for review. Dose modulation, iterative reconstruction, and/or weight based adjustment of the mA/kV was utilized to reduce the radiation dose to as low as reasonably achievable.; CTA of the abdomen and pelvis was performed with the administration of intravenous contrast. Multiplanar reformatted images are provided for review. MIP images are provided for review. Dose modulation, iterative reconstruction, and/or weight based adjustment of the mA/kV was utilized to reduce the radiation dose to as low as reasonably achievable. COMPARISON: None. HISTORY: ORDERING SYSTEM PROVIDED HISTORY: aneurysm, cp, sob TECHNOLOGIST PROVIDED HISTORY: Reason for exam:->aneurysm, cp, sob FINDINGS: CTA chest: There are confluent opacities located in left upper lobe. Patchy airspace opacities located in lingula. There is moderate to large left pleural effusion. Peribronchial thickening extensor in left hilar location into the left lung base. There is subsegmental atelectasis in posterior right lower lobe with adjacent ground-glass opacities. There is pulmonary vascular congestion. The heart is mildly enlarged. There is mediastinal lymphadenopathy. Ascending thoracic aorta measures 3.7 cm in diameter. Descending thoracic aorta measures 3 cm in diameter. No evidence of thoracic aortic aneurysm or dissection. Distal descending thoracic aorta is tortuous. CTA abdomen/pelvis: There is evidence of endograft repair involving the abdominal aorta. There is redemonstration of fusiform abdominal aortic aneurysm measuring up to 6.4 cm. This finding is stable since prior. No evidence of endoleak. No retroperitoneal fluid collections. Iliac limbs of endograft appear patent. Common iliac arteries are tortuous. Common femoral arteries are patent. Numerous diverticula associated with the left colon and sigmoid colon. No evidence of acute diverticulitis. Liver, gallbladder, pancreas, and spleen are grossly unremarkable however there is motion artifact limiting this examination. There is no hydronephrosis. Cyst associated with the right kidney is stable since previous examination as well as cyst associated with the left kidney appearing stable since prior. Appendix is visualized and normal.    1.  New confluent opacities located in left upper lobe suggesting pneumonia, atelectasis, or neoplasm. 2.  Patchy airspace opacities located in lingula suggesting pneumonia with areas of atelectasis.  3.  Stenosis and occlusion are associated with left upper lobe segmental bronchi and lingular segmental bronchi. 4.  Moderate to large left pleural effusion. 5.  Mediastinal lymphadenopathy. 6.  Stable fusiform infrarenal abdominal aortic aneurysm with endograft repair. Aneurysm measures up to 6.4 cm. No evidence of endoleak or retroperitoneal fluid. 7.  Diverticulosis. Cta Abdomen Pelvis W Contrast    Result Date: 11/17/2020  EXAMINATION: CTA OF THE CHEST 11/17/2020 3:18 pm TECHNIQUE: CTA of the chest was performed after the administration of intravenous contrast.  Multiplanar reformatted images are provided for review. MIP images are provided for review. Dose modulation, iterative reconstruction, and/or weight based adjustment of the mA/kV was utilized to reduce the radiation dose to as low as reasonably achievable.; CTA of the abdomen and pelvis was performed with the administration of intravenous contrast. Multiplanar reformatted images are provided for review. MIP images are provided for review. Dose modulation, iterative reconstruction, and/or weight based adjustment of the mA/kV was utilized to reduce the radiation dose to as low as reasonably achievable. COMPARISON: None. HISTORY: ORDERING SYSTEM PROVIDED HISTORY: aneurysm, cp, sob TECHNOLOGIST PROVIDED HISTORY: Reason for exam:->aneurysm, cp, sob FINDINGS: CTA chest: There are confluent opacities located in left upper lobe. Patchy airspace opacities located in lingula. There is moderate to large left pleural effusion. Peribronchial thickening extensor in left hilar location into the left lung base. There is subsegmental atelectasis in posterior right lower lobe with adjacent ground-glass opacities. There is pulmonary vascular congestion. The heart is mildly enlarged. There is mediastinal lymphadenopathy. Ascending thoracic aorta measures 3.7 cm in diameter. Descending thoracic aorta measures 3 cm in diameter. No evidence of thoracic aortic aneurysm or dissection. Distal descending thoracic aorta is tortuous.  CTA abdomen/pelvis: There is evidence of endograft repair involving the abdominal aorta. There is redemonstration of fusiform abdominal aortic aneurysm measuring up to 6.4 cm. This finding is stable since prior. No evidence of endoleak. No retroperitoneal fluid collections. Iliac limbs of endograft appear patent. Common iliac arteries are tortuous. Common femoral arteries are patent. Numerous diverticula associated with the left colon and sigmoid colon. No evidence of acute diverticulitis. Liver, gallbladder, pancreas, and spleen are grossly unremarkable however there is motion artifact limiting this examination. There is no hydronephrosis. Cyst associated with the right kidney is stable since previous examination as well as cyst associated with the left kidney appearing stable since prior. Appendix is visualized and normal.    1.  New confluent opacities located in left upper lobe suggesting pneumonia, atelectasis, or neoplasm. 2.  Patchy airspace opacities located in lingula suggesting pneumonia with areas of atelectasis. 3.  Stenosis and occlusion are associated with left upper lobe segmental bronchi and lingular segmental bronchi. 4.  Moderate to large left pleural effusion. 5.  Mediastinal lymphadenopathy. 6.  Stable fusiform infrarenal abdominal aortic aneurysm with endograft repair. Aneurysm measures up to 6.4 cm. No evidence of endoleak or retroperitoneal fluid. 7.  Diverticulosis. Xr Abdomen For Ng/og/ne Tube Placement    Result Date: 11/22/2020  EXAMINATION: ONE SUPINE XRAY VIEW(S) OF THE ABDOMEN 11/22/2020 5:06 pm COMPARISON: November 22, 2020, 4 hours prior. HISTORY: ORDERING SYSTEM PROVIDED HISTORY: Confirmation of course of NG/OG/NE tube and location of tip of tube TECHNOLOGIST PROVIDED HISTORY: Reason for exam:->Confirmation of course of NG/OG/NE tube and location of tip of tube Portable? ->Yes FINDINGS: Tip of the NG tube noted at the level of the diaphragm, no significant change.  The fluoroscopic images were obtained during L4-5 epidural steroid injection. Intraprocedural fluoroscopic spot images as above. See separate procedure report for more information. ASSESSMENT/PLAN :    60 yo male  CAD  HTN  AAA s/p repair  DM type 2  Morbid obesity  Chronic back pain  Thrombocytopenia, Anemia   OMER opacity s/p biopsy    - S/p bronch with cytology pending  - OMER PNA vs neoplasm  - On rocpehin and levaquin   - CBC showing Hgb 7.8 with MCV 94, Platelets 39  - Will follow cyto  - Cytopenias likely due to combination of PNA and abc, with myelosuppression. Thrombocytopenia has continued to slowly progress. CBC was WNL on 10/6/20, suggesting an acute process rather than a primary bone marrow process  - Transfuse for Hgb <7, platelets <86  - Smear showed subtle toxic granulation of neutrophils  - Check iron profile and B12/folate    11/24/20  - [facility] bronch bx pathology:  DIAGNOSIS   POSITIVE FOR MALIGNANT CELLS   Carcinoma, most compatible with small cell carcinoma, see comment. Monolayer shows atypical crushed cells, and many lymphocytes/histiocytes   with anthracotic pigment. Cell block shows abundant crushed malignant cells. COMMENT:   Immunostains stain the malignant cells as follows:   Pankeratin and TTF1:  Positive   Chromogranin:  Positive weakly   Synaptophysin:  Negative   CK7:  Negative   This immunoprofile is most compatible with small cell carcinoma. 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 His CT scan of abdomen and pelvis did not show evidence of intra-abdominal metastasis.   He has multiple comorbid conditions including obesity, coronary artery disease, obstructive sleep apnea, obesity, type 2 diabetes mellitus and he presently has pneumonia with hypoxia and respiratory failure and remains intubated and sedated. 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   - Today's Hgb 7.2 Plts 38   - Patient still intubated but hopefully will be able to extubated  - Once stable, MRI brain to complete staging   - If he improves clinically and has improvement in ECOG, he will be considered for systemic therapy  - L pleural effusions likely malignant  - ID following, now on unasyn  - Given his current ECOG and comorbidities along with persistent fevers and abx requirements, not eligible for inpatient emergent chemotherapy. Will get rad onc on board as inpatient urgent palliative XRT could be considered to improved respiratory status if he has improvement in above    11/26/20  - Remains intubated, back in ICU  - CBC with further drop in platelets to 29. Likely combination of myelosuppression from acute illness and abx exposure  - If he has improvement in his clinic course, eventually will need MRI brain as above  - Therapy plan as above. He will need significant improvement clinically prior to discussion regarding chemotherapy. Rad onc consult pending, again will likely need improvement prior to proceeding with therapy. Cultures have no grown a source, but patient remains febrile (possibly drug fever and ID following and managing abx)  - Will follow. Further recs pending clinical course    11/27/20  - LUE DVT  - Platelets 33. Stable from yesterday  - Can not anticoagulate with current platelet count. Needs to be ~50. Monitor for now  - Remainder of CBC stable    11/29/20  - CBC stable, thrombocytopenia unchanged. Hgb dropped to 7.0. Will give 1 unit  - Continue to hold [facility], [place] with platelets <02  - Still with low grade fevers in the 100's  - ID following, on unasyn.  Possible post-obstructive PNA      Hector Fried MD  Electronically signed 11/29/2020 at 12:27 PM

## 2020-11-29 NOTE — PROGRESS NOTES
Critical Care Team - Daily Progress Note         Date and time: 2020 4:40 PM  Patient's name:  Ynes Joya. Medical Record Number: [id number]  Patient's account/billing number: [de-identified]  Patient's YOB: 1960  Age: 61 y.o.   Date of Admission: 2020  1:13 PM  Length of stay during current admission: 12      Primary Care Physician: Tommy Everett PA-C  ICU Attending Physician: Dr. Anay Banks    Code Status: Full Code    Reason for ICU admission: acute hypoxic respiratory failure      SUBJECTIVE:     Diuresed appropriately    Full vent support   Awake and following commands   psv trial this am      : Hemoglobin dropped this morning to 7.0, 1 unit packed red blood cells ordered by hematology  Continue pressure support trials today    CURRENT VENTILATION STATUS:     [x] Ventilator  [] BIPAP  [] Nasal Cannula [] Room Air        SECRETIONS : Amount:  [] Small [x] Moderate  [] Large  [] None  Color:     [] White [x] Colored  [] Bloody    SEDATION:    [] Propofol gtt  [x] Versed gtt  [] Ativan gtt   [] No Sedation    PARALYZED:  [x] No    [] Yes      VASOPRESSORS:  [x] No    [] Yes    If yes -   [] Levophed       [] Dopamine     [] Vasopressin       [] Dobutamine  [] Phenylephrine         [] Epinephrine    CENTRAL LINES:     [x] No   [] Yes   (Date of Insertion:   )           If yes -     [] Right IJ     [] Left IJ [] Right Femoral [] Left Femoral                   [] Right Subclavian [] Left Subclavian       HUFF'S CATHETER:   [] No   [x] Yes  (Date of Insertion:   )     URINE OUTPUT:            [x] Good   [] Low              [] Anuric      OBJECTIVE:     VITAL SIGNS:  /70   Pulse 94   Temp 100.4 °F (38 °C) (Bladder)   Resp 19   Ht 5' 11\" (1.803 m)   Wt (!) 319 lb 7.1 oz (144.9 kg)   SpO2 92%   BMI 44.55 kg/m²   Tmax over 24 hours:  Temp (24hrs), Av.7 °F (38.2 °C), Min:100.4 °F (38 °C), Max:101.1 °F (38.4 °C)      Patient Vitals for the past 6 hrs:   BP Temp Temp src Pulse Resp SpO2   11/29/20 1500 119/70 -- -- 94 19 92 %   11/29/20 1400 116/78 -- -- 91 20 91 %   11/29/20 1300 115/71 -- -- 96 20 92 %   11/29/20 1240 -- -- -- 95 22 91 %   11/29/20 1238 -- -- -- -- 20 92 %   11/29/20 1200 110/73 100.4 °F (38 °C) Bladder 92 20 91 %   11/29/20 1100 128/76 -- -- 90 20 90 %         Intake/Output Summary (Last 24 hours) at 11/29/2020 1640  Last data filed at 11/29/2020 1500  Gross per 24 hour   Intake 5193.67 ml   Output 2325 ml   Net 2868.67 ml     Wt Readings from Last 2 Encounters:   11/27/20 (!) 319 lb 7.1 oz (144.9 kg)   11/17/20 (!) 314 lb (142.4 kg)     Body mass index is 44.55 kg/m². PHYSICAL EXAMINATION:  General: Intubated   HEENT:  Normocephalic, atraumatic. Pulls equal and reactive no scleral icterus. No conjunctival injection. No nasal discharge. Neck:  Supple, without stridor, no JVD  Heart:  RRR, no murmurs, gallops, rubs  Lungs: Greatly diminished in left upper lobe, rhonchorous throughout the remainder of the lung fields   Abdomen: Obese, bowel sounds present, soft, non-tender, non-distended, no guarding or rigidity. Not tender to palpation  Extremities:  No clubbing, cyanosis,1+ edema bilaterally. Palpable radial and DP pulses b/l upper and lower extremieties  Skin:  Warm and dry,  Neuro: Following commands. Cranial nerves II through XII grossly intact.   No focal neurologic deficits  Breast: deferred  Rectal: deferred  Genitalia:  deferred     MEDICATIONS:    Scheduled Meds:   [START ON 11/30/2020] methylPREDNISolone  40 mg Intravenous Daily    sodium chloride  20 mL Intravenous Once    potassium bicarb-citric acid  40 mEq Per NG tube BID    sodium chloride  20 mL Intravenous Once    ampicillin-sulbactam  3 g Intravenous Q6H    sucralfate  1 g Oral 4 times per day    heparin flush  1 mL Intravenous 2 times per day    insulin lispro  0-18 Units Subcutaneous Q6H    nicotine  1 patch Transdermal Daily    acetylcysteine  4 mL Inhalation BID    albuterol  2.5 mg Nebulization Q4H    Arformoterol Tartrate  15 mcg Nebulization BID    atorvastatin  80 mg Oral Nightly    [Held by provider] clopidogrel  75 mg Oral Daily    fluticasone  2 spray Nasal Daily    sodium chloride flush  10 mL Intravenous 2 times per day    [Held by provider] enoxaparin  40 mg Subcutaneous Daily    insulin glargine  0.25 Units/kg Subcutaneous Nightly    chlorhexidine  15 mL Mouth/Throat BID     Continuous Infusions:   dextrose 100 mL/hr at 11/29/20 0538    fentaNYL 5 mcg/ml in 0.9%  ml infusion 200 mcg/hr (11/29/20 1454)    propofol Stopped (11/24/20 1140)    dextrose      midazolam 6 mg/hr (11/29/20 0624)     PRN Meds:   potassium chloride, 10 mEq, PRN  acetaminophen, 650 mg, Q4H PRN    Or  acetaminophen, 650 mg, Q4H PRN  sodium chloride flush, 10 mL, PRN  heparin flush, 1 mL, PRN  sodium phosphate IVPB, 0.16 mmol/kg, PRN    Or  sodium phosphate IVPB, 0.32 mmol/kg, PRN  magnesium sulfate, 1 g, PRN  tiZANidine, 4 mg, TID PRN  polyethylene glycol, 17 g, Daily PRN  promethazine, 12.5 mg, Q6H PRN    Or  ondansetron, 4 mg, Q6H PRN  glucose, 15 g, PRN  dextrose, 12.5 g, PRN  glucagon (rDNA), 1 mg, PRN  dextrose, 100 mL/hr, PRN  oxyCODONE-acetaminophen, 1 tablet, TID PRN    And  oxyCODONE, 2.5 mg, TID PRN  albuterol, 2.5 mg, Q4H PRN        VENT SETTINGS (Comprehensive) (if applicable):  Vent Information  $Ventilation: $Subsequent Day  Skin Assessment: Clean, dry, & intact  Equipment ID: [id number]  Equipment Changed: Humidification  Vent Type: [id number]  Vent Mode: AC/VC  Vt Ordered: 450 mL  Rate Set: 20 bmp  Peak Flow: 60 L/min  Pressure Support: 0 cmH20  FiO2 : 60 %  SpO2: 92 %  SpO2/FiO2 ratio: 153.33  Sensitivity: 3  PEEP/CPAP: 12  I Time/ I Time %: 0 s  Humidification Source: Heated wire  Humidification Temp: 37  Humidification Temp Measured: 37.1  Circuit Condensation: Drained  Mask Type: Full face mask  Mask Size: Medium  Additional Respiratory  Assessments  Pulse: 94  Resp: 19  SpO2: 92 %  Position: Semi-Jeff's  Humidification Source: Heated wire  Humidification Temp: 37  Circuit Condensation: Drained  Oral Care: Mouth swabbed  Subglottic Suction Done?: Yes  Cuff Pressure (cm H2O): 29 cm H2O    ABGs:   Recent Labs     11/28/20  0550 11/29/20  0612   PH 7.469*  --    PCO2 45.2*  --    PO2 72.0*  --    HCO3 32.1*  --    BE 7.6* 9.8*   O2SAT 93.1 91.2*       Laboratory findings:    Complete Blood Count:   Recent Labs     11/27/20  0535 11/28/20  0550 11/29/20  0530   WBC 8.2 7.2 7.4   HGB 8.1* 7.9* 7.0*   HCT 27.4* 26.8* 25.1*   PLT 33* 31* 32*        Last 3 Blood Glucose:   Recent Labs     11/27/20  0535 11/28/20  0550 11/29/20  0530   GLUCOSE 197* 174* 233*        PT/INR:    Lab Results   Component Value Date    PROTIME 11.6 11/26/2020    PROTIME 11.1 12/30/2011    INR 1.0 11/26/2020     PTT:    Lab Results   Component Value Date    APTT 29.0 12/28/2011       Comprehensive Metabolic Profile:   Recent Labs     11/27/20  0535 11/28/20  0550 11/28/20 1920 11/29/20  0530   * 151* 152* 150*   K 3.3* 3.9  --  3.5    103  --  105   CO2 35* 35*  --  39*   * 100*  --  89*   CREATININE 1.5* 1.3*  --  1.3*   GLUCOSE 197* 174*  --  233*   CALCIUM 8.7 8.7  --  8.6   PROT 6.0* 5.6*  --  5.3*   LABALBU 3.5 3.2*  --  3.0*   BILITOT 0.8 0.6  --  0.7   ALKPHOS 144* 136*  --  138*   AST 40* 46*  --  45*   ALT 37 37  --  34      Magnesium:   Lab Results   Component Value Date    MG 2.7 11/29/2020     Phosphorus:   Lab Results   Component Value Date    PHOS 2.4 11/29/2020     Ionized Calcium:   Lab Results   Component Value Date    CAION 1.33 10/23/2020        Urinalysis:     Troponin:   No results for input(s): TROPONINI in the last 72 hours.     Microbiology:  GPC blood   Respiratory culture no growth    Radiology/Imaging:     Chest x-ray:  Cxr reviewed     ASSESSMENT:     Neuro   Chronic back pain              -Epidural on 11/17, no hematoma seen on CT lumbar spine    Sedated on the vent              -Versed, fentanyl. Seroquel 100 mg was given on dose , d/c by renal      Respiratory   Acute hypoxic and hypercapnic respiratory failure       -continue full vent support. Pressure support trials              -Follow ABG              -Albuterol, incentive spirometer              -solumedrol q12   -wean FiO2 as able   -metanebs   -Mucomyst   -bedside L thoracentesis 11/22 with only a small amount of pleural fluid, not amendable to drainage   -bronch 11/27 showing extrinsic compression from mass, no mucus plug     Community-acquired pneumonia post obstructive pna              -Strep and Legionella negative              -Respiratory culture pending              -Pro-Kael 0.23   -continue unasyn     Lung mass OMER              -Status post bronc 11/18 with TBNA              -No obvious mucous plugging or purulence. Very erythematous and compressed airways, concern for mass              -Cytology concerning for small cell lung cancer. Heme-onc on board.     Respiratory film array negative  Long, lifetime smoker--nicotine patch     Cardiovascular   CAD/stents              -Plavix is on hold for approximately 7 days per patient he had his epidural.  It has still been on hold in case he needs further procedures and thrombocytopenia     History of aortic aneurysm              -Status post endovascular repair              -Stable on most recent CT    Get ECHO. Less concern for PE as HR and bp stable, no change in o2 requirement since admission     Gastrointestinal   Tube feeding   Abdominal pain-resolved. no peritoneal signs.  KUB unremarkable  PPI held from nephro and started on carafate, most likely 2/2 platelets?     Renal   Nephrology following   Hyperkalemia- resolved  hypophos- resolved    Hypernatremia   -free water deficit   -increase free water   -nephro following    ISIDRO   -nephro following and recs appreciated   -stop bumex drip     Infectious Disease     Community-acquired pneumonia   -recultured              -respiratory cx growing candida, light growth   -switched to unasyn         Blood cultures gram positive cocci   -coag neg staph. Most likely contaminant. Stop vanco.    Blood and urine cultures pending     Hematology/Oncology   Thrombocytopenia              - received platelets x1              -heme/onc following   -most llikely 2/2 neoplasm, drugs and infection   -Transfuse for Hgb <7, platelets <63    Small cell lung cancer   -biopsy results   -heme/onc and pulmonology for further work up and recommendations   -not inpatient emergent chemo candidate   -rad/onc consults, MRI brain when stable    DVT   -left axillary vein   -platelets 33, no anticoagulation at this time until platelets rise     Endocrine   Diabetes              -Noninsulin-dependent              -Monitor sugars   -Increase to high-dose sliding scale    msk   Chronic back pain              -Status post epidural on 11/17              -No erythematous region or fluctuance              -CT with no evidence of epidural hematoma     Social/Spiritual/DNR/Other   Full code      Dispo: continue ICU level of care    -- Cara De La Rosa0, D.O. Resident, PGY-1      11/29/20, 4:40 PM    I personally saw, examined and provided care for the patient. Radiographs, labs and medication list were reviewed by me independently. I spoke with bedside nursing, therapists and consultants. Critical care services and times documented are independent of procedures and multidisciplinary rounds with Residents. Additionally comprehensive, multidisciplinary rounds were conducted with the MICU team. The case was discussed in detail and plans for care were established. Review of Residents documentation was conducted and revisions were made as appropriate. I agree with the above documented exam, problem list and plan of care.     psv trial as tolerated   High peep is likely more result body habitus   Electrolyte abnormalities are a result of diuresis   He may need a tracheostomy if weaning is not tolerated     Neisha Edwards M.D.    Pulmonary/Critical Care Medicine   35 min cct excluding procedures

## 2020-11-29 NOTE — PROGRESS NOTES
Pulse 108   Temp 100.6 °F (38.1 °C) (Bladder)   Resp 22   Ht 5' 11\" (1.803 m)   Wt (!) 319 lb 7.1 oz (144.9 kg)   SpO2 90%   BMI 44.55 kg/m²   Temp  Av.4 °F (38 °C)  Min: 99 °F (37.2 °C)  Max: 101.1 °F (38.4 °C)  Constitutional: The patient is lying in bed. He is intubated and sedated. Opens eyes. Restrained. FiO2 60%. PEEP 12. Skin: Warm and dry. No rashes were noted. HEENT: Round and reactive pupils. Moist mucous membranes. No ulcerations or thrush. ET tube. OG tube. Neck: Supple to movements. Chest: No coarse breath sounds. No crackles. Cardiovascular: Heart sounds rhythmic and regular. Abdomen: Large, round and nondistended. Positive bowel sounds to auscultation. Benign to palpation. Extremities: Minimal edema. Lines: Left midline 2020. Black catheter.     Laboratory and Tests Review:  Lab Results   Component Value Date    WBC 7.4 2020    WBC 7.2 2020    WBC 8.2 2020    HGB 7.0 (L) 2020    HCT 25.1 (L) 2020    MCV 94.7 2020    PLT 32 (L) 2020     Lab Results   Component Value Date    NEUTROABS 6.36 2020    NEUTROABS 5.49 2020    NEUTROABS 6.40 2020     No results found for: [facility]  Lab Results   Component Value Date    ALT 34 2020    AST 45 (H) 2020    ALKPHOS 138 (H) 2020    BILITOT 0.7 2020     Lab Results   Component Value Date     2020    K 3.5 2020    K 5.1 2020     2020    CO2 39 2020    BUN 89 2020    CREATININE 1.3 2020    CREATININE 1.3 2020    CREATININE 1.5 2020    GFRAA >60 2020    LABGLOM 56 2020    GLUCOSE 233 2020    GLUCOSE 120 2011    PROT 5.3 2020    LABALBU 3.0 2020    LABALBU 4.5 2011    CALCIUM 8.6 2020    BILITOT 0.7 2020    ALKPHOS 138 2020    AST 45 2020    ALT 34 2020     Lab Results   Component Value Date    CRP 2.5 (H) 2020

## 2020-11-29 NOTE — PROGRESS NOTES
Nephrology Consult Note    Patient's Name: Isaac Kelly.  5:15 PM  11/29/2020    Nephrologist: Dr. Fabiano Mooer MD    Reason for Consult: Renal is consulted for volume management  Requesting Physician:  Dr. Sachi Hernandez    Chief Complaint:  SOB    History Obtained From: EMR as the patient is intubated, sedated and no family member is present today    Sub: Patient seen and examined bedside in the ICU, he is on 50% FiO2. UO is marginal  Sodium followed from last night, IV D5W increased to 100 ml/hr      Past Medical History:   Diagnosis Date    Anticoagulant long-term use     Arthritis     CAD (coronary artery disease)     Chronic back pain     Depression     Dyslipidemia     Hiatal hernia 1979    History of ST elevation myocardial infarction (STEMI)     Hyperlipidemia     Hypertension     Low back pain     Lumbar radiculopathy 8/14/2019    Obesity     MANOLO on CPAP     Presence of stent in left circumflex coronary artery     Presence of stent in right coronary artery     Smoker     Type 2 diabetes mellitus without complication (HCC)        Past Surgical History:   Procedure Laterality Date    ABDOMINAL AORTIC ANEURYSM REPAIR, ENDOVASCULAR N/A 6/28/2019    ENDOVASCULAR REPAIR ABDOMINAL AORTIC ANEURYSM performed by Manoj Cai MD at [address] [place] [address] Bilateral 3/12/2020    BILATERAL L3,L4,L5 MEDIAL BRANCH NERVE BLOCK performed by Dariela Vargas DO at [address] [place] [address] Bilateral 6/11/2020    BILATERAL L3,L4,L5 MEDIAL NERVE BRANCH BLOCK #2 performed by Dariela Vargas DO at [address] [place] [address] N/A 11/18/2020    BRONCHOSCOPY/TRANSBRONCHIAL NEEDLE BIOPSY performed by Elizabeth Meadwos MD at [address] [place] [address] PLACEMENT  X4    CORONARY ANGIOPLASTY WITH STENT PLACEMENT  12/30/2011    Dr. Roscoe Coreas 1st OM 3.0 x 20 Ion    DIAGNOSTIC CARDIAC CATH LAB PROCEDURE  3/15/2005    SE. Mild to moderate CAD. Normal LV.    [address] CATH LAB PROCEDURE  1/16/2007    [facility]. Cath/PTCA/DE stent of proximal and distal RCA.  DIAGNOSTIC CARDIAC CATH LAB PROCEDURE  2/21/2007    Cath/DE stent of proximal OM1 and proximal Cx.  DIAGNOSTIC CARDIAC CATH LAB PROCEDURE  12/30/2011    ANURADHA of mid OM branch of circumflex.  ECHO COMPL W DOP COLOR FLOW  12/30/2011         HC INSERT PICC CATH, 5/> YRS - MIDLINE  11/23/2020         HERNIA REPAIR  2/2014    laparoscopic ventral hernia repain    JOINT REPLACEMENT Left 4/1/14    TKA-ed    JOINT REPLACEMENT Right 2018    KNEE    PAIN MANAGEMENT PROCEDURE Right 9/1/2020    RIGHT L3,4,5 MEDIAL BRANCH RADIOFREQUENCY ABLATION performed by Carlton oMore DO at [address] [place] [address] N/A 11/17/2020    L4-5 EPIDURAL STEROID INJECTION #1 performed by Carlton Moore DO at [facility] 80 OFFICE/OUTPT VISIT,PROCEDURE ONLY Right 11/26/2018    RIGHT KNEE TOTAL ARTHROPLASTY performed by Budd Denver, DO at [facility] OR       Family History   Problem Relation Age of Onset    Diabetes Mother     Stroke Mother     Diabetes Father     No Known Problems Sister         reports that he has been smoking cigarettes. He started smoking about 42 years ago. He has a 39.00 pack-year smoking history. He has never used smokeless tobacco. He reports that he does not drink alcohol or use drugs.     Allergies:  Bee venom    Current Medications:    [START ON 11/30/2020] methylPREDNISolone sodium (SOLU-MEDROL) injection 40 mg, Daily  0.9 % sodium chloride bolus, Once  dextrose 5 % solution, Continuous  potassium chloride 10 mEq/100 mL IVPB (Peripheral Line), PRN  potassium bicarb-citric acid (EFFER-K) effervescent tablet 40 mEq, BID  0.9 % sodium chloride bolus, Once  ampicillin-sulbactam (UNASYN) 3 g ivpb minibag, Q6H  acetaminophen (TYLENOL) tablet 650 mg, Q4H PRN    Or  acetaminophen (TYLENOL) suppository 650 mg, Q4H PRN  sucralfate (CARAFATE) tablet 1 g, 4 times per day  fentaNYL 5 mcg/ml in 0.9%  ml infusion, Continuous  propofol injection, Titrated  sodium chloride flush 0.9 % injection 10 mL, PRN  heparin flush 100 UNIT/ML injection 100 Units, 2 times per day  heparin flush 100 UNIT/ML injection 100 Units, PRN  sodium phosphate 24.3 mmol in dextrose 5 % 250 mL IVPB, PRN    Or  sodium phosphate 48.57 mmol in dextrose 5 % 250 mL IVPB, PRN  magnesium sulfate 1 g in dextrose 5% 100 mL IVPB, PRN  insulin lispro (HUMALOG) injection vial 0-18 Units, Q6H  nicotine (NICODERM CQ) 21 MG/24HR 1 patch, Daily  acetylcysteine (MUCOMYST) 10 % solution 400 mg, BID  albuterol (PROVENTIL) nebulizer solution 2.5 mg, Q4H  Arformoterol Tartrate (BROVANA) nebulizer solution 15 mcg, BID  atorvastatin (LIPITOR) tablet 80 mg, Nightly  [Held by provider] clopidogrel (PLAVIX) tablet 75 mg, Daily  fluticasone (FLONASE) 50 MCG/ACT nasal spray 2 spray, Daily  tiZANidine (ZANAFLEX) tablet 4 mg, TID PRN  sodium chloride flush 0.9 % injection 10 mL, 2 times per day  polyethylene glycol (GLYCOLAX) packet 17 g, Daily PRN  promethazine (PHENERGAN) tablet 12.5 mg, Q6H PRN    Or  ondansetron (ZOFRAN) injection 4 mg, Q6H PRN  [Held by provider] enoxaparin (LOVENOX) injection 40 mg, Daily  insulin glargine (LANTUS) injection vial 36 Units, Nightly  glucose (GLUTOSE) 40 % oral gel 15 g, PRN  dextrose 50 % IV solution, PRN  glucagon (rDNA) injection 1 mg, PRN  dextrose 5 % solution, PRN  oxyCODONE-acetaminophen (PERCOCET) 5-325 MG per tablet 1 tablet, TID PRN    And  oxyCODONE (ROXICODONE) immediate release tablet 2.5 mg, TID PRN  albuterol (PROVENTIL) nebulizer solution 2.5 mg, Q4H PRN  chlorhexidine (PERIDEX) 0.12 % solution 15 mL, BID  midazolam (VERSED) 1 mg/mL in D5W infusion, Continuous        Review of Systems:   Unable to obtain as he is intubated and sedated    Physical exam:   Constitutional:    Vitals: /70   Pulse 117   Temp 100.4 °F (38 °C) (Bladder)   Resp 30   Ht 5' 11\" (1.803 m)   Wt (!) 319 lb 7.1 oz (144.9 kg)   SpO2 94% BMI 44.55 kg/m²        Patient seen and examined bedside , he is intubated and awake, FiO2 requirement is at 50%, no acute distress  Skin: no rash, turgor wnl  Heent:  eomi, mmm  Neck: no bruits or jvd noted  Cardiovascular:  S1, S2 without m/r/g  Respiratory: CTA B without w/r/r, decreased breath sounds in bases  Abdomen:  +bs, soft, nt, Black catheter draining clear urine  Ext: 3+ lower extremity edema  Psychiatric: mood and affect appropriate  Musculoskeletal:  Rom, muscular strength intact    Data:   Labs:  CBC:   Lab Results   Component Value Date    WBC 7.4 11/29/2020    RBC 2.65 11/29/2020    HGB 7.0 11/29/2020    HCT 25.1 11/29/2020    MCV 94.7 11/29/2020    MCH 26.4 11/29/2020    MCHC 27.9 11/29/2020    RDW 16.8 11/29/2020    PLT 32 11/29/2020    MPV 12.9 11/29/2020     CMP:    Lab Results   Component Value Date     11/29/2020    K 3.5 11/29/2020    K 5.1 11/17/2020     11/29/2020    CO2 39 11/29/2020    BUN 89 11/29/2020    CREATININE 1.3 11/29/2020    GFRAA >60 11/29/2020    LABGLOM 56 11/29/2020    GLUCOSE 233 11/29/2020    GLUCOSE 120 12/31/2011    PROT 5.3 11/29/2020    LABALBU 3.0 11/29/2020    LABALBU 4.5 12/28/2011    CALCIUM 8.6 11/29/2020    BILITOT 0.7 11/29/2020    ALKPHOS 138 11/29/2020    AST 45 11/29/2020    ALT 34 11/29/2020     BMP:    Lab Results   Component Value Date     11/29/2020    K 3.5 11/29/2020    K 5.1 11/17/2020     11/29/2020    CO2 39 11/29/2020    BUN 89 11/29/2020    LABALBU 3.0 11/29/2020    LABALBU 4.5 12/28/2011    CREATININE 1.3 11/29/2020    CALCIUM 8.6 11/29/2020    GFRAA >60 11/29/2020    LABGLOM 56 11/29/2020    GLUCOSE 233 11/29/2020    GLUCOSE 120 12/31/2011     Calcium:    Lab Results   Component Value Date    CALCIUM 8.6 11/29/2020     Phosphorus:    Lab Results   Component Value Date    PHOS 2.4 11/29/2020     Uric Acid:  No results found for: URICACID  U/A:    Lab Results   Component Value Date    NITRU Negative 11/24/2020    COLORU Yellow 11/24/2020    PHUR 5.5 11/24/2020    WBCUA 2-5 11/24/2020    RBCUA >20 11/24/2020    MUCUS Present 11/24/2020    BACTERIA MODERATE 11/24/2020    CLARITYU CLOUDY 11/24/2020    SPECGRAV 1.025 11/24/2020    LEUKOCYTESUR Negative 11/24/2020    UROBILINOGEN 0.2 11/24/2020    BILIRUBINUR Negative 11/24/2020    BLOODU LARGE 11/24/2020    GLUCOSEU Negative 11/24/2020    KETUA Negative 11/24/2020    AMORPHOUS MANY 11/24/2020        Imaging:  Xr Abdomen (kub) (single Ap View)    Result Date: 11/23/2020  EXAMINATION: ONE SUPINE XRAY VIEW(S) OF THE ABDOMEN 11/23/2020 9:38 am COMPARISON: None HISTORY: ORDERING SYSTEM PROVIDED HISTORY: abdominal pain TECHNOLOGIST PROVIDED HISTORY: Reason for exam:->abdominal pain FINDINGS: There is a nonobstructive bowel gas pattern. There are no abnormal air-fluid levels. There are no calculi overlying the renal shadows. There is an NG tube within the stomach. There is a stent graft seen within the abdominal aorta. 1. There is no bowel obstruction, ileus or free air. Ct Lumbar Spine Wo Contrast    Result Date: 11/19/2020  EXAMINATION: CT OF THE LUMBAR SPINE WITHOUT CONTRAST  11/18/2020 TECHNIQUE: CT of the lumbar spine was performed without the administration of intravenous contrast. Multiplanar reformatted images are provided for review. Dose modulation, iterative reconstruction, and/or weight based adjustment of the mA/kV was utilized to reduce the radiation dose to as low as reasonably achievable. COMPARISON: 11/17/2020 HISTORY: ORDERING SYSTEM PROVIDED HISTORY: exclude epidural hematoma TECHNOLOGIST PROVIDED HISTORY: Reason for exam:->exclude epidural hematoma Chronic back pain, recent epidural placement FINDINGS: BONES/ALIGNMENT: Vertebral body heights are preserved without evidence for lumbar fracture. However, there is irregular lucency and sclerosis in the upper sacral ala bilaterally. There is minimal retrolisthesis at L2-L3.  Alignment is otherwise normal. DEGENERATIVE cardiac silhouette. Osseous and soft tissue structures: No acute findings. 1.  Unchanged left upper lung complete opacification. 2.  Elevation of the right hemidiaphragm and right lower lung opacity. 3.  Medical support devices as above. Xr Chest Portable    Result Date: 11/23/2020  EXAMINATION: ONE XRAY VIEW OF THE CHEST 11/23/2020 7:08 am COMPARISON: 11/20/2020 HISTORY: ORDERING SYSTEM PROVIDED HISTORY: resp failure TECHNOLOGIST PROVIDED HISTORY: Reason for exam:->resp failure FINDINGS: There is no interval change in the persistent dense consolidation within the left upper lobe. The left lower lobe is clear. The right lung is clear. There is minimal atelectasis seen within the right lung base. The cardiac silhouette is within normals. 1. No interval change in the dense consolidation seen within the left upper lobe. 2. Minimal right lung base atelectasis. Xr Chest Portable    Result Date: 11/22/2020  EXAMINATION: ONE XRAY VIEW OF THE CHEST 11/22/2020 6:28 am COMPARISON: 11/21/2020 HISTORY: ORDERING SYSTEM PROVIDED HISTORY: resp failure TECHNOLOGIST PROVIDED HISTORY: Reason for exam:->resp failure FINDINGS: The endotracheal tube is stable. The enteric tube tip is not well visualized but may be at the gastroesophageal junction. There is stable left upper lobe dense opacity. There are continued patchy opacities in the left lower lobe. There may be small pleural effusions. No pneumothorax is seen. No significant change in dense airspace opacity of the left upper lobe and patchy left lower lobe airspace opacities. Enteric tube tip is not well visualized due to underpenetration. The tip may be at the gastroesophageal junction. This could be confirmed with KUB centered on the upper abdomen.     Xr Chest Portable    Result Date: 11/21/2020  EXAMINATION: ONE XRAY VIEW OF THE CHEST 11/21/2020 7:37 am COMPARISON: 11/20/2020 HISTORY: ORDERING SYSTEM PROVIDED HISTORY: resp failure TECHNOLOGIST PROVIDED HISTORY: Reason for exam:->resp failure FINDINGS: Endotracheal tube and nasogastric tube are unchanged. Large opacity in the left upper lobe is unchanged. There is additional airspace disease in the left lower lobe which is stable. There is no pneumothorax. Small pleural effusions are not excluded. Unchanged large opacity/consolidation in left upper lobe. Unchanged airspace disease in left lower lobe. Xr Chest Portable    Result Date: 11/20/2020  EXAMINATION: ONE XRAY VIEW OF THE CHEST 11/20/2020 6:33 am COMPARISON: 11/19/2020 HISTORY: ORDERING SYSTEM PROVIDED HISTORY: resp failure TECHNOLOGIST PROVIDED HISTORY: Reason for exam:->resp failure FINDINGS: Endotracheal and enteric tubes remain in place. There has been no appreciable change in opacities throughout the left lung, most pronounced in the left apex. The right lung is clear. The heart size is at the upper limits of normal.  There is no discernible pneumothorax. Grossly stable opacities on the left. Xr Chest Portable    Result Date: 11/19/2020  EXAMINATION: ONE XRAY VIEW OF THE CHEST 11/19/2020 6:23 am COMPARISON: 11/18/2020 HISTORY: ORDERING SYSTEM PROVIDED HISTORY: resp failure TECHNOLOGIST PROVIDED HISTORY: Reason for exam:->resp failure FINDINGS: Evaluation is limited by the patient's body habitus and the portable technique. The right lung apex was partially excluded. There is increased dense consolidation in the left upper lobe. There is also medial left basilar airspace opacity obscuring the hemidiaphragm. The heart size is mildly enlarged. There is no discernible pneumothorax. Endotracheal and enteric tubes are again seen. Increasing multifocal left lung pneumonia. Xr Chest Portable    Result Date: 11/18/2020  EXAMINATION: ONE XRAY VIEW OF THE CHEST 11/18/2020 6:39 am COMPARISON: CT chest November 17, 2020.  HISTORY: ORDERING SYSTEM PROVIDED HISTORY: SOB TECHNOLOGIST PROVIDED HISTORY: Reason for exam:->SOB FINDINGS: The modulation, iterative reconstruction, and/or weight based adjustment of the mA/kV was utilized to reduce the radiation dose to as low as reasonably achievable. COMPARISON: None. HISTORY: ORDERING SYSTEM PROVIDED HISTORY: aneurysm, cp, sob TECHNOLOGIST PROVIDED HISTORY: Reason for exam:->aneurysm, cp, sob FINDINGS: CTA chest: There are confluent opacities located in left upper lobe. Patchy airspace opacities located in lingula. There is moderate to large left pleural effusion. Peribronchial thickening extensor in left hilar location into the left lung base. There is subsegmental atelectasis in posterior right lower lobe with adjacent ground-glass opacities. There is pulmonary vascular congestion. The heart is mildly enlarged. There is mediastinal lymphadenopathy. Ascending thoracic aorta measures 3.7 cm in diameter. Descending thoracic aorta measures 3 cm in diameter. No evidence of thoracic aortic aneurysm or dissection. Distal descending thoracic aorta is tortuous. CTA abdomen/pelvis: There is evidence of endograft repair involving the abdominal aorta. There is redemonstration of fusiform abdominal aortic aneurysm measuring up to 6.4 cm. This finding is stable since prior. No evidence of endoleak. No retroperitoneal fluid collections. Iliac limbs of endograft appear patent. Common iliac arteries are tortuous. Common femoral arteries are patent. Numerous diverticula associated with the left colon and sigmoid colon. No evidence of acute diverticulitis. Liver, gallbladder, pancreas, and spleen are grossly unremarkable however there is motion artifact limiting this examination. There is no hydronephrosis. Cyst associated with the right kidney is stable since previous examination as well as cyst associated with the left kidney appearing stable since prior. Appendix is visualized and normal.    1.  New confluent opacities located in left upper lobe suggesting pneumonia, atelectasis, or neoplasm.  2. Patchy airspace opacities located in lingula suggesting pneumonia with areas of atelectasis. 3.  Stenosis and occlusion are associated with left upper lobe segmental bronchi and lingular segmental bronchi. 4.  Moderate to large left pleural effusion. 5.  Mediastinal lymphadenopathy. 6.  Stable fusiform infrarenal abdominal aortic aneurysm with endograft repair. Aneurysm measures up to 6.4 cm. No evidence of endoleak or retroperitoneal fluid. 7.  Diverticulosis. Cta Abdomen Pelvis W Contrast    Result Date: 11/17/2020  EXAMINATION: CTA OF THE CHEST 11/17/2020 3:18 pm TECHNIQUE: CTA of the chest was performed after the administration of intravenous contrast.  Multiplanar reformatted images are provided for review. MIP images are provided for review. Dose modulation, iterative reconstruction, and/or weight based adjustment of the mA/kV was utilized to reduce the radiation dose to as low as reasonably achievable.; CTA of the abdomen and pelvis was performed with the administration of intravenous contrast. Multiplanar reformatted images are provided for review. MIP images are provided for review. Dose modulation, iterative reconstruction, and/or weight based adjustment of the mA/kV was utilized to reduce the radiation dose to as low as reasonably achievable. COMPARISON: None. HISTORY: ORDERING SYSTEM PROVIDED HISTORY: aneurysm, cp, sob TECHNOLOGIST PROVIDED HISTORY: Reason for exam:->aneurysm, cp, sob FINDINGS: CTA chest: There are confluent opacities located in left upper lobe. Patchy airspace opacities located in lingula. There is moderate to large left pleural effusion. Peribronchial thickening extensor in left hilar location into the left lung base. There is subsegmental atelectasis in posterior right lower lobe with adjacent ground-glass opacities. There is pulmonary vascular congestion. The heart is mildly enlarged. There is mediastinal lymphadenopathy.   Ascending thoracic aorta measures 3.7 cm in diameter. Descending thoracic aorta measures 3 cm in diameter. No evidence of thoracic aortic aneurysm or dissection. Distal descending thoracic aorta is tortuous. CTA abdomen/pelvis: There is evidence of endograft repair involving the abdominal aorta. There is redemonstration of fusiform abdominal aortic aneurysm measuring up to 6.4 cm. This finding is stable since prior. No evidence of endoleak. No retroperitoneal fluid collections. Iliac limbs of endograft appear patent. Common iliac arteries are tortuous. Common femoral arteries are patent. Numerous diverticula associated with the left colon and sigmoid colon. No evidence of acute diverticulitis. Liver, gallbladder, pancreas, and spleen are grossly unremarkable however there is motion artifact limiting this examination. There is no hydronephrosis. Cyst associated with the right kidney is stable since previous examination as well as cyst associated with the left kidney appearing stable since prior. Appendix is visualized and normal.    1.  New confluent opacities located in left upper lobe suggesting pneumonia, atelectasis, or neoplasm. 2.  Patchy airspace opacities located in lingula suggesting pneumonia with areas of atelectasis. 3.  Stenosis and occlusion are associated with left upper lobe segmental bronchi and lingular segmental bronchi. 4.  Moderate to large left pleural effusion. 5.  Mediastinal lymphadenopathy. 6.  Stable fusiform infrarenal abdominal aortic aneurysm with endograft repair. Aneurysm measures up to 6.4 cm. No evidence of endoleak or retroperitoneal fluid. 7.  Diverticulosis. Xr Abdomen For Ng/og/ne Tube Placement    Result Date: 11/22/2020  EXAMINATION: ONE SUPINE XRAY VIEW(S) OF THE ABDOMEN 11/22/2020 5:06 pm COMPARISON: November 22, 2020, 4 hours prior.  HISTORY: ORDERING SYSTEM PROVIDED HISTORY: Confirmation of course of NG/OG/NE tube and location of tip of tube TECHNOLOGIST PROVIDED HISTORY: Reason for exam:->Confirmation of course of NG/OG/NE tube and location of tip of tube Portable? ->Yes FINDINGS: Tip of the NG tube noted at the level of the diaphragm, no significant change. The right side and the inferior part of the abdomen is not included in the study. There is opacification of the visualized left upper lung. Tip of NG tube at the level of the left hemidiaphragm, no change. Xr Chest Abdomen Ng Placement    Result Date: 11/22/2020  EXAMINATION: ONE SUPINE XRAY VIEW(S) OF THE ABDOMEN 11/22/2020 12:52 pm COMPARISON: November 18. HISTORY: ORDERING SYSTEM PROVIDED HISTORY: OG placement TECHNOLOGIST PROVIDED HISTORY: Reason for exam:->OG placement FINDINGS: Nasogastric tube is present. Side hole appears to be at level of gastroesophageal junction. This tube could be advanced approximately 4 or 5 cm. Nasogastric tube is present with side hole at level of gastroesophageal junction. This tube could be advanced approximately 4 or 5 cm. Xr Chest Abdomen Ng Placement    Result Date: 11/18/2020  EXAMINATION: ONE SUPINE XRAY VIEW(S) OF THE ABDOMEN 11/18/2020 9:07 am COMPARISON: 11/18/2020, about 2 hours earlier HISTORY: ORDERING SYSTEM PROVIDED HISTORY: NGT TECHNOLOGIST PROVIDED HISTORY: Reason for exam:->NGT Reason for exam:->portable NG tube placement FINDINGS: There is an NG tube with the tip in the stomach. An ET tube is again noted in satisfactory position. There is extensive opacity in the left lung, most likely pneumonia. Increased markings also seen in the visualized portion of the right lung. The heart is enlarged. NG tube tip in the stomach. Fluoro For Surgical Procedures    Result Date: 11/17/2020  EXAMINATION: SPOT FLUOROSCOPIC IMAGES 11/17/2020 12:03 pm TECHNIQUE: Fluoroscopy was provided by the radiology department for procedure. Radiologist was not present during examination.  FLUOROSCOPY DOSE AND TYPE OR TIME AND EXPOSURES: Total dose:26.99 mGy COMPARISON: None HISTORY: ORDERING SYSTEM PROVIDED HISTORY: Pain management TECHNOLOGIST PROVIDED HISTORY: Reason for exam:->L4-5 Epidural steroid injection #1 Intraprocedural imaging. FINDINGS: 3 intraoperative fluoroscopic images were obtained during L4-5 epidural steroid injection. Intraprocedural fluoroscopic spot images as above. See separate procedure report for more information. Assessment  1. Acute hypoxic hypercapnic respiratory secondary to community-acquired pneumonia, on mechanical ventilation  2. Acute kidney injury secondary to overt diuresis, questionable underlying chronic kidney disease with evidence of proteinuria  3. Anasarca, multifactorial secondary to hypoalbuminemia, aggressive IVF, anemia  4. Hypernatremia secondary to free water deficit, loop diuretic use. Calculated free water deficit is 3.75 L  5. Hyperkalemia  6. Anemia, normocytic, oncology on board  7. Severe thrombocytopenia    Plan    1. Continue to hold Bumex  2. Sodium is improving, C/W IV D5 W at 100 ml/hr and free water flushes at 100 ml/hr, repeat sodium now, discussed with the dialysis RN  3. Stop Potassium bicarbonate, start K Phos 1 pack via NGT TID x 6 doses  4. 1 unit of PRBC transfusion today        Thank you Dr. Jocelynn Landa for allowing us to participate in care of [address] [place] [address].             Electronically signed by Chestine Apley, MD on 11/29/2020 at 5:15 PM

## 2020-11-29 NOTE — PROGRESS NOTES
11/28/20 2242   Vent Information   Vent Mode PS   Rate Set 0 bmp   Peak Flow 60 L/min   Pressure Support 5 cmH20   FiO2  50 %   SpO2 92 %   SpO2/FiO2 ratio 184   Sensitivity 3   PEEP/CPAP 8   I Time/ I Time % 0 s   Vent Patient Data   High Peep/I Pressure 0   Peak Inspiratory Pressure 16 cmH2O   Mean Airway Pressure 9.6 cmH20   Rate Measured 14 br/min   Vt Exhaled 566 mL   Minute Volume 7.39 Liters   I:E Ratio 1:4.00   Spontaneous Breathing Trial (SBT) RT Doc   Pulse 110   Additional Respiratory  Assessments   Resp 18   Alarm Settings   High Pressure Alarm 50 cmH2O

## 2020-11-29 NOTE — PLAN OF CARE
Problem: Falls - Risk of:  Goal: Will remain free from falls  Description: Will remain free from falls  11/28/2020 1949 by Vandana Martinez RN  Outcome: Met This Shift  Goal: Absence of physical injury  Description: Absence of physical injury  11/29/2020 0027 by Simon Wetzel RN  Outcome: Met This Shift  11/28/2020 1949 by Vandana Martinez RN  Outcome: Met This Shift     Problem: Skin Integrity:  Goal: Will show no infection signs and symptoms  Description: Will show no infection signs and symptoms  11/29/2020 0027 by Simon Wetzel RN  Outcome: Met This Shift  11/28/2020 1949 by Vandana Martinez RN  Outcome: Met This Shift  Goal: Absence of new skin breakdown  Description: Absence of new skin breakdown  11/28/2020 1949 by Vandana Martinez RN  Outcome: Met This Shift     Problem: Pain:  Goal: Pain level will decrease  Description: Pain level will decrease  11/28/2020 1949 by Vandana Martinez RN  Outcome: Met This Shift  Goal: Control of acute pain  Description: Control of acute pain  11/29/2020 0027 by Simon Wetzel RN  Outcome: Met This Shift  11/28/2020 1949 by Vandana Martinez RN  Outcome: Met This Shift  Goal: Control of chronic pain  Description: Control of chronic pain  11/28/2020 1949 by Vandana Martinez RN  Outcome: Met This Shift     Problem: Gas Exchange - Impaired  Goal: Able to breathe comfortably  Description: Able to breathe comfortably  11/28/2020 1949 by Vandana Martinez RN  Outcome: Met This Shift     Problem: Fluid and Electrolyte Imbalance  Goal: Fluid and electrolyte balance are achieved/maintained  11/29/2020 0027 by Simon Wetzel RN  Outcome: Met This Shift  11/28/2020 1949 by Vandana Martinez RN  Outcome: Met This Shift     Problem: HH FLUID RETENTION-CHF  Goal: Absence of fluid overload signs and symptoms  11/29/2020 0027 by Simon Wetzel RN  Outcome: Met This Shift  11/28/2020 1949 by Vandana Martinez RN  Outcome: Met This Shift     Problem: HEMODYNAMIC STATUS  Goal: Hemoglobin within

## 2020-11-29 NOTE — PROGRESS NOTES
This note also relates to the following rows which could not be included:  Vt Ordered - Cannot attach notes to unvalidated device data  Rate Set - Cannot attach notes to unvalidated device data  Peak Flow - Cannot attach notes to unvalidated device data  Pressure Support - Cannot attach notes to unvalidated device data  FiO2  - Cannot attach notes to unvalidated device data  SpO2 - Cannot attach notes to unvalidated device data  Sensitivity - Cannot attach notes to unvalidated device data  PEEP/CPAP - Cannot attach notes to unvalidated device data  I Time/ I Time % - Cannot attach notes to unvalidated device data  High Peep/I Pressure - Cannot attach notes to unvalidated device data  Peak Inspiratory Pressure - Cannot attach notes to unvalidated device data  Mean Airway Pressure - Cannot attach notes to unvalidated device data  Rate Measured - Cannot attach notes to unvalidated device data  Vt Exhaled - Cannot attach notes to unvalidated device data  Minute Volume - Cannot attach notes to unvalidated device data  I:E Ratio - Cannot attach notes to unvalidated device data  Pulse - Cannot attach notes to unvalidated device data  High Pressure Alarm - Cannot attach notes to unvalidated device data       11/28/20 2238   Vent Information   Vent Mode PS   Humidification Source Heated wire   Humidification Temp 37   Additional Respiratory  Assessments   Resp 16   Alarm Settings   Low Minute Volume Alarm 6 L/min   High Respiratory Rate 40 br/min   Low Exhaled Vt  350 mL   ETT (adult)   Placement Date/Time: 11/18/20 (c) 8202   Preoxygenation: Yes  Mask Ventilation: Mask ventilation not attempted (0)  Technique: Video laryngoscopy  Tube Size: 8 mm  Blade Size: 4  Location: Oral  Grade View: Full view of the glottis  Insertion attempts. ..    Secured at 25 cm

## 2020-11-29 NOTE — PROGRESS NOTES
[facility] Progress Note    Admitting Date and Time: 11/17/2020  1:13 PM  Admit Dx: Acute respiratory failure with hypoxia ([address] [J96.01]  Acute respiratory failure with hypoxia (HCC) [J96.01]    Subjective:  Patient is being followed for Acute respiratory failure with hypoxia ([address] [J96.01]  Acute respiratory failure with hypoxia ([address] [J96.01]     Brief history per pulmonary critical care: \"The patient is a 57 y. o. male with significant past medical history of diabetes, MANOLO, back pain, hypertension, hyperlipidemia, STEMI's status post 5 stents, CAD, aortic aneurysm presenting to the the hospital initially on 11/17. [facility] had a epidural performed for low back pain and after the procedure became very short of breath.  He was satting 80% so was sent to the hospital for further evaluation.  While in the ED patient was found to be thrombocytopenic as well as hypoxic on room air.  He was placed on 4 L of oxygen but O2 sats improved.  CTA of the chest demonstrating pneumonia and pleural effusions.  He was given 1 dose of Rocephin and azithromycin while in the ER.     On 11/18 RRT was called at 7:30 AM. Charissa Emery was found to have increased work of breathing as well as hypercapnic on ABGs.  He was on BiPAP all night with minimal improvement of his symptoms.  During the RT he was switched to Bearl  was subsequently brought down to the ICU for further evaluation and care. He was not improving on AVAPS and subsequently intubated.     A bronchoscopy was performed on 11/18 and biopsies were sent. \"    Pt remains intubated. He follows commands and follows with his eyes. ROS: denies fever, chills, cp, sob, n/v, HA unless stated above.       potassium bicarb-citric acid  40 mEq Per NG tube BID    sodium chloride  20 mL Intravenous Once    ampicillin-sulbactam  3 g Intravenous Q6H    methylPREDNISolone  40 mg Intravenous Q12H    sucralfate  1 g Oral 4 times per day    heparin flush  1 mL Intravenous 2 times per day    insulin lispro  0-18 Units Subcutaneous Q6H    nicotine  1 patch Transdermal Daily    acetylcysteine  4 mL Inhalation BID    albuterol  2.5 mg Nebulization Q4H    Arformoterol Tartrate  15 mcg Nebulization BID    atorvastatin  80 mg Oral Nightly    [Held by provider] clopidogrel  75 mg Oral Daily    fluticasone  2 spray Nasal Daily    sodium chloride flush  10 mL Intravenous 2 times per day    [Held by provider] enoxaparin  40 mg Subcutaneous Daily    insulin glargine  0.25 Units/kg Subcutaneous Nightly    chlorhexidine  15 mL Mouth/Throat BID     potassium chloride, 10 mEq, PRN  acetaminophen, 650 mg, Q4H PRN    Or  acetaminophen, 650 mg, Q4H PRN  sodium chloride flush, 10 mL, PRN  heparin flush, 1 mL, PRN  sodium phosphate IVPB, 0.16 mmol/kg, PRN    Or  sodium phosphate IVPB, 0.32 mmol/kg, PRN  magnesium sulfate, 1 g, PRN  tiZANidine, 4 mg, TID PRN  polyethylene glycol, 17 g, Daily PRN  promethazine, 12.5 mg, Q6H PRN    Or  ondansetron, 4 mg, Q6H PRN  glucose, 15 g, PRN  dextrose, 12.5 g, PRN  glucagon (rDNA), 1 mg, PRN  dextrose, 100 mL/hr, PRN  oxyCODONE-acetaminophen, 1 tablet, TID PRN    And  oxyCODONE, 2.5 mg, TID PRN  albuterol, 2.5 mg, Q4H PRN         Objective:    BP (!) 164/105   Pulse 108   Temp 100.6 °F (38.1 °C) (Bladder)   Resp 22   Ht 5' 11\" (1.803 m)   Wt (!) 319 lb 7.1 oz (144.9 kg)   SpO2 90%   BMI 44.55 kg/m²     General Appearance: alert and in no acute distress  Skin: cool and dry, especially the extremities  Head: normocephalic and atraumatic  Eyes: pupils equal, round, and reactive to light, extraocular eye movements intact, conjunctivae normal  Neck: neck supple and non tender without mass   Pulmonary/Chest: clear to auscultation bilaterally- no wheezes, rales or rhonchi, normal air movement, no respiratory distress  Cardiovascular: normal rate, normal S1 and S2 and no carotid bruits  Abdomen: soft, non-tender, non-distended, normal bowel sounds, no masses or organomegaly  Extremities: no cyanosis, no clubbing and no edema        Recent Labs     11/27/20  0535 11/28/20  0550 11/28/20  1920 11/29/20  0530   * 151* 152* 150*   K 3.3* 3.9  --  3.5    103  --  105   CO2 35* 35*  --  39*   * 100*  --  89*   CREATININE 1.5* 1.3*  --  1.3*   GLUCOSE 197* 174*  --  233*   CALCIUM 8.7 8.7  --  8.6       Recent Labs     11/27/20  0535 11/28/20  0550 11/29/20  0530   WBC 8.2 7.2 7.4   RBC 2.96* 2.92* 2.65*   HGB 8.1* 7.9* 7.0*   HCT 27.4* 26.8* 25.1*   MCV 92.6 91.8 94.7   MCH 27.4 27.1 26.4   MCHC 29.6* 29.5* 27.9*   RDW 16.3* 16.6* 16.8*   PLT 33* 31* 32*   MPV 12.0 13.3* 12.9*         Assessment:    Active Problems:    CAD (coronary artery disease)    Smoker    Non-insulin dependent type 2 diabetes mellitus ([facility] [address]    Morbid obesity with BMI of 40.0-44.9, adult (HCC)    Chronic back pain    Acute respiratory failure with hypoxia ([facility] [address]    Community acquired pneumonia of left upper lobe of lung    Thrombocytopenia (HCC)  Resolved Problems:    * No resolved hospital problems. *      Plan:  1. Acute hypoxic resp failure due to CAP  -  Left sided pneumonia on admission  -  Full vent support; had trial of PS  -  Pulm/cc following  -  Solumedrol now at q12 h  -  Nebs  -  Thoracentesis performed 11/22/20 with only small amount of pleural fluid  -  Respiratory cultures pending; 24 hours no growth  -  Bronchoscopy performed on 11/18/20    2. CAP  -  Continue antibiotics; currently on unasyn  - strep and legionella negative  - procalcitonin 0.23  -  resp culture neg x 24 hours  -  ID was consulted  -  ?drug induced fevers-cefepime discontinued as result; Fevers improving somewhat    3. Hx CAD with stent placement  -  plavix of currently on hold  -  Hx aortic aneurysm s/p endovascular repair    4. Hypoalbuminemia  -  With anasarca/volume overload  -  Renal continues to follow  -  IV diuretics discontinued  -  Albumin per renal    5. Hypernatremia-2/2 free water deficits; slight worsening  -  Renal following  -  Free water flushes    6. Left lung CA-with pleural effusion; Most consistent with small cell lung cancer  -  Hem/onc and pulmonology following  -  Rad/onc consulted by pulm. 7.  NIDDM- continue lantus and ISS    8. Chronic back pain, s/p epidural injection 11/17/20; CT spine neg for hematoma    9. Essential hypertension-  BB currently on hold due to low BP    10. Mixed hyperlipidemia with hx CAD  -  Continue statin    11. Suspect COPD with MANOLO  -  Morbid Obesity and 39 pack year smoking history  -  Patient will require sleep studies and PFTs as outpatient    12. LUE DVT-partially occlusive  -   Hematology on board  -  Cannot anticoagulate due to low platelets    13. Hypokalemia-nephrology following and repleting as needed. NOTE: This report was transcribed using voice recognition software. Every effort was made to ensure accuracy; however, inadvertent computerized transcription errors may be present.   Electronically signed by Hal Soto MD on 11/29/2020 at 11:30 AM

## 2020-11-29 NOTE — PROGRESS NOTES
11/28/20 2242   Vent Information   Vent Mode PS   Rate Set 0 bmp   Peak Flow 60 L/min   Pressure Support 5 cmH20   FiO2  50 %   SpO2 (!) 89 %   SpO2/FiO2 ratio 178   Sensitivity 3   PEEP/CPAP 8   I Time/ I Time % 0 s   Vent Patient Data   High Peep/I Pressure 0   Peak Inspiratory Pressure 16 cmH2O   Mean Airway Pressure 9.6 cmH20   Rate Measured 14 br/min   Vt Exhaled 566 mL   Minute Volume 7.39 Liters   I:E Ratio 1:4.00   Spontaneous Breathing Trial (SBT) RT Doc   Pulse 110   Additional Respiratory  Assessments   Resp 18   Alarm Settings   High Pressure Alarm 50 cmH2O

## 2020-11-30 NOTE — PROGRESS NOTES
Pulmonary progress note    Admit Date: 11/17/2020  Requesting Physician: Francisca Patiño PA-C    CC:  Community-acquired pneumonia  HPI:  61years old obese male, heavy smoker who was sent to ER for an outpatient procedure unit because increased shortness of breath and cough. Patient was evaluated in ER and started antibiotics with analysis of community-acquired pneumonia. The patient was initially medically floor and pulmonary consult was requested. Because of progressive respiratory distress and hypoxemia the decision was to transfer the patient to intensive care units and proceed with endotracheal tube placement and full mechanical ventilatory support. After evaluation by intensivist the decision was to proceed with bedside bronchoscopy due to left upper lobe collapse. Findings the bronchoscopy show a very narrow left upper lobe most likely due to extrinsic compression no major mucous plug seen erythema noticed sample for microbiology and cytology were obtained. November 20.no events overnight, still not able to be weaned to extubate failed first attempt this morning. November 21. Febrile, failed vent weaning as he required 60% FiO2 with PEEP of 10. Panculture. Antibiotics change by CCM team.  November 23. Febrile. Failed vent weaning this am,  November 24. Still febrile, failing vent weaning. Still requires 60% FiO2  November 25. Still febrile, antibiotics change by ID. Started on vent weaning with CPAP pressure support 10 PEEP of 8, tidal volume between 500-700, comfortable. November 27.   100.5 T-max.   Still require FiO2 60%, full mechanical ventilatory support  November 30: ACVC 20/450/60/12, Fentanyl Drip    PMH:    Past Medical History:   Diagnosis Date    Anticoagulant long-term use     Arthritis     CAD (coronary artery disease)     Chronic back pain     Depression     Dyslipidemia     Hiatal hernia 1979    History of ST elevation myocardial infarction (STEMI)     Hyperlipidemia  Hypertension     Low back pain     Lumbar radiculopathy 8/14/2019    Obesity     MANOLO on CPAP     Presence of stent in left circumflex coronary artery     Presence of stent in right coronary artery     Smoker     Type 2 diabetes mellitus without complication (HCC)      PSH:   Past Surgical History:   Procedure Laterality Date    ABDOMINAL AORTIC ANEURYSM REPAIR, ENDOVASCULAR N/A 6/28/2019    ENDOVASCULAR REPAIR ABDOMINAL AORTIC ANEURYSM performed by Umair Cobb MD at [address] [place] [address],[facility] Bilateral 3/12/2020    BILATERAL L3,L4,L5 MEDIAL BRANCH NERVE BLOCK performed by Az Martinez DO at [address] [address] Bilateral 6/11/2020    BILATERAL L3,L4,L5 MEDIAL NERVE BRANCH BLOCK #2 performed by Az Martinez DO at [address] N/A 11/18/2020    BRONCHOSCOPY/TRANSBRONCHIAL NEEDLE BIOPSY performed by Shin Gardner MD at [address],C STENT PLACEMENT  12/30/2011    Dr. Reba Dumont 1st OM 3.0 x 20 Ion    DIAGNOSTIC CARDIAC CATH LAB PROCEDURE  3/15/2005    [facility]. Mild to moderate CAD. Normal LV.  DIAGNOSTIC CARDIAC CATH LAB PROCEDURE  1/16/2007    [facility]. Cath/PTCA/DE stent of proximal and distal RCA.  DIAGNOSTIC CARDIAC CATH LAB PROCEDURE  2/21/2007    Cath/DE stent of proximal OM1 and proximal Cx.  DIAGNOSTIC CARDIAC CATH LAB PROCEDURE  12/30/2011    ANURADHA of mid OM branch of circumflex.      ECHO COMPL W DOP COLOR FLOW  12/30/2011         HC INSERT PICC CATH, 5/> YRS - MIDLINE  11/23/2020         HERNIA REPAIR  2/2014    laparoscopic ventral hernia repain    JOINT REPLACEMENT Left 4/1/14    TKA-ed    JOINT REPLACEMENT Right 2018    KNEE    PAIN MANAGEMENT PROCEDURE Right 9/1/2020    RIGHT L3,4,5 MEDIAL BRANCH RADIOFREQUENCY ABLATION performed by Az Martinez DO at [facility] N/A 11/17/2020    L4-5 EPIDURAL STEROID INJECTION #1 performed by Yvan Gamez DO at [address] [facility] OFFICE/OUTPT VISIT,PROCEDURE ONLY Right 2018    RIGHT KNEE TOTAL ARTHROPLASTY [facility]               fentaNYL 5 mcg/ml in 0.9%  ml infusion 150 mcg/hr (20 1029)    dextrose      midazolam 3 mg/hr (20 1029)      chlorothiazide (DIURIL) IVPB  125 mg Intravenous Q12H    albumin human  25 g Intravenous Q8H    famotidine  20 mg Oral BID    methylPREDNISolone  40 mg Intravenous Daily    potassium & sodium phosphates  1 packet Per NG tube TID    sodium chloride  20 mL Intravenous Once    ampicillin-sulbactam  3 g Intravenous Q6H    heparin flush  1 mL Intravenous 2 times per day    insulin lispro  0-18 Units Subcutaneous Q6H    nicotine  1 patch Transdermal Daily    acetylcysteine  4 mL Inhalation BID    albuterol  2.5 mg Nebulization Q4H    Arformoterol Tartrate  15 mcg Nebulization BID    atorvastatin  80 mg Oral Nightly    [Held by provider] clopidogrel  75 mg Oral Daily    fluticasone  2 spray Nasal Daily    sodium chloride flush  10 mL Intravenous 2 times per day    [Held by provider] enoxaparin  40 mg Subcutaneous Daily    insulin glargine  0.25 Units/kg Subcutaneous Nightly    chlorhexidine  15 mL Mouth/Throat BID         Vitals:  Tmax:  VITALS:  BP (!) 145/99   Pulse 105   Temp 100.9 °F (38.3 °C) (Bladder)   Resp 26   Ht 5' 11\" (1.803 m)   Wt (!) 327 lb 2.6 oz (148.4 kg)   SpO2 (!) 89%   BMI 45.63 kg/m²   24HR INTAKE/OUTPUT:      Intake/Output Summary (Last 24 hours) at 2020 1047  Last data filed at 2020 0800  Gross per 24 hour   Intake 5079.2 ml   Output 1815 ml   Net 3264.2 ml     CURRENT PULSE OXIMETRY:  SpO2: (!) 89 %  24HR PULSE OXIMETRY RANGE:  SpO2  Av.8 %  Min: 88 %  Max: 94 %      EXAM:  General: No distress. Alert. Sedated. Obese  Eyes: PERRL. No sclera icterus. No conjunctival injection. ENT: No discharge. Pharynx clear. Endotracheal tube is in place. consult requested  · Vent management as per CCM, ACVC 20/450/60/12  · Discussed with CCM team  · To continue LABA with hope to improve bronchoconstriction  · Fentanyl for sedation   · Diuresis per nephrology, possible thoracentesis left side  · Prolonged wean expected, possible trach placement if needed    Consults: ID, Pulmonary, Hematology/Oncology, Nephrology   Drips: Fentanyl   VENT: Michael Gay 20/450/60/12    Hannah Parekh M.D., F.C.C.P.  11/30/2020  10:47 AM

## 2020-11-30 NOTE — PLAN OF CARE
Problem: Falls - Risk of:  Goal: Will remain free from falls  Description: Will remain free from falls  Outcome: Met This Shift     Problem: Skin Integrity:  Goal: Will show no infection signs and symptoms  Description: Will show no infection signs and symptoms  Outcome: Met This Shift     Problem: HH FLUID RETENTION-CHF  Goal: Absence of fluid overload signs and symptoms  Outcome: Met This Shift     Problem: Bleeding - Risk of  Goal: Absence of active bleeding  Outcome: Met This Shift     Problem: Restraint Use - Nonviolent/Non-Self-Destructive Behavior:  Goal: Absence of restraint-related injury  Description: Absence of restraint-related injury  Outcome: Met This Shift

## 2020-11-30 NOTE — PROGRESS NOTES
[facility]  Hematology/Oncology  Consult      Patient Name: Yazmin Koehler. YOB: 1960  PCP: Douglas Falcon PA-C   Referring Provider:      Reason for Consultation:   Chief Complaint   Patient presents with    Shortness of Breath     patient sent from surgery after having epideral injection L4-L5. SOB has been for past two weeks     Subjective: Remains intubated and in ICU. Asleep and un-arousable when assessed. History of Present Illness: This is a 62 yo male patient with a past medical history of CAD, DM type 2, morbid obesity, chronic back pain, HTN, AAA s/p repair who set to the emergency room by Dr. Omari Izquierdo for complaints of shortness of breath and decreased O2 during an epidural procedure. Patient was having an L4/L5 epidural for pain management done by Dr. Omari Izquierdo when his O2 dropped into the 80's during the procedure. Patient also reported having worsening shortness of breath over the past two weeks with a productive cough. Patient is basically bed bound only taking occasional steps. CTA of the chest, A/P showed new confluent opacities located in left upper lobe suggesting pneumonia, atelectasis, or neoplasm. Patchy airspace opacities located in lingula suggesting pneumonia with areas of atelectasis. Stenosis and occlusion are associated with left upper lobe segmental bronchi and lingular segmental bronchi. Moderate to large left pleural effusion. Mediastinal lymphadenopathy. Stable fusiform infrarenal abdominal aortic aneurysm with endograft repair. Aneurysm measures up to 6.4 cm. No evidence of endoleak or retroperitoneal fluid. He was given 1 dose of Rocephin and azithromycin while in the ER. CBC since admission has shown anemia and thrombocytopenia. 11/18 an RRT was called and was subsequently intubated. He also had a bronchoscopy done and bxs of left upper lobe were sent.        Diagnostic Data:     Past Medical History:   Diagnosis Date    Anticoagulant long-term use     Arthritis     CAD (coronary artery disease)     Chronic back pain     Depression     Dyslipidemia     Hiatal hernia 1979    History of ST elevation myocardial infarction (STEMI)     Hyperlipidemia     Hypertension     Low back pain     Lumbar radiculopathy 8/14/2019    Obesity     MANOLO on CPAP     Presence of stent in left circumflex coronary artery     Presence of stent in right coronary artery     Smoker     Type 2 diabetes mellitus without complication [facility])        Patient Active Problem List    Diagnosis Date Noted    Acute respiratory failure with hypoxia ([address] 11/17/2020    Community acquired pneumonia of left upper lobe of lung 11/17/2020    Thrombocytopenia ([address] 11/17/2020    Spinal stenosis of lumbar region with neurogenic claudication 10/29/2020    Obesity     Chronic back pain     Lumbosacral spondylosis without myelopathy 12/11/2019    Lumbar radiculopathy 08/14/2019    AAA (abdominal aortic aneurysm) ([address] 06/28/2019    Chronic bilateral low back pain with bilateral sciatica 06/11/2019    Right hip pain 06/11/2019    DDD (degenerative disc disease), lumbar 06/11/2019    Status post total right knee replacement 11/26/2018    Morbid obesity with BMI of 40.0-44.9, adult ([address] 11/26/2018    Chronic pain syndrome 07/27/2018    Chronic pain of right knee 07/27/2018    Non-insulin dependent type 2 diabetes mellitus ([address] 03/16/2018    Aortic valve stenosis 02/15/2018    Primary osteoarthritis of right knee 02/15/2018    Presence of stent in left circumflex coronary artery     Smoker     CAD (coronary artery disease) 12/28/2011    HTN (hypertension) 12/28/2011    Hyperlipemia 12/28/2011        Past Surgical History:   Procedure Laterality Date    ABDOMINAL AORTIC ANEURYSM REPAIR, ENDOVASCULAR N/A 6/28/2019    ENDOVASCULAR REPAIR ABDOMINAL AORTIC ANEURYSM performed by Josef Aguilar MD at [address] [place] [address],[address] Bilateral 3/12/2020    BILATERAL L3,L4,L5 MEDIAL BRANCH NERVE BLOCK performed by Marisela Padilla DO at [address] Bilateral 6/11/2020    BILATERAL L3,L4,L5 MEDIAL NERVE BRANCH BLOCK #2 performed by Marisela Padilla DO at [address] [place] [address] N/A 11/18/2020    BRONCHOSCOPY/TRANSBRONCHIAL NEEDLE BIOPSY performed by Robert Khoury MD at [address] [place] [address]  12/30/2011    Dr. Esau Bowden 1st OM 3.0 x 20 Ion    DIAGNOSTIC CARDIAC CATH LAB PROCEDURE  3/15/2005    [facility]. Mild to moderate CAD. Normal LV.  DIAGNOSTIC CARDIAC CATH LAB PROCEDURE  1/16/2007    [facility]. Cath/PTCA/DE stent of proximal and distal RCA.  DIAGNOSTIC CARDIAC CATH LAB PROCEDURE  2/21/2007    Cath/DE stent of proximal OM1 and proximal Cx.  DIAGNOSTIC CARDIAC CATH LAB PROCEDURE  12/30/2011    ANURADHA of mid OM branch of circumflex.  ECHO COMPL W DOP COLOR FLOW  12/30/2011         HC INSERT PICC CATH, 5/> YRS - MIDLINE  11/23/2020         HERNIA REPAIR  2/2014    laparoscopic ventral hernia repain    JOINT REPLACEMENT Left 4/1/14    TKA-ed    JOINT REPLACEMENT Right 2018    KNEE    PAIN MANAGEMENT PROCEDURE Right 9/1/2020    RIGHT L3,4,5 MEDIAL BRANCH RADIOFREQUENCY ABLATION performed by Marisela Padilla DO at [facility] N/A 11/17/2020    L4-5 EPIDURAL STEROID INJECTION #1 performed by Marisela Padilla DO at [address] [facility] OFFICE/OUTPT VISIT,PROCEDURE ONLY Right 11/26/2018    RIGHT KNEE TOTAL ARTHROPLASTY performed by Chandler Seip, DO at [facility]       Family History  Family History   Problem Relation Age of Onset    Diabetes Mother     Stroke Mother     Diabetes Father     No Known Problems Sister        Social History    TOBACCO:   reports that he has been smoking cigarettes. He started smoking about 42 years ago. He has a 39.00 pack-year smoking history.  He has never used smokeless tobacco.  ETOH:   reports no history of 7/6/20   Abdi Simmons PA-C   aspirin 325 MG EC tablet TAKE 1 TABLET BY MOUTH EVERY DAY  Patient taking differently: Take 325 mg by mouth daily Has been on hold for procedure 5/7/20   Femi Gauthier DO   Tens Unit MISC by Does not apply route 5/7/19   TRUDY Skinner       Allergies  Allergies   Allergen Reactions    Bee Venom Anaphylaxis       Review of Systems: patient is intubated and sedated and not responsive to questions. Objective  BP (!) 182/110   Pulse 120   Temp 101.1 °F (38.4 °C) (Bladder)   Resp 21   Ht 5' 11\" (1.803 m)   Wt (!) 327 lb 2.6 oz (148.4 kg)   SpO2 (!) 89%   BMI 45.63 kg/m²     Physical Exam:   Performance Status:  Head and neck: PERRLA, EOMI . Sclera non icteric. Oropharynx: Clear  Neck: no JVD,  no adenopathy  Heart: Regular rate and regular rhythm, no murmur  Lungs:Disffuse inspiratory and expiratory rhonchi   Extremities: No edema,no cyanosis, no clubbing. Abdomen: Soft, non-tender;no masses, no organomegaly  Skin: No rash. Neurologic:Cranial nerves grossly intact. No focal motor or sensory deficits .     Recent Laboratory Data-   Lab Results   Component Value Date    WBC 5.6 11/30/2020    HGB 7.3 (L) 11/30/2020    HCT 25.2 (L) 11/30/2020    MCV 93.7 11/30/2020    PLT 28 (L) 11/30/2020    LYMPHOPCT 17.7 (L) 11/30/2020    RBC 2.69 (L) 11/30/2020    MCH 27.1 11/30/2020    MCHC 29.0 (L) 11/30/2020    RDW 16.6 (H) 11/30/2020    NEUTOPHILPCT 69.1 11/30/2020    MONOPCT 4.9 11/30/2020    BASOPCT 0.4 11/30/2020    NEUTROABS 3.84 11/30/2020    LYMPHSABS 0.98 (L) 11/30/2020    MONOSABS 0.27 11/30/2020    EOSABS 0.09 11/30/2020    BASOSABS 0.02 11/30/2020       Lab Results   Component Value Date     11/30/2020    K 3.8 11/30/2020     11/30/2020    CO2 35 (H) 11/30/2020    BUN 90 (H) 11/30/2020    CREATININE 1.4 (H) 11/30/2020    GLUCOSE 185 (H) 11/30/2020    CALCIUM 8.3 (L) 11/30/2020    PROT 5.2 (L) 11/30/2020    LABALBU 2.3 (L) 11/30/2020    BILITOT 0.5 identified. 1. No evidence for epidural hematoma on CT scan. 2. Suspected sacral insufficiency fracture. Xr Chest Portable    Result Date: 11/23/2020  EXAMINATION: ONE XRAY VIEW OF THE CHEST 11/23/2020 7:08 am COMPARISON: 11/20/2020 HISTORY: ORDERING SYSTEM PROVIDED HISTORY: resp failure TECHNOLOGIST PROVIDED HISTORY: Reason for exam:->resp failure FINDINGS: There is no interval change in the persistent dense consolidation within the left upper lobe. The left lower lobe is clear. The right lung is clear. There is minimal atelectasis seen within the right lung base. The cardiac silhouette is within normals. 1. No interval change in the dense consolidation seen within the left upper lobe. 2. Minimal right lung base atelectasis. Xr Chest Portable    Result Date: 11/22/2020  EXAMINATION: ONE XRAY VIEW OF THE CHEST 11/22/2020 6:28 am COMPARISON: 11/21/2020 HISTORY: ORDERING SYSTEM PROVIDED HISTORY: resp failure TECHNOLOGIST PROVIDED HISTORY: Reason for exam:->resp failure FINDINGS: The endotracheal tube is stable. The enteric tube tip is not well visualized but may be at the gastroesophageal junction. There is stable left upper lobe dense opacity. There are continued patchy opacities in the left lower lobe. There may be small pleural effusions. No pneumothorax is seen. No significant change in dense airspace opacity of the left upper lobe and patchy left lower lobe airspace opacities. Enteric tube tip is not well visualized due to underpenetration. The tip may be at the gastroesophageal junction. This could be confirmed with KUB centered on the upper abdomen. Xr Chest Portable    Result Date: 11/21/2020  EXAMINATION: ONE XRAY VIEW OF THE CHEST 11/21/2020 7:37 am COMPARISON: 11/20/2020 HISTORY: ORDERING SYSTEM PROVIDED HISTORY: resp failure TECHNOLOGIST PROVIDED HISTORY: Reason for exam:->resp failure FINDINGS: Endotracheal tube and nasogastric tube are unchanged.  Large opacity in the left upper lobe is unchanged. There is additional airspace disease in the left lower lobe which is stable. There is no pneumothorax. Small pleural effusions are not excluded. Unchanged large opacity/consolidation in left upper lobe. Unchanged airspace disease in left lower lobe. Xr Chest Portable    Result Date: 11/20/2020  EXAMINATION: ONE XRAY VIEW OF THE CHEST 11/20/2020 6:33 am COMPARISON: 11/19/2020 HISTORY: ORDERING SYSTEM PROVIDED HISTORY: resp failure TECHNOLOGIST PROVIDED HISTORY: Reason for exam:->resp failure FINDINGS: Endotracheal and enteric tubes remain in place. There has been no appreciable change in opacities throughout the left lung, most pronounced in the left apex. The right lung is clear. The heart size is at the upper limits of normal.  There is no discernible pneumothorax. Grossly stable opacities on the left. Xr Chest Portable    Result Date: 11/19/2020  EXAMINATION: ONE XRAY VIEW OF THE CHEST 11/19/2020 6:23 am COMPARISON: 11/18/2020 HISTORY: ORDERING SYSTEM PROVIDED HISTORY: resp failure TECHNOLOGIST PROVIDED HISTORY: Reason for exam:->resp failure FINDINGS: Evaluation is limited by the patient's body habitus and the portable technique. The right lung apex was partially excluded. There is increased dense consolidation in the left upper lobe. There is also medial left basilar airspace opacity obscuring the hemidiaphragm. The heart size is mildly enlarged. There is no discernible pneumothorax. Endotracheal and enteric tubes are again seen. Increasing multifocal left lung pneumonia. Xr Chest Portable    Result Date: 11/18/2020  EXAMINATION: ONE XRAY VIEW OF THE CHEST 11/18/2020 6:39 am COMPARISON: CT chest November 17, 2020. HISTORY: ORDERING SYSTEM PROVIDED HISTORY: SOB TECHNOLOGIST PROVIDED HISTORY: Reason for exam:->SOB FINDINGS: The cardiac silhouette is within normal limits in size. There is masslike consolidation of the left upper lobe.   There is a small to moderate left dependent pleural effusion. There is no visible pneumothorax. The pulmonary vessels are within normal limits. Left upper lobe masslike consolidation. Small to moderate left pleural effusion. Xr Chest 1 View    Result Date: 11/18/2020  EXAMINATION: ONE XRAY VIEW OF THE CHEST 11/18/2020 9:06 am COMPARISON: 11/18/2020 HISTORY: ORDERING SYSTEM PROVIDED HISTORY: intubation TECHNOLOGIST PROVIDED HISTORY: Reason for exam:->intubation Reason for exam:->portable FINDINGS: Compared to the chest radiograph from earlier today, 6:44 a.m., there is interval placement of an endotracheal tube, the tip of which is approximately 4.1 cm above the korina. Nasogastric tube is present but is suboptimally visualized due to motion artifact and relative underpenetration of the study. The abdominal radiograph demonstrates it to be well positioned, with the tip in the upper abdomen. Prominent masslike consolidative opacity in the upper left lung are grossly stable. No pneumothorax is seen. Layering left pleural effusion. 1.  The life-support lines and tubes are well positioned. 2. Masslike consolidative opacity in the left upper lung. Small left effusion. Cta Chest W Contrast    Result Date: 11/17/2020  EXAMINATION: CTA OF THE CHEST 11/17/2020 3:18 pm TECHNIQUE: CTA of the chest was performed after the administration of intravenous contrast.  Multiplanar reformatted images are provided for review. MIP images are provided for review. Dose modulation, iterative reconstruction, and/or weight based adjustment of the mA/kV was utilized to reduce the radiation dose to as low as reasonably achievable.; CTA of the abdomen and pelvis was performed with the administration of intravenous contrast. Multiplanar reformatted images are provided for review. MIP images are provided for review.  Dose modulation, iterative reconstruction, and/or weight based adjustment of the mA/kV was utilized to reduce the radiation dose to as low as reasonably achievable. COMPARISON: None. HISTORY: ORDERING SYSTEM PROVIDED HISTORY: aneurysm, cp, sob TECHNOLOGIST PROVIDED HISTORY: Reason for exam:->aneurysm, cp, sob FINDINGS: CTA chest: There are confluent opacities located in left upper lobe. Patchy airspace opacities located in lingula. There is moderate to large left pleural effusion. Peribronchial thickening extensor in left hilar location into the left lung base. There is subsegmental atelectasis in posterior right lower lobe with adjacent ground-glass opacities. There is pulmonary vascular congestion. The heart is mildly enlarged. There is mediastinal lymphadenopathy. Ascending thoracic aorta measures 3.7 cm in diameter. Descending thoracic aorta measures 3 cm in diameter. No evidence of thoracic aortic aneurysm or dissection. Distal descending thoracic aorta is tortuous. CTA abdomen/pelvis: There is evidence of endograft repair involving the abdominal aorta. There is redemonstration of fusiform abdominal aortic aneurysm measuring up to 6.4 cm. This finding is stable since prior. No evidence of endoleak. No retroperitoneal fluid collections. Iliac limbs of endograft appear patent. Common iliac arteries are tortuous. Common femoral arteries are patent. Numerous diverticula associated with the left colon and sigmoid colon. No evidence of acute diverticulitis. Liver, gallbladder, pancreas, and spleen are grossly unremarkable however there is motion artifact limiting this examination. There is no hydronephrosis. Cyst associated with the right kidney is stable since previous examination as well as cyst associated with the left kidney appearing stable since prior. Appendix is visualized and normal.    1.  New confluent opacities located in left upper lobe suggesting pneumonia, atelectasis, or neoplasm. 2.  Patchy airspace opacities located in lingula suggesting pneumonia with areas of atelectasis.  3.  Stenosis and occlusion are associated thoracic aorta is tortuous. CTA abdomen/pelvis: There is evidence of endograft repair involving the abdominal aorta. There is redemonstration of fusiform abdominal aortic aneurysm measuring up to 6.4 cm. This finding is stable since prior. No evidence of endoleak. No retroperitoneal fluid collections. Iliac limbs of endograft appear patent. Common iliac arteries are tortuous. Common femoral arteries are patent. Numerous diverticula associated with the left colon and sigmoid colon. No evidence of acute diverticulitis. Liver, gallbladder, pancreas, and spleen are grossly unremarkable however there is motion artifact limiting this examination. There is no hydronephrosis. Cyst associated with the right kidney is stable since previous examination as well as cyst associated with the left kidney appearing stable since prior. Appendix is visualized and normal.    1.  New confluent opacities located in left upper lobe suggesting pneumonia, atelectasis, or neoplasm. 2.  Patchy airspace opacities located in lingula suggesting pneumonia with areas of atelectasis. 3.  Stenosis and occlusion are associated with left upper lobe segmental bronchi and lingular segmental bronchi. 4.  Moderate to large left pleural effusion. 5.  Mediastinal lymphadenopathy. 6.  Stable fusiform infrarenal abdominal aortic aneurysm with endograft repair. Aneurysm measures up to 6.4 cm. No evidence of endoleak or retroperitoneal fluid. 7.  Diverticulosis. Xr Abdomen For Ng/og/ne Tube Placement    Result Date: 11/22/2020  EXAMINATION: ONE SUPINE XRAY VIEW(S) OF THE ABDOMEN 11/22/2020 5:06 pm COMPARISON: November 22, 2020, 4 hours prior. HISTORY: ORDERING SYSTEM PROVIDED HISTORY: Confirmation of course of NG/OG/NE tube and location of tip of tube TECHNOLOGIST PROVIDED HISTORY: Reason for exam:->Confirmation of course of NG/OG/NE tube and location of tip of tube Portable? ->Yes FINDINGS: Tip of the NG tube noted at the level of the diaphragm, no significant change. The right side and the inferior part of the abdomen is not included in the study. There is opacification of the visualized left upper lung. Tip of NG tube at the level of the left hemidiaphragm, no change. Xr Chest Abdomen Ng Placement    Result Date: 11/22/2020  EXAMINATION: ONE SUPINE XRAY VIEW(S) OF THE ABDOMEN 11/22/2020 12:52 pm COMPARISON: November 18. HISTORY: ORDERING SYSTEM PROVIDED HISTORY: OG placement TECHNOLOGIST PROVIDED HISTORY: Reason for exam:->OG placement FINDINGS: Nasogastric tube is present. Side hole appears to be at level of gastroesophageal junction. This tube could be advanced approximately 4 or 5 cm. Nasogastric tube is present with side hole at level of gastroesophageal junction. This tube could be advanced approximately 4 or 5 cm. Xr Chest Abdomen Ng Placement    Result Date: 11/18/2020  EXAMINATION: ONE SUPINE XRAY VIEW(S) OF THE ABDOMEN 11/18/2020 9:07 am COMPARISON: 11/18/2020, about 2 hours earlier HISTORY: ORDERING SYSTEM PROVIDED HISTORY: NGT TECHNOLOGIST PROVIDED HISTORY: Reason for exam:->NGT Reason for exam:->portable NG tube placement FINDINGS: There is an NG tube with the tip in the stomach. An ET tube is again noted in satisfactory position. There is extensive opacity in the left lung, most likely pneumonia. Increased markings also seen in the visualized portion of the right lung. The heart is enlarged. NG tube tip in the stomach. Fluoro For Surgical Procedures    Result Date: 11/17/2020  EXAMINATION: SPOT FLUOROSCOPIC IMAGES 11/17/2020 12:03 pm TECHNIQUE: Fluoroscopy was provided by the radiology department for procedure. Radiologist was not present during examination. FLUOROSCOPY DOSE AND TYPE OR TIME AND EXPOSURES: Total dose:26.99 mGy COMPARISON: None HISTORY: ORDERING SYSTEM PROVIDED HISTORY: Pain management TECHNOLOGIST PROVIDED HISTORY: Reason for exam:->L4-5 Epidural steroid injection #1 Intraprocedural imaging. FINDINGS: 3 intraoperative fluoroscopic images were obtained during L4-5 epidural steroid injection. Intraprocedural fluoroscopic spot images as above. See separate procedure report for more information. ASSESSMENT/PLAN :    60 yo male  CAD  HTN  AAA s/p repair  DM type 2  Morbid obesity  Chronic back pain  Thrombocytopenia, Anemia   OMER opacity s/p biopsy    - S/p bronch with cytology pending  - OMER PNA vs neoplasm  - On rocpehin and levaquin   - CBC showing Hgb 7.8 with MCV 94, Platelets 39  - Will follow cyto  - Cytopenias likely due to combination of PNA and abc, with myelosuppression. Thrombocytopenia has continued to slowly progress. CBC was WNL on 10/6/20, suggesting an acute process rather than a primary bone marrow process  - Transfuse for Hgb <7, platelets <15  - Smear showed subtle toxic granulation of neutrophils  - Check iron profile and B12/folate    11/24/20  - OMER bronch bx pathology:  DIAGNOSIS   POSITIVE FOR MALIGNANT CELLS   Carcinoma, most compatible with small cell carcinoma, see comment. Monolayer shows atypical crushed cells, and many lymphocytes/histiocytes   with anthracotic pigment. Cell block shows abundant crushed malignant cells. COMMENT:   Immunostains stain the malignant cells as follows:   Pankeratin and TTF1:  Positive   Chromogranin:  Positive weakly   Synaptophysin:  Negative   CK7:  Negative   This immunoprofile is most compatible with small cell carcinoma. 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 His CT scan of abdomen and pelvis did not show evidence of intra-abdominal metastasis.   He has multiple comorbid conditions including obesity, coronary artery disease, obstructive sleep apnea, obesity, type 2 diabetes mellitus and he presently has pneumonia with hypoxia and respiratory failure and remains intubated and sedated. 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   - Today's Hgb 7.2 Plts 38   - Patient still intubated but hopefully will be able to extubated  - Once stable, MRI brain to complete staging   - If he improves clinically and has improvement in ECOG, he will be considered for systemic therapy  - L pleural effusions likely malignant  - ID following, now on unasyn  - Given his current ECOG and comorbidities along with persistent fevers and abx requirements, not eligible for inpatient emergent chemotherapy. Will get rad onc on board as inpatient urgent palliative XRT could be considered to improved respiratory status if he has improvement in above    11/26/20  - Remains intubated, back in ICU  - CBC with further drop in platelets to 29. Likely combination of myelosuppression from acute illness and abx exposure  - If he has improvement in his clinic course, eventually will need MRI brain as above  - Therapy plan as above. He will need significant improvement clinically prior to discussion regarding chemotherapy. Rad onc consult pending, again will likely need improvement prior to proceeding with therapy. Cultures have no grown a source, but patient remains febrile (possibly drug fever and ID following and managing abx)  - Will follow. Further recs pending clinical course    11/27/20  - LUE DVT  - Platelets 33. Stable from yesterday  - Can not anticoagulate with current platelet count. Needs to be ~50. Monitor for now  - Remainder of CBC stable    11/29/20  - CBC stable, thrombocytopenia unchanged. Hgb dropped to 7.0. Will give 1 unit  - Continue to hold [facility] with platelets <19  - Still with low grade fevers in the 100's  - ID following, on unasyn. Possible post-obstructive PNA    11/30/20  - Continues to have thrombocytopenia platelets 28.   Hemoglobin 7.3 after 1 unit of packed red blood cells yesterday. - Continue to hold [facility] for platelets <19  - Still with fevers in the 100s, he is also hypertensive  - On Unasyn  - Rad on consult still pending, but no aggressive oncologic care until significant clinical improvement      MATY Quick - CNP  Electronically signed 11/30/2020 at 2:48 PM   Pt seen and examined.  Note edited to reflect updates  Denys Sawyer MD

## 2020-11-30 NOTE — PROGRESS NOTES
· Usual Body Weight: (UTO)     · Ideal Body Weight: 172 lbs; % Ideal Body Weight 183.7 %   · BMI: 45.6  · BMI Categories: Obese Class 3 (BMI 40.0 or greater)       Nutrition Diagnosis:   · Inadequate oral intake related to impaired respiratory function as evidenced by NPO or clear liquid status due to medical condition, intubation      Nutrition Interventions:   Food and/or Nutrient Delivery:  Continue NPO, Modify Tube Feeding(increase goal rate, as eleni, now that no propofol rena)  Nutrition Education/Counseling:  Education not indicated   Coordination of Nutrition Care:  Continue to monitor while inpatient    Goals:  Pt eleni EN at goal rate       Nutrition Monitoring and Evaluation:   Behavioral-Environmental Outcomes:  None Identified   Food/Nutrient Intake Outcomes:  Enteral Nutrition Intake/Tolerance  Physical Signs/Symptoms Outcomes:  Biochemical Data, GI Status, Fluid Status or Edema, Hemodynamic Status, Weight, Skin, Nutrition Focused Physical Findings     Discharge Planning:     Too soon to determine     Electronically signed by Jie Harrell RD, CNSC, LD on 11/30/20 at 12:59 PM EST    Contact: [phone]

## 2020-11-30 NOTE — PROGRESS NOTES
[address] [place] [address] [facility]  [facility]  Progress Note    SUBJECTIVE:  Chief Complaint   Patient presents with    Shortness of Breath     patient sent from surgery after having epideral injection L4-L5. SOB has been for past two weeks     No major changes. The patient is still in the ICU. He is tolerating antibiotics. Patient is again having fevers. They seem to be unremitting. Review of systems:  As stated above in the chief complaint, otherwise negative.     Medications:  Scheduled Meds:   chlorothiazide (DIURIL) IVPB  125 mg Intravenous Q12H    albumin human  25 g Intravenous Q8H    famotidine  20 mg Oral BID    methylPREDNISolone  40 mg Intravenous Daily    potassium & sodium phosphates  1 packet Per NG tube TID    sodium chloride  20 mL Intravenous Once    ampicillin-sulbactam  3 g Intravenous Q6H    heparin flush  1 mL Intravenous 2 times per day    insulin lispro  0-18 Units Subcutaneous Q6H    nicotine  1 patch Transdermal Daily    acetylcysteine  4 mL Inhalation BID    albuterol  2.5 mg Nebulization Q4H    Arformoterol Tartrate  15 mcg Nebulization BID    atorvastatin  80 mg Oral Nightly    [Held by provider] clopidogrel  75 mg Oral Daily    fluticasone  2 spray Nasal Daily    sodium chloride flush  10 mL Intravenous 2 times per day    [Held by provider] enoxaparin  40 mg Subcutaneous Daily    insulin glargine  0.25 Units/kg Subcutaneous Nightly    chlorhexidine  15 mL Mouth/Throat BID     Continuous Infusions:   fentaNYL 5 mcg/ml in 0.9%  ml infusion Stopped (11/30/20 1145)    dextrose      midazolam Stopped (11/30/20 1102)     PRN Meds:potassium chloride, acetaminophen **OR** acetaminophen, sodium chloride flush, heparin flush, sodium phosphate IVPB **OR** sodium phosphate IVPB, magnesium sulfate, tiZANidine, polyethylene glycol, promethazine **OR** ondansetron, glucose, dextrose, glucagon (rDNA), dextrose, oxyCODONE-acetaminophen **AND** oxyCODONE, albuterol    OBJECTIVE:  BP (!) 145/99   Pulse 105   Temp 100.9 °F (38.3 °C) (Bladder)   Resp 26   Ht 5' 11\" (1.803 m)   Wt (!) 327 lb 2.6 oz (148.4 kg)   SpO2 (!) 89%   BMI 45.63 kg/m²   Temp  Av.5 °F (38.1 °C)  Min: 99.9 °F (37.7 °C)  Max: 101.1 °F (38.4 °C)  Constitutional: The patient is lying in bed. He is intubated and sedated. Opens eyes. Restrained. FiO2 60%. PEEP 12. No changes. Skin: Warm and dry. No rashes were noted. HEENT: Round and reactive pupils. Moist mucous membranes. No ulcerations or thrush. ET tube. OG tube. Neck: Supple to movements. Chest: No coarse breath sounds. No crackles. Cardiovascular: Heart sounds rhythmic and regular. Abdomen: Large, round and nondistended. Positive bowel sounds to auscultation. Benign to palpation. Extremities: Minimal edema. Lines: Left midline 2020. Black catheter.     Laboratory and Tests Review:  Lab Results   Component Value Date    WBC 5.6 2020    WBC 7.4 2020    WBC 7.2 2020    HGB 7.3 (L) 2020    HCT 25.2 (L) 2020    MCV 93.7 2020    PLT 28 (L) 2020     Lab Results   Component Value Date    NEUTROABS 3.84 2020    NEUTROABS 6.36 2020    NEUTROABS 5.49 2020     No results found for: [facility]  Lab Results   Component Value Date    ALT 51 (H) 2020     (H) 2020    ALKPHOS 134 (H) 2020    BILITOT 0.5 2020     Lab Results   Component Value Date     2020    K 3.8 2020    K 5.1 2020     2020    CO2 35 2020    BUN 90 2020    CREATININE 1.4 2020    CREATININE 1.3 2020    CREATININE 1.3 2020    GFRAA >60 2020    LABGLOM 52 2020    GLUCOSE 185 2020    GLUCOSE 120 2011    PROT 5.2 2020    LABALBU 2.3 2020    LABALBU 4.5 2011    CALCIUM 8.3 2020    BILITOT 0.5 2020    ALKPHOS 134 2020     2020    ALT 51 11/30/2020     Lab Results   Component Value Date    CRP 2.5 (H) 11/25/2020     Lab Results   Component Value Date    SEDRATE 80 (H) 11/25/2020     Radiology:  Rest x-ray reviewed. No changes    Microbiology:   Respiratory panel (including SARS-CoV-2: Not detected  Streptococcus pneumoniae/Legionella urine Ag: negative  Nares screen MRSA: Negative  Urine culture 11/24/2020: Negative  Respiratory culture 11/18/2020: OP aristides absent. Respiratory culture 11/21/2020: OP aristides absent, Candida albicans  Respiratory culture 11/24/2020: OP aristides absent, Candida albicans  Blood cultures 11/21/2020 CoNS in 1 of 4 bottles   Blood cultures 11/24/2020: Negative x2  Blood cultures 11/29/2020: Negative so far    ASSESSMENT:  · Acute respiratory failure  · Left upper lobe cancer. Being followed by oncology. Not eligible for emergent chemotherapy  · Probable obstructive pneumonia  · Fever associated to postobstructive pneumonia and now possibly secondary to underlying tumor  · Candida colonization of airways  · CoNS bacteremia. Contaminant  · Acute kidney injury, improving  · Prognosis is poor    PLAN:  · Continue Unasyn for now  · No need to treat Candida colonization at this point  · No need to treat CoNS bacteremia   · We will follow with you.     Discussed with Dr. Bam Brooks  11:53 AM  11/30/2020

## 2020-11-30 NOTE — PROGRESS NOTES
Critical Care Team - Daily Progress Note         Date and time: 11/30/2020 1:30 PM  Patient's name:  Jg Patel. Medical Record Number: [id number]  Patient's account/billing number: [de-identified]  Patient's YOB: 1960  Age: 61 y.o. Date of Admission: 11/17/2020  1:13 PM  Length of stay during current admission: 13      Primary Care Physician: Manisha Joaquin PA-C  ICU Attending Physician: Dr. Ace Mays    Code Status: Full Code    Reason for ICU admission: acute hypoxic respiratory failure      SUBJECTIVE:     BRIEF HISTORY:   The patient is a 57 y. o. male with significant past medical history of diabetes, MANOLO, back pain, hypertension, hyperlipidemia, STEMI's status post 5 stents, CAD, aortic aneurysm presenting to the the hospital initially on 11/17. [facility] had a epidural performed yesterday for low back pain and after the procedure became very short of breath.  He was satting 80% so was sent to the hospital for further evaluation.  While in the ED patient was found to be thrombocytopenic as well as hypoxic on room air.  He was placed on 4 L of oxygen but O2 sats improved.  CTA of the chest demonstrating pneumonia and pleural effusions.  He was given 1 dose of Rocephin and azithromycin while in the ER.     On 11/18 RRT was called at 7:30 AM. Tammie Younger was found to have increased work of breathing as well as hypercapnic on ABGs.  He was on BiPAP all night with minimal improvement of his symptoms.  During the RT he was switched to Tuliobrittany [place] was subsequently brought down to the ICU for further evaluation and care. He was not improving on AVAPS and subsequently intubated     OVERNIGHT EVENTS:------------------------------------------------------------------------------------------------   11/19/20: Continues to be intubated, no acute events  11/20/20: Attempted weaning trials. Patient on pressure support for a few hours. ABGs worsening.   Placed on it back on AC/VC  11/21/20: More rhonchorous today.  More secretions. 11/22/20; still having moist secretions, gram positive cocci bacteremia   11/23/20: continued secretions, complaining of mild abdominal pain today. multiple attempts made to transfer patient per wifes request  11/24/20: start diuresis. Wean propofol and add seroquel for agitation. Increase free water. Biopsy results back concerning for small cell lung cancer. Febrile. 11/25 febrile re cultured overnight , t max 101, ID consulted, will attempt weaning again today  11/26: continues with fevers, tmax 101. ID added levaquin yesterday, bronch today  11/27: low grade fevers improved. Not tolerating vent weaning, continued diruesis throughout the day. Stopped by renal with worsening bun  11/28: Diuresed appropriately, Awake and following commands, failed psv trial   11/29: Hemoglobin dropped this morning to 7.0, 1 unit packed red blood cells ordered by hematology  Continue pressure support trials today   11/30: febrile overnight, wean sedation, PSV trials -- failed.  Discuss trach with family    CURRENT VENTILATION STATUS:     [x] Ventilator  [] BIPAP  [] Nasal Cannula [] Room Air        SECRETIONS : Amount:  [x] Small [] Moderate  [] Large  [] None  Color:     [] White [x] Colored  [] Bloody    SEDATION:    [] Propofol gtt  [x] Versed gtt  [] Ativan gtt   [] No Sedation    PARALYZED:  [x] No    [] Yes      VASOPRESSORS:  [x] No    [] Yes    If yes -   [] Levophed       [] Dopamine     [] Vasopressin       [] Dobutamine  [] Phenylephrine         [] Epinephrine    CENTRAL LINES:     [x] No   [] Yes   (Date of Insertion:   )           If yes -     [] Right IJ     [] Left IJ [] Right Femoral [] Left Femoral                   [] Right Subclavian [] Left Subclavian       HUFF'S CATHETER:   [] No   [x] Yes  (Date of Insertion: 11/19  )     URINE OUTPUT:            [x] Good   [] Low              [] Anuric      OBJECTIVE:     VITAL SIGNS:  BP (!) 182/110   Pulse 120   Temp 101.1 °F (38.4 °C) (Bladder)   Resp 21   Ht 5' 11\" (1.803 m)   Wt (!) 327 lb 2.6 oz (148.4 kg)   SpO2 (!) 89%   BMI 45.63 kg/m²   Tmax over 24 hours:  Temp (24hrs), Av.6 °F (38.1 °C), Min:99.9 °F (37.7 °C), Max:101.1 °F (38.4 °C)      Patient Vitals for the past 6 hrs:   BP Temp Temp src Pulse Resp SpO2   20 1223 -- -- -- 120 21 (!) 89 %   20 1215 (!) 182/110 -- -- -- -- --   20 1209 (!) 192/125 -- -- 142 -- (!) 88 %   20 1208 -- -- -- 131 -- --   20 1207 (!) 192/125 -- -- -- -- --   20 1200 (!) 184/112 101.1 °F (38.4 °C) Bladder 130 19 90 %   20 1130 (!) 160/104 -- -- 114 -- --   20 1100 (!) 148/99 -- -- 111 24 (!) 88 %   20 1030 -- -- -- 105 26 (!) 89 %   20 1000 (!) 145/99 -- -- 110 21 (!) 89 %   20 0900 (!) 162/97 100.9 °F (38.3 °C) Bladder 108 19 91 %   20 0800 (!) 147/95 101.1 °F (38.4 °C) Bladder 112 21 92 %   20 0744 -- -- -- 104 19 92 %         Intake/Output Summary (Last 24 hours) at 2020 1330  Last data filed at 2020 1200  Gross per 24 hour   Intake 5079.2 ml   Output 2215 ml   Net 2864.2 ml     Wt Readings from Last 2 Encounters:   20 (!) 327 lb 2.6 oz (148.4 kg)   20 (!) 314 lb (142.4 kg)     Body mass index is 45.63 kg/m². PHYSICAL EXAMINATION:  General: Intubated   HEENT:  Normocephalic, atraumatic. Pulls equal and reactive no scleral icterus. No conjunctival injection. No nasal discharge. Neck:  Supple, without stridor, no JVD  Heart:  Tachycardic, regular rhythm, no murmurs, gallops, rubs  Lungs: Greatly diminished in left upper lobe, rhonchorous throughout the remainder of the lung fields   Abdomen: Obese, bowel sounds present, soft, non-tender, non-distended, no guarding or rigidity. Not tender to palpation  Extremities:  No clubbing, cyanosis,1+ edema bilaterally. Palpable radial and DP pulses b/l upper and lower extremieties  Skin:  Warm and dry,  Neuro: Following commands.   Cranial nerves II through XII grossly intact.   No focal neurologic deficits  Breast: deferred  Rectal: deferred  Genitalia:  deferred     MEDICATIONS:    Scheduled Meds:   chlorothiazide (DIURIL) IVPB  125 mg Intravenous Q12H    albumin human  25 g Intravenous Q8H    famotidine  20 mg Oral BID    methylPREDNISolone  40 mg Intravenous Daily    potassium & sodium phosphates  1 packet Per NG tube TID    sodium chloride  20 mL Intravenous Once    ampicillin-sulbactam  3 g Intravenous Q6H    heparin flush  1 mL Intravenous 2 times per day    insulin lispro  0-18 Units Subcutaneous Q6H    nicotine  1 patch Transdermal Daily    acetylcysteine  4 mL Inhalation BID    albuterol  2.5 mg Nebulization Q4H    Arformoterol Tartrate  15 mcg Nebulization BID    atorvastatin  80 mg Oral Nightly    [Held by provider] clopidogrel  75 mg Oral Daily    fluticasone  2 spray Nasal Daily    sodium chloride flush  10 mL Intravenous 2 times per day    [Held by provider] enoxaparin  40 mg Subcutaneous Daily    insulin glargine  0.25 Units/kg Subcutaneous Nightly    chlorhexidine  15 mL Mouth/Throat BID     Continuous Infusions:   fentaNYL 5 mcg/ml in 0.9%  ml infusion 125 mcg/hr (11/30/20 1300)    dextrose      midazolam 3 mg/hr (11/30/20 1224)     PRN Meds:   potassium chloride, 10 mEq, PRN  acetaminophen, 650 mg, Q4H PRN    Or  acetaminophen, 650 mg, Q4H PRN  sodium chloride flush, 10 mL, PRN  heparin flush, 1 mL, PRN  sodium phosphate IVPB, 0.16 mmol/kg, PRN    Or  sodium phosphate IVPB, 0.32 mmol/kg, PRN  magnesium sulfate, 1 g, PRN  tiZANidine, 4 mg, TID PRN  polyethylene glycol, 17 g, Daily PRN  promethazine, 12.5 mg, Q6H PRN    Or  ondansetron, 4 mg, Q6H PRN  glucose, 15 g, PRN  dextrose, 12.5 g, PRN  glucagon (rDNA), 1 mg, PRN  dextrose, 100 mL/hr, PRN  oxyCODONE-acetaminophen, 1 tablet, TID PRN    And  oxyCODONE, 2.5 mg, TID PRN  albuterol, 2.5 mg, Q4H PRN        VENT SETTINGS (Comprehensive) (if applicable):  Vent LABALBU 3.2*  --  3.0*  --  2.3*  --    BILITOT 0.6  --  0.7  --  0.5  --    ALKPHOS 136*  --  138*  --  134*  --    AST 46*  --  45*  --  111*  --    ALT 37  --  34  --  51*  --     < > = values in this interval not displayed. Magnesium:   Lab Results   Component Value Date    MG 2.7 11/30/2020     Phosphorus:   Lab Results   Component Value Date    PHOS 2.3 11/30/2020     Ionized Calcium:   Lab Results   Component Value Date    CAION 1.33 10/23/2020        Troponin:   No results for input(s): TROPONINI in the last 72 hours. Microbiology:  GPC blood   Respiratory culture no growth    Radiology/Imaging:         ASSESSMENT:     Neuro   Chronic back pain              -Epidural on 11/17, no hematoma seen on CT lumbar spine      Sedated on the vent              -Versed, fentanyl. Seroquel 100 mg was given one dose , d/c by renal      Respiratory   Acute hypoxic and hypercapnic respiratory failure       -continue full vent support. Pressure support trials              -Follow ABG              -Albuterol, incentive spirometer              -solumedrol 40 daily   -wean FiO2 as able   -metanebs   -Mucomyst   -bedside L thoracentesis 11/22 with only a small amount of pleural fluid, not amendable to drainage   -bronch 11/27 showing extrinsic compression from mass, no mucus plug   -failed PSV today---> tachycardic/tacypnic/sweating/hypertensive     Community-acquired pneumonia post obstructive pna              -Strep and Legionella negative              -Respiratory culture pending              -Pro-Kael 0.23   -continue unasyn     Lung mass OMER              -Status post bronc 11/18 with TBNA              -No obvious mucous plugging or purulence. Very erythematous and compressed airways, concern for mass              -Cytology concerning for small cell lung cancer. Heme-onc on board.     Pleural effusion   -evaluated with US on 11/22, not larg fluid pocket   -per nephro note, possible thoracentesis with pulmonology?     Respiratory film array negative  Long, lifetime smoker--nicotine patch     Cardiovascular   CAD/stents              -Plavix is on hold for approximately 7 days per patient he had his epidural.  It has still been on hold in case he needs further procedures and thrombocytopenia     History of aortic aneurysm              -Status post endovascular repair              -Stable on most recent CT    Echo limited 2/2 patient body habitus.     Gastrointestinal   Tube feeding   PPI held from nephro and started on carafate, most likely 2/2 platelets? -- start pepcid     Renal     Hyperkalemia- resolved  hypophos- replace    Anasarca   -nephro following and recommends SPA bid with diuril    Hypernatremia- resolved    ISIDRO   -nephro following and recs appreciated   -most likely 2/2 diuresis     Infectious Disease     Community-acquired pneumonia   -recultured              -respiratory cx growing candida, light growth   -switched to unasyn         Blood cultures gram positive cocci   -coag neg staph. Most likely contaminant.  Stop vanco.    Febrile   -continues with fevers despite abx   -repeat blood cx pending   -2/2 malignancy?     Hematology/Oncology   Thrombocytopenia              - received platelets x1              -heme/onc following   -most llikely 2/2 neoplasm, drugs and infection   -Transfuse for Hgb <7, platelets <41    Small cell lung cancer   -biopsy results   -heme/onc and pulmonology for further work up and recommendations   -not inpatient emergent chemo candidate   -rad/onc consult, MRI brain when stable    DVT   -left axillary vein   -platelets 28, no anticoagulation at this time until platelets rise     Endocrine   Diabetes              -Noninsulin-dependent              -Monitor sugars   -high-dose sliding scale    msk   Chronic back pain              -Status post epidural on 11/17              -No erythematous region or fluctuance              -CT with no evidence of epidural hematoma     Social/Spiritual/DNR/Other   Full code    ------------------------------------------------------------------------------------------  1. Failed pressure support trial again, awaiting family decision about trach  2. Start pepcid  3. Continues with fevers, continue Unasyn  4. EPCs, no anticoagulation because of thrombocytopenia  5. Albumin and diuril per nephro  6. Dr. Kelly Matias discussed with family    Dispo: continue ICU level of care     Nancy Baptiste, PGY2  11/30/20 130pm      I personally saw, examined and provided care for the patient. Radiographs, labs and medication list were reviewed by me independently. I spoke with bedside nursing, therapists and consultants. Critical care services and times documented are independent of procedures and multidisciplinary rounds with Residents. Patient failed PSV trails today became tachycardic, tachypnea, hypertensive and dropped saturations . Has to rest him back on A/C. I called Mrs. Maurizio Cantor and gave her an update. I also explained to her that given the fact that he has been intubated for 13 days and not being able to be liberated from mechanical ventilation anytime soon , if she wishes to continue aggressive care ( patient with SCC lung CA and overall poor prognosis, has been bed ridden for the past 6 weeks and multiple comorbidities) then the next step would be to proceed with trach and PEG. She informs me that patient did not wish to be on life support long term and she would like to discuss this matter further with patients sons before she makes any further decisions. In order to perform a thoracentesis patient platelet counts has to be >12430. Additionally comprehensive, multidisciplinary rounds were conducted with the MICU team. The case was discussed in detail and plans for care were established. Review of Residents documentation was conducted and revisions were made as appropriate.  I agree with the above documented exam, problem list and plan of care.   Jaqui Meza MD   CCT excluding procedures:45'

## 2020-11-30 NOTE — PROGRESS NOTES
Nephrology Consult Note    Patient's Name: Antonio Atkins.  11:31 AM  11/30/2020    Nephrologist: Dr. Alena Soulier, MD    Reason for Consult: Renal is consulted for volume management  Requesting Physician:  Dr. Cabezas Late    Chief Complaint:  SOB    History Obtained From: EMR as the patient is intubated, sedated and no family member is present today    Sub: Patient seen and examined bedside in the ICU, he is on 60% FiO2. Comfortable. Past Medical History:   Diagnosis Date    Anticoagulant long-term use     Arthritis     CAD (coronary artery disease)     Chronic back pain     Depression     Dyslipidemia     Hiatal hernia 1979    History of ST elevation myocardial infarction (STEMI)     Hyperlipidemia     Hypertension     Low back pain     Lumbar radiculopathy 8/14/2019    Obesity     MANOLO on CPAP     Presence of stent in left circumflex coronary artery     Presence of stent in right coronary artery     Smoker     Type 2 diabetes mellitus without complication (HCC)        Past Surgical History:   Procedure Laterality Date    ABDOMINAL AORTIC ANEURYSM REPAIR, ENDOVASCULAR N/A 6/28/2019    ENDOVASCULAR REPAIR ABDOMINAL AORTIC ANEURYSM performed by Richmond Macias MD at [address] [place] [address] Bilateral 3/12/2020    BILATERAL L3,L4,L5 MEDIAL BRANCH NERVE BLOCK performed by Linus Adams DO at [address] [place] [address] Bilateral 6/11/2020    BILATERAL L3,L4,L5 MEDIAL NERVE BRANCH BLOCK #2 performed by Linus Adams DO at [address] [place] [address] N/A 11/18/2020    BRONCHOSCOPY/TRANSBRONCHIAL NEEDLE BIOPSY performed by Pao Izquierdo MD at [address] [place] [address] PLACEMENT  X4    CORONARY ANGIOPLASTY WITH STENT PLACEMENT  12/30/2011    Dr. Bahman Marie 1st OM 3.0 x 20 Ion    DIAGNOSTIC CARDIAC CATH LAB PROCEDURE  3/15/2005    [facility]. Mild to moderate CAD. Normal LV.  DIAGNOSTIC CARDIAC CATH LAB PROCEDURE  1/16/2007    [facility].  Cath/PTCA/DE stent of proximal and distal RCA.  DIAGNOSTIC CARDIAC CATH LAB PROCEDURE  2/21/2007    Cath/DE stent of proximal OM1 and proximal Cx.  DIAGNOSTIC CARDIAC CATH LAB PROCEDURE  12/30/2011    ANURADHA of mid OM branch of circumflex.  ECHO COMPL W DOP COLOR FLOW  12/30/2011         HC INSERT PICC CATH, 5/> YRS - MIDLINE  11/23/2020         HERNIA REPAIR  2/2014    laparoscopic ventral hernia repain    JOINT REPLACEMENT Left 4/1/14    TKA-ed    JOINT REPLACEMENT Right 2018    KNEE    PAIN MANAGEMENT PROCEDURE Right 9/1/2020    RIGHT L3,4,5 MEDIAL BRANCH RADIOFREQUENCY ABLATION performed by Carlton Moore DO at [facility] 1772 N/A 11/17/2020    L4-5 EPIDURAL STEROID INJECTION #1 performed by Carlton Moore DO at [facility] 80 OFFICE/OUTPT VISIT,PROCEDURE ONLY Right 11/26/2018    RIGHT KNEE TOTAL ARTHROPLASTY performed by Budd Denver, DO at [facility] OR       Family History   Problem Relation Age of Onset    Diabetes Mother     Stroke Mother     Diabetes Father     No Known Problems Sister         reports that he has been smoking cigarettes. He started smoking about 42 years ago. He has a 39.00 pack-year smoking history. He has never used smokeless tobacco. He reports that he does not drink alcohol or use drugs.     Allergies:  Bee venom    Current Medications:    chlorothiazide (DIURIL) 125 mg in sodium chloride 0.9 % 50 mL IVPB, Q12H  albumin human 25 % IV solution 25 g, Q8H  famotidine (PEPCID) tablet 20 mg, BID  methylPREDNISolone sodium (SOLU-MEDROL) injection 40 mg, Daily  potassium & sodium phosphates (PHOS-NAK) 280-160-250 MG packet 250 mg, TID  potassium chloride 10 mEq/100 mL IVPB (Peripheral Line), PRN  0.9 % sodium chloride bolus, Once  ampicillin-sulbactam (UNASYN) 3 g ivpb minibag, Q6H  acetaminophen (TYLENOL) tablet 650 mg, Q4H PRN    Or  acetaminophen (TYLENOL) suppository 650 mg, Q4H PRN  fentaNYL 5 mcg/ml in 0.9%  ml infusion, Continuous  sodium chloride flush 0.9 % injection 10 mL, PRN  heparin flush 100 UNIT/ML injection 100 Units, 2 times per day  heparin flush 100 UNIT/ML injection 100 Units, PRN  sodium phosphate 24.3 mmol in dextrose 5 % 250 mL IVPB, PRN    Or  sodium phosphate 48.57 mmol in dextrose 5 % 250 mL IVPB, PRN  magnesium sulfate 1 g in dextrose 5% 100 mL IVPB, PRN  insulin lispro (HUMALOG) injection vial 0-18 Units, Q6H  nicotine (NICODERM CQ) 21 MG/24HR 1 patch, Daily  acetylcysteine (MUCOMYST) 10 % solution 400 mg, BID  albuterol (PROVENTIL) nebulizer solution 2.5 mg, Q4H  Arformoterol Tartrate (BROVANA) nebulizer solution 15 mcg, BID  atorvastatin (LIPITOR) tablet 80 mg, Nightly  [Held by provider] clopidogrel (PLAVIX) tablet 75 mg, Daily  fluticasone (FLONASE) 50 MCG/ACT nasal spray 2 spray, Daily  tiZANidine (ZANAFLEX) tablet 4 mg, TID PRN  sodium chloride flush 0.9 % injection 10 mL, 2 times per day  polyethylene glycol (GLYCOLAX) packet 17 g, Daily PRN  promethazine (PHENERGAN) tablet 12.5 mg, Q6H PRN    Or  ondansetron (ZOFRAN) injection 4 mg, Q6H PRN  [Held by provider] enoxaparin (LOVENOX) injection 40 mg, Daily  insulin glargine (LANTUS) injection vial 36 Units, Nightly  glucose (GLUTOSE) 40 % oral gel 15 g, PRN  dextrose 50 % IV solution, PRN  glucagon (rDNA) injection 1 mg, PRN  dextrose 5 % solution, PRN  oxyCODONE-acetaminophen (PERCOCET) 5-325 MG per tablet 1 tablet, TID PRN    And  oxyCODONE (ROXICODONE) immediate release tablet 2.5 mg, TID PRN  albuterol (PROVENTIL) nebulizer solution 2.5 mg, Q4H PRN  chlorhexidine (PERIDEX) 0.12 % solution 15 mL, BID  midazolam (VERSED) 1 mg/mL in D5W infusion, Continuous        Review of Systems:   Unable to obtain as he is intubated and sedated    Physical exam:   Constitutional:    Vitals: BP (!) 145/99   Pulse 105   Temp 100.9 °F (38.3 °C) (Bladder)   Resp 26   Ht 5' 11\" (1.803 m)   Wt (!) 327 lb 2.6 oz (148.4 kg)   SpO2 (!) 89%   BMI 45.63 kg/m²        Patient seen and examined bedside , he is intubated and awake, FiO2 requirement is at 50%, no acute distress  Skin: no rash, turgor wnl  Heent:  eomi, mmm  Neck: no bruits or jvd noted  Cardiovascular:  S1, S2 without m/r/g  Respiratory: CTA B without w/r/r, decreased breath sounds in bases  Abdomen:  +bs, soft, nt, Black catheter draining clear urine  Ext: 3+ lower extremity edema  Psychiatric: mood and affect appropriate  Musculoskeletal:  Rom, muscular strength intact    Data:   Labs:  CBC:   Lab Results   Component Value Date    WBC 5.6 11/30/2020    RBC 2.69 11/30/2020    HGB 7.3 11/30/2020    HCT 25.2 11/30/2020    MCV 93.7 11/30/2020    MCH 27.1 11/30/2020    MCHC 29.0 11/30/2020    RDW 16.6 11/30/2020    PLT 28 11/30/2020    MPV 10.9 11/30/2020     CMP:    Lab Results   Component Value Date     11/30/2020    K 3.8 11/30/2020    K 5.1 11/17/2020     11/30/2020    CO2 35 11/30/2020    BUN 90 11/30/2020    CREATININE 1.4 11/30/2020    GFRAA >60 11/30/2020    LABGLOM 52 11/30/2020    GLUCOSE 185 11/30/2020    GLUCOSE 120 12/31/2011    PROT 5.2 11/30/2020    LABALBU 2.3 11/30/2020    LABALBU 4.5 12/28/2011    CALCIUM 8.3 11/30/2020    BILITOT 0.5 11/30/2020    ALKPHOS 134 11/30/2020     11/30/2020    ALT 51 11/30/2020     BMP:    Lab Results   Component Value Date     11/30/2020    K 3.8 11/30/2020    K 5.1 11/17/2020     11/30/2020    CO2 35 11/30/2020    BUN 90 11/30/2020    LABALBU 2.3 11/30/2020    LABALBU 4.5 12/28/2011    CREATININE 1.4 11/30/2020    CALCIUM 8.3 11/30/2020    GFRAA >60 11/30/2020    LABGLOM 52 11/30/2020    GLUCOSE 185 11/30/2020    GLUCOSE 120 12/31/2011     Calcium:    Lab Results   Component Value Date    CALCIUM 8.3 11/30/2020     Phosphorus:    Lab Results   Component Value Date    PHOS 2.3 11/30/2020     Uric Acid:  No results found for: URICACID  U/A:    Lab Results   Component Value Date    NITRU Negative 11/24/2020    COLORU Yellow 11/24/2020    PHUR 5.5 11/24/2020    WBCUA 2-5 11/24/2020 RBCUA >20 11/24/2020    MUCUS Present 11/24/2020    BACTERIA MODERATE 11/24/2020    CLARITYU CLOUDY 11/24/2020    SPECGRAV 1.025 11/24/2020    LEUKOCYTESUR Negative 11/24/2020    UROBILINOGEN 0.2 11/24/2020    BILIRUBINUR Negative 11/24/2020    BLOODU LARGE 11/24/2020    GLUCOSEU Negative 11/24/2020    KETUA Negative 11/24/2020    AMORPHOUS MANY 11/24/2020        Imaging:  Xr Abdomen (kub) (single Ap View)    Result Date: 11/23/2020  EXAMINATION: ONE SUPINE XRAY VIEW(S) OF THE ABDOMEN 11/23/2020 9:38 am COMPARISON: None HISTORY: ORDERING SYSTEM PROVIDED HISTORY: abdominal pain TECHNOLOGIST PROVIDED HISTORY: Reason for exam:->abdominal pain FINDINGS: There is a nonobstructive bowel gas pattern. There are no abnormal air-fluid levels. There are no calculi overlying the renal shadows. There is an NG tube within the stomach. There is a stent graft seen within the abdominal aorta. 1. There is no bowel obstruction, ileus or free air. Ct Lumbar Spine Wo Contrast    Result Date: 11/19/2020  EXAMINATION: CT OF THE LUMBAR SPINE WITHOUT CONTRAST  11/18/2020 TECHNIQUE: CT of the lumbar spine was performed without the administration of intravenous contrast. Multiplanar reformatted images are provided for review. Dose modulation, iterative reconstruction, and/or weight based adjustment of the mA/kV was utilized to reduce the radiation dose to as low as reasonably achievable. COMPARISON: 11/17/2020 HISTORY: ORDERING SYSTEM PROVIDED HISTORY: exclude epidural hematoma TECHNOLOGIST PROVIDED HISTORY: Reason for exam:->exclude epidural hematoma Chronic back pain, recent epidural placement FINDINGS: BONES/ALIGNMENT: Vertebral body heights are preserved without evidence for lumbar fracture. However, there is irregular lucency and sclerosis in the upper sacral ala bilaterally. There is minimal retrolisthesis at L2-L3.  Alignment is otherwise normal. DEGENERATIVE CHANGES: There is disc space narrowing and vacuum disc phenomenon findings. 1.  Unchanged left upper lung complete opacification. 2.  Elevation of the right hemidiaphragm and right lower lung opacity. 3.  Medical support devices as above. Xr Chest Portable    Result Date: 11/23/2020  EXAMINATION: ONE XRAY VIEW OF THE CHEST 11/23/2020 7:08 am COMPARISON: 11/20/2020 HISTORY: ORDERING SYSTEM PROVIDED HISTORY: resp failure TECHNOLOGIST PROVIDED HISTORY: Reason for exam:->resp failure FINDINGS: There is no interval change in the persistent dense consolidation within the left upper lobe. The left lower lobe is clear. The right lung is clear. There is minimal atelectasis seen within the right lung base. The cardiac silhouette is within normals. 1. No interval change in the dense consolidation seen within the left upper lobe. 2. Minimal right lung base atelectasis. Xr Chest Portable    Result Date: 11/22/2020  EXAMINATION: ONE XRAY VIEW OF THE CHEST 11/22/2020 6:28 am COMPARISON: 11/21/2020 HISTORY: ORDERING SYSTEM PROVIDED HISTORY: resp failure TECHNOLOGIST PROVIDED HISTORY: Reason for exam:->resp failure FINDINGS: The endotracheal tube is stable. The enteric tube tip is not well visualized but may be at the gastroesophageal junction. There is stable left upper lobe dense opacity. There are continued patchy opacities in the left lower lobe. There may be small pleural effusions. No pneumothorax is seen. No significant change in dense airspace opacity of the left upper lobe and patchy left lower lobe airspace opacities. Enteric tube tip is not well visualized due to underpenetration. The tip may be at the gastroesophageal junction. This could be confirmed with KUB centered on the upper abdomen.     Xr Chest Portable    Result Date: 11/21/2020  EXAMINATION: ONE XRAY VIEW OF THE CHEST 11/21/2020 7:37 am COMPARISON: 11/20/2020 HISTORY: ORDERING SYSTEM PROVIDED HISTORY: resp failure TECHNOLOGIST PROVIDED HISTORY: Reason for exam:->resp failure FINDINGS: Endotracheal tube consolidation of the left upper lobe. There is a small to moderate left dependent pleural effusion. There is no visible pneumothorax. The pulmonary vessels are within normal limits. Left upper lobe masslike consolidation. Small to moderate left pleural effusion. Xr Chest 1 View    Result Date: 11/18/2020  EXAMINATION: ONE XRAY VIEW OF THE CHEST 11/18/2020 9:06 am COMPARISON: 11/18/2020 HISTORY: ORDERING SYSTEM PROVIDED HISTORY: intubation TECHNOLOGIST PROVIDED HISTORY: Reason for exam:->intubation Reason for exam:->portable FINDINGS: Compared to the chest radiograph from earlier today, 6:44 a.m., there is interval placement of an endotracheal tube, the tip of which is approximately 4.1 cm above the korina. Nasogastric tube is present but is suboptimally visualized due to motion artifact and relative underpenetration of the study. The abdominal radiograph demonstrates it to be well positioned, with the tip in the upper abdomen. Prominent masslike consolidative opacity in the upper left lung are grossly stable. No pneumothorax is seen. Layering left pleural effusion. 1.  The life-support lines and tubes are well positioned. 2. Masslike consolidative opacity in the left upper lung. Small left effusion. Cta Chest W Contrast    Result Date: 11/17/2020  EXAMINATION: CTA OF THE CHEST 11/17/2020 3:18 pm TECHNIQUE: CTA of the chest was performed after the administration of intravenous contrast.  Multiplanar reformatted images are provided for review. MIP images are provided for review. Dose modulation, iterative reconstruction, and/or weight based adjustment of the mA/kV was utilized to reduce the radiation dose to as low as reasonably achievable.; CTA of the abdomen and pelvis was performed with the administration of intravenous contrast. Multiplanar reformatted images are provided for review. MIP images are provided for review.  Dose modulation, iterative reconstruction, and/or weight based adjustment of the mA/kV was utilized to reduce the radiation dose to as low as reasonably achievable. COMPARISON: None. HISTORY: ORDERING SYSTEM PROVIDED HISTORY: aneurysm, cp, sob TECHNOLOGIST PROVIDED HISTORY: Reason for exam:->aneurysm, cp, sob FINDINGS: CTA chest: There are confluent opacities located in left upper lobe. Patchy airspace opacities located in lingula. There is moderate to large left pleural effusion. Peribronchial thickening extensor in left hilar location into the left lung base. There is subsegmental atelectasis in posterior right lower lobe with adjacent ground-glass opacities. There is pulmonary vascular congestion. The heart is mildly enlarged. There is mediastinal lymphadenopathy. Ascending thoracic aorta measures 3.7 cm in diameter. Descending thoracic aorta measures 3 cm in diameter. No evidence of thoracic aortic aneurysm or dissection. Distal descending thoracic aorta is tortuous. CTA abdomen/pelvis: There is evidence of endograft repair involving the abdominal aorta. There is redemonstration of fusiform abdominal aortic aneurysm measuring up to 6.4 cm. This finding is stable since prior. No evidence of endoleak. No retroperitoneal fluid collections. Iliac limbs of endograft appear patent. Common iliac arteries are tortuous. Common femoral arteries are patent. Numerous diverticula associated with the left colon and sigmoid colon. No evidence of acute diverticulitis. Liver, gallbladder, pancreas, and spleen are grossly unremarkable however there is motion artifact limiting this examination. There is no hydronephrosis. Cyst associated with the right kidney is stable since previous examination as well as cyst associated with the left kidney appearing stable since prior. Appendix is visualized and normal.    1.  New confluent opacities located in left upper lobe suggesting pneumonia, atelectasis, or neoplasm.  2.  Patchy airspace opacities located in lingula suggesting pneumonia with areas of atelectasis. 3.  Stenosis and occlusion are associated with left upper lobe segmental bronchi and lingular segmental bronchi. 4.  Moderate to large left pleural effusion. 5.  Mediastinal lymphadenopathy. 6.  Stable fusiform infrarenal abdominal aortic aneurysm with endograft repair. Aneurysm measures up to 6.4 cm. No evidence of endoleak or retroperitoneal fluid. 7.  Diverticulosis. Cta Abdomen Pelvis W Contrast    Result Date: 11/17/2020  EXAMINATION: CTA OF THE CHEST 11/17/2020 3:18 pm TECHNIQUE: CTA of the chest was performed after the administration of intravenous contrast.  Multiplanar reformatted images are provided for review. MIP images are provided for review. Dose modulation, iterative reconstruction, and/or weight based adjustment of the mA/kV was utilized to reduce the radiation dose to as low as reasonably achievable.; CTA of the abdomen and pelvis was performed with the administration of intravenous contrast. Multiplanar reformatted images are provided for review. MIP images are provided for review. Dose modulation, iterative reconstruction, and/or weight based adjustment of the mA/kV was utilized to reduce the radiation dose to as low as reasonably achievable. COMPARISON: None. HISTORY: ORDERING SYSTEM PROVIDED HISTORY: aneurysm, cp, sob TECHNOLOGIST PROVIDED HISTORY: Reason for exam:->aneurysm, cp, sob FINDINGS: CTA chest: There are confluent opacities located in left upper lobe. Patchy airspace opacities located in lingula. There is moderate to large left pleural effusion. Peribronchial thickening extensor in left hilar location into the left lung base. There is subsegmental atelectasis in posterior right lower lobe with adjacent ground-glass opacities. There is pulmonary vascular congestion. The heart is mildly enlarged. There is mediastinal lymphadenopathy. Ascending thoracic aorta measures 3.7 cm in diameter. Descending thoracic aorta measures 3 cm in diameter.   No evidence of thoracic aortic aneurysm or dissection. Distal descending thoracic aorta is tortuous. CTA abdomen/pelvis: There is evidence of endograft repair involving the abdominal aorta. There is redemonstration of fusiform abdominal aortic aneurysm measuring up to 6.4 cm. This finding is stable since prior. No evidence of endoleak. No retroperitoneal fluid collections. Iliac limbs of endograft appear patent. Common iliac arteries are tortuous. Common femoral arteries are patent. Numerous diverticula associated with the left colon and sigmoid colon. No evidence of acute diverticulitis. Liver, gallbladder, pancreas, and spleen are grossly unremarkable however there is motion artifact limiting this examination. There is no hydronephrosis. Cyst associated with the right kidney is stable since previous examination as well as cyst associated with the left kidney appearing stable since prior. Appendix is visualized and normal.    1.  New confluent opacities located in left upper lobe suggesting pneumonia, atelectasis, or neoplasm. 2.  Patchy airspace opacities located in lingula suggesting pneumonia with areas of atelectasis. 3.  Stenosis and occlusion are associated with left upper lobe segmental bronchi and lingular segmental bronchi. 4.  Moderate to large left pleural effusion. 5.  Mediastinal lymphadenopathy. 6.  Stable fusiform infrarenal abdominal aortic aneurysm with endograft repair. Aneurysm measures up to 6.4 cm. No evidence of endoleak or retroperitoneal fluid. 7.  Diverticulosis. Xr Abdomen For Ng/og/ne Tube Placement    Result Date: 11/22/2020  EXAMINATION: ONE SUPINE XRAY VIEW(S) OF THE ABDOMEN 11/22/2020 5:06 pm COMPARISON: November 22, 2020, 4 hours prior.  HISTORY: ORDERING SYSTEM PROVIDED HISTORY: Confirmation of course of NG/OG/NE tube and location of tip of tube TECHNOLOGIST PROVIDED HISTORY: Reason for exam:->Confirmation of course of NG/OG/NE tube and location of tip of tube Portable? ->Yes FINDINGS: Tip of the NG tube noted at the level of the diaphragm, no significant change. The right side and the inferior part of the abdomen is not included in the study. There is opacification of the visualized left upper lung. Tip of NG tube at the level of the left hemidiaphragm, no change. Xr Chest Abdomen Ng Placement    Result Date: 11/22/2020  EXAMINATION: ONE SUPINE XRAY VIEW(S) OF THE ABDOMEN 11/22/2020 12:52 pm COMPARISON: November 18. HISTORY: ORDERING SYSTEM PROVIDED HISTORY: OG placement TECHNOLOGIST PROVIDED HISTORY: Reason for exam:->OG placement FINDINGS: Nasogastric tube is present. Side hole appears to be at level of gastroesophageal junction. This tube could be advanced approximately 4 or 5 cm. Nasogastric tube is present with side hole at level of gastroesophageal junction. This tube could be advanced approximately 4 or 5 cm. Xr Chest Abdomen Ng Placement    Result Date: 11/18/2020  EXAMINATION: ONE SUPINE XRAY VIEW(S) OF THE ABDOMEN 11/18/2020 9:07 am COMPARISON: 11/18/2020, about 2 hours earlier HISTORY: ORDERING SYSTEM PROVIDED HISTORY: NGT TECHNOLOGIST PROVIDED HISTORY: Reason for exam:->NGT Reason for exam:->portable NG tube placement FINDINGS: There is an NG tube with the tip in the stomach. An ET tube is again noted in satisfactory position. There is extensive opacity in the left lung, most likely pneumonia. Increased markings also seen in the visualized portion of the right lung. The heart is enlarged. NG tube tip in the stomach. Fluoro For Surgical Procedures    Result Date: 11/17/2020  EXAMINATION: SPOT FLUOROSCOPIC IMAGES 11/17/2020 12:03 pm TECHNIQUE: Fluoroscopy was provided by the radiology department for procedure. Radiologist was not present during examination.  FLUOROSCOPY DOSE AND TYPE OR TIME AND EXPOSURES: Total dose:26.99 mGy COMPARISON: None HISTORY: ORDERING SYSTEM PROVIDED HISTORY: Pain management TECHNOLOGIST PROVIDED HISTORY: Reason for exam:->L4-5 Epidural steroid injection #1 Intraprocedural imaging. FINDINGS: 3 intraoperative fluoroscopic images were obtained during L4-5 epidural steroid injection. Intraprocedural fluoroscopic spot images as above. See separate procedure report for more information. Assessment  1. Acute hypoxic hypercapnic respiratory secondary to community-acquired pneumonia, on mechanical ventilation  2. Acute kidney injury secondary to overt diuresis, questionable underlying chronic kidney disease with evidence of proteinuria  3. Anasarca, multifactorial secondary to hypoalbuminemia, aggressive IVF, anemia  4. Hypernatremia secondary to free water deficit, loop diuretic use. Calculated free water deficit is 3.75 L  5. Hyperkalemia  6. Anemia, normocytic, oncology on board  7. Severe thrombocytopenia    Plan    1. Start SPA 25 gm iv bid with diuril 125 mg iv bid   2. Sodium is back to normal. discussed with RN  3. continue K Phos 1 pack via NGT TID x 6 doses  4. S/p 1 unit of PRBC transfusion yesterday  5. Discussed with family on phone  6. Possible thoracentesis left side per pulmonary? 7.  Possible trach and peg if weaning fails           Discussed with Dr Denisa Pro and Dr Funmilayo Vargas MD    Electronically signed by Palak Tse MD on 11/30/2020 at 11:31 AM

## 2020-11-30 NOTE — PLAN OF CARE
Problem: Falls - Risk of:  Goal: Will remain free from falls  Description: Will remain free from falls  11/30/2020 0943 by Suzie Banegas RN  Outcome: Met This Shift  11/30/2020 0326 by Fe Espinoza RN  Outcome: Met This Shift  Goal: Absence of physical injury  Description: Absence of physical injury  Outcome: Met This Shift

## 2020-11-30 NOTE — PLAN OF CARE
Problem: Inadequate oral food/beverage intake (NI-2.1)  Goal: Food and/or Nutrient Delivery  Pt will eleni EN at goal rate  Description: Individualized approach for food/nutrient provision.   Outcome: Met This Shift

## 2020-11-30 NOTE — PROGRESS NOTES
[facility] Progress [facility]    Admitting Date and Time: 11/17/2020  1:13 PM  Admit Dx: Acute respiratory failure with hypoxia ([address] [J96.01]  Acute respiratory failure with hypoxia (HCC) [J96.01]    Subjective:  Patient is being followed for Acute respiratory failure with hypoxia ([address] [J96.01]  Acute respiratory failure with hypoxia ([address] [J96.01]     Brief history per pulmonary critical care: \"The patient is a 57 y. o. male with significant past medical history of diabetes, MANOLO, back pain, hypertension, hyperlipidemia, STEMI's status post 5 stents, CAD, aortic aneurysm presenting to the the hospital initially on 11/17. Alphonse Phillips had a epidural performed for low back pain and after the procedure became very short of breath.  He was satting 80% so was sent to the hospital for further evaluation.  While in the ED patient was found to be thrombocytopenic as well as hypoxic on room air.  He was placed on 4 L of oxygen but O2 sats improved.  CTA of the chest demonstrating pneumonia and pleural effusions.  He was given 1 dose of Rocephin and azithromycin while in the ER.     On 11/18 RRT was called at 7:30 AM. Alvenia Lacho was found to have increased work of breathing as well as hypercapnic on ABGs.  He was on BiPAP all night with minimal improvement of his symptoms.  During the RT he was switched to Agus Alvine was subsequently brought down to the ICU for further evaluation and care. He was not improving on AVAPS and subsequently intubated.     A bronchoscopy was performed on 11/18 and biopsies were sent. \"    Pt remains intubated. He follows commands and follows with his eyes. ROS: denies fever, chills, cp, sob, n/v, HA unless stated above.       chlorothiazide (DIURIL) IVPB  125 mg Intravenous Q12H    albumin human  25 g Intravenous Q8H    famotidine  20 mg Oral BID    methylPREDNISolone  40 mg Intravenous Daily    potassium & sodium phosphates  1 packet Per NG tube TID    sodium chloride  20 mL Intravenous Once    ampicillin-sulbactam  3 g Intravenous Q6H    heparin flush  1 mL Intravenous 2 times per day    insulin lispro  0-18 Units Subcutaneous Q6H    nicotine  1 patch Transdermal Daily    acetylcysteine  4 mL Inhalation BID    albuterol  2.5 mg Nebulization Q4H    Arformoterol Tartrate  15 mcg Nebulization BID    atorvastatin  80 mg Oral Nightly    [Held by provider] clopidogrel  75 mg Oral Daily    fluticasone  2 spray Nasal Daily    sodium chloride flush  10 mL Intravenous 2 times per day    [Held by provider] enoxaparin  40 mg Subcutaneous Daily    insulin glargine  0.25 Units/kg Subcutaneous Nightly    chlorhexidine  15 mL Mouth/Throat BID     potassium chloride, 10 mEq, PRN  acetaminophen, 650 mg, Q4H PRN    Or  acetaminophen, 650 mg, Q4H PRN  sodium chloride flush, 10 mL, PRN  heparin flush, 1 mL, PRN  sodium phosphate IVPB, 0.16 mmol/kg, PRN    Or  sodium phosphate IVPB, 0.32 mmol/kg, PRN  magnesium sulfate, 1 g, PRN  tiZANidine, 4 mg, TID PRN  polyethylene glycol, 17 g, Daily PRN  promethazine, 12.5 mg, Q6H PRN    Or  ondansetron, 4 mg, Q6H PRN  glucose, 15 g, PRN  dextrose, 12.5 g, PRN  glucagon (rDNA), 1 mg, PRN  dextrose, 100 mL/hr, PRN  oxyCODONE-acetaminophen, 1 tablet, TID PRN    And  oxyCODONE, 2.5 mg, TID PRN  albuterol, 2.5 mg, Q4H PRN         Objective:    BP (!) 145/99   Pulse 105   Temp 100.9 °F (38.3 °C) (Bladder)   Resp 26   Ht 5' 11\" (1.803 m)   Wt (!) 327 lb 2.6 oz (148.4 kg)   SpO2 (!) 89%   BMI 45.63 kg/m²     General Appearance: alert and in no acute distress  Skin: cool and dry, especially the extremities  Head: normocephalic and atraumatic  Eyes: pupils equal, round, and reactive to light, extraocular eye movements intact, conjunctivae normal  Neck: neck supple and non tender without mass   Pulmonary/Chest: clear to auscultation bilaterally- no wheezes, rales or rhonchi, normal air movement, no respiratory distress  Cardiovascular: normal rate, normal S1 and S2 and no carotid bruits  Abdomen: soft, non-tender, non-distended, normal bowel sounds, no masses or organomegaly  Extremities: no cyanosis, no clubbing and no edema        Recent Labs     11/28/20  0550  11/29/20  0530 11/29/20  1745 11/30/20  0540 11/30/20  0650   *   < > 150* 145 143  --    K 3.9  --  3.5  --  5.5* 3.8     --  105  --  101  --    CO2 35*  --  39*  --  35*  --    *  --  89*  --  90*  --    CREATININE 1.3*  --  1.3*  --  1.4*  --    GLUCOSE 174*  --  233*  --  185*  --    CALCIUM 8.7  --  8.6  --  8.3*  --     < > = values in this interval not displayed. Recent Labs     11/28/20  0550 11/29/20  0530 11/30/20  0540   WBC 7.2 7.4 5.6   RBC 2.92* 2.65* 2.69*   HGB 7.9* 7.0* 7.3*   HCT 26.8* 25.1* 25.2*   MCV 91.8 94.7 93.7   MCH 27.1 26.4 27.1   MCHC 29.5* 27.9* 29.0*   RDW 16.6* 16.8* 16.6*   PLT 31* 32* 28*   MPV 13.3* 12.9* 10.9         Assessment:    Active Problems:    CAD (coronary artery disease)    Smoker    Non-insulin dependent type 2 diabetes mellitus ([address]    Morbid obesity with BMI of 40.0-44.9, adult (HCC)    Chronic back pain    Acute respiratory failure with hypoxia ([address]    Community acquired pneumonia of left upper lobe of lung    Thrombocytopenia ([address]  Resolved Problems:    * No resolved hospital problems. *      Plan:  1. Acute hypoxic resp failure due to CAP and volume overload  -  Left sided pneumonia on admission  -  Full vent support; had trial of PS  -  Pulm/cc following  -  Solumedrol now at q12 h  -  Nebs  -  Thoracentesis performed 11/22/20 with only small amount of pleural fluid  -  Respiratory cultures pending; 24 hours no growth  -  Bronchoscopy performed on 11/18/20  -  possible thoracentesis left side per pulmonology  -  hydrodiuril    2.   CAP  -  Continue antibiotics; currently on unasyn  - strep and legionella negative  - procalcitonin 0.23  -  resp culture neg x 24 hours  -  ID was consulted  -  ?drug induced fevers-cefepime

## 2020-11-30 NOTE — CARE COORDINATION
COVID negative 11/17. Vent/ intubated since 11/18- FIO2 60% , PEEP 12-attempting to wean. On iv abx. Bronch from 11/24 showed + malignancy- oncology following- per note needs improvement before initiating chemotherapy. Full code. [facility] and [facility] following(back door referrals)- will need to discuss LTAC/PAVEL choices w/ wife when pt stabilizes-would not make any decision at this time from previous notes.  Will follow Philip Garcia

## 2020-12-01 NOTE — CONSULTS
GENERAL SURGERY  CONSULT NOTE  12/1/2020    Physician Consulted: Dr. Cat Stoll  Reason for Consult: Trach/PEG  Referring Physician: Dr. Steve Anderson. is a 61 y.o. male with PMH as below presented to the hospital after experiencing hypoxic respiratory failure after an epidural procedure. He is now found to have small cell lung cancer and is intubated and sedated in the ICU. General surgery is consulted for possible placement of a trach and PEG. However, the decision is currently being made whether to transfer the patient to [facility] for palliative radiation therapy. Also, the patient is not yet a candidate for trach/PEG due to his platelet counts and high mechanical ventilation settings.       Past Medical History:   Diagnosis Date    Anticoagulant long-term use     Arthritis     CAD (coronary artery disease)     Chronic back pain     Depression     Dyslipidemia     Hiatal hernia 1979    History of ST elevation myocardial infarction (STEMI)     Hyperlipidemia     Hypertension     Low back pain     Lumbar radiculopathy 8/14/2019    Obesity     MANOLO on CPAP     Presence of stent in left circumflex coronary artery     Presence of stent in right coronary artery     Smoker     Type 2 diabetes mellitus without complication (HCC)        Past Surgical History:   Procedure Laterality Date    ABDOMINAL AORTIC ANEURYSM REPAIR, ENDOVASCULAR N/A 6/28/2019    ENDOVASCULAR REPAIR ABDOMINAL AORTIC ANEURYSM performed by Verna Velazquez MD at [address] [place] [address] Bilateral 3/12/2020    BILATERAL L3,L4,L5 MEDIAL BRANCH NERVE BLOCK performed by Ann Curran DO at [address] [place] [address] Bilateral 6/11/2020    BILATERAL L3,L4,L5 MEDIAL NERVE BRANCH BLOCK #2 performed by Ann Curran DO at [address] [place] [address] N/A 11/18/2020    BRONCHOSCOPY/TRANSBRONCHIAL NEEDLE BIOPSY performed by Ricco Mcclain MD at [address] [place] [address]  X4 quinapril (ACCUPRIL) 10 MG tablet Take 1 tablet by mouth daily  Patient taking differently: Take 10 mg by mouth daily Wife states on hold 10/16/20  Yes Emerald Panchal PA-C   metFORMIN (GLUCOPHAGE) 500 MG tablet TAKE 1 TABLET BY MOUTH TWICE A DAY WITH MEALS  Patient taking differently: Wife states on hold 10/8/20  Yes Mae Payton DO   fluticasone (FLONASE) 50 MCG/ACT nasal spray 2 sprays by Nasal route daily 10/6/20  Yes Emerald Panchal PA-C   omega-3 acid ethyl esters (LOVAZA) 1 g capsule take 1 capsule twice a day 8/14/20  Yes Emerald Panchal PA-C   niacin (SLO-NIACIN) 500 MG extended release tablet take 1 tablet by mouth once daily 7/20/20  Yes Mae Payton DO   clopidogrel (PLAVIX) 75 MG tablet TAKE 1 TABLET BY MOUTH EVERY DAY  Patient taking differently: Take 75 mg by mouth daily Has been on hold for procedure 7/6/20  Yes Emerald Panchal PA-C   atorvastatin (LIPITOR) 80 MG tablet TAKE 1 TABLET BY MOUTH AT BEDTIME 10/22/18  Yes Emerald Panchal PA-C   carvedilol (COREG) 25 MG tablet TAKE ONE TABLET BY MOUTH TWICE DAILY (WITH MEALS) 10/22/18  Yes Emerald Panchal PA-C   sildenafil (VIAGRA) 50 MG tablet Take 1 tablet by mouth as needed for Erectile Dysfunction 7/6/20   Emerald Panchal PA-C   aspirin 325 MG EC tablet TAKE 1 TABLET BY MOUTH EVERY DAY  Patient taking differently: Take 325 mg by mouth daily Has been on hold for procedure 5/7/20   Irene Rowell DO   Tens Unit MISC by Does not apply route 5/7/19   TRUDY Bryant       Allergies   Allergen Reactions    Bee Venom Anaphylaxis       Family History   Problem Relation Age of Onset    Diabetes Mother     Stroke Mother     Diabetes Father     No Known Problems Sister        Social History     Tobacco Use    Smoking status: Current Every Day Smoker     Packs/day: 1.00     Years: 39.00     Pack years: 39.00     Types: Cigarettes     Start date: 11/17/1978    Smokeless tobacco: Never Used   Substance Use Topics    Alcohol use: No    Drug use:  No Review of Systems   Unable to perform      PHYSICAL EXAM:    Vitals:    12/01/20 1505   BP: (!) 147/83   Pulse: 114   Resp: 19   Temp:    SpO2: 91%       General Appearance:  Awake, intubated and sedated, does not follow commands  Skin:  Skin color, texture, turgor normal. No rashes or lesions. Head/face:  NCAT  Eyes:  No gross abnormalities. Lungs:  Mechanical ventilation  Heart:  tachycardic  Abdomen: obese, soft, no major scars are visible, non-distended      LABS:    CBC  Recent Labs     12/01/20  0942 12/01/20  1446   WBC 5.0  --    HGB 5.7* 6.5*   HCT 19.3* 21.7*   PLT 25*  --      BMP  Recent Labs     12/01/20  0515   *   K 3.6      CO2 36*   BUN 90*   CREATININE 1.7*   CALCIUM 8.5*     Liver Function  Recent Labs     12/01/20  0515   BILITOT 0.7   AST 80*   ALT 44*   ALKPHOS 133*   PROT 4.9*   LABALBU 3.0*     No results for input(s): LACTATE in the last 72 hours. No results for input(s): INR, PTT in the last 72 hours. Invalid input(s): PT    RADIOLOGY    Xr Chest Portable    Result Date: 12/1/2020  EXAMINATION: ONE XRAY VIEW OF THE CHEST 12/1/2020 7:30 am COMPARISON: 1 day prior. HISTORY: ORDERING SYSTEM PROVIDED HISTORY: resp failure TECHNOLOGIST PROVIDED HISTORY: Reason for exam:->resp failure FINDINGS: There is stable positioning of endotracheal tube. The cardiomediastinal contours are unchanged. There is stable left upper lobar masslike consolidation. .  There is nonspecific elevation of the right hemidiaphragm. There is similar appearance of bilateral small layering effusions, left greater than right. There is no visible pneumothorax. The pulmonary vessels are within normal limits. There is no significant interval change compared to the prior examination. No significant interval change compared to 1 day prior.         ASSESSMENT:  61 y.o. male with small cell lung cancer and respiratory failure requiring mechanical ventilation  PLAN:  - Will await decision by radiation oncology as to whether he will be transferred or not  - Attempt to wean vent settings to at least FiO2 of <50% and PEEP <10 in order to be candidate for trach  - Can continue tube feeds    Discussed with Dr. Ran White    Electronically signed by Paco Vallecillo MD on 12/1/20 at 4:08 PM EST    Attending Physician Statement:    Chief Complaint:   Chief Complaint   Patient presents with    Shortness of Breath     patient sent from surgery after having epideral injection L4-L5. SOB has been for past two weeks       I have examined the patient and performed the key aspects of physical exam, reviewed the record (including all pertinent and new radiology images and laboratory findings), and discussed the case with the surgical team.  I agree with the assessment and plan with the following additions, corrections, and changes. 14pt review of symptoms unable to be completed due to patient status. Trach PEG consult. Awaiting final decision whether patient will be transferred downtown for palliative radiation or not. In addition his ventilator settings are too high to which precludes tracheostomy at this point and will need to be further weaned. His thrombocytopenia will also need to be addressed with platelets greater than 50 prior to procedure. Oncology following. Scott Richards MD  12/02/20  8:10 AM    NOTE: This report, in part or full, may have been transcribed using voice recognition software. Every effort was made to ensure accuracy; however, inadvertent computerized transcription errors may be present. Please excuse any transcriptional grammatical or spelling errors that may have escaped my editorial review.

## 2020-12-01 NOTE — PROGRESS NOTES
 Hypertension     Low back pain     Lumbar radiculopathy 8/14/2019    Obesity     MANOLO on CPAP     Presence of stent in left circumflex coronary artery     Presence of stent in right coronary artery     Smoker     Type 2 diabetes mellitus without complication (HCC)      PSH:   Past Surgical History:   Procedure Laterality Date    ABDOMINAL AORTIC ANEURYSM REPAIR, ENDOVASCULAR N/A 6/28/2019    ENDOVASCULAR REPAIR ABDOMINAL AORTIC ANEURYSM performed by Trisha Hunt MD at [address] Bilateral 3/12/2020    BILATERAL L3,L4,L5 MEDIAL BRANCH NERVE BLOCK performed by Lencho Samano DO at [address] Bilateral 6/11/2020    BILATERAL L3,L4,L5 MEDIAL NERVE BRANCH BLOCK #2 performed by Lencho Samano DO at [address] [place] [address] N/A 11/18/2020    BRONCHOSCOPY/TRANSBRONCHIAL NEEDLE BIOPSY performed by Etelvina Wolf MD at [place] [facility] STENT PLACEMENT  12/30/2011    Dr. Antony Rehman 1st OM 3.0 x 20 Ion    DIAGNOSTIC CARDIAC CATH LAB PROCEDURE  3/15/2005    [facility]. Mild to moderate CAD. Normal LV.  DIAGNOSTIC CARDIAC CATH LAB PROCEDURE  1/16/2007    [facility]. Cath/PTCA/DE stent of proximal and distal RCA.  DIAGNOSTIC CARDIAC CATH LAB PROCEDURE  2/21/2007    Cath/DE stent of proximal OM1 and proximal Cx.  DIAGNOSTIC CARDIAC CATH LAB PROCEDURE  12/30/2011    ANURADHA of mid OM branch of circumflex.      ECHO COMPL W DOP COLOR FLOW  12/30/2011         HC INSERT PICC CATH, 5/> YRS - MIDLINE  11/23/2020         HERNIA REPAIR  2/2014    laparoscopic ventral hernia repain    JOINT REPLACEMENT Left 4/1/14    TKA-ed    JOINT REPLACEMENT Right 2018    KNEE    PAIN MANAGEMENT PROCEDURE Right 9/1/2020    RIGHT L3,4,5 MEDIAL BRANCH RADIOFREQUENCY ABLATION performed by Lencho Samano DO at [address] [place] [address] N/A 11/17/2020    L4-5 EPIDURAL STEROID INJECTION #1 performed by expiratory phase noticed  CV: Regular rate. Regular rhythm. No mumur or rub. ABD: Non-tender. Non-distended. No masses. No organmegaly. Normal bowel sounds. Fatty, soft  Skin: Warm and dry. No nodule on exposed extremities. No rash on exposed extremities. Lymph: No cervical LAD. No supraclavicular LAD. Ext: No joint deformity. No clubbing. No cyanosis. 1+ edema  Neuro: . Positive pupils/gag/corneals. Normal pain response. Lab Results:  CBC:   Recent Labs     11/30/20  0540 12/01/20  0515 12/01/20  0942 12/01/20  1446   WBC 5.6 4.6 5.0  --    HGB 7.3* 5.6* 5.7* 6.5*   HCT 25.2* 19.2* 19.3* 21.7*   MCV 93.7 93.2 93.7  --    PLT 28* 20* 25*  --      BMP:   Recent Labs     11/29/20  0530 11/29/20  1745 11/30/20  0540 11/30/20  0650 12/01/20  0515   * 145 143  --  148*   K 3.5  --  5.5* 3.8 3.6     --  101  --  104   CO2 39*  --  35*  --  36*   PHOS 2.4*  --  2.3*  --  3.1   BUN 89*  --  90*  --  90*   CREATININE 1.3*  --  1.4*  --  1.7*      ALB:3,BILIDIR:3,BILITOT:3,ALKPHOS:3)@  PT/INR:   No results for input(s): PROTIME, INR in the last 72 hours. Cultures:  -    ABG: noted  Films: Unchanged, left upper lobe opacity  CT : Left upper lobe collapse    ASSESSMENT:  · Acute on chronic respiratory failure - on ACVC mechanical ventilation   · Community-acquired pneumonia  · History heavy smoking and morbid obesity, cannot rule out underlying COPD, cannot rule out MANOLO  · OMER Small Cell Carcinoma     PLAN:  · Agree with current plan. · Antibiotic, Unasyn   · Bronchodilators and IV steroids  · Left upper lobe bronchoscopy, cytology with malignant cells, possible non-small cell lung cancer. Heme-onc consult appreciated not a candidate for urgent inpatient chemotherapy.     · Radiation oncology consult requested with potential for radiation   · Vent management as per CCM, ACVC 20/510/70/12  · Discussed with CCM team  · To continue LABA with hope to improve bronchoconstriction  · Fentanyl for sedation · Diuresis per nephrology, possible thoracentesis left side  · Prolonged wean expected, possible trach placement if needed    Consults: ID, Pulmonary, Hematology/Oncology, Nephrology   Drips: Fentanyl   VENT: [address] 20/510/70/12    - Look to future trach/PEG placement if needed and unable to liberate from vent after 10-14 days  - supportive care to continue   - wean vent as able     Jhonny Mendoza M.D., F.C.C.P.  12/1/2020  4:01 PM

## 2020-12-01 NOTE — PROGRESS NOTES
[address] [place] [address] [facility]  [facility]  Progress Note    SUBJECTIVE:  Chief Complaint   Patient presents with    Shortness of Breath     patient sent from surgery after having epideral injection L4-L5. SOB has been for past two weeks     No new changes. The patient is still in the ICU and on the ventilator. Still having fevers. Review of systems:  As stated above in the chief complaint, otherwise negative.     Medications:  Scheduled Meds:   sodium chloride  20 mL Intravenous Once    famotidine  20 mg Oral BID    methylPREDNISolone  40 mg Intravenous Daily    sodium chloride  20 mL Intravenous Once    ampicillin-sulbactam  3 g Intravenous Q6H    heparin flush  1 mL Intravenous 2 times per day    insulin lispro  0-18 Units Subcutaneous Q6H    nicotine  1 patch Transdermal Daily    acetylcysteine  4 mL Inhalation BID    albuterol  2.5 mg Nebulization Q4H    Arformoterol Tartrate  15 mcg Nebulization BID    atorvastatin  80 mg Oral Nightly    [Held by provider] clopidogrel  75 mg Oral Daily    fluticasone  2 spray Nasal Daily    sodium chloride flush  10 mL Intravenous 2 times per day    [Held by provider] enoxaparin  40 mg Subcutaneous Daily    insulin glargine  0.25 Units/kg Subcutaneous Nightly    chlorhexidine  15 mL Mouth/Throat BID     Continuous Infusions:   fentaNYL 5 mcg/ml in 0.9%  ml infusion 200 mcg/hr (12/01/20 0851)    dextrose      midazolam 7 mg/hr (12/01/20 0921)     PRN Meds:potassium chloride, acetaminophen **OR** acetaminophen, sodium chloride flush, heparin flush, sodium phosphate IVPB **OR** sodium phosphate IVPB, magnesium sulfate, tiZANidine, polyethylene glycol, promethazine **OR** ondansetron, glucose, dextrose, glucagon (rDNA), dextrose, oxyCODONE-acetaminophen **AND** oxyCODONE, albuterol    OBJECTIVE:  /67   Pulse 101   Temp 101.4 °F (38.6 °C) (Bladder)   Resp 20   Ht 5' 11\" (1.803 m)   Wt (!) 327 lb 2.6 oz (148.4 kg)   SpO2 90% BMI 45.63 kg/m²   Temp  Av °F (38.3 °C)  Min: 100.5 °F (38.1 °C)  Max: 101.5 °F (38.6 °C)  Constitutional: The patient is lying in bed. He is intubated and sedated. Opens eyes. Restrained. FiO2 60%. PEEP 12. No changes. Skin: Warm and dry. No rashes were noted. HEENT: Round and reactive pupils. Moist mucous membranes. No ulcerations or thrush. ET tube. OG tube. Neck: Supple to movements. Chest: No coarse breath sounds. No crackles. Cardiovascular: Heart sounds rhythmic and regular. Abdomen: Large, round and nondistended. Positive bowel sounds to auscultation. Benign to palpation. Extremities: Minimal edema. Lines: Left midline 2020. Black catheter. Laboratory and Tests Review:  Lab Results   Component Value Date    WBC 5.0 2020    WBC 4.6 2020    WBC 5.6 2020    HGB 5.7 (L) 2020    HCT 19.3 (L) 2020    MCV 93.7 2020    PLT 25 (L) 2020     Lab Results   Component Value Date    NEUTROABS 3.86 2020    NEUTROABS 3.84 2020    NEUTROABS 6.36 2020     No results found for: [facility]  Lab Results   Component Value Date    ALT 44 (H) 2020    AST 80 (H) 2020    ALKPHOS 133 (H) 2020    BILITOT 0.7 2020     Lab Results   Component Value Date     2020    K 3.6 2020    K 5.1 2020     2020    CO2 36 2020    BUN 90 2020    CREATININE 1.7 2020    CREATININE 1.4 2020    CREATININE 1.3 2020    GFRAA 50 2020    LABGLOM 41 2020    GLUCOSE 176 2020    GLUCOSE 120 2011    PROT 4.9 2020    LABALBU 3.0 2020    LABALBU 4.5 2011    CALCIUM 8.5 2020    BILITOT 0.7 2020    ALKPHOS 133 2020    AST 80 2020    ALT 44 2020     Lab Results   Component Value Date    CRP 2.5 (H) 2020     Lab Results   Component Value Date    SEDRATE 82 (H) 2020     Radiology:  Rest x-ray reviewed.   No changes    Microbiology:   Respiratory panel (including SARS-CoV-2: Not detected  Streptococcus pneumoniae/Legionella urine Ag: negative  Nares screen MRSA: Negative  Urine culture 11/24/2020: Negative  Respiratory culture 11/18/2020: OP aristides absent. Respiratory culture 11/21/2020: OP aristides absent, Candida albicans  Respiratory culture 11/24/2020: OP aristides absent, Candida albicans  Blood cultures 11/21/2020 CoNS in 1 of 4 bottles   Blood cultures 11/24/2020: Negative x2  Blood cultures 11/29/2020: Negative so far    ASSESSMENT:  · Acute respiratory failure  · Left upper lobe cancer. Being followed by oncology. Not eligible for emergent chemotherapy  · Probable obstructive pneumonia. There was initial improvement with Unasyn. Fevers have come back  · Fever associated to underlying cancer  · Candida colonization of airways  · CoNS bacteremia. Contaminant  · Acute kidney injury, improving  · Prognosis is poor    PLAN:  · Continue Unasyn for now  · No need to treat Candida colonization at this point  · No need to treat CoNS bacteremia   · We will follow with you.      Discussed with Dr. Omid Miller  10:35 AM  12/1/2020

## 2020-12-01 NOTE — PLAN OF CARE
Problem: Skin Integrity:  Goal: Absence of new skin breakdown  Description: Absence of new skin breakdown  Outcome: Met This Shift     Problem: Pain:  Goal: Pain level will decrease  Description: Pain level will decrease  Outcome: Met This Shift  Goal: Control of acute pain  Description: Control of acute pain  Outcome: Met This Shift  Goal: Control of chronic pain  Description: Control of chronic pain  Outcome: Met This Shift     Problem: Gas Exchange - Impaired  Goal: Able to breathe comfortably  Description: Able to breathe comfortably  Outcome: Met This Shift     Problem: Fluid and Electrolyte Imbalance  Goal: Fluid and electrolyte balance are achieved/maintained  Outcome: Met This Shift     Problem: HEMODYNAMIC STATUS  Goal: Hemoglobin within specified parameters  Outcome: Met This Shift     Problem: Bleeding - Risk of  Goal: Absence of active bleeding  Outcome: Met This Shift     Problem: Restraint Use - Nonviolent/Non-Self-Destructive Behavior:  Goal: Absence of restraint-related injury  Description: Absence of restraint-related injury  Outcome: Met This Shift     Problem: Restraint Use - Nonviolent/Non-Self-Destructive Behavior:  Goal: Absence of restraint indications  Description: Absence of restraint indications  Outcome: Not Met This Shift

## 2020-12-01 NOTE — PROGRESS NOTES
Nephrology Consult Note    Patient's Name: Jose River.  9:11 AM  12/1/2020    Nephrologist: Dr. Emiliano Fuentes MD    Reason for Consult: Renal is consulted for volume management  Requesting Physician:  Dr. Rand Davis    Chief Complaint:  SOB    History Obtained From: EMR as the patient is intubated, sedated and no family member is present today    Sub: Patient seen and examined bedside in the ICU, he is on 20% FiO2. Comfortable. Events reviewed with the ICU RN, he received 1 unit of PRBC transfusion today, repeat hemoglobin is still low.   Urine output is adequate      Past Medical History:   Diagnosis Date    Anticoagulant long-term use     Arthritis     CAD (coronary artery disease)     Chronic back pain     Depression     Dyslipidemia     Hiatal hernia 1979    History of ST elevation myocardial infarction (STEMI)     Hyperlipidemia     Hypertension     Low back pain     Lumbar radiculopathy 8/14/2019    Obesity     MANOLO on CPAP     Presence of stent in left circumflex coronary artery     Presence of stent in right coronary artery     Smoker     Type 2 diabetes mellitus without complication (HCC)        Past Surgical History:   Procedure Laterality Date    ABDOMINAL AORTIC ANEURYSM REPAIR, ENDOVASCULAR N/A 6/28/2019    ENDOVASCULAR REPAIR ABDOMINAL AORTIC ANEURYSM performed by Malia Guillermo MD at [facility] Bilateral 3/12/2020    BILATERAL L3,L4,L5 MEDIAL BRANCH NERVE BLOCK performed by Liz Bal DO at [address] [place] [address] Bilateral 6/11/2020    BILATERAL L3,L4,L5 MEDIAL NERVE BRANCH BLOCK #2 performed by Liz Bal DO at [address] [place] [address] N/A 11/18/2020    BRONCHOSCOPY/TRANSBRONCHIAL NEEDLE BIOPSY performed by Chelsie Linda MD at [facility] 19  X4    CORONARY ANGIOPLASTY WITH STENT PLACEMENT  12/30/2011    Dr. Montrell Francisco [facility] OM 3.0 x 20 Ion    DIAGNOSTIC CARDIAC CATH LAB PROCEDURE 3/15/2005    [facility]. Mild to moderate CAD. Normal LV.  DIAGNOSTIC CARDIAC CATH LAB PROCEDURE  1/16/2007    [facility]. Cath/PTCA/DE stent of proximal and distal RCA.  DIAGNOSTIC CARDIAC CATH LAB PROCEDURE  2/21/2007    Cath/DE stent of proximal OM1 and proximal Cx.  DIAGNOSTIC CARDIAC CATH LAB PROCEDURE  12/30/2011    ANURADHA of mid OM branch of circumflex.  ECHO COMPL W DOP COLOR FLOW  12/30/2011         HC INSERT PICC CATH, 5/> YRS - MIDLINE  11/23/2020         HERNIA REPAIR  2/2014    laparoscopic ventral hernia repain    JOINT REPLACEMENT Left 4/1/14    TKA-ed    JOINT REPLACEMENT Right 2018    KNEE    PAIN MANAGEMENT PROCEDURE Right 9/1/2020    RIGHT L3,4,5 MEDIAL BRANCH RADIOFREQUENCY ABLATION performed by Marisela Padilla DO at [facility] N/A 11/17/2020    L4-5 EPIDURAL STEROID INJECTION #1 performed by Marisela Padilla DO at [address] [facility] OFFICE/OUTPT VISIT,PROCEDURE ONLY Right 11/26/2018    RIGHT KNEE TOTAL ARTHROPLASTY performed by Chandler Seip, DO at [facility]       Family History   Problem Relation Age of Onset    Diabetes Mother     Stroke Mother     Diabetes Father     No Known Problems Sister         reports that he has been smoking cigarettes. He started smoking about 42 years ago. He has a 39.00 pack-year smoking history. He has never used smokeless tobacco. He reports that he does not drink alcohol or use drugs.     Allergies:  Bee venom    Current Medications:    chlorothiazide (DIURIL) 125 mg in sodium chloride 0.9 % 50 mL IVPB, Q12H  albumin human 25 % IV solution 25 g, Q8H  famotidine (PEPCID) tablet 20 mg, BID  methylPREDNISolone sodium (SOLU-MEDROL) injection 40 mg, Daily  potassium & sodium phosphates (PHOS-NAK) 280-160-250 MG packet 250 mg, TID  potassium chloride 10 mEq/100 mL IVPB (Peripheral Line), PRN  0.9 % sodium chloride bolus, Once  ampicillin-sulbactam (UNASYN) 3 g ivpb minibag, Q6H  acetaminophen (TYLENOL) tablet 650 mg, Q4H PRN Or  acetaminophen (TYLENOL) suppository 650 mg, Q4H PRN  fentaNYL 5 mcg/ml in 0.9%  ml infusion, Continuous  sodium chloride flush 0.9 % injection 10 mL, PRN  heparin flush 100 UNIT/ML injection 100 Units, 2 times per day  heparin flush 100 UNIT/ML injection 100 Units, PRN  sodium phosphate 24.3 mmol in dextrose 5 % 250 mL IVPB, PRN    Or  sodium phosphate 48.57 mmol in dextrose 5 % 250 mL IVPB, PRN  magnesium sulfate 1 g in dextrose 5% 100 mL IVPB, PRN  insulin lispro (HUMALOG) injection vial 0-18 Units, Q6H  nicotine (NICODERM CQ) 21 MG/24HR 1 patch, Daily  acetylcysteine (MUCOMYST) 10 % solution 400 mg, BID  albuterol (PROVENTIL) nebulizer solution 2.5 mg, Q4H  Arformoterol Tartrate (BROVANA) nebulizer solution 15 mcg, BID  atorvastatin (LIPITOR) tablet 80 mg, Nightly  [Held by provider] clopidogrel (PLAVIX) tablet 75 mg, Daily  fluticasone (FLONASE) 50 MCG/ACT nasal spray 2 spray, Daily  tiZANidine (ZANAFLEX) tablet 4 mg, TID PRN  sodium chloride flush 0.9 % injection 10 mL, 2 times per day  polyethylene glycol (GLYCOLAX) packet 17 g, Daily PRN  promethazine (PHENERGAN) tablet 12.5 mg, Q6H PRN    Or  ondansetron (ZOFRAN) injection 4 mg, Q6H PRN  [Held by provider] enoxaparin (LOVENOX) injection 40 mg, Daily  insulin glargine (LANTUS) injection vial 36 Units, Nightly  glucose (GLUTOSE) 40 % oral gel 15 g, PRN  dextrose 50 % IV solution, PRN  glucagon (rDNA) injection 1 mg, PRN  dextrose 5 % solution, PRN  oxyCODONE-acetaminophen (PERCOCET) 5-325 MG per tablet 1 tablet, TID PRN    And  oxyCODONE (ROXICODONE) immediate release tablet 2.5 mg, TID PRN  albuterol (PROVENTIL) nebulizer solution 2.5 mg, Q4H PRN  chlorhexidine (PERIDEX) 0.12 % solution 15 mL, BID  midazolam (VERSED) 1 mg/mL in D5W infusion, Continuous        Review of Systems:   Unable to obtain as he is intubated and sedated    Physical exam:   Constitutional:    Vitals: /80   Pulse 99   Temp 101.5 °F (38.6 °C) (Bladder)   Resp 19   Ht 5' 11\" (1.803 m)   Wt (!) 327 lb 2.6 oz (148.4 kg)   SpO2 92%   BMI 45.63 kg/m²        Patient seen and examined bedside , he is intubated and awake, FiO2 requirement is at 50%, no acute distress  Skin: no rash, turgor wnl  Heent:  eomi, mmm  Neck: no bruits or jvd noted  Cardiovascular:  S1, S2 without m/r/g  Respiratory: CTA B without w/r/r, decreased breath sounds in bases  Abdomen:  +bs, soft, nt, Black catheter draining clear urine  Ext: 3+ lower extremity edema  Psychiatric: mood and affect appropriate  Musculoskeletal:  Rom, muscular strength intact    Data:   Labs:  CBC:   Lab Results   Component Value Date    WBC 4.6 12/01/2020    RBC 2.06 12/01/2020    HGB 5.6 12/01/2020    HCT 19.2 12/01/2020    MCV 93.2 12/01/2020    MCH 27.2 12/01/2020    MCHC 29.2 12/01/2020    RDW 16.8 12/01/2020    PLT 20 12/01/2020    MPV 12.0 12/01/2020     CMP:    Lab Results   Component Value Date     12/01/2020    K 3.6 12/01/2020    K 5.1 11/17/2020     12/01/2020    CO2 36 12/01/2020    BUN 90 12/01/2020    CREATININE 1.7 12/01/2020    GFRAA 50 12/01/2020    LABGLOM 41 12/01/2020    GLUCOSE 176 12/01/2020    GLUCOSE 120 12/31/2011    PROT 4.9 12/01/2020    LABALBU 3.0 12/01/2020    LABALBU 4.5 12/28/2011    CALCIUM 8.5 12/01/2020    BILITOT 0.7 12/01/2020    ALKPHOS 133 12/01/2020    AST 80 12/01/2020    ALT 44 12/01/2020     BMP:    Lab Results   Component Value Date     12/01/2020    K 3.6 12/01/2020    K 5.1 11/17/2020     12/01/2020    CO2 36 12/01/2020    BUN 90 12/01/2020    LABALBU 3.0 12/01/2020    LABALBU 4.5 12/28/2011    CREATININE 1.7 12/01/2020    CALCIUM 8.5 12/01/2020    GFRAA 50 12/01/2020    LABGLOM 41 12/01/2020    GLUCOSE 176 12/01/2020    GLUCOSE 120 12/31/2011     Calcium:    Lab Results   Component Value Date    CALCIUM 8.5 12/01/2020     Phosphorus:    Lab Results   Component Value Date    PHOS 3.1 12/01/2020     Uric Acid:  No results found for: URICACID  U/A:    Lab Results Component Value Date    NITRU Negative 11/24/2020    COLORU Yellow 11/24/2020    PHUR 5.5 11/24/2020    WBCUA 2-5 11/24/2020    RBCUA >20 11/24/2020    MUCUS Present 11/24/2020    BACTERIA MODERATE 11/24/2020    CLARITYU CLOUDY 11/24/2020    SPECGRAV 1.025 11/24/2020    LEUKOCYTESUR Negative 11/24/2020    UROBILINOGEN 0.2 11/24/2020    BILIRUBINUR Negative 11/24/2020    BLOODU LARGE 11/24/2020    GLUCOSEU Negative 11/24/2020    KETUA Negative 11/24/2020    AMORPHOUS MANY 11/24/2020        Imaging:  Xr Abdomen (kub) (single Ap View)    Result Date: 11/23/2020  EXAMINATION: ONE SUPINE XRAY VIEW(S) OF THE ABDOMEN 11/23/2020 9:38 am COMPARISON: None HISTORY: ORDERING SYSTEM PROVIDED HISTORY: abdominal pain TECHNOLOGIST PROVIDED HISTORY: Reason for exam:->abdominal pain FINDINGS: There is a nonobstructive bowel gas pattern. There are no abnormal air-fluid levels. There are no calculi overlying the renal shadows. There is an NG tube within the stomach. There is a stent graft seen within the abdominal aorta. 1. There is no bowel obstruction, ileus or free air. Ct Lumbar Spine Wo Contrast    Result Date: 11/19/2020  EXAMINATION: CT OF THE LUMBAR SPINE WITHOUT CONTRAST  11/18/2020 TECHNIQUE: CT of the lumbar spine was performed without the administration of intravenous contrast. Multiplanar reformatted images are provided for review. Dose modulation, iterative reconstruction, and/or weight based adjustment of the mA/kV was utilized to reduce the radiation dose to as low as reasonably achievable. COMPARISON: 11/17/2020 HISTORY: ORDERING SYSTEM PROVIDED HISTORY: exclude epidural hematoma TECHNOLOGIST PROVIDED HISTORY: Reason for exam:->exclude epidural hematoma Chronic back pain, recent epidural placement FINDINGS: BONES/ALIGNMENT: Vertebral body heights are preserved without evidence for lumbar fracture. However, there is irregular lucency and sclerosis in the upper sacral ala bilaterally.   There is minimal retrolisthesis at L2-L3. Alignment is otherwise normal. DEGENERATIVE CHANGES: There is disc space narrowing and vacuum disc phenomenon at L2-L3. Disc space heights are otherwise preserved. There is diffuse facet arthropathy. SOFT TISSUES/RETROPERITONEUM: No evidence for epidural hematoma is identified. 1. No evidence for epidural hematoma on CT scan. 2. Suspected sacral insufficiency fracture. Xr Chest Portable    Result Date: 11/24/2020  EXAMINATION: ONE XRAY VIEW OF THE CHEST 11/24/2020 6:36 am COMPARISON: 11/23/2020 HISTORY: ORDERING SYSTEM PROVIDED HISTORY: resp failure TECHNOLOGIST PROVIDED HISTORY: Reason for exam:->resp failure FINDINGS: The heart is borderline enlarged. Dense opacity remains in the mid and upper left lung unchanged. Moderate amount of opacity present in the lower right lung. Small amount of opacity present in the left lung base favored to be due to atelectasis. No abnormal pulmonary vascular congestion. ET tube and NG tube unchanged. Persistent dense opacity in the upper left lung unchanged. Moderate opacity in the right lung base worrisome for pneumonia. Probable mild left basilar atelectasis. No abnormal vascular congestion. Xr Chest Portable    Result Date: 11/23/2020  EXAMINATION: ONE XRAY VIEW OF THE CHEST 11/23/2020 4:54 pm COMPARISON: Multiple prior chest series with the most recent dated November 23, 2020 at [facility]: ETT adjusted TECHNOLOGIST PROVIDED HISTORY: Reason for exam:->ETT adjusted Reason for exam:->ETT advanced to 28 cm FINDINGS: Medical support devices: Endotracheal tube terminates in the mid to distal thoracic trachea. Enteric tube extends subdiaphragmatic and off the field of view. Lungs: Stable left upper lung complete opacification. Persistent right lower lung opacity. Slight elevation of the right hemidiaphragm is stable. Left lower lung not imaged.  Cardiomediastinal silhouette and pulmonary vascularity: There appears to be atherosclerotic disease and prominence of the cardiac silhouette. Osseous and soft tissue structures: No acute findings. 1.  Unchanged left upper lung complete opacification. 2.  Elevation of the right hemidiaphragm and right lower lung opacity. 3.  Medical support devices as above. Xr Chest Portable    Result Date: 11/23/2020  EXAMINATION: ONE XRAY VIEW OF THE CHEST 11/23/2020 7:08 am COMPARISON: 11/20/2020 HISTORY: ORDERING SYSTEM PROVIDED HISTORY: resp failure TECHNOLOGIST PROVIDED HISTORY: Reason for exam:->resp failure FINDINGS: There is no interval change in the persistent dense consolidation within the left upper lobe. The left lower lobe is clear. The right lung is clear. There is minimal atelectasis seen within the right lung base. The cardiac silhouette is within normals. 1. No interval change in the dense consolidation seen within the left upper lobe. 2. Minimal right lung base atelectasis. Xr Chest Portable    Result Date: 11/22/2020  EXAMINATION: ONE XRAY VIEW OF THE CHEST 11/22/2020 6:28 am COMPARISON: 11/21/2020 HISTORY: ORDERING SYSTEM PROVIDED HISTORY: resp failure TECHNOLOGIST PROVIDED HISTORY: Reason for exam:->resp failure FINDINGS: The endotracheal tube is stable. The enteric tube tip is not well visualized but may be at the gastroesophageal junction. There is stable left upper lobe dense opacity. There are continued patchy opacities in the left lower lobe. There may be small pleural effusions. No pneumothorax is seen. No significant change in dense airspace opacity of the left upper lobe and patchy left lower lobe airspace opacities. Enteric tube tip is not well visualized due to underpenetration. The tip may be at the gastroesophageal junction. This could be confirmed with KUB centered on the upper abdomen.     Xr Chest Portable    Result Date: 11/21/2020  EXAMINATION: ONE XRAY VIEW OF THE CHEST 11/21/2020 7:37 am COMPARISON: 11/20/2020 HISTORY: ORDERING SYSTEM PROVIDED HISTORY: resp failure TECHNOLOGIST PROVIDED HISTORY: Reason for exam:->resp failure FINDINGS: Endotracheal tube and nasogastric tube are unchanged. Large opacity in the left upper lobe is unchanged. There is additional airspace disease in the left lower lobe which is stable. There is no pneumothorax. Small pleural effusions are not excluded. Unchanged large opacity/consolidation in left upper lobe. Unchanged airspace disease in left lower lobe. Xr Chest Portable    Result Date: 11/20/2020  EXAMINATION: ONE XRAY VIEW OF THE CHEST 11/20/2020 6:33 am COMPARISON: 11/19/2020 HISTORY: ORDERING SYSTEM PROVIDED HISTORY: resp failure TECHNOLOGIST PROVIDED HISTORY: Reason for exam:->resp failure FINDINGS: Endotracheal and enteric tubes remain in place. There has been no appreciable change in opacities throughout the left lung, most pronounced in the left apex. The right lung is clear. The heart size is at the upper limits of normal.  There is no discernible pneumothorax. Grossly stable opacities on the left. Xr Chest Portable    Result Date: 11/19/2020  EXAMINATION: ONE XRAY VIEW OF THE CHEST 11/19/2020 6:23 am COMPARISON: 11/18/2020 HISTORY: ORDERING SYSTEM PROVIDED HISTORY: resp failure TECHNOLOGIST PROVIDED HISTORY: Reason for exam:->resp failure FINDINGS: Evaluation is limited by the patient's body habitus and the portable technique. The right lung apex was partially excluded. There is increased dense consolidation in the left upper lobe. There is also medial left basilar airspace opacity obscuring the hemidiaphragm. The heart size is mildly enlarged. There is no discernible pneumothorax. Endotracheal and enteric tubes are again seen. Increasing multifocal left lung pneumonia. Xr Chest Portable    Result Date: 11/18/2020  EXAMINATION: ONE XRAY VIEW OF THE CHEST 11/18/2020 6:39 am COMPARISON: CT chest November 17, 2020.  HISTORY: ORDERING SYSTEM PROVIDED HISTORY: SOB TECHNOLOGIST PROVIDED HISTORY: Reason for exam:->SOB FINDINGS: The cardiac silhouette is within normal limits in size. There is masslike consolidation of the left upper lobe. There is a small to moderate left dependent pleural effusion. There is no visible pneumothorax. The pulmonary vessels are within normal limits. Left upper lobe masslike consolidation. Small to moderate left pleural effusion. Xr Chest 1 View    Result Date: 11/18/2020  EXAMINATION: ONE XRAY VIEW OF THE CHEST 11/18/2020 9:06 am COMPARISON: 11/18/2020 HISTORY: ORDERING SYSTEM PROVIDED HISTORY: intubation TECHNOLOGIST PROVIDED HISTORY: Reason for exam:->intubation Reason for exam:->portable FINDINGS: Compared to the chest radiograph from earlier today, 6:44 a.m., there is interval placement of an endotracheal tube, the tip of which is approximately 4.1 cm above the korina. Nasogastric tube is present but is suboptimally visualized due to motion artifact and relative underpenetration of the study. The abdominal radiograph demonstrates it to be well positioned, with the tip in the upper abdomen. Prominent masslike consolidative opacity in the upper left lung are grossly stable. No pneumothorax is seen. Layering left pleural effusion. 1.  The life-support lines and tubes are well positioned. 2. Masslike consolidative opacity in the left upper lung. Small left effusion. Cta Chest W Contrast    Result Date: 11/17/2020  EXAMINATION: CTA OF THE CHEST 11/17/2020 3:18 pm TECHNIQUE: CTA of the chest was performed after the administration of intravenous contrast.  Multiplanar reformatted images are provided for review. MIP images are provided for review.  Dose modulation, iterative reconstruction, and/or weight based adjustment of the mA/kV was utilized to reduce the radiation dose to as low as reasonably achievable.; CTA of the abdomen and pelvis was performed with the administration of intravenous contrast. Multiplanar reformatted images are provided for review. MIP images are provided for review. Dose modulation, iterative reconstruction, and/or weight based adjustment of the mA/kV was utilized to reduce the radiation dose to as low as reasonably achievable. COMPARISON: None. HISTORY: ORDERING SYSTEM PROVIDED HISTORY: aneurysm, cp, sob TECHNOLOGIST PROVIDED HISTORY: Reason for exam:->aneurysm, cp, sob FINDINGS: CTA chest: There are confluent opacities located in left upper lobe. Patchy airspace opacities located in lingula. There is moderate to large left pleural effusion. Peribronchial thickening extensor in left hilar location into the left lung base. There is subsegmental atelectasis in posterior right lower lobe with adjacent ground-glass opacities. There is pulmonary vascular congestion. The heart is mildly enlarged. There is mediastinal lymphadenopathy. Ascending thoracic aorta measures 3.7 cm in diameter. Descending thoracic aorta measures 3 cm in diameter. No evidence of thoracic aortic aneurysm or dissection. Distal descending thoracic aorta is tortuous. CTA abdomen/pelvis: There is evidence of endograft repair involving the abdominal aorta. There is redemonstration of fusiform abdominal aortic aneurysm measuring up to 6.4 cm. This finding is stable since prior. No evidence of endoleak. No retroperitoneal fluid collections. Iliac limbs of endograft appear patent. Common iliac arteries are tortuous. Common femoral arteries are patent. Numerous diverticula associated with the left colon and sigmoid colon. No evidence of acute diverticulitis. Liver, gallbladder, pancreas, and spleen are grossly unremarkable however there is motion artifact limiting this examination. There is no hydronephrosis. Cyst associated with the right kidney is stable since previous examination as well as cyst associated with the left kidney appearing stable since prior.   Appendix is visualized and normal.    1.  New confluent opacities located in left upper lobe suggesting pneumonia, atelectasis, or neoplasm. 2.  Patchy airspace opacities located in lingula suggesting pneumonia with areas of atelectasis. 3.  Stenosis and occlusion are associated with left upper lobe segmental bronchi and lingular segmental bronchi. 4.  Moderate to large left pleural effusion. 5.  Mediastinal lymphadenopathy. 6.  Stable fusiform infrarenal abdominal aortic aneurysm with endograft repair. Aneurysm measures up to 6.4 cm. No evidence of endoleak or retroperitoneal fluid. 7.  Diverticulosis. Cta Abdomen Pelvis W Contrast    Result Date: 11/17/2020  EXAMINATION: CTA OF THE CHEST 11/17/2020 3:18 pm TECHNIQUE: CTA of the chest was performed after the administration of intravenous contrast.  Multiplanar reformatted images are provided for review. MIP images are provided for review. Dose modulation, iterative reconstruction, and/or weight based adjustment of the mA/kV was utilized to reduce the radiation dose to as low as reasonably achievable.; CTA of the abdomen and pelvis was performed with the administration of intravenous contrast. Multiplanar reformatted images are provided for review. MIP images are provided for review. Dose modulation, iterative reconstruction, and/or weight based adjustment of the mA/kV was utilized to reduce the radiation dose to as low as reasonably achievable. COMPARISON: None. HISTORY: ORDERING SYSTEM PROVIDED HISTORY: aneurysm, cp, sob TECHNOLOGIST PROVIDED HISTORY: Reason for exam:->aneurysm, cp, sob FINDINGS: CTA chest: There are confluent opacities located in left upper lobe. Patchy airspace opacities located in lingula. There is moderate to large left pleural effusion. Peribronchial thickening extensor in left hilar location into the left lung base. There is subsegmental atelectasis in posterior right lower lobe with adjacent ground-glass opacities. There is pulmonary vascular congestion. The heart is mildly enlarged.   There is mediastinal lymphadenopathy. Ascending thoracic aorta measures 3.7 cm in diameter. Descending thoracic aorta measures 3 cm in diameter. No evidence of thoracic aortic aneurysm or dissection. Distal descending thoracic aorta is tortuous. CTA abdomen/pelvis: There is evidence of endograft repair involving the abdominal aorta. There is redemonstration of fusiform abdominal aortic aneurysm measuring up to 6.4 cm. This finding is stable since prior. No evidence of endoleak. No retroperitoneal fluid collections. Iliac limbs of endograft appear patent. Common iliac arteries are tortuous. Common femoral arteries are patent. Numerous diverticula associated with the left colon and sigmoid colon. No evidence of acute diverticulitis. Liver, gallbladder, pancreas, and spleen are grossly unremarkable however there is motion artifact limiting this examination. There is no hydronephrosis. Cyst associated with the right kidney is stable since previous examination as well as cyst associated with the left kidney appearing stable since prior. Appendix is visualized and normal.    1.  New confluent opacities located in left upper lobe suggesting pneumonia, atelectasis, or neoplasm. 2.  Patchy airspace opacities located in lingula suggesting pneumonia with areas of atelectasis. 3.  Stenosis and occlusion are associated with left upper lobe segmental bronchi and lingular segmental bronchi. 4.  Moderate to large left pleural effusion. 5.  Mediastinal lymphadenopathy. 6.  Stable fusiform infrarenal abdominal aortic aneurysm with endograft repair. Aneurysm measures up to 6.4 cm. No evidence of endoleak or retroperitoneal fluid. 7.  Diverticulosis. Xr Abdomen For Ng/og/ne Tube Placement    Result Date: 11/22/2020  EXAMINATION: ONE SUPINE XRAY VIEW(S) OF THE ABDOMEN 11/22/2020 5:06 pm COMPARISON: November 22, 2020, 4 hours prior.  HISTORY: ORDERING SYSTEM PROVIDED HISTORY: Confirmation of course of NG/OG/NE tube and location of tip of tube TECHNOLOGIST PROVIDED HISTORY: Reason for exam:->Confirmation of course of NG/OG/NE tube and location of tip of tube Portable? ->Yes FINDINGS: Tip of the NG tube noted at the level of the diaphragm, no significant change. The right side and the inferior part of the abdomen is not included in the study. There is opacification of the visualized left upper lung. Tip of NG tube at the level of the left hemidiaphragm, no change. Xr Chest Abdomen Ng Placement    Result Date: 11/22/2020  EXAMINATION: ONE SUPINE XRAY VIEW(S) OF THE ABDOMEN 11/22/2020 12:52 pm COMPARISON: November 18. HISTORY: ORDERING SYSTEM PROVIDED HISTORY: OG placement TECHNOLOGIST PROVIDED HISTORY: Reason for exam:->OG placement FINDINGS: Nasogastric tube is present. Side hole appears to be at level of gastroesophageal junction. This tube could be advanced approximately 4 or 5 cm. Nasogastric tube is present with side hole at level of gastroesophageal junction. This tube could be advanced approximately 4 or 5 cm. Xr Chest Abdomen Ng Placement    Result Date: 11/18/2020  EXAMINATION: ONE SUPINE XRAY VIEW(S) OF THE ABDOMEN 11/18/2020 9:07 am COMPARISON: 11/18/2020, about 2 hours earlier HISTORY: ORDERING SYSTEM PROVIDED HISTORY: NGT TECHNOLOGIST PROVIDED HISTORY: Reason for exam:->NGT Reason for exam:->portable NG tube placement FINDINGS: There is an NG tube with the tip in the stomach. An ET tube is again noted in satisfactory position. There is extensive opacity in the left lung, most likely pneumonia. Increased markings also seen in the visualized portion of the right lung. The heart is enlarged. NG tube tip in the stomach. Fluoro For Surgical Procedures    Result Date: 11/17/2020  EXAMINATION: SPOT FLUOROSCOPIC IMAGES 11/17/2020 12:03 pm TECHNIQUE: Fluoroscopy was provided by the radiology department for procedure. Radiologist was not present during examination.  FLUOROSCOPY DOSE AND TYPE OR TIME AND EXPOSURES: Total dose:26.99 mGy COMPARISON: None HISTORY: ORDERING SYSTEM PROVIDED HISTORY: Pain management TECHNOLOGIST PROVIDED HISTORY: Reason for exam:->L4-5 Epidural steroid injection #1 Intraprocedural imaging. FINDINGS: 3 intraoperative fluoroscopic images were obtained during L4-5 epidural steroid injection. Intraprocedural fluoroscopic spot images as above. See separate procedure report for more information. Assessment  1. Acute hypoxic hypercapnic respiratory secondary to community-acquired pneumonia, on mechanical ventilation  2. Acute kidney injury secondary to overt diuresis, questionable underlying chronic kidney disease with evidence of proteinuria  3. Anasarca, multifactorial secondary to hypoalbuminemia, aggressive IVF, anemia  4. Hypernatremia secondary to free water deficit, loop diuretic use. Calculated free water deficit is 3.75 L  5. Hyperkalemia  6. Anemia, normocytic, oncology on board  7. Severe thrombocytopenia    Plan    1. Chest x-ray results reviewed, there is no evidence of volume overload  2. Agree with stopping diuretics  3. PRBC transfusion per ICU team  4. Continue with free water flushes at 100 mL/h for persistent hypernatremia  5.   Renal function remains stable with adequate urine output               Electronically signed by Kavin Horvath MD on 12/1/2020 at 9:11 AM

## 2020-12-01 NOTE — PROGRESS NOTES
[facility] Progress Note    Admitting Date and Time: 11/17/2020  1:13 PM  Admit Dx: Acute respiratory failure with hypoxia ([address] [J96.01]  Acute respiratory failure with hypoxia (HCC) [J96.01]    Subjective:  Patient is being followed for Acute respiratory failure with hypoxia ([address] [J96.01]  Acute respiratory failure with hypoxia ([address] [J96.01]     Brief history per pulmonary critical care: \"The patient is a 57 y. o. male with significant past medical history of diabetes, MANOLO, back pain, hypertension, hyperlipidemia, STEMI's status post 5 stents, CAD, aortic aneurysm presenting to the the hospital initially on 11/17. Natalie Maynard had a epidural performed for low back pain and after the procedure became very short of breath.  He was satting 80% so was sent to the hospital for further evaluation.  While in the ED patient was found to be thrombocytopenic as well as hypoxic on room air.  He was placed on 4 L of oxygen but O2 sats improved.  CTA of the chest demonstrating pneumonia and pleural effusions.  He was given 1 dose of Rocephin and azithromycin while in the ER. On 11/18 RRT was called at 7:30 AM. Annamaria Trejo was found to have increased work of breathing as well as hypercapnic on ABGs.  He was on BiPAP all night with minimal improvement of his symptoms.  During the RT he was switched to Wilson Bevels was subsequently brought down to the ICU for further evaluation and care. He was not improving on AVAPS and subsequently intubated. A bronchoscopy was performed on 11/18 and biopsies were sent. \"    12/1/20  Pt remains intubated. He follows commands and follows with his eyes. Labs revealing a significant drop in hgb, now at 5.6. Hematology is following the patient and will be notified of results. ROS: denies fever, chills, cp, sob, n/v, HA unless stated above.       famotidine  20 mg Oral BID    methylPREDNISolone  40 mg Intravenous Daily    sodium chloride  20 mL Intravenous Once    ampicillin-sulbactam  3 g Intravenous Q6H    heparin flush  1 mL Intravenous 2 times per day    insulin lispro  0-18 Units Subcutaneous Q6H    nicotine  1 patch Transdermal Daily    acetylcysteine  4 mL Inhalation BID    albuterol  2.5 mg Nebulization Q4H    Arformoterol Tartrate  15 mcg Nebulization BID    atorvastatin  80 mg Oral Nightly    [Held by provider] clopidogrel  75 mg Oral Daily    fluticasone  2 spray Nasal Daily    sodium chloride flush  10 mL Intravenous 2 times per day    [Held by provider] enoxaparin  40 mg Subcutaneous Daily    insulin glargine  0.25 Units/kg Subcutaneous Nightly    chlorhexidine  15 mL Mouth/Throat BID     potassium chloride, 10 mEq, PRN  acetaminophen, 650 mg, Q4H PRN    Or  acetaminophen, 650 mg, Q4H PRN  sodium chloride flush, 10 mL, PRN  heparin flush, 1 mL, PRN  sodium phosphate IVPB, 0.16 mmol/kg, PRN    Or  sodium phosphate IVPB, 0.32 mmol/kg, PRN  magnesium sulfate, 1 g, PRN  tiZANidine, 4 mg, TID PRN  polyethylene glycol, 17 g, Daily PRN  promethazine, 12.5 mg, Q6H PRN    Or  ondansetron, 4 mg, Q6H PRN  glucose, 15 g, PRN  dextrose, 12.5 g, PRN  glucagon (rDNA), 1 mg, PRN  dextrose, 100 mL/hr, PRN  oxyCODONE-acetaminophen, 1 tablet, TID PRN    And  oxyCODONE, 2.5 mg, TID PRN  albuterol, 2.5 mg, Q4H PRN         Objective:    /73   Pulse 99   Temp 101.4 °F (38.6 °C) (Bladder)   Resp 19   Ht 5' 11\" (1.803 m)   Wt (!) 327 lb 2.6 oz (148.4 kg)   SpO2 93%   BMI 45.63 kg/m²     General Appearance: alert and in no acute distress  Skin: cool and dry, especially the extremities  Head: normocephalic and atraumatic  Eyes: pupils equal, round, and reactive to light, extraocular eye movements intact, conjunctivae normal  Neck: neck supple and non tender without mass   Pulmonary/Chest: clear to auscultation bilaterally- no wheezes, rales or rhonchi, normal air movement, no respiratory distress  Cardiovascular: normal rate, normal S1 and S2 and no carotid bruits  Abdomen: soft, non-tender, non-distended, normal bowel sounds, no masses or organomegaly  Extremities: no cyanosis, no clubbing and no edema        Recent Labs     11/29/20  0530 11/29/20  1745 11/30/20  0540 11/30/20  0650 12/01/20  0515   * 145 143  --  148*   K 3.5  --  5.5* 3.8 3.6     --  101  --  104   CO2 39*  --  35*  --  36*   BUN 89*  --  90*  --  90*   CREATININE 1.3*  --  1.4*  --  1.7*   GLUCOSE 233*  --  185*  --  176*   CALCIUM 8.6  --  8.3*  --  8.5*       Recent Labs     11/29/20  0530 11/30/20  0540 12/01/20  0515   WBC 7.4 5.6 4.6   RBC 2.65* 2.69* 2.06*   HGB 7.0* 7.3* 5.6*   HCT 25.1* 25.2* 19.2*   MCV 94.7 93.7 93.2   MCH 26.4 27.1 27.2   MCHC 27.9* 29.0* 29.2*   RDW 16.8* 16.6* 16.8*   PLT 32* 28* 20*   MPV 12.9* 10.9 12.0         Assessment:    Active Problems:    CAD (coronary artery disease)    Smoker    Non-insulin dependent type 2 diabetes mellitus ([facility] [address] [address]    Morbid obesity with BMI of 40.0-44.9, adult (HCC)    Chronic back pain    Acute respiratory failure with hypoxia ([facility] [address]    Community acquired pneumonia of left upper lobe of lung    Thrombocytopenia (HCC)  Resolved Problems:    * No resolved hospital problems. *      Plan:  1. Acute hypoxic resp failure due to CAP and volume overload  -  Left sided pneumonia on admission  -  Full vent support; had trial of PS  -  Pulm/cc following  -  Solumedrol now at q12 h  -  Nebs  -  Thoracentesis performed 11/22/20 with only small amount of pleural fluid  -  Respiratory cultures pending; 24 hours no growth  -  Bronchoscopy performed on 11/18/20  -  possible thoracentesis left side per pulmonology  -  Hydrodiuril  -  COVID testing negative on 11/17/20    2. CAP  -  Continue antibiotics; currently on unasyn  - strep and legionella negative  - procalcitonin 0.23  -  resp culture neg x 24 hours  -  ID was consulted  -  ?drug induced fevers-cefepime discontinued as result; Fevers persist  T max 101.5 F    3.   Hx CAD with stent placement  -  plavix currently on hold  -  Hx aortic aneurysm s/p endovascular repair    4. Hypoalbuminemia  -  With anasarca/volume overload  -  Renal continues to follow; Discussed case today with Dr. John Vallecillo  -  IV diuretics discontinued  -  Albumin per renal    5. Hypernatremia-2/2 free water deficits; resolved  -  Renal following    6. Left lung CA-with pleural effusion; Most consistent with small cell lung cancer  -  Hem/onc and pulmonology following  -  Rad/onc consulted by pulm. 7.  NIDDM- continue lantus and ISS    8. Chronic back pain, s/p epidural injection 11/17/20; CT spine neg for hematoma  -  zanaflex prn    9. Essential hypertension-  Controlled  -  BB currently on hold due to low BP    10. Mixed hyperlipidemia with hx CAD  -  Continue statin    11. Suspect COPD with MANOLO  -  Morbid Obesity and 39 pack year smoking history  -  Patient will require sleep studies and PFTs as outpatient    12. LUE DVT-partially occlusive  -   Hematology on board  -  Cannot fully anticoagulate due to low platelets  -  Patient is on lovenox 40 mg daily    13. Hyperkalemia-nephrology following;  Started on thiazide    14. Anemia-acutely worsening  -  Hematology following patient  -  Awaiting their recommendations. -  Generally would transfuse for hgb <7.0        NOTE: This report was transcribed using voice recognition software. Every effort was made to ensure accuracy; however, inadvertent computerized transcription errors may be present.   Electronically signed by Yisel Mercer MD on 12/1/2020 at 10:06 AM

## 2020-12-01 NOTE — CARE COORDINATION
COVID negative 11/17. Vent/ intubated since 11/18- FIO2 70%,PEEP 12. On iv abx, iv Diuril. Bronch from 11/24 showed + malignancy -radiation oncology following- per note needs improvement before initiating chemotherapy. Full code. [facility] and [facility] following(back door referrals)- will need to discuss LTAC/PAVEL choices w/ wife when pt stabilizes-would not make any decision at this time from previous notes.  Will follow Lai Wallace

## 2020-12-01 NOTE — PROGRESS NOTES
Critical Care Team - Daily Progress Note         Date and time: 12/1/2020 12:11 PM  Patient's name:  Néstor Ariza. Medical Record Number: [id number]  Patient's account/billing number: [de-identified]  Patient's YOB: 1960  Age: 61 y.o. Date of Admission: 11/17/2020  1:13 PM  Length of stay during current admission: 14      Primary Care Physician: Lupe Kim PA-C  ICU Attending Physician: Dr. Stacey Yoon    Code Status: Full Code    Reason for ICU admission: acute hypoxic respiratory failure      SUBJECTIVE:     BRIEF HISTORY:   The patient is a 57 y. o. male with significant past medical history of diabetes, MANOLO, back pain, hypertension, hyperlipidemia, STEMI's status post 5 stents, CAD, aortic aneurysm presenting to the the hospital initially on 11/17. [facility] had a epidural performed yesterday for low back pain and after the procedure became very short of breath.  He was satting 80% so was sent to the hospital for further evaluation.  While in the ED patient was found to be thrombocytopenic as well as hypoxic on room air.  He was placed on 4 L of oxygen but O2 sats improved.  CTA of the chest demonstrating pneumonia and pleural effusions.  He was given 1 dose of Rocephin and azithromycin while in the ER.     On 11/18 RRT was called at 7:30 AM. Quique Verduzco was found to have increased work of breathing as well as hypercapnic on ABGs.  He was on BiPAP all night with minimal improvement of his symptoms.  During the RT he was switched to Tiffany Peacockau was subsequently brought down to the ICU for further evaluation and care. He was not improving on AVAPS and subsequently intubated     OVERNIGHT EVENTS:------------------------------------------------------------------------------------------------   11/19/20: Continues to be intubated, no acute events  11/20/20: Attempted weaning trials. Patient on pressure support for a few hours. ABGs worsening.   Placed on it back on AC/VC  11/21/20: More rhonchorous today.  More secretions. 11/22/20; still having moist secretions, gram positive cocci bacteremia   11/23/20: continued secretions, complaining of mild abdominal pain today. multiple attempts made to transfer patient per wifes request  11/24/20: start diuresis. Wean propofol and add seroquel for agitation. Increase free water. Biopsy results back concerning for small cell lung cancer. Febrile. 11/25 febrile re cultured overnight , t max 101, ID consulted, will attempt weaning again today  11/26: continues with fevers, tmax 101. ID added levaquin yesterday, bronch today  11/27: low grade fevers improved. Not tolerating vent weaning, continued diruesis throughout the day. Stopped by renal with worsening bun  11/28: Diuresed appropriately, Awake and following commands, failed psv trial   11/29: Hemoglobin dropped this morning to 7.0, 1 unit packed red blood cells ordered by hematology  Continue pressure support trials today   11/30: febrile overnight, wean sedation, PSV trials -- failed. Discuss trach with family  12/1: Febrile overnight, hemoglobin 5.6 today, given a unit of blood, platelets 20.     CURRENT VENTILATION STATUS:     [x] Ventilator  [] BIPAP  [] Nasal Cannula [] Room Air        SECRETIONS : Amount:  [x] Small [] Moderate  [] Large  [] None  Color:     [] White [x] Colored  [] Bloody    SEDATION:    [] Propofol gtt  [x] Versed gtt  [] Ativan gtt   [] No Sedation    PARALYZED:  [x] No    [] Yes      VASOPRESSORS:  [x] No    [] Yes    If yes -   [] Levophed       [] Dopamine     [] Vasopressin       [] Dobutamine  [] Phenylephrine         [] Epinephrine    CENTRAL LINES:     [x] No   [] Yes   (Date of Insertion:   )           If yes -     [] Right IJ     [] Left IJ [] Right Femoral [] Left Femoral                   [] Right Subclavian [] Left Subclavian       HUFF'S CATHETER:   [] No   [x] Yes  (Date of Insertion: 11/19  )     URINE OUTPUT:            [x] Good   [] Low              [] Anuric      OBJECTIVE:     VITAL SIGNS:  /66   Pulse 94   Temp 101.4 °F (38.6 °C) (Bladder)   Resp 19   Ht 5' 11\" (1.803 m)   Wt (!) 327 lb 2.6 oz (148.4 kg)   SpO2 91%   BMI 45.63 kg/m²   Tmax over 24 hours:  Temp (24hrs), Av °F (38.3 °C), Min:100.5 °F (38.1 °C), Max:101.5 °F (38.6 °C)      Patient Vitals for the past 6 hrs:   BP Temp Temp src Pulse Resp SpO2   20 1159 -- -- -- 94 19 91 %   20 1158 -- -- -- -- 20 91 %   20 1115 110/66 -- -- 91 19 92 %   20 1100 (!) 134/90 -- -- 94 19 92 %   20 1000 111/67 -- -- 101 20 90 %   20 0900 113/73 101.4 °F (38.6 °C) Bladder 99 19 93 %   20 0817 -- -- -- 99 19 92 %   20 0810 -- -- -- -- 23 93 %   20 0804 -- -- -- -- 19 93 %   20 0800 138/80 101.5 °F (38.6 °C) Bladder 91 19 93 %   20 0700 137/85 -- -- 99 18 94 %         Intake/Output Summary (Last 24 hours) at 2020 1211  Last data filed at 2020 0636  Gross per 24 hour   Intake 5190 ml   Output 1875 ml   Net 3315 ml     Wt Readings from Last 2 Encounters:   20 (!) 327 lb 2.6 oz (148.4 kg)   20 (!) 314 lb (142.4 kg)     Body mass index is 45.63 kg/m². PHYSICAL EXAMINATION:  General: Intubated   HEENT:  Normocephalic, atraumatic. Pulls equal and reactive no scleral icterus. No conjunctival injection. No nasal discharge. Neck:  Supple, without stridor, no JVD  Heart:  Tachycardic, regular rhythm, no murmurs, gallops, rubs  Lungs: Greatly diminished in left upper lobe, rhonchorous throughout the remainder of the lung fields   Abdomen: Obese, bowel sounds present, soft, non-tender, non-distended, no guarding or rigidity. Not tender to palpation  Extremities:  No clubbing, cyanosis,1+ edema bilaterally. Palpable radial and DP pulses b/l upper and lower extremieties  Skin:  Warm and dry,  Neuro: Following commands. Cranial nerves II through XII grossly intact.   No focal neurologic deficits  Breast: deferred  Rectal: deferred  Genitalia:  deferred     MEDICATIONS:    Scheduled Meds:   sodium chloride  20 mL Intravenous Once    famotidine  20 mg Oral BID    methylPREDNISolone  40 mg Intravenous Daily    sodium chloride  20 mL Intravenous Once    ampicillin-sulbactam  3 g Intravenous Q6H    heparin flush  1 mL Intravenous 2 times per day    insulin lispro  0-18 Units Subcutaneous Q6H    nicotine  1 patch Transdermal Daily    acetylcysteine  4 mL Inhalation BID    albuterol  2.5 mg Nebulization Q4H    Arformoterol Tartrate  15 mcg Nebulization BID    atorvastatin  80 mg Oral Nightly    [Held by provider] clopidogrel  75 mg Oral Daily    fluticasone  2 spray Nasal Daily    sodium chloride flush  10 mL Intravenous 2 times per day    [Held by provider] enoxaparin  40 mg Subcutaneous Daily    insulin glargine  0.25 Units/kg Subcutaneous Nightly    chlorhexidine  15 mL Mouth/Throat BID     Continuous Infusions:   fentaNYL 5 mcg/ml in 0.9%  ml infusion 200 mcg/hr (12/01/20 0851)    dextrose      midazolam 7 mg/hr (12/01/20 0921)     PRN Meds:   potassium chloride, 10 mEq, PRN  acetaminophen, 650 mg, Q4H PRN    Or  acetaminophen, 650 mg, Q4H PRN  sodium chloride flush, 10 mL, PRN  heparin flush, 1 mL, PRN  sodium phosphate IVPB, 0.16 mmol/kg, PRN    Or  sodium phosphate IVPB, 0.32 mmol/kg, PRN  magnesium sulfate, 1 g, PRN  tiZANidine, 4 mg, TID PRN  polyethylene glycol, 17 g, Daily PRN  promethazine, 12.5 mg, Q6H PRN    Or  ondansetron, 4 mg, Q6H PRN  glucose, 15 g, PRN  dextrose, 12.5 g, PRN  glucagon (rDNA), 1 mg, PRN  dextrose, 100 mL/hr, PRN  oxyCODONE-acetaminophen, 1 tablet, TID PRN    And  oxyCODONE, 2.5 mg, TID PRN  albuterol, 2.5 mg, Q4H PRN        VENT SETTINGS (Comprehensive) (if applicable):  Vent Information  $Ventilation: $Subsequent Day  Skin Assessment: Clean, dry, & intact  Equipment ID: [id number]  Equipment Changed: Humidification  Vent Type: [id number]  Vent Mode: AC/VC  Vt Ordered: 510 mL  Rate Set: 20 bmp  Peak Flow: 70 L/min  Pressure Support: 0 cmH20  FiO2 : (S) 60 %  SpO2: 91 %  SpO2/FiO2 ratio: 151.67  Sensitivity: 3  PEEP/CPAP: (S) 12  I Time/ I Time %: 0 s  Humidification Source: Heated wire  Humidification Temp: 37  Humidification Temp Measured: 36.8  Circuit Condensation: Drained  Mask Type: Full face mask  Mask Size: Medium  Additional Respiratory  Assessments  Pulse: 94  Resp: 19  SpO2: 91 %  Position: Semi-Jeff's  Humidification Source: Heated wire  Humidification Temp: 37  Circuit Condensation: Drained  Oral Care: Mouth suctioned, Mouth swabbed, Lip moisturizer applied  Subglottic Suction Done?: Yes  Cuff Pressure (cm H2O): 29 cm H2O    ABGs:   Recent Labs     12/01/20  0539   PH 7.485*   PCO2 50.0*   PO2 83.6   HCO3 36.8*   BE 12.3*   O2SAT 95.8       Laboratory findings:    Complete Blood Count:   Recent Labs     11/30/20  0540 12/01/20  0515 12/01/20  0942   WBC 5.6 4.6 5.0   HGB 7.3* 5.6* 5.7*   HCT 25.2* 19.2* 19.3*   PLT 28* 20* 25*        Last 3 Blood Glucose:   Recent Labs     11/29/20  0530 11/30/20  0540 12/01/20  0515   GLUCOSE 233* 185* 176*        PT/INR:    Lab Results   Component Value Date    PROTIME 11.6 11/26/2020    PROTIME 11.1 12/30/2011    INR 1.0 11/26/2020     PTT:    Lab Results   Component Value Date    APTT 29.0 12/28/2011       Comprehensive Metabolic Profile:   Recent Labs     11/29/20  0530 11/29/20  1745 11/30/20  0540 11/30/20  0650 12/01/20  0515   * 145 143  --  148*   K 3.5  --  5.5* 3.8 3.6     --  101  --  104   CO2 39*  --  35*  --  36*   BUN 89*  --  90*  --  90*   CREATININE 1.3*  --  1.4*  --  1.7*   GLUCOSE 233*  --  185*  --  176*   CALCIUM 8.6  --  8.3*  --  8.5*   PROT 5.3*  --  5.2*  --  4.9*   LABALBU 3.0*  --  2.3*  --  3.0*   BILITOT 0.7  --  0.5  --  0.7   ALKPHOS 138*  --  134*  --  133*   AST 45*  --  111*  --  80*   ALT 34  --  51*  --  44*      Magnesium:   Lab Results   Component Value Date    MG 2.6 12/01/2020     Phosphorus:   Lab hold in case he needs further procedures and thrombocytopenia     History of aortic aneurysm              -Status post endovascular repair              -Stable on most recent CT    Echo limited 2/2 patient body habitus.     Gastrointestinal   Tube feeding   PPI held from nephro and started on carafate, most likely 2/2 platelets? --Pepcid restarted     Renal     Hyperkalemia- resolved  hypophos- replace    Anasarca   -nephro following and recommends SPA bid with diuril   -Diuril and albumin stopped 12/1    Hypernatremia- resolved    ISIDRO   -nephro following and recs appreciated   -most likely 2/2 diuresis     Infectious Disease     Community-acquired pneumonia   -recultured              -respiratory cx growing candida, light growth   -switched to unasyn         Blood cultures gram positive cocci   -coag neg staph. Most likely contaminant. Stop vanco.    Febrile   -continues with fevers despite abx   -repeat blood cx pending   -2/2 malignancy?     Hematology/Oncology   Thrombocytopenia              - received platelets x1              -heme/onc following   -most llikely 2/2 neoplasm, drugs and infection   -Transfuse for Hgb <7, platelets <07    Small cell lung cancer   -biopsy results   -heme/onc and pulmonology for further work up and recommendations   -not inpatient emergent chemo candidate   -rad/onc consult, MRI brain when stable    DVT   -left axillary vein   -platelets 28, no anticoagulation at this time until platelets rise    Anemia   -Hemoglobin 5.6 12/1-given 1 unit of blood     Endocrine   Diabetes              -Noninsulin-dependent              -Monitor sugars   -high-dose sliding scale    msk   Chronic back pain              -Status post epidural on 11/17              -No erythematous region or fluctuance              -CT with no evidence of epidural hematoma     Social/Spiritual/DNR/Other   Full code    ------------------------------------------------------------------------------------------  1.  Failed pressure support trial again, awaiting family decision about trach  2. Anemia-we will check fecal occult, given 1 unit of blood 12/1  3. Continues with fevers, continue Unasyn  4. EPCs, no anticoagulation because of thrombocytopenia  5. Albumin and diuril discontinued  6. Dr. Thad Philip discussed with family, they wish to proceed with trach and PEG, consulted surgery    Dispo: continue ICU level of care     Hubert Alvares MD PGY-1  12/1/2020 12:17 PM    Addendum:    I personally saw, examined and provided care for the patient. Radiographs, labs and medication list were reviewed by me independently. I spoke with bedside nursing, therapists and consultants. Critical care services and times documented are independent of procedures and multidisciplinary rounds with Residents. Additionally comprehensive, multidisciplinary rounds were conducted with the MICU team. The case was discussed in detail and plans for care were established. I talked extensively to patients wife. She wishes him to be a full code and wants to proceed with a trach and PEG and also on board for him to get XRT. Appreciate radiation oncology and Heme/onc input. Patient needs to be transferred to [facility] in order to be able to get palliative radiation. Unfortunately he  could not get MRI of the brain due to his body habitus . Surgery has been consulted for trach and PEG . Attempt to wean vent settings to at least FiO2 of <50% and PEEP <10 in order to be candidate for trach. Review of Residents documentation was conducted and revisions were made as appropriate. I agree with the above documented exam, problem list and plan of care.     Robert Haynes MD   CCT excluding procedures:45'

## 2020-12-01 NOTE — ANESTHESIA PRE PROCEDURE
procedure 7/6/20  Yes Rebeca Mcdonough PA-C   atorvastatin (LIPITOR) 80 MG tablet TAKE 1 TABLET BY MOUTH AT BEDTIME 10/22/18  Yes Rebeca Mcdonough PA-C   carvedilol (COREG) 25 MG tablet TAKE ONE TABLET BY MOUTH TWICE DAILY (WITH MEALS) 10/22/18  Yes Rebeca Mcdonough PA-C   sildenafil (VIAGRA) 50 MG tablet Take 1 tablet by mouth as needed for Erectile Dysfunction 7/6/20   Rebeca Mcdonough PA-C   aspirin 325 MG EC tablet TAKE 1 TABLET BY MOUTH EVERY DAY  Patient taking differently: Take 325 mg by mouth daily Has been on hold for procedure 5/7/20   Shaheed De Leon DO   Tens Unit MISC by Does not apply route 5/7/19   TRUDY Healy       Current medications:    Current Facility-Administered Medications   Medication Dose Route Frequency Provider Last Rate Last Dose    famotidine (PEPCID) tablet 20 mg  20 mg Oral BID Maury Alicea MD   20 mg at 12/01/20 0956    methylPREDNISolone sodium (SOLU-MEDROL) injection 40 mg  40 mg Intravenous Daily Marie Hdz MD   40 mg at 12/01/20 0956    potassium chloride 10 mEq/100 mL IVPB (Peripheral Line)  10 mEq Intravenous PRN Marie Hdz  mL/hr at 11/29/20 1149 10 mEq at 11/29/20 1149    0.9 % sodium chloride bolus  20 mL Intravenous Once Ev Marion MD        ampicillin-sulbactam (UNASYN) 3 g ivpb minibag  3 g Intravenous Q6H Jesus Meza  mL/hr at 12/01/20 1258 3 g at 12/01/20 1258    acetaminophen (TYLENOL) tablet 650 mg  650 mg Oral Q4H PRN Aurora Rowe MD   650 mg at 12/01/20 0957    Or    acetaminophen (TYLENOL) suppository 650 mg  650 mg Rectal Q4H PRN Aurora Rowe MD   650 mg at 11/27/20 0430    fentaNYL 5 mcg/ml in 0.9%  ml infusion  200 mcg/hr Intravenous Continuous Marie Hdz MD 40 mL/hr at 12/01/20 0851 200 mcg/hr at 12/01/20 0851    sodium chloride flush 0.9 % injection 10 mL  10 mL Intravenous PRN Marie Hdz MD   10 mL at 11/26/20 0838    heparin flush 100 UNIT/ML injection 100 Units  1 mL Intravenous 2 times per day Yazmin Alex MD   Stopped at 12/01/20 0752    heparin flush 100 UNIT/ML injection 100 Units  1 mL Intracatheter PRN Yazmin Alex MD        sodium phosphate 24.3 mmol in dextrose 5 % 250 mL IVPB  0.16 mmol/kg Intravenous PRN Yazmin Alex MD        Or   Julien Rudder sodium phosphate 48.57 mmol in dextrose 5 % 250 mL IVPB  0.32 mmol/kg Intravenous PRN Yazmin Alex MD        magnesium sulfate 1 g in dextrose 5% 100 mL IVPB  1 g Intravenous PRN Yazmin Alex MD        insulin lispro (HUMALOG) injection vial 0-18 Units  0-18 Units Subcutaneous Q6H Moises Nix MD   3 Units at 12/01/20 1126    nicotine (NICODERM CQ) 21 MG/24HR 1 patch  1 patch Transdermal Daily Moises Nix MD   1 patch at 12/01/20 0957    acetylcysteine (MUCOMYST) 10 % solution 400 mg  4 mL Inhalation BID Moises Nix MD   400 mg at 12/01/20 1156    albuterol (PROVENTIL) nebulizer solution 2.5 mg  2.5 mg Nebulization Q4H Moises Nix MD   2.5 mg at 12/01/20 1157    Arformoterol Tartrate (BROVANA) nebulizer solution 15 mcg  15 mcg Nebulization BID Jose Galeano MD   15 mcg at 12/01/20 0803    atorvastatin (LIPITOR) tablet 80 mg  80 mg Oral Nightly Simon R Lockso, DO   80 mg at 11/30/20 2026    [Held by provider] clopidogrel (PLAVIX) tablet 75 mg  75 mg Oral Daily Simon R Lockso, DO   Stopped at 11/23/20 0900    fluticasone (FLONASE) 50 MCG/ACT nasal spray 2 spray  2 spray Nasal Daily Simon R Lockso, DO   2 spray at 12/01/20 1001    tiZANidine (ZANAFLEX) tablet 4 mg  4 mg Oral TID PRN Johnita Seat Lockso, DO   4 mg at 11/18/20 0202    sodium chloride flush 0.9 % injection 10 mL  10 mL Intravenous 2 times per day Johnita Seat Lockso, DO   10 mL at 12/01/20 1001    polyethylene glycol (GLYCOLAX) packet 17 g  17 g Oral Daily PRN Johnita Seat Lockso, DO        promethazine (PHENERGAN) tablet 12.5 mg  12.5 mg Oral Q6H PRN Simon Alexander DO        Or    ondansetron (ZOFRAN) injection 4 mg  4 mg Intravenous Q6H PRN Rosalynd Stain, DO        [Held by provider] enoxaparin (LOVENOX) injection 40 mg  40 mg Subcutaneous Daily Simon R Lockso, DO   Stopped at 11/24/20 0900    insulin glargine (LANTUS) injection vial 36 Units  0.25 Units/kg Subcutaneous Nightly Terra Lard Lockso, DO   36 Units at 11/30/20 2027    glucose (GLUTOSE) 40 % oral gel 15 g  15 g Oral PRN Simon R Lockso, DO        dextrose 50 % IV solution  12.5 g Intravenous PRN Simon R Lockso, DO        glucagon (rDNA) injection 1 mg  1 mg Intramuscular PRN Simon R Lockso, DO        dextrose 5 % solution  100 mL/hr Intravenous PRN Terra Lard Lockso, DO        oxyCODONE-acetaminophen (PERCOCET) 5-325 MG per tablet 1 tablet  1 tablet Oral TID PRN Rosalynd Stain, DO   1 tablet at 11/30/20 1215    And    oxyCODONE (ROXICODONE) immediate release tablet 2.5 mg  2.5 mg Oral TID PRN Terra Lard Lockso, DO   2.5 mg at 11/30/20 1215    albuterol (PROVENTIL) nebulizer solution 2.5 mg  2.5 mg Nebulization Q4H PRN Terra Lard Lockso, DO   2.5 mg at 11/18/20 0646    chlorhexidine (PERIDEX) 0.12 % solution 15 mL  15 mL Mouth/Throat BID Palmyramarva Galicia MD   15 mL at 12/01/20 0956    midazolam (VERSED) 1 mg/mL in D5W infusion  2 mg/hr Intravenous Continuous Palmyraemre Galicia MD 7 mL/hr at 12/01/20 0921 7 mg/hr at 12/01/20 8917       Allergies:     Allergies   Allergen Reactions    Bee Venom Anaphylaxis       Problem List:    Patient Active Problem List   Diagnosis Code    CAD (coronary artery disease) I25.10    HTN (hypertension) I10    Hyperlipemia E78.5    Presence of stent in left circumflex coronary artery Z95.5    Smoker F17.200    Aortic valve stenosis I35.0    Primary osteoarthritis of right knee M17.11    Non-insulin dependent type 2 diabetes mellitus (HCC) E11.9    Chronic pain syndrome G89.4    Chronic pain of right knee M25.561, G89.29    Status post total right knee replacement Z96.651    Morbid obesity with BMI of 40.0-44.9, adult ([facility] [address] E66.01, Z68.41    Chronic bilateral low back pain with bilateral sciatica M54.42, M54.41, G89.29    Right hip pain M25.551    DDD (degenerative disc disease), lumbar M51.36    AAA (abdominal aortic aneurysm) (HCC) I71.4    Lumbar radiculopathy M54.16    Lumbosacral spondylosis without myelopathy M47.817    Obesity E66.9    Chronic back pain M54.9, G89.29    Spinal stenosis of lumbar region with neurogenic claudication M48.062    Acute respiratory failure with hypoxia ([facility]) J96.01    Community acquired pneumonia of left upper lobe of lung J18.9    Thrombocytopenia (HCC) D69.6    Small cell lung cancer (HCC) C34.90       Past Medical History:        Diagnosis Date    Anticoagulant long-term use     Arthritis     CAD (coronary artery disease)     Chronic back pain     Depression     Dyslipidemia     Hiatal hernia 1979    History of ST elevation myocardial infarction (STEMI)     Hyperlipidemia     Hypertension     Low back pain     Lumbar radiculopathy 8/14/2019    Obesity     MANOLO on CPAP     Presence of stent in left circumflex coronary artery     Presence of stent in right coronary artery     Smoker     Type 2 diabetes mellitus without complication ([facility])        Past Surgical History:        Procedure Laterality Date    ABDOMINAL AORTIC ANEURYSM REPAIR, ENDOVASCULAR N/A 6/28/2019    ENDOVASCULAR REPAIR ABDOMINAL AORTIC ANEURYSM performed by Gabino Benedict MD at [address] [place] [address] Bilateral 3/12/2020    BILATERAL L3,L4,L5 MEDIAL BRANCH NERVE BLOCK performed by Lauren Mills DO at [address] [place] [address] Bilateral 6/11/2020    BILATERAL L3,L4,L5 MEDIAL NERVE BRANCH BLOCK #2 performed by Lauren Mills DO at [address] [place] [address] N/A 11/18/2020    BRONCHOSCOPY/TRANSBRONCHIAL NEEDLE BIOPSY performed by Maximilian Cha MD at [place] [address] 12/30/2011    Dr. Jyoti Rizvi 1st OM 3.0 x 20 Ion    DIAGNOSTIC CARDIAC CATH LAB PROCEDURE  3/15/2005    [facility]. Mild to moderate CAD. Normal LV.  DIAGNOSTIC CARDIAC CATH LAB PROCEDURE  1/16/2007    [facility]. Cath/PTCA/DE stent of proximal and distal RCA.  DIAGNOSTIC CARDIAC CATH LAB PROCEDURE  2/21/2007    Cath/DE stent of proximal OM1 and proximal Cx.  DIAGNOSTIC CARDIAC CATH LAB PROCEDURE  12/30/2011    ANURADHA of mid OM branch of circumflex.  ECHO COMPL W DOP COLOR FLOW  12/30/2011         HC INSERT PICC CATH, 5/> YRS - MIDLINE  11/23/2020         HERNIA REPAIR  2/2014    laparoscopic ventral hernia repain    JOINT REPLACEMENT Left 4/1/14    TKA-ed    JOINT REPLACEMENT Right 2018    KNEE    PAIN MANAGEMENT PROCEDURE Right 9/1/2020    RIGHT L3,4,5 MEDIAL BRANCH RADIOFREQUENCY ABLATION performed by Ken Lewis DO at [address] [facility] N/A 11/17/2020    L4-5 EPIDURAL STEROID INJECTION #1 performed by Ken Lewis DO at [facility] OFFICE/OUTPT VISIT,PROCEDURE ONLY Right 11/26/2018    RIGHT KNEE TOTAL ARTHROPLASTY performed by Gianni Xavier DO at [facility] History:    Social History     Tobacco Use    Smoking status: Current Every Day Smoker     Packs/day: 1.00     Years: 39.00     Pack years: 39.00     Types: Cigarettes     Start date: 11/17/1978    Smokeless tobacco: Never Used   Substance Use Topics    Alcohol use:  No                                Ready to quit: Not Answered  Counseling given: Not Answered      Vital Signs (Current):   Vitals:    12/01/20 1158 12/01/20 1159 12/01/20 1200 12/01/20 1300   BP:   110/66 112/71   Pulse:  94 93 105   Resp: 20 19 20 20   Temp:       TempSrc:       SpO2: 91% 91% 91% 91%   Weight:       Height:                                                  BP Readings from Last 3 Encounters:   12/01/20 112/71   11/17/20 (!) 157/79   10/29/20 (!) 148/100       NPO Status:                           Instructed patients RN NPO 0000 DOS                                                      BMI:   Wt Readings from Last 3 Encounters:   11/30/20 (!) 327 lb 2.6 oz (148.4 kg)   11/17/20 (!) 314 lb (142.4 kg)   10/23/20 (!) 314 lb (142.4 kg)     Body mass index is 45.63 kg/m². CBC:   Lab Results   Component Value Date    WBC 5.0 12/01/2020    RBC 2.06 12/01/2020    HGB 5.7 12/01/2020    HCT 19.3 12/01/2020    MCV 93.7 12/01/2020    RDW 16.8 12/01/2020    PLT 25 12/01/2020       CMP:   Lab Results   Component Value Date     12/01/2020    K 3.6 12/01/2020    K 5.1 11/17/2020     12/01/2020    CO2 36 12/01/2020    BUN 90 12/01/2020    CREATININE 1.7 12/01/2020    GFRAA 50 12/01/2020    LABGLOM 41 12/01/2020    GLUCOSE 176 12/01/2020    GLUCOSE 120 12/31/2011    PROT 4.9 12/01/2020    CALCIUM 8.5 12/01/2020    BILITOT 0.7 12/01/2020    ALKPHOS 133 12/01/2020    AST 80 12/01/2020    ALT 44 12/01/2020       POC Tests: No results for input(s): POCGLU, POCNA, POCK, POCCL, POCBUN, POCHEMO, POCHCT in the last 72 hours. Coags:   Lab Results   Component Value Date    PROTIME 11.6 11/26/2020    PROTIME 11.1 12/30/2011    INR 1.0 11/26/2020    APTT 29.0 12/28/2011       HCG (If Applicable): No results found for: PREGTESTUR, PREGSERUM, HCG, HCGQUANT     ABGs:   Lab Results   Component Value Date    JIU3VJK 36.0 11/29/2020        Type & Screen (If Applicable):  Lab Results   Component Value Date    LABABO O 12/30/2011    [address] POS 12/30/2011       Drug/Infectious Status (If Applicable):  No results found for: HIV, HEPCAB    COVID-19 Screening (If Applicable):   Lab Results   Component Value Date    COVID19 Not Detected 11/17/2020     EKG 11/17/20  Narrative & Impression     Normal sinus rhythm  Nonspecific T wave abnormality  Abnormal ECG  No previous ECGs available  Confirmed by Ravi Lee ([id number]) on 11/18/2020 7:08:24 AM     ECHO 6/18/19   Findings      Left Ventricle   Ejection fraction is visually estimated at 55%.  No regional wall motion   abnormalities seen. Normal left ventricular wall thickness. Left ventricle   size is normal. Normal left ventricular diastolic filling pattern for age. Right Ventricle   Normal right ventricle structure and function. Left Atrium   Left atrial volume index of 31 ml per meters squared BSA. Right Atrium   Normal right atrium. Mitral Valve   Structurally normal mitral valve. No evidence of mitral valve stenosis. Physiologic and/or trace mitral regurgitation is present. Tricuspid Valve   The tricuspid valve appears structurally normal.   Physiologic and/or trace tricuspid regurgitation. Aortic Valve   The aortic valve is trileaflet. No hemodynamically significant aortic   stenosis is present. No evidence of aortic valve regurgitation. Pulmonic Valve   Pulmonic valve is structurally normal.      Pericardial Effusion   No evidence for hemodynamically significant pericardial effusion. Aorta   Normal aortic root and ascending aorta. Miscellaneous   The inferior vena cava diameter is normal with normal respiratory   variation. Conclusions      Summary   Ejection fraction is visually estimated at 55%. No regional wall motion abnormalities seen. Normal right ventricle structure and function. Physiologic and/or trace mitral regurgitation is present.       Signature      ----------------------------------------------------------------   Electronically signed by Joshua Coleman DO(Interpreting   physician) on 06/19/2019 04:42 PM   ----------------------------------------------------------------      Anesthesia Evaluation  Patient summary reviewed and Nursing notes reviewed no history of anesthetic complications:   Airway: Mallampati: Unable to assess / NA       Comment: 8.0 ETT  [facility] 20/510/60/12     Dental:          Pulmonary:   (+) pneumonia: unresolved,  sleep apnea: on CPAP,  decreased breath sounds,  current smoker                          ROS comment: Acute hypoxic hypercapnic respiratory secondary to community-acquired pneumonia, on mechanical ventilation   Cardiovascular:    (+) hypertension:, valvular problems/murmurs: AS, past MI:, CAD:, CABG/stent:, hyperlipidemia      ECG reviewed  Rhythm: regular  Rate: normal  Echocardiogram reviewed         Beta Blocker:  Order written         Neuro/Psych:   (+) neuromuscular disease:, psychiatric history:depression/anxiety              ROS comment: Chronic pain syndrome  Chronic bilateral low back pain with bilateral sciatica   DDD (degenerative disc disease), lumbar   Lumbar radiculopathy   Lumbosacral spondylosis without myelopathy      GI/Hepatic/Renal:   (+) hiatal hernia, renal disease: ARF, morbid obesity          Endo/Other:    (+) DiabetesType II DM, using insulin, blood dyscrasia: thrombocytopenia, anticoagulation therapy and anemia, arthritis: OA., malignancy/cancer (Small cell lung cancer ). Abdominal:   (+) obese,         Vascular:           ROS comment: AAA (abdominal aortic aneurysm) ([facility] [address] . Anesthesia Plan      general     ASA 4     (Fentanyl gtt 200mcg/hr  Versed gtt 7mg/hr)  Induction: intravenous. BIS  MIPS: Prophylactic antiemetics administered. Anesthetic plan and risks discussed with patient. Use of blood products discussed with patient whom consented to blood products. Plan discussed with CRNA and attending.                 Karla Andrews RN   12/1/2020

## 2020-12-01 NOTE — CONSULTS
effusion. 5.  Mediastinal lymphadenopathy. 6.  Stable fusiform infrarenal abdominal aortic aneurysm with endograft    repair.  Aneurysm measures up to 6.4 cm.  No evidence of endoleak or    retroperitoneal fluid. 7.  Diverticulosis. CT abdomen pelvis showed aortic aneurysm with endograft repair measuring 6.4 cm. No evidence of endoleak. 11/18/2020Specimen Source: Yadira Pinks, [facility], LEFT UPPER LOBE     Clinical Data:  respiratory faillure, r/o mass             ADEQUACY STATEMENT:   Specimen is satisfactory for interpretation     DIAGNOSIS   POSITIVE FOR MALIGNANT CELLS     Carcinoma, most compatible with small cell carcinoma, see comment. Monolayer shows atypical crushed cells, and many lymphocytes/histiocytes   with anthracotic pigment. Cell block shows abundant crushed malignant cells.      COMMENT:   Immunostains stain the malignant cells as follows:   Pankeratin and TTF1:  Positive   Chromogranin:  Positive weakly   Synaptophysin:  Negative   CK7:  Negative   This immunoprofile is most compatible with small cell carcinoma    Past Medical History:      Diagnosis Date    Anticoagulant long-term use     Arthritis     CAD (coronary artery disease)     Chronic back pain     Depression     Dyslipidemia     Hiatal hernia 1979    History of ST elevation myocardial infarction (STEMI)     Hyperlipidemia     Hypertension     Low back pain     Lumbar radiculopathy 8/14/2019    Obesity     MANOLO on CPAP     Presence of stent in left circumflex coronary artery     Presence of stent in right coronary artery     Smoker     Type 2 diabetes mellitus without complication ([facility] [address]        Past Surgical History:      Procedure Laterality Date    ABDOMINAL AORTIC ANEURYSM REPAIR, ENDOVASCULAR N/A 6/28/2019    ENDOVASCULAR REPAIR ABDOMINAL AORTIC ANEURYSM performed by Carson Elias MD at [address] [place] [address],[address] Bilateral 3/12/2020    BILATERAL L3,L4,L5 MEDIAL BRANCH NERVE BLOCK performed 20 mg at 12/01/20 0956    methylPREDNISolone sodium (SOLU-MEDROL) injection 40 mg  40 mg Intravenous Daily Nemo Constantino MD   40 mg at 12/01/20 0956    potassium chloride 10 mEq/100 mL IVPB (Peripheral Line)  10 mEq Intravenous PRN Nemo Constantino  mL/hr at 11/29/20 1149 10 mEq at 11/29/20 1149    0.9 % sodium chloride bolus  20 mL Intravenous Once Mario Charles MD        ampicillin-sulbactam (UNASYN) 3 g ivpb minibag  3 g Intravenous Q6H Jasmin Ramirez  mL/hr at 12/01/20 1258 3 g at 12/01/20 1258    acetaminophen (TYLENOL) tablet 650 mg  650 mg Oral Q4H PRN Pieter Majano MD   650 mg at 12/01/20 0957    Or    acetaminophen (TYLENOL) suppository 650 mg  650 mg Rectal Q4H PRN Pieter Majano MD   650 mg at 11/27/20 0430    fentaNYL 5 mcg/ml in 0.9%  ml infusion  200 mcg/hr Intravenous Continuous Nemo Constantino MD 40 mL/hr at 12/01/20 0851 200 mcg/hr at 12/01/20 0851    sodium chloride flush 0.9 % injection 10 mL  10 mL Intravenous PRN Nemo Constantino MD   10 mL at 11/26/20 0838    heparin flush 100 UNIT/ML injection 100 Units  1 mL Intravenous 2 times per day Nemo Constantino MD   Stopped at 12/01/20 0752    heparin flush 100 UNIT/ML injection 100 Units  1 mL Intracatheter PRN Nemo Constantino MD        sodium phosphate 24.3 mmol in dextrose 5 % 250 mL IVPB  0.16 mmol/kg Intravenous PRN Nemo Constantino MD        Or   [facility] sodium phosphate 48.57 mmol in dextrose 5 % 250 mL IVPB  0.32 mmol/kg Intravenous PRN Nemo Constantino MD        magnesium sulfate 1 g in dextrose 5% 100 mL IVPB  1 g Intravenous PRN Nemo Constantino MD        insulin lispro (HUMALOG) injection vial 0-18 Units  0-18 Units Subcutaneous Q6H Pieter Majano MD   3 Units at 12/01/20 1126    nicotine (NICODERM CQ) 21 MG/24HR 1 patch  1 patch Transdermal Daily Pieter Majano MD   1 patch at 12/01/20 0957    acetylcysteine (MUCOMYST) 10 % solution 400 mg  4 mL Inhalation BID Pieter Majano MD 400 mg at 12/01/20 1156    albuterol (PROVENTIL) nebulizer solution 2.5 mg  2.5 mg Nebulization Q4H Ricco Mcclain MD   2.5 mg at 12/01/20 1157    Arformoterol Tartrate (BROVANA) nebulizer solution 15 mcg  15 mcg Nebulization BID Naomy Pollack MD   15 mcg at 12/01/20 0803    atorvastatin (LIPITOR) tablet 80 mg  80 mg Oral Nightly [place] citizen Kerrick Lockso, DO   80 mg at 11/30/20 2026    [Held by provider] clopidogrel (PLAVIX) tablet 75 mg  75 mg Oral Daily Simon R Lockso, DO   Stopped at 11/23/20 0900    fluticasone (FLONASE) 50 MCG/ACT nasal spray 2 spray  2 spray Nasal Daily Simon R Lockso, DO   2 spray at 12/01/20 1001    tiZANidine (ZANAFLEX) tablet 4 mg  4 mg Oral TID PRN [place] Sic, DO   4 mg at 11/18/20 0202    sodium chloride flush 0.9 % injection 10 mL  10 mL Intravenous 2 times per day [place] Sic, DO   10 mL at 12/01/20 1001    polyethylene glycol (GLYCOLAX) packet 17 g  17 g Oral Daily PRN [place] citizen Kerrick Lockso, DO        promethazine (PHENERGAN) tablet 12.5 mg  12.5 mg Oral Q6H PRN Simon R Lockso, DO        Or    ondansetron (ZOFRAN) injection 4 mg  4 mg Intravenous Q6H PRN Simon R Lockso, DO        [Held by provider] enoxaparin (LOVENOX) injection 40 mg  40 mg Subcutaneous Daily Simon R Lockso, DO   Stopped at 11/24/20 0900    insulin glargine (LANTUS) injection vial 36 Units  0.25 Units/kg Subcutaneous Nightly [place] citizen Mariangel Lockso, DO   36 Units at 11/30/20 2027    glucose (GLUTOSE) 40 % oral gel 15 g  15 g Oral PRN Simon R Lockso, DO        dextrose 50 % IV solution  12.5 g Intravenous PRN Simon R Lockso, DO        glucagon (rDNA) injection 1 mg  1 mg Intramuscular PRN Simon R Lockso, DO        dextrose 5 % solution  100 mL/hr Intravenous PRN [place] citizen Kerrick Lockso, DO        oxyCODONE-acetaminophen (PERCOCET) 5-325 MG per tablet 1 tablet  1 tablet Oral TID PRN Linda Garber DO   1 tablet at 11/30/20 1215    And    oxyCODONE (ROXICODONE) immediate release tablet 2.5 mg 2.5 mg Oral TID PRN [facility] Lockso, DO   2.5 mg at 11/30/20 1215    albuterol (PROVENTIL) nebulizer solution 2.5 mg  2.5 mg Nebulization Q4H PRN [facility] Lockso, DO   2.5 mg at 11/18/20 0646    chlorhexidine (PERIDEX) 0.12 % solution 15 mL  15 mL Mouth/Throat BID Maximilian Cha MD   15 mL at 12/01/20 6617    midazolam (VERSED) 1 mg/mL in D5W infusion  2 mg/hr Intravenous Continuous Maximilian Cha MD 7 mL/hr at 12/01/20 8747 7 mg/hr at 12/01/20 6184       Family History:  Family History   Problem Relation Age of Onset    Diabetes Mother     Stroke Mother     Diabetes Father     No Known Problems Sister        Social History:       reports that he has been smoking cigarettes. He started smoking about 42 years ago. He has a 39.00 pack-year smoking history. He has never used smokeless tobacco..   reports no history of alcohol use. .   reports no history of drug use. Review of Systems:  Obtained from the patient, chart review and nursing assessment. (Not evaluable)     Constitutional:  No fever, chills or night sweats. Denies recent weight loss.  Eyes:  No blurred or changes in vision. Denies discharge or pain.  ENT:  No headaches, hearing loss or vertigo. No mouth sores or sore throat. No change in taste or smell.  Cardiovascular:  No chest discomfort, dyspnea on exertion or palpitations.  Respiratory: Has no cough or wheezing. Has no sputum production or hemoptysis. Has no pleuritic pain.  Gastrointestinal: No abdominal pain, appetite loss or nausea. No change in bowel habits. No hematochezia or melena.  Genitourinary: Patient acknowledges no dysuria, trouble voiding, urgency or hematuria. No nocturia or increased frequency.  Musculoskeletal: No gait disturbance, weakness or joint complaints.  Integumentary: No rash or pruritis.  Neurological: No headache, diplopia, syncope, change in muscle strength, numbness or tingling.  No change in gait, balance, coordination, mood, affect, memory, mentation, behavior.  Psychiatric: No anxiety, or depression.  Endocrine: No temperature intolerance. No excessive thirst, fluid intake, or urination. No tremor.  Hematologic/Lymphatic: No abnormal bruising or bleeding, blood clots or swollen lymph nodes.  Allergic/Immunologic: No nasal congestion or hives. Physical examination:   Vitals:    12/01/20 1115 12/01/20 1158 12/01/20 1159 12/01/20 1200   BP: 110/66   110/66   Pulse: 91  94 93   Resp: 19 20 19 19   Temp:       TempSrc:       SpO2: 92% 91% 91% 91%   Weight:       Height:          ECOG Performance Status: 4    Constitutional: Intubated unresponsive    Respiratory: Ventilated respiratory sounds bilaterally, with rhonchi    Cardiovascular:.  S1-S2 RRR    Abdomen: Obese    Musculoskeletal:Extremities: .  2+ bipedal edema        Radiation safety and oncologic treatment support:    - Previous radiation history:  No  - History of autoimmune or connective tissue disease:  No  - Nutritional support/ PEG:  Not applicable  - Dental evaluation:  Not applicable  -  requested:  Not asked for.  - Oncology Nurse navigator requested:  - Transportation for daily treatment:  Self    Other Investigations/Laboratory Data:    Lab Results   Component Value Date    WBC 5.0 12/01/2020    HGB 5.7 (L) 12/01/2020    HCT 19.3 (L) 12/01/2020    MCV 93.7 12/01/2020    PLT 25 (L) 12/01/2020       Lab Results   Component Value Date     12/01/2020    K 3.6 12/01/2020    K 5.1 11/17/2020     12/01/2020    CO2 36 12/01/2020    BUN 90 12/01/2020    CREATININE 1.7 12/01/2020    GLUCOSE 176 12/01/2020    GLUCOSE 120 12/31/2011    CALCIUM 8.5 12/01/2020       Tumor markers: No results found for: PSA, CEA, , [id number],     Impression: Small cell lung cancer left upper lung lung with postobstructive pneumonia. Discussion/Plan:   Patient has acute respiratory compromise and has been on the ventilator for several days.   It is my understanding patient is being considered for tracheostomy. Further staging with MRI brain also pending. He is not a chemo candidate, at least at present. However, palliative radiation therapy to the left hilar area can be offered, but only at [facility], where he could be transported down to the radiation department. It appears he has extensive stage disease, with poor prognosis, and the role of radiation is palliation. Goal is to improve his performance status and allow him to be weaned off the ventilator. 4-5 treatments, to stabilize him. Tumor is generally sensitive to RT. Case discussed with Dr. Nargis Gerard.  A decision would be forthcoming. NCCN guidelines, version 2020 is used to help review treatment recommendations. Procedures and process involved with CT simulation and daily radiation treatments were explained. Toxicities, both expected and less common, were reviewed in detail. Questions were answered to their apparent satisfaction. Thank you,  for allowing us to participate in the care of your patient. Nader Dawn MD  [address] [place] [address]:  [phone]              FAX: [phone]    [place]:      [phone]             FAX:  [phone]      CC:  Douglas Falcon PA-C , No referring provider defined for this encounter.

## 2020-12-01 NOTE — PROGRESS NOTES
[facility]  Hematology/Oncology  Consult      Patient Name: Fide Whitten. YOB: 1960  PCP: Romana Dunning, PA-C   Referring Provider:      Reason for Consultation:   Chief Complaint   Patient presents with    Shortness of Breath     patient sent from surgery after having epideral injection L4-L5. SOB has been for past two weeks     Subjective: Remains intubated and in ICU. Arouses with touch and sound. History of Present Illness: This is a 62 yo male patient with a past medical history of CAD, DM type 2, morbid obesity, chronic back pain, HTN, AAA s/p repair who set to the emergency room by Dr. Earnestine Hummel for complaints of shortness of breath and decreased O2 during an epidural procedure. Patient was having an L4/L5 epidural for pain management done by Dr. Earnestine Hummel when his O2 dropped into the 80's during the procedure. Patient also reported having worsening shortness of breath over the past two weeks with a productive cough. Patient is basically bed bound only taking occasional steps. CTA of the chest, A/P showed new confluent opacities located in left upper lobe suggesting pneumonia, atelectasis, or neoplasm. Patchy airspace opacities located in lingula suggesting pneumonia with areas of atelectasis. Stenosis and occlusion are associated with left upper lobe segmental bronchi and lingular segmental bronchi. Moderate to large left pleural effusion. Mediastinal lymphadenopathy. Stable fusiform infrarenal abdominal aortic aneurysm with endograft repair. Aneurysm measures up to 6.4 cm. No evidence of endoleak or retroperitoneal fluid. He was given 1 dose of Rocephin and azithromycin while in the ER. CBC since admission has shown anemia and thrombocytopenia. 11/18 an RRT was called and was subsequently intubated. He also had a bronchoscopy done and bxs of left upper lobe were sent.        Diagnostic Data:     Past Medical History:   Diagnosis Date    Anticoagulant long-term use     Arthritis     CAD (coronary artery disease)     Chronic back pain     Depression     Dyslipidemia     Hiatal hernia 1979    History of ST elevation myocardial infarction (STEMI)     Hyperlipidemia     Hypertension     Low back pain     Lumbar radiculopathy 8/14/2019    Obesity     MANOLO on CPAP     Presence of stent in left circumflex coronary artery     Presence of stent in right coronary artery     Smoker     Type 2 diabetes mellitus without complication [facility])        Patient Active Problem List    Diagnosis Date Noted    Acute respiratory failure with hypoxia ([address] 11/17/2020    Community acquired pneumonia of left upper lobe of lung 11/17/2020    Thrombocytopenia ([address] 11/17/2020    Spinal stenosis of lumbar region with neurogenic claudication 10/29/2020    Obesity     Chronic back pain     Lumbosacral spondylosis without myelopathy 12/11/2019    Lumbar radiculopathy 08/14/2019    AAA (abdominal aortic aneurysm) ([address] 06/28/2019    Chronic bilateral low back pain with bilateral sciatica 06/11/2019    Right hip pain 06/11/2019    DDD (degenerative disc disease), lumbar 06/11/2019    Status post total right knee replacement 11/26/2018    Morbid obesity with BMI of 40.0-44.9, adult ([address] 11/26/2018    Chronic pain syndrome 07/27/2018    Chronic pain of right knee 07/27/2018    Non-insulin dependent type 2 diabetes mellitus ([address] 03/16/2018    Aortic valve stenosis 02/15/2018    Primary osteoarthritis of right knee 02/15/2018    Presence of stent in left circumflex coronary artery     Smoker     CAD (coronary artery disease) 12/28/2011    HTN (hypertension) 12/28/2011    Hyperlipemia 12/28/2011        Past Surgical History:   Procedure Laterality Date    ABDOMINAL AORTIC ANEURYSM REPAIR, ENDOVASCULAR N/A 6/28/2019    ENDOVASCULAR REPAIR ABDOMINAL AORTIC ANEURYSM performed by Doug Sanford MD at [address] [place] [address],[address] Bilateral 3/12/2020    BILATERAL L3,L4,L5 MEDIAL BRANCH NERVE BLOCK performed by Andrew Lynn DO at [address] Bilateral 6/11/2020    BILATERAL L3,L4,L5 MEDIAL NERVE BRANCH BLOCK #2 performed by Andrew Lynn DO at [facility] N/A 11/18/2020    BRONCHOSCOPY/TRANSBRONCHIAL NEEDLE BIOPSY performed by Caden Sotomayor MD at [address]  12/30/2011    Dr. Gerald Saldana 1st OM 3.0 x 20 Ion    DIAGNOSTIC CARDIAC CATH LAB PROCEDURE  3/15/2005    [facility]. Mild to moderate CAD. Normal LV.  DIAGNOSTIC CARDIAC CATH LAB PROCEDURE  1/16/2007    [facility]. Cath/PTCA/DE stent of proximal and distal RCA.  DIAGNOSTIC CARDIAC CATH LAB PROCEDURE  2/21/2007    Cath/DE stent of proximal OM1 and proximal Cx.  DIAGNOSTIC CARDIAC CATH LAB PROCEDURE  12/30/2011    ANURADHA of mid OM branch of circumflex.  ECHO COMPL W DOP COLOR FLOW  12/30/2011         HC INSERT PICC CATH, 5/> YRS - MIDLINE  11/23/2020         HERNIA REPAIR  2/2014    laparoscopic ventral hernia repain    JOINT REPLACEMENT Left 4/1/14    TKA-ed    JOINT REPLACEMENT Right 2018    KNEE    PAIN MANAGEMENT PROCEDURE Right 9/1/2020    RIGHT L3,4,5 MEDIAL BRANCH RADIOFREQUENCY ABLATION performed by Andrew Lynn DO at [facility] N/A 11/17/2020    L4-5 EPIDURAL STEROID INJECTION #1 performed by Andrew Lynn DO at [facility] OFFICE/OUTPT VISIT,PROCEDURE ONLY Right 11/26/2018    RIGHT KNEE TOTAL ARTHROPLASTY performed by Kelly Mittal DO at [facility]       Family History  Family History   Problem Relation Age of Onset    Diabetes Mother     Stroke Mother     Diabetes Father     No Known Problems Sister        Social History    TOBACCO:   reports that he has been smoking cigarettes. He started smoking about 42 years ago. He has a 39.00 pack-year smoking history.  He has never used smokeless tobacco.  ETOH:   reports no history of alcohol use.    Home Medications  Prior to Admission medications    Medication Sig Start Date End Date Taking? Authorizing Provider   loratadine (CLARITIN) 10 MG capsule Take 10 mg by mouth daily   Yes Historical Provider, MD   pioglitazone (ACTOS) 15 MG tablet TAKE 1 TABLET BY MOUTH EVERY DAY 11/6/20  Yes Safia Archibald PA-C   pregabalin (LYRICA) 150 MG capsule Take 1 capsule by mouth 2 times daily for 30 days. 11/12/20 12/12/20 Yes Jeferson Bruce DO   diclofenac (VOLTAREN) 75 MG EC tablet TAKE 1 TABLET BY MOUTH TWICE A DAY 10/7/20  Yes Historical Provider, MD   oxyCODONE-acetaminophen (PERCOCET) 7.5-325 MG per tablet Take 1 tablet by mouth 3 times daily as needed for Pain for up to 30 days.  Intended supply: 30 days 11/12/20 12/12/20 Yes Jeferson Bruce DO   quinapril (ACCUPRIL) 10 MG tablet Take 1 tablet by mouth daily  Patient taking differently: Take 10 mg by mouth daily Wife states on hold 10/16/20  Yes Sfaia Archibald PA-C   metFORMIN (GLUCOPHAGE) 500 MG tablet TAKE 1 TABLET BY MOUTH TWICE A DAY WITH MEALS  Patient taking differently: Wife states on hold 10/8/20  Yes Xuan Benitez DO   fluticasone (FLONASE) 50 MCG/ACT nasal spray 2 sprays by Nasal route daily 10/6/20  Yes Safia Archibald PA-C   omega-3 acid ethyl esters (LOVAZA) 1 g capsule take 1 capsule twice a day 8/14/20  Yes Sfaia Archibald PA-C   niacin (SLO-NIACIN) 500 MG extended release tablet take 1 tablet by mouth once daily 7/20/20  Yes Xuan Benitez DO   clopidogrel (PLAVIX) 75 MG tablet TAKE 1 TABLET BY MOUTH EVERY DAY  Patient taking differently: Take 75 mg by mouth daily Has been on hold for procedure 7/6/20  Yes Safia Archibald PA-C   atorvastatin (LIPITOR) 80 MG tablet TAKE 1 TABLET BY MOUTH AT BEDTIME 10/22/18  Yes Safia Archibald PA-C   carvedilol (COREG) 25 MG tablet TAKE ONE TABLET BY MOUTH TWICE DAILY (WITH MEALS) 10/22/18  Yes Safia Archibald PA-C   sildenafil (VIAGRA) 50 MG tablet Take 1 tablet by mouth as needed for Erectile Dysfunction 7/6/20 Jennifer Quiros PA-C   aspirin 325 MG EC tablet TAKE 1 TABLET BY MOUTH EVERY DAY  Patient taking differently: Take 325 mg by mouth daily Has been on hold for procedure 5/7/20   Vara Harada, DO   Tens Unit MISC by Does not apply route 5/7/19   TRUDY Christopher       Allergies  Allergies   Allergen Reactions    Bee Venom Anaphylaxis       Review of Systems: patient is intubated and sedated and not responsive to questions. Objective  /67   Pulse 101   Temp 101.4 °F (38.6 °C) (Bladder)   Resp 20   Ht 5' 11\" (1.803 m)   Wt (!) 327 lb 2.6 oz (148.4 kg)   SpO2 90%   BMI 45.63 kg/m²     Physical Exam:   Performance Status:  Head and neck: PERRLA, EOMI . Sclera non icteric. Oropharynx: Clear  Neck: no JVD,  no adenopathy  Heart: Regular rate and regular rhythm, no murmur  Lungs:Disffuse inspiratory and expiratory rhonchi   Extremities: No edema,no cyanosis, no clubbing. Abdomen: Soft, non-tender;no masses, no organomegaly  Skin: No rash. Neurologic:Cranial nerves grossly intact. No focal motor or sensory deficits .     Recent Laboratory Data-   Lab Results   Component Value Date    WBC 5.0 12/01/2020    HGB 5.7 (L) 12/01/2020    HCT 19.3 (L) 12/01/2020    MCV 93.7 12/01/2020    PLT 25 (L) 12/01/2020    LYMPHOPCT 12.0 (L) 12/01/2020    RBC 2.06 (L) 12/01/2020    MCH 27.7 12/01/2020    MCHC 29.5 (L) 12/01/2020    RDW 16.8 (H) 12/01/2020    NEUTOPHILPCT 76.0 12/01/2020    MONOPCT 2.0 12/01/2020    BASOPCT 1.0 12/01/2020    NEUTROABS 3.86 12/01/2020    LYMPHSABS 0.55 (L) 12/01/2020    MONOSABS 0.09 (L) 12/01/2020    EOSABS 0.05 12/01/2020    BASOSABS 0.05 12/01/2020       Lab Results   Component Value Date     (H) 12/01/2020    K 3.6 12/01/2020     12/01/2020    CO2 36 (H) 12/01/2020    BUN 90 (H) 12/01/2020    CREATININE 1.7 (H) 12/01/2020    GLUCOSE 176 (H) 12/01/2020    CALCIUM 8.5 (L) 12/01/2020    PROT 4.9 (L) 12/01/2020    LABALBU 3.0 (L) 12/01/2020    BILITOT 0.7 12/01/2020 There is additional airspace disease in the left lower lobe which is stable. There is no pneumothorax. Small pleural effusions are not excluded. Unchanged large opacity/consolidation in left upper lobe. Unchanged airspace disease in left lower lobe. Xr Chest Portable    Result Date: 11/20/2020  EXAMINATION: ONE XRAY VIEW OF THE CHEST 11/20/2020 6:33 am COMPARISON: 11/19/2020 HISTORY: ORDERING SYSTEM PROVIDED HISTORY: resp failure TECHNOLOGIST PROVIDED HISTORY: Reason for exam:->resp failure FINDINGS: Endotracheal and enteric tubes remain in place. There has been no appreciable change in opacities throughout the left lung, most pronounced in the left apex. The right lung is clear. The heart size is at the upper limits of normal.  There is no discernible pneumothorax. Grossly stable opacities on the left. Xr Chest Portable    Result Date: 11/19/2020  EXAMINATION: ONE XRAY VIEW OF THE CHEST 11/19/2020 6:23 am COMPARISON: 11/18/2020 HISTORY: ORDERING SYSTEM PROVIDED HISTORY: resp failure TECHNOLOGIST PROVIDED HISTORY: Reason for exam:->resp failure FINDINGS: Evaluation is limited by the patient's body habitus and the portable technique. The right lung apex was partially excluded. There is increased dense consolidation in the left upper lobe. There is also medial left basilar airspace opacity obscuring the hemidiaphragm. The heart size is mildly enlarged. There is no discernible pneumothorax. Endotracheal and enteric tubes are again seen. Increasing multifocal left lung pneumonia. Xr Chest Portable    Result Date: 11/18/2020  EXAMINATION: ONE XRAY VIEW OF THE CHEST 11/18/2020 6:39 am COMPARISON: CT chest November 17, 2020. HISTORY: ORDERING SYSTEM PROVIDED HISTORY: SOB TECHNOLOGIST PROVIDED HISTORY: Reason for exam:->SOB FINDINGS: The cardiac silhouette is within normal limits in size. There is masslike consolidation of the left upper lobe.   There is a small to moderate left dependent pleural effusion. There is no visible pneumothorax. The pulmonary vessels are within normal limits. Left upper lobe masslike consolidation. Small to moderate left pleural effusion. Xr Chest 1 View    Result Date: 11/18/2020  EXAMINATION: ONE XRAY VIEW OF THE CHEST 11/18/2020 9:06 am COMPARISON: 11/18/2020 HISTORY: ORDERING SYSTEM PROVIDED HISTORY: intubation TECHNOLOGIST PROVIDED HISTORY: Reason for exam:->intubation Reason for exam:->portable FINDINGS: Compared to the chest radiograph from earlier today, 6:44 a.m., there is interval placement of an endotracheal tube, the tip of which is approximately 4.1 cm above the korina. Nasogastric tube is present but is suboptimally visualized due to motion artifact and relative underpenetration of the study. The abdominal radiograph demonstrates it to be well positioned, with the tip in the upper abdomen. Prominent masslike consolidative opacity in the upper left lung are grossly stable. No pneumothorax is seen. Layering left pleural effusion. 1.  The life-support lines and tubes are well positioned. 2. Masslike consolidative opacity in the left upper lung. Small left effusion. Cta Chest W Contrast    Result Date: 11/17/2020  EXAMINATION: CTA OF THE CHEST 11/17/2020 3:18 pm TECHNIQUE: CTA of the chest was performed after the administration of intravenous contrast.  Multiplanar reformatted images are provided for review. MIP images are provided for review. Dose modulation, iterative reconstruction, and/or weight based adjustment of the mA/kV was utilized to reduce the radiation dose to as low as reasonably achievable.; CTA of the abdomen and pelvis was performed with the administration of intravenous contrast. Multiplanar reformatted images are provided for review. MIP images are provided for review.  Dose modulation, iterative reconstruction, and/or weight based adjustment of the mA/kV was utilized to reduce the radiation dose to as low as reasonably achievable. COMPARISON: None. HISTORY: ORDERING SYSTEM PROVIDED HISTORY: aneurysm, cp, sob TECHNOLOGIST PROVIDED HISTORY: Reason for exam:->aneurysm, cp, sob FINDINGS: CTA chest: There are confluent opacities located in left upper lobe. Patchy airspace opacities located in lingula. There is moderate to large left pleural effusion. Peribronchial thickening extensor in left hilar location into the left lung base. There is subsegmental atelectasis in posterior right lower lobe with adjacent ground-glass opacities. There is pulmonary vascular congestion. The heart is mildly enlarged. There is mediastinal lymphadenopathy. Ascending thoracic aorta measures 3.7 cm in diameter. Descending thoracic aorta measures 3 cm in diameter. No evidence of thoracic aortic aneurysm or dissection. Distal descending thoracic aorta is tortuous. CTA abdomen/pelvis: There is evidence of endograft repair involving the abdominal aorta. There is redemonstration of fusiform abdominal aortic aneurysm measuring up to 6.4 cm. This finding is stable since prior. No evidence of endoleak. No retroperitoneal fluid collections. Iliac limbs of endograft appear patent. Common iliac arteries are tortuous. Common femoral arteries are patent. Numerous diverticula associated with the left colon and sigmoid colon. No evidence of acute diverticulitis. Liver, gallbladder, pancreas, and spleen are grossly unremarkable however there is motion artifact limiting this examination. There is no hydronephrosis. Cyst associated with the right kidney is stable since previous examination as well as cyst associated with the left kidney appearing stable since prior. Appendix is visualized and normal.    1.  New confluent opacities located in left upper lobe suggesting pneumonia, atelectasis, or neoplasm. 2.  Patchy airspace opacities located in lingula suggesting pneumonia with areas of atelectasis.  3.  Stenosis and occlusion are associated with left upper lobe segmental bronchi and lingular segmental bronchi. 4.  Moderate to large left pleural effusion. 5.  Mediastinal lymphadenopathy. 6.  Stable fusiform infrarenal abdominal aortic aneurysm with endograft repair. Aneurysm measures up to 6.4 cm. No evidence of endoleak or retroperitoneal fluid. 7.  Diverticulosis. Cta Abdomen Pelvis W Contrast    Result Date: 11/17/2020  EXAMINATION: CTA OF THE CHEST 11/17/2020 3:18 pm TECHNIQUE: CTA of the chest was performed after the administration of intravenous contrast.  Multiplanar reformatted images are provided for review. MIP images are provided for review. Dose modulation, iterative reconstruction, and/or weight based adjustment of the mA/kV was utilized to reduce the radiation dose to as low as reasonably achievable.; CTA of the abdomen and pelvis was performed with the administration of intravenous contrast. Multiplanar reformatted images are provided for review. MIP images are provided for review. Dose modulation, iterative reconstruction, and/or weight based adjustment of the mA/kV was utilized to reduce the radiation dose to as low as reasonably achievable. COMPARISON: None. HISTORY: ORDERING SYSTEM PROVIDED HISTORY: aneurysm, cp, sob TECHNOLOGIST PROVIDED HISTORY: Reason for exam:->aneurysm, cp, sob FINDINGS: CTA chest: There are confluent opacities located in left upper lobe. Patchy airspace opacities located in lingula. There is moderate to large left pleural effusion. Peribronchial thickening extensor in left hilar location into the left lung base. There is subsegmental atelectasis in posterior right lower lobe with adjacent ground-glass opacities. There is pulmonary vascular congestion. The heart is mildly enlarged. There is mediastinal lymphadenopathy. Ascending thoracic aorta measures 3.7 cm in diameter. Descending thoracic aorta measures 3 cm in diameter. No evidence of thoracic aortic aneurysm or dissection.   Distal descending thoracic aorta is tortuous. CTA abdomen/pelvis: There is evidence of endograft repair involving the abdominal aorta. There is redemonstration of fusiform abdominal aortic aneurysm measuring up to 6.4 cm. This finding is stable since prior. No evidence of endoleak. No retroperitoneal fluid collections. Iliac limbs of endograft appear patent. Common iliac arteries are tortuous. Common femoral arteries are patent. Numerous diverticula associated with the left colon and sigmoid colon. No evidence of acute diverticulitis. Liver, gallbladder, pancreas, and spleen are grossly unremarkable however there is motion artifact limiting this examination. There is no hydronephrosis. Cyst associated with the right kidney is stable since previous examination as well as cyst associated with the left kidney appearing stable since prior. Appendix is visualized and normal.    1.  New confluent opacities located in left upper lobe suggesting pneumonia, atelectasis, or neoplasm. 2.  Patchy airspace opacities located in lingula suggesting pneumonia with areas of atelectasis. 3.  Stenosis and occlusion are associated with left upper lobe segmental bronchi and lingular segmental bronchi. 4.  Moderate to large left pleural effusion. 5.  Mediastinal lymphadenopathy. 6.  Stable fusiform infrarenal abdominal aortic aneurysm with endograft repair. Aneurysm measures up to 6.4 cm. No evidence of endoleak or retroperitoneal fluid. 7.  Diverticulosis. Xr Abdomen For Ng/og/ne Tube Placement    Result Date: 11/22/2020  EXAMINATION: ONE SUPINE XRAY VIEW(S) OF THE ABDOMEN 11/22/2020 5:06 pm COMPARISON: November 22, 2020, 4 hours prior. HISTORY: ORDERING SYSTEM PROVIDED HISTORY: Confirmation of course of NG/OG/NE tube and location of tip of tube TECHNOLOGIST PROVIDED HISTORY: Reason for exam:->Confirmation of course of NG/OG/NE tube and location of tip of tube Portable? ->Yes FINDINGS: Tip of the NG tube noted at the level of the diaphragm, no significant change. The right side and the inferior part of the abdomen is not included in the study. There is opacification of the visualized left upper lung. Tip of NG tube at the level of the left hemidiaphragm, no change. Xr Chest Abdomen Ng Placement    Result Date: 11/22/2020  EXAMINATION: ONE SUPINE XRAY VIEW(S) OF THE ABDOMEN 11/22/2020 12:52 pm COMPARISON: November 18. HISTORY: ORDERING SYSTEM PROVIDED HISTORY: OG placement TECHNOLOGIST PROVIDED HISTORY: Reason for exam:->OG placement FINDINGS: Nasogastric tube is present. Side hole appears to be at level of gastroesophageal junction. This tube could be advanced approximately 4 or 5 cm. Nasogastric tube is present with side hole at level of gastroesophageal junction. This tube could be advanced approximately 4 or 5 cm. Xr Chest Abdomen Ng Placement    Result Date: 11/18/2020  EXAMINATION: ONE SUPINE XRAY VIEW(S) OF THE ABDOMEN 11/18/2020 9:07 am COMPARISON: 11/18/2020, about 2 hours earlier HISTORY: ORDERING SYSTEM PROVIDED HISTORY: NGT TECHNOLOGIST PROVIDED HISTORY: Reason for exam:->NGT Reason for exam:->portable NG tube placement FINDINGS: There is an NG tube with the tip in the stomach. An ET tube is again noted in satisfactory position. There is extensive opacity in the left lung, most likely pneumonia. Increased markings also seen in the visualized portion of the right lung. The heart is enlarged. NG tube tip in the stomach. Fluoro For Surgical Procedures    Result Date: 11/17/2020  EXAMINATION: SPOT FLUOROSCOPIC IMAGES 11/17/2020 12:03 pm TECHNIQUE: Fluoroscopy was provided by the radiology department for procedure. Radiologist was not present during examination. FLUOROSCOPY DOSE AND TYPE OR TIME AND EXPOSURES: Total dose:26.99 mGy COMPARISON: None HISTORY: ORDERING SYSTEM PROVIDED HISTORY: Pain management TECHNOLOGIST PROVIDED HISTORY: Reason for exam:->L4-5 Epidural steroid injection #1 Intraprocedural imaging.  FINDINGS: 3 intraoperative fluoroscopic images were obtained during L4-5 epidural steroid injection. Intraprocedural fluoroscopic spot images as above. See separate procedure report for more information. ASSESSMENT/PLAN :    60 yo male  CAD  HTN  AAA s/p repair  DM type 2  Morbid obesity  Chronic back pain  Thrombocytopenia, Anemia   OMER opacity s/p biopsy    - S/p bronch with cytology pending  - OMER PNA vs neoplasm  - On rocpehin and levaquin   - CBC showing Hgb 7.8 with MCV 94, Platelets 39  - Will follow cyto  - Cytopenias likely due to combination of PNA and abc, with myelosuppression. Thrombocytopenia has continued to slowly progress. CBC was WNL on 10/6/20, suggesting an acute process rather than a primary bone marrow process  - Transfuse for Hgb <7, platelets <34  - Smear showed subtle toxic granulation of neutrophils  - Check iron profile and B12/folate    11/24/20  - OMER bronch bx pathology:  DIAGNOSIS   POSITIVE FOR MALIGNANT CELLS   Carcinoma, most compatible with small cell carcinoma, see comment. Monolayer shows atypical crushed cells, and many lymphocytes/histiocytes   with anthracotic pigment. Cell block shows abundant crushed malignant cells. COMMENT:   Immunostains stain the malignant cells as follows:   Pankeratin and TTF1:  Positive   Chromogranin:  Positive weakly   Synaptophysin:  Negative   CK7:  Negative   This immunoprofile is most compatible with small cell carcinoma. 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 His CT scan of abdomen and pelvis did not show evidence of intra-abdominal metastasis.   He has multiple comorbid conditions including obesity, coronary artery disease, obstructive sleep apnea, obesity, type 2 diabetes mellitus and he presently has pneumonia with hypoxia and respiratory failure and remains intubated and sedated. 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   - Today's Hgb 7.2 Plts 38   - Patient still intubated but hopefully will be able to extubated  - Once stable, MRI brain to complete staging   - If he improves clinically and has improvement in ECOG, he will be considered for systemic therapy  - L pleural effusions likely malignant  - ID following, now on unasyn  - Given his current ECOG and comorbidities along with persistent fevers and abx requirements, not eligible for inpatient emergent chemotherapy. Will get rad onc on board as inpatient urgent palliative XRT could be considered to improved respiratory status if he has improvement in above    11/26/20  - Remains intubated, back in ICU  - CBC with further drop in platelets to 29. Likely combination of myelosuppression from acute illness and abx exposure  - If he has improvement in his clinic course, eventually will need MRI brain as above  - Therapy plan as above. He will need significant improvement clinically prior to discussion regarding chemotherapy. Rad onc consult pending, again will likely need improvement prior to proceeding with therapy. Cultures have no grown a source, but patient remains febrile (possibly drug fever and ID following and managing abx)  - Will follow. Further recs pending clinical course    11/27/20  - LUE DVT  - Platelets 33. Stable from yesterday  - Can not anticoagulate with current platelet count. Needs to be ~50. Monitor for now  - Remainder of CBC stable    11/29/20  - CBC stable, thrombocytopenia unchanged. Hgb dropped to 7.0. Will give 1 unit  - Continue to hold [facility] with platelets <39  - Still with low grade fevers in the 100's  - ID following, on unasyn. Possible post-obstructive PNA    11/30/20  - Continues to have thrombocytopenia platelets 28.   Hemoglobin 7.3 after 1 unit of packed red blood cells yesterday. - Continue to hold [facility] for platelets <88  - Still with fevers in the 100s, he is also hypertensive  - On Unasyn  - Rad on consult still pending, but no aggressive oncologic care until significant clinical improvement    12/1/20  - Today's Hgb 5.7,transfuse with PRBC for target hemoglobin above 7  - Left upper lobe cancer. Being followed by oncology. Not eligible for emergent chemotherapy  - Patient has to improve clinically in order to transfer to [facility] (1-) for palliative radiation.  - Febrile dt underlying ca   - Poor prognosis     MATY Stevens - CNP  Electronically signed 12/1/2020 at 10:37 AM   Pt seen and examined.  Note edited to reflect updates  Hansel Trinidad MD

## 2020-12-01 NOTE — PROGRESS NOTES
IR request for left thoracentesis presented to Dr. Carlton Dailey. Per Dr. Carlton Dailey small effusion with no change from 1 week ago. Increased risk for bleeding as well due to platelet count of 20. ICU called and nurse Estefania made aware.

## 2020-12-02 PROBLEM — J96.00 ARF (ACUTE RESPIRATORY FAILURE) (HCC): Status: ACTIVE | Noted: 2020-01-01

## 2020-12-02 NOTE — CARE COORDINATION
COVID negative 11/17. Vent/ intubated since 11/18- FIO2 50%,PEEP 10. On iv abx. + lung malignancy - awaiting transfer to [facility] for palliative radiation therapy. Full code. [facility] and [facility] following(back door referrals)- will need to discuss LTAC/[facility]VEL choices w/ wife when pt stabilizes-would not make any decision at this time from previous notes. Probable need for trach/PEG- platelet count 19 today.  Will follow Jewel Vega

## 2020-12-02 NOTE — PROGRESS NOTES
today.  More secretions. 11/22/20; still having moist secretions, gram positive cocci bacteremia   11/23/20: continued secretions, complaining of mild abdominal pain today. multiple attempts made to transfer patient per wifes request  11/24/20: start diuresis. Wean propofol and add seroquel for agitation. Increase free water. Biopsy results back concerning for small cell lung cancer. Febrile. 11/25 febrile re cultured overnight , t max 101, ID consulted, will attempt weaning again today  11/26: continues with fevers, tmax 101. ID added levaquin yesterday, bronch today  11/27: low grade fevers improved. Not tolerating vent weaning, continued diruesis throughout the day. Stopped by renal with worsening bun  11/28: Diuresed appropriately, Awake and following commands, failed psv trial   11/29: Hemoglobin dropped this morning to 7.0, 1 unit packed red blood cells ordered by hematology  Continue pressure support trials today   11/30: febrile overnight, wean sedation, PSV trials -- failed.  Discuss trach with family  12/1: Febrile overnight, hemoglobin 5.6 today, given a unit of blood, platelets 20.  59/4:-OCGINLACA were 19, given a unit, fever broke    CURRENT VENTILATION STATUS:     [x] Ventilator  [] BIPAP  [] Nasal Cannula [] Room Air        SECRETIONS : Amount:  [x] Small [] Moderate  [] Large  [] None  Color:     [] White [x] Colored  [] Bloody    SEDATION:    [] Propofol gtt  [x] Versed gtt  [] Ativan gtt   [] No Sedation    PARALYZED:  [x] No    [] Yes      VASOPRESSORS:  [x] No    [] Yes    If yes -   [] Levophed       [] Dopamine     [] Vasopressin       [] Dobutamine  [] Phenylephrine         [] Epinephrine    CENTRAL LINES:     [x] No   [] Yes   (Date of Insertion:   )           If yes -     [] Right IJ     [] Left IJ [] Right Femoral [] Left Femoral                   [] Right Subclavian [] Left Subclavian       HUFF'S CATHETER:   [] No   [x] Yes  (Date of Insertion: 11/19  )     URINE OUTPUT: [x] Good   [] Low              [] Anuric      OBJECTIVE:     VITAL SIGNS:  BP (!) 156/99   Pulse 117   Temp 99.5 °F (37.5 °C) (Bladder)   Resp 22   Ht 5' 11\" (1.803 m)   Wt (!) 327 lb 2.6 oz (148.4 kg)   SpO2 92%   BMI 45.63 kg/m²   Tmax over 24 hours:  Temp (24hrs), Av.1 °F (37.8 °C), Min:99.1 °F (37.3 °C), Max:101.8 °F (38.8 °C)      Patient Vitals for the past 6 hrs:   BP Pulse Resp SpO2   20 1434 (!) 156/99 117 22 92 %   20 1305 (!) 173/96 116 28 91 %   20 1250 (!) 156/83 107 14 90 %   20 1233 -- 107 21 94 %   20 1230 (!) 161/99 108 17 91 %   20 1150 -- 104 16 95 %   20 1100 (!) 161/98 133 16 92 %   20 1018 -- 102 17 95 %   20 1000 121/78 97 21 94 %         Intake/Output Summary (Last 24 hours) at 2020 1522  Last data filed at 2020 1434  Gross per 24 hour   Intake 5835.5 ml   Output 2450 ml   Net 3385.5 ml     Wt Readings from Last 2 Encounters:   20 (!) 327 lb 2.6 oz (148.4 kg)   20 (!) 314 lb (142.4 kg)     Body mass index is 45.63 kg/m². PHYSICAL EXAMINATION:  General: Intubated   HEENT:  Normocephalic, atraumatic. Pulls equal and reactive no scleral icterus. No conjunctival injection. No nasal discharge. Neck:  Supple, without stridor, no JVD  Heart:  Tachycardic, regular rhythm, no murmurs, gallops, rubs  Lungs: Greatly diminished in left upper lobe, rhonchorous throughout the remainder of the lung fields   Abdomen: Obese, bowel sounds present, soft, non-tender, non-distended, no guarding or rigidity. Not tender to palpation  Extremities:  No clubbing, cyanosis,1+ edema bilaterally. Palpable radial and DP pulses b/l upper and lower extremieties  Skin:  Warm and dry,  Neuro: Following commands. Cranial nerves II through XII grossly intact.   No focal neurologic deficits  Breast: deferred  Rectal: deferred  Genitalia:  deferred     MEDICATIONS:    Scheduled Meds:   famotidine  20 mg Oral BID    methylPREDNISolone  40 mg Intravenous Daily    sodium chloride  20 mL Intravenous Once    ampicillin-sulbactam  3 g Intravenous Q6H    heparin flush  1 mL Intravenous 2 times per day    insulin lispro  0-18 Units Subcutaneous Q6H    nicotine  1 patch Transdermal Daily    acetylcysteine  4 mL Inhalation BID    albuterol  2.5 mg Nebulization Q4H    Arformoterol Tartrate  15 mcg Nebulization BID    atorvastatin  80 mg Oral Nightly    [Held by provider] clopidogrel  75 mg Oral Daily    fluticasone  2 spray Nasal Daily    sodium chloride flush  10 mL Intravenous 2 times per day    [Held by provider] enoxaparin  40 mg Subcutaneous Daily    insulin glargine  0.25 Units/kg Subcutaneous Nightly    chlorhexidine  15 mL Mouth/Throat BID     Continuous Infusions:   IV infusion builder 50 mL/hr at 12/02/20 1234    fentaNYL 5 mcg/ml in 0.9%  ml infusion 200 mcg/hr (12/02/20 1156)    dextrose      midazolam 7 mg/hr (12/02/20 1431)     PRN Meds:   potassium chloride, 10 mEq, PRN  acetaminophen, 650 mg, Q4H PRN    Or  acetaminophen, 650 mg, Q4H PRN  sodium chloride flush, 10 mL, PRN  heparin flush, 1 mL, PRN  sodium phosphate IVPB, 0.16 mmol/kg, PRN    Or  sodium phosphate IVPB, 0.32 mmol/kg, PRN  magnesium sulfate, 1 g, PRN  tiZANidine, 4 mg, TID PRN  polyethylene glycol, 17 g, Daily PRN  promethazine, 12.5 mg, Q6H PRN    Or  ondansetron, 4 mg, Q6H PRN  glucose, 15 g, PRN  dextrose, 12.5 g, PRN  glucagon (rDNA), 1 mg, PRN  dextrose, 100 mL/hr, PRN  oxyCODONE-acetaminophen, 1 tablet, TID PRN    And  oxyCODONE, 2.5 mg, TID PRN  albuterol, 2.5 mg, Q4H PRN        VENT SETTINGS (Comprehensive) (if applicable):  Vent Information  $Ventilation: $Subsequent Day  Skin Assessment: Clean, dry, & intact  Equipment ID: [id number]  Equipment Changed: Humidification  Vent Type: [id number]  Vent Mode: PS  Vt Ordered: 510 mL  Rate Set: 20 bmp  Peak Flow: 70 L/min  Pressure Support: 0 cmH20  FiO2 : 60 %  SpO2: 92 %  SpO2/FiO2 ratio: 153.33  Sensitivity: 3  PEEP/CPAP: 10  I Time/ I Time %: 0 s  Humidification Source: Heated wire  Humidification Temp: 37  Humidification Temp Measured: 36.7  Circuit Condensation: Drained  Mask Type: Full face mask  Mask Size: Medium  Additional Respiratory  Assessments  Pulse: 117  Resp: 22  SpO2: 92 %  Position: Semi-Jeff's  Humidification Source: Heated wire  Humidification Temp: 37  Circuit Condensation: Drained  Oral Care: Mouth suctioned, Mouth swabbed, Lip moisturizer applied, Mouth moisturizer  Subglottic Suction Done?: Yes  Cuff Pressure (cm H2O): 29 cm H2O    ABGs:   Recent Labs     12/02/20  0603   PH 7.466*   PCO2 43.5   PO2 69.9*   HCO3 30.7*   BE 6.3*   O2SAT 93.4       Laboratory findings:    Complete Blood Count:   Recent Labs     12/01/20  0515 12/01/20  0942 12/01/20  1446 12/02/20  0530   WBC 4.6 5.0  --  6.1   HGB 5.6* 5.7* 6.5* 7.8*   HCT 19.2* 19.3* 21.7* 25.9*   PLT 20* 25*  --  19*        Last 3 Blood Glucose:   Recent Labs     11/30/20  0540 12/01/20  0515 12/02/20  0530   GLUCOSE 185* 176* 226*        PT/INR:    Lab Results   Component Value Date    PROTIME 11.6 11/26/2020    PROTIME 11.1 12/30/2011    INR 1.0 11/26/2020     PTT:    Lab Results   Component Value Date    APTT 29.0 12/28/2011       Comprehensive Metabolic Profile:   Recent Labs     11/30/20  0540 11/30/20  0650 12/01/20  0515 12/02/20  0530     --  148* 148*   K 5.5* 3.8 3.6 3.0*     --  104 104   CO2 35*  --  36* 33*   BUN 90*  --  90* 78*   CREATININE 1.4*  --  1.7* 1.4*   GLUCOSE 185*  --  176* 226*   CALCIUM 8.3*  --  8.5* 8.7   PROT 5.2*  --  4.9* 5.1*   LABALBU 2.3*  --  3.0* 2.9*   BILITOT 0.5  --  0.7 0.9   ALKPHOS 134*  --  133* 159*   *  --  80* 77*   ALT 51*  --  44* 49*      Magnesium:   Lab Results   Component Value Date    MG 2.4 12/02/2020     Phosphorus:   Lab Results   Component Value Date    PHOS 3.1 12/02/2020     Ionized Calcium:   Lab Results   Component Value Date    CAION 1.33 10/23/2020        Troponin:   No results for input(s): TROPONINI in the last 72 hours. Microbiology:  GPC blood   Respiratory culture no growth    Radiology/Imaging:         ASSESSMENT:     Neuro   Chronic back pain              -Epidural on 11/17, no hematoma seen on CT lumbar spine      Sedated on the vent              -Versed, fentanyl. Seroquel 100 mg was given one dose , d/c by renal      Respiratory   Acute hypoxic and hypercapnic respiratory failure       -continue full vent support. Pressure support trials              -Follow ABG              -Albuterol, incentive spirometer              -solumedrol 40 daily   -wean FiO2 as able   -metanebs   -Mucomyst   -bedside L thoracentesis 11/22 with only a small amount of pleural fluid, not amendable to drainage   -bronch 11/27 showing extrinsic compression from mass, no mucus plug   -failed PSV today---> tachycardic/tacypnic/sweating/hypertensive     Community-acquired pneumonia post obstructive pna              -Strep and Legionella negative              -Respiratory culture pending              -Pro-Kael 0.23   -continue unasyn     Lung mass OMER              -Status post bronc 11/18 with TBNA              -No obvious mucous plugging or purulence. Very erythematous and compressed airways, concern for mass              -Cytology concerning for small cell lung cancer. Heme-onc on board.  Rad onc recs appreciated   -Being transferred to [facility] for palliative radiation therapy   -Holding off on trach and PEG until after palliative radiation    Pleural effusion   -evaluated with US on 11/22, not larg fluid pocket   -per nephro note, possible thoracentesis with pulmonology?    -12/1-thoracentesis will not be done due to low platelet count and risk of procedure  Respiratory film array negative  Long, lifetime smoker--nicotine patch      Cardiovascular   CAD/stents              -Plavix is on hold for approximately 7 days per patient he had his epidural.  It has still been on hold in case he needs further procedures and thrombocytopenia     History of aortic aneurysm              -Status post endovascular repair              -Stable on most recent CT    Echo limited 2/2 patient body habitus.     Gastrointestinal   Tube feeding-dietary consulted for caloric management  PPI held from nephro and started on carafate, most likely 2/2 platelets? --Pepcid restarted     Renal     Hyperkalemia- resolved  hypophos- replace    Anasarca   -nephro following and recommends SPA bid with diuril   -Diuril and albumin stopped 12/1    Hypernatremia- resolved    ISIDRO   -nephro following and recs appreciated   -most likely 2/2 diuresis     Infectious Disease     Community-acquired pneumonia   -recultured              -respiratory cx growing candida, light growth   -switched to unasyn         Blood cultures gram positive cocci   -coag neg staph. Most likely contaminant.  Stop vanco.    Febrile   -continues with fevers despite abx-fevers broke 12/2   -repeat blood cx no growth to date   -2/2 malignancy?     Hematology/Oncology   Thrombocytopenia              - received platelets x2              -heme/onc following   -most llikely 2/2 neoplasm, drugs and infection   -Transfuse for Hgb <7, platelets <18    Small cell lung cancer   -biopsy results   -heme/onc and pulmonology for further work up and recommendations   -not inpatient emergent chemo candidate   -rad/onc consult, MRI brain-unable to fit in MRI   -Transferring to [facility] for palliative radiation    DVT   -left axillary vein   -platelets 28, no anticoagulation at this time until platelets rise    Anemia   -Hemoglobin 5.6 12/1-given 1 unit of blood     Endocrine   Diabetes              -Noninsulin-dependent              -Monitor sugars   -high-dose sliding scale    msk   Chronic back pain              -Status post epidural on 11/17              -No erythematous region or fluctuance              -CT with no evidence of epidural hematoma     Social/Spiritual/DNR/Other   Full code    ------------------------------------------------------------------------------------------  1. Failed pressure support trial again-trach after palliative radiation  2. Anemia-fecal occult negative, hemoglobin 7.8 today after 2 units  3. Fevers broke, continue Unasyn  4. EPCs, no anticoagulation because of thrombocytopenia  5. Tube feed rate increased  6. Dr. Lenwood Merlin discussed with family, they wish to proceed with trach and PEG, consulted surgery-holding off until after palliative radiation    Dispo: Transfer to [facility] for palliative radiation and further management     Cammy Schlatter, MD PGY-1  12/2/2020 3:22 PM      I personally saw, examined and provided care for the patient. Radiographs, labs and medication list were reviewed by me independently. I spoke with bedside nursing, therapists and consultants. Critical care services and times documented are independent of procedures and multidisciplinary rounds with Residents. Additionally comprehensive, multidisciplinary rounds were conducted with the MICU team. The case was discussed in detail and plans for care were established. Review of Residents documentation was conducted and revisions were made as appropriate. I agree with the above documented exam, problem list and plan of care.   Umer Matias MD   CCT excluding procedures: 36'

## 2020-12-02 NOTE — PROGRESS NOTES
[address] [place] [address] [facility]  TAVIA  Progress Note    SUBJECTIVE:  Chief Complaint   Patient presents with    Shortness of Breath     patient sent from surgery after having epideral injection L4-L5. SOB has been for past two weeks     The patient is still on a ventilator. She is still in the ICU. Still requiring restraints. He is still having fevers. Review of systems:  As stated above in the chief complaint, otherwise negative.     Medications:  Scheduled Meds:   potassium bicarb-citric acid  40 mEq Oral Q4H    potassium chloride  10 mEq Intravenous Q1H    famotidine  20 mg Oral BID    methylPREDNISolone  40 mg Intravenous Daily    sodium chloride  20 mL Intravenous Once    ampicillin-sulbactam  3 g Intravenous Q6H    heparin flush  1 mL Intravenous 2 times per day    insulin lispro  0-18 Units Subcutaneous Q6H    nicotine  1 patch Transdermal Daily    acetylcysteine  4 mL Inhalation BID    albuterol  2.5 mg Nebulization Q4H    Arformoterol Tartrate  15 mcg Nebulization BID    atorvastatin  80 mg Oral Nightly    [Held by provider] clopidogrel  75 mg Oral Daily    fluticasone  2 spray Nasal Daily    sodium chloride flush  10 mL Intravenous 2 times per day    [Held by provider] enoxaparin  40 mg Subcutaneous Daily    insulin glargine  0.25 Units/kg Subcutaneous Nightly    chlorhexidine  15 mL Mouth/Throat BID     Continuous Infusions:   fentaNYL 5 mcg/ml in 0.9%  ml infusion 200 mcg/hr (12/02/20 0526)    dextrose      midazolam 7 mg/hr (12/02/20 0025)     PRN Meds:potassium chloride, acetaminophen **OR** acetaminophen, sodium chloride flush, heparin flush, sodium phosphate IVPB **OR** sodium phosphate IVPB, magnesium sulfate, tiZANidine, polyethylene glycol, promethazine **OR** ondansetron, glucose, dextrose, glucagon (rDNA), dextrose, oxyCODONE-acetaminophen **AND** oxyCODONE, albuterol    OBJECTIVE:  /78   Pulse 80   Temp 99.5 °F (37.5 °C) (Bladder) Resp 20   Ht 5' 11\" (1.803 m)   Wt (!) 327 lb 2.6 oz (148.4 kg)   SpO2 96%   BMI 45.63 kg/m²   Temp  Av.3 °F (37.9 °C)  Min: 99.1 °F (37.3 °C)  Max: 101.8 °F (38.8 °C)  Constitutional: The patient is lying in bed. He is intubated and sedated. Opens eyes. Restrained. FiO2 down to 50%. PEEP down to 8. Skin: Warm and dry. No rashes were noted. HEENT: Round and reactive pupils. Moist mucous membranes. No ulcerations or thrush. ET tube. OG tube. Neck: Supple to movements. Chest: No coarse breath sounds. No crackles. Cardiovascular: Heart sounds rhythmic and regular. Abdomen: Large, round and nondistended. Positive bowel sounds to auscultation. Benign to palpation. Extremities: Minimal edema. Lines: Left midline 2020. Black catheter.     Laboratory and Tests Review:  Lab Results   Component Value Date    WBC 6.1 2020    WBC 5.0 2020    WBC 4.6 2020    HGB 7.8 (L) 2020    HCT 25.9 (L) 2020    MCV 91.8 2020    PLT 19 (LL) 2020     Lab Results   Component Value Date    NEUTROABS 5.00 2020    NEUTROABS 3.86 2020    NEUTROABS 3.84 2020     No results found for: [facility]  Lab Results   Component Value Date    ALT 49 (H) 2020    AST 77 (H) 2020    ALKPHOS 159 (H) 2020    BILITOT 0.9 2020     Lab Results   Component Value Date     2020    K 3.0 2020    K 5.1 2020     2020    CO2 33 2020    BUN 78 2020    CREATININE 1.4 2020    CREATININE 1.7 2020    CREATININE 1.4 2020    GFRAA >60 2020    LABGLOM 52 2020    GLUCOSE 226 2020    GLUCOSE 120 2011    PROT 5.1 2020    LABALBU 2.9 2020    LABALBU 4.5 2011    CALCIUM 8.7 2020    BILITOT 0.9 2020    ALKPHOS 159 2020    AST 77 2020    ALT 49 2020     Lab Results   Component Value Date    CRP 2.5 (H) 2020     Lab Results Component Value Date    SEDRATE 80 (H) 11/25/2020     Radiology:  Rest x-ray reviewed. No changes    Microbiology:   Respiratory panel (including SARS-CoV-2: Not detected  Streptococcus pneumoniae/Legionella urine Ag: negative  Nares screen MRSA: Negative  Urine culture 11/24/2020: Negative  Respiratory culture 11/18/2020: OP aristides absent. Respiratory culture 11/21/2020: OP aristides absent, Candida albicans  Respiratory culture 11/24/2020: OP aristides absent, Candida albicans  Blood cultures 11/21/2020 CoNS in 1 of 4 bottles   Blood cultures 11/24/2020: Negative x2  Blood cultures 11/29/2020: Negative so far    ASSESSMENT:  · Acute respiratory failure  · Left upper lobe cancer. Being followed by oncology. Not eligible for emergent chemotherapy  · Probable obstructive pneumonia. There was initial improvement with Unasyn. Fevers have come back. These are most likely secondary to tumor burden and persistent obstruction  · Fever associated to underlying cancer  · Candida colonization of airways  · CoNS bacteremia.   Contaminant  · Acute kidney injury, improving  · Prognosis is poor    PLAN:  · Continue Unasyn for now  · No need to treat Candida colonization at this point  · No need to treat CoNS bacteremia   · Patient be transferred to [facility] for palliative radiation    Discussed with Dr. Polo Escobedo  9:39 AM  12/2/2020

## 2020-12-02 NOTE — PROGRESS NOTES
Pulmonary progress note    Admit Date: 11/17/2020  Requesting Physician: Jessica Potter PA-C    CC:  Community-acquired pneumonia  HPI:  61years old obese male, heavy smoker who was sent to ER for an outpatient procedure unit because increased shortness of breath and cough. Patient was evaluated in ER and started antibiotics with analysis of community-acquired pneumonia. The patient was initially medically floor and pulmonary consult was requested. Because of progressive respiratory distress and hypoxemia the decision was to transfer the patient to intensive care units and proceed with endotracheal tube placement and full mechanical ventilatory support. After evaluation by intensivist the decision was to proceed with bedside bronchoscopy due to left upper lobe collapse. Findings the bronchoscopy show a very narrow left upper lobe most likely due to extrinsic compression no major mucous plug seen erythema noticed sample for microbiology and cytology were obtained. November 20.no events overnight, still not able to be weaned to extubate failed first attempt this morning. November 21. Febrile, failed vent weaning as he required 60% FiO2 with PEEP of 10. Panculture. Antibiotics change by CCM team.  November 23. Febrile. Failed vent weaning this am,  November 24. Still febrile, failing vent weaning. Still requires 60% FiO2  November 25. Still febrile, antibiotics change by ID. Started on vent weaning with CPAP pressure support 10 PEEP of 8, tidal volume between 500-700, comfortable. November 27.   100.5 T-max.   Still require FiO2 60%, full mechanical ventilatory support  November 30: ACVC 20/510/70/12, Fentanyl Drip    PMH:    Past Medical History:   Diagnosis Date    Anticoagulant long-term use     Arthritis     CAD (coronary artery disease)     Chronic back pain     Depression     Dyslipidemia     Hiatal hernia 1979    History of ST elevation myocardial infarction (STEMI)     Hyperlipidemia  Hypertension     Low back pain     Lumbar radiculopathy 8/14/2019    Obesity     MANOLO on CPAP     Presence of stent in left circumflex coronary artery     Presence of stent in right coronary artery     Smoker     Type 2 diabetes mellitus without complication (HCC)      PSH:   Past Surgical History:   Procedure Laterality Date    ABDOMINAL AORTIC ANEURYSM REPAIR, ENDOVASCULAR N/A 6/28/2019    ENDOVASCULAR REPAIR ABDOMINAL AORTIC ANEURYSM performed by Doug Sanford MD at [facility] 1 Bilateral 3/12/2020    BILATERAL L3,L4,L5 MEDIAL BRANCH NERVE BLOCK performed by Jeferson Bruce DO at [facility] 1 Bilateral 6/11/2020    BILATERAL L3,L4,L5 MEDIAL NERVE BRANCH BLOCK #2 performed by Jeferson Bruce DO at [address] N/A 11/18/2020    BRONCHOSCOPY/TRANSBRONCHIAL NEEDLE BIOPSY performed by Lissett Meek MD at [place] [facility] STENT PLACEMENT  12/30/2011    Dr. [address] 1st OM 3.0 x 20 Ion    DIAGNOSTIC CARDIAC CATH LAB PROCEDURE  3/15/2005    [facility]. Mild to moderate CAD. Normal LV.  DIAGNOSTIC CARDIAC CATH LAB PROCEDURE  1/16/2007    [facility]. Cath/PTCA/DE stent of proximal and distal RCA.  DIAGNOSTIC CARDIAC CATH LAB PROCEDURE  2/21/2007    Cath/DE stent of proximal OM1 and proximal Cx.  DIAGNOSTIC CARDIAC CATH LAB PROCEDURE  12/30/2011    ANURDAHA of mid OM branch of circumflex.      ECHO COMPL W DOP COLOR FLOW  12/30/2011         HC INSERT PICC CATH, 5/> YRS - MIDLINE  11/23/2020         HERNIA REPAIR  2/2014    laparoscopic ventral hernia repain    JOINT REPLACEMENT Left 4/1/14    TKA-ed    JOINT REPLACEMENT Right 2018    KNEE    PAIN MANAGEMENT PROCEDURE Right 9/1/2020    RIGHT L3,4,5 MEDIAL BRANCH RADIOFREQUENCY ABLATION performed by Jeferson Bruce DO at [address] [place] [address] N/A 11/17/2020    L4-5 EPIDURAL STEROID INJECTION #1 performed by Dariela Vargas DO at [facility] OFFICE/OUTPT VISIT,PROCEDURE ONLY Right 2018    RIGHT KNEE TOTAL ARTHROPLASTY performed by Josue Arreguin DO at [address] IV infusion builder 50 mL/hr at 20 1234    fentaNYL 5 mcg/ml in 0.9%  ml infusion 200 mcg/hr (20 1156)    dextrose      midazolam 7 mg/hr (20 1431)      famotidine  20 mg Oral BID    methylPREDNISolone  40 mg Intravenous Daily    sodium chloride  20 mL Intravenous Once    ampicillin-sulbactam  3 g Intravenous Q6H    heparin flush  1 mL Intravenous 2 times per day    insulin lispro  0-18 Units Subcutaneous Q6H    nicotine  1 patch Transdermal Daily    acetylcysteine  4 mL Inhalation BID    albuterol  2.5 mg Nebulization Q4H    Arformoterol Tartrate  15 mcg Nebulization BID    atorvastatin  80 mg Oral Nightly    [Held by provider] clopidogrel  75 mg Oral Daily    fluticasone  2 spray Nasal Daily    sodium chloride flush  10 mL Intravenous 2 times per day    [Held by provider] enoxaparin  40 mg Subcutaneous Daily    insulin glargine  0.25 Units/kg Subcutaneous Nightly    chlorhexidine  15 mL Mouth/Throat BID         Vitals:  Tmax:  VITALS:  BP (!) 156/99   Pulse 117   Temp 99.5 °F (37.5 °C) (Bladder)   Resp 22   Ht 5' 11\" (1.803 m)   Wt (!) 327 lb 2.6 oz (148.4 kg)   SpO2 92%   BMI 45.63 kg/m²   24HR INTAKE/OUTPUT:      Intake/Output Summary (Last 24 hours) at 2020 1527  Last data filed at 2020 1434  Gross per 24 hour   Intake 5835.5 ml   Output 2450 ml   Net 3385.5 ml     CURRENT PULSE OXIMETRY:  SpO2: 92 %  24HR PULSE OXIMETRY RANGE:  SpO2  Av.7 %  Min: 90 %  Max: 99 %      EXAM:  General: No distress. Alert. Sedated. Obese  Eyes: PERRL. No sclera icterus. No conjunctival injection. ENT: No discharge. Pharynx clear. Endotracheal tube is in place. Secretions +  Neck: Trachea midline. Normal thyroid. Thick  Resp: No accessory muscle use. No crackles.   Bronchial sounds noticed left more than right. Fairly prolonged expiratory phase noticed  CV: Regular rate. Regular rhythm. No mumur or rub. ABD: Non-tender. Non-distended. No masses. No organmegaly. Normal bowel sounds. Fatty, soft  Skin: Warm and dry. No nodule on exposed extremities. No rash on exposed extremities. Lymph: No cervical LAD. No supraclavicular LAD. Ext: No joint deformity. No clubbing. No cyanosis. 1+ edema  Neuro: . Positive pupils/gag/corneals. Normal pain response. Lab Results:  CBC:   Recent Labs     12/01/20  0515 12/01/20  0942 12/01/20  1446 12/02/20  0530   WBC 4.6 5.0  --  6.1   HGB 5.6* 5.7* 6.5* 7.8*   HCT 19.2* 19.3* 21.7* 25.9*   MCV 93.2 93.7  --  91.8   PLT 20* 25*  --  19*     BMP:   Recent Labs     11/30/20  0540 11/30/20  0650 12/01/20  0515 12/02/20  0530     --  148* 148*   K 5.5* 3.8 3.6 3.0*     --  104 104   CO2 35*  --  36* 33*   PHOS 2.3*  --  3.1 3.1   BUN 90*  --  90* 78*   CREATININE 1.4*  --  1.7* 1.4*      ALB:3,BILIDIR:3,BILITOT:3,ALKPHOS:3)@  PT/INR:   No results for input(s): PROTIME, INR in the last 72 hours. Cultures:  -    ABG: noted  Films: Unchanged, left upper lobe opacity  CT : Left upper lobe collapse    ASSESSMENT:  · Acute on chronic respiratory failure - on ACVC mechanical ventilation   · Community-acquired pneumonia  · History heavy smoking and morbid obesity, cannot rule out underlying COPD, cannot rule out MANOLO  · OMER Small Cell Carcinoma     PLAN:  · Agree with current plan. · Antibiotic, Unasyn   · Bronchodilators and IV steroids  · Left upper lobe bronchoscopy, cytology with malignant cells, possible non-small cell lung cancer. Heme-onc consult appreciated not a candidate for urgent inpatient chemotherapy.     · Radiation oncology consult requested with potential for radiation   · Vent management as per CCM, [address] 20/510/60/10  · Discussed with CCM team  · To continue LABA with hope to improve bronchoconstriction  · Fentanyl for sedation · Diuresis per nephrology, possible thoracentesis left side  · Prolonged wean expected, possible trach placement if needed    Consults: ID, Pulmonary, Hematology/Oncology, Nephrology   Drips: Fentanyl   VENT: ACVC 20/510/60/10    - Look to future trach/PEG placement if needed and unable to liberate from vent after 10-14 days  - supportive care to continue   - wean vent as able, patient on [address] 20/510/60/10 at this time  - vent wean per ICU team   - transfuse for Hgb <7 and Platelet < 20     Darryl Gilford, M.D., F.C.C.P.  12/2/2020  3:27 PM

## 2020-12-02 NOTE — DISCHARGE SUMMARY
[facility] Physician Discharge Summary       No follow-up provider specified. Activity level:  Bedrest    Dispo:  Transfer to [facility] ICU      Condition on discharge: Stable     Patient ID:  Enedelia Rojas  [id number]  76 y.o.  1960    Admit date: 11/17/2020    Discharge date and time:  12/2/2020  1:57 PM    Admission Diagnoses: Active Problems:    CAD (coronary artery disease)    Smoker    Non-insulin dependent type 2 diabetes mellitus ([address]    Morbid obesity with BMI of 40.0-44.9, adult (HCC)    Chronic back pain    Acute respiratory failure with hypoxia ([address]    Community acquired pneumonia of left upper lobe of lung    Thrombocytopenia ([address]    Small cell lung cancer ([address]  Resolved Problems:    * No resolved hospital problems. *      Discharge Diagnoses: Active Problems:    CAD (coronary artery disease)    Smoker    Non-insulin dependent type 2 diabetes mellitus ([address]    Morbid obesity with BMI of 40.0-44.9, adult (HCC)    Chronic back pain    Acute respiratory failure with hypoxia ([address]    Community acquired pneumonia of left upper lobe of lung    Thrombocytopenia ([address]    Small cell lung cancer ([address]  Resolved Problems:    * No resolved hospital problems. *      Consults:  IP CONSULT TO PULMONOLOGY  IP CONSULT TO DIETITIAN  IP CONSULT TO CRITICAL CARE  IP CONSULT TO IV TEAM  IP CONSULT TO HEM/ONC  IP CONSULT TO NEPHROLOGY  IP CONSULT TO INFECTIOUS DISEASES  IP CONSULT TO IV TEAM  IP CONSULT TO RADIATION ONCOLOGY  IP CONSULT TO GENERAL SURGERY  IP CONSULT TO PULMONOLOGY  IP CONSULT TO NEPHROLOGY  IP CONSULT TO RADIATION ONCOLOGY    Procedures:   Thoracentesis 11/22/20  Bronchoscopy 11/18/20, 11/26/20  Echocardiogram 11/27/20  Intubation 11/18/20  Black catheter insertion 11/17/20  Central Line insertion 11/23/20      Hospital Course:   Patient Enedelia Rojas. is a 61 y.o. presented with   Acute respiratory failure with hypoxia ([address] [J96.01]      1.   Acute hypoxic resp failure due to CAP and volume overload  -  Left sided pneumonia on admission  -  Full vent support; had trial of PS  -  Pulm/cc following  -  Solumedrol now at q12 h  -  Nebs  -  Thoracentesis performed 11/22/20 with only small amount of pleural fluid  -  Respiratory cultures pending; 24 hours no growth  -  Bronchoscopy performed on 11/18/20  -  COVID testing negative on 11/17/20     2. CAP  -  Continue antibiotics; currently on unasyn  - strep and legionella negative  - procalcitonin 0.23  -  resp culture neg x 24 hours  -  ID was consulted  - persistent fevers-cefepime discontinued thinking that it was drug induced, however, fevers persist     3. Hx CAD with stent placement  -  continue plavix   -  Hx aortic aneurysm s/p endovascular repair     4. Hypoalbuminemia  -  With anasarca/volume overload  -  Renal followed patient  -  IV diuretics discontinued  -  Albumin per renal     5. Hypernatremia-2/2 free water deficits; resolved  -  Renal following     6. Left lung CA-with pleural effusion; Most consistent with small cell lung cancer  -  Hem/onc and pulmonology following  -  Rad/onc consulted by pulm. -suggestion of palliative radiation and will be transferred to [facility] for this     7. NIDDM- continue lantus and ISS     8. Chronic back pain, s/p epidural injection 11/17/20;   -  CT spine neg for hematoma  -  zanaflex prn     9.  Essential hypertension-  Controlled  -  BB currently on hold due to low BP     10. Mixed hyperlipidemia with hx CAD  -  Continue statin     11. Suspect COPD with MANOLO  -  Morbid Obesity and 39 pack year smoking history  -  Patient will require sleep studies and PFTs as outpatient     12. LUE DVT-partially occlusive  -   Hematology on board  -  Cannot fully anticoagulate due to low platelets  -  Patient is on lovenox 40 mg daily     13. Hyperkalemia-nephrology following;  Started on thiazide     14.   Anemia-acutely worsening  -  Hematology following patient  -  Awaiting their approximately 4.8 cm above the korina. Enteric catheter extends beyond the inferior margin of the image. Dense consolidation of the left upper lobe, not significantly changed. There are bibasilar airspace opacities and small to moderate bilateral pleural effusions. No visible pneumothorax. 1. No significant interval change. Persistent dense consolidation of the left upper lobe. 2. Mild bibasilar airspace opacities with small to moderate pleural effusions. Xr Chest Portable    Result Date: 12/1/2020  EXAMINATION: ONE XRAY VIEW OF THE CHEST 12/1/2020 7:30 am COMPARISON: 1 day prior. HISTORY: ORDERING SYSTEM PROVIDED HISTORY: resp failure TECHNOLOGIST PROVIDED HISTORY: Reason for exam:->resp failure FINDINGS: There is stable positioning of endotracheal tube. The cardiomediastinal contours are unchanged. There is stable left upper lobar masslike consolidation. .  There is nonspecific elevation of the right hemidiaphragm. There is similar appearance of bilateral small layering effusions, left greater than right. There is no visible pneumothorax. The pulmonary vessels are within normal limits. There is no significant interval change compared to the prior examination. No significant interval change compared to 1 day prior.       Patient Instructions:      Medication List      ASK your doctor about these medications    aspirin 325 MG EC tablet  TAKE 1 TABLET BY MOUTH EVERY DAY     atorvastatin 80 MG tablet  Commonly known as:  LIPITOR  TAKE 1 TABLET BY MOUTH AT BEDTIME     carvedilol 25 MG tablet  Commonly known as:  COREG  TAKE ONE TABLET BY MOUTH TWICE DAILY (WITH MEALS)     Claritin 10 MG capsule  Generic drug:  loratadine     clopidogrel 75 MG tablet  Commonly known as:  PLAVIX  TAKE 1 TABLET BY MOUTH EVERY DAY     diclofenac 75 MG EC tablet  Commonly known as:  VOLTAREN     fluticasone 50 MCG/ACT nasal spray  Commonly known as:  Flonase  2 sprays by Nasal route daily     metFORMIN 500 MG tablet  Commonly known as:  GLUCOPHAGE  TAKE 1 TABLET BY MOUTH TWICE A DAY WITH MEALS     niacin 500 MG extended release tablet  Commonly known as:  SLO-NIACIN  take 1 tablet by mouth once daily     omega-3 acid ethyl esters 1 g capsule  Commonly known as:  LOVAZA  take 1 capsule twice a day     oxyCODONE-acetaminophen 7.5-325 MG per tablet  Commonly known as:  Percocet  Take 1 tablet by mouth 3 times daily as needed for Pain for up to 30 days. Intended supply: 30 days     pioglitazone 15 MG tablet  Commonly known as:  ACTOS  TAKE 1 TABLET BY MOUTH EVERY DAY     pregabalin 150 MG capsule  Commonly known as:  Lyrica  Take 1 capsule by mouth 2 times daily for 30 days. quinapril 10 MG tablet  Commonly known as: Accupril  Take 1 tablet by mouth daily     sildenafil 50 MG tablet  Commonly known as:  Viagra  Take 1 tablet by mouth as needed for Erectile Dysfunction     Tens Unit Misc  by Does not apply route     tiZANidine 4 MG tablet  Commonly known as:  ZANAFLEX  Take 1 tablet by mouth 3 times daily as needed (spasms)  Ask about: Should I take this medication?               Note that more than 30 minutes was spent in preparing discharge papers, discussing discharge with patient, medication review, etc.    Signed:  Electronically signed by Boone White MD on 12/2/2020 at 1:57 PM

## 2020-12-02 NOTE — PROGRESS NOTES
[facility]  Hematology/Oncology  Consult      Patient Name: Gaston Peterson. YOB: 1960  PCP: Janelle Kunz PA-C   Referring Provider:      Reason for Consultation:   Chief Complaint   Patient presents with    Shortness of Breath     patient sent from surgery after having epideral injection L4-L5. SOB has been for past two weeks     Subjective: Remains intubated and in ICU. Much more alert and responds to yes and no questions. History of Present Illness: This is a 60 yo male patient with a past medical history of CAD, DM type 2, morbid obesity, chronic back pain, HTN, AAA s/p repair who set to the emergency room by Dr. Claudene Dixon for complaints of shortness of breath and decreased O2 during an epidural procedure. Patient was having an L4/L5 epidural for pain management done by Dr. Claudene Dixon when his O2 dropped into the 80's during the procedure. Patient also reported having worsening shortness of breath over the past two weeks with a productive cough. Patient is basically bed bound only taking occasional steps. CTA of the chest, A/P showed new confluent opacities located in left upper lobe suggesting pneumonia, atelectasis, or neoplasm. Patchy airspace opacities located in lingula suggesting pneumonia with areas of atelectasis. Stenosis and occlusion are associated with left upper lobe segmental bronchi and lingular segmental bronchi. Moderate to large left pleural effusion. Mediastinal lymphadenopathy. Stable fusiform infrarenal abdominal aortic aneurysm with endograft repair. Aneurysm measures up to 6.4 cm. No evidence of endoleak or retroperitoneal fluid. He was given 1 dose of Rocephin and azithromycin while in the ER. CBC since admission has shown anemia and thrombocytopenia. 11/18 an RRT was called and was subsequently intubated. He also had a bronchoscopy done and bxs of left upper lobe were sent.        Diagnostic Data:     Past Medical History:   Diagnosis Date    Anticoagulant long-term use     Arthritis     CAD (coronary artery disease)     Chronic back pain     Depression     Dyslipidemia     Hiatal hernia 1979    History of ST elevation myocardial infarction (STEMI)     Hyperlipidemia     Hypertension     Low back pain     Lumbar radiculopathy 8/14/2019    Obesity     MANOLO on CPAP     Presence of stent in left circumflex coronary artery     Presence of stent in right coronary artery     Smoker     Type 2 diabetes mellitus without complication [facility])        Patient Active Problem List    Diagnosis Date Noted    ARF (acute respiratory failure) ([address] 12/02/2020    Small cell lung cancer ([address]     Acute respiratory failure with hypoxia ([address] 11/17/2020    Community acquired pneumonia of left upper lobe of lung 11/17/2020    Thrombocytopenia ([address] 11/17/2020    Spinal stenosis of lumbar region with neurogenic claudication 10/29/2020    Obesity     Chronic back pain     Lumbosacral spondylosis without myelopathy 12/11/2019    Lumbar radiculopathy 08/14/2019    AAA (abdominal aortic aneurysm) ([address] 06/28/2019    Chronic bilateral low back pain with bilateral sciatica 06/11/2019    Right hip pain 06/11/2019    DDD (degenerative disc disease), lumbar 06/11/2019    Status post total right knee replacement 11/26/2018    Morbid obesity with BMI of 40.0-44.9, adult ([address] 11/26/2018    Chronic pain syndrome 07/27/2018    Chronic pain of right knee 07/27/2018    Non-insulin dependent type 2 diabetes mellitus ([address] 03/16/2018    Aortic valve stenosis 02/15/2018    Primary osteoarthritis of right knee 02/15/2018    Presence of stent in left circumflex coronary artery     Smoker     CAD (coronary artery disease) 12/28/2011    HTN (hypertension) 12/28/2011    Hyperlipemia 12/28/2011        Past Surgical History:   Procedure Laterality Date    ABDOMINAL AORTIC ANEURYSM REPAIR, ENDOVASCULAR N/A 6/28/2019    ENDOVASCULAR REPAIR ABDOMINAL AORTIC Yes Jennifer Quiros PA-C   sildenafil (VIAGRA) 50 MG tablet Take 1 tablet by mouth as needed for Erectile Dysfunction 7/6/20   Jennifer Quiros PA-C   aspirin 325 MG EC tablet TAKE 1 TABLET BY MOUTH EVERY DAY  Patient taking differently: Take 325 mg by mouth daily Has been on hold for procedure 5/7/20   Vara Harada,    Tens Unit MISC by Does not apply route 5/7/19   TRUDY Christopher       Allergies  Allergies   Allergen Reactions    Bee Venom Anaphylaxis       Review of Systems: patient is intubated and sedated and not responsive to questions. Objective  /78   Pulse 102   Temp 99.5 °F (37.5 °C) (Bladder)   Resp 17   Ht 5' 11\" (1.803 m)   Wt (!) 327 lb 2.6 oz (148.4 kg)   SpO2 95%   BMI 45.63 kg/m²     Physical Exam:   Performance Status:  Head and neck: PERRLA, EOMI . Sclera non icteric. Oropharynx: Clear  Neck: no JVD,  no adenopathy  Heart: Regular rate and regular rhythm, no murmur  Lungs:Disffuse inspiratory and expiratory rhonchi   Extremities: No edema,no cyanosis, no clubbing. Abdomen: Soft, non-tender;no masses, no organomegaly  Skin: No rash. Neurologic:Cranial nerves grossly intact. No focal motor or sensory deficits .     Recent Laboratory Data-   Lab Results   Component Value Date    WBC 6.1 12/02/2020    HGB 7.8 (L) 12/02/2020    HCT 25.9 (L) 12/02/2020    MCV 91.8 12/02/2020    PLT 19 (LL) 12/02/2020    LYMPHOPCT 11.0 (L) 12/02/2020    RBC 2.82 (L) 12/02/2020    MCH 27.7 12/02/2020    MCHC 30.1 (L) 12/02/2020    RDW 17.0 (H) 12/02/2020    NEUTOPHILPCT 80.0 12/02/2020    MONOPCT 5.0 12/02/2020    BASOPCT 0.0 12/02/2020    NEUTROABS 5.00 12/02/2020    LYMPHSABS 0.67 (L) 12/02/2020    MONOSABS 0.30 12/02/2020    EOSABS 0.12 12/02/2020    BASOSABS 0.00 12/02/2020       Lab Results   Component Value Date     (H) 12/02/2020    K 3.0 (L) 12/02/2020     12/02/2020    CO2 33 (H) 12/02/2020    BUN 78 (H) 12/02/2020    CREATININE 1.4 (H) 12/02/2020    GLUCOSE 226 (H) 12/02/2020    CALCIUM 8.7 12/02/2020    PROT 5.1 (L) 12/02/2020    LABALBU 2.9 (L) 12/02/2020    BILITOT 0.9 12/02/2020    ALKPHOS 159 (H) 12/02/2020    AST 77 (H) 12/02/2020    ALT 49 (H) 12/02/2020    LABGLOM 52 12/02/2020    GFRAA >60 12/02/2020       No results found for: IRON, TIBC, FERRITIN        Radiology-    Xr Abdomen (kub) (single Ap View)    Result Date: 11/23/2020  EXAMINATION: ONE SUPINE XRAY VIEW(S) OF THE ABDOMEN 11/23/2020 9:38 am COMPARISON: None HISTORY: ORDERING SYSTEM PROVIDED HISTORY: abdominal pain TECHNOLOGIST PROVIDED HISTORY: Reason for exam:->abdominal pain FINDINGS: There is a nonobstructive bowel gas pattern. There are no abnormal air-fluid levels. There are no calculi overlying the renal shadows. There is an NG tube within the stomach. There is a stent graft seen within the abdominal aorta. 1. There is no bowel obstruction, ileus or free air. Ct Lumbar Spine Wo Contrast    Result Date: 11/19/2020  EXAMINATION: CT OF THE LUMBAR SPINE WITHOUT CONTRAST  11/18/2020 TECHNIQUE: CT of the lumbar spine was performed without the administration of intravenous contrast. Multiplanar reformatted images are provided for review. Dose modulation, iterative reconstruction, and/or weight based adjustment of the mA/kV was utilized to reduce the radiation dose to as low as reasonably achievable. COMPARISON: 11/17/2020 HISTORY: ORDERING SYSTEM PROVIDED HISTORY: exclude epidural hematoma TECHNOLOGIST PROVIDED HISTORY: Reason for exam:->exclude epidural hematoma Chronic back pain, recent epidural placement FINDINGS: BONES/ALIGNMENT: Vertebral body heights are preserved without evidence for lumbar fracture. However, there is irregular lucency and sclerosis in the upper sacral ala bilaterally. There is minimal retrolisthesis at L2-L3. Alignment is otherwise normal. DEGENERATIVE CHANGES: There is disc space narrowing and vacuum disc phenomenon at L2-L3. Disc space heights are otherwise preserved. There is diffuse facet arthropathy. SOFT TISSUES/RETROPERITONEUM: No evidence for epidural hematoma is identified. 1. No evidence for epidural hematoma on CT scan. 2. Suspected sacral insufficiency fracture. Xr Chest Portable    Result Date: 11/23/2020  EXAMINATION: ONE XRAY VIEW OF THE CHEST 11/23/2020 7:08 am COMPARISON: 11/20/2020 HISTORY: ORDERING SYSTEM PROVIDED HISTORY: resp failure TECHNOLOGIST PROVIDED HISTORY: Reason for exam:->resp failure FINDINGS: There is no interval change in the persistent dense consolidation within the left upper lobe. The left lower lobe is clear. The right lung is clear. There is minimal atelectasis seen within the right lung base. The cardiac silhouette is within normals. 1. No interval change in the dense consolidation seen within the left upper lobe. 2. Minimal right lung base atelectasis. Xr Chest Portable    Result Date: 11/22/2020  EXAMINATION: ONE XRAY VIEW OF THE CHEST 11/22/2020 6:28 am COMPARISON: 11/21/2020 HISTORY: ORDERING SYSTEM PROVIDED HISTORY: resp failure TECHNOLOGIST PROVIDED HISTORY: Reason for exam:->resp failure FINDINGS: The endotracheal tube is stable. The enteric tube tip is not well visualized but may be at the gastroesophageal junction. There is stable left upper lobe dense opacity. There are continued patchy opacities in the left lower lobe. There may be small pleural effusions. No pneumothorax is seen. No significant change in dense airspace opacity of the left upper lobe and patchy left lower lobe airspace opacities. Enteric tube tip is not well visualized due to underpenetration. The tip may be at the gastroesophageal junction. This could be confirmed with KUB centered on the upper abdomen.     Xr Chest Portable    Result Date: 11/21/2020  EXAMINATION: ONE XRAY VIEW OF THE CHEST 11/21/2020 7:37 am COMPARISON: 11/20/2020 HISTORY: ORDERING SYSTEM PROVIDED HISTORY: resp failure TECHNOLOGIST PROVIDED HISTORY: Reason for exam:->resp failure FINDINGS: Endotracheal tube and nasogastric tube are unchanged. Large opacity in the left upper lobe is unchanged. There is additional airspace disease in the left lower lobe which is stable. There is no pneumothorax. Small pleural effusions are not excluded. Unchanged large opacity/consolidation in left upper lobe. Unchanged airspace disease in left lower lobe. Xr Chest Portable    Result Date: 11/20/2020  EXAMINATION: ONE XRAY VIEW OF THE CHEST 11/20/2020 6:33 am COMPARISON: 11/19/2020 HISTORY: ORDERING SYSTEM PROVIDED HISTORY: resp failure TECHNOLOGIST PROVIDED HISTORY: Reason for exam:->resp failure FINDINGS: Endotracheal and enteric tubes remain in place. There has been no appreciable change in opacities throughout the left lung, most pronounced in the left apex. The right lung is clear. The heart size is at the upper limits of normal.  There is no discernible pneumothorax. Grossly stable opacities on the left. Xr Chest Portable    Result Date: 11/19/2020  EXAMINATION: ONE XRAY VIEW OF THE CHEST 11/19/2020 6:23 am COMPARISON: 11/18/2020 HISTORY: ORDERING SYSTEM PROVIDED HISTORY: resp failure TECHNOLOGIST PROVIDED HISTORY: Reason for exam:->resp failure FINDINGS: Evaluation is limited by the patient's body habitus and the portable technique. The right lung apex was partially excluded. There is increased dense consolidation in the left upper lobe. There is also medial left basilar airspace opacity obscuring the hemidiaphragm. The heart size is mildly enlarged. There is no discernible pneumothorax. Endotracheal and enteric tubes are again seen. Increasing multifocal left lung pneumonia. Xr Chest Portable    Result Date: 11/18/2020  EXAMINATION: ONE XRAY VIEW OF THE CHEST 11/18/2020 6:39 am COMPARISON: CT chest November 17, 2020.  HISTORY: ORDERING SYSTEM PROVIDED HISTORY: SOB TECHNOLOGIST PROVIDED HISTORY: Reason for exam:->SOB FINDINGS: The cardiac silhouette is within normal reconstruction, and/or weight based adjustment of the mA/kV was utilized to reduce the radiation dose to as low as reasonably achievable. COMPARISON: None. HISTORY: ORDERING SYSTEM PROVIDED HISTORY: aneurysm, cp, sob TECHNOLOGIST PROVIDED HISTORY: Reason for exam:->aneurysm, cp, sob FINDINGS: CTA chest: There are confluent opacities located in left upper lobe. Patchy airspace opacities located in lingula. There is moderate to large left pleural effusion. Peribronchial thickening extensor in left hilar location into the left lung base. There is subsegmental atelectasis in posterior right lower lobe with adjacent ground-glass opacities. There is pulmonary vascular congestion. The heart is mildly enlarged. There is mediastinal lymphadenopathy. Ascending thoracic aorta measures 3.7 cm in diameter. Descending thoracic aorta measures 3 cm in diameter. No evidence of thoracic aortic aneurysm or dissection. Distal descending thoracic aorta is tortuous. CTA abdomen/pelvis: There is evidence of endograft repair involving the abdominal aorta. There is redemonstration of fusiform abdominal aortic aneurysm measuring up to 6.4 cm. This finding is stable since prior. No evidence of endoleak. No retroperitoneal fluid collections. Iliac limbs of endograft appear patent. Common iliac arteries are tortuous. Common femoral arteries are patent. Numerous diverticula associated with the left colon and sigmoid colon. No evidence of acute diverticulitis. Liver, gallbladder, pancreas, and spleen are grossly unremarkable however there is motion artifact limiting this examination. There is no hydronephrosis. Cyst associated with the right kidney is stable since previous examination as well as cyst associated with the left kidney appearing stable since prior. Appendix is visualized and normal.    1.  New confluent opacities located in left upper lobe suggesting pneumonia, atelectasis, or neoplasm.  2.  Patchy airspace opacities located in lingula suggesting pneumonia with areas of atelectasis. 3.  Stenosis and occlusion are associated with left upper lobe segmental bronchi and lingular segmental bronchi. 4.  Moderate to large left pleural effusion. 5.  Mediastinal lymphadenopathy. 6.  Stable fusiform infrarenal abdominal aortic aneurysm with endograft repair. Aneurysm measures up to 6.4 cm. No evidence of endoleak or retroperitoneal fluid. 7.  Diverticulosis. Cta Abdomen Pelvis W Contrast    Result Date: 11/17/2020  EXAMINATION: CTA OF THE CHEST 11/17/2020 3:18 pm TECHNIQUE: CTA of the chest was performed after the administration of intravenous contrast.  Multiplanar reformatted images are provided for review. MIP images are provided for review. Dose modulation, iterative reconstruction, and/or weight based adjustment of the mA/kV was utilized to reduce the radiation dose to as low as reasonably achievable.; CTA of the abdomen and pelvis was performed with the administration of intravenous contrast. Multiplanar reformatted images are provided for review. MIP images are provided for review. Dose modulation, iterative reconstruction, and/or weight based adjustment of the mA/kV was utilized to reduce the radiation dose to as low as reasonably achievable. COMPARISON: None. HISTORY: ORDERING SYSTEM PROVIDED HISTORY: aneurysm, cp, sob TECHNOLOGIST PROVIDED HISTORY: Reason for exam:->aneurysm, cp, sob FINDINGS: CTA chest: There are confluent opacities located in left upper lobe. Patchy airspace opacities located in lingula. There is moderate to large left pleural effusion. Peribronchial thickening extensor in left hilar location into the left lung base. There is subsegmental atelectasis in posterior right lower lobe with adjacent ground-glass opacities. There is pulmonary vascular congestion. The heart is mildly enlarged. There is mediastinal lymphadenopathy. Ascending thoracic aorta measures 3.7 cm in diameter. Descending thoracic aorta measures 3 cm in diameter. No evidence of thoracic aortic aneurysm or dissection. Distal descending thoracic aorta is tortuous. CTA abdomen/pelvis: There is evidence of endograft repair involving the abdominal aorta. There is redemonstration of fusiform abdominal aortic aneurysm measuring up to 6.4 cm. This finding is stable since prior. No evidence of endoleak. No retroperitoneal fluid collections. Iliac limbs of endograft appear patent. Common iliac arteries are tortuous. Common femoral arteries are patent. Numerous diverticula associated with the left colon and sigmoid colon. No evidence of acute diverticulitis. Liver, gallbladder, pancreas, and spleen are grossly unremarkable however there is motion artifact limiting this examination. There is no hydronephrosis. Cyst associated with the right kidney is stable since previous examination as well as cyst associated with the left kidney appearing stable since prior. Appendix is visualized and normal.    1.  New confluent opacities located in left upper lobe suggesting pneumonia, atelectasis, or neoplasm. 2.  Patchy airspace opacities located in lingula suggesting pneumonia with areas of atelectasis. 3.  Stenosis and occlusion are associated with left upper lobe segmental bronchi and lingular segmental bronchi. 4.  Moderate to large left pleural effusion. 5.  Mediastinal lymphadenopathy. 6.  Stable fusiform infrarenal abdominal aortic aneurysm with endograft repair. Aneurysm measures up to 6.4 cm. No evidence of endoleak or retroperitoneal fluid. 7.  Diverticulosis. Xr Abdomen For Ng/og/ne Tube Placement    Result Date: 11/22/2020  EXAMINATION: ONE SUPINE XRAY VIEW(S) OF THE ABDOMEN 11/22/2020 5:06 pm COMPARISON: November 22, 2020, 4 hours prior.  HISTORY: ORDERING SYSTEM PROVIDED HISTORY: Confirmation of course of NG/OG/NE tube and location of tip of tube TECHNOLOGIST PROVIDED HISTORY: Reason for exam:->Confirmation of course of NG/OG/NE tube and location of tip of tube Portable? ->Yes FINDINGS: Tip of the NG tube noted at the level of the diaphragm, no significant change. The right side and the inferior part of the abdomen is not included in the study. There is opacification of the visualized left upper lung. Tip of NG tube at the level of the left hemidiaphragm, no change. Xr Chest Abdomen Ng Placement    Result Date: 11/22/2020  EXAMINATION: ONE SUPINE XRAY VIEW(S) OF THE ABDOMEN 11/22/2020 12:52 pm COMPARISON: November 18. HISTORY: ORDERING SYSTEM PROVIDED HISTORY: OG placement TECHNOLOGIST PROVIDED HISTORY: Reason for exam:->OG placement FINDINGS: Nasogastric tube is present. Side hole appears to be at level of gastroesophageal junction. This tube could be advanced approximately 4 or 5 cm. Nasogastric tube is present with side hole at level of gastroesophageal junction. This tube could be advanced approximately 4 or 5 cm. Xr Chest Abdomen Ng Placement    Result Date: 11/18/2020  EXAMINATION: ONE SUPINE XRAY VIEW(S) OF THE ABDOMEN 11/18/2020 9:07 am COMPARISON: 11/18/2020, about 2 hours earlier HISTORY: ORDERING SYSTEM PROVIDED HISTORY: NGT TECHNOLOGIST PROVIDED HISTORY: Reason for exam:->NGT Reason for exam:->portable NG tube placement FINDINGS: There is an NG tube with the tip in the stomach. An ET tube is again noted in satisfactory position. There is extensive opacity in the left lung, most likely pneumonia. Increased markings also seen in the visualized portion of the right lung. The heart is enlarged. NG tube tip in the stomach. Fluoro For Surgical Procedures    Result Date: 11/17/2020  EXAMINATION: SPOT FLUOROSCOPIC IMAGES 11/17/2020 12:03 pm TECHNIQUE: Fluoroscopy was provided by the radiology department for procedure. Radiologist was not present during examination.  FLUOROSCOPY DOSE AND TYPE OR TIME AND EXPOSURES: Total dose:26.99 mGy COMPARISON: None HISTORY: ORDERING SYSTEM PROVIDED HISTORY: Pain management TECHNOLOGIST PROVIDED HISTORY: Reason for exam:->L4-5 Epidural steroid injection #1 Intraprocedural imaging. FINDINGS: 3 intraoperative fluoroscopic images were obtained during L4-5 epidural steroid injection. Intraprocedural fluoroscopic spot images as above. See separate procedure report for more information. ASSESSMENT/PLAN :    62 yo male  CAD  HTN  AAA s/p repair  DM type 2  Morbid obesity  Chronic back pain  Thrombocytopenia, Anemia   OMER opacity s/p biopsy    - S/p bronch with cytology pending  - OMER PNA vs neoplasm  - On rocpehin and levaquin   - CBC showing Hgb 7.8 with MCV 94, Platelets 39  - Will follow cyto  - Cytopenias likely due to combination of PNA and abc, with myelosuppression. Thrombocytopenia has continued to slowly progress. CBC was WNL on 10/6/20, suggesting an acute process rather than a primary bone marrow process  - Transfuse for Hgb <7, platelets <38  - Smear showed subtle toxic granulation of neutrophils  - Check iron profile and B12/folate    11/24/20  - OMER bronch bx pathology:  DIAGNOSIS   POSITIVE FOR MALIGNANT CELLS   Carcinoma, most compatible with small cell carcinoma, see comment. Monolayer shows atypical crushed cells, and many lymphocytes/histiocytes   with anthracotic pigment. Cell block shows abundant crushed malignant cells. COMMENT:   Immunostains stain the malignant cells as follows:   Pankeratin and TTF1:  Positive   Chromogranin:  Positive weakly   Synaptophysin:  Negative   CK7:  Negative   This immunoprofile is most compatible with small cell carcinoma. - Today's platelets 34, Hgb 7.6  - Transfuse for Hgb <7, platelets <24  Patient with small cell carcinoma with left upper lobe opacity with large left pleural effusion along with mediastinal lymphadenopathy. His CT scan of abdomen and pelvis did not show evidence of intra-abdominal metastasis.   He has multiple comorbid conditions including obesity, coronary artery disease, obstructive sleep apnea, obesity, type 2 diabetes mellitus and he presently has pneumonia with hypoxia and respiratory failure and remains intubated and sedated. 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   - Today's Hgb 7.2 Plts 38   - Patient still intubated but hopefully will be able to extubated  - Once stable, MRI brain to complete staging   - If he improves clinically and has improvement in ECOG, he will be considered for systemic therapy  - L pleural effusions likely malignant  - ID following, now on unasyn  - Given his current ECOG and comorbidities along with persistent fevers and abx requirements, not eligible for inpatient emergent chemotherapy. Will get rad onc on board as inpatient urgent palliative XRT could be considered to improved respiratory status if he has improvement in above    11/26/20  - Remains intubated, back in ICU  - CBC with further drop in platelets to 29. Likely combination of myelosuppression from acute illness and abx exposure  - If he has improvement in his clinic course, eventually will need MRI brain as above  - Therapy plan as above. He will need significant improvement clinically prior to discussion regarding chemotherapy. Rad onc consult pending, again will likely need improvement prior to proceeding with therapy. Cultures have no grown a source, but patient remains febrile (possibly drug fever and ID following and managing abx)  - Will follow. Further recs pending clinical course    11/27/20  - LUE DVT  - Platelets 33. Stable from yesterday  - Can not anticoagulate with current platelet count. Needs to be ~50. Monitor for now  - Remainder of CBC stable    11/29/20  - CBC stable, thrombocytopenia unchanged. Hgb dropped to 7.0.  Will give 1 unit  - Continue to hold [facility] with platelets <00  - Still with low grade fevers in the 100's  - ID following, on unasyn. Possible post-obstructive PNA    11/30/20  - Continues to have thrombocytopenia platelets 28. Hemoglobin 7.3 after 1 unit of packed red blood cells yesterday. - Continue to hold [facility] for platelets <02  - Still with fevers in the 100s, he is also hypertensive  - On Unasyn  - Rad on consult still pending, but no aggressive oncologic care until significant clinical improvement    12/1/20  - Today's Hgb 7.8  - Left upper lobe cancer. Not eligible for emergent chemotherapy  - Patient has to improve clinically in order to transfer to [facility] for palliative radiation.  - Continue to transfuse with target hemoglobin above 7   - Febrile dt underlying ca   - Overall poor prognosis     12/2/20  - Today's Hgb 7.8 plts are 19  - Left upper lobe cancer. Not eligible for emergent chemotherapy at this time. - Patient has been cleared per Dr. Lacey Mauricio to transport to [facility] ([facility]) for palliative radiation. Transfer order has been placed per ICU nursing staff. - Transfuse for Hgb <7, platelets <85  - Overall poor prognosis      MATY Correa - CNP  Electronically signed 12/2/2020 at 10:53 AM   Pt seen and examined.  Note edited to reflect updates  Gill Prieto MD

## 2020-12-02 NOTE — PLAN OF CARE
Problem: Falls - Risk of:  Goal: Will remain free from falls  Description: Will remain free from falls  Outcome: Met This Shift  Goal: Absence of physical injury  Description: Absence of physical injury  Outcome: Met This Shift     Problem: Skin Integrity:  Goal: Will show no infection signs and symptoms  Description: Will show no infection signs and symptoms  Outcome: Met This Shift  Goal: Absence of new skin breakdown  Description: Absence of new skin breakdown  Outcome: Met This Shift     Problem: Pain:  Goal: Pain level will decrease  Description: Pain level will decrease  Outcome: Met This Shift  Goal: Control of acute pain  Description: Control of acute pain  Outcome: Met This Shift  Goal: Control of chronic pain  Description: Control of chronic pain  Outcome: Met This Shift     Problem: Gas Exchange - Impaired  Goal: Able to breathe comfortably  Description: Able to breathe comfortably  Outcome: Met This Shift     Problem: Fluid and Electrolyte Imbalance  Goal: Fluid and electrolyte balance are achieved/maintained  Outcome: Met This Shift     Problem: HEMODYNAMIC STATUS  Goal: Hemoglobin within specified parameters  Outcome: Met This Shift     Problem: Bleeding - Risk of  Goal: Absence of active bleeding  Outcome: Met This Shift     Problem: Restraint Use - Nonviolent/Non-Self-Destructive Behavior:  Goal: Absence of restraint-related injury  Description: Absence of restraint-related injury  Outcome: Met This Shift     Problem: MOBILITY  Goal: Mobility/activity is maintained at optimum level for patient  Outcome: Not Met This Shift     Problem: Restraint Use - Nonviolent/Non-Self-Destructive Behavior:  Goal: Absence of restraint indications  Description: Absence of restraint indications  Outcome: Not Met This Shift

## 2020-12-03 NOTE — PROGRESS NOTES
Failed extubation this am and now reintubated and sedated so no history is available.   Vent settings:Vent Information  $Ventilation: $Subsequent Day  Skin Assessment: Clean, dry, & intact  Equipment ID: [id number]  Equipment Changed: Humidification  Vent Type: 840  Vent Mode: AC/VC  Vt Ordered: 510 mL  Rate Set: 20 bmp  Peak Flow: 70 L/min  Pressure Support: 0 cmH20  FiO2 : 65 %  SpO2: 96 %  SpO2/FiO2 ratio: 147.69  Sensitivity: 3  PEEP/CPAP: 7  I Time/ I Time %: 0 s  Humidification Source: Heated wire  Humidification Temp: 37  Humidification Temp Measured: 36.8  Circuit Condensation: Drained  Mask Type: Full face mask  Mask Size: Medium  Additional Respiratory  Assessments  Pulse: 98  Resp: 20  SpO2: 96 %  Position: Semi-Jeff's  Humidification Source: Heated wire  Humidification Temp: 37  Circuit Condensation: Drained  Oral Care: Mouth moisturizer, Lip moisturizer applied, Mouth suctioned  Subglottic Suction Done?: Yes  Cuff Pressure (cm H2O): 29 cm H2O      Current Facility-Administered Medications:     insulin glargine (LANTUS) injection vial 10 Units, 10 Units, Subcutaneous, Daily, Mohsen Rowell MD, 10 Units at 12/03/20 1005    potassium bicarb-citric acid (EFFER-K) effervescent tablet 40 mEq, 40 mEq, Oral, Q4H, Mohsen Rowell MD, 40 mEq at 12/03/20 1006    cefepime (MAXIPIME) 2 g IVPB extended (mini-bag), 2 g, Intravenous, Q12H, Celeste Tanner MD, Last Rate: 12.5 mL/hr at 12/03/20 1234, 2 g at 12/03/20 1234    NS FLUSH for cefepime , , Intravenous, Q12H, Celeste Tanner MD    famotidine (PEPCID) tablet 20 mg, 20 mg, Oral, BID, Myrna Ashby MD, 20 mg at 12/03/20 0758    methylPREDNISolone sodium (SOLU-MEDROL) injection 40 mg, 40 mg, Intravenous, Daily, Mohsen Rowell MD, 40 mg at 12/03/20 0801    potassium chloride 10 mEq/100 mL IVPB (Peripheral Line), 10 mEq, Intravenous, PRN, Mohsen Rowell MD, Last Rate: 100 mL/hr at 12/03/20 0949, 10 mEq at 12/03/20 0949    0.9 % sodium chloride bolus, 20 mL, Intravenous, Once, Hema Gardner MD    acetaminophen (TYLENOL) tablet 650 mg, 650 mg, Oral, Q4H PRN, 650 mg at 12/02/20 0909 **OR** acetaminophen (TYLENOL) suppository 650 mg, 650 mg, Rectal, Q4H PRN, Lissett Meek MD, 650 mg at 11/27/20 0430    fentaNYL 5 mcg/ml in 0.9%  ml infusion, 200 mcg/hr, Intravenous, Continuous, Jc Bunn MD, Last Rate: 20 mL/hr at 12/03/20 1000, 100 mcg/hr at 12/03/20 1000    sodium chloride flush 0.9 % injection 10 mL, 10 mL, Intravenous, PRN, Jc Bunn MD, 10 mL at 11/26/20 0838    heparin flush 100 UNIT/ML injection 100 Units, 1 mL, Intravenous, 2 times per day, Jc Bunn MD, 100 Units at 12/02/20 1955    heparin flush 100 UNIT/ML injection 100 Units, 1 mL, Intracatheter, PRN, Jc Bunn MD    sodium phosphate 24.3 mmol in dextrose 5 % 250 mL IVPB, 0.16 mmol/kg, Intravenous, PRN **OR** sodium phosphate 48.57 mmol in dextrose 5 % 250 mL IVPB, 0.32 mmol/kg, Intravenous, PRN, Jc Bunn MD    magnesium sulfate 1 g in dextrose 5% 100 mL IVPB, 1 g, Intravenous, PRN, Jc Bunn MD    insulin lispro (HUMALOG) injection vial 0-18 Units, 0-18 Units, Subcutaneous, Q6H, Lissett Meek MD, 9 Units at 12/03/20 1235    nicotine (NICODERM CQ) 21 MG/24HR 1 patch, 1 patch, Transdermal, Daily, Lissett Meek MD, 1 patch at 12/03/20 0802    acetylcysteine (MUCOMYST) 10 % solution 400 mg, 4 mL, Inhalation, BID, Lissett Meek MD, 400 mg at 12/03/20 0823    albuterol (PROVENTIL) nebulizer solution 2.5 mg, 2.5 mg, Nebulization, Q4H, Lissett Meek MD, 2.5 mg at 12/03/20 7428    Arformoterol Tartrate (BROVANA) nebulizer solution 15 mcg, 15 mcg, Nebulization, BID, Darnell Abad MD, 15 mcg at 12/03/20 9766    atorvastatin (LIPITOR) tablet 80 mg, 80 mg, Oral, Nightly, Simon Alexander DO, 80 mg at 12/02/20 1954    [Held by provider] clopidogrel (PLAVIX) tablet 75 mg, 75 mg, Oral, Daily, Simon Alexander DO, Stopped at 11/23/20 0900    fluticasone (FLONASE) 50 MCG/ACT nasal spray 2 spray, 2 spray, Nasal, Daily, Simon R Lockso, DO, 2 spray at 12/03/20 0750    tiZANidine (ZANAFLEX) tablet 4 mg, 4 mg, Oral, TID PRN, Caesar Fruits Lockso, DO, 4 mg at 11/18/20 0202    sodium chloride flush 0.9 % injection 10 mL, 10 mL, Intravenous, 2 times per day, Simon R Lockso, DO, 10 mL at 12/03/20 0751    polyethylene glycol (GLYCOLAX) packet 17 g, 17 g, Oral, Daily PRN, Simon R Lockso, DO    promethazine (PHENERGAN) tablet 12.5 mg, 12.5 mg, Oral, Q6H PRN **OR** ondansetron (ZOFRAN) injection 4 mg, 4 mg, Intravenous, Q6H PRN, Simon R Lockso, DO    [Held by provider] enoxaparin (LOVENOX) injection 40 mg, 40 mg, Subcutaneous, Daily, Simon R Lockso, DO, Stopped at 11/24/20 0900    insulin glargine (LANTUS) injection vial 36 Units, 0.25 Units/kg, Subcutaneous, Nightly, Simon R Lockso, DO, 36 Units at 12/02/20 1955    glucose (GLUTOSE) 40 % oral gel 15 g, 15 g, Oral, PRN, Simon R Lockso, DO    dextrose 50 % IV solution, 12.5 g, Intravenous, PRN, Simon R Lockso, DO    glucagon (rDNA) injection 1 mg, 1 mg, Intramuscular, PRN, Simon R Lockso, DO    dextrose 5 % solution, 100 mL/hr, Intravenous, PRN, Simon R Lockso, DO    oxyCODONE-acetaminophen (PERCOCET) 5-325 MG per tablet 1 tablet, 1 tablet, Oral, TID PRN, 1 tablet at 11/30/20 1215 **AND** oxyCODONE (ROXICODONE) immediate release tablet 2.5 mg, 2.5 mg, Oral, TID PRN, Caesar Fruits Lockso, DO, 2.5 mg at 11/30/20 1215    albuterol (PROVENTIL) nebulizer solution 2.5 mg, 2.5 mg, Nebulization, Q4H PRN, Simon Alexander, DO, 2.5 mg at 11/18/20 0646    chlorhexidine (PERIDEX) 0.12 % solution 15 mL, 15 mL, Mouth/Throat, BID, Charmayne Roys, MD, 15 mL at 12/03/20 0750    midazolam (VERSED) 1 mg/mL in D5W infusion, 2 mg/hr, Intravenous, Continuous, Charmayne Roys, MD, Last Rate: 3 mL/hr at 12/03/20 1000, 3 mg/hr at 12/03/20 1000  BP (!) 125/96   Pulse 98   Temp 98.4 °F (36.9 °C) (Bladder)   Resp 20   Ht 5' 11\" (1.803 m)   Wt (!) 327 lb 2.6 oz (148.4 kg)   SpO2 96%   BMI 45.63 kg/m²     General: Sedated, no distress  Mouth: Orally intubated  Neck: No JVD  Chest: Symmetric, no accessory use  Heart: RRR  Lungs: Bilateral rales and rhonchi  Abdomen: Soft, nt, no hsm  Extremities: 2+BLE edema    Intake/Output Summary (Last 24 hours) at 12/3/2020 1326  Last data filed at 12/3/2020 9772  Gross per 24 hour   Intake 6033 ml   Output 1850 ml   Net 4183 ml     CBC with Differential:    Lab Results   Component Value Date    WBC 6.2 12/03/2020    RBC 2.74 12/03/2020    HGB 7.5 12/03/2020    HCT 25.7 12/03/2020    PLT 36 12/03/2020    MCV 93.8 12/03/2020    MCH 27.4 12/03/2020    MCHC 29.2 12/03/2020    RDW 16.9 12/03/2020    NRBC 6.3 12/03/2020    SEGSPCT 62 12/29/2011    BLASTSPCT 0.9 11/21/2020    METASPCT 5.0 12/01/2020    LYMPHOPCT 10.8 12/03/2020    PROMYELOPCT 0.9 11/21/2020    MONOPCT 0.9 12/03/2020    MYELOPCT 2.0 12/02/2020    BASOPCT 0.0 12/03/2020    MONOSABS 0.06 12/03/2020    LYMPHSABS 0.74 12/03/2020    EOSABS 0.06 12/03/2020    BASOSABS 0.00 12/03/2020     BMP:    Lab Results   Component Value Date     12/03/2020    K 3.4 12/03/2020    K 5.1 11/17/2020     12/03/2020    CO2 32 12/03/2020    BUN 69 12/03/2020    LABALBU 2.4 12/03/2020    LABALBU 4.5 12/28/2011    CREATININE 1.2 12/03/2020    CALCIUM 8.5 12/03/2020    GFRAA >60 12/03/2020    LABGLOM >60 12/03/2020    GLUCOSE 262 12/03/2020    GLUCOSE 120 12/31/2011     ABG:    Lab Results   Component Value Date    PH 7.434 12/03/2020    PCO2 49.6 12/03/2020    PO2 55.1 12/03/2020    HCO3 32.5 12/03/2020    BE 7.3 12/03/2020    O2SAT 86.7 12/03/2020   CXR viewed opacified left chest, ett in place    IMPRESSION:   Patient Active Problem List   Diagnosis    CAD (coronary artery disease)    HTN (hypertension)    Hyperlipemia    Presence of stent in left circumflex coronary artery    Smoker    Aortic valve stenosis    Primary osteoarthritis of right knee    Non-insulin dependent type 2 diabetes mellitus (HCC)    Chronic pain syndrome    Chronic pain of right knee    Status post total right knee replacement    Morbid obesity with BMI of 40.0-44.9, adult (HCC)    Chronic bilateral low back pain with bilateral sciatica    Right hip pain    DDD (degenerative disc disease), lumbar    AAA (abdominal aortic aneurysm) (HCC)    Lumbar radiculopathy    Lumbosacral spondylosis without myelopathy    Obesity    Chronic back pain    Spinal stenosis of lumbar region with neurogenic claudication    Acute respiratory failure with hypoxia ([address]    Community acquired pneumonia of left upper lobe of lung    Thrombocytopenia ([address]    Small cell lung cancer ([address]    ARF (acute respiratory failure) ([address]   Failed extubation and for trach and peg. Has small cell carcinoma and plan is [facility] when bed available.     Huey Castaneda  12/3/2020  1:26 PM

## 2020-12-03 NOTE — PROGRESS NOTES
Patient doing very well on psv with positive cuff leak. Following commands. abg attempt is a venous mixed gas as illustrated by high HHb. PH and pCo2 wnl. Will trial extubation with close observation. bipap at bedside. Mohsen Rowell M.D.    Pulmonary/Critical Care Medicine

## 2020-12-03 NOTE — PROGRESS NOTES
Becky Goldman Hospitalist   Progress Note    Admitting Date and Time: 11/17/2020  1:13 PM  Admit Dx: Acute respiratory failure with hypoxia ([address] [J96.01]  Acute respiratory failure with hypoxia ([address] [J96.01]     Seen for follow on multiple problems as listed below. Subjective: Intubated on vent , opens eyes , makes eye contact , folows simple commands by eyes /nodding at times. Reviewed care . ROS: Not possible sec to above.      insulin glargine  10 Units Subcutaneous Daily    potassium bicarb-citric acid  40 mEq Oral Q4H    cefepime  2 g Intravenous Q12H    famotidine  20 mg Oral BID    methylPREDNISolone  40 mg Intravenous Daily    sodium chloride  20 mL Intravenous Once    heparin flush  1 mL Intravenous 2 times per day    insulin lispro  0-18 Units Subcutaneous Q6H    nicotine  1 patch Transdermal Daily    acetylcysteine  4 mL Inhalation BID    albuterol  2.5 mg Nebulization Q4H    Arformoterol Tartrate  15 mcg Nebulization BID    atorvastatin  80 mg Oral Nightly    [Held by provider] clopidogrel  75 mg Oral Daily    fluticasone  2 spray Nasal Daily    sodium chloride flush  10 mL Intravenous 2 times per day    [Held by provider] enoxaparin  40 mg Subcutaneous Daily    insulin glargine  0.25 Units/kg Subcutaneous Nightly    chlorhexidine  15 mL Mouth/Throat BID     potassium chloride, 10 mEq, PRN  acetaminophen, 650 mg, Q4H PRN    Or  acetaminophen, 650 mg, Q4H PRN  sodium chloride flush, 10 mL, PRN  heparin flush, 1 mL, PRN  sodium phosphate IVPB, 0.16 mmol/kg, PRN    Or  sodium phosphate IVPB, 0.32 mmol/kg, PRN  magnesium sulfate, 1 g, PRN  tiZANidine, 4 mg, TID PRN  polyethylene glycol, 17 g, Daily PRN  promethazine, 12.5 mg, Q6H PRN    Or  ondansetron, 4 mg, Q6H PRN  glucose, 15 g, PRN  dextrose, 12.5 g, PRN  glucagon (rDNA), 1 mg, PRN  dextrose, 100 mL/hr, PRN  oxyCODONE-acetaminophen, 1 tablet, TID PRN    And  oxyCODONE, 2.5 mg, TID PRN  albuterol, 2.5 mg, Q4H PRN         Objective:    /68   Pulse 83   Temp 98.4 °F (36.9 °C) (Bladder)   Resp 19   Ht 5' 11\" (1.803 m)   Wt (!) 327 lb 2.6 oz (148.4 kg)   SpO2 92%   BMI 45.63 kg/m²   General Appearance: alert , intubated on vent ,  in no acute distress  Skin: warm and dry  Head: normocephalic and atraumatic  Eyes: pupils equal, round, and reactive to light, extraocular eye movements intact, conjunctivae normal  Neck: supple and non-tender without mass  Pulmonary/Chest: diffuse bilat coarse BS /ronchi  Cardiovascular: normal rate, regular rhythm, normal S1 and S2  Abdomen: soft, non-tender, non-distended, normal bowel sounds, no masses or organomegaly  Extremities: no cyanosis, clubbing , + bipedal edema   Neurologic: can not assess. Recent Labs     12/01/20  0515 12/02/20  0530 12/03/20  0540   * 148* 144   K 3.6 3.0* 3.4*    104 102   CO2 36* 33* 32*   BUN 90* 78* 69*   CREATININE 1.7* 1.4* 1.2   GLUCOSE 176* 226* 262*   CALCIUM 8.5* 8.7 8.5*       Recent Labs     12/01/20  0942 12/01/20  1446 12/02/20  0530 12/03/20  0540   WBC 5.0  --  6.1 6.2   RBC 2.06*  --  2.82* 2.74*   HGB 5.7* 6.5* 7.8* 7.5*   HCT 19.3* 21.7* 25.9* 25.7*   MCV 93.7  --  91.8 93.8   MCH 27.7  --  27.7 27.4   MCHC 29.5*  --  30.1* 29.2*   RDW 16.8*  --  17.0* 16.9*   PLT 25*  --  19* 36*   MPV 12.7*  --  9.6 12.9*       Labs and images reviewed     Radiology:   XR ABDOMEN FOR NG/OG/NE TUBE PLACEMENT   Final Result   Suboptimally visualized NG tube likely terminates in the proximal stomach. XR CHEST PORTABLE   Final Result   1. Endotracheal tube is 5.2 cm above korina. 2.  Stable chest radiograph with interstitial and hazy opacities throughout   both lungs notable in left upper lobe. XR CHEST PORTABLE   Final Result   Stable support apparatus. Persistent bilateral airspace disease with more dense consolidation in the   left upper lobe. No significant interval change compared to multiple priors.       XR CHEST opacity in the upper left lung unchanged. Moderate opacity   in the right lung base worrisome for pneumonia. Probable mild left basilar   atelectasis. No abnormal vascular congestion. XR CHEST PORTABLE   Final Result   1. Unchanged left upper lung complete opacification. 2.  Elevation of the right hemidiaphragm and right lower lung opacity. 3.  Medical support devices as above. XR ABDOMEN (KUB) (SINGLE AP VIEW)   Final Result   1. There is no bowel obstruction, ileus or free air. XR CHEST PORTABLE   Final Result   1. No interval change in the dense consolidation seen within the left upper   lobe. 2. Minimal right lung base atelectasis. XR ABDOMEN FOR NG/OG/NE TUBE PLACEMENT   Final Result   Tip of NG tube at the level of the left hemidiaphragm, no change. XR CHEST ABDOMEN NG PLACEMENT   Final Result   Nasogastric tube is present with side hole at level of gastroesophageal   junction. This tube could be advanced approximately 4 or 5 cm. XR CHEST PORTABLE   Final Result   No significant change in dense airspace opacity of the left upper lobe and   patchy left lower lobe airspace opacities. Enteric tube tip is not well visualized due to underpenetration. The tip may   be at the gastroesophageal junction. This could be confirmed with KUB   centered on the upper abdomen. XR CHEST PORTABLE   Final Result   Unchanged large opacity/consolidation in left upper lobe. Unchanged airspace   disease in left lower lobe. XR CHEST PORTABLE   Final Result   Grossly stable opacities on the left. XR CHEST PORTABLE   Final Result   Increasing multifocal left lung pneumonia. CT LUMBAR SPINE WO CONTRAST   Final Result   1. No evidence for epidural hematoma on CT scan. 2. Suspected sacral insufficiency fracture. XR CHEST ABDOMEN NG PLACEMENT   Final Result   NG tube tip in the stomach. XR CHEST 1 VIEW   Final Result   1.   The life-support lines and tubes are well positioned. 2. Masslike consolidative opacity in the left upper lung. Small left   effusion. XR CHEST PORTABLE   Final Result   Left upper lobe masslike consolidation. Small to moderate left pleural effusion. CTA CHEST W CONTRAST   Final Result   1. New confluent opacities located in left upper lobe suggesting pneumonia,   atelectasis, or neoplasm. 2.  Patchy airspace opacities located in lingula suggesting pneumonia with   areas of atelectasis. 3.  Stenosis and occlusion are associated with left upper lobe segmental   bronchi and lingular segmental bronchi. 4.  Moderate to large left pleural effusion. 5.  Mediastinal lymphadenopathy. 6.  Stable fusiform infrarenal abdominal aortic aneurysm with endograft   repair. Aneurysm measures up to 6.4 cm. No evidence of endoleak or   retroperitoneal fluid. 7.  Diverticulosis. CTA ABDOMEN PELVIS W CONTRAST   Final Result   1. New confluent opacities located in left upper lobe suggesting pneumonia,   atelectasis, or neoplasm. 2.  Patchy airspace opacities located in lingula suggesting pneumonia with   areas of atelectasis. 3.  Stenosis and occlusion are associated with left upper lobe segmental   bronchi and lingular segmental bronchi. 4.  Moderate to large left pleural effusion. 5.  Mediastinal lymphadenopathy. 6.  Stable fusiform infrarenal abdominal aortic aneurysm with endograft   repair. Aneurysm measures up to 6.4 cm. No evidence of endoleak or   retroperitoneal fluid. 7.  Diverticulosis.       IR Interventional Radiology Procedure Request    (Results Pending)   XR CHEST PORTABLE    (Results Pending)       Assessment:    Active Problems:    CAD (coronary artery disease)    Smoker    Non-insulin dependent type 2 diabetes mellitus ([address]    Morbid obesity with BMI of 40.0-44.9, adult (HCC)    Chronic back pain    Acute respiratory failure with hypoxia ([address]    Community acquired pneumonia of left upper lobe of lung Thrombocytopenia ([facility]    Small cell lung cancer ([facility]  Resolved Problems:    * No resolved hospital problems. *      Plan:  Acute hypoxic and hypercapnic respiratory failure secondary to CAP/ left pneumonia and vol ol  -intubated and on vent. Critical care managing. Status post [facility] 11/18. S/p thoracentesis 11/22 with only small amount of fluid. On IV Solu-Medrol, nebulized treatments. And diuresis as per renal.  Covid testing on admission negative-11/17. Attempted extubation today, failed and reintubated. For possible PEG/Trach. Repeat thoracentesis not planned secondary to low platelets.       Volume OL , anasarca/now ISIDOR secondary to diuresis- multifactorial sec to hypoalbuminemia , aggressive IVFs ,anemia - renal following. Was treated with IV diuresis and albumin as per renal .Currently fluid managed by renal.  Monitor. Electrolytes are managed by renal.      CAP -was on IV Rocephin + Levaq , later started on IV cefepime which was discontinued with concern of drug-induced fever. Currently on IV Unasyn. ID following     left lung ca as per bronch bx  With large pleural effusion  - hem onc & pulm following. To be considered for palliative radiation. To be transferred to [facility] ([facility]) for palliative radiation. Left upper extremity DVT-heme-onc following, cannot fully anticoagulate due to low platelets, currently on Lovenox 40 mg daily.     Suspect COPD exacerbation with MANOLO -on  bronchodilators, IV steroids, vent support as per critical care. Will need out pt follow with pulm     Anemia / Thrombocytopenia-chronic,s/p 1 U plts . Hemonc has been consulted and following. Transfuse for PLTs <20 and Hgb <7     NIDDM - On Lantus + ISS     Back pain status post epidural injection 11/17, CT spine negative for hematoma     Hypertension / Coronary artery status post PC I -on aspirin, Plavix ,statin, plavix on hold asper CC. BB on hold sec to  BP on lower side.     Hyperlipidemia - on statin      Tobacco use-has been counseled and is on nicotine patch. Does not have official COPD as a diagnosis-but has 39 pack years history of smoking. Out pt follow for sleep studies as well as PFTs     On Lovenox for DVT prophylaxis  Full code        Electronically signed by Delphine Garcia MD on 12/3/2020 at 3:43 PM

## 2020-12-03 NOTE — PATIENT CARE CONFERENCE
Intensive Care Daily Quality Rounding Checklist        ICU Team Members:  Dr. Mcallister Leader, charge nurse, bedside nurse, clinical pharmacist     ICU Day #: NUMBER: 16     Intubation Date: November 18,2020     Ventilator Day #: NUMBER: R Lance Norris 16 Insertion Date: November 23,2020                                                    Day #: NUMBER: 11      Arterial Line Insertion Date:  n/a                             Day #: n/a     DVT Prophylaxis: PCDs    GI Prophylaxis: ON TUBE FEED DIET     Black Catheter Insertion Date: November 17,2020                                        Day #: 17                             Continued need (if yes, reason documented and discussed with physician): yes, strict I/o intubated/sedated     Skin Issues/ Wounds and ordered treatment discussed on rounds: no issues/abrasions elbows bilaterally     Goals/ Plans for the Day: Daily labs, wean vent as able, soft wrist restraints to prevent pulling tubes, trach/ PEG tomorrow tentatively

## 2020-12-03 NOTE — PLAN OF CARE
Problem: Falls - Risk of:  Goal: Will remain free from falls  Description: Will remain free from falls  Outcome: Met This Shift  Goal: Absence of physical injury  Description: Absence of physical injury  Outcome: Met This Shift     Problem: Skin Integrity:  Goal: Will show no infection signs and symptoms  Description: Will show no infection signs and symptoms  Outcome: Met This Shift     Problem: Pain:  Goal: Pain level will decrease  Description: Pain level will decrease  Outcome: Met This Shift  Goal: Control of acute pain  Description: Control of acute pain  Outcome: Met This Shift  Goal: Control of chronic pain  Description: Control of chronic pain  Outcome: Met This Shift     Problem: Fluid and Electrolyte Imbalance  Goal: Fluid and electrolyte balance are achieved/maintained  Outcome: Met This Shift     Problem: HH FLUID RETENTION-CHF  Goal: Absence of fluid overload signs and symptoms  Outcome: Met This Shift     Problem: Restraint Use - Nonviolent/Non-Self-Destructive Behavior:  Goal: Absence of restraint-related injury  Description: Absence of restraint-related injury  Outcome: Met This Shift     Problem: Restraint Use - Nonviolent/Non-Self-Destructive Behavior:  Goal: Absence of restraint indications  Description: Absence of restraint indications  Outcome: Not Met This Shift

## 2020-12-03 NOTE — PROGRESS NOTES
Patient became diaphoretic, dyspneic with saturations dropping in low 80's. NT suction and racemic epi given. Due to respiratory effort, hypoxia and sinus tachycardia and hypertension decision made to intubate. Patient is agreeable. Dana Cazares M.D.    Pulmonary/Critical Care Medicine

## 2020-12-03 NOTE — PROGRESS NOTES
[address] [place] [address] [facility]  [facility]  Progress Note    SUBJECTIVE:  Chief Complaint   Patient presents with    Shortness of Breath     patient sent from surgery after having epideral injection L4-L5. SOB has been for past two weeks     The patient is still on a ventilator. She is still in the ICU. Still requiring restraints. He continues to have some low-grade fevers. Unable to wean. Review of systems:  As stated above in the chief complaint, otherwise negative.     Medications:  Scheduled Meds:   insulin glargine  10 Units Subcutaneous Daily    potassium bicarb-citric acid  40 mEq Oral Q4H    famotidine  20 mg Oral BID    methylPREDNISolone  40 mg Intravenous Daily    sodium chloride  20 mL Intravenous Once    ampicillin-sulbactam  3 g Intravenous Q6H    heparin flush  1 mL Intravenous 2 times per day    insulin lispro  0-18 Units Subcutaneous Q6H    nicotine  1 patch Transdermal Daily    acetylcysteine  4 mL Inhalation BID    albuterol  2.5 mg Nebulization Q4H    Arformoterol Tartrate  15 mcg Nebulization BID    atorvastatin  80 mg Oral Nightly    [Held by provider] clopidogrel  75 mg Oral Daily    fluticasone  2 spray Nasal Daily    sodium chloride flush  10 mL Intravenous 2 times per day    [Held by provider] enoxaparin  40 mg Subcutaneous Daily    insulin glargine  0.25 Units/kg Subcutaneous Nightly    chlorhexidine  15 mL Mouth/Throat BID     Continuous Infusions:   fentaNYL 5 mcg/ml in 0.9%  ml infusion 200 mcg/hr (12/03/20 0800)    dextrose      midazolam 7 mg/hr (12/03/20 0646)     PRN Meds:potassium chloride, acetaminophen **OR** acetaminophen, sodium chloride flush, heparin flush, sodium phosphate IVPB **OR** sodium phosphate IVPB, magnesium sulfate, tiZANidine, polyethylene glycol, promethazine **OR** ondansetron, glucose, dextrose, glucagon (rDNA), dextrose, oxyCODONE-acetaminophen **AND** oxyCODONE, albuterol    OBJECTIVE:  /70   Pulse 82 Lab Results   Component Value Date    SEDRATE 80 (H) 11/25/2020     Radiology:  Rest x-ray reviewed. No changes    Microbiology:   Respiratory panel (including SARS-CoV-2: Not detected  Streptococcus pneumoniae/Legionella urine Ag: negative  Nares screen MRSA: Negative  Urine culture 11/24/2020: Negative  Respiratory culture 11/18/2020: OP aristides absent. 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 Not eligible for emergent chemotherapy  · Probable obstructive pneumonia. Treated with Unasyn. Now growing probable Pseudomonas  · Fever associated to underlying cancer  · Candida colonization of airways  · CoNS bacteremia.   Contaminant  · Acute kidney injury, improving  · Prognosis remains poor    PLAN:  · Stop Unasyn blood  · Blood cultures x2  · Start Cefepime  · No need to treat Candida colonization at this point  · No need to treat CoNS bacteremia   · Patient be transferred to [facility] for palliative radiation    Radha Pastor  9:29 AM  12/3/2020

## 2020-12-03 NOTE — CARE COORDINATION
COVID negative 11/17. Vent/ intubated since 11/18-attempting to wean. Lengthy conversation w/ pt's wife Valentine Thomas [phone]. Plan of care discussed. Wife is upset regarding no bed available at this time at [facility] so pt can initiate Palliative radiation therapy. Wife is requesting another attempt to transfer pt to [facility]- pt's nurse notified. LTAC facilities again discussed- unable to make a decision regarding LTAC choice at this time. Both [facility] following pending wife's decision and pt's progress.  Will follow Milli Bolden

## 2020-12-03 NOTE — CARE COORDINATION
COVID negative 11/17. Vent/ intubated since 11/18- FIO2 45%,PEEP 8-attempting to wean. If unable to wean , for possible trach/Peg tomorrow pending platelet count- 36 today. On iv abx. + lung malignancy - ? Still awaiting possible  transfer to [facility] for palliative radiation therapy. Full code. [facility] and [facility] following(back door referrals.  Will follow Marly Prince

## 2020-12-03 NOTE — PROGRESS NOTES
Nephrology Consult Note    Patient's Name: Néstor Ariza.  9:25 AM  12/3/2020    Nephrologist: Dr. Jean Paul Etienne MD    Reason for Consult: Renal is consulted for volume management  Requesting Physician:  Dr. Betzy Villela    Chief Complaint:  SOB    History Obtained From: EMR as the patient is intubated, sedated and no family member is present today    Sub: Patient seen and examined bedside in the ICU, plans for extubation noted  UO remains adequate      Past Medical History:   Diagnosis Date    Anticoagulant long-term use     Arthritis     CAD (coronary artery disease)     Chronic back pain     Depression     Dyslipidemia     Hiatal hernia 1979    History of ST elevation myocardial infarction (STEMI)     Hyperlipidemia     Hypertension     Low back pain     Lumbar radiculopathy 8/14/2019    Obesity     MANOLO on CPAP     Presence of stent in left circumflex coronary artery     Presence of stent in right coronary artery     Smoker     Type 2 diabetes mellitus without complication [facility])        Past Surgical History:   Procedure Laterality Date    ABDOMINAL AORTIC ANEURYSM REPAIR, ENDOVASCULAR N/A 6/28/2019    ENDOVASCULAR REPAIR ABDOMINAL AORTIC ANEURYSM performed by Gerda Beltran MD at [address] [place] [address] 1 Bilateral 3/12/2020    BILATERAL L3,L4,L5 [address] performed by Kirsty Lama DO at [address] [place] [address] Bilateral 6/11/2020    BILATERAL L3,L4,L5 MEDIAL NERVE BRANCH BLOCK #2 performed by Kirsty Lama DO at [address] [facility] N/A 11/18/2020    BRONCHOSCOPY/TRANSBRONCHIAL NEEDLE BIOPSY performed by Aurora Rowe MD at [address] [place] [address]  12/30/2011    Dr. Cookie Harp 1st OM 3.0 x 20 Ion    DIAGNOSTIC CARDIAC CATH LAB PROCEDURE  3/15/2005    [facility]. Mild to moderate CAD. Normal LV.  DIAGNOSTIC CARDIAC CATH LAB PROCEDURE  1/16/2007    [facility].  Cath/PTCA/DE stent of proximal and distal RCA.  DIAGNOSTIC CARDIAC CATH LAB PROCEDURE  2/21/2007    Cath/DE stent of proximal OM1 and proximal Cx.  DIAGNOSTIC CARDIAC CATH LAB PROCEDURE  12/30/2011    ANURADHA of mid OM branch of circumflex.  ECHO COMPL W DOP COLOR FLOW  12/30/2011         HC INSERT PICC CATH, 5/> YRS - MIDLINE  11/23/2020         HERNIA REPAIR  2/2014    laparoscopic ventral hernia repain    JOINT REPLACEMENT Left 4/1/14    TKA-ed    JOINT REPLACEMENT Right 2018    KNEE    PAIN MANAGEMENT PROCEDURE Right 9/1/2020    RIGHT L3,4,5 MEDIAL BRANCH RADIOFREQUENCY ABLATION performed by Rissa Kiser DO at [facility] [address] N/A 11/17/2020    L4-5 EPIDURAL STEROID INJECTION #1 performed by Rissa Kiser DO at [facility] OFFICE/OUTPT VISIT,PROCEDURE ONLY Right 11/26/2018    RIGHT KNEE TOTAL ARTHROPLASTY performed by Ginger Rivera DO at [facility]       Family History   Problem Relation Age of Onset    Diabetes Mother     Stroke Mother     Diabetes Father     No Known Problems Sister         reports that he has been smoking cigarettes. He started smoking about 42 years ago. He has a 39.00 pack-year smoking history. He has never used smokeless tobacco. He reports that he does not drink alcohol or use drugs.     Allergies:  Bee venom    Current Medications:    insulin glargine (LANTUS) injection vial 10 Units, Daily  potassium bicarb-citric acid (EFFER-K) effervescent tablet 40 mEq, Q4H  famotidine (PEPCID) tablet 20 mg, BID  methylPREDNISolone sodium (SOLU-MEDROL) injection 40 mg, Daily  potassium chloride 10 mEq/100 mL IVPB (Peripheral Line), PRN  0.9 % sodium chloride bolus, Once  ampicillin-sulbactam (UNASYN) 3 g ivpb minibag, Q6H  acetaminophen (TYLENOL) tablet 650 mg, Q4H PRN    Or  acetaminophen (TYLENOL) suppository 650 mg, Q4H PRN  fentaNYL 5 mcg/ml in 0.9%  ml infusion, Continuous  sodium chloride flush 0.9 % injection 10 mL, PRN  heparin flush 100 UNIT/ML distress  Skin: no rash, turgor wnl  Heent:  eomi, mmm  Neck: no bruits or jvd noted  Cardiovascular:  S1, S2 without m/r/g  Respiratory: CTA B without w/r/r, decreased breath sounds in bases  Abdomen:  +bs, soft, nt, Black catheter draining clear urine  Ext: 1+ lower extremity edema, much improved  Psychiatric: mood and affect appropriate  Musculoskeletal:  Rom, muscular strength intact    Data:   Labs:  CBC:   Lab Results   Component Value Date    WBC 6.2 12/03/2020    RBC 2.74 12/03/2020    HGB 7.5 12/03/2020    HCT 25.7 12/03/2020    MCV 93.8 12/03/2020    MCH 27.4 12/03/2020    MCHC 29.2 12/03/2020    RDW 16.9 12/03/2020    PLT 36 12/03/2020    MPV 12.9 12/03/2020     CMP:    Lab Results   Component Value Date     12/03/2020    K 3.4 12/03/2020    K 5.1 11/17/2020     12/03/2020    CO2 32 12/03/2020    BUN 69 12/03/2020    CREATININE 1.2 12/03/2020    GFRAA >60 12/03/2020    LABGLOM >60 12/03/2020    GLUCOSE 262 12/03/2020    GLUCOSE 120 12/31/2011    PROT 4.9 12/03/2020    LABALBU 2.4 12/03/2020    LABALBU 4.5 12/28/2011    CALCIUM 8.5 12/03/2020    BILITOT 0.6 12/03/2020    ALKPHOS 149 12/03/2020    AST 76 12/03/2020    ALT 51 12/03/2020     BMP:    Lab Results   Component Value Date     12/03/2020    K 3.4 12/03/2020    K 5.1 11/17/2020     12/03/2020    CO2 32 12/03/2020    BUN 69 12/03/2020    LABALBU 2.4 12/03/2020    LABALBU 4.5 12/28/2011    CREATININE 1.2 12/03/2020    CALCIUM 8.5 12/03/2020    GFRAA >60 12/03/2020    LABGLOM >60 12/03/2020    GLUCOSE 262 12/03/2020    GLUCOSE 120 12/31/2011     Calcium:    Lab Results   Component Value Date    CALCIUM 8.5 12/03/2020     Phosphorus:    Lab Results   Component Value Date    PHOS 2.9 12/03/2020     Uric Acid:  No results found for: URICACID  U/A:    Lab Results   Component Value Date    NITRU Negative 11/24/2020    COLORU Yellow 11/24/2020    PHUR 5.5 11/24/2020    WBCUA 2-5 11/24/2020    RBCUA >20 11/24/2020    MUCUS Present 11/24/2020    BACTERIA MODERATE 11/24/2020    CLARITYU CLOUDY 11/24/2020    SPECGRAV 1.025 11/24/2020    LEUKOCYTESUR Negative 11/24/2020    UROBILINOGEN 0.2 11/24/2020    BILIRUBINUR Negative 11/24/2020    BLOODU LARGE 11/24/2020    GLUCOSEU Negative 11/24/2020    KETUA Negative 11/24/2020    AMORPHOUS MANY 11/24/2020        Imaging:  Xr Abdomen (kub) (single Ap View)    Result Date: 11/23/2020  EXAMINATION: ONE SUPINE XRAY VIEW(S) OF THE ABDOMEN 11/23/2020 9:38 am COMPARISON: None HISTORY: ORDERING SYSTEM PROVIDED HISTORY: abdominal pain TECHNOLOGIST PROVIDED HISTORY: Reason for exam:->abdominal pain FINDINGS: There is a nonobstructive bowel gas pattern. There are no abnormal air-fluid levels. There are no calculi overlying the renal shadows. There is an NG tube within the stomach. There is a stent graft seen within the abdominal aorta. 1. There is no bowel obstruction, ileus or free air. Ct Lumbar Spine Wo Contrast    Result Date: 11/19/2020  EXAMINATION: CT OF THE LUMBAR SPINE WITHOUT CONTRAST  11/18/2020 TECHNIQUE: CT of the lumbar spine was performed without the administration of intravenous contrast. Multiplanar reformatted images are provided for review. Dose modulation, iterative reconstruction, and/or weight based adjustment of the mA/kV was utilized to reduce the radiation dose to as low as reasonably achievable. COMPARISON: 11/17/2020 HISTORY: ORDERING SYSTEM PROVIDED HISTORY: exclude epidural hematoma TECHNOLOGIST PROVIDED HISTORY: Reason for exam:->exclude epidural hematoma Chronic back pain, recent epidural placement FINDINGS: BONES/ALIGNMENT: Vertebral body heights are preserved without evidence for lumbar fracture. However, there is irregular lucency and sclerosis in the upper sacral ala bilaterally. There is minimal retrolisthesis at L2-L3. Alignment is otherwise normal. DEGENERATIVE CHANGES: There is disc space narrowing and vacuum disc phenomenon at L2-L3.   Disc space heights are otherwise preserved. There is diffuse facet arthropathy. SOFT TISSUES/RETROPERITONEUM: No evidence for epidural hematoma is identified. 1. No evidence for epidural hematoma on CT scan. 2. Suspected sacral insufficiency fracture. Xr Chest Portable    Result Date: 11/24/2020  EXAMINATION: ONE XRAY VIEW OF THE CHEST 11/24/2020 6:36 am COMPARISON: 11/23/2020 HISTORY: ORDERING SYSTEM PROVIDED HISTORY: resp failure TECHNOLOGIST PROVIDED HISTORY: Reason for exam:->resp failure FINDINGS: The heart is borderline enlarged. Dense opacity remains in the mid and upper left lung unchanged. Moderate amount of opacity present in the lower right lung. Small amount of opacity present in the left lung base favored to be due to atelectasis. No abnormal pulmonary vascular congestion. ET tube and NG tube unchanged. Persistent dense opacity in the upper left lung unchanged. Moderate opacity in the right lung base worrisome for pneumonia. Probable mild left basilar atelectasis. No abnormal vascular congestion. Xr Chest Portable    Result Date: 11/23/2020  EXAMINATION: ONE XRAY VIEW OF THE CHEST 11/23/2020 4:54 pm COMPARISON: Multiple prior chest series with the most recent dated November 23, 2020 at [address] [facility]: ETT adjusted TECHNOLOGIST PROVIDED HISTORY: Reason for exam:->ETT adjusted Reason for exam:->ETT advanced to 28 cm FINDINGS: Medical support devices: Endotracheal tube terminates in the mid to distal thoracic trachea. Enteric tube extends subdiaphragmatic and off the field of view. Lungs: Stable left upper lung complete opacification. Persistent right lower lung opacity. Slight elevation of the right hemidiaphragm is stable. Left lower lung not imaged. Cardiomediastinal silhouette and pulmonary vascularity: There appears to be atherosclerotic disease and prominence of the cardiac silhouette. Osseous and soft tissue structures: No acute findings.     1.  Unchanged left upper lung complete opacification. 2.  Elevation of the right hemidiaphragm and right lower lung opacity. 3.  Medical support devices as above. Xr Chest Portable    Result Date: 11/23/2020  EXAMINATION: ONE XRAY VIEW OF THE CHEST 11/23/2020 7:08 am COMPARISON: 11/20/2020 HISTORY: ORDERING SYSTEM PROVIDED HISTORY: resp failure TECHNOLOGIST PROVIDED HISTORY: Reason for exam:->resp failure FINDINGS: There is no interval change in the persistent dense consolidation within the left upper lobe. The left lower lobe is clear. The right lung is clear. There is minimal atelectasis seen within the right lung base. The cardiac silhouette is within normals. 1. No interval change in the dense consolidation seen within the left upper lobe. 2. Minimal right lung base atelectasis. Xr Chest Portable    Result Date: 11/22/2020  EXAMINATION: ONE XRAY VIEW OF THE CHEST 11/22/2020 6:28 am COMPARISON: 11/21/2020 HISTORY: ORDERING SYSTEM PROVIDED HISTORY: resp failure TECHNOLOGIST PROVIDED HISTORY: Reason for exam:->resp failure FINDINGS: The endotracheal tube is stable. The enteric tube tip is not well visualized but may be at the gastroesophageal junction. There is stable left upper lobe dense opacity. There are continued patchy opacities in the left lower lobe. There may be small pleural effusions. No pneumothorax is seen. No significant change in dense airspace opacity of the left upper lobe and patchy left lower lobe airspace opacities. Enteric tube tip is not well visualized due to underpenetration. The tip may be at the gastroesophageal junction. This could be confirmed with KUB centered on the upper abdomen. Xr Chest Portable    Result Date: 11/21/2020  EXAMINATION: ONE XRAY VIEW OF THE CHEST 11/21/2020 7:37 am COMPARISON: 11/20/2020 HISTORY: ORDERING SYSTEM PROVIDED HISTORY: resp failure TECHNOLOGIST PROVIDED HISTORY: Reason for exam:->resp failure FINDINGS: Endotracheal tube and nasogastric tube are unchanged. Large opacity in the left upper lobe is unchanged. There is additional airspace disease in the left lower lobe which is stable. There is no pneumothorax. Small pleural effusions are not excluded. Unchanged large opacity/consolidation in left upper lobe. Unchanged airspace disease in left lower lobe. Xr Chest Portable    Result Date: 11/20/2020  EXAMINATION: ONE XRAY VIEW OF THE CHEST 11/20/2020 6:33 am COMPARISON: 11/19/2020 HISTORY: ORDERING SYSTEM PROVIDED HISTORY: resp failure TECHNOLOGIST PROVIDED HISTORY: Reason for exam:->resp failure FINDINGS: Endotracheal and enteric tubes remain in place. There has been no appreciable change in opacities throughout the left lung, most pronounced in the left apex. The right lung is clear. The heart size is at the upper limits of normal.  There is no discernible pneumothorax. Grossly stable opacities on the left. Xr Chest Portable    Result Date: 11/19/2020  EXAMINATION: ONE XRAY VIEW OF THE CHEST 11/19/2020 6:23 am COMPARISON: 11/18/2020 HISTORY: ORDERING SYSTEM PROVIDED HISTORY: resp failure TECHNOLOGIST PROVIDED HISTORY: Reason for exam:->resp failure FINDINGS: Evaluation is limited by the patient's body habitus and the portable technique. The right lung apex was partially excluded. There is increased dense consolidation in the left upper lobe. There is also medial left basilar airspace opacity obscuring the hemidiaphragm. The heart size is mildly enlarged. There is no discernible pneumothorax. Endotracheal and enteric tubes are again seen. Increasing multifocal left lung pneumonia. Xr Chest Portable    Result Date: 11/18/2020  EXAMINATION: ONE XRAY VIEW OF THE CHEST 11/18/2020 6:39 am COMPARISON: CT chest November 17, 2020. HISTORY: ORDERING SYSTEM PROVIDED HISTORY: SOB TECHNOLOGIST PROVIDED HISTORY: Reason for exam:->SOB FINDINGS: The cardiac silhouette is within normal limits in size. There is masslike consolidation of the left upper lobe. occlusion are associated with left upper lobe segmental bronchi and lingular segmental bronchi. 4.  Moderate to large left pleural effusion. 5.  Mediastinal lymphadenopathy. 6.  Stable fusiform infrarenal abdominal aortic aneurysm with endograft repair. Aneurysm measures up to 6.4 cm. No evidence of endoleak or retroperitoneal fluid. 7.  Diverticulosis. Cta Abdomen Pelvis W Contrast    Result Date: 11/17/2020  EXAMINATION: CTA OF THE CHEST 11/17/2020 3:18 pm TECHNIQUE: CTA of the chest was performed after the administration of intravenous contrast.  Multiplanar reformatted images are provided for review. MIP images are provided for review. Dose modulation, iterative reconstruction, and/or weight based adjustment of the mA/kV was utilized to reduce the radiation dose to as low as reasonably achievable.; CTA of the abdomen and pelvis was performed with the administration of intravenous contrast. Multiplanar reformatted images are provided for review. MIP images are provided for review. Dose modulation, iterative reconstruction, and/or weight based adjustment of the mA/kV was utilized to reduce the radiation dose to as low as reasonably achievable. COMPARISON: None. HISTORY: ORDERING SYSTEM PROVIDED HISTORY: aneurysm, cp, sob TECHNOLOGIST PROVIDED HISTORY: Reason for exam:->aneurysm, cp, sob FINDINGS: CTA chest: There are confluent opacities located in left upper lobe. Patchy airspace opacities located in lingula. There is moderate to large left pleural effusion. Peribronchial thickening extensor in left hilar location into the left lung base. There is subsegmental atelectasis in posterior right lower lobe with adjacent ground-glass opacities. There is pulmonary vascular congestion. The heart is mildly enlarged. There is mediastinal lymphadenopathy. Ascending thoracic aorta measures 3.7 cm in diameter. Descending thoracic aorta measures 3 cm in diameter.   No evidence of thoracic aortic aneurysm or dissection. Distal descending thoracic aorta is tortuous. CTA abdomen/pelvis: There is evidence of endograft repair involving the abdominal aorta. There is redemonstration of fusiform abdominal aortic aneurysm measuring up to 6.4 cm. This finding is stable since prior. No evidence of endoleak. No retroperitoneal fluid collections. Iliac limbs of endograft appear patent. Common iliac arteries are tortuous. Common femoral arteries are patent. Numerous diverticula associated with the left colon and sigmoid colon. No evidence of acute diverticulitis. Liver, gallbladder, pancreas, and spleen are grossly unremarkable however there is motion artifact limiting this examination. There is no hydronephrosis. Cyst associated with the right kidney is stable since previous examination as well as cyst associated with the left kidney appearing stable since prior. Appendix is visualized and normal.    1.  New confluent opacities located in left upper lobe suggesting pneumonia, atelectasis, or neoplasm. 2.  Patchy airspace opacities located in lingula suggesting pneumonia with areas of atelectasis. 3.  Stenosis and occlusion are associated with left upper lobe segmental bronchi and lingular segmental bronchi. 4.  Moderate to large left pleural effusion. 5.  Mediastinal lymphadenopathy. 6.  Stable fusiform infrarenal abdominal aortic aneurysm with endograft repair. Aneurysm measures up to 6.4 cm. No evidence of endoleak or retroperitoneal fluid. 7.  Diverticulosis. Xr Abdomen For Ng/og/ne Tube Placement    Result Date: 11/22/2020  EXAMINATION: ONE SUPINE XRAY VIEW(S) OF THE ABDOMEN 11/22/2020 5:06 pm COMPARISON: November 22, 2020, 4 hours prior. HISTORY: ORDERING SYSTEM PROVIDED HISTORY: Confirmation of course of NG/OG/NE tube and location of tip of tube TECHNOLOGIST PROVIDED HISTORY: Reason for exam:->Confirmation of course of NG/OG/NE tube and location of tip of tube Portable? ->Yes FINDINGS: Tip of the NG tube noted at the level of the diaphragm, no significant change. The right side and the inferior part of the abdomen is not included in the study. There is opacification of the visualized left upper lung. Tip of NG tube at the level of the left hemidiaphragm, no change. Xr Chest Abdomen Ng Placement    Result Date: 11/22/2020  EXAMINATION: ONE SUPINE XRAY VIEW(S) OF THE ABDOMEN 11/22/2020 12:52 pm COMPARISON: November 18. HISTORY: ORDERING SYSTEM PROVIDED HISTORY: OG placement TECHNOLOGIST PROVIDED HISTORY: Reason for exam:->OG placement FINDINGS: Nasogastric tube is present. Side hole appears to be at level of gastroesophageal junction. This tube could be advanced approximately 4 or 5 cm. Nasogastric tube is present with side hole at level of gastroesophageal junction. This tube could be advanced approximately 4 or 5 cm. Xr Chest Abdomen Ng Placement    Result Date: 11/18/2020  EXAMINATION: ONE SUPINE XRAY VIEW(S) OF THE ABDOMEN 11/18/2020 9:07 am COMPARISON: 11/18/2020, about 2 hours earlier HISTORY: ORDERING SYSTEM PROVIDED HISTORY: NGT TECHNOLOGIST PROVIDED HISTORY: Reason for exam:->NGT Reason for exam:->portable NG tube placement FINDINGS: There is an NG tube with the tip in the stomach. An ET tube is again noted in satisfactory position. There is extensive opacity in the left lung, most likely pneumonia. Increased markings also seen in the visualized portion of the right lung. The heart is enlarged. NG tube tip in the stomach. Fluoro For Surgical Procedures    Result Date: 11/17/2020  EXAMINATION: SPOT FLUOROSCOPIC IMAGES 11/17/2020 12:03 pm TECHNIQUE: Fluoroscopy was provided by the radiology department for procedure. Radiologist was not present during examination.  FLUOROSCOPY DOSE AND TYPE OR TIME AND EXPOSURES: Total dose:26.99 mGy COMPARISON: None HISTORY: ORDERING SYSTEM PROVIDED HISTORY: Pain management TECHNOLOGIST PROVIDED HISTORY: Reason for exam:->L4-5 Epidural steroid injection #1 Intraprocedural imaging. FINDINGS: 3 intraoperative fluoroscopic images were obtained during L4-5 epidural steroid injection. Intraprocedural fluoroscopic spot images as above. See separate procedure report for more information. Assessment  1. Acute hypoxic hypercapnic respiratory secondary to community-acquired pneumonia, on mechanical ventilation  2. Acute kidney injury secondary to overt diuresis, questionable underlying chronic kidney disease with evidence of proteinuria  3. Anasarca, multifactorial secondary to hypoalbuminemia, aggressive IVF, anemia  4. Hypernatremia secondary to free water deficit, loop diuretic use. Calculated free water deficit is 3.75 L  5. Hyperkalemia  6. Anemia, normocytic, oncology on board  7. Severe thrombocytopenia    Plan    1. Hypernatremia has improved with D5W infusion  2. K replaced IV  3.  Renal function continues to improve with adequate UO                 Electronically signed by Kim Altamirano MD on 12/3/2020 at 9:25 AM

## 2020-12-03 NOTE — PROGRESS NOTES
Critical Care Team - Daily Progress Note         Date and time: 12/3/2020 1:49 PM  Patient's name:  Shirley Vines. Medical Record Number: [id number]  Patient's account/billing number: [de-identified]  Patient's YOB: 1960  Age: 61 y.o. Date of Admission: 11/17/2020  1:13 PM  Length of stay during current admission: 16      Primary Care Physician: Jessica Brandt PA-C  ICU Attending Physician: Dr. Susan Giles    Code Status: Full Code    Reason for ICU admission: acute hypoxic respiratory failure      SUBJECTIVE:     BRIEF HISTORY:   The patient is a 57 y. o. male with significant past medical history of diabetes, MANOLO, back pain, hypertension, hyperlipidemia, STEMI's status post 5 stents, CAD, aortic aneurysm presenting to the the hospital initially on 11/17. Georgette Saxena had a epidural performed yesterday for low back pain and after the procedure became very short of breath.  He was satting 80% so was sent to the hospital for further evaluation.  While in the ED patient was found to be thrombocytopenic as well as hypoxic on room air.  He was placed on 4 L of oxygen but O2 sats improved.  CTA of the chest demonstrating pneumonia and pleural effusions.  He was given 1 dose of Rocephin and azithromycin while in the ER.     On 11/18 RRT was called at 7:30 AM. Steffanie Homans was found to have increased work of breathing as well as hypercapnic on ABGs.  He was on BiPAP all night with minimal improvement of his symptoms.  During the RT he was switched to Kapil Prose was subsequently brought down to the ICU for further evaluation and care. He was not improving on AVAPS and subsequently intubated     OVERNIGHT EVENTS:------------------------------------------------------------------------------------------------   11/19/20: Continues to be intubated, no acute events  11/20/20: Attempted weaning trials. Patient on pressure support for a few hours. ABGs worsening.   Placed on it back on AC/VC  11/21/20: More rhonchorous today.  More secretions. 11/22/20; still having moist secretions, gram positive cocci bacteremia   11/23/20: continued secretions, complaining of mild abdominal pain today. multiple attempts made to transfer patient per wifes request  11/24/20: start diuresis. Wean propofol and add seroquel for agitation. Increase free water. Biopsy results back concerning for small cell lung cancer. Febrile. 11/25 febrile re cultured overnight , t max 101, ID consulted, will attempt weaning again today  11/26: continues with fevers, tmax 101. ID added levaquin yesterday, bronch today  11/27: low grade fevers improved. Not tolerating vent weaning, continued diruesis throughout the day. Stopped by renal with worsening bun  11/28: Diuresed appropriately, Awake and following commands, failed psv trial   11/29: Hemoglobin dropped this morning to 7.0, 1 unit packed red blood cells ordered by hematology  Continue pressure support trials today   11/30: febrile overnight, wean sedation, PSV trials -- failed. Discuss trach with family  12/1: Febrile overnight, hemoglobin 5.6 today, given a unit of blood, platelets 20.  01/3:-CNSMABQOF were 19, given a unit, fever broke  12/3: Transfued platelets, up to 36 now.  Pending transfer to [facility]:     [x] Ventilator  [] BIPAP  [] Nasal Cannula [] Room Air        SECRETIONS : Amount:  [x] Small [] Moderate  [] Large  [] None  Color:     [] White [x] Colored  [] Bloody    SEDATION:    [] Propofol gtt  [x] Versed gtt  [] Ativan gtt   [] No Sedation    PARALYZED:  [x] No    [] Yes      VASOPRESSORS:  [x] No    [] Yes    If yes -   [] Levophed       [] Dopamine     [] Vasopressin       [] Dobutamine  [] Phenylephrine         [] Epinephrine    CENTRAL LINES:     [x] No   [] Yes   (Date of Insertion:   )           If yes -     [] Right IJ     [] Left IJ [] Right Femoral [] Left Femoral                   [] Right Subclavian [] Left Subclavian       HUFF'S CATHETER:   [] No   [x] Yes  (Date of Insertion:   )     URINE OUTPUT:            [x] Good   [] Low              [] Anuric      OBJECTIVE:     VITAL SIGNS:  BP (!) 125/96   Pulse 98   Temp 98.4 °F (36.9 °C) (Bladder)   Resp 20   Ht 5' 11\" (1.803 m)   Wt (!) 327 lb 2.6 oz (148.4 kg)   SpO2 96%   BMI 45.63 kg/m²   Tmax over 24 hours:  Temp (24hrs), Av.7 °F (37.6 °C), Min:98.4 °F (36.9 °C), Max:101 °F (38.3 °C)      Patient Vitals for the past 6 hrs:   BP Pulse Resp SpO2   20 1236 (!) 125/96 98 20 96 %   20 1206 -- 154 (!) 56 96 %   20 1200 112/85 106 19 97 %   20 1159 -- 110 23 97 %   20 1143 -- 118 -- (!) 86 %   20 1140 -- -- (!) 38 91 %   20 1040 -- 97 (!) 39 92 %   20 0905 103/70 82 19 95 %   20 0827 -- 85 20 94 %   20 0804 (!) 155/98 92 20 95 %         Intake/Output Summary (Last 24 hours) at 12/3/2020 1349  Last data filed at 12/3/2020 0949  Gross per 24 hour   Intake 6033 ml   Output 1850 ml   Net 4183 ml     Wt Readings from Last 2 Encounters:   20 (!) 327 lb 2.6 oz (148.4 kg)   20 (!) 314 lb (142.4 kg)     Body mass index is 45.63 kg/m². PHYSICAL EXAMINATION:  General: Intubated   HEENT:  Normocephalic, atraumatic. Pulls equal and reactive no scleral icterus. No conjunctival injection. No nasal discharge. Neck:  Supple, without stridor, no JVD  Heart:  Tachycardic, regular rhythm, no murmurs, gallops, rubs  Lungs: Greatly diminished in left upper lobe, rhonchorous throughout the remainder of the lung fields   Abdomen: Obese, bowel sounds present, soft, non-tender, non-distended, no guarding or rigidity. Not tender to palpation  Extremities:  No clubbing, cyanosis,1+ edema bilaterally. Palpable radial and DP pulses b/l upper and lower extremieties  Skin:  Warm and dry,  Neuro: Following commands. Cranial nerves II through XII grossly intact.   No focal neurologic deficits  Breast: deferred  Rectal: deferred  Genitalia:  deferred     MEDICATIONS:    Scheduled Meds:   insulin glargine  10 Units Subcutaneous Daily    potassium bicarb-citric acid  40 mEq Oral Q4H    cefepime  2 g Intravenous Q12H    famotidine  20 mg Oral BID    methylPREDNISolone  40 mg Intravenous Daily    sodium chloride  20 mL Intravenous Once    heparin flush  1 mL Intravenous 2 times per day    insulin lispro  0-18 Units Subcutaneous Q6H    nicotine  1 patch Transdermal Daily    acetylcysteine  4 mL Inhalation BID    albuterol  2.5 mg Nebulization Q4H    Arformoterol Tartrate  15 mcg Nebulization BID    atorvastatin  80 mg Oral Nightly    [Held by provider] clopidogrel  75 mg Oral Daily    fluticasone  2 spray Nasal Daily    sodium chloride flush  10 mL Intravenous 2 times per day    [Held by provider] enoxaparin  40 mg Subcutaneous Daily    insulin glargine  0.25 Units/kg Subcutaneous Nightly    chlorhexidine  15 mL Mouth/Throat BID     Continuous Infusions:   sodium chloride      fentaNYL 5 mcg/ml in 0.9%  ml infusion 100 mcg/hr (12/03/20 1000)    dextrose      midazolam 3 mg/hr (12/03/20 1000)     PRN Meds:   potassium chloride, 10 mEq, PRN  acetaminophen, 650 mg, Q4H PRN    Or  acetaminophen, 650 mg, Q4H PRN  sodium chloride flush, 10 mL, PRN  heparin flush, 1 mL, PRN  sodium phosphate IVPB, 0.16 mmol/kg, PRN    Or  sodium phosphate IVPB, 0.32 mmol/kg, PRN  magnesium sulfate, 1 g, PRN  tiZANidine, 4 mg, TID PRN  polyethylene glycol, 17 g, Daily PRN  promethazine, 12.5 mg, Q6H PRN    Or  ondansetron, 4 mg, Q6H PRN  glucose, 15 g, PRN  dextrose, 12.5 g, PRN  glucagon (rDNA), 1 mg, PRN  dextrose, 100 mL/hr, PRN  oxyCODONE-acetaminophen, 1 tablet, TID PRN    And  oxyCODONE, 2.5 mg, TID PRN  albuterol, 2.5 mg, Q4H PRN        VENT SETTINGS (Comprehensive) (if applicable):  Vent Information  $Ventilation: $Subsequent Day  Skin Assessment: Clean, dry, & intact  Equipment ID: [id number]  Equipment Changed: Humidification  Vent Type: [id number]  Vent Mode: AC/VC  Vt Ordered: 510 mL  Rate Set: 20 bmp  Peak Flow: 70 L/min  Pressure Support: 0 cmH20  FiO2 : 65 %  SpO2: 96 %  SpO2/FiO2 ratio: 147.69  Sensitivity: 3  PEEP/CPAP: 7  I Time/ I Time %: 0 s  Humidification Source: Heated wire  Humidification Temp: 37  Humidification Temp Measured: 36.8  Circuit Condensation: Drained  Mask Type: Full face mask  Mask Size: Medium  Additional Respiratory  Assessments  Pulse: 98  Resp: 20  SpO2: 96 %  Position: Semi-Jeff's  Humidification Source: Heated wire  Humidification Temp: 37  Circuit Condensation: Drained  Oral Care: Mouth moisturizer, Lip moisturizer applied, Mouth suctioned  Subglottic Suction Done?: Yes  Cuff Pressure (cm H2O): 29 cm H2O    ABGs:   Recent Labs     12/03/20  1115   PH 7.434   PCO2 49.6*   PO2 55.1*   HCO3 32.5*   BE 7.3*   O2SAT 86.7*       Laboratory findings:    Complete Blood Count:   Recent Labs     12/01/20  0942 12/01/20  1446 12/02/20  0530 12/03/20  0540   WBC 5.0  --  6.1 6.2   HGB 5.7* 6.5* 7.8* 7.5*   HCT 19.3* 21.7* 25.9* 25.7*   PLT 25*  --  19* 36*        Last 3 Blood Glucose:   Recent Labs     12/01/20  0515 12/02/20  0530 12/03/20  0540   GLUCOSE 176* 226* 262*        PT/INR:    Lab Results   Component Value Date    PROTIME 11.6 11/26/2020    PROTIME 11.1 12/30/2011    INR 1.0 11/26/2020     PTT:    Lab Results   Component Value Date    APTT 29.0 12/28/2011       Comprehensive Metabolic Profile:   Recent Labs     12/01/20  0515 12/02/20  0530 12/03/20  0540   * 148* 144   K 3.6 3.0* 3.4*    104 102   CO2 36* 33* 32*   BUN 90* 78* 69*   CREATININE 1.7* 1.4* 1.2   GLUCOSE 176* 226* 262*   CALCIUM 8.5* 8.7 8.5*   PROT 4.9* 5.1* 4.9*   LABALBU 3.0* 2.9* 2.4*   BILITOT 0.7 0.9 0.6   ALKPHOS 133* 159* 149*   AST 80* 77* 76*   ALT 44* 49* 51*      Magnesium:   Lab Results   Component Value Date    MG 2.2 12/03/2020     Phosphorus:   Lab Results   Component Value Date    PHOS 2.9 12/03/2020 Ionized Calcium:   Lab Results   Component Value Date    CAION 1.33 10/23/2020        Troponin:   No results for input(s): TROPONINI in the last 72 hours. Microbiology:  GPC blood   Respiratory culture no growth    Radiology/Imaging:         ASSESSMENT:     Neuro   Chronic back pain              -Epidural on 11/17, no hematoma seen on CT lumbar spine      Sedated on the vent              -Versed, fentanyl. Seroquel 100 mg was given one dose , d/c by renal      Respiratory   Acute hypoxic and hypercapnic respiratory failure       -continue full vent support. Pressure support trials, tempted extubation 12/3, failed had to be reintubated              -Follow ABG              -Albuterol, incentive spirometer              -solumedrol 40 daily   -wean FiO2 as able   -metanebs   -Mucomyst   -bedside L thoracentesis 11/22 with only a small amount of pleural fluid, not amendable to drainage   -bronch 11/27 showing extrinsic compression from mass, no mucus plug   -failed PSV today---> tachycardic/tacypnic/sweating/hypertensive     Community-acquired pneumonia post obstructive pna              -Strep and Legionella negative              -Respiratory culture pending              -Pro-Kael 0.23   -continue unasyn     Lung mass OMER              -Status post bronc 11/18 with TBNA              -No obvious mucous plugging or purulence. Very erythematous and compressed airways, concern for mass              -Cytology concerning for small cell lung cancer. Heme-onc on board.  Rad onc recs appreciated   -Being transferred to [facility] for palliative radiation therapy   -Holding off on trach and PEG until after palliative radiation    Pleural effusion   -evaluated with US on 11/22, not larg fluid pocket   -per nephro note, possible thoracentesis with pulmonology?    -12/1-thoracentesis will not be done due to low platelet count and risk of procedure  Respiratory film array negative  Long, lifetime

## 2020-12-03 NOTE — PROGRESS NOTES
[facility]  Hematology/Oncology  Consult      Patient Name: Yazmin Koehler. YOB: 1960  PCP: Douglas Falcon PA-C   Referring Provider:      Reason for Consultation:   Chief Complaint   Patient presents with    Shortness of Breath     patient sent from surgery after having epideral injection L4-L5. SOB has been for past two weeks     Subjective: Patient was extubated however while rounding patient was being reintubated. History of Present Illness: This is a 60 yo male patient with a past medical history of CAD, DM type 2, morbid obesity, chronic back pain, HTN, AAA s/p repair who set to the emergency room by Dr. Omari Izquierdo for complaints of shortness of breath and decreased O2 during an epidural procedure. Patient was having an L4/L5 epidural for pain management done by Dr. Omari Izquierdo when his O2 dropped into the 80's during the procedure. Patient also reported having worsening shortness of breath over the past two weeks with a productive cough. Patient is basically bed bound only taking occasional steps. CTA of the chest, A/P showed new confluent opacities located in left upper lobe suggesting pneumonia, atelectasis, or neoplasm. Patchy airspace opacities located in lingula suggesting pneumonia with areas of atelectasis. Stenosis and occlusion are associated with left upper lobe segmental bronchi and lingular segmental bronchi. Moderate to large left pleural effusion. Mediastinal lymphadenopathy. Stable fusiform infrarenal abdominal aortic aneurysm with endograft repair. Aneurysm measures up to 6.4 cm. No evidence of endoleak or retroperitoneal fluid. He was given 1 dose of Rocephin and azithromycin while in the ER. CBC since admission has shown anemia and thrombocytopenia. 11/18 an RRT was called and was subsequently intubated. He also had a bronchoscopy done and bxs of left upper lobe were sent.        Diagnostic Data:     Past Medical History:   Diagnosis Date    Anticoagulant long-term use     Arthritis     CAD (coronary artery disease)     Chronic back pain     Depression     Dyslipidemia     Hiatal hernia 1979    History of ST elevation myocardial infarction (STEMI)     Hyperlipidemia     Hypertension     Low back pain     Lumbar radiculopathy 8/14/2019    Obesity     MANOLO on CPAP     Presence of stent in left circumflex coronary artery     Presence of stent in right coronary artery     Smoker     Type 2 diabetes mellitus without complication [facility])        Patient Active Problem List    Diagnosis Date Noted    ARF (acute respiratory failure) ([address] 12/02/2020    Small cell lung cancer ([address]     Acute respiratory failure with hypoxia ([address] 11/17/2020    Community acquired pneumonia of left upper lobe of lung 11/17/2020    Thrombocytopenia ([address] 11/17/2020    Spinal stenosis of lumbar region with neurogenic claudication 10/29/2020    Obesity     Chronic back pain     Lumbosacral spondylosis without myelopathy 12/11/2019    Lumbar radiculopathy 08/14/2019    AAA (abdominal aortic aneurysm) ([address] 06/28/2019    Chronic bilateral low back pain with bilateral sciatica 06/11/2019    Right hip pain 06/11/2019    DDD (degenerative disc disease), lumbar 06/11/2019    Status post total right knee replacement 11/26/2018    Morbid obesity with BMI of 40.0-44.9, adult ([address] 11/26/2018    Chronic pain syndrome 07/27/2018    Chronic pain of right knee 07/27/2018    Non-insulin dependent type 2 diabetes mellitus ([address] 03/16/2018    Aortic valve stenosis 02/15/2018    Primary osteoarthritis of right knee 02/15/2018    Presence of stent in left circumflex coronary artery     Smoker     CAD (coronary artery disease) 12/28/2011    HTN (hypertension) 12/28/2011    Hyperlipemia 12/28/2011        Past Surgical History:   Procedure Laterality Date    ABDOMINAL AORTIC ANEURYSM REPAIR, ENDOVASCULAR N/A 6/28/2019    ENDOVASCULAR REPAIR ABDOMINAL AORTIC ANEURYSM performed by pack-year smoking history. He has never used smokeless tobacco.  ETOH:   reports no history of alcohol use. Home Medications  Prior to Admission medications    Medication Sig Start Date End Date Taking? Authorizing Provider   loratadine (CLARITIN) 10 MG capsule Take 10 mg by mouth daily   Yes Historical Provider, MD   pioglitazone (ACTOS) 15 MG tablet TAKE 1 TABLET BY MOUTH EVERY DAY 11/6/20  Yes Lizeth Hay PA-C   pregabalin (LYRICA) 150 MG capsule Take 1 capsule by mouth 2 times daily for 30 days. 11/12/20 12/12/20 Yes Az Martinez DO   diclofenac (VOLTAREN) 75 MG EC tablet TAKE 1 TABLET BY MOUTH TWICE A DAY 10/7/20  Yes Historical Provider, MD   oxyCODONE-acetaminophen (PERCOCET) 7.5-325 MG per tablet Take 1 tablet by mouth 3 times daily as needed for Pain for up to 30 days.  Intended supply: 30 days 11/12/20 12/12/20 Yes Az Martinez DO   quinapril (ACCUPRIL) 10 MG tablet Take 1 tablet by mouth daily  Patient taking differently: Take 10 mg by mouth daily Wife states on hold 10/16/20  Yes Lizeth Hay PA-C   metFORMIN (GLUCOPHAGE) 500 MG tablet TAKE 1 TABLET BY MOUTH TWICE A DAY WITH MEALS  Patient taking differently: Wife states on hold 10/8/20  Yes Catherine Middleton DO   fluticasone (FLONASE) 50 MCG/ACT nasal spray 2 sprays by Nasal route daily 10/6/20  Yes Lizeth Hay PA-C   omega-3 acid ethyl esters (LOVAZA) 1 g capsule take 1 capsule twice a day 8/14/20  Yes Lizeth Hay PA-C   niacin (SLO-NIACIN) 500 MG extended release tablet take 1 tablet by mouth once daily 7/20/20  Yes Catherine Middleton DO   clopidogrel (PLAVIX) 75 MG tablet TAKE 1 TABLET BY MOUTH EVERY DAY  Patient taking differently: Take 75 mg by mouth daily Has been on hold for procedure 7/6/20  Yes Lizeth Hay PA-C   atorvastatin (LIPITOR) 80 MG tablet TAKE 1 TABLET BY MOUTH AT BEDTIME 10/22/18  Yes Lizeth Hay PA-C   carvedilol (COREG) 25 MG tablet TAKE ONE TABLET BY MOUTH TWICE DAILY (WITH MEALS) 10/22/18  Yes Lizeth Hay PA-C sildenafil (VIAGRA) 50 MG tablet Take 1 tablet by mouth as needed for Erectile Dysfunction 7/6/20   Kaitlin Gamble PA-C   aspirin 325 MG EC tablet TAKE 1 TABLET BY MOUTH EVERY DAY  Patient taking differently: Take 325 mg by mouth daily Has been on hold for procedure 5/7/20   [address], DO   Tens Unit MISC by Does not apply route 5/7/19   TRUDY Dinero       Allergies  Allergies   Allergen Reactions    Bee Venom Anaphylaxis       Review of Systems: patient is intubated and sedated and not responsive to questions. Objective  /70   Pulse 97   Temp 98.4 °F (36.9 °C) (Bladder)   Resp (!) 39   Ht 5' 11\" (1.803 m)   Wt (!) 327 lb 2.6 oz (148.4 kg)   SpO2 92%   BMI 45.63 kg/m²     Physical Exam:   Performance Status:  Head and neck: PERRLA, EOMI . Sclera non icteric. Oropharynx: Clear  Neck: no JVD,  no adenopathy  Heart: Regular rate and regular rhythm, no murmur  Lungs:Disffuse inspiratory and expiratory rhonchi   Extremities: No edema,no cyanosis, no clubbing. Abdomen: Soft, non-tender;no masses, no organomegaly  Skin: No rash. Neurologic:Cranial nerves grossly intact. No focal motor or sensory deficits .     Recent Laboratory Data-   Lab Results   Component Value Date    WBC 6.2 12/03/2020    HGB 7.5 (L) 12/03/2020    HCT 25.7 (L) 12/03/2020    MCV 93.8 12/03/2020    PLT 36 (L) 12/03/2020    LYMPHOPCT 11.0 (L) 12/02/2020    RBC 2.74 (L) 12/03/2020    MCH 27.4 12/03/2020    MCHC 29.2 (L) 12/03/2020    RDW 16.9 (H) 12/03/2020    NEUTOPHILPCT 80.0 12/02/2020    MONOPCT 5.0 12/02/2020    BASOPCT 0.0 12/02/2020    NEUTROABS 5.00 12/02/2020    LYMPHSABS 0.67 (L) 12/02/2020    MONOSABS 0.30 12/02/2020    EOSABS 0.12 12/02/2020    BASOSABS 0.00 12/02/2020       Lab Results   Component Value Date     12/03/2020    K 3.4 (L) 12/03/2020     12/03/2020    CO2 32 (H) 12/03/2020    BUN 69 (H) 12/03/2020    CREATININE 1.2 12/03/2020    GLUCOSE 262 (H) 12/03/2020    CALCIUM 8.5 (L) 12/03/2020    PROT 4.9 (L) 12/03/2020    LABALBU 2.4 (L) 12/03/2020    BILITOT 0.6 12/03/2020    ALKPHOS 149 (H) 12/03/2020    AST 76 (H) 12/03/2020    ALT 51 (H) 12/03/2020    LABGLOM >60 12/03/2020    GFRAA >60 12/03/2020       No results found for: IRON, TIBC, FERRITIN        Radiology-    Xr Abdomen (kub) (single Ap View)    Result Date: 11/23/2020  EXAMINATION: ONE SUPINE XRAY VIEW(S) OF THE ABDOMEN 11/23/2020 9:38 am COMPARISON: None HISTORY: ORDERING SYSTEM PROVIDED HISTORY: abdominal pain TECHNOLOGIST PROVIDED HISTORY: Reason for exam:->abdominal pain FINDINGS: There is a nonobstructive bowel gas pattern. There are no abnormal air-fluid levels. There are no calculi overlying the renal shadows. There is an NG tube within the stomach. There is a stent graft seen within the abdominal aorta. 1. There is no bowel obstruction, ileus or free air. Ct Lumbar Spine Wo Contrast    Result Date: 11/19/2020  EXAMINATION: CT OF THE LUMBAR SPINE WITHOUT CONTRAST  11/18/2020 TECHNIQUE: CT of the lumbar spine was performed without the administration of intravenous contrast. Multiplanar reformatted images are provided for review. Dose modulation, iterative reconstruction, and/or weight based adjustment of the mA/kV was utilized to reduce the radiation dose to as low as reasonably achievable. COMPARISON: 11/17/2020 HISTORY: ORDERING SYSTEM PROVIDED HISTORY: exclude epidural hematoma TECHNOLOGIST PROVIDED HISTORY: Reason for exam:->exclude epidural hematoma Chronic back pain, recent epidural placement FINDINGS: BONES/ALIGNMENT: Vertebral body heights are preserved without evidence for lumbar fracture. However, there is irregular lucency and sclerosis in the upper sacral ala bilaterally. There is minimal retrolisthesis at L2-L3. Alignment is otherwise normal. DEGENERATIVE CHANGES: There is disc space narrowing and vacuum disc phenomenon at L2-L3. Disc space heights are otherwise preserved.   There is diffuse facet arthropathy. SOFT TISSUES/RETROPERITONEUM: No evidence for epidural hematoma is identified. 1. No evidence for epidural hematoma on CT scan. 2. Suspected sacral insufficiency fracture. Xr Chest Portable    Result Date: 11/23/2020  EXAMINATION: ONE XRAY VIEW OF THE CHEST 11/23/2020 7:08 am COMPARISON: 11/20/2020 HISTORY: ORDERING SYSTEM PROVIDED HISTORY: resp failure TECHNOLOGIST PROVIDED HISTORY: Reason for exam:->resp failure FINDINGS: There is no interval change in the persistent dense consolidation within the left upper lobe. The left lower lobe is clear. The right lung is clear. There is minimal atelectasis seen within the right lung base. The cardiac silhouette is within normals. 1. No interval change in the dense consolidation seen within the left upper lobe. 2. Minimal right lung base atelectasis. Xr Chest Portable    Result Date: 11/22/2020  EXAMINATION: ONE XRAY VIEW OF THE CHEST 11/22/2020 6:28 am COMPARISON: 11/21/2020 HISTORY: ORDERING SYSTEM PROVIDED HISTORY: resp failure TECHNOLOGIST PROVIDED HISTORY: Reason for exam:->resp failure FINDINGS: The endotracheal tube is stable. The enteric tube tip is not well visualized but may be at the gastroesophageal junction. There is stable left upper lobe dense opacity. There are continued patchy opacities in the left lower lobe. There may be small pleural effusions. No pneumothorax is seen. No significant change in dense airspace opacity of the left upper lobe and patchy left lower lobe airspace opacities. Enteric tube tip is not well visualized due to underpenetration. The tip may be at the gastroesophageal junction. This could be confirmed with KUB centered on the upper abdomen.     Xr Chest Portable    Result Date: 11/21/2020  EXAMINATION: ONE XRAY VIEW OF THE CHEST 11/21/2020 7:37 am COMPARISON: 11/20/2020 HISTORY: ORDERING SYSTEM PROVIDED HISTORY: resp failure TECHNOLOGIST PROVIDED HISTORY: Reason for exam:->resp failure FINDINGS: There is masslike consolidation of the left upper lobe. There is a small to moderate left dependent pleural effusion. There is no visible pneumothorax. The pulmonary vessels are within normal limits. Left upper lobe masslike consolidation. Small to moderate left pleural effusion. Xr Chest 1 View    Result Date: 11/18/2020  EXAMINATION: ONE XRAY VIEW OF THE CHEST 11/18/2020 9:06 am COMPARISON: 11/18/2020 HISTORY: ORDERING SYSTEM PROVIDED HISTORY: intubation TECHNOLOGIST PROVIDED HISTORY: Reason for exam:->intubation Reason for exam:->portable FINDINGS: Compared to the chest radiograph from earlier today, 6:44 a.m., there is interval placement of an endotracheal tube, the tip of which is approximately 4.1 cm above the korina. Nasogastric tube is present but is suboptimally visualized due to motion artifact and relative underpenetration of the study. The abdominal radiograph demonstrates it to be well positioned, with the tip in the upper abdomen. Prominent masslike consolidative opacity in the upper left lung are grossly stable. No pneumothorax is seen. Layering left pleural effusion. 1.  The life-support lines and tubes are well positioned. 2. Masslike consolidative opacity in the left upper lung. Small left effusion. Cta Chest W Contrast    Result Date: 11/17/2020  EXAMINATION: CTA OF THE CHEST 11/17/2020 3:18 pm TECHNIQUE: CTA of the chest was performed after the administration of intravenous contrast.  Multiplanar reformatted images are provided for review. MIP images are provided for review. Dose modulation, iterative reconstruction, and/or weight based adjustment of the mA/kV was utilized to reduce the radiation dose to as low as reasonably achievable.; CTA of the abdomen and pelvis was performed with the administration of intravenous contrast. Multiplanar reformatted images are provided for review. MIP images are provided for review.  Dose modulation, iterative reconstruction, and/or weight tip of tube Portable? ->Yes FINDINGS: Tip of the NG tube noted at the level of the diaphragm, no significant change. The right side and the inferior part of the abdomen is not included in the study. There is opacification of the visualized left upper lung. Tip of NG tube at the level of the left hemidiaphragm, no change. Xr Chest Abdomen Ng Placement    Result Date: 11/22/2020  EXAMINATION: ONE SUPINE XRAY VIEW(S) OF THE ABDOMEN 11/22/2020 12:52 pm COMPARISON: November 18. HISTORY: ORDERING SYSTEM PROVIDED HISTORY: OG placement TECHNOLOGIST PROVIDED HISTORY: Reason for exam:->OG placement FINDINGS: Nasogastric tube is present. Side hole appears to be at level of gastroesophageal junction. This tube could be advanced approximately 4 or 5 cm. Nasogastric tube is present with side hole at level of gastroesophageal junction. This tube could be advanced approximately 4 or 5 cm. Xr Chest Abdomen Ng Placement    Result Date: 11/18/2020  EXAMINATION: ONE SUPINE XRAY VIEW(S) OF THE ABDOMEN 11/18/2020 9:07 am COMPARISON: 11/18/2020, about 2 hours earlier HISTORY: ORDERING SYSTEM PROVIDED HISTORY: NGT TECHNOLOGIST PROVIDED HISTORY: Reason for exam:->NGT Reason for exam:->portable NG tube placement FINDINGS: There is an NG tube with the tip in the stomach. An ET tube is again noted in satisfactory position. There is extensive opacity in the left lung, most likely pneumonia. Increased markings also seen in the visualized portion of the right lung. The heart is enlarged. NG tube tip in the stomach. Fluoro For Surgical Procedures    Result Date: 11/17/2020  EXAMINATION: SPOT FLUOROSCOPIC IMAGES 11/17/2020 12:03 pm TECHNIQUE: Fluoroscopy was provided by the radiology department for procedure. Radiologist was not present during examination.  FLUOROSCOPY DOSE AND TYPE OR TIME AND EXPOSURES: Total dose:26.99 mGy COMPARISON: None HISTORY: ORDERING SYSTEM PROVIDED HISTORY: Pain management TECHNOLOGIST PROVIDED diabetes mellitus and he presently has pneumonia with hypoxia and respiratory failure and remains intubated and sedated. 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   - Today's Hgb 7.2 Plts 38   - Patient still intubated but hopefully will be able to extubated  - Once stable, MRI brain to complete staging   - If he improves clinically and has improvement in ECOG, he will be considered for systemic therapy  - L pleural effusions likely malignant  - ID following, now on unasyn  - Given his current ECOG and comorbidities along with persistent fevers and abx requirements, not eligible for inpatient emergent chemotherapy. Will get rad onc on board as inpatient urgent palliative XRT could be considered to improved respiratory status if he has improvement in above    11/26/20  - Remains intubated, back in ICU  - CBC with further drop in platelets to 29. Likely combination of myelosuppression from acute illness and abx exposure  - If he has improvement in his clinic course, eventually will need MRI brain as above  - Therapy plan as above. He will need significant improvement clinically prior to discussion regarding chemotherapy. Rad onc consult pending, again will likely need improvement prior to proceeding with therapy. Cultures have no grown a source, but patient remains febrile (possibly drug fever and ID following and managing abx)  - Will follow. Further recs pending clinical course    11/27/20  - LUE DVT  - Platelets 33. Stable from yesterday  - Can not anticoagulate with current platelet count. Needs to be ~50. Monitor for now  - Remainder of CBC stable    11/29/20  - CBC stable, thrombocytopenia unchanged. Hgb dropped to 7.0. Will give 1 unit  - Continue to hold [facility] with platelets <01  - Still with low grade fevers in the 100's  - ID following, on unasyn.  Possible post-obstructive PNA    11/30/20  - Continues to have thrombocytopenia platelets 28. Hemoglobin 7.3 after 1 unit of packed red blood cells yesterday. - Continue to hold [facility] [place], operated by [facility] for platelets <27  - Still with fevers in the 100s, he is also hypertensive  - On Unasyn  - Rad on consult still pending, but no aggressive oncologic care until significant clinical improvement    12/1/20  - Today's Hgb 7.8  - Left upper lobe cancer. Not eligible for emergent chemotherapy  - Patient has to improve clinically in order to transfer to [facility] ([facility]) for palliative radiation.  - Continue to transfuse with target hemoglobin above 7   - Febrile dt underlying ca   - Overall poor prognosis     12/2/20  - Today's Hgb 7.8 plts are 19  - Left upper lobe cancer. Not eligible for emergent chemotherapy at this time. - Patient has been cleared per Dr. Lenwood Merlin to transport to [facility] ([facility]) for palliative radiation. Transfer order has been placed per ICU nursing staff. - Transfuse for Hgb <7, platelets <65  - Overall poor prognosis      12/3/20  - Hb 7.5 plts trending up 36   - Patient was extubated however had to be reintubated. - To be transferred to [facility] ([facility]) for palliative radiation.  - Transfuse for Hgb <7, platelets <86  - Overall poor prognosis      MATY Llanes - CNP  Electronically signed 12/3/2020 at 11:26 AM   Pt seen and examined.  Note edited to reflect updates  Samara Reyes MD

## 2020-12-04 NOTE — PROGRESS NOTES
[facility]  Hematology/Oncology  Consult      Patient Name: Christo Mcknight. YOB: 1960  PCP: Saeed Knott PA-C   Referring Provider:      Reason for Consultation:   No chief complaint on file. Subjective: Patient remains intubated  Was not responsive to questions     History of Present Illness: This is a 60 yo male patient with a past medical history of CAD, DM type 2, morbid obesity, chronic back pain, HTN, AAA s/p repair who set to the emergency room by Dr. Crystal Rowe for complaints of shortness of breath and decreased O2 during an epidural procedure. Patient was having an L4/L5 epidural for pain management done by Dr. Crystal Rowe when his O2 dropped into the 80's during the procedure. Patient also reported having worsening shortness of breath over the past two weeks with a productive cough. Patient is basically bed bound only taking occasional steps. CTA of the chest, A/P showed new confluent opacities located in left upper lobe suggesting pneumonia, atelectasis, or neoplasm. Patchy airspace opacities located in lingula suggesting pneumonia with areas of atelectasis. Stenosis and occlusion are associated with left upper lobe segmental bronchi and lingular segmental bronchi. Moderate to large left pleural effusion. Mediastinal lymphadenopathy. Stable fusiform infrarenal abdominal aortic aneurysm with endograft repair. Aneurysm measures up to 6.4 cm. No evidence of endoleak or retroperitoneal fluid. He was given 1 dose of Rocephin and azithromycin while in the ER. CBC since admission has shown anemia and thrombocytopenia. 11/18 an RRT was called and was subsequently intubated. He also had a bronchoscopy done and bxs of left upper lobe were sent.        Diagnostic Data:     Past Medical History:   Diagnosis Date    Anticoagulant long-term use     Arthritis     CAD (coronary artery disease)     Chronic back pain     Depression     Dyslipidemia     Hiatal hernia 1979    History of ST elevation myocardial infarction (STEMI)     Hyperlipidemia     Hypertension     Low back pain     Lumbar radiculopathy 8/14/2019    Obesity     MANOLO on CPAP     Presence of stent in left circumflex coronary artery     Presence of stent in right coronary artery     Smoker     Type 2 diabetes mellitus without complication [facility])        Patient Active Problem List    Diagnosis Date Noted    ARF (acute respiratory failure) ([address] 12/02/2020    Small cell lung cancer ([address]     Acute respiratory failure with hypoxia ([address] 11/17/2020    Community acquired pneumonia of left upper lobe of lung 11/17/2020    Thrombocytopenia ([address] 11/17/2020    Spinal stenosis of lumbar region with neurogenic claudication 10/29/2020    Obesity     Chronic back pain     Lumbosacral spondylosis without myelopathy 12/11/2019    Lumbar radiculopathy 08/14/2019    AAA (abdominal aortic aneurysm) ([address] 06/28/2019    Chronic bilateral low back pain with bilateral sciatica 06/11/2019    Right hip pain 06/11/2019    DDD (degenerative disc disease), lumbar 06/11/2019    Status post total right knee replacement 11/26/2018    Morbid obesity with BMI of 40.0-44.9, adult ([address] 11/26/2018    Chronic pain syndrome 07/27/2018    Chronic pain of right knee 07/27/2018    Non-insulin dependent type 2 diabetes mellitus ([address] 03/16/2018    Aortic valve stenosis 02/15/2018    Primary osteoarthritis of right knee 02/15/2018    Presence of stent in left circumflex coronary artery     Smoker     CAD (coronary artery disease) 12/28/2011    HTN (hypertension) 12/28/2011    Hyperlipemia 12/28/2011        Past Surgical History:   Procedure Laterality Date    ABDOMINAL AORTIC ANEURYSM REPAIR, ENDOVASCULAR N/A 6/28/2019    ENDOVASCULAR REPAIR ABDOMINAL AORTIC ANEURYSM performed by Drake Mcfarlane MD at [facility] 3/12/2020    BILATERAL L3,L4,L5 MEDIAL BRANCH NERVE BLOCK performed by Paul Davenport DO at [address] Bilateral 6/11/2020    BILATERAL L3,L4,L5 MEDIAL NERVE BRANCH BLOCK #2 performed by Tanna Breen DO at [address] N/A 11/18/2020    BRONCHOSCOPY/TRANSBRONCHIAL NEEDLE BIOPSY performed by Aba Haider MD at [facility] [address]  X4    CORONARY ANGIOPLASTY WITH STENT PLACEMENT  12/30/2011     [facility] 1st OM 3.0 x 20 Ion    DIAGNOSTIC CARDIAC CATH LAB PROCEDURE  3/15/2005    [facility]. Mild to moderate CAD. Normal LV.  DIAGNOSTIC CARDIAC CATH LAB PROCEDURE  1/16/2007    [facility]. Cath/PTCA/DE stent of proximal and distal RCA.  DIAGNOSTIC CARDIAC CATH LAB PROCEDURE  2/21/2007    Cath/DE stent of proximal OM1 and proximal Cx.  DIAGNOSTIC CARDIAC CATH LAB PROCEDURE  12/30/2011    ANURADHA of mid OM branch of circumflex.  ECHO COMPL W DOP COLOR FLOW  12/30/2011         HC INSERT PICC CATH, 5/> YRS - MIDLINE  11/23/2020         HERNIA REPAIR  2/2014    laparoscopic ventral hernia repain    JOINT REPLACEMENT Left 4/1/14    TKA-ed    JOINT REPLACEMENT Right 2018    KNEE    PAIN MANAGEMENT PROCEDURE Right 9/1/2020    RIGHT L3,4,5 MEDIAL BRANCH RADIOFREQUENCY ABLATION performed by Tanna Breen DO at [facility] [address] N/A 11/17/2020    L4-5 EPIDURAL STEROID INJECTION #1 performed by Tanna Breen DO at [address] [facility] OFFICE/OUTPT VISIT,PROCEDURE ONLY Right 11/26/2018    RIGHT KNEE TOTAL ARTHROPLASTY performed by Oralia Cochran DO at [facility] OR       Family History  Family History   Problem Relation Age of Onset    Diabetes Mother     Stroke Mother     Diabetes Father     No Known Problems Sister        Social History    TOBACCO:   reports that he has been smoking cigarettes. He started smoking about 42 years ago. He has a 39.00 pack-year smoking history. He has never used smokeless tobacco.  ETOH:   reports no history of alcohol use.     Home Medications  Prior to Admission tablet TAKE 1 TABLET BY MOUTH AT BEDTIME 10/22/18   Dudley Sevilla PA-C   carvedilol (COREG) 25 MG tablet TAKE ONE TABLET BY MOUTH TWICE DAILY (WITH MEALS) 10/22/18   Dudley Sevilla PA-C       Allergies  Allergies   Allergen Reactions    Bee Venom Anaphylaxis       Review of Systems: patient is intubated and sedated and not responsive to questions. Objective  /89   Pulse 94   Temp 100.6 °F (38.1 °C) (Bladder)   Resp 20   Ht 5' 11\" (1.803 m)   Wt (!) 341 lb 4.4 oz (154.8 kg)   SpO2 94%   BMI 47.60 kg/m²     Physical Exam:   Performance Status:  Head and neck: PERRLA, EOMI . Sclera non icteric. Oropharynx: Clear  Neck: no JVD,  no adenopathy  Heart: Regular rate and regular rhythm, no murmur  Lungs:Disffuse inspiratory and expiratory rhonchi   Extremities: No edema,no cyanosis, no clubbing. Abdomen: Soft, non-tender;no masses, no organomegaly  Skin: No rash. Neurologic:Cranial nerves grossly intact. No focal motor or sensory deficits .     Recent Laboratory Data-   Lab Results   Component Value Date    WBC 7.0 12/04/2020    HGB 7.9 (L) 12/04/2020    HCT 26.5 (L) 12/04/2020    MCV 90.8 12/04/2020    PLT 21 (L) 12/04/2020    LYMPHOPCT 15.9 (L) 12/04/2020    RBC 2.92 (L) 12/04/2020    MCH 27.1 12/04/2020    MCHC 29.8 (L) 12/04/2020    RDW 16.9 (H) 12/04/2020    NEUTOPHILPCT 78.8 12/04/2020    MONOPCT 2.7 12/04/2020    BASOPCT 0.4 12/04/2020    NEUTROABS 5.74 12/04/2020    LYMPHSABS 1.12 (L) 12/04/2020    MONOSABS 0.21 12/04/2020    EOSABS 0.00 (L) 12/04/2020    BASOSABS 0.00 12/04/2020       Lab Results   Component Value Date     12/04/2020    K 4.4 12/04/2020     12/04/2020    CO2 32 (H) 12/04/2020    BUN 68 (H) 12/04/2020    CREATININE 1.2 12/04/2020    GLUCOSE 143 (H) 12/04/2020    CALCIUM 8.8 12/04/2020    PROT 5.1 (L) 12/04/2020    LABALBU 2.7 (L) 12/04/2020    BILITOT 0.6 12/04/2020    ALKPHOS 181 (H) 12/04/2020     (H) 12/04/2020    ALT 76 (H) 12/04/2020    LABGLOM >60 12/04/2020    GFRAA >60 12/04/2020       No results found for: IRON, TIBC, FERRITIN        Radiology-    Xr Abdomen (kub) (single Ap View)    Result Date: 11/23/2020  EXAMINATION: ONE SUPINE XRAY VIEW(S) OF THE ABDOMEN 11/23/2020 9:38 am COMPARISON: None HISTORY: ORDERING SYSTEM PROVIDED HISTORY: abdominal pain TECHNOLOGIST PROVIDED HISTORY: Reason for exam:->abdominal pain FINDINGS: There is a nonobstructive bowel gas pattern. There are no abnormal air-fluid levels. There are no calculi overlying the renal shadows. There is an NG tube within the stomach. There is a stent graft seen within the abdominal aorta. 1. There is no bowel obstruction, ileus or free air. Ct Lumbar Spine Wo Contrast    Result Date: 11/19/2020  EXAMINATION: CT OF THE LUMBAR SPINE WITHOUT CONTRAST  11/18/2020 TECHNIQUE: CT of the lumbar spine was performed without the administration of intravenous contrast. Multiplanar reformatted images are provided for review. Dose modulation, iterative reconstruction, and/or weight based adjustment of the mA/kV was utilized to reduce the radiation dose to as low as reasonably achievable. COMPARISON: 11/17/2020 HISTORY: ORDERING SYSTEM PROVIDED HISTORY: exclude epidural hematoma TECHNOLOGIST PROVIDED HISTORY: Reason for exam:->exclude epidural hematoma Chronic back pain, recent epidural placement FINDINGS: BONES/ALIGNMENT: Vertebral body heights are preserved without evidence for lumbar fracture. However, there is irregular lucency and sclerosis in the upper sacral ala bilaterally. There is minimal retrolisthesis at L2-L3. Alignment is otherwise normal. DEGENERATIVE CHANGES: There is disc space narrowing and vacuum disc phenomenon at L2-L3. Disc space heights are otherwise preserved. There is diffuse facet arthropathy. SOFT TISSUES/RETROPERITONEUM: No evidence for epidural hematoma is identified. 1. No evidence for epidural hematoma on CT scan. 2. Suspected sacral insufficiency fracture.     Angela Prabhakar Chest Portable    Result Date: 11/23/2020  EXAMINATION: ONE XRAY VIEW OF THE CHEST 11/23/2020 7:08 am COMPARISON: 11/20/2020 HISTORY: ORDERING SYSTEM PROVIDED HISTORY: resp failure TECHNOLOGIST PROVIDED HISTORY: Reason for exam:->resp failure FINDINGS: There is no interval change in the persistent dense consolidation within the left upper lobe. The left lower lobe is clear. The right lung is clear. There is minimal atelectasis seen within the right lung base. The cardiac silhouette is within normals. 1. No interval change in the dense consolidation seen within the left upper lobe. 2. Minimal right lung base atelectasis. Xr Chest Portable    Result Date: 11/22/2020  EXAMINATION: ONE XRAY VIEW OF THE CHEST 11/22/2020 6:28 am COMPARISON: 11/21/2020 HISTORY: ORDERING SYSTEM PROVIDED HISTORY: resp failure TECHNOLOGIST PROVIDED HISTORY: Reason for exam:->resp failure FINDINGS: The endotracheal tube is stable. The enteric tube tip is not well visualized but may be at the gastroesophageal junction. There is stable left upper lobe dense opacity. There are continued patchy opacities in the left lower lobe. There may be small pleural effusions. No pneumothorax is seen. No significant change in dense airspace opacity of the left upper lobe and patchy left lower lobe airspace opacities. Enteric tube tip is not well visualized due to underpenetration. The tip may be at the gastroesophageal junction. This could be confirmed with KUB centered on the upper abdomen. Xr Chest Portable    Result Date: 11/21/2020  EXAMINATION: ONE XRAY VIEW OF THE CHEST 11/21/2020 7:37 am COMPARISON: 11/20/2020 HISTORY: ORDERING SYSTEM PROVIDED HISTORY: resp failure TECHNOLOGIST PROVIDED HISTORY: Reason for exam:->resp failure FINDINGS: Endotracheal tube and nasogastric tube are unchanged. Large opacity in the left upper lobe is unchanged. There is additional airspace disease in the left lower lobe which is stable.  There is no limits. Left upper lobe masslike consolidation. Small to moderate left pleural effusion. Xr Chest 1 View    Result Date: 11/18/2020  EXAMINATION: ONE XRAY VIEW OF THE CHEST 11/18/2020 9:06 am COMPARISON: 11/18/2020 HISTORY: ORDERING SYSTEM PROVIDED HISTORY: intubation TECHNOLOGIST PROVIDED HISTORY: Reason for exam:->intubation Reason for exam:->portable FINDINGS: Compared to the chest radiograph from earlier today, 6:44 a.m., there is interval placement of an endotracheal tube, the tip of which is approximately 4.1 cm above the korina. Nasogastric tube is present but is suboptimally visualized due to motion artifact and relative underpenetration of the study. The abdominal radiograph demonstrates it to be well positioned, with the tip in the upper abdomen. Prominent masslike consolidative opacity in the upper left lung are grossly stable. No pneumothorax is seen. Layering left pleural effusion. 1.  The life-support lines and tubes are well positioned. 2. Masslike consolidative opacity in the left upper lung. Small left effusion. Cta Chest W Contrast    Result Date: 11/17/2020  EXAMINATION: CTA OF THE CHEST 11/17/2020 3:18 pm TECHNIQUE: CTA of the chest was performed after the administration of intravenous contrast.  Multiplanar reformatted images are provided for review. MIP images are provided for review. Dose modulation, iterative reconstruction, and/or weight based adjustment of the mA/kV was utilized to reduce the radiation dose to as low as reasonably achievable.; CTA of the abdomen and pelvis was performed with the administration of intravenous contrast. Multiplanar reformatted images are provided for review. MIP images are provided for review. Dose modulation, iterative reconstruction, and/or weight based adjustment of the mA/kV was utilized to reduce the radiation dose to as low as reasonably achievable. COMPARISON: None.  HISTORY: ORDERING SYSTEM PROVIDED HISTORY: aneurysm, cp, sob TECHNOLOGIST PROVIDED HISTORY: Reason for exam:->aneurysm, cp, sob FINDINGS: CTA chest: There are confluent opacities located in left upper lobe. Patchy airspace opacities located in lingula. There is moderate to large left pleural effusion. Peribronchial thickening extensor in left hilar location into the left lung base. There is subsegmental atelectasis in posterior right lower lobe with adjacent ground-glass opacities. There is pulmonary vascular congestion. The heart is mildly enlarged. There is mediastinal lymphadenopathy. Ascending thoracic aorta measures 3.7 cm in diameter. Descending thoracic aorta measures 3 cm in diameter. No evidence of thoracic aortic aneurysm or dissection. Distal descending thoracic aorta is tortuous. CTA abdomen/pelvis: There is evidence of endograft repair involving the abdominal aorta. There is redemonstration of fusiform abdominal aortic aneurysm measuring up to 6.4 cm. This finding is stable since prior. No evidence of endoleak. No retroperitoneal fluid collections. Iliac limbs of endograft appear patent. Common iliac arteries are tortuous. Common femoral arteries are patent. Numerous diverticula associated with the left colon and sigmoid colon. No evidence of acute diverticulitis. Liver, gallbladder, pancreas, and spleen are grossly unremarkable however there is motion artifact limiting this examination. There is no hydronephrosis. Cyst associated with the right kidney is stable since previous examination as well as cyst associated with the left kidney appearing stable since prior. Appendix is visualized and normal.    1.  New confluent opacities located in left upper lobe suggesting pneumonia, atelectasis, or neoplasm. 2.  Patchy airspace opacities located in lingula suggesting pneumonia with areas of atelectasis. 3.  Stenosis and occlusion are associated with left upper lobe segmental bronchi and lingular segmental bronchi.  4.  Moderate to large left pleural effusion. 5.  Mediastinal lymphadenopathy. 6.  Stable fusiform infrarenal abdominal aortic aneurysm with endograft repair. Aneurysm measures up to 6.4 cm. No evidence of endoleak or retroperitoneal fluid. 7.  Diverticulosis. Cta Abdomen Pelvis W Contrast    Result Date: 11/17/2020  EXAMINATION: CTA OF THE CHEST 11/17/2020 3:18 pm TECHNIQUE: CTA of the chest was performed after the administration of intravenous contrast.  Multiplanar reformatted images are provided for review. MIP images are provided for review. Dose modulation, iterative reconstruction, and/or weight based adjustment of the mA/kV was utilized to reduce the radiation dose to as low as reasonably achievable.; CTA of the abdomen and pelvis was performed with the administration of intravenous contrast. Multiplanar reformatted images are provided for review. MIP images are provided for review. Dose modulation, iterative reconstruction, and/or weight based adjustment of the mA/kV was utilized to reduce the radiation dose to as low as reasonably achievable. COMPARISON: None. HISTORY: ORDERING SYSTEM PROVIDED HISTORY: aneurysm, cp, sob TECHNOLOGIST PROVIDED HISTORY: Reason for exam:->aneurysm, cp, sob FINDINGS: CTA chest: There are confluent opacities located in left upper lobe. Patchy airspace opacities located in lingula. There is moderate to large left pleural effusion. Peribronchial thickening extensor in left hilar location into the left lung base. There is subsegmental atelectasis in posterior right lower lobe with adjacent ground-glass opacities. There is pulmonary vascular congestion. The heart is mildly enlarged. There is mediastinal lymphadenopathy. Ascending thoracic aorta measures 3.7 cm in diameter. Descending thoracic aorta measures 3 cm in diameter. No evidence of thoracic aortic aneurysm or dissection. Distal descending thoracic aorta is tortuous. CTA abdomen/pelvis:  There is evidence of endograft repair involving the abdominal aorta. There is redemonstration of fusiform abdominal aortic aneurysm measuring up to 6.4 cm. This finding is stable since prior. No evidence of endoleak. No retroperitoneal fluid collections. Iliac limbs of endograft appear patent. Common iliac arteries are tortuous. Common femoral arteries are patent. Numerous diverticula associated with the left colon and sigmoid colon. No evidence of acute diverticulitis. Liver, gallbladder, pancreas, and spleen are grossly unremarkable however there is motion artifact limiting this examination. There is no hydronephrosis. Cyst associated with the right kidney is stable since previous examination as well as cyst associated with the left kidney appearing stable since prior. Appendix is visualized and normal.    1.  New confluent opacities located in left upper lobe suggesting pneumonia, atelectasis, or neoplasm. 2.  Patchy airspace opacities located in lingula suggesting pneumonia with areas of atelectasis. 3.  Stenosis and occlusion are associated with left upper lobe segmental bronchi and lingular segmental bronchi. 4.  Moderate to large left pleural effusion. 5.  Mediastinal lymphadenopathy. 6.  Stable fusiform infrarenal abdominal aortic aneurysm with endograft repair. Aneurysm measures up to 6.4 cm. No evidence of endoleak or retroperitoneal fluid. 7.  Diverticulosis. Xr Abdomen For Ng/og/ne Tube Placement    Result Date: 11/22/2020  EXAMINATION: ONE SUPINE XRAY VIEW(S) OF THE ABDOMEN 11/22/2020 5:06 pm COMPARISON: November 22, 2020, 4 hours prior. HISTORY: ORDERING SYSTEM PROVIDED HISTORY: Confirmation of course of NG/OG/NE tube and location of tip of tube TECHNOLOGIST PROVIDED HISTORY: Reason for exam:->Confirmation of course of NG/OG/NE tube and location of tip of tube Portable? ->Yes FINDINGS: Tip of the NG tube noted at the level of the diaphragm, no significant change.  The right side and the inferior part of the abdomen is not included in the study. There is opacification of the visualized left upper lung. Tip of NG tube at the level of the left hemidiaphragm, no change. Xr Chest Abdomen Ng Placement    Result Date: 11/22/2020  EXAMINATION: ONE SUPINE XRAY VIEW(S) OF THE ABDOMEN 11/22/2020 12:52 pm COMPARISON: November 18. HISTORY: ORDERING SYSTEM PROVIDED HISTORY: OG placement TECHNOLOGIST PROVIDED HISTORY: Reason for exam:->OG placement FINDINGS: Nasogastric tube is present. Side hole appears to be at level of gastroesophageal junction. This tube could be advanced approximately 4 or 5 cm. Nasogastric tube is present with side hole at level of gastroesophageal junction. This tube could be advanced approximately 4 or 5 cm. Xr Chest Abdomen Ng Placement    Result Date: 11/18/2020  EXAMINATION: ONE SUPINE XRAY VIEW(S) OF THE ABDOMEN 11/18/2020 9:07 am COMPARISON: 11/18/2020, about 2 hours earlier HISTORY: ORDERING SYSTEM PROVIDED HISTORY: NGT TECHNOLOGIST PROVIDED HISTORY: Reason for exam:->NGT Reason for exam:->portable NG tube placement FINDINGS: There is an NG tube with the tip in the stomach. An ET tube is again noted in satisfactory position. There is extensive opacity in the left lung, most likely pneumonia. Increased markings also seen in the visualized portion of the right lung. The heart is enlarged. NG tube tip in the stomach. Fluoro For Surgical Procedures    Result Date: 11/17/2020  EXAMINATION: SPOT FLUOROSCOPIC IMAGES 11/17/2020 12:03 pm TECHNIQUE: Fluoroscopy was provided by the radiology department for procedure. Radiologist was not present during examination. FLUOROSCOPY DOSE AND TYPE OR TIME AND EXPOSURES: Total dose:26.99 mGy COMPARISON: None HISTORY: ORDERING SYSTEM PROVIDED HISTORY: Pain management TECHNOLOGIST PROVIDED HISTORY: Reason for exam:->L4-5 Epidural steroid injection #1 Intraprocedural imaging.  FINDINGS: 3 intraoperative fluoroscopic images were obtained during L4-5 epidural steroid injection. Intraprocedural fluoroscopic spot images as above. See separate procedure report for more information. ASSESSMENT/PLAN :    60 yo male  CAD  HTN  AAA s/p repair  DM type 2  Morbid obesity  Chronic back pain  Thrombocytopenia, Anemia   OMER opacity s/p biopsy    - S/p bronch with cytology pending  - OMER PNA vs neoplasm  - On rocpehin and levaquin   - CBC showing Hgb 7.8 with MCV 94, Platelets 39  - Will follow cyto  - Cytopenias likely due to combination of PNA and abc, with myelosuppression. Thrombocytopenia has continued to slowly progress. CBC was WNL on 10/6/20, suggesting an acute process rather than a primary bone marrow process  - Transfuse for Hgb <7, platelets <46  - Smear showed subtle toxic granulation of neutrophils  - Check iron profile and B12/folate    11/24/20  - OMER bronch bx pathology:  DIAGNOSIS   POSITIVE FOR MALIGNANT CELLS   Carcinoma, most compatible with small cell carcinoma, see comment. Monolayer shows atypical crushed cells, and many lymphocytes/histiocytes   with anthracotic pigment. Cell block shows abundant crushed malignant cells. COMMENT:   Immunostains stain the malignant cells as follows:   Pankeratin and TTF1:  Positive   Chromogranin:  Positive weakly   Synaptophysin:  Negative   CK7:  Negative   This immunoprofile is most compatible with small cell carcinoma. - Today's platelets 34, Hgb 7.6  - Transfuse for Hgb <7, platelets <23  Patient with small cell carcinoma with left upper lobe opacity with large left pleural effusion along with mediastinal lymphadenopathy. His CT scan of abdomen and pelvis did not show evidence of intra-abdominal metastasis. He has multiple comorbid conditions including obesity, coronary artery disease, obstructive sleep apnea, obesity, type 2 diabetes mellitus and he presently has pneumonia with hypoxia and respiratory failure and remains intubated and sedated.   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   - Today's Hgb 7.2 Plts 38   - Patient still intubated but hopefully will be able to extubated  - Once stable, MRI brain to complete staging   - If he improves clinically and has improvement in ECOG, he will be considered for systemic therapy  - L pleural effusions likely malignant  - ID following, now on unasyn  - Given his current ECOG and comorbidities along with persistent fevers and abx requirements, not eligible for inpatient emergent chemotherapy. Will get rad onc on board as inpatient urgent palliative XRT could be considered to improved respiratory status if he has improvement in above    11/26/20  - Remains intubated, back in ICU  - CBC with further drop in platelets to 29. Likely combination of myelosuppression from acute illness and abx exposure  - If he has improvement in his clinic course, eventually will need MRI brain as above  - Therapy plan as above. He will need significant improvement clinically prior to discussion regarding chemotherapy. Rad onc consult pending, again will likely need improvement prior to proceeding with therapy. Cultures have no grown a source, but patient remains febrile (possibly drug fever and ID following and managing abx)  - Will follow. Further recs pending clinical course    11/27/20  - LUE DVT  - Platelets 33. Stable from yesterday  - Can not anticoagulate with current platelet count. Needs to be ~50. Monitor for now  - Remainder of CBC stable    11/29/20  - CBC stable, thrombocytopenia unchanged. Hgb dropped to 7.0. Will give 1 unit  - Continue to hold [facility] with platelets <58  - Still with low grade fevers in the 100's  - ID following, on unasyn. Possible post-obstructive PNA    11/30/20  - Continues to have thrombocytopenia platelets 28. Hemoglobin 7.3 after 1 unit of packed red blood cells yesterday.   - Continue to hold [facility] for platelets <78  - Still

## 2020-12-04 NOTE — PROGRESS NOTES
Transferred to [facility] for XRT. He remains ntubated and sedated so no history is available.   Vent settings:Vent Information  $Ventilation: $Subsequent Day  Equipment ID: [id number]  Vent Type: 980  Vent Mode: AC/VC  Vt Ordered: 510 mL  Rate Set: 20 bmp  Peak Flow: 70 L/min  Pressure Support: 0 cmH20  FiO2 : 65 %  SpO2: 90 %  SpO2/FiO2 ratio: 138.46  Sensitivity: 3  PEEP/CPAP: 5  I Time/ I Time %: 0 s  Humidification Source: (S) Heated wire  Humidification Temp: 36.9  Additional Respiratory  Assessments  Pulse: 103  Resp: 20  SpO2: 90 %  Position: Semi-Jeff's  Humidification Source: (S) Heated wire  Humidification Temp: 36.9  Oral Care: Mouth suctioned      Current Facility-Administered Medications:     sodium chloride flush 0.9 % injection 10 mL, 10 mL, Intravenous, 2 times per day, Kiesha Rutherford MD, 10 mL at 12/04/20 0800    sodium chloride flush 0.9 % injection 10 mL, 10 mL, Intravenous, PRN, Kiesha Rutherford MD, 10 mL at 12/04/20 0346    acetaminophen (TYLENOL) tablet 650 mg, 650 mg, Oral, Q6H PRN, 650 mg at 12/04/20 1013 **OR** acetaminophen (TYLENOL) suppository 650 mg, 650 mg, Rectal, Q6H PRN, Kiesha Rutherford MD    cefepime (MAXIPIME) 2 g IVPB extended (mini-bag), 2 g, Intravenous, Q12H, Aruna Boone MD    [Held by provider] insulin glargine (LANTUS) injection vial 40 Units, 40 Units, Subcutaneous, Nightly, Aruna Boone MD    insulin lispro (HUMALOG) injection vial 0-18 Units, 0-18 Units, Subcutaneous, Q4H, Aruna Boone MD    famotidine (PEPCID) injection 20 mg, 20 mg, Intravenous, BID, Aruna Boone MD, 20 mg at 12/04/20 0759    0.9 % sodium chloride infusion admixture, , Intravenous, Q12H, Aruna Boone MD    sodium chloride (Inhalant) 3 % nebulizer solution 2 mL, 2 mL, Nebulization, Q4H, Aruna Boone MD, 2 mL at 12/04/20 0113    mineral oil-hydrophilic petrolatum (HYDROPHOR) ointment, , Topical, BID PRN, Aruna Boone MD    ipratropium-albuterol (DUONEB) nebulizer solution 1 ampule, 1 ampule, Inhalation, Q4H WA, Theodora Cruz MD, 1 ampule at 12/04/20 1037    0.9 % sodium chloride bolus, 20 mL, Intravenous, Once, Theodora Cruz MD    dextrose 5 % solution, 100 mL/hr, Intravenous, PRN, Margo Bang MD    dextrose 50 % IV solution, 12.5 g, Intravenous, PRN, Margo Bang MD    glucagon (rDNA) injection 1 mg, 1 mg, Intramuscular, PRN, Margo Bang MD    glucose (GLUTOSE) 40 % oral gel 15 g, 15 g, Oral, PRN, Margo Bang MD    acetylcysteine (MUCOMYST) 20 % solution 400 mg, 2 mL, Inhalation, BID, Margo Bang MD, 400 mg at 12/04/20 1038    Arformoterol Tartrate (BROVANA) nebulizer solution 15 mcg, 15 mcg, Nebulization, BID, Margo Bang MD, 15 mcg at 12/04/20 1038    atorvastatin (LIPITOR) tablet 80 mg, 80 mg, Oral, Nightly, Margo Bang MD    chlorhexidine (PERIDEX) 0.12 % solution 15 mL, 15 mL, Mouth/Throat, BID, Margo Bang MD, 15 mL at 12/04/20 0759    clopidogrel (PLAVIX) tablet 75 mg, 75 mg, Oral, Daily, Margo Bang MD, Stopped at 12/04/20 0725    fentaNYL 5 mcg/ml in 0.9%  ml infusion, 200 mcg/hr, Intravenous, Continuous, Margo Bang MD, Last Rate: 40 mL/hr at 12/04/20 1014, 200 mcg/hr at 12/04/20 1014    heparin flush 100 UNIT/ML injection 100 Units, 1 mL, Intravenous, 2 times per day, Margo Bang MD    heparin flush 100 UNIT/ML injection 100 Units, 1 mL, Intracatheter, PRN, Margo Bang MD    magnesium sulfate 1 g in dextrose 5% 100 mL IVPB, 1 g, Intravenous, PRN, Margo Bang MD    methylPREDNISolone sodium (SOLU-MEDROL) injection 40 mg, 40 mg, Intravenous, Daily, Margo Bang MD, 40 mg at 12/04/20 0800    midazolam (VERSED) 100 mg in dextrose 5 % 100 mL infusion, 2 mg/hr, Intravenous, Continuous, Margo Bang MD, Last Rate: 10 mL/hr at 12/04/20 1114, 10 mg/hr at 12/04/20 1114    nicotine (NICODERM CQ) 21 MG/24HR 1 patch, 1 patch, Transdermal, Daily, Margo Bang MD, 1 patch at 12/04/20 0    oxyCODONE-acetaminophen (PERCOCET) 5-325 MG per tablet 1 tablet, 1 tablet, Oral, TID PRN **AND** oxyCODONE (ROXICODONE) immediate release tablet 2.5 mg, 2.5 mg, Oral, TID PRN, Ирина Flowers MD    [Held by provider] potassium chloride 30 mEq in dextrose 5 % 1,000 mL infusion, , Intravenous, Continuous, Ирина Flowers MD, Stopped at 12/04/20 0300    tiZANidine (ZANAFLEX) tablet 4 mg, 4 mg, Oral, TID PRN, Ирина Flowers MD  /89   Pulse 103   Temp 100.6 °F (38.1 °C) (Bladder)   Resp 20   Ht 5' 11\" (1.803 m)   Wt (!) 341 lb 4.4 oz (154.8 kg)   SpO2 90%   BMI 47.60 kg/m²     General: Sedated, no distress  Mouth: Orally intubated  Neck: No JVD  Chest: Symmetric, no accessory use  Heart: RRR  Lungs: Bilateral rales and rhonchi  Abdomen: Soft, nt, no hsm  Extremities: 2+BLE edema    Intake/Output Summary (Last 24 hours) at 12/4/2020 1154  Last data filed at 12/4/2020 1100  Gross per 24 hour   Intake 912 ml   Output 1995 ml   Net -1083 ml     CBC with Differential:    Lab Results   Component Value Date    WBC 7.0 12/04/2020    RBC 2.92 12/04/2020    HGB 7.9 12/04/2020    HCT 26.5 12/04/2020    PLT 21 12/04/2020    MCV 90.8 12/04/2020    MCH 27.1 12/04/2020    MCHC 29.8 12/04/2020    RDW 16.9 12/04/2020    NRBC 8.8 12/04/2020    SEGSPCT 62 12/29/2011    BLASTSPCT 0.9 11/21/2020    METASPCT 1.8 12/04/2020    LYMPHOPCT 15.9 12/04/2020    PROMYELOPCT 0.9 11/21/2020    MONOPCT 2.7 12/04/2020    MYELOPCT 0.9 12/04/2020    BASOPCT 0.4 12/04/2020    MONOSABS 0.21 12/04/2020    LYMPHSABS 1.12 12/04/2020    EOSABS 0.00 12/04/2020    BASOSABS 0.00 12/04/2020     BMP:    Lab Results   Component Value Date     12/04/2020    K 4.4 12/04/2020     12/04/2020    CO2 32 12/04/2020    BUN 68 12/04/2020    LABALBU 2.7 12/04/2020    LABALBU 4.5 12/28/2011    CREATININE 1.2 12/04/2020    CALCIUM 8.8 12/04/2020    GFRAA >60 12/04/2020    LABGLOM >60 12/04/2020    GLUCOSE 143 12/04/2020    GLUCOSE 120 12/31/2011     ABG:    Lab Results   Component Value Date    PH 7.492 12/04/2020    PCO2 47.8 12/04/2020    PO2 69.7 12/04/2020    HCO3 35.8 12/04/2020    BE 11.3 12/04/2020    O2SAT 93.8 12/04/2020   CXR viewed opacified left chest, ett in place    IMPRESSION:   Patient Active Problem List   Diagnosis    CAD (coronary artery disease)    HTN (hypertension)    Hyperlipemia    Presence of stent in left circumflex coronary artery    Smoker    Aortic valve stenosis    Primary osteoarthritis of right knee    Non-insulin dependent type 2 diabetes mellitus (HCC)    Chronic pain syndrome    Chronic pain of right knee    Status post total right knee replacement    Morbid obesity with BMI of 40.0-44.9, adult (HCC)    Chronic bilateral low back pain with bilateral sciatica    Right hip pain    DDD (degenerative disc disease), lumbar    AAA (abdominal aortic aneurysm) ([address]    Lumbar radiculopathy    Lumbosacral spondylosis without myelopathy    Obesity    Chronic back pain    Spinal stenosis of lumbar region with neurogenic claudication    Acute respiratory failure with hypoxia ([address]    Community acquired pneumonia of left upper lobe of lung    Thrombocytopenia ([address]    Small cell lung cancer ([address]    ARF (acute respiratory failure) ([address]   Failed extubation and for trach and peg. Has small cell carcinoma and now at [facility] for XRT.    US chest noted but doubt there is that much fluid or that drainage would be beneficial.  Isadora Meter  12/4/2020  11:54 AM

## 2020-12-04 NOTE — TELEPHONE ENCOUNTER
Spoke with wife on phone & discussed radiation therapy. Answered all questions regarding RT & wife voiced understanding. Contact information provided & encouraged wife to call with any questions. Wife appreciative & voiced understanding. Per ICU nurse, pt is scheduled for an IR procedure and unsure of time that pt will be available to come for simulation & treatment. Dr. Corey Mi updated & stated that pt is only here at main campus for RT, that it is urgent for him to begin treatment ASAP, today. ICU nurse notified & discussed with intensivist. IR procedure will be delayed and pt can come down for RT. ICU nurse also voiced concerns to this nurse regarding pt's stability & transferring in current state. I discussed this with Dr. Patti Foss is to continue with simulation & treatment as planned with hopes of improving pt's overall status.

## 2020-12-04 NOTE — PLAN OF CARE
Problem: Inadequate protein-energy intake  (NI-5.3)  Goal: Food and/or Nutrient Delivery  Description: Individualized approach for food/nutrient provision.   Outcome: Ongoing

## 2020-12-04 NOTE — PROGRESS NOTES
RADIATION ONCOLOGY    Aidan Lints.        12/4/2020     [id number]     1. Small cell lung cancer [facility])        Patient transferred from [facility] to [facility]. He remains intubated. We in the radiation oncology department waited all day for his arrival, for CT simulation and starting palliative radiation therapy today. For reasons not clear, including patient instability, he was not sent down during our working hours. Assessment/plan: No treatment over the weekend. If and when he is stable enough to receive palliative XRT, I can be contacted.      Shaila Velázquez MD

## 2020-12-04 NOTE — PROGRESS NOTES
Hospitalist Progress Note      PCP: Janelle uKnz PA-C    Date of Admission: 12/3/2020    Chief Complaint: N/A    Hospital Course: This is a 72-year-old male with past medical history of chronic back pain, type 2 diabetes mellitus, CAD, hypertension, morbid obesity, MANOLO who initially presented to [facility] for elective epidural injection for his chronic back at which point he became hypoxic and was admitted. Respiratory status worsened to the point of impending respiratory failure with hypoxemia, hypercapnia that was refractory to NIPPV requiring intubation for mechanical ventilation. Further work-up revealed left upper lobe obstruction secondary to lung mass; had biopsy with pathology consistent with small cell carcinoma. Patient was started on IV antibiotics for postobstructive pneumonia; respiratory culture growing Pseudomonas aeruginosa. He was transferred to [facility] for palliative radiation therapy. Subjective: Pt was seen and examined at bedside.  No acute event overnight; unable to complete ROS due to sedated state    Medications:  Reviewed    Infusion Medications    dextrose      fentaNYL 5 mcg/ml in 0.9%  ml infusion 200 mcg/hr (12/04/20 1014)    midazolam 10 mg/hr (12/04/20 0901)    [Held by provider] IV infusion builder Stopped (12/04/20 0300)     Scheduled Medications    sodium chloride flush  10 mL Intravenous 2 times per day    cefepime  2 g Intravenous Q12H    [Held by provider] insulin glargine  40 Units Subcutaneous Nightly    insulin lispro  0-18 Units Subcutaneous Q4H    famotidine (PEPCID) injection  20 mg Intravenous BID    sodium chloride   Intravenous Q12H    sodium chloride (Inhalant)  2 mL Nebulization Q4H    ipratropium-albuterol  1 ampule Inhalation Q4H WA    acetylcysteine  2 mL Inhalation BID    Arformoterol Tartrate  15 mcg Nebulization BID    atorvastatin  80 mg Oral Nightly    chlorhexidine  15 mL Mouth/Throat BID    clopidogrel  75 mg Oral Daily    heparin flush  1 mL Intravenous 2 times per day    methylPREDNISolone  40 mg Intravenous Daily    nicotine  1 patch Transdermal Daily     PRN Meds: sodium chloride flush, acetaminophen **OR** acetaminophen, mineral oil-hydrophilic petrolatum, dextrose, dextrose, glucagon (rDNA), glucose, heparin flush, magnesium sulfate, oxyCODONE-acetaminophen **AND** oxyCODONE, tiZANidine      Intake/Output Summary (Last 24 hours) at 12/4/2020 1046  Last data filed at 12/4/2020 1000  Gross per 24 hour   Intake 912 ml   Output 1895 ml   Net -983 ml       Exam:    BP (!) 149/98   Pulse 108   Temp 100.6 °F (38.1 °C) (Bladder) Comment: tylenol given  Resp 20   Ht 5' 11\" (1.803 m)   Wt (!) 341 lb 4.4 oz (154.8 kg)   SpO2 93%   BMI 47.60 kg/m²     General appearance: Intubated, on full vent support. No apparent distress. Respiratory: [address] air entry bilaterally. Cardiovascular: Normal S1/S2. Regular rhythm, tachycardic. Abdomen: Soft, non-tender, non-distended with normal bowel sounds. Musculoskeletal: No clubbing, cyanosis or edema bilaterally. Skin: Skin color, texture, turgor normal.  No rashes or lesions.   Neurologic: Sedated  Peripheral Pulses: +2 palpable, equal bilaterally       Labs:   Recent Labs     12/03/20  0540 12/04/20  0215 12/04/20  0600   WBC 6.2 8.2 7.0   HGB 7.5* 8.3* 7.9*   HCT 25.7* 27.2* 26.5*   PLT 36* 26* 21*     Recent Labs     12/02/20  0530 12/03/20  0540 12/04/20  0240 12/04/20  0600   * 144 144  --    K 3.0* 3.4* 4.4  --     102 101  --    CO2 33* 32* 32*  --    BUN 78* 69* 68*  --    CREATININE 1.4* 1.2 1.2  --    CALCIUM 8.7 8.5* 8.8  --    PHOS 3.1 2.9 2.8 2.8     Recent Labs     12/02/20  0530 12/03/20  0540 12/04/20  0240   AST 77* 76* 123*   ALT 49* 51* 76*   BILITOT 0.9 0.6 0.6   ALKPHOS 159* 149* 181*     Recent Labs     12/04/20  0240 12/04/20  0600   INR 1.0 1.0     No results for input(s): Faraz Booth in the last 72 hours. Radiology:  US CHEST INCLUDING MEDIASTINUM   Final Result      XR CHEST PORTABLE   Final Result   No change. IR Interventional Radiology Procedure Request    (Results Pending)   XR CHEST PORTABLE    (Results Pending)             Active Hospital Problems    Diagnosis Date Noted    ARF (acute respiratory failure) ([facility] [J96.00] 12/02/2020       Assessment  Acute respiratory failure with hypoxia, hypercapnia  - intubated, on full vent support  Postobstructive pneumonia   VAP - respiratory culture growing Pseudomonas aeruginosa  Sepsis - secondary to above  Small cell lung cancer  Acute renal failure  Thrombocytopenia  Anemia  COPD  Chronic back pain  Hypertension  CAD  MANOLO  Morbid obesity - Body mass index is 47.6 kg/m².      Plan:  Critical care, vent management by intensivist  Wean FiO2 as able  On cefepime  Continue Solu-Medrol, bronchodilators  Radiation oncologist has been consulted  ID, nephrology consult as well  Humalog sliding scale    DVT Prophylaxis: SCDs, no VTE chemoprophylaxis due to thrombocytopenia    Diet: Diet NPO Effective Now  Code Status: Full Code    PT/OT Eval Status: N/A    Dispo - remains in ICU for critical care    Fracisco Espinal MD 12/4/2020 10:46 AM

## 2020-12-04 NOTE — CONSULTS
[facility] CONSULT NOTE    Reason for Consult: Pneumonia   Requested by: Dr. Quinn Ling    Chief complaint: Radiation therapy    History Obtained From: [facility]    HISTORY Javier              The patient is a 61 y.o. super morbidly obese male with history of CAD, hypertension, hyperlipidemia, DM, transferred to [facility] on 12/04 for radiation therapy from [address] [place] [address] where he initially presented on 11/17 for hypoxia requiring intubation after receiving a lumbar epidural steroid injection for his chronic low back pain, incidentally found to have left upper lobe postobstructive pneumonia from lung mass that was diagnosed to be small cell carcinoma biopsy and had respiratory culture growing Pseudomonas aeruginosa for which he was seen by Dr. Jennifer Magana. He has been having intermittent fever up to 102 °F since 11/23. Respiratory pathogen PCR panel including SARS-CoV-2 PCR on 11/17 and urine Legionella antigen on 11/18 were negative. He had respiratory culture on 11/23 and 11/24 which showed absent oropharyngeal aristides and moderate growth of Candida albicans deemed colonization. He had blood cultures on 11/21 growing coagulase-negative Staphylococcus in 1 out of 2 sets deemed contaminant. Repeat blood cultures on 11/24 showed no growth. Respiratory Gram stain and culture on 12/04 showed few polymorphonuclear leukocytes, no epithelial cells, no organisms, reduced oropharyngeal aristides, moderate growth of Pseudomonas aeruginosa (non-MDR). He received ceftriaxone and levofloxacin from 11/17-11/21, cefepime from 11/21-11/25, ampicillin-sulbactam from 11/25-12/03, cefepime since 12/03. On transfer, he remains febrile at 100.6 °F with no leukocytosis. Chest x-ray showed dense left upper lobe consolidation similar to that since 11/17. Cefepime was resumed. ID service was subsequently consulted for further recommendations.     Past Medical History  Past Medical History:   Diagnosis Date    Anticoagulant long-term use     0.9 % sodium chloride Flush   Intravenous Q8H Nicci Henning MD        albumin human 25 % IV solution 25 g  25 g Intravenous Q8H Vevelyn Channel, APRN -  mL/hr at 12/04/20 1602 25 g at 12/04/20 1602    acetaZOLAMIDE (DIAMOX) tablet 250 mg  250 mg Per NG tube Q12H Vevelyn Channel, APRN - CNP   250 mg at 12/04/20 1601    dextrose 5 % solution  100 mL/hr Intravenous PRN Margo Bang MD        dextrose 50 % IV solution  12.5 g Intravenous PRN Margo Bang MD        glucagon (rDNA) injection 1 mg  1 mg Intramuscular PRN Margo Bang MD        glucose (GLUTOSE) 40 % oral gel 15 g  15 g Oral PRN Margo Bang MD        acetylcysteine (MUCOMYST) 20 % solution 400 mg  2 mL Inhalation BID Margo Bang MD   400 mg at 12/04/20 1038    Arformoterol Tartrate (BROVANA) nebulizer solution 15 mcg  15 mcg Nebulization BID Margo Bang MD   15 mcg at 12/04/20 1038    atorvastatin (LIPITOR) tablet 80 mg  80 mg Oral Nightly Margo Bang MD        chlorhexidine (PERIDEX) 0.12 % solution 15 mL  15 mL Mouth/Throat BID Margo Bang MD   15 mL at 12/04/20 0759    clopidogrel (PLAVIX) tablet 75 mg  75 mg Oral Daily Margo Bang MD   Stopped at 12/04/20 0725    fentaNYL 5 mcg/ml in 0.9%  ml infusion  200 mcg/hr Intravenous Continuous Margo Bang MD 40 mL/hr at 12/04/20 1014 200 mcg/hr at 12/04/20 1014    heparin flush 100 UNIT/ML injection 100 Units  1 mL Intravenous 2 times per day Margo Bang MD        heparin flush 100 UNIT/ML injection 100 Units  1 mL Intracatheter PRANGELIQUE Bang MD        magnesium sulfate 1 g in dextrose 5% 100 mL IVPB  1 g Intravenous PRN Margo Bang MD        methylPREDNISolone sodium (SOLU-MEDROL) injection 40 mg  40 mg Intravenous Daily Margo Bang MD   40 mg at 12/04/20 0800    midazolam (VERSED) 100 mg in dextrose 5 % 100 mL infusion  2 mg/hr Intravenous Continuous Margo Bang MD 10 mL/hr at 12/04/20 1114 10 mg/hr at 12/04/20 1114    nicotine (NICODERM CQ) 21 MG/24HR 1 patch  1 patch Transdermal Daily Mo Epps MD   1 patch at 12/04/20 0759    oxyCODONE-acetaminophen (PERCOCET) 5-325 MG per tablet 1 tablet  1 tablet Oral TID PRN Mo Epps MD        And    oxyCODONE (ROXICODONE) immediate release tablet 2.5 mg  2.5 mg Oral TID PRN Mo Epps MD       Fay Velasquez [Held by provider] potassium chloride 30 mEq in dextrose 5 % 1,000 mL infusion   Intravenous Continuous Mo Epps MD   Stopped at 12/04/20 0300    tiZANidine (ZANAFLEX) tablet 4 mg  4 mg Oral TID PRN Mo Epps MD           Allergies   Allergen Reactions    Bee Venom Anaphylaxis       Surgical History  Past Surgical History:   Procedure Laterality Date    ABDOMINAL AORTIC ANEURYSM REPAIR, ENDOVASCULAR N/A 6/28/2019    ENDOVASCULAR REPAIR ABDOMINAL AORTIC ANEURYSM performed by Romayne Spence, MD at [address] [place] [address] Bilateral 3/12/2020    BILATERAL L3,L4,L5 MEDIAL BRANCH NERVE BLOCK performed by Lisandra Banerjee DO at [address] [place] [address] Bilateral 6/11/2020    BILATERAL L3,L4,L5 MEDIAL NERVE BRANCH BLOCK #2 performed by Lisandra Banerjee DO at [address] [place] [address] N/A 11/18/2020    BRONCHOSCOPY/TRANSBRONCHIAL NEEDLE BIOPSY performed by Donis Farris MD at [facility]  X4    CORONARY ANGIOPLASTY WITH STENT PLACEMENT  12/30/2011    Dr. Omero Domingo 1st OM 3.0 x 20 Ion    DIAGNOSTIC CARDIAC CATH LAB PROCEDURE  3/15/2005    [facility]. Mild to moderate CAD. Normal LV.  DIAGNOSTIC CARDIAC CATH LAB PROCEDURE  1/16/2007    [facility]. Cath/PTCA/DE stent of proximal and distal RCA.  DIAGNOSTIC CARDIAC CATH LAB PROCEDURE  2/21/2007    Cath/DE stent of proximal OM1 and proximal Cx.  DIAGNOSTIC CARDIAC CATH LAB PROCEDURE  12/30/2011    ANURADHA of mid OM branch of circumflex.      ECHO COMPL W DOP COLOR FLOW  12/30/2011         HC INSERT PICC CATH, 5/> YRS - MIDLINE  11/23/2020  HERNIA REPAIR  2/2014    laparoscopic ventral hernia repain    JOINT REPLACEMENT Left 4/1/14    TKA-ed    JOINT REPLACEMENT Right 2018    KNEE    PAIN MANAGEMENT PROCEDURE Right 9/1/2020    RIGHT L3,4,5 MEDIAL BRANCH RADIOFREQUENCY ABLATION performed by Aiden Portillo DO at [facility] N/A 11/17/2020    L4-5 EPIDURAL STEROID INJECTION #1 performed by Aiden Portillo DO at [facility] OFFICE/OUTPT VISIT,PROCEDURE ONLY Right 11/26/2018    RIGHT KNEE TOTAL ARTHROPLASTY performed by Mary Alice Villatoro DO at [address] [facility] History     Socioeconomic History    Marital status:    Tobacco Use    Smoking status: Current Every Day Smoker     Packs/day: 1.00     Years: 39.00     Pack years: 39.00     Types: Cigarettes     Start date: 11/17/1978    Smokeless tobacco: Never Used   Substance and Sexual Activity    Alcohol use: No    Drug use: No       Family Medical History  Family History   Problem Relation Age of Onset    Diabetes Mother     Stroke Mother     Diabetes Father     No Known Problems Sister        Review of Systems:  Unable to obtain due to patient intubated and sedated    Physical Examination:  Vitals:    12/04/20 1328 12/04/20 1400 12/04/20 1416 12/04/20 1500   BP:  103/66  105/75   Pulse: 102 97 94 97   Resp: 20 20 20 20   Temp:       TempSrc:       SpO2: 93% 94% 94% 94%   Weight:       Height:         Constitutional: Intubated, no MV dyssynchrony  Eyes: Sclerae anicteric, no conjunctival erythema  ENT: No buccal lesion, no pharyngeal exudates  Neck: No nuchal rigidity, no cervical adenopathy  Lungs: Clear breath sounds, no crackles, no wheezes  Heart: Regular rate and rhythm, no murmurs  Abdomen: Bowel sounds present, soft, nontender  Skin: Warm and dry, no active dermatoses  Musculoskeletal: No joint erythema, no joint swelling    Labs, imaging, and medical records/notes were personally reviewed.     Assessment:  Acute hypoxic respiratory failure  Pseudomonas aeruginosa VAP/HAP  Fever, multifactorial, probably associated with VAP/HAP and underlying malignancy  Small cell lung carcinoma    Plan:  Continue cefepime at 2 g every 8 hours. Continue supportive care. Thank you for involving me in the care of Isaac Kelly. . I will continue to follow. Please do not hesitate to call for any questions or concerns.     Electronically signed by Mynor Fontenot MD on 12/4/2020 at 4:19 PM

## 2020-12-04 NOTE — CONSULTS
[facility]  Internal Medicine Residency Program  History and Physical  MICU    Patient:  Ynes Joya. 61 y.o. male MRN: [id number]     Date of Service: 12/4/2020    Hospital Day: 2      Chief complaint: Small cell cancer, postobstructive pneumonia  History of Present Illness   The patient is a 61 y.o. male with past medical history of STEMI s/p 5 stents type 2 diabetes mellitus, chronic low back pain, hypertension, abdominal aortic aneurysm s/p endovascular repair admitted to [facility] on November 17 with shortness of breath and low platelet count. Patient was found to have CAP and thrombocytopenia. Patient is intubated during hospital course because of progressive respiratory distress and hypoxia. Pulmonology and ID is following for pneumonia. Patient was given Unasyn, respiratory culture growing gram-negative clifford probable Pseudomonas. Patient was switched to cefepime. .  CTA of the chest showed new confluent opacities in the left upper lobe, patchy airspace opacities located in the lingula suggesting pneumonia. Bronchoscopy was done, which showed a very narrow left upper lobe likely extrinsic compression. Biopsy was obtained. The results showing small cell carcinoma. Hematology oncology was consulted. Patient is not requiring emergent radiation. Yesterday patient was extubated and was on BiPAP but the patient becomes desaturated and agitated. Patient was reintubated on the same day. Decision was made to transfer the patient to to [facility] for palliative radiation and trach and PEG.       Past Medical History:      Diagnosis Date    Anticoagulant long-term use     Arthritis     CAD (coronary artery disease)     Chronic back pain     Depression     Dyslipidemia     Hiatal hernia 1979    History of ST elevation myocardial infarction (STEMI)     Hyperlipidemia     Hypertension     Low back pain     Lumbar radiculopathy 8/14/2019    Obesity     MANOLO on CPAP     Presence of stent in left circumflex coronary artery     Presence of stent in right coronary artery     Smoker     Type 2 diabetes mellitus without complication [facility])        Past Surgical History:        Procedure Laterality Date    ABDOMINAL AORTIC ANEURYSM REPAIR, ENDOVASCULAR N/A 6/28/2019    ENDOVASCULAR REPAIR ABDOMINAL AORTIC ANEURYSM performed by Shahriar Irvin MD at [facility] Bilateral 3/12/2020    BILATERAL L3,L4,L5 MEDIAL BRANCH NERVE BLOCK performed by Yomaira Quiroz DO at [facility] Bilateral 6/11/2020    BILATERAL L3,L4,L5 MEDIAL NERVE BRANCH BLOCK #2 performed by Yomaira Quiroz DO at [address] [place] [address] N/A 11/18/2020    BRONCHOSCOPY/TRANSBRONCHIAL NEEDLE BIOPSY performed by Jacobo Saldana MD at [address],C STENT PLACEMENT  12/30/2011    Dr. Argelia Nickerson 1st OM 3.0 x 20 Ion    DIAGNOSTIC CARDIAC CATH LAB PROCEDURE  3/15/2005    [facility]. Mild to moderate CAD. Normal LV.  DIAGNOSTIC CARDIAC CATH LAB PROCEDURE  1/16/2007    [facility]. Cath/PTCA/DE stent of proximal and distal RCA.  DIAGNOSTIC CARDIAC CATH LAB PROCEDURE  2/21/2007    Cath/DE stent of proximal OM1 and proximal Cx.  DIAGNOSTIC CARDIAC CATH LAB PROCEDURE  12/30/2011    ANURADHA of mid OM branch of circumflex.      ECHO COMPL W DOP COLOR FLOW  12/30/2011         HC INSERT PICC CATH, 5/> YRS - MIDLINE  11/23/2020         HERNIA REPAIR  2/2014    laparoscopic ventral hernia repain    JOINT REPLACEMENT Left 4/1/14    TKA-ed    JOINT REPLACEMENT Right 2018    KNEE    PAIN MANAGEMENT PROCEDURE Right 9/1/2020    RIGHT L3,4,5 MEDIAL BRANCH RADIOFREQUENCY ABLATION performed by Yomaira Quiroz DO at [facility] [address] N/A 11/17/2020    L4-5 EPIDURAL STEROID INJECTION #1 performed by Yomaira Quiroz DO at [address] [facility] OFFICE/OUTPT VISIT,PROCEDURE ONLY Right 11/26/2018    RIGHT KNEE TOTAL ARTHROPLASTY performed by Annie Valencia DO at [facility] OR       Medications Prior to Admission:    Prior to Admission medications    Medication Sig Start Date End Date Taking? Authorizing Provider   loratadine (CLARITIN) 10 MG capsule Take 10 mg by mouth daily    Historical Provider, MD   pioglitazone (ACTOS) 15 MG tablet TAKE 1 TABLET BY MOUTH EVERY DAY 11/6/20   Ivan Munoz PA-C   pregabalin (LYRICA) 150 MG capsule Take 1 capsule by mouth 2 times daily for 30 days. 11/12/20 12/12/20  Korey Bowles DO   diclofenac (VOLTAREN) 75 MG EC tablet TAKE 1 TABLET BY MOUTH TWICE A DAY 10/7/20   Historical Provider, MD   oxyCODONE-acetaminophen (PERCOCET) 7.5-325 MG per tablet Take 1 tablet by mouth 3 times daily as needed for Pain for up to 30 days.  Intended supply: 30 days 11/12/20 12/12/20  Korey Bowles DO   quinapril (ACCUPRIL) 10 MG tablet Take 1 tablet by mouth daily  Patient taking differently: Take 10 mg by mouth daily Wife states on hold 10/16/20   Ivan Munoz PA-C   metFORMIN (GLUCOPHAGE) 500 MG tablet TAKE 1 TABLET BY MOUTH TWICE A DAY WITH MEALS  Patient taking differently: Wife states on hold 10/8/20   Moiz Moise DO   fluticasone (FLONASE) 50 MCG/ACT nasal spray 2 sprays by Nasal route daily 10/6/20   Ivan Munoz PA-C   omega-3 acid ethyl esters (LOVAZA) 1 g capsule take 1 capsule twice a day 8/14/20   Ivan Munoz PA-C   niacin (SLO-NIACIN) 500 MG extended release tablet take 1 tablet by mouth once daily 7/20/20   Moiz Moise DO   clopidogrel (PLAVIX) 75 MG tablet TAKE 1 TABLET BY MOUTH EVERY DAY  Patient taking differently: Take 75 mg by mouth daily Has been on hold for procedure 7/6/20   Ivan Munoz PA-C   sildenafil (VIAGRA) 50 MG tablet Take 1 tablet by mouth as needed for Erectile Dysfunction 7/6/20   Ivan Munoz PA-C   aspirin 325 MG EC tablet TAKE 1 TABLET BY MOUTH EVERY DAY  Patient taking differently: Take 325 mg by mouth daily Has TLC None    PICC None    Hemoaccess None    Oxygen:      Mechanical Ventilation:   Mode: A/C    TV:510 ml RR: 20  PEEP 5cmH2O  FiO2 60%    ABG:     Lab Results   Component Value Date    PH 7.495 12/04/2020    PCO2 45.7 12/04/2020    PO2 63.7 12/04/2020    ZHN6BVK 36.0 11/29/2020    HCO3 34.4 12/04/2020    BE 10.1 12/04/2020    THB 8.8 12/04/2020    O2SAT 91.9 12/04/2020     Labs and Imaging Studies   Basic Labs  CBC:   Lab Results   Component Value Date    WBC 6.2 12/03/2020    RBC 2.74 12/03/2020    HGB 7.5 12/03/2020    HCT 25.7 12/03/2020    MCV 93.8 12/03/2020    RDW 16.9 12/03/2020    PLT 36 12/03/2020     HFP:    Lab Results   Component Value Date    PROT 4.9 12/03/2020     CMP:  Lab Results   Component Value Date     12/03/2020    K 3.4 12/03/2020    K 5.1 11/17/2020     12/03/2020    CO2 32 12/03/2020    BUN 69 12/03/2020    PROT 4.9 12/03/2020     FLP:    Lab Results   Component Value Date    CHOL 115 10/06/2020    TRIG 160 10/06/2020    HDL 42 10/06/2020     U/A:  No components found for: Floretta Wu, USPGRAV, UPH, UPROTEIN, UGLUCOSE, UKETONE, UBILI, UBLOOD, UNITRITE, UUROBIL, [place], [facility], [place], [facility], [facility], [place]  TSH:    Lab Results   Component Value Date    TSH 1.090 02/15/2018     PT/INR:  No results found for: PTINR  Iron Studies:  No results found for: TIBC, FERRITIN  VITAMIN B12: No components found for: B12  FOLATE:  No results found for: FOLATE    Imaging Studies:     Xr Abdomen (kub) (single Ap View)    Result Date: 11/23/2020  EXAMINATION: ONE SUPINE XRAY VIEW(S) OF THE ABDOMEN 11/23/2020 9:38 am COMPARISON: None HISTORY: ORDERING SYSTEM PROVIDED HISTORY: abdominal pain TECHNOLOGIST PROVIDED HISTORY: Reason for exam:->abdominal pain FINDINGS: There is a nonobstructive bowel gas pattern. There are no abnormal air-fluid levels. There are no calculi overlying the renal shadows. There is an NG tube within the stomach.  There is a stent graft seen within the abdominal aorta. 1. There is no bowel obstruction, ileus or free air. Ct Lumbar Spine Wo Contrast    Result Date: 11/19/2020  EXAMINATION: CT OF THE LUMBAR SPINE WITHOUT CONTRAST  11/18/2020 TECHNIQUE: CT of the lumbar spine was performed without the administration of intravenous contrast. Multiplanar reformatted images are provided for review. Dose modulation, iterative reconstruction, and/or weight based adjustment of the mA/kV was utilized to reduce the radiation dose to as low as reasonably achievable. COMPARISON: 11/17/2020 HISTORY: ORDERING SYSTEM PROVIDED HISTORY: exclude epidural hematoma TECHNOLOGIST PROVIDED HISTORY: Reason for exam:->exclude epidural hematoma Chronic back pain, recent epidural placement FINDINGS: BONES/ALIGNMENT: Vertebral body heights are preserved without evidence for lumbar fracture. However, there is irregular lucency and sclerosis in the upper sacral ala bilaterally. There is minimal retrolisthesis at L2-L3. Alignment is otherwise normal. DEGENERATIVE CHANGES: There is disc space narrowing and vacuum disc phenomenon at L2-L3. Disc space heights are otherwise preserved. There is diffuse facet arthropathy. SOFT TISSUES/RETROPERITONEUM: No evidence for epidural hematoma is identified. 1. No evidence for epidural hematoma on CT scan. 2. Suspected sacral insufficiency fracture. Xr Chest Portable    Result Date: 12/3/2020  EXAMINATION: ONE XRAY VIEW OF THE CHEST 12/3/2020 12:36 pm COMPARISON: Today at 0600 hours HISTORY: ORDERING SYSTEM PROVIDED HISTORY: re intubation TECHNOLOGIST PROVIDED HISTORY: Reason for exam:->re intubation FINDINGS: Endotracheal tube is 5.2 cm above the korina. NG tube is present. Distal aspect of NG tube is not clearly visualized. There are persistent opacities in left upper lobe. Interstitial and hazy opacities are present throughout remaining bilateral lung fields appearing similar since prior. There is blunting of costophrenic sulci.   The heart is mildly enlarged. No pneumothorax. 1.  Endotracheal tube is 5.2 cm above korina. 2.  Stable chest radiograph with interstitial and hazy opacities throughout both lungs notable in left upper lobe. Xr Chest Portable    Result Date: 12/3/2020  EXAMINATION: ONE XRAY VIEW OF THE CHEST 12/3/2020 6:51 am COMPARISON: Chest radiograph December 2, 2020 and priors. HISTORY: ORDERING SYSTEM PROVIDED HISTORY: resp failure TECHNOLOGIST PROVIDED HISTORY: Reason for exam:->resp failure FINDINGS: Endotracheal tube is again seen with tip in the midthoracic trachea. Nasogastric tube courses the GE junction with distal tip not included on these images. There is again noted to be dense consolidation involving the left upper lung. Hazy bilateral airspace opacities are again seen. No definite pleural effusion or pneumothorax. Cardiac and mediastinal contours are without acute process on limited evaluation due to rotation. No acute osseous abnormality. Stable support apparatus. Persistent bilateral airspace disease with more dense consolidation in the left upper lobe. No significant interval change compared to multiple priors. Xr Chest Portable    Result Date: 12/2/2020  EXAMINATION: ONE XRAY VIEW OF THE CHEST 12/2/2020 7:02 am COMPARISON: December 1, 2020 HISTORY: ORDERING SYSTEM PROVIDED HISTORY: resp failure TECHNOLOGIST PROVIDED HISTORY: Reason for exam:->resp failure FINDINGS: ETT tip is approximately 4.8 cm above the korina. Enteric catheter extends beyond the inferior margin of the image. Dense consolidation of the left upper lobe, not significantly changed. There are bibasilar airspace opacities and small to moderate bilateral pleural effusions. No visible pneumothorax. 1. No significant interval change. Persistent dense consolidation of the left upper lobe. 2. Mild bibasilar airspace opacities with small to moderate pleural effusions.     Xr Chest Portable    Result Date: 12/1/2020  EXAMINATION: ONE XRAY VIEW OF THE CHEST 12/1/2020 7:30 am COMPARISON: 1 day prior. HISTORY: ORDERING SYSTEM PROVIDED HISTORY: resp failure TECHNOLOGIST PROVIDED HISTORY: Reason for exam:->resp failure FINDINGS: There is stable positioning of endotracheal tube. The cardiomediastinal contours are unchanged. There is stable left upper lobar masslike consolidation. .  There is nonspecific elevation of the right hemidiaphragm. There is similar appearance of bilateral small layering effusions, left greater than right. There is no visible pneumothorax. The pulmonary vessels are within normal limits. There is no significant interval change compared to the prior examination. No significant interval change compared to 1 day prior. Xr Chest Portable    Result Date: 11/30/2020  EXAMINATION: ONE XRAY VIEW OF THE CHEST 11/30/2020 7:21 am COMPARISON: 11/29/2020 HISTORY: ORDERING SYSTEM PROVIDED HISTORY: resp failure TECHNOLOGIST PROVIDED HISTORY: Reason for exam:->resp failure FINDINGS: Lines and tubes are stable. Heart size and mediastinal contours are unchanged. The lungs are stable. No change. Xr Chest Portable    Result Date: 11/29/2020  EXAMINATION: ONE XRAY VIEW OF THE CHEST 11/29/2020 7:31 am COMPARISON: Chest radiograph November 20, 2020 HISTORY: ORDERING SYSTEM PROVIDED HISTORY: resp failure TECHNOLOGIST PROVIDED HISTORY: Reason for exam:->resp failure FINDINGS: Limited evaluation due to patient rotation. Endotracheal tube likely unchanged in position. Enteric feeding tube is difficult to visualize at the level of the diaphragm. There is persistent left upper lung opacity. Diffuse airspace opacity seen in bilateral lungs. Limited evaluation due to rotated projection. No significant interval change.     Xr Chest Portable    Result Date: 11/28/2020  EXAMINATION: ONE XRAY VIEW OF THE CHEST 11/28/2020 7:17 am COMPARISON: 11/26/2020 and 11/27/2020 HISTORY: ORDERING SYSTEM PROVIDED HISTORY: resp failure TECHNOLOGIST PROVIDED HISTORY: Reason for exam:->resp failure FINDINGS: There is persistent dense consolidation seen within the left upper lobe unchanged when compared to the prior study. There is hazy infiltration of the aerated left lower lobe. The right lung is clear. There is stable position of the support lines and tubes. The heart is mildly enlarged. Overall there is no interval change in the appearance of the chest x-ray when compared to the prior 2 studies. Persistent dense consolidation seen within the left upper lobe. Xr Chest Portable    Result Date: 11/27/2020  EXAMINATION: ONE XRAY VIEW OF THE CHEST 11/27/2020 7:51 am COMPARISON: 11/26/2020 and 11/25/2020 HISTORY: ORDERING SYSTEM PROVIDED HISTORY: resp failure TECHNOLOGIST PROVIDED HISTORY: Reason for exam:->resp failure FINDINGS: Again noted there is dense consolidation seen within the left upper lobe. There is a left pleural effusion with near complete whiteout of the left lung. The right lung is grossly clear. The endotracheal tube and NG tube are unchanged in position. The heart is enlarged. 1. Persistent dense consolidation within the left upper lobe with near complete whiteout of the left lung and left pleural effusion. 2. The right lung may range grossly clear. Xr Chest Portable    Result Date: 11/26/2020  EXAMINATION: ONE XRAY VIEW OF THE CHEST 11/26/2020 7:46 am COMPARISON: 11/25/2020 HISTORY: ORDERING SYSTEM PROVIDED HISTORY: resp failure TECHNOLOGIST PROVIDED HISTORY: Reason for exam:->resp failure Respiratory failure FINDINGS: The heart is enlarged. There is mild diffuse airspace opacification of the right lung which is new favored to represent CHF. There is increase seen consolidation seen throughout the left lung consistent with worsening of the left lung airspace disease. The left lower lateral lobe is now only partially aerated.     1. Significant worsening of the bilateral airspace disease with new findings of CHF involving the right lung and left lower lobe and worsening of the left upper lobe airspace disease. Xr Chest Portable    Result Date: 11/25/2020  EXAMINATION: ONE XRAY VIEW OF THE CHEST 11/25/2020 7:01 am COMPARISON: Previous chest x-ray of 11/23/2020 and 11/24/2020. HISTORY: ORDERING SYSTEM PROVIDED HISTORY: resp failure TECHNOLOGIST PROVIDED HISTORY: Reason for exam:->resp failure FINDINGS: There is no interval change in the dense consolidation seen within the left upper lobe. The endotracheal tube and NG tube are unchanged in position. The right lung is clear. The cardiac silhouette is at the upper limits of normal.    1. No interval change in the dense consolidation seen within the left upper lobe. Xr Chest Portable    Result Date: 11/24/2020  EXAMINATION: ONE XRAY VIEW OF THE CHEST 11/24/2020 6:36 am COMPARISON: 11/23/2020 HISTORY: ORDERING SYSTEM PROVIDED HISTORY: resp failure TECHNOLOGIST PROVIDED HISTORY: Reason for exam:->resp failure FINDINGS: The heart is borderline enlarged. Dense opacity remains in the mid and upper left lung unchanged. Moderate amount of opacity present in the lower right lung. Small amount of opacity present in the left lung base favored to be due to atelectasis. No abnormal pulmonary vascular congestion. ET tube and NG tube unchanged. Persistent dense opacity in the upper left lung unchanged. Moderate opacity in the right lung base worrisome for pneumonia. Probable mild left basilar atelectasis. No abnormal vascular congestion. Xr Chest Portable    Result Date: 11/23/2020  EXAMINATION: ONE XRAY VIEW OF THE CHEST 11/23/2020 4:54 pm COMPARISON: Multiple prior chest series with the most recent dated November 23, 2020 at [facility]: ETT adjusted TECHNOLOGIST PROVIDED HISTORY: Reason for exam:->ETT adjusted Reason for exam:->ETT advanced to 28 cm FINDINGS: Medical support devices: Endotracheal tube terminates in the mid to distal thoracic trachea. Enteric tube extends subdiaphragmatic and off the field of view. Lungs: Stable left upper lung complete opacification. Persistent right lower lung opacity. Slight elevation of the right hemidiaphragm is stable. Left lower lung not imaged. Cardiomediastinal silhouette and pulmonary vascularity: There appears to be atherosclerotic disease and prominence of the cardiac silhouette. Osseous and soft tissue structures: No acute findings. 1.  Unchanged left upper lung complete opacification. 2.  Elevation of the right hemidiaphragm and right lower lung opacity. 3.  Medical support devices as above. Xr Chest Portable    Result Date: 11/23/2020  EXAMINATION: ONE XRAY VIEW OF THE CHEST 11/23/2020 7:08 am COMPARISON: 11/20/2020 HISTORY: ORDERING SYSTEM PROVIDED HISTORY: resp failure TECHNOLOGIST PROVIDED HISTORY: Reason for exam:->resp failure FINDINGS: There is no interval change in the persistent dense consolidation within the left upper lobe. The left lower lobe is clear. The right lung is clear. There is minimal atelectasis seen within the right lung base. The cardiac silhouette is within normals. 1. No interval change in the dense consolidation seen within the left upper lobe. 2. Minimal right lung base atelectasis. Xr Chest Portable    Result Date: 11/22/2020  EXAMINATION: ONE XRAY VIEW OF THE CHEST 11/22/2020 6:28 am COMPARISON: 11/21/2020 HISTORY: ORDERING SYSTEM PROVIDED HISTORY: resp failure TECHNOLOGIST PROVIDED HISTORY: Reason for exam:->resp failure FINDINGS: The endotracheal tube is stable. The enteric tube tip is not well visualized but may be at the gastroesophageal junction. There is stable left upper lobe dense opacity. There are continued patchy opacities in the left lower lobe. There may be small pleural effusions. No pneumothorax is seen. No significant change in dense airspace opacity of the left upper lobe and patchy left lower lobe airspace opacities.  Enteric tube tip is not well visualized due to underpenetration. The tip may be at the gastroesophageal junction. This could be confirmed with KUB centered on the upper abdomen. Xr Chest Portable    Result Date: 11/21/2020  EXAMINATION: ONE XRAY VIEW OF THE CHEST 11/21/2020 7:37 am COMPARISON: 11/20/2020 HISTORY: ORDERING SYSTEM PROVIDED HISTORY: resp failure TECHNOLOGIST PROVIDED HISTORY: Reason for exam:->resp failure FINDINGS: Endotracheal tube and nasogastric tube are unchanged. Large opacity in the left upper lobe is unchanged. There is additional airspace disease in the left lower lobe which is stable. There is no pneumothorax. Small pleural effusions are not excluded. Unchanged large opacity/consolidation in left upper lobe. Unchanged airspace disease in left lower lobe. Xr Chest Portable    Result Date: 11/20/2020  EXAMINATION: ONE XRAY VIEW OF THE CHEST 11/20/2020 6:33 am COMPARISON: 11/19/2020 HISTORY: ORDERING SYSTEM PROVIDED HISTORY: resp failure TECHNOLOGIST PROVIDED HISTORY: Reason for exam:->resp failure FINDINGS: Endotracheal and enteric tubes remain in place. There has been no appreciable change in opacities throughout the left lung, most pronounced in the left apex. The right lung is clear. The heart size is at the upper limits of normal.  There is no discernible pneumothorax. Grossly stable opacities on the left. Xr Chest Portable    Result Date: 11/19/2020  EXAMINATION: ONE XRAY VIEW OF THE CHEST 11/19/2020 6:23 am COMPARISON: 11/18/2020 HISTORY: ORDERING SYSTEM PROVIDED HISTORY: resp failure TECHNOLOGIST PROVIDED HISTORY: Reason for exam:->resp failure FINDINGS: Evaluation is limited by the patient's body habitus and the portable technique. The right lung apex was partially excluded. There is increased dense consolidation in the left upper lobe. There is also medial left basilar airspace opacity obscuring the hemidiaphragm. The heart size is mildly enlarged. There is no discernible pneumothorax. Endotracheal and enteric tubes are again seen. Increasing multifocal left lung pneumonia. Xr Chest Portable    Result Date: 11/18/2020  EXAMINATION: ONE XRAY VIEW OF THE CHEST 11/18/2020 6:39 am COMPARISON: CT chest November 17, 2020. HISTORY: ORDERING SYSTEM PROVIDED HISTORY: SOB TECHNOLOGIST PROVIDED HISTORY: Reason for exam:->SOB FINDINGS: The cardiac silhouette is within normal limits in size. There is masslike consolidation of the left upper lobe. There is a small to moderate left dependent pleural effusion. There is no visible pneumothorax. The pulmonary vessels are within normal limits. Left upper lobe masslike consolidation. Small to moderate left pleural effusion. Xr Chest 1 View    Result Date: 11/18/2020  EXAMINATION: ONE XRAY VIEW OF THE CHEST 11/18/2020 9:06 am COMPARISON: 11/18/2020 HISTORY: ORDERING SYSTEM PROVIDED HISTORY: intubation TECHNOLOGIST PROVIDED HISTORY: Reason for exam:->intubation Reason for exam:->portable FINDINGS: Compared to the chest radiograph from earlier today, 6:44 a.m., there is interval placement of an endotracheal tube, the tip of which is approximately 4.1 cm above the korina. Nasogastric tube is present but is suboptimally visualized due to motion artifact and relative underpenetration of the study. The abdominal radiograph demonstrates it to be well positioned, with the tip in the upper abdomen. Prominent masslike consolidative opacity in the upper left lung are grossly stable. No pneumothorax is seen. Layering left pleural effusion. 1.  The life-support lines and tubes are well positioned. 2. Masslike consolidative opacity in the left upper lung. Small left effusion. Cta Chest W Contrast    Result Date: 11/17/2020  EXAMINATION: CTA OF THE CHEST 11/17/2020 3:18 pm TECHNIQUE: CTA of the chest was performed after the administration of intravenous contrast.  Multiplanar reformatted images are provided for review. MIP images are provided for review. Dose modulation, iterative reconstruction, and/or weight based adjustment of the mA/kV was utilized to reduce the radiation dose to as low as reasonably achievable.; CTA of the abdomen and pelvis was performed with the administration of intravenous contrast. Multiplanar reformatted images are provided for review. MIP images are provided for review. Dose modulation, iterative reconstruction, and/or weight based adjustment of the mA/kV was utilized to reduce the radiation dose to as low as reasonably achievable. COMPARISON: None. HISTORY: ORDERING SYSTEM PROVIDED HISTORY: aneurysm, cp, sob TECHNOLOGIST PROVIDED HISTORY: Reason for exam:->aneurysm, cp, sob FINDINGS: CTA chest: There are confluent opacities located in left upper lobe. Patchy airspace opacities located in lingula. There is moderate to large left pleural effusion. Peribronchial thickening extensor in left hilar location into the left lung base. There is subsegmental atelectasis in posterior right lower lobe with adjacent ground-glass opacities. There is pulmonary vascular congestion. The heart is mildly enlarged. There is mediastinal lymphadenopathy. Ascending thoracic aorta measures 3.7 cm in diameter. Descending thoracic aorta measures 3 cm in diameter. No evidence of thoracic aortic aneurysm or dissection. Distal descending thoracic aorta is tortuous. CTA abdomen/pelvis: There is evidence of endograft repair involving the abdominal aorta. There is redemonstration of fusiform abdominal aortic aneurysm measuring up to 6.4 cm. This finding is stable since prior. No evidence of endoleak. No retroperitoneal fluid collections. Iliac limbs of endograft appear patent. Common iliac arteries are tortuous. Common femoral arteries are patent. Numerous diverticula associated with the left colon and sigmoid colon. No evidence of acute diverticulitis.   Liver, gallbladder, pancreas, and spleen are grossly unremarkable however there is motion artifact limiting this examination. There is no hydronephrosis. Cyst associated with the right kidney is stable since previous examination as well as cyst associated with the left kidney appearing stable since prior. Appendix is visualized and normal.    1.  New confluent opacities located in left upper lobe suggesting pneumonia, atelectasis, or neoplasm. 2.  Patchy airspace opacities located in lingula suggesting pneumonia with areas of atelectasis. 3.  Stenosis and occlusion are associated with left upper lobe segmental bronchi and lingular segmental bronchi. 4.  Moderate to large left pleural effusion. 5.  Mediastinal lymphadenopathy. 6.  Stable fusiform infrarenal abdominal aortic aneurysm with endograft repair. Aneurysm measures up to 6.4 cm. No evidence of endoleak or retroperitoneal fluid. 7.  Diverticulosis. Cta Abdomen Pelvis W Contrast    Result Date: 11/17/2020  EXAMINATION: CTA OF THE CHEST 11/17/2020 3:18 pm TECHNIQUE: CTA of the chest was performed after the administration of intravenous contrast.  Multiplanar reformatted images are provided for review. MIP images are provided for review. Dose modulation, iterative reconstruction, and/or weight based adjustment of the mA/kV was utilized to reduce the radiation dose to as low as reasonably achievable.; CTA of the abdomen and pelvis was performed with the administration of intravenous contrast. Multiplanar reformatted images are provided for review. MIP images are provided for review. Dose modulation, iterative reconstruction, and/or weight based adjustment of the mA/kV was utilized to reduce the radiation dose to as low as reasonably achievable. COMPARISON: None. HISTORY: ORDERING SYSTEM PROVIDED HISTORY: aneurysm, cp, sob TECHNOLOGIST PROVIDED HISTORY: Reason for exam:->aneurysm, cp, sob FINDINGS: CTA chest: There are confluent opacities located in left upper lobe. Patchy airspace opacities located in lingula.   There is moderate to large left pleural effusion. Peribronchial thickening extensor in left hilar location into the left lung base. There is subsegmental atelectasis in posterior right lower lobe with adjacent ground-glass opacities. There is pulmonary vascular congestion. The heart is mildly enlarged. There is mediastinal lymphadenopathy. Ascending thoracic aorta measures 3.7 cm in diameter. Descending thoracic aorta measures 3 cm in diameter. No evidence of thoracic aortic aneurysm or dissection. Distal descending thoracic aorta is tortuous. CTA abdomen/pelvis: There is evidence of endograft repair involving the abdominal aorta. There is redemonstration of fusiform abdominal aortic aneurysm measuring up to 6.4 cm. This finding is stable since prior. No evidence of endoleak. No retroperitoneal fluid collections. Iliac limbs of endograft appear patent. Common iliac arteries are tortuous. Common femoral arteries are patent. Numerous diverticula associated with the left colon and sigmoid colon. No evidence of acute diverticulitis. Liver, gallbladder, pancreas, and spleen are grossly unremarkable however there is motion artifact limiting this examination. There is no hydronephrosis. Cyst associated with the right kidney is stable since previous examination as well as cyst associated with the left kidney appearing stable since prior. Appendix is visualized and normal.    1.  New confluent opacities located in left upper lobe suggesting pneumonia, atelectasis, or neoplasm. 2.  Patchy airspace opacities located in lingula suggesting pneumonia with areas of atelectasis. 3.  Stenosis and occlusion are associated with left upper lobe segmental bronchi and lingular segmental bronchi. 4.  Moderate to large left pleural effusion. 5.  Mediastinal lymphadenopathy. 6.  Stable fusiform infrarenal abdominal aortic aneurysm with endograft repair. Aneurysm measures up to 6.4 cm. No evidence of endoleak or retroperitoneal fluid. 7.  Diverticulosis.     Us Dup Upper Extremities Bilateral Venous    Result Date: 11/24/2020  EXAMINATION: DUPLEX ULTRASOUND OF THE BILATERAL UPPER EXTREMITIES FOR DVT, 11/24/2020 5:46 pm TECHNIQUE: Duplex ultrasound and Doppler images were obtained of the deep venous structures of the upper extremity. COMPARISON: None. HISTORY: ORDERING SYSTEM PROVIDED HISTORY: dvt TECHNOLOGIST PROVIDED HISTORY: Reason for exam:->dvt What reading provider will be dictating this exam?->CRC FINDINGS: There is normal flow and compressibility of the visualized venous structures of the right upper extremity. There is no evidence of echogenic thrombus. Partially occlusive thrombus identified in the left axillary vein. There is some color flow identified within the left axillary vein. The remaining venous vessels of the left upper extremity demonstrate normal flow and where possible compressibility. No evidence of echogenic thrombus in the remaining left upper extremity vessels. 1.  Partially occlusive thrombus in the left axillary vein. Some flow is identified within this vessel on color Doppler interrogation. 2.  No evidence of deep venous thrombosis in the right upper extremity. These results were called by Dr. Trey English to the patients nurse GOPI Pena on 11/24/2020 at 20:13. Xr Abdomen For Ng/og/ne Tube Placement    Result Date: 12/3/2020  EXAMINATION: ONE SUPINE XRAY VIEW(S) OF THE ABDOMEN 12/3/2020 2:29 pm COMPARISON: Abdominal radiographs, 11/23/2020. HISTORY: ORDERING SYSTEM PROVIDED HISTORY: Confirmation of course of NG/OG/NE tube and location of tip of tube TECHNOLOGIST PROVIDED HISTORY: Reason for exam:->Confirmation of course of NG/OG/NE tube and location of tip of tube Portable? ->Yes FINDINGS: The distal aspect of the nasogastric tube is suboptimally visualized due to underpenetration. The tip is below the diaphragm and likely within the proximal stomach.   There is persistent opacification of the partially visualized upper left in left upper lobe. Resident's Assessment and Plan     Dorma Cecy. is a 61 y.o. male with past medical history of STEMI s/p 5 stents type 2 diabetes mellitus, chronic low back pain, hypertension, abdominal aortic aneurysm s/p endovascular repair admitted to [facility] on November 17 with shortness of breath and low platelet count. Patient was found to have CAP and small cell carcinoma. Currently intubated. Patient is transferred to [facility] for trach and PEG/palliative radiation. Neurology. Patient is awake alert, following commands. Sedated with Versed at 10,  fentanyl 50. Monitor mentation status    Acute hypoxic hypercapnic respiratory failure. Patient extubated yesterday, become desaturating. Have to reintubated. Vent settings: AC/VC/20/500/5/60 percent FiO2. Oxygen saturation 95%. ABG 7.49/45.7/62.7/30.4. On physical exam, there is bilateral wheezing in crackles. Continue acetylcysteine, albuterol, Brovana, Flonase  Add 3% nebulizer, intrapulmonary percussive aerosol nebulizer. PT OT evaluation. Continue monitor respiratory status. Adjust vent. Monitor daily chest x-ray, ABG. Patient will need trach and PEG. Post obstructive pneumonia. Patient is afebrile, WBC 6.2  Physical exam showing bilateral wheezing and crackles. Chest x-ray showing interstitial and patchy opacities throughout both lungs. Respiratory cultures gram-negative clifford. Probable Pseudomonas. CXR showed left sided opacities and pleural effusion. Patient will benefit from fluid drainage. Patient is currently on cefepime 2 g every 12 hours. Infectious disease following. Further recommendation appreciated. Continue monitor vitals, daily CBC, chest x-ray, ABG. Following cultures. Small cell carcinoma  CTA showing a mass, no obvious mucous plugging on the left upper lobe. Very edematous and compressed airway. Biopsies done. Result compatible with small cell carcinoma of the lung.   Hematology oncology is following. Patient is not requiring emergent radiation. Patient is transferred. Palliative radiation. Continue follow-up with heme-onc. Thrombocytopenia likely 2/2 neoplasm and underlying sepsis  Platelets 36 status post platelets transfused x2. Monitor daily CBC, transfuse platelets ,goal to keep platelet more than 20. Hematology following. Recommendation appreciated    Acute normocytic anemia likely 2/2 malignancy versus underlying sepsis  Hemoglobin 7.5, s/p 1 unit packed red blood cell. Continue monitor daily CBC. Goal to keep hemoglobin more than 7. Hypernatremia, resolving. Likely 2/2 dehydration versus diuresis . sodium 144 today. Currently given D5/KCl at rate 50 mL/hour. Continue monitor daily BMP. Hypokalemia. Potassium 3.4. Replace with potassium chloride. Recheck daily BMP. Replace as needed. ISIDRO stage II likely prerenal 2/2 diuresis, resolving. Creatinine trending down 1.7-1.2. Patient is currently on IV fluid at rate 50 mL/hour. Nephrology is consulted, further recommendation appreciated. Upper extremity DVT. Ultrasound showing left axillary vein DVT. Platelets 36. No indication for anticoagulation. Continue monitor. Type 2 diabetes mellitus. Blood glucose 220 -290  On Lantus 40 nightly. High-dose sliding scale. Continue monitor blood glucose every 4 hour. CAD s/p 5stents. Home medication include aspirin and Plavix. Currently on hold due to thrombocytopenia. History of aortic aneurysm. Status low endovascular repair. Recent CT showing stable. Code Status:   Full code     PT/OT: Active  DVT ppx: Lovenox  GI ppx: Delores Villafuerte MD, PGY-1   Attending physician: Dr. Bindu Burkett. I personally saw, examined and provided care for the patient. Radiographs, labs and medication list were reviewed by me independently. I spoke with bedside nursing, therapists and consultants.  Critical care services and times documented are independent of procedures and multidisciplinary rounds with Residents. Additionally comprehensive, multidisciplinary rounds were conducted with the MICU team. The case was discussed in detail and plans for care were established. Review of Residents documentation was conducted and revisions were made as appropriate. I agree with the above documented exam, problem list and plan of care.   Souleymane Llamas D.O.  CCT 40 min

## 2020-12-04 NOTE — PROGRESS NOTES
Department of Internal Medicine  Nephrology Progress Note    No need for full consultation. Patient followed by our group at [facility]. SUBJECTIVE:  We are following Dr. Chapo Carty for ISIDRO and volume management. He remains intubated and sedated. Opens eyes to voice and follows commands. Physical Exam:    VITALS:  /89   Pulse 102   Temp 100.6 °F (38.1 °C) (Bladder)   Resp 20   Ht 5' 11\" (1.803 m)   Wt (!) 341 lb 4.4 oz (154.8 kg)   SpO2 93%   BMI 47.60 kg/m²   24HR INTAKE/OUTPUT:    Intake/Output Summary (Last 24 hours) at 12/4/2020 1416  Last data filed at 12/4/2020 1200  Gross per 24 hour   Intake 912 ml   Output 2070 ml   Net -1158 ml       Constitutional:  Morbidly obese  male. Sedated, intubated.   Cardiovascular/Edema:  RRR,S1/S2  Respiratory:  ETT to vent, FiO2 65%, PEEP 5, diminished bases bilaterally  Gastrointestinal:  Soft, obese, nondistended, bowel sounds hypoactive; + abdominal wall edema  Skin: Warm, dry, no lesions  3+ pitting edema BLE into hips, 1+ pitting edema abdominal wall, 3+ pitting bilateral forearms and hands      DATA:    CBC with Differential:    Lab Results   Component Value Date    WBC 7.0 12/04/2020    RBC 2.92 12/04/2020    HGB 7.9 12/04/2020    HCT 26.5 12/04/2020    PLT 21 12/04/2020    MCV 90.8 12/04/2020    MCH 27.1 12/04/2020    MCHC 29.8 12/04/2020    RDW 16.9 12/04/2020    NRBC 8.8 12/04/2020    SEGSPCT 62 12/29/2011    BLASTSPCT 0.9 11/21/2020    METASPCT 1.8 12/04/2020    LYMPHOPCT 15.9 12/04/2020    PROMYELOPCT 0.9 11/21/2020    MONOPCT 2.7 12/04/2020    MYELOPCT 0.9 12/04/2020    BASOPCT 0.4 12/04/2020    MONOSABS 0.21 12/04/2020    LYMPHSABS 1.12 12/04/2020    EOSABS 0.00 12/04/2020    BASOSABS 0.00 12/04/2020     CMP:    Lab Results   Component Value Date     12/04/2020    K 4.4 12/04/2020     12/04/2020    CO2 32 12/04/2020    BUN 68 12/04/2020    CREATININE 1.2 12/04/2020    GFRAA >60 12/04/2020    LABGLOM >60 12/04/2020    GLUCOSE 143 12/04/2020    GLUCOSE 120 12/31/2011    PROT 5.1 12/04/2020    LABALBU 2.7 12/04/2020    LABALBU 4.5 12/28/2011    CALCIUM 8.8 12/04/2020    BILITOT 0.6 12/04/2020    ALKPHOS 181 12/04/2020     12/04/2020    ALT 76 12/04/2020     Magnesium:    Lab Results   Component Value Date    MG 2.2 12/04/2020     Phosphorus:    Lab Results   Component Value Date    PHOS 2.8 12/04/2020     Radiology Review:    CXR 12/4/2020   No change. BRIEF SUMMARY OF INITIAL CONSULT & ADMISSION PRIOR TO TRANSFER:    Briefly, Mr. Sarah Jerez is a 61year old male with a PMH of Type II DM, HTN, HLD, CAD s/p stents x 5, AAA s/p endovascular repair and chronic low back pain who presented to [facility] on 11/17/2020 with complaints of SOB. He was hypoxic on room air. A CTA of the chest demonstrated PNA and pleural effusions. Labs were significant for thrombocytopenia. The following day an RRT was called for worsening SOB. He was placed on bipap but later intubated for worsening respiratory acidosis. A bronchoscopy was performed the same day (11/18) and it was noted that the OMER was significantly narrowed. A transbronchial needle aspiration was completed and three biopsies were obtained. On 11/24 biopsies + small cell lung CA. His wife had requested transfer to [facility] however he was not accepted/insurance did not cover. Nephrology was consulted for volume management and lasix and albumin were started. ID was consulted on 11/25 for persistent fevers. A second bronch was performed and Bumex gtt were started on 11/26. Bumex gtt was discontinued on 11/27 for worsening ISIDRO and hypernatremia. Free water was increased NGT to 100 mL/hr. On 11/28 D5W was also started. D5W increased to 100 mL/hr on 11/29, free water continued. D5W was discontinued and HCTZ with albumin were started BID on 11/30 but discontinued on 12/1. Free water via NGT continued at 100 mL/hr. General surgery was consulted on 12/1 for trach and PEG.  On 12/2 D5W + 30 mEq KCL

## 2020-12-04 NOTE — PLAN OF CARE
Problem: Restraint Use - Nonviolent/Non-Self-Destructive Behavior:  Goal: Absence of restraint-related injury  Description: Absence of restraint-related injury  Outcome: Met This Shift     Problem: Falls - Risk of:  Goal: Absence of physical injury  Description: Absence of physical injury  Outcome: Ongoing     Problem: Restraint Use - Nonviolent/Non-Self-Destructive Behavior:  Goal: Absence of restraint indications  Description: Absence of restraint indications  Outcome: Not Met This Shift

## 2020-12-04 NOTE — TELEPHONE ENCOUNTER
Spoke with Yahir Estrada,  in ICU. She is working on getting mobile intensive to transport pt here and stay with pt throughout simulation & treatment. Dr. Lilibeth Guillory updated.

## 2020-12-04 NOTE — PROGRESS NOTES
Notified IR that patient's wife, Trent eLo wants called by doctor prior to signing consent. IR will have Dr. Nicole Antoine call wife and they will obtain consent.

## 2020-12-04 NOTE — CARE COORDINATION
Transition of Care: Pt remains in [address]: Transferred from 12/3 from [facility] to [facility] ([facility]) for palliative radiation therapy. ETT tube, vent , FiO2 65%, Antoine wrist restraints, Versed & Fentanyl gtt continues. Plan of care to include trach? Per previous CM note backdoor referral to both [facility] and [facility] was initiated. Awaiting clinical progression to determine appropriate discharge plan, CM following.     Nohemi BARDALESN, RN  [facility]   [phone]

## 2020-12-04 NOTE — PROGRESS NOTES
Patient appears anxious during assessment after arrival to MICU. He is biting his ETT and attempting to grab at nearby objects. Patient is not verbally redirectable and continues to grab at lines and attempt to grab for ETT. Bilateral wrist restraints applied for patient safety.

## 2020-12-04 NOTE — PROGRESS NOTES
Comprehensive Nutrition Assessment    Type and Reason for Visit:  Initial, Consult    Nutrition Recommendations/Plan: Recommend initiation of EN w/ Glucerna 1.5 (Diabetic 1.5) w/ goal rate of 65 ml/hr x 24 hr to provide: 1560 mlTV, 2340 kcal, 129 gm protein, 1184 ml FW. Additional FW per critical care. Nutrition Assessment:  Pt transferred from [facility] for palliative RT, originally admitted w/ PNA. Pt extubated yesterday but was re-intubated soon after. Pt pending trach and PEG. PMH of lung cancer and DM. Will provide EN recommendations and monitor. Malnutrition Assessment:  Malnutrition Status: At risk for malnutrition (Comment)    Context:  Acute Illness     Findings of the 6 clinical characteristics of malnutrition:  Energy Intake:  Mild decrease in energy intake (Comment)  Weight Loss:  Unable to assess(CHA d/t lack of EMR wt hx)     Body Fat Loss:  Unable to assess     Muscle Mass Loss:  Unable to assess    Fluid Accumulation:  No significant fluid accumulation     Strength:  Not Performed    Estimated Daily Nutrient Needs:  Energy (kcal):  4266-6797([id number]= 2472, MinVol= 10.7, Tmax= 38.1); Weight Used for Energy Requirements:  Admission     Protein (g):  120-140(1.5-1.8 gm/kg IBW);  Weight Used for Protein Requirements:  Ideal        Fluid (ml/day):  per critical care; Method Used for Fluid Requirements:  1 ml/kcal      Nutrition Related Findings:  Pt intubated, abd rotund soft, +BS, -1L I/O, +2 BUE/BLE edema, lactic acidosis, GI labs elevated, OGT clamped      Wounds:  Skin Tears(abrasions and bruising noted)       Current Nutrition Therapies:    Diet NPO Effective Now    Anthropometric Measures:  · Height: 5' 11\" (180.3 cm)  · Current Body Weight: 323 lb (146.5 kg)(Using admit d/t fluid, Current: 341 lb 4.4 oz (12/4/20) actual)   · Admission Body Weight: 323 lb (146.5 kg)(11/18/20 actual)    · Usual Body Weight: (CHA d/t lack of EMR wt hx)     · Ideal Body Weight: 172 lbs; % Ideal Body Weight 187.8 %   · BMI: 45.1  · Adjusted Body Weight: No Adjustment   · BMI Categories: Obese Class 3 (BMI 40.0 or greater)       Nutrition Diagnosis:   · Inadequate protein-energy intake related to impaired respiratory function as evidenced by NPO or clear liquid status due to medical condition, intubation    Nutrition Interventions:   Food and/or Nutrient Delivery:  Continue NPO, Start Tube Feeding(Recommend initiation of EN w/ Glucerna 1.5 (Diabetic 1.5) w/ goal rate of 65 ml/hr x 24 hr to provide: 1560 mlTV, 2340 kcal, 129 gm protein, 1184 ml FW. Additional FW per critical care.)  Nutrition Education/Counseling:  Education not indicated   Coordination of Nutrition Care:  Continue to monitor while inpatient    Goals:  Nutrition Progression       Nutrition Monitoring and Evaluation:   Behavioral-Environmental Outcomes:  None Identified   Food/Nutrient Intake Outcomes:  Enteral Nutrition Intake/Tolerance  Physical Signs/Symptoms Outcomes:  Biochemical Data, GI Status, Fluid Status or Edema, Hemodynamic Status, Nutrition Focused Physical Findings, Skin, Weight     Discharge Planning:     Too soon to determine     Electronically signed by Hudson Vasquez RD, LD on 12/4/20 at 11:31 AM EST    Contact: [phone]

## 2020-12-04 NOTE — PROGRESS NOTES
Physical Therapy    PT consult to evaluate/treat received and appreciated. Pt chart reviewed and evaluation attempted. Pt is currently on hold d/t increased sedation d/t agitation. Will check back as able. Thank you.         Yuko Villalobos, PT, DPT   [id number]

## 2020-12-04 NOTE — TELEPHONE ENCOUNTER
Spoke with wife in regards to consent for RT simulation & treat. Verbal consent received with 2 RNs as witness. Attempted to reach  on ICU for update regarding pt transport with out answer. Awaiting response. Dr. Jenni Lackey updated.

## 2020-12-04 NOTE — H&P
Hospital Medicine History & Physical      PCP: Rosa Elena Howe PA-C    Date of Admission: 12/3/2020    Date of Service: Pt seen/examined on 12/4/2020 and Admitted to Inpatient with expected LOS greater than two midnights due to medical therapy. Chief Complaint:  Shortness of breath after epidural.       History Of Present Illness:      61 y.o. male who presented to [facility] on 11/17 after receiving an epidural injection for chronic low back pain. He was found to be hypoxic and was admitted. During his admission he became increasingly hypoxic and then hypercapnic and failed non-invasive ventilation so he had to intubated. Patient was found to have an infiltrate that was due to OMER obstruction from a lung mass that biopsy proved to be small cell carcinoma. He has had thrombocytopenia with low platelets. He was recommended to have an MRI of head but could not fit into the scanner. He was transferred to [facility] for Palliative radiation therapy.      Past Medical History:          Diagnosis Date    Anticoagulant long-term use     Arthritis     CAD (coronary artery disease)     Chronic back pain     Depression     Dyslipidemia     Hiatal hernia 1979    History of ST elevation myocardial infarction (STEMI)     Hyperlipidemia     Hypertension     Low back pain     Lumbar radiculopathy 8/14/2019    Obesity     MANOLO on CPAP     Presence of stent in left circumflex coronary artery     Presence of stent in right coronary artery     Smoker     Type 2 diabetes mellitus without complication ([address]        Past Surgical History:          Procedure Laterality Date    ABDOMINAL AORTIC ANEURYSM REPAIR, ENDOVASCULAR N/A 6/28/2019    ENDOVASCULAR REPAIR ABDOMINAL AORTIC ANEURYSM performed by Adela Watson MD at [address] Bilateral 3/12/2020    BILATERAL L3,L4,L5 MEDIAL BRANCH NERVE BLOCK performed by Manju Unger DO at [facility] BLOCK Bilateral 6/11/2020    BILATERAL L3,L4,L5 MEDIAL NERVE BRANCH BLOCK #2 performed by Kumar Prieto DO at [address] [place] [address] N/A 11/18/2020    BRONCHOSCOPY/TRANSBRONCHIAL NEEDLE BIOPSY performed by Kenneth Costa MD at [address]  12/30/2011    Dr. Quiana Quick 1st OM 3.0 x 20 Ion    DIAGNOSTIC CARDIAC CATH LAB PROCEDURE  3/15/2005    [facility]. Mild to moderate CAD. Normal LV.  DIAGNOSTIC CARDIAC CATH LAB PROCEDURE  1/16/2007    [facility]. Cath/PTCA/DE stent of proximal and distal RCA.  DIAGNOSTIC CARDIAC CATH LAB PROCEDURE  2/21/2007    Cath/DE stent of proximal OM1 and proximal Cx.  DIAGNOSTIC CARDIAC CATH LAB PROCEDURE  12/30/2011    ANURADHA of mid OM branch of circumflex.  ECHO COMPL W DOP COLOR FLOW  12/30/2011         HC INSERT PICC CATH, 5/> YRS - MIDLINE  11/23/2020         HERNIA REPAIR  2/2014    laparoscopic ventral hernia repain    JOINT REPLACEMENT Left 4/1/14    TKA-ed    JOINT REPLACEMENT Right 2018    KNEE    PAIN MANAGEMENT PROCEDURE Right 9/1/2020    RIGHT L3,4,5 MEDIAL BRANCH RADIOFREQUENCY ABLATION performed by Kumar Prieto DO at [address] N/A 11/17/2020    L4-5 EPIDURAL STEROID INJECTION #1 performed by Kumar Prieto DO at [facility] OFFICE/OUTPT VISIT,PROCEDURE ONLY Right 11/26/2018    RIGHT KNEE TOTAL ARTHROPLASTY performed by Malia Davenport DO at [facility]       Medications Prior to Admission:      Prior to Admission medications    Medication Sig Start Date End Date Taking? Authorizing Provider   loratadine (CLARITIN) 10 MG capsule Take 10 mg by mouth daily    Historical Provider, MD   pioglitazone (ACTOS) 15 MG tablet TAKE 1 TABLET BY MOUTH EVERY DAY 11/6/20   Vicci Cancer, PA-C   pregabalin (LYRICA) 150 MG capsule Take 1 capsule by mouth 2 times daily for 30 days.  11/12/20 12/12/20  Kumar Prieto DO   diclofenac (VOLTAREN) 75 MG EC tablet TAKE 1 TABLET BY MOUTH TWICE A DAY 10/7/20   Historical Provider, MD   oxyCODONE-acetaminophen (PERCOCET) 7.5-325 MG per tablet Take 1 tablet by mouth 3 times daily as needed for Pain for up to 30 days. Intended supply: 30 days 11/12/20 12/12/20  Jennifer Garcia DO   quinapril (ACCUPRIL) 10 MG tablet Take 1 tablet by mouth daily  Patient taking differently: Take 10 mg by mouth daily Wife states on hold 10/16/20   Neela Rosales PA-C   metFORMIN (GLUCOPHAGE) 500 MG tablet TAKE 1 TABLET BY MOUTH TWICE A DAY WITH MEALS  Patient taking differently: Wife states on hold 10/8/20   Phuc Roche DO   fluticasone (FLONASE) 50 MCG/ACT nasal spray 2 sprays by Nasal route daily 10/6/20   Neela Rosales PA-C   omega-3 acid ethyl esters (LOVAZA) 1 g capsule take 1 capsule twice a day 8/14/20   Neela Rosales PA-C   niacin (SLO-NIACIN) 500 MG extended release tablet take 1 tablet by mouth once daily 7/20/20   Phuc Roche DO   clopidogrel (PLAVIX) 75 MG tablet TAKE 1 TABLET BY MOUTH EVERY DAY  Patient taking differently: Take 75 mg by mouth daily Has been on hold for procedure 7/6/20   Neela Rosales PA-C   sildenafil (VIAGRA) 50 MG tablet Take 1 tablet by mouth as needed for Erectile Dysfunction 7/6/20   Neela Rosales PA-C   aspirin 325 MG EC tablet TAKE 1 TABLET BY MOUTH EVERY DAY  Patient taking differently: Take 325 mg by mouth daily Has been on hold for procedure 5/7/20   [address], DO   Tens Unit MISC by Does not apply route 5/7/19   TRUDY English   atorvastatin (LIPITOR) 80 MG tablet TAKE 1 TABLET BY MOUTH AT BEDTIME 10/22/18   Neela Rosales PA-C   carvedilol (COREG) 25 MG tablet TAKE ONE TABLET BY MOUTH TWICE DAILY (WITH MEALS) 10/22/18   Neela Rosales PA-C       Allergies:  Bee venom    Social History:      The patient currently lives independently with wife. TOBACCO:   reports that he has been smoking cigarettes. He started smoking about 42 years ago.  He has a 39.00 pack-year smoking history. He has never used smokeless tobacco.  ETOH:   reports no history of alcohol use. Family History:          Problem Relation Age of Onset    Diabetes Mother     Stroke Mother     Diabetes Father     No Known Problems Sister        REVIEW OF SYSTEMS:   Unable to obtain due to intubated and sedated. PHYSICAL EXAM:    /78   Pulse 99   Temp 100 °F (37.8 °C) (Bladder)   Resp 20   Ht 5' 11\" (1.803 m)   Wt (!) 341 lb 4.4 oz (154.8 kg)   SpO2 91%   BMI 47.60 kg/m²     General appearance:  Intubated and sedated   No apparent distress, appears stated age and cooperative. HEENT:  ET tube at 24 cm at the lip. OG tube present. Normal cephalic, atraumatic without obvious deformity. Pupils equal, round, and reactive to light. Extra ocular muscles intact. Conjunctivae/corneas clear. Neck: Supple, with full range of motion. No jugular venous distention. Trachea midline. Respiratory: On Vent A/C 20/510/60/5 Coarse breath sounds bilaterally without any rales or rhonchi. Cardiovascular:  Regular rate and rhythm with normal S1/S2 without murmurs, rubs or gallops. Abdomen: Soft, non-tender, non-distended with normal bowel sounds. Musculoskeletal:  No clubbing, or cyanosis, +2 pitting edema bilaterally. Skin: Skin color, texture, turgor normal.  No rashes or lesions. Neurologic:  Neurovascularly intact without any focal sensory/motor deficits. Cranial nerves: II-XII intact, grossly non-focal.  Psychiatric:  Alert and oriented, thought content appropriate, normal insight    CXR:  I have reviewed the CXR with the following interpretation:   Impression    1.  Endotracheal tube is 5.2 cm above korina. 2.  Stable chest radiograph with interstitial and hazy opacities throughout    both lungs notable in left upper lobe.        Labs:     Recent Labs     12/01/20  0942 12/01/20  1446 12/02/20  0530 12/03/20  0540   WBC 5.0  --  6.1 6.2   HGB 5.7* 6.5* 7.8* 7.5*   HCT 19.3* 21.7* 25.9* 25.7*   PLT 25* --  19* 36*     Recent Labs     12/01/20  0515 12/02/20  0530 12/03/20  0540   * 148* 144   K 3.6 3.0* 3.4*    104 102   CO2 36* 33* 32*   BUN 90* 78* 69*   CREATININE 1.7* 1.4* 1.2   CALCIUM 8.5* 8.7 8.5*   PHOS 3.1 3.1 2.9     Recent Labs     12/01/20  0515 12/02/20  0530 12/03/20  0540   AST 80* 77* 76*   ALT 44* 49* 51*   BILITOT 0.7 0.9 0.6   ALKPHOS 133* 159* 149*     No results for input(s): INR in the last 72 hours. No results for input(s): Earlis Macomb in the last 72 hours. Urinalysis:      Lab Results   Component Value Date    NITRU Negative 11/24/2020    WBCUA 2-5 11/24/2020    BACTERIA MODERATE 11/24/2020    RBCUA >20 11/24/2020    BLOODU LARGE 11/24/2020    SPECGRAV 1.025 11/24/2020    GLUCOSEU Negative 11/24/2020         ASSESSMENT:    1. Acute Hypoxic and Hypercapnic Respiratory failure  2. Left Upper Lobe Small cell lung cancer   3. Obstructive Pneumonia   4. Thrombocytopenia  5. Anemia  6. ISIDRO    PLAN:  Admit to inpatient. Continue on Vent per critical care. Monitor platelets closely and consult Critical care, hematology, Pulmonary, and Nephrology. Continue on sedation. Continue on cefepime as ordered by ID at [facility]. DVT Prophylaxis: PCDs  Diet: Diet NPO Effective Now  Code Status: Full Code    Dispo - Poor prognosis. Carlito Weir DO    Thank you Tommy Everett PA-C for the opportunity to be involved in this patient's care. If you have any questions or concerns please feel free to contact me at [phone].

## 2020-12-05 NOTE — PROGRESS NOTES
Patient continues to grab at lines and attempts to grab for ETT during assessments and ROM. Bilateral wrist restraints continued for patient safety.

## 2020-12-05 NOTE — PROGRESS NOTES
Surgery: For Trach/PEG, platelets will need to be maintained >50 prior to scheduling. Defer transfusion/management to others. Please contact when appropriate.       Greg Christopher MD  12/4/20  7:33 PM

## 2020-12-05 NOTE — PROGRESS NOTES
[facility]  Hematology/Oncology  Consult      Patient Name: Yahir Zuñiga YOB: 1960  PCP: David Villanueva PA-C   Referring Provider:      Reason for Consultation:   No chief complaint on file. Subjective: Patient remains intubated and sedated. History of Present Illness: This is a 60 yo male patient with a past medical history of CAD, DM type 2, morbid obesity, chronic back pain, HTN, AAA s/p repair who set to the emergency room by Dr. Lobo Johnston for complaints of shortness of breath and decreased O2 during an epidural procedure. Patient was having an L4/L5 epidural for pain management done by Dr. Lobo Johnston when his O2 dropped into the 80's during the procedure. Patient also reported having worsening shortness of breath over the past two weeks with a productive cough. Patient is basically bed bound only taking occasional steps. CTA of the chest, A/P showed new confluent opacities located in left upper lobe suggesting pneumonia, atelectasis, or neoplasm. Patchy airspace opacities located in lingula suggesting pneumonia with areas of atelectasis. Stenosis and occlusion are associated with left upper lobe segmental bronchi and lingular segmental bronchi. Moderate to large left pleural effusion. Mediastinal lymphadenopathy. Stable fusiform infrarenal abdominal aortic aneurysm with endograft repair. Aneurysm measures up to 6.4 cm. No evidence of endoleak or retroperitoneal fluid. He was given 1 dose of Rocephin and azithromycin while in the ER. CBC since admission has shown anemia and thrombocytopenia. 11/18 an RRT was called and was subsequently intubated. He also had a bronchoscopy done and bxs of left upper lobe were sent.        Diagnostic Data:     Past Medical History:   Diagnosis Date    Anticoagulant long-term use     Arthritis     CAD (coronary artery disease)     Chronic back pain     Depression     Dyslipidemia     Hiatal hernia 1979    History of ST elevation myocardial infarction (STEMI)     Hyperlipidemia     Hypertension     Low back pain     Lumbar radiculopathy 8/14/2019    Obesity     MANOLO on CPAP     Presence of stent in left circumflex coronary artery     Presence of stent in right coronary artery     Smoker     Type 2 diabetes mellitus without complication [facility])        Patient Active Problem List    Diagnosis Date Noted    ARF (acute respiratory failure) ([address] 12/02/2020    Small cell lung cancer ([address]     Acute respiratory failure with hypoxia ([address] 11/17/2020    Community acquired pneumonia of left upper lobe of lung 11/17/2020    Thrombocytopenia ([address] 11/17/2020    Spinal stenosis of lumbar region with neurogenic claudication 10/29/2020    Obesity     Chronic back pain     Lumbosacral spondylosis without myelopathy 12/11/2019    Lumbar radiculopathy 08/14/2019    AAA (abdominal aortic aneurysm) ([address] 06/28/2019    Chronic bilateral low back pain with bilateral sciatica 06/11/2019    Right hip pain 06/11/2019    DDD (degenerative disc disease), lumbar 06/11/2019    Status post total right knee replacement 11/26/2018    Morbid obesity with BMI of 40.0-44.9, adult ([address] 11/26/2018    Chronic pain syndrome 07/27/2018    Chronic pain of right knee 07/27/2018    Non-insulin dependent type 2 diabetes mellitus ([address] 03/16/2018    Aortic valve stenosis 02/15/2018    Primary osteoarthritis of right knee 02/15/2018    Presence of stent in left circumflex coronary artery     Smoker     CAD (coronary artery disease) 12/28/2011    HTN (hypertension) 12/28/2011    Hyperlipemia 12/28/2011        Past Surgical History:   Procedure Laterality Date    ABDOMINAL AORTIC ANEURYSM REPAIR, ENDOVASCULAR N/A 6/28/2019    ENDOVASCULAR REPAIR ABDOMINAL AORTIC ANEURYSM performed by Dian Corbett MD at [address] [facility] Bilateral 3/12/2020    BILATERAL L3,L4,L5 MEDIAL BRANCH NERVE BLOCK performed by Korey Bowles DO at [address]  ANESTHESIA NERVE BLOCK Bilateral 6/11/2020    BILATERAL L3,L4,L5 MEDIAL NERVE BRANCH BLOCK #2 performed by Inez Nicole DO at [address] [place] [address] N/A 11/18/2020    BRONCHOSCOPY/TRANSBRONCHIAL NEEDLE BIOPSY performed by Chaparro Rahman MD at [facility] STENT PLACEMENT  12/30/2011    Dr. Jessenia Senior 1st OM 3.0 x 20 Ion    DIAGNOSTIC CARDIAC CATH LAB PROCEDURE  3/15/2005    [facility]. Mild to moderate CAD. Normal LV.  DIAGNOSTIC CARDIAC CATH LAB PROCEDURE  1/16/2007    [facility]. Cath/PTCA/DE stent of proximal and distal RCA.  DIAGNOSTIC CARDIAC CATH LAB PROCEDURE  2/21/2007    Cath/DE stent of proximal OM1 and proximal Cx.  DIAGNOSTIC CARDIAC CATH LAB PROCEDURE  12/30/2011    ANURADHA of mid OM branch of circumflex.  ECHO COMPL W DOP COLOR FLOW  12/30/2011         HC INSERT PICC CATH, 5/> YRS - MIDLINE  11/23/2020         HERNIA REPAIR  2/2014    laparoscopic ventral hernia repain    JOINT REPLACEMENT Left 4/1/14    TKA-ed    JOINT REPLACEMENT Right 2018    KNEE    PAIN MANAGEMENT PROCEDURE Right 9/1/2020    RIGHT L3,4,5 MEDIAL BRANCH RADIOFREQUENCY ABLATION performed by Inez Nicole DO at [facility] N/A 11/17/2020    L4-5 EPIDURAL STEROID INJECTION #1 performed by Inez Nicole DO at [address] [facility] OFFICE/OUTPT VISIT,PROCEDURE ONLY Right 11/26/2018    RIGHT KNEE TOTAL ARTHROPLASTY performed by Dania Cornell DO at [facility] OR       Family History  Family History   Problem Relation Age of Onset    Diabetes Mother     Stroke Mother     Diabetes Father     No Known Problems Sister        Social History    TOBACCO:   reports that he has been smoking cigarettes. He started smoking about 42 years ago. He has a 39.00 pack-year smoking history. He has never used smokeless tobacco.  ETOH:   reports no history of alcohol use.     Home Medications  Prior to Admission medications Medication Sig Start Date End Date Taking? Authorizing Provider   loratadine (CLARITIN) 10 MG capsule Take 10 mg by mouth daily    Historical Provider, MD   pioglitazone (ACTOS) 15 MG tablet TAKE 1 TABLET BY MOUTH EVERY DAY 11/6/20   Dharmesh Cantu PA-C   pregabalin (LYRICA) 150 MG capsule Take 1 capsule by mouth 2 times daily for 30 days. 11/12/20 12/12/20  Andrew Lynn DO   diclofenac (VOLTAREN) 75 MG EC tablet TAKE 1 TABLET BY MOUTH TWICE A DAY 10/7/20   Historical Provider, MD   oxyCODONE-acetaminophen (PERCOCET) 7.5-325 MG per tablet Take 1 tablet by mouth 3 times daily as needed for Pain for up to 30 days.  Intended supply: 30 days 11/12/20 12/12/20  Andrew Lynn DO   quinapril (ACCUPRIL) 10 MG tablet Take 1 tablet by mouth daily  Patient taking differently: Take 10 mg by mouth daily Wife states on hold 10/16/20   Dharmesh Cantu PA-C   metFORMIN (GLUCOPHAGE) 500 MG tablet TAKE 1 TABLET BY MOUTH TWICE A DAY WITH MEALS  Patient taking differently: Wife states on hold 10/8/20   Jeremy Martinez DO   fluticasone (FLONASE) 50 MCG/ACT nasal spray 2 sprays by Nasal route daily 10/6/20   Dharmesh Cantu PA-C   omega-3 acid ethyl esters (LOVAZA) 1 g capsule take 1 capsule twice a day 8/14/20   Dharmesh Cantu PA-C   niacin (SLO-NIACIN) 500 MG extended release tablet take 1 tablet by mouth once daily 7/20/20   Jeremy Martinez DO   clopidogrel (PLAVIX) 75 MG tablet TAKE 1 TABLET BY MOUTH EVERY DAY  Patient taking differently: Take 75 mg by mouth daily Has been on hold for procedure 7/6/20   Dharmesh Cantu PA-C   sildenafil (VIAGRA) 50 MG tablet Take 1 tablet by mouth as needed for Erectile Dysfunction 7/6/20   Dharmesh Cantu PA-C   aspirin 325 MG EC tablet TAKE 1 TABLET BY MOUTH EVERY DAY  Patient taking differently: Take 325 mg by mouth daily Has been on hold for procedure 5/7/20   Rahul Matias DO   Tens Unit MISC by Does not apply route 5/7/19   TRUDY Camarena   atorvastatin (LIPITOR) 80 MG tablet TAKE 1 TABLET BY MOUTH AT BEDTIME 10/22/18   Angelito Chatterjee PA-C   carvedilol (COREG) 25 MG tablet TAKE ONE TABLET BY MOUTH TWICE DAILY (WITH MEALS) 10/22/18   Angelito Chatterjee PA-C       Allergies  Allergies   Allergen Reactions    Bee Venom Anaphylaxis       Review of Systems: patient is intubated and sedated and not responsive to questions. Objective  /78   Pulse 88   Temp 99.3 °F (37.4 °C)   Resp 20   Ht 5' 11\" (1.803 m)   Wt (!) 341 lb 9.6 oz (154.9 kg)   SpO2 93%   BMI 47.64 kg/m²     Physical Exam:   Performance Status:  Head and neck: PERRLA, EOMI . Sclera non icteric. Oropharynx: Clear  Neck: no JVD,  no adenopathy  Heart: Regular rate and regular rhythm, no murmur  Lungs:Disffuse inspiratory and expiratory rhonchi   Extremities: No edema,no cyanosis, no clubbing. Abdomen: Soft, non-tender;no masses, no organomegaly  Skin: No rash. Neurologic:Cranial nerves grossly intact. No focal motor or sensory deficits .     Recent Laboratory Data-   Lab Results   Component Value Date    WBC 6.1 12/05/2020    HGB 7.4 (L) 12/05/2020    HCT 24.9 (L) 12/05/2020    MCV 92.6 12/05/2020    PLT 20 (L) 12/05/2020    LYMPHOPCT 18.4 (L) 12/05/2020    RBC 2.69 (L) 12/05/2020    MCH 27.5 12/05/2020    MCHC 29.7 (L) 12/05/2020    RDW 16.9 (H) 12/05/2020    NEUTOPHILPCT 76.3 12/05/2020    MONOPCT 4.4 12/05/2020    BASOPCT 0.2 12/05/2020    NEUTROABS 4.64 12/05/2020    LYMPHSABS 1.10 (L) 12/05/2020    MONOSABS 0.24 12/05/2020    EOSABS 0.00 (L) 12/05/2020    BASOSABS 0.00 12/05/2020       Lab Results   Component Value Date     (H) 12/05/2020    K 3.8 12/05/2020     12/05/2020    CO2 31 (H) 12/05/2020    BUN 64 (H) 12/05/2020    CREATININE 1.2 12/05/2020    GLUCOSE 116 (H) 12/05/2020    CALCIUM 8.9 12/05/2020    PROT 5.0 (L) 12/05/2020    LABALBU 2.6 (L) 12/05/2020    BILITOT 0.8 12/05/2020    ALKPHOS 146 (H) 12/05/2020     (H) 12/05/2020    ALT 79 (H) 12/05/2020    LABGLOM >60 12/05/2020    GFRAA >60 Date: 11/23/2020  EXAMINATION: ONE XRAY VIEW OF THE CHEST 11/23/2020 7:08 am COMPARISON: 11/20/2020 HISTORY: ORDERING SYSTEM PROVIDED HISTORY: resp failure TECHNOLOGIST PROVIDED HISTORY: Reason for exam:->resp failure FINDINGS: There is no interval change in the persistent dense consolidation within the left upper lobe. The left lower lobe is clear. The right lung is clear. There is minimal atelectasis seen within the right lung base. The cardiac silhouette is within normals. 1. No interval change in the dense consolidation seen within the left upper lobe. 2. Minimal right lung base atelectasis. Xr Chest Portable    Result Date: 11/22/2020  EXAMINATION: ONE XRAY VIEW OF THE CHEST 11/22/2020 6:28 am COMPARISON: 11/21/2020 HISTORY: ORDERING SYSTEM PROVIDED HISTORY: resp failure TECHNOLOGIST PROVIDED HISTORY: Reason for exam:->resp failure FINDINGS: The endotracheal tube is stable. The enteric tube tip is not well visualized but may be at the gastroesophageal junction. There is stable left upper lobe dense opacity. There are continued patchy opacities in the left lower lobe. There may be small pleural effusions. No pneumothorax is seen. No significant change in dense airspace opacity of the left upper lobe and patchy left lower lobe airspace opacities. Enteric tube tip is not well visualized due to underpenetration. The tip may be at the gastroesophageal junction. This could be confirmed with KUB centered on the upper abdomen. Xr Chest Portable    Result Date: 11/21/2020  EXAMINATION: ONE XRAY VIEW OF THE CHEST 11/21/2020 7:37 am COMPARISON: 11/20/2020 HISTORY: ORDERING SYSTEM PROVIDED HISTORY: resp failure TECHNOLOGIST PROVIDED HISTORY: Reason for exam:->resp failure FINDINGS: Endotracheal tube and nasogastric tube are unchanged. Large opacity in the left upper lobe is unchanged. There is additional airspace disease in the left lower lobe which is stable. There is no pneumothorax.   Small pleural effusions are not excluded. Unchanged large opacity/consolidation in left upper lobe. Unchanged airspace disease in left lower lobe. Xr Chest Portable    Result Date: 11/20/2020  EXAMINATION: ONE XRAY VIEW OF THE CHEST 11/20/2020 6:33 am COMPARISON: 11/19/2020 HISTORY: ORDERING SYSTEM PROVIDED HISTORY: resp failure TECHNOLOGIST PROVIDED HISTORY: Reason for exam:->resp failure FINDINGS: Endotracheal and enteric tubes remain in place. There has been no appreciable change in opacities throughout the left lung, most pronounced in the left apex. The right lung is clear. The heart size is at the upper limits of normal.  There is no discernible pneumothorax. Grossly stable opacities on the left. Xr Chest Portable    Result Date: 11/19/2020  EXAMINATION: ONE XRAY VIEW OF THE CHEST 11/19/2020 6:23 am COMPARISON: 11/18/2020 HISTORY: ORDERING SYSTEM PROVIDED HISTORY: resp failure TECHNOLOGIST PROVIDED HISTORY: Reason for exam:->resp failure FINDINGS: Evaluation is limited by the patient's body habitus and the portable technique. The right lung apex was partially excluded. There is increased dense consolidation in the left upper lobe. There is also medial left basilar airspace opacity obscuring the hemidiaphragm. The heart size is mildly enlarged. There is no discernible pneumothorax. Endotracheal and enteric tubes are again seen. Increasing multifocal left lung pneumonia. Xr Chest Portable    Result Date: 11/18/2020  EXAMINATION: ONE XRAY VIEW OF THE CHEST 11/18/2020 6:39 am COMPARISON: CT chest November 17, 2020. HISTORY: ORDERING SYSTEM PROVIDED HISTORY: SOB TECHNOLOGIST PROVIDED HISTORY: Reason for exam:->SOB FINDINGS: The cardiac silhouette is within normal limits in size. There is masslike consolidation of the left upper lobe. There is a small to moderate left dependent pleural effusion. There is no visible pneumothorax. The pulmonary vessels are within normal limits.     Left upper lobe masslike consolidation. Small to moderate left pleural effusion. Xr Chest 1 View    Result Date: 11/18/2020  EXAMINATION: ONE XRAY VIEW OF THE CHEST 11/18/2020 9:06 am COMPARISON: 11/18/2020 HISTORY: ORDERING SYSTEM PROVIDED HISTORY: intubation TECHNOLOGIST PROVIDED HISTORY: Reason for exam:->intubation Reason for exam:->portable FINDINGS: Compared to the chest radiograph from earlier today, 6:44 a.m., there is interval placement of an endotracheal tube, the tip of which is approximately 4.1 cm above the korina. Nasogastric tube is present but is suboptimally visualized due to motion artifact and relative underpenetration of the study. The abdominal radiograph demonstrates it to be well positioned, with the tip in the upper abdomen. Prominent masslike consolidative opacity in the upper left lung are grossly stable. No pneumothorax is seen. Layering left pleural effusion. 1.  The life-support lines and tubes are well positioned. 2. Masslike consolidative opacity in the left upper lung. Small left effusion. Cta Chest W Contrast    Result Date: 11/17/2020  EXAMINATION: CTA OF THE CHEST 11/17/2020 3:18 pm TECHNIQUE: CTA of the chest was performed after the administration of intravenous contrast.  Multiplanar reformatted images are provided for review. MIP images are provided for review. Dose modulation, iterative reconstruction, and/or weight based adjustment of the mA/kV was utilized to reduce the radiation dose to as low as reasonably achievable.; CTA of the abdomen and pelvis was performed with the administration of intravenous contrast. Multiplanar reformatted images are provided for review. MIP images are provided for review. Dose modulation, iterative reconstruction, and/or weight based adjustment of the mA/kV was utilized to reduce the radiation dose to as low as reasonably achievable. COMPARISON: None.  HISTORY: ORDERING SYSTEM PROVIDED HISTORY: aneurysm, cp, sob TECHNOLOGIST PROVIDED HISTORY: Reason for exam:->aneurysm, cp, sob FINDINGS: CTA chest: There are confluent opacities located in left upper lobe. Patchy airspace opacities located in lingula. There is moderate to large left pleural effusion. Peribronchial thickening extensor in left hilar location into the left lung base. There is subsegmental atelectasis in posterior right lower lobe with adjacent ground-glass opacities. There is pulmonary vascular congestion. The heart is mildly enlarged. There is mediastinal lymphadenopathy. Ascending thoracic aorta measures 3.7 cm in diameter. Descending thoracic aorta measures 3 cm in diameter. No evidence of thoracic aortic aneurysm or dissection. Distal descending thoracic aorta is tortuous. CTA abdomen/pelvis: There is evidence of endograft repair involving the abdominal aorta. There is redemonstration of fusiform abdominal aortic aneurysm measuring up to 6.4 cm. This finding is stable since prior. No evidence of endoleak. No retroperitoneal fluid collections. Iliac limbs of endograft appear patent. Common iliac arteries are tortuous. Common femoral arteries are patent. Numerous diverticula associated with the left colon and sigmoid colon. No evidence of acute diverticulitis. Liver, gallbladder, pancreas, and spleen are grossly unremarkable however there is motion artifact limiting this examination. There is no hydronephrosis. Cyst associated with the right kidney is stable since previous examination as well as cyst associated with the left kidney appearing stable since prior. Appendix is visualized and normal.    1.  New confluent opacities located in left upper lobe suggesting pneumonia, atelectasis, or neoplasm. 2.  Patchy airspace opacities located in lingula suggesting pneumonia with areas of atelectasis. 3.  Stenosis and occlusion are associated with left upper lobe segmental bronchi and lingular segmental bronchi. 4.  Moderate to large left pleural effusion.  5.  Mediastinal lymphadenopathy. 6.  Stable fusiform infrarenal abdominal aortic aneurysm with endograft repair. Aneurysm measures up to 6.4 cm. No evidence of endoleak or retroperitoneal fluid. 7.  Diverticulosis. Cta Abdomen Pelvis W Contrast    Result Date: 11/17/2020  EXAMINATION: CTA OF THE CHEST 11/17/2020 3:18 pm TECHNIQUE: CTA of the chest was performed after the administration of intravenous contrast.  Multiplanar reformatted images are provided for review. MIP images are provided for review. Dose modulation, iterative reconstruction, and/or weight based adjustment of the mA/kV was utilized to reduce the radiation dose to as low as reasonably achievable.; CTA of the abdomen and pelvis was performed with the administration of intravenous contrast. Multiplanar reformatted images are provided for review. MIP images are provided for review. Dose modulation, iterative reconstruction, and/or weight based adjustment of the mA/kV was utilized to reduce the radiation dose to as low as reasonably achievable. COMPARISON: None. HISTORY: ORDERING SYSTEM PROVIDED HISTORY: aneurysm, cp, sob TECHNOLOGIST PROVIDED HISTORY: Reason for exam:->aneurysm, cp, sob FINDINGS: CTA chest: There are confluent opacities located in left upper lobe. Patchy airspace opacities located in lingula. There is moderate to large left pleural effusion. Peribronchial thickening extensor in left hilar location into the left lung base. There is subsegmental atelectasis in posterior right lower lobe with adjacent ground-glass opacities. There is pulmonary vascular congestion. The heart is mildly enlarged. There is mediastinal lymphadenopathy. Ascending thoracic aorta measures 3.7 cm in diameter. Descending thoracic aorta measures 3 cm in diameter. No evidence of thoracic aortic aneurysm or dissection. Distal descending thoracic aorta is tortuous. CTA abdomen/pelvis: There is evidence of endograft repair involving the abdominal aorta.   There is redemonstration of fusiform abdominal aortic aneurysm measuring up to 6.4 cm. This finding is stable since prior. No evidence of endoleak. No retroperitoneal fluid collections. Iliac limbs of endograft appear patent. Common iliac arteries are tortuous. Common femoral arteries are patent. Numerous diverticula associated with the left colon and sigmoid colon. No evidence of acute diverticulitis. Liver, gallbladder, pancreas, and spleen are grossly unremarkable however there is motion artifact limiting this examination. There is no hydronephrosis. Cyst associated with the right kidney is stable since previous examination as well as cyst associated with the left kidney appearing stable since prior. Appendix is visualized and normal.    1.  New confluent opacities located in left upper lobe suggesting pneumonia, atelectasis, or neoplasm. 2.  Patchy airspace opacities located in lingula suggesting pneumonia with areas of atelectasis. 3.  Stenosis and occlusion are associated with left upper lobe segmental bronchi and lingular segmental bronchi. 4.  Moderate to large left pleural effusion. 5.  Mediastinal lymphadenopathy. 6.  Stable fusiform infrarenal abdominal aortic aneurysm with endograft repair. Aneurysm measures up to 6.4 cm. No evidence of endoleak or retroperitoneal fluid. 7.  Diverticulosis. Xr Abdomen For Ng/og/ne Tube Placement    Result Date: 11/22/2020  EXAMINATION: ONE SUPINE XRAY VIEW(S) OF THE ABDOMEN 11/22/2020 5:06 pm COMPARISON: November 22, 2020, 4 hours prior. HISTORY: ORDERING SYSTEM PROVIDED HISTORY: Confirmation of course of NG/OG/NE tube and location of tip of tube TECHNOLOGIST PROVIDED HISTORY: Reason for exam:->Confirmation of course of NG/OG/NE tube and location of tip of tube Portable? ->Yes FINDINGS: Tip of the NG tube noted at the level of the diaphragm, no significant change. The right side and the inferior part of the abdomen is not included in the study.   There is opacification of the visualized left upper lung. Tip of NG tube at the level of the left hemidiaphragm, no change. Xr Chest Abdomen Ng Placement    Result Date: 11/22/2020  EXAMINATION: ONE SUPINE XRAY VIEW(S) OF THE ABDOMEN 11/22/2020 12:52 pm COMPARISON: November 18. HISTORY: ORDERING SYSTEM PROVIDED HISTORY: OG placement TECHNOLOGIST PROVIDED HISTORY: Reason for exam:->OG placement FINDINGS: Nasogastric tube is present. Side hole appears to be at level of gastroesophageal junction. This tube could be advanced approximately 4 or 5 cm. Nasogastric tube is present with side hole at level of gastroesophageal junction. This tube could be advanced approximately 4 or 5 cm. Xr Chest Abdomen Ng Placement    Result Date: 11/18/2020  EXAMINATION: ONE SUPINE XRAY VIEW(S) OF THE ABDOMEN 11/18/2020 9:07 am COMPARISON: 11/18/2020, about 2 hours earlier HISTORY: ORDERING SYSTEM PROVIDED HISTORY: NGT TECHNOLOGIST PROVIDED HISTORY: Reason for exam:->NGT Reason for exam:->portable NG tube placement FINDINGS: There is an NG tube with the tip in the stomach. An ET tube is again noted in satisfactory position. There is extensive opacity in the left lung, most likely pneumonia. Increased markings also seen in the visualized portion of the right lung. The heart is enlarged. NG tube tip in the stomach. Fluoro For Surgical Procedures    Result Date: 11/17/2020  EXAMINATION: SPOT FLUOROSCOPIC IMAGES 11/17/2020 12:03 pm TECHNIQUE: Fluoroscopy was provided by the radiology department for procedure. Radiologist was not present during examination. FLUOROSCOPY DOSE AND TYPE OR TIME AND EXPOSURES: Total dose:26.99 mGy COMPARISON: None HISTORY: ORDERING SYSTEM PROVIDED HISTORY: Pain management TECHNOLOGIST PROVIDED HISTORY: Reason for exam:->L4-5 Epidural steroid injection #1 Intraprocedural imaging. FINDINGS: 3 intraoperative fluoroscopic images were obtained during L4-5 epidural steroid injection.     Intraprocedural lobe opacity with large left pleural effusion along with mediastinal lymphadenopathy.   - Today's Hgb 7.2 Plts 38   - Patient still intubated but hopefully will be able to extubated  - Once stable, MRI brain to complete staging   - If he improves clinically and has improvement in ECOG, he will be considered for systemic therapy  - L pleural effusions likely malignant  - ID following, now on unasyn  - Given his current ECOG and comorbidities along with persistent fevers and abx requirements, not eligible for inpatient emergent chemotherapy. Will get rad onc on board as inpatient urgent palliative XRT could be considered to improved respiratory status if he has improvement in above    11/26/20  - Remains intubated, back in ICU  - CBC with further drop in platelets to 29. Likely combination of myelosuppression from acute illness and abx exposure  - If he has improvement in his clinic course, eventually will need MRI brain as above  - Therapy plan as above. He will need significant improvement clinically prior to discussion regarding chemotherapy. Rad onc consult pending, again will likely need improvement prior to proceeding with therapy. Cultures have no grown a source, but patient remains febrile (possibly drug fever and ID following and managing abx)  - Will follow. Further recs pending clinical course    11/27/20  - LUE DVT  - Platelets 33. Stable from yesterday  - Can not anticoagulate with current platelet count. Needs to be ~50. Monitor for now  - Remainder of CBC stable    11/29/20  - CBC stable, thrombocytopenia unchanged. Hgb dropped to 7.0. Will give 1 unit  - Continue to hold [facility] with platelets <28  - Still with low grade fevers in the 100's  - ID following, on unasyn. Possible post-obstructive PNA    11/30/20  - Continues to have thrombocytopenia platelets 28. Hemoglobin 7.3 after 1 unit of packed red blood cells yesterday.   - Continue to hold [facility] for platelets <56  - Still with fevers in the 100s, he is also hypertensive  - On Unasyn  - Rad on consult still pending, but no aggressive oncologic care until significant clinical improvement    12/1/20  - Today's Hgb 7.8  - Left upper lobe cancer. Not eligible for emergent chemotherapy  - Patient has to improve clinically in order to transfer to [facility] ([facility]) for palliative radiation.  - Continue to transfuse with target hemoglobin above 7   - Febrile dt underlying ca   - Overall poor prognosis     12/2/20  - Today's Hgb 7.8 plts are 19  - Left upper lobe cancer. Not eligible for emergent chemotherapy at this time. - Patient has been cleared per Dr. Thad Philip to transport to [facility] ([facility]) for palliative radiation. Transfer order has been placed per ICU nursing staff. - Transfuse for Hgb <7, platelets <93  - Overall poor prognosis      12/3/20  - Hb 7.5 plts trending up 36   - Patient was extubated however had to be reintubated. - To be transferred to [facility] ([facility]) for palliative radiation.  - Transfuse for Hgb <7, platelets <84  - Overall poor prognosis      12/4/20  -Today's hemoglobin 7.9 platelets 21  - Pulmonary holding on drainage of effusion at this time due to low volume/low likelihood of clinical benefit  - Planning trach and PEG  - Remains intubated   - Patient to start RT, however this has been delayed per Dr. Sky Alexandra note    12/5/20  Remains intubated and sedated with fentanyl and midazolam  Hemoglobin 7.4 with platelets of 20.   Awaiting planning for palliative radiation therapy  Overall prognosis very poor    Liudmila Santos MD  Electronically signed 12/5/2020 at 10:37 AM

## 2020-12-05 NOTE — PROGRESS NOTES
TAVIA PROGRESS NOTE      Chief complaint: Follow-up of VAP    The patient is a 61 y.o. super morbidly obese male with history of CAD, hypertension, hyperlipidemia, DM, transferred to [facility] on 12/04 for radiation therapy from [address] [place] [address] where he initially presented on 11/17 for hypoxia requiring intubation after receiving a lumbar epidural steroid injection for his chronic low back pain, incidentally found to have left upper lobe postobstructive pneumonia from lung mass that was diagnosed to be small cell carcinoma biopsy and had respiratory culture growing Pseudomonas aeruginosa for which he was seen by Dr. Manju Ramires. He has been having intermittent fever up to 102 °F since 11/23. Respiratory pathogen PCR panel including SARS-CoV-2 PCR on 11/17 and urine Legionella antigen on 11/18 were negative. He had respiratory culture on 11/23 and 11/24 which showed absent oropharyngeal aristides and moderate growth of Candida albicans deemed colonization. He had blood cultures on 11/21 growing coagulase-negative Staphylococcus in 1 out of 2 sets deemed contaminant. Repeat blood cultures on 11/24 showed no growth. Respiratory Gram stain and culture on 12/04 showed few polymorphonuclear leukocytes, no epithelial cells, no organisms, reduced oropharyngeal aristides, moderate growth of Pseudomonas aeruginosa (non-MDR). He received ceftriaxone and levofloxacin from 11/17-11/21, cefepime from 11/21-11/25, ampicillin-sulbactam from 11/25-12/03, cefepime since 12/03.     On transfer, he remains febrile at 100.6 °F with no leukocytosis. Chest x-ray showed dense left upper lobe consolidation similar to that since 11/17. Cefepime was resumed. Subjective: Patient was seen and examined. He remains in critical condition, intubated and sedated.     Objective:  /81   Pulse 85   Temp 99.3 °F (37.4 °C) (Bladder)   Resp 21   Ht 5' 11\" (1.803 m)   Wt (!) 341 lb 9.6 oz (154.9 kg)   SpO2 93%   BMI 47.64 kg/m²   Constitutional: Intubated, no MV dyssynchrony  Respiratory: Clear breath sounds, no crackles, no wheezes  Cardiovascular: Regular rate and rhythm, no murmurs  Gastrointestinal: Bowel sounds present, soft, nontender  Skin: Warm and dry, no active dermatoses  Musculoskeletal: No joint swelling, no joint erythema    Labs, imaging, and medical records/notes were personally reviewed. Assessment:  Acute hypoxic respiratory failure  Pseudomonas aeruginosa VAP/HAP  Fever, multifactorial, probably associated with VAP/HAP and underlying malignancy  Small cell lung carcinoma    Recommendations:  Continue cefepime at 2 g every 8 hours. Continue supportive care.     Thank you for involving me in the care of Isaac Kelly. . I will continue to follow. Please do not hesitate to call for any questions or concerns.     Electronically signed by Alphonso Long MD on 12/5/2020 at 9:18 AM

## 2020-12-05 NOTE — FLOWSHEET NOTE
Restraints maintained for patient safety.         12/05/20 1600   Restraint Order   Length of Order 24   Order Upon Application Yes   Face to Face Yes   Assessment   Less Restrictive Alternative RP;RE;DE;RO;VR   Special Consideration/Risk Factors N   Justification   Clinical Justification L;T;H;U   Education   Discontinuation Criteria Absence   Criteria Explained Yes   Patient's Response NL   Family Notification S   Restraint Monitoring Q60 Minutes   Visual/Safety Check (q 60 mins) SD   Restraint  Monitoring Q2 Hours   Circulation NS   Range of Motion P   Fluids IV   Food/Meal N   Elimination UC   Restraint Type   Soft Restraint B Wrist CONTINUED   Vital Signs   Pulse 78   Heart Rate Source Monitor   Resp 20   /78   BP Location Right upper arm   MAP (mmHg) 86   Patient Position Semi fowlers   Level of Consciousness Responds to Voice (1)   BP Upper/Lower Upper

## 2020-12-05 NOTE — FLOWSHEET NOTE
Restraints maintained for patient safety. Patient anxious and agitated, is unable to be re-directed, and cannot follow commands.         12/05/20 0800   Restraint Order   Length of Order 24   Order Upon Application Yes   Face to Face Yes   Assessment   Less Restrictive Alternative RP;RE;DE;RO;VR   Special Consideration/Risk Factors N   Justification   Clinical Justification L;T;H;U   Education   Discontinuation Criteria Absence   Criteria Explained Yes   Patient's Response NL   Family Notification S   Restraint Monitoring Q60 Minutes   Visual/Safety Check (q 60 mins) SD   Restraint  Monitoring Q2 Hours   Circulation NS   Range of Motion P   Fluids N   Food/Meal N   Elimination UC   Restraint Type   Soft Restraint B Wrist CONTINUED   Vital Signs   Temp 99.3 °F (37.4 °C)   Pulse 94   Heart Rate Source Monitor   Resp 20   BP (!) 152/101   BP Location Right upper arm   MAP (mmHg) 108   Patient Position Semi fowlers   Level of Consciousness Responds to Voice (1)   MEWS Score 1   BP Upper/Lower Upper

## 2020-12-05 NOTE — PROGRESS NOTES
[address] [facility]  Department of Internal Medicine   Internal Medicine Residency   MICU Progress Note    Patient:  Jg Patel. 61 y.o. male  MRN: [id number]     Date of Service: 12/5/2020    Allergy: Bee venom    Subjective     Patient is examined and seen in the room. Currently sedated , vital signs stable , not responding to verbal stimuli . ACVC 20/510/55%/5, ABG 7.46/47.8/62.3/33.2, awaiting Trach  And PEG availability. May also benefit from radiation therapy and thoracentesis. No acute 24-hour or overnight change    Objective     VS: /85   Pulse 91   Temp 99.3 °F (37.4 °C) (Bladder)   Resp 20   Ht 5' 11\" (1.803 m)   Wt (!) 341 lb 9.6 oz (154.9 kg)   SpO2 93%   BMI 47.64 kg/m²           I & O - 24hr:     Intake/Output Summary (Last 24 hours) at 12/5/2020 1837  Last data filed at 12/5/2020 1613  Gross per 24 hour   Intake 2603.58 ml   Output 2150 ml   Net 453.58 ml       Physical Exam:  General Appearance: Intubated, sedated, and   Lung:  breath sound congruent with the vent, (+) crackles and wheezing, marked reduced air entry to Lt lung  Heart: regular rate and rhythm, S1, S2 normal, no murmur, click, rub or gallop  Extremities:  extremities normal, atraumatic, no cyanosis or edema  Neurologic: Mental status: Sedated.   Not following commands        Lines     site day    Art line   None    TLC None    PICC Lt basilic midline  12   Hemoaccess None    Oxygen:       Mechanical Ventilation:   Mode: ACVC 20/510/55%/5, ABG 7.46/47.8/62.3/33.2  ABG:     Lab Results   Component Value Date    PH 7.460 12/05/2020    PCO2 47.8 12/05/2020    PO2 62.3 12/05/2020    DFX8YDR 36.0 11/29/2020    HCO3 33.2 12/05/2020    BE 8.5 12/05/2020    THB 8.2 12/05/2020    O2SAT 90.8 12/05/2020        Medications     Infusions: (Fluid, Sedation, Vasopressors)  IVF:    D5W 60 ml/hr  Vasopressors   (-) pressors  Sedation   Fentanyl 100, Versed 10    Nutrition:   NPO  ATB:   Antibiotics  Days   cefepime 2 Labs     CBC with Differential:    Lab Results   Component Value Date    WBC 6.1 12/05/2020    RBC 2.69 12/05/2020    HGB 7.4 12/05/2020    HCT 24.9 12/05/2020    PLT 20 12/05/2020    MCV 92.6 12/05/2020    MCH 27.5 12/05/2020    MCHC 29.7 12/05/2020    RDW 16.9 12/05/2020    NRBC 3.5 12/05/2020    SEGSPCT 62 12/29/2011    BLASTSPCT 0.9 11/21/2020    METASPCT 1.8 12/04/2020    LYMPHOPCT 18.4 12/05/2020    PROMYELOPCT 0.9 11/21/2020    MONOPCT 4.4 12/05/2020    MYELOPCT 0.9 12/04/2020    BASOPCT 0.2 12/05/2020    MONOSABS 0.24 12/05/2020    LYMPHSABS 1.10 12/05/2020    EOSABS 0.00 12/05/2020    BASOSABS 0.00 12/05/2020     CMP:    Lab Results   Component Value Date     12/05/2020    K 3.8 12/05/2020    K 4.4 12/04/2020    CL 98 12/05/2020    CO2 31 12/05/2020    BUN 62 12/05/2020    CREATININE 1.2 12/05/2020    GFRAA >60 12/05/2020    LABGLOM >60 12/05/2020    GLUCOSE 190 12/05/2020    GLUCOSE 120 12/31/2011    PROT 5.1 12/05/2020    LABALBU 3.1 12/05/2020    LABALBU 4.5 12/28/2011    CALCIUM 9.0 12/05/2020    BILITOT 0.8 12/05/2020    ALKPHOS 127 12/05/2020     12/05/2020    ALT 83 12/05/2020       Imaging Studies:  CXR 12/05/20, No change.  Complete opacification left mid and upper lung.  Correlate   bronchoscopy as warranted to exclude endobronchial obstruction. Resident's Assessment and Plan     Néstor Ariza. is a 61 y.o. male with past medical history of STEMI s/p 5 stents type 2 diabetes mellitus, chronic low back pain, hypertension, abdominal aortic aneurysm s/p endovascular repair admitted to [facility] on November 17 with shortness of breath and low platelet count. Patient was found to have CAP and small cell carcinoma. Currently intubated. Patient is transferred to [facility] for trach and PEG/palliative radiation.     Neurology   Patient is sedated with fentanyl and Versed  Not responsive to verbal stimuli  Monitor mentation    Cardiology  Vital signs stable, not on pressor, monitor, on ICU telemetry    CAD status post 5 stents  Home Meds aspirin and Plavix and Lipitor  Hold aspirin and Plavix due to patient's thrombocytopenia    History of aortic aneurysm  Status post endovascular repair  Stable, monitor    Pulmonology  Acute hypoxic hypercapnic respiratory failure secondary to obstructive pneumonia in the setting of small cell carcinoma of the lung  Patient is a transfer from [facility] post 1 trial of extubation, desaturations of there after, reintubated and same day  ACVC 20/510/55%/5, ABG 7.46/47.8/62.3/33.2  Candidate for trach and PEG, pending procedure availability likely next Monday  Candidate of palliative radiation therapy. ICU team opined that pt may benefit from radiation therapy that is likely the primary reason of obstruction. Continue bronchodilator  Monitor    Obstructive pneumonia in the setting of small cell carcinoma of the lung  Patient with small cell carcinoma with left upper lobe opacity with large left pleural effusion along with mediastinal lymphadenopathy. His CT scan of abdomen and pelvis did not show evidence of intra-abdominal metastasis. Respiratory cultures gram-negative clifford. Probable Pseudomonas. On cefepime, ID following patient      Small cell carcinoma of the lung  CTA showing a mass, no obvious mucous plugging on the left upper lobe. Very edematous and compressed airway. Biopsies immunoprofile compatible with small cell carcinoma of the lung  Patient with small cell carcinoma with left upper lobe opacity with large left pleural effusion along with mediastinal lymphadenopathy. His CT scan of abdomen and pelvis did not show evidence of intra-abdominal metastasis.     Hematology oncology following, recommend palliative care given poor diagnosis  Radiation oncology following the patient, rec No treatment over the weekend      Nephrology  ISIDRO stage II, likely prerenal secondary to diuresis,   December 5, creatinine 1.2 BUN 62  Bumex drip 0.5, monitor renal function    Natremia, resolved    Hypokalemia, resolved    Hematology and oncology  Thrombocytopenia, secondary to underlying sepsis/bacteremia versus myelophthisis secondary to malignancy  Status post transfusion of platelets x2  Hematology following  Consider peripheral blood smear, and BM for differential diagnosis if pt improves  Platelet 20, transfusion target 10, will coordinated with surgical procedures trach and peg and thoracentesis for blood platelet transfusion    Normocytic anemia, secondary to underlying sepsis/bacteremia versus myelophthisis secondary to malignancy  Status post PRBC transfusion  Hb stable, 7.4  Follow-up folate B12 and iron panel      Endocrinology  Type 2 diabetes  On Lantus 40 nightly. High-dose sliding scale. Continue monitor blood glucose every 4 hour. DVT/GI prophy: lovenox/pepcid  Disposition plan: Continue current management        Drexel Curling, MD, PGY-1    Attending physician: Dr. Marjorie Whalen    I personally saw, examined and provided care for the patient. Radiographs, labs and medication list were reviewed by me independently. I spoke with bedside nursing, therapists and consultants. Critical care services and times documented are independent of procedures and multidisciplinary rounds with Residents. Additionally comprehensive, multidisciplinary rounds were conducted with the MICU team. The case was discussed in detail and plans for care were established. Review of Residents documentation was conducted and revisions were made as appropriate. I agree with the above documented exam, problem list and plan of care.   [place] D.O.  CCT 40 min

## 2020-12-05 NOTE — PLAN OF CARE
Problem: Restraint Use - Nonviolent/Non-Self-Destructive Behavior:  Goal: Absence of restraint-related injury  Description: Absence of restraint-related injury  12/5/2020 0219 by Willy Chi RN  Outcome: Met This Shift     Problem: Restraint Use - Nonviolent/Non-Self-Destructive Behavior:  Goal: Absence of restraint indications  Description: Absence of restraint indications  12/5/2020 0219 by Willy Chi RN  Outcome: Not Met This Shift

## 2020-12-05 NOTE — PROGRESS NOTES
Effusion noted on ultrasound of chest. Down to 55%. He remains ntubated and sedated so no history is available.   Vent settings:Vent Information  $Ventilation: $Subsequent Day  Skin Assessment: Clean, dry, & intact  Equipment ID: [id number]  Vent Type: [phone]  Vent Mode: AC/VC  Vt Ordered: 510 mL  Rate Set: 20 bmp  Peak Flow: 70 L/min  Pressure Support: 0 cmH20  FiO2 : 55 %  SpO2: 91 %  SpO2/FiO2 ratio: 165.45  Sensitivity: 3  PEEP/CPAP: 5  I Time/ I Time %: 0 s  Humidification Source: Heated wire  Humidification Temp: 36.9  Humidification Temp Measured: 36.6  Additional Respiratory  Assessments  Pulse: 94  Resp: 20  SpO2: 91 %  Position: Semi-Jeff's  Humidification Source: Heated wire  Humidification Temp: 36.9  Oral Care: Mouth swabbed  Subglottic Suction Done?: Yes      Current Facility-Administered Medications:     sodium chloride flush 0.9 % injection 10 mL, 10 mL, Intravenous, 2 times per day, Marlene Angel MD, 10 mL at 12/04/20 2150    sodium chloride flush 0.9 % injection 10 mL, 10 mL, Intravenous, PRN, Marlene Angel MD, 10 mL at 12/04/20 0346    acetaminophen (TYLENOL) tablet 650 mg, 650 mg, Oral, Q6H PRN, 650 mg at 12/04/20 1904 **OR** acetaminophen (TYLENOL) suppository 650 mg, 650 mg, Rectal, Q6H PRN, Marlene Angel MD    [Held by provider] insulin glargine (LANTUS) injection vial 40 Units, 40 Units, Subcutaneous, Nightly, Taty Fam MD    insulin lispro (HUMALOG) injection vial 0-18 Units, 0-18 Units, Subcutaneous, Q4H, Taty Fam MD, 3 Units at 12/04/20 1630    famotidine (PEPCID) injection 20 mg, 20 mg, Intravenous, BID, Taty Fam MD, 20 mg at 12/04/20 2149    mineral oil-hydrophilic petrolatum (HYDROPHOR) ointment, , Topical, BID PRN, Taty Fam MD    ipratropium-albuterol (DUONEB) nebulizer solution 1 ampule, 1 ampule, Inhalation, Q4H WA, Thao Brown MD, 1 ampule at 12/04/20 2055    0.9 % sodium chloride bolus, 20 mL, Intravenous, Once, Thao Brown MD    cefepime (MAXIPIME) 2 g IVPB extended (mini-bag), 2 g, Intravenous, Q8H, Sonia Mckeon MD, Last Rate: 12.5 mL/hr at 12/05/20 0459, 2 g at 12/05/20 0459    Cefepime 0.9 % sodium chloride Flush, , Intravenous, Q8H, Sony Chery MD, Stopped at 12/04/20 1800    albumin human 25 % IV solution 25 g, 25 g, Intravenous, Q8H, Anjelica Foster, APRN - CNP, Last Rate: 100 mL/hr at 12/05/20 0633, 25 g at 12/05/20 4607    sodium chloride (Inhalant) 3 % nebulizer solution 2 mL, 2 mL, Nebulization, BID, Asaf Godinez MD, 2 mL at 12/04/20 1643    0.9 % sodium chloride bolus, 20 mL, Intravenous, Once, Asaf Godinez MD    dextrose 5 % solution, 100 mL/hr, Intravenous, PRN, Radha Quevedo MD    dextrose 50 % IV solution, 12.5 g, Intravenous, PRN, Radha Quevedo MD    glucagon (rDNA) injection 1 mg, 1 mg, Intramuscular, PRN, Radha Quevedo MD    glucose (GLUTOSE) 40 % oral gel 15 g, 15 g, Oral, PRN, Radha Quevedo MD    acetylcysteine (MUCOMYST) 20 % solution 400 mg, 2 mL, Inhalation, BID, Radha Quevedo MD, 400 mg at 12/04/20 2054    Arformoterol Tartrate (BROVANA) nebulizer solution 15 mcg, 15 mcg, Nebulization, BID, Radha Quevedo MD, 15 mcg at 12/04/20 2055    atorvastatin (LIPITOR) tablet 80 mg, 80 mg, Oral, Nightly, Radha Quevedo MD, 80 mg at 12/04/20 1957    chlorhexidine (PERIDEX) 0.12 % solution 15 mL, 15 mL, Mouth/Throat, BID, Radha Quevedo MD, 15 mL at 12/04/20 2017    clopidogrel (PLAVIX) tablet 75 mg, 75 mg, Oral, Daily, Radha Quevedo MD, Stopped at 12/04/20 0725    fentaNYL 5 mcg/ml in 0.9%  ml infusion, 200 mcg/hr, Intravenous, Continuous, Radha Quevedo MD, Last Rate: 40 mL/hr at 12/05/20 0629, 200 mcg/hr at 12/05/20 0629    heparin flush 100 UNIT/ML injection 100 Units, 1 mL, Intravenous, 2 times per day, Radha Quevedo MD    heparin flush 100 UNIT/ML injection 100 Units, 1 mL, Intracatheter, PRN, Radha Quevedo MD    magnesium sulfate 1 g in dextrose 5% 100 mL IVPB, 1 g, Intravenous, PRN, Mo Epps MD    methylPREDNISolone sodium (SOLU-MEDROL) injection 40 mg, 40 mg, Intravenous, Daily, Mo Epps MD, 40 mg at 12/04/20 0800    midazolam (VERSED) 100 mg in dextrose 5 % 100 mL infusion, 2 mg/hr, Intravenous, Continuous, Mo Epps MD, Last Rate: 10 mL/hr at 12/04/20 2213, 10 mg/hr at 12/04/20 2213    nicotine (NICODERM CQ) 21 MG/24HR 1 patch, 1 patch, Transdermal, Daily, Mo Epps MD, 1 patch at 12/04/20 0759    oxyCODONE-acetaminophen (PERCOCET) 5-325 MG per tablet 1 tablet, 1 tablet, Oral, TID PRN **AND** oxyCODONE (ROXICODONE) immediate release tablet 2.5 mg, 2.5 mg, Oral, TID PRN, Mo Epps MD    tiZANidine (ZANAFLEX) tablet 4 mg, 4 mg, Oral, TID PRN, Mo Epps MD, 4 mg at 12/04/20 1958  /81   Pulse 94   Temp 99.3 °F (37.4 °C) (Bladder)   Resp 20   Ht 5' 11\" (1.803 m)   Wt (!) 341 lb 9.6 oz (154.9 kg)   SpO2 91%   BMI 47.64 kg/m²     General: Sedated, no distress  Mouth: Orally intubated  Neck: No JVD  Chest: Symmetric, no accessory use  Heart: RRR  Lungs: Bilateral rales and rhonchi  Abdomen: Soft, nt, no hsm  Extremities: 2+BLE edema    Intake/Output Summary (Last 24 hours) at 12/5/2020 0728  Last data filed at 12/5/2020 0600  Gross per 24 hour   Intake 1825.58 ml   Output 1990 ml   Net -164.42 ml     CBC with Differential:    Lab Results   Component Value Date    WBC 6.1 12/05/2020    RBC 2.69 12/05/2020    HGB 7.4 12/05/2020    HCT 24.9 12/05/2020    PLT 20 12/05/2020    MCV 92.6 12/05/2020    MCH 27.5 12/05/2020    MCHC 29.7 12/05/2020    RDW 16.9 12/05/2020    NRBC 3.5 12/05/2020    SEGSPCT 62 12/29/2011    BLASTSPCT 0.9 11/21/2020    METASPCT 1.8 12/04/2020    LYMPHOPCT 18.4 12/05/2020    PROMYELOPCT 0.9 11/21/2020    MONOPCT 4.4 12/05/2020    MYELOPCT 0.9 12/04/2020    BASOPCT 0.2 12/05/2020    MONOSABS 0.24 12/05/2020    LYMPHSABS 1.10 12/05/2020    EOSABS 0.00 12/05/2020 BASOSABS 0.00 12/05/2020     BMP:    Lab Results   Component Value Date     12/05/2020    K 3.8 12/05/2020    K 4.4 12/04/2020     12/05/2020    CO2 31 12/05/2020    BUN 64 12/05/2020    LABALBU 2.6 12/05/2020    LABALBU 4.5 12/28/2011    CREATININE 1.2 12/05/2020    CALCIUM 8.9 12/05/2020    GFRAA >60 12/05/2020    LABGLOM >60 12/05/2020    GLUCOSE 116 12/05/2020    GLUCOSE 120 12/31/2011     ABG:    Lab Results   Component Value Date    PH 7.460 12/05/2020    PCO2 47.8 12/05/2020    PO2 62.3 12/05/2020    HCO3 33.2 12/05/2020    BE 8.5 12/05/2020    O2SAT 90.8 12/05/2020   CXR viewed opacified left chest, ett in place    IMPRESSION:   Patient Active Problem List   Diagnosis    CAD (coronary artery disease)    HTN (hypertension)    Hyperlipemia    Presence of stent in left circumflex coronary artery    Smoker    Aortic valve stenosis    Primary osteoarthritis of right knee    Non-insulin dependent type 2 diabetes mellitus (HCC)    Chronic pain syndrome    Chronic pain of right knee    Status post total right knee replacement    Morbid obesity with BMI of 40.0-44.9, adult (HCC)    Chronic bilateral low back pain with bilateral sciatica    Right hip pain    DDD (degenerative disc disease), lumbar    AAA (abdominal aortic aneurysm) ([address]    Lumbar radiculopathy    Lumbosacral spondylosis without myelopathy    Obesity    Chronic back pain    Spinal stenosis of lumbar region with neurogenic claudication    Acute respiratory failure with hypoxia ([address]    Community acquired pneumonia of left upper lobe of lung    Thrombocytopenia ([address]    Small cell lung cancer ([address]    ARF (acute respiratory failure) ([address]   Failed extubation and for trach and peg. Has small cell carcinoma and now at [facility]. US chest noted as were plans for drainage. Wean O2 as able.   Currently sedated with fentanyl and midazolam.  Huey Castaneda  12/5/2020  7:28 AM

## 2020-12-05 NOTE — PLAN OF CARE
Problem: Skin Integrity:  Goal: Will show no infection signs and symptoms  Description: Will show no infection signs and symptoms  Outcome: Ongoing     Problem: Skin Integrity:  Goal: Absence of new skin breakdown  Description: Absence of new skin breakdown  Outcome: Ongoing     Problem: Falls - Risk of:  Goal: Will remain free from falls  Description: Will remain free from falls  Outcome: Met This Shift     Problem: Falls - Risk of:  Goal: Absence of physical injury  Description: Absence of physical injury  Outcome: Met This Shift

## 2020-12-05 NOTE — PROGRESS NOTES
Hospitalist Progress Note      PCP: Seth Slater PA-C    Date of Admission: 12/3/2020    Chief Complaint: N/A    Hospital Course: This is a 24-year-old male with past medical history of chronic back pain, type 2 diabetes mellitus, CAD, hypertension, morbid obesity, MANOLO who initially presented to [facility] for elective epidural injection for his chronic back at which point he became hypoxic and was admitted. Respiratory status worsened to the point of impending respiratory failure with hypoxemia, hypercapnia that was refractory to NIPPV requiring intubation for mechanical ventilation. Further work-up revealed left upper lobe obstruction secondary to lung mass; had biopsy with pathology consistent with small cell carcinoma. Patient was started on IV antibiotics for postobstructive pneumonia; respiratory culture growing Pseudomonas aeruginosa. He was transferred to [facility] for palliative radiation therapy. Subjective: Pt was seen and examined at bedside.  No acute event overnight; unable to complete ROS due to sedated state    Medications:  Reviewed    Infusion Medications    dextrose      dextrose      fentaNYL 5 mcg/ml in 0.9%  ml infusion 200 mcg/hr (12/05/20 0629)    midazolam 10 mg/hr (12/05/20 0808)     Scheduled Medications    potassium chloride  10 mEq Intravenous Q1H    acetaZOLAMIDE  250 mg Per NG tube Q12H    bumetanide  2 mg Intravenous BID    albumin human  25 g Intravenous Q8H    sodium chloride flush  10 mL Intravenous 2 times per day    [Held by provider] insulin glargine  40 Units Subcutaneous Nightly    insulin lispro  0-18 Units Subcutaneous Q4H    famotidine (PEPCID) injection  20 mg Intravenous BID    ipratropium-albuterol  1 ampule Inhalation Q4H WA    sodium chloride  20 mL Intravenous Once    cefepime  2 g Intravenous Q8H    sodium chloride   Intravenous Q8H    sodium chloride (Inhalant)  2 mL Nebulization BID    sodium chloride  20 mL Intravenous Once    acetylcysteine  2 mL Inhalation BID    Arformoterol Tartrate  15 mcg Nebulization BID    atorvastatin  80 mg Oral Nightly    chlorhexidine  15 mL Mouth/Throat BID    clopidogrel  75 mg Oral Daily    heparin flush  1 mL Intravenous 2 times per day    methylPREDNISolone  40 mg Intravenous Daily    nicotine  1 patch Transdermal Daily     PRN Meds: sodium chloride flush, acetaminophen **OR** acetaminophen, mineral oil-hydrophilic petrolatum, dextrose, dextrose, glucagon (rDNA), glucose, heparin flush, magnesium sulfate, oxyCODONE-acetaminophen **AND** oxyCODONE, tiZANidine      Intake/Output Summary (Last 24 hours) at 12/5/2020 1037  Last data filed at 12/5/2020 0900  Gross per 24 hour   Intake 1755.58 ml   Output 1540 ml   Net 215.58 ml       Exam:    /78   Pulse 88   Temp 99.3 °F (37.4 °C)   Resp 20   Ht 5' 11\" (1.803 m)   Wt (!) 341 lb 9.6 oz (154.9 kg)   SpO2 93%   BMI 47.64 kg/m²     General appearance: Intubated, on full vent support. No apparent distress. Respiratory: [place] air entry bilaterally. Cardiovascular: Normal S1/S2. Regular rhythm, tachycardic. Abdomen: Soft, non-tender, non-distended with normal bowel sounds. Musculoskeletal: No clubbing, cyanosis or edema bilaterally. Skin: Skin color, texture, turgor normal.  No rashes or lesions.   Neurologic: Sedated  Peripheral Pulses: +2 palpable, equal bilaterally       Labs:   Recent Labs     12/04/20  0600 12/04/20  1605 12/04/20  2158 12/05/20  0500   WBC 7.0 6.2  --  6.1   HGB 7.9* 7.1* 7.4* 7.4*   HCT 26.5* 24.3* 24.9* 24.9*   PLT 21* 24*  --  20*     Recent Labs     12/04/20  0240 12/04/20  0600 12/04/20  2213 12/05/20  0500     --  148* 148*   K 4.4  --  4.1 3.8     --  105 105   CO2 32*  --  33* 31*   BUN 68*  --  64* 64*   CREATININE 1.2  --  1.2 1.2   CALCIUM 8.8  --  8.7 8.9   PHOS 2.8 2.8  --  3.3     Recent Labs     12/03/20  0540 12/04/20  0244 20  0500   AST 76* 123* 152*   ALT 51* 76* 79*   BILITOT 0.6 0.6 0.8   ALKPHOS 149* 181* 146*     Recent Labs     20  0240 20  0600 20  0500   INR 1.0 1.0 1.1     No results for input(s): Zulema Comment in the last 72 hours. Radiology:  XR CHEST PORTABLE   Final Result   No change. Complete opacification left mid and upper lung. Correlate   bronchoscopy as warranted to exclude endobronchial obstruction. US CHEST INCLUDING MEDIASTINUM   Final Result   Addendum 1 of 1   Patient MRN:  [id number]   : 1960   Age: 61 years   Gender: Male   Order Date:  2020 9:26 AM   EXAM: US CHEST INCLUDING MEDIASTINUM   NUMBER OF IMAGES:  6   INDICATION: J90 Pleural effusion     Dr Richie Aleman to read   eval for left side pleural effusion   COMPARISON: None   Ultrasound of the left chest was performed. There is a small to   moderate left pleural effusion   IMPRESSION: Small moderate left pleural effusion   . Final      XR CHEST PORTABLE   Final Result   No change. IR INTERVENTIONAL RADIOLOGY PROCEDURE REQUEST    (Results Pending)   XR CHEST PORTABLE    (Results Pending)             Active Hospital Problems    Diagnosis Date Noted    ARF (acute respiratory failure) ([facility] [address] [J96.00] 2020       Assessment  Acute respiratory failure with hypoxia, hypercapnia  - intubated, on full vent support  Postobstructive pneumonia   VAP - respiratory culture growing Pseudomonas aeruginosa  Sepsis - secondary to above  Small cell lung cancer  Acute renal failure  Metabolic alkalosis - discussed with Dr. Edwin Olson, likely post hypercapnia alkalosis superimposed on chronic alkalosis. Hypernatremia  Thrombocytopenia  Anemia  COPD  Chronic back pain  Hypertension  CAD  MANOLO  Morbid obesity - Body mass index is 47.64 kg/m².      Plan:  Critical care, vent management by intensivist  Wean FiO2 as able  On Cefepime  Will be started on Bumex and acetazolamide Dr. Edwin Olson   Continue Solu-Medrol, bronchodilators  Radiation oncologist has been consulted  Humalog sliding scale    DVT Prophylaxis: SCDs, no VTE chemoprophylaxis due to thrombocytopenia    Diet: Diet NPO Effective Now  Diet NPO, After Midnight Exceptions are: Sips with Meds  Code Status: Full Code    PT/OT Eval Status: N/A    Dispo - remains in ICU for critical care    Patricia Dominguez MD 12/5/2020 10:37 AM

## 2020-12-06 NOTE — PROGRESS NOTES
T[facility]IA PROGRESS NOTE      Chief complaint: Follow-up of VAP    The patient is a 61 y.o. super morbidly obese male with history of CAD, hypertension, hyperlipidemia, DM, transferred to [facility] on 12/04 for radiation therapy from [facility] where he initially presented on 11/17 for hypoxia requiring intubation after receiving a lumbar epidural steroid injection for his chronic low back pain, incidentally found to have left upper lobe postobstructive pneumonia from lung mass that was diagnosed to be small cell carcinoma biopsy and had respiratory culture growing Pseudomonas aeruginosa for which he was seen by Dr. Denise Gonzalez. He has been having intermittent fever up to 102 °F since 11/23. Respiratory pathogen PCR panel including SARS-CoV-2 PCR on 11/17 and urine Legionella antigen on 11/18 were negative. He had respiratory culture on 11/23 and 11/24 which showed absent oropharyngeal aristides and moderate growth of Candida albicans deemed colonization. He had blood cultures on 11/21 growing coagulase-negative Staphylococcus in 1 out of 2 sets deemed contaminant. Repeat blood cultures on 11/24 showed no growth. Respiratory Gram stain and culture on 12/04 showed few polymorphonuclear leukocytes, no epithelial cells, no organisms, reduced oropharyngeal aristides, moderate growth of Pseudomonas aeruginosa (non-MDR). He received ceftriaxone and levofloxacin from 11/17-11/21, cefepime from 11/21-11/25, ampicillin-sulbactam from 11/25-12/03, cefepime since 12/03.     On transfer, he remains febrile at 100.6 °F with no leukocytosis. Chest x-ray showed dense left upper lobe consolidation similar to that since 11/17. Cefepime was resumed. Subjective: Patient was seen and examined. He remains in critical condition, intubated and sedated.     Objective:  /84   Pulse 86   Temp 98.8 °F (37.1 °C)   Resp 20   Ht 5' 11\" (1.803 m)   Wt (!) 335 lb 6.4 oz (152.1 kg)   SpO2 90%   BMI 46.78 kg/m²   Constitutional: Intubated, no MV

## 2020-12-06 NOTE — PROGRESS NOTES
Patient attempts to pull at lines and tubes when free of restraint, patient remains in bilateral SWR for inability to comprehend safety needs at this time

## 2020-12-06 NOTE — PLAN OF CARE
Patient is ok to get radiation today from medical POA. However, I was told that due to nursing staff shortage, no body is going to accompany with him for the whole process. He needs one RN and one RT to accompany.  notified.

## 2020-12-06 NOTE — PROGRESS NOTES
Hospitalist Progress Note      SYNOPSIS: Patient admitted on 12/3/2020. He had initially presented to [facility] for elective epidural injection for his chronic back pain and he subsequently became hypoxic and was admitted. His respiratory status worsen with associated hypoxemia and hypercapnia refractory to NIPPV and he required intubation for mechanical ventilation. Workup revealed left upper lobe lung mass with biopsy yielding small cell carcinoma. Further workup revealed postobstructive pneumonia and sputum culture yielded Pseudomonas aeruginosa. He has been on antibiotics and was transferred to [address] [place] [address] for palliative radiation therapy. SUBJECTIVE:  Patient seen and examined. Spiked a low-grade temperature in the morning yesterday. Patient's remains intubated and sedated to a RASS of -1 to -2 . Currently on Versed and fentanyl. Has some generalized edema. Review of systems: Unable to do a review of systems because of patient's sedation. Records reviewed. Stable overnight. No other overnight issues reported. Temp (24hrs), Av.8 °F (37.1 °C), Min:98.8 °F (37.1 °C), Max:99 °F (37.2 °C)    DIET: Diet NPO Effective Now  Diet NPO, After Midnight Exceptions are: Sips with Meds  CODE: Full Code    Intake/Output Summary (Last 24 hours) at 2020 1648  Last data filed at 2020 1554  Gross per 24 hour   Intake 3659 ml   Output 6340 ml   Net -2681 ml       OBJECTIVE:    /86   Pulse 72   Temp 98.8 °F (37.1 °C)   Resp 20   Ht 5' 11\" (1.803 m)   Wt (!) 335 lb 6.4 oz (152.1 kg)   SpO2 93%   BMI 46.78 kg/m²     General appearance: morbidly obese, generalized edema. HEENT:  Conjunctivae/corneas clear. Neck: No jugular venous distention. Respiratory:  Decreased bilateral breath sounds.   Cardiovascular: Regular rate rhythm, normal S1-S2  Abdomen: Soft, nontender, nondistended  Musculoskeletal: No clubbing, cyanosis, 1+ bilateral lower extremity edema. Brisk capillary refill. Skin:  No rashes  on visible skin  Neurologic:  Intubated and sedated. ASSESSMENT/PLAN:    Acute hypoxic respiratory failure:   - secondary to postobstructive pneumonia in the setting of small cell carcinoma of the lung with large left pleural effusion. .  Attempted extubation will patient desaturated and was reintubated. .  Vent management per Critical Care. .  Plan is for trach and PEG tube placement     postobstructive pneumonia:  - secondary to small-cell carcinoma in the left upper lobe  - cultures growing Pseudomonas aeruginosa. Patient adequately covered with cefepime. -  Infectious Disease on board. Sepsis secondary to the above: continue ongoing antibiotic. Small cell cancer of left upper lobe:  - patient undergoing palliative radiation therapy. -  Follow further hematology oncology recommendations. Volume overload: likely secondary to volume resuscitation and in the setting of probable HFpEF 55% EF  - patient is on acetazolamide for   Metabolic alkalosis. Mild hypernatremia: resolved. Nephrology on board. Appreciate nephrology help. Hypoalbuminemia: receiving albumin supplementation. Diabetes mellitus type 2: continue ongoing antidiabetic regimen. Thrombocytopenia: likely secondary to malignancy. Hematology oncology on board. Left pleural effusion: likely malignant effusion. Code status is full code.        DISPOSITION: TBD    Medications:  REVIEWED DAILY    Infusion Medications    dextrose 60 mL/hr at 12/06/20 1609    bumetanide 0.1 mg/mL infusion 0.5 mg/hr (12/06/20 0831)    dextrose      fentaNYL 5 mcg/ml in 0.9%  ml infusion 200 mcg/hr (12/06/20 1608)    midazolam 8 mg/hr (12/06/20 1452)     Scheduled Medications    sodium chloride  20 mL Intravenous Once    sodium chloride  250 mL Intravenous Once    acetaZOLAMIDE  250 mg Per NG tube Q12H    cefepime  2 g Intravenous Q8H    sodium chloride 0.8 0.9       Recent Labs     12/04/20  0600 12/05/20  0500 12/06/20  0431   INR 1.0 1.1 1.2       No results for input(s): CKTOTAL, TROPONINI in the last 72 hours. Chronic labs:    Lab Results   Component Value Date    CHOL 115 10/06/2020    TRIG 160 (H) 10/06/2020    HDL 42 10/06/2020    LDLCALC 41 10/06/2020    TSH 1.090 02/15/2018    INR 1.2 12/06/2020    LABA1C 7.7 (H) 11/18/2020       Radiology: REVIEWED DAILY    +++++++++++++++++++++++++++++++++++++++++++++++++  [address], [place]  +++++++++++++++++++++++++++++++++++++++++++++++++  NOTE: This report was transcribed using voice recognition software. Every effort was made to ensure accuracy; however, inadvertent computerized transcription errors may be present.

## 2020-12-06 NOTE — PROGRESS NOTES
Power picc  Placement 12/6/2020    Product number: [id number]   Lot Number: [id number]      Ultrasound: yes   Right brachial:                Upper Arm Circumference: 43cm    Size: 5fdl    Exposed Length: 0    Internal Length: 44cm   Cut: 6cm   Vein Measurement: 0.7cm  Reuben Goldberg  12/6/2020  11:16 AM

## 2020-12-06 NOTE — PROGRESS NOTES
Department of Internal Medicine  Nephrology Progress Note    Events reviewed. 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 He remains intubated and sedated. Physical Exam:    VITALS:  /84   Pulse 86   Temp 98.8 °F (37.1 °C)   Resp 20   Ht 5' 11\" (1.803 m)   Wt (!) 335 lb 6.4 oz (152.1 kg)   SpO2 90%   BMI 46.78 kg/m²   24HR INTAKE/OUTPUT:      Intake/Output Summary (Last 24 hours) at 12/6/2020 1200  Last data filed at 12/6/2020 1100  Gross per 24 hour   Intake 2654 ml   Output 6400 ml   Net -3746 ml       Constitutional:  Morbidly obese  male. Sedated, intubated.   Cardiovascular/Edema:  RRR,S1/S2  Respiratory:  ETT to vent, 55% PEEP 5, diminished bases bilaterally  Gastrointestinal:  Soft, obese, nondistended, bowel sounds hypoactive; + abdominal wall edema  Skin: Warm, dry, no lesions  3+ pitting edema BLE into hips, 1+ pitting edema abdominal wall, 3+ pitting bilateral forearms and hands      DATA:    CBC with Differential:    Lab Results   Component Value Date    WBC 5.5 12/06/2020    RBC 2.52 12/06/2020    HGB 7.1 12/06/2020    HCT 23.2 12/06/2020    PLT 19 12/06/2020    MCV 92.1 12/06/2020    MCH 28.2 12/06/2020    MCHC 30.6 12/06/2020    RDW 16.8 12/06/2020    NRBC 4.6 12/06/2020    SEGSPCT 62 12/29/2011    BLASTSPCT 0.9 11/21/2020    METASPCT 0.9 12/06/2020    LYMPHOPCT 10.8 12/06/2020    PROMYELOPCT 0.9 11/21/2020    MONOPCT 0.9 12/06/2020    MYELOPCT 0.9 12/06/2020    BASOPCT 0.4 12/06/2020    MONOSABS 0.06 12/06/2020    LYMPHSABS 0.61 12/06/2020    EOSABS 0.05 12/06/2020    BASOSABS 0.00 12/06/2020     CMP:    Lab Results   Component Value Date     12/06/2020    K 3.2 12/06/2020    K 4.4 12/04/2020    CL 99 12/06/2020    CO2 34 12/06/2020    BUN 60 12/06/2020    CREATININE 1.2 12/06/2020    GFRAA >60 12/06/2020    LABGLOM >60 12/06/2020    GLUCOSE 125 12/06/2020    GLUCOSE 120 12/31/2011    PROT 5.4 12/06/2020    LABALBU 3.4 12/06/2020 LABALBU 4.5 12/28/2011    CALCIUM 9.3 12/06/2020    BILITOT 0.9 12/06/2020    ALKPHOS 140 12/06/2020     12/06/2020    ALT 81 12/06/2020     Magnesium:    Lab Results   Component Value Date    MG 1.9 12/06/2020     Phosphorus:    Lab Results   Component Value Date    PHOS 4.1 12/06/2020     Radiology Review:       CXR 12/6/2020   No change.  Complete opacification left mid and upper lung.  Correlate    bronchoscopy as warranted to exclude endobronchial obstruction. BRIEF SUMMARY OF INITIAL CONSULT & ADMISSION PRIOR TO TRANSFER:    Briefly, Mr. Charleen Browne is a 61year old male with a PMH of Type II DM, HTN, HLD, CAD s/p stents x 5, AAA s/p endovascular repair and chronic low back pain who presented to [facility] on 11/17/2020 with complaints of SOB. He was hypoxic on room air. A CTA of the chest demonstrated PNA and pleural effusions. Labs were significant for thrombocytopenia. The following day an RRT was called for worsening SOB. He was placed on bipap but later intubated for worsening respiratory acidosis. A bronchoscopy was performed the same day (11/18) and it was noted that the OMER was significantly narrowed. A transbronchial needle aspiration was completed and three biopsies were obtained. On 11/24 biopsies + small cell lung CA. His wife had requested transfer to [facility] however he was not accepted/insurance did not cover. Nephrology was consulted for volume management and lasix and albumin were started. ID was consulted on 11/25 for persistent fevers. A second bronch was performed and Bumex gtt were started on 11/26. Bumex gtt was discontinued on 11/27 for worsening ISIDRO and hypernatremia. Free water was increased NGT to 100 mL/hr. On 11/28 D5W was also started. D5W increased to 100 mL/hr on 11/29, free water continued. D5W was discontinued and HCTZ with albumin were started BID on 11/30 but discontinued on 12/1. Free water via NGT continued at 100 mL/hr.  General surgery was consulted on 12/1 for to monitor CMP Q 12 hours   · Maintain strict I&Os    Electronically signed by MATY Echevarria CNP on 12/6/2020 at 12:00 PM

## 2020-12-07 NOTE — PROGRESS NOTES
Patient was consulted to IR for left thoracentesis. I called the floor and spoke with Bharat Strong RN. Patient was identified via 2 patient identifiers. Pre-procedure routine/checklist was completed. The Doctor  stopped Lovenox and Plavix. The patient's medical history, allergies, medications, labs, and NPO status were all reviewed and verified. The patient has been NPO since midnight. The procedure was explained, informed consent was obtained and questions were answered. The patient verbalized understanding. /86   Pulse 79   Temp 99.3 °F (37.4 °C)   Resp 20   Ht 5' 11\" (1.803 m)   Wt (!) 333 lb 11.2 oz (151.4 kg)   SpO2 92%   BMI 46.54 kg/m²      Lab Results   Component Value Date    INR 1.3 12/07/2020    PROTIME 15.0 (H) 12/07/2020    PLT 30 (L) 12/07/2020    CREATININE 1.1 12/07/2020    BUN 59 (H) 12/07/2020    LABGLOM >60 12/07/2020    GFRAA >60 12/07/2020    HGB 8.2 (L) 12/07/2020    HCT 27.1 (L) 12/07/2020    WBC 6.2 12/07/2020    COVID19 Not Detected 11/17/2020         has a past medical history of Anticoagulant long-term use, Arthritis, CAD (coronary artery disease), Chronic back pain, Depression, Dyslipidemia, Hiatal hernia, History of ST elevation myocardial infarction (STEMI), Hyperlipidemia, Hypertension, Low back pain, Lumbar radiculopathy, Obesity, MANOLO on CPAP, Presence of stent in left circumflex coronary artery, Presence of stent in right coronary artery, Smoker, and Type 2 diabetes mellitus without complication ([facility] [address] has a past surgical history that includes Coronary angioplasty with stent (X4); ECHO Compl W Dop Color Flow (12/30/2011); Coronary angioplasty with stent (12/30/2011); Diagnostic Cardiac Cath Lab Procedure (3/15/2005); Diagnostic Cardiac Cath Lab Procedure (1/16/2007); Diagnostic Cardiac Cath Lab Procedure (2/21/2007);  Diagnostic Cardiac Cath Lab Procedure (12/30/2011); hernia repair (2/2014); pr office/outpt visit,procedure only (Right, 11/26/2018); joint replacement (Left, 4/1/14); joint replacement (Right, 2018); AAA repair, endovascular (N/A, 6/28/2019); Anesthesia Nerve Block (Bilateral, 3/12/2020); Anesthesia Nerve Block (Bilateral, 6/11/2020); Pain management procedure (Right, 9/1/2020); Pain management procedure (N/A, 11/17/2020); bronchoscopy (N/A, 11/18/2020); and Insert Picc Cath, 5/> Yrs - Midline (11/23/2020). ALLERGIES: Bee venom     Waiting for wife to be updated, need to get consent. Platelets to be sent with patient to give during procedure.

## 2020-12-07 NOTE — PROGRESS NOTES
Hospitalist Progress Note      SYNOPSIS: Patient admitted on 12/3/2020. He had initially presented to [facility] for elective epidural injection for his chronic back pain and he subsequently became hypoxic and was admitted. His respiratory status worsen with associated hypoxemia and hypercapnia refractory to NIPPV and he required intubation for mechanical ventilation. Workup revealed left upper lobe lung mass with biopsy yielding small cell carcinoma. Further workup revealed postobstructive pneumonia and sputum culture yielded Pseudomonas aeruginosa. He has been on antibiotics and was transferred to [facility] for palliative radiation therapy. SUBJECTIVE:  Patient seen and examined. Remains intubated and sedated. Continues to have generalized edema. Review of systems: Unable to do a review of systems because of patient's sedation. Records reviewed. Stable overnight. No other overnight issues reported. Temp (24hrs), Av.3 °F (37.4 °C), Min:98.8 °F (37.1 °C), Max:99.9 °F (37.7 °C)    DIET: Diet NPO, After Midnight Exceptions are: Sips with Meds  CODE: Full Code    Intake/Output Summary (Last 24 hours) at 2020 1654  Last data filed at 2020 1430  Gross per 24 hour   Intake 2837.24 ml   Output 6935 ml   Net -4097.76 ml       OBJECTIVE:    /89   Pulse 93   Temp 99.9 °F (37.7 °C) (Bladder)   Resp 20   Ht 5' 11\" (1.803 m)   Wt (!) 333 lb 11.2 oz (151.4 kg)   SpO2 92%   BMI 46.54 kg/m²     General appearance: morbidly obese, generalized edema. Intubated and sedated. HEENT:  Conjunctivae/corneas clear. Neck: No jugular venous distention. Respiratory:  Decreased bilateral breath sounds. Cardiovascular: Regular rate rhythm, normal S1-S2  Abdomen: Soft, nontender, nondistended  Musculoskeletal: No clubbing, cyanosis, 1+ bilateral lower extremity edema. Brisk capillary refill.    Skin:  No rashes  on visible skin  Neurologic:  Intubated and sedated. ASSESSMENT/PLAN:    Acute hypoxic respiratory failure:   - secondary to postobstructive pneumonia in the setting of small cell carcinoma of the lung with large left pleural effusion. .  Attempted extubation with patient desaturating and was reintubated. .  Vent management per Critical Care. .  Plan is for trach and PEG tube placement in due course     postobstructive pneumonia:  - secondary to small-cell carcinoma in the left upper lobe  - cultures growing Pseudomonas aeruginosa. Patient adequately covered with cefepime. -  Infectious Disease on board. Sepsis secondary to the above: continue ongoing antibiotic.     coronary artery disease, status post stent placement x5:  - antiplatelets on hold due to thrombocytopenia. Small cell cancer of left upper lobe:  - patient undergoing palliative radiation therapy. -  Follow further Oncology recommendations. Mild hypernatremia: resolved. Nephrology on board. Appreciate nephrology help. Diabetes mellitus type 2:   -  Fingerstick glucose fairly controlled. -continue ongoing antidiabetic regimen. Thrombocytopenia: likely secondary to malignancy. -  Stable. No bleeding noted at this time. Monitor CBC  -Hematology oncology on board. Normocytic anemia:   - likely anemia of chronic disease. H and H remain stable. Monitor H&H. Left pleural effusion: likely malignant effusion. Code status is full code.        DISPOSITION: TBD    Medications:  REVIEWED DAILY    Infusion Medications    dextrose 60 mL/hr at 12/07/20 1045    dextrose      fentaNYL 5 mcg/ml in 0.9%  ml infusion 200 mcg/hr (12/07/20 1216)    midazolam 10 mg/hr (12/07/20 1225)     Scheduled Medications    sodium chloride  20 mL Intravenous Once    cefepime  2 g Intravenous Q8H    sodium chloride   Intravenous Q8H    lidocaine PF  5 mL Intradermal Once    heparin flush  3 mL Intravenous 2 times per day    sodium chloride flush  10 mL Intravenous 2 times per day    [Held by provider] insulin glargine  40 Units Subcutaneous Nightly    insulin lispro  0-18 Units Subcutaneous Q4H    famotidine (PEPCID) injection  20 mg Intravenous BID    ipratropium-albuterol  1 ampule Inhalation Q4H WA    sodium chloride (Inhalant)  2 mL Nebulization BID    Arformoterol Tartrate  15 mcg Nebulization BID    [Held by provider] atorvastatin  80 mg Oral Nightly    chlorhexidine  15 mL Mouth/Throat BID    clopidogrel  75 mg Oral Daily    heparin flush  1 mL Intravenous 2 times per day    methylPREDNISolone  40 mg Intravenous Daily    nicotine  1 patch Transdermal Daily     PRN Meds: sodium chloride flush, heparin flush, sodium chloride flush, acetaminophen **OR** acetaminophen, mineral oil-hydrophilic petrolatum, dextrose, dextrose, glucagon (rDNA), glucose, heparin flush, magnesium sulfate, oxyCODONE-acetaminophen **AND** oxyCODONE, tiZANidine    Labs:     Recent Labs     12/06/20  2200 12/07/20  0158 12/07/20  0415   WBC 6.4 6.3 6.2   HGB 8.3* 8.2* 8.2*   HCT 26.6* 26.3* 27.1*   PLT 29* 33* 30*       Recent Labs     12/05/20  0500  12/06/20  0431 12/06/20  0533 12/06/20  1732 12/07/20  0400 12/07/20  0415   *   < >  --  144 143 142  --    K 3.8   < >  --  3.2* 3.2* 3.0*  --       < >  --  99 98 96*  --    CO2 31*   < >  --  34* 33* 32*  --    BUN 64*   < >  --  60* 63* 59*  --    CREATININE 1.2   < >  --  1.2 1.1 1.1  --    CALCIUM 8.9   < >  --  9.3 9.0 9.0  --    PHOS 3.3  --  4.1  --   --   --  3.9    < > = values in this interval not displayed. Recent Labs     12/06/20  0533 12/06/20  1732 12/07/20  0400   PROT 5.4* 5.3* 5.2*   ALKPHOS 140* 149* 158*   ALT 81* 88* 85*   * 148* 134*   BILITOT 0.9 0.9 0.9       Recent Labs     12/05/20  0500 12/06/20  0431 12/07/20  0415   INR 1.1 1.2 1.3       No results for input(s): Faraz Breeze in the last 72 hours.     Chronic labs:    Lab Results   Component Value Date    CHOL 115 10/06/2020    TRIG 160 (H) 10/06/2020    HDL 42 10/06/2020    LDLCALC 41 10/06/2020    TSH 1.090 02/15/2018    INR 1.3 12/07/2020    LABA1C 7.7 (H) 11/18/2020       Radiology: REVIEWED DAILY    +++++++++++++++++++++++++++++++++++++++++++++++++  Ryan MATIAS/ Timothy Craig , [place]  +++++++++++++++++++++++++++++++++++++++++++++++++  NOTE: This report was transcribed using voice recognition software. Every effort was made to ensure accuracy; however, inadvertent computerized transcription errors may be present.

## 2020-12-07 NOTE — CONSULTS
Radiation oncology    Diagnosis small cell Ca left lung    Patient has not yet started palliative radiation therapy, for reasons still not clear to me. Spoke to the resident on the case this morning. The delay is not from radiation oncology department.     If and when patient is sent down for treatment we will treat    Lesly Kong MD, CATRACHO, FRCP(C), DABR    [phone] cell

## 2020-12-07 NOTE — PLAN OF CARE
Problem: Restraint Use - Nonviolent/Non-Self-Destructive Behavior:  Goal: Absence of restraint indications  Description: Absence of restraint indications  12/7/2020 1752 by Cristina Delgado RN  Outcome: Not Met This Shift     Problem: Restraint Use - Nonviolent/Non-Self-Destructive Behavior:  Goal: Absence of restraint-related injury  Description: Absence of restraint-related injury  12/7/2020 1752 by Cristina Delgado RN  Outcome: Met This Shift

## 2020-12-07 NOTE — PROGRESS NOTES
[facility]  Department of Internal Medicine   Internal Medicine Residency   MICU Progress Note    Patient:  Laine Smiley. 61 y.o. male  MRN: [id number]     Date of Service: 12/7/2020    Allergy: Bee venom  follow up ARF  Change in mental status   Subjective     Patient is examined and seen in the room. Currently sedated , vital signs stable , not responding to verbal stimuli . ACVC 20/510/55%/8, ABG 7.5/39/70/30, will undergo lung radiation therapy today, awaiting Trach and PEG availability, plan thoracentesis tomorrow    Overnight, transfused 1 unit of platelet    Objective     VS: /89   Pulse 93   Temp 99.9 °F (37.7 °C) (Bladder)   Resp 20   Ht 5' 11\" (1.803 m)   Wt (!) 333 lb 11.2 oz (151.4 kg)   SpO2 92%   BMI 46.54 kg/m²           I & O - 24hr:     Intake/Output Summary (Last 24 hours) at 12/7/2020 1654  Last data filed at 12/7/2020 1430  Gross per 24 hour   Intake 2837.24 ml   Output 6935 ml   Net -4097.76 ml       Physical Exam:  General Appearance: Intubated, sedated, and   Lung:  breath sound congruent with the vent, (+) crackles and wheezing, marked reduced air entry to Lt lung  Heart: regular rate and rhythm, S1, S2 normal, no murmur, click, rub or gallop  Extremities:  extremities normal, atraumatic, no cyanosis or edema  Neurologic: Mental status: Sedated.   Not following commands        Lines     site day    Art line   None    TLC None    PICC Lt basilic midline  15   Hemoaccess None    Oxygen:       Mechanical Ventilation:   Mode: ACVC 20/510/55%/8, ABG 7.5/39/70/30  ABG:     Lab Results   Component Value Date    PH 7.507 12/07/2020    PCO2 39.2 12/07/2020    PO2 70.0 12/07/2020    EIY4PYP 36.0 11/29/2020    HCO3 30.4 12/07/2020    BE 6.8 12/07/2020    THB 9.2 12/07/2020    O2SAT 93.5 12/07/2020        Medications     Infusions: (Fluid, Sedation, Vasopressors)  IVF:    D5W 60 ml/hr  Vasopressors   (-) pressors  Sedation   Fentanyl 100, Versed 10    Nutrition: NPO  ATB:   Antibiotics  Days   cefepime 4               Labs     CBC with Differential:    Lab Results   Component Value Date    WBC 6.2 12/07/2020    RBC 2.94 12/07/2020    HGB 8.2 12/07/2020    HCT 27.1 12/07/2020    PLT 30 12/07/2020    MCV 92.2 12/07/2020    MCH 27.9 12/07/2020    MCHC 30.3 12/07/2020    RDW 16.5 12/07/2020    NRBC 7.0 12/07/2020    SEGSPCT 62 12/29/2011    BLASTSPCT 0.9 11/21/2020    METASPCT 1.7 12/07/2020    LYMPHOPCT 13.0 12/07/2020    PROMYELOPCT 0.9 11/21/2020    MONOPCT 2.6 12/07/2020    MYELOPCT 0.9 12/06/2020    BASOPCT 0.3 12/07/2020    MONOSABS 0.19 12/07/2020    LYMPHSABS 0.81 12/07/2020    EOSABS 0.00 12/07/2020    BASOSABS 0.00 12/07/2020     CMP:    Lab Results   Component Value Date     12/07/2020    K 3.0 12/07/2020    K 4.4 12/04/2020    CL 96 12/07/2020    CO2 32 12/07/2020    BUN 59 12/07/2020    CREATININE 1.1 12/07/2020    GFRAA >60 12/07/2020    LABGLOM >60 12/07/2020    GLUCOSE 129 12/07/2020    GLUCOSE 120 12/31/2011    PROT 5.2 12/07/2020    LABALBU 3.0 12/07/2020    LABALBU 4.5 12/28/2011    CALCIUM 9.0 12/07/2020    BILITOT 0.9 12/07/2020    ALKPHOS 158 12/07/2020     12/07/2020    ALT 85 12/07/2020       Imaging Studies:  CXR 12/06/20, No interval change.  Complete opacification left mid and upper lung.  Correlate bronchoscopy as warranted to exclude endobronchial obstruction. Resident's Assessment and Plan     Jovany Lynn. is a 61 y.o. male with past medical history of STEMI s/p 5 stents type 2 diabetes mellitus, chronic low back pain, hypertension, abdominal aortic aneurysm s/p endovascular repair admitted to [facility] on November 17 with shortness of breath and low platelet count. Patient was found to have CAP and small cell carcinoma. Currently intubated. Patient is transferred to [facility] for trach and PEG/palliative radiation.     Neurology   Patient is sedated with fentanyl and Versed  Not responsive to verbal stimuli  Monitor mentation    Cardiology  Vital signs stable, not on pressor, monitor, on ICU telemetry    CAD status post 5 stents  Home Meds aspirin and Plavix and Lipitor  Hold aspirin and Plavix due to patient's thrombocytopenia    History of aortic aneurysm  Status post endovascular repair  Stable, monitor    Pulmonology  Acute hypoxic hypercapnic respiratory failure secondary to obstructive pneumonia in the setting of small cell carcinoma of the lung  Patient is a [facility] post 1 trial of extubation, desaturations of there after, reintubated and same day  ACVC 20/510/55%/8, ABG 7.5/39/70/30  Candidate for trach and PEG, pending procedure availability likely next Monday  Candidate of palliative radiation therapy. ICU team opined that pt may benefit from radiation therapy that is likely the primary reason of obstruction. Dec 7, pt underwent one session of radiation therapy of the lung. awaiting Trach and PEG availability, plan thoracentesis tomorrow   Continue bronchodilator  Monitor    Obstructive pneumonia in the setting of small cell carcinoma of the lung  Patient with small cell carcinoma with left upper lobe opacity with large left pleural effusion along with mediastinal lymphadenopathy. His CT scan of abdomen and pelvis did not show evidence of intra-abdominal metastasis. Respiratory cultures gram-negative clifford. Probable Pseudomonas. On cefepime, ID following patient      Small cell carcinoma of the lung  CTA showing a mass, no obvious mucous plugging on the left upper lobe. Very edematous and compressed airway. Biopsies immunoprofile compatible with small cell carcinoma of the lung  Patient with small cell carcinoma with left upper lobe opacity with large left pleural effusion along with mediastinal lymphadenopathy. His CT scan of abdomen and pelvis did not show evidence of intra-abdominal metastasis. Candidate of palliative radiation therapy.  ICU team opined that pt may benefit from radiation therapy that is likely the primary reason of obstruction. Some nursing staffing issues with the urgent radiation therapy scheduled for this Friday. Hematology oncology following, recommend palliative care given poor diagnosis  Radiation oncology following the patient, rec No treatment over the weekend      Nephrology  IISDRO stage II, likely prerenal secondary to diuresis,   December 5, creatinine 1.2 BUN 62  Bumex drip 0.5, monitor renal function    Natremia, resolved    Hypokalemia, resolved    Hematology and oncology  Thrombocytopenia, secondary to underlying sepsis/bacteremia versus myelophthisis secondary to malignancy  Status post transfusion of platelets x2  Hematology following  Consider peripheral blood smear, and BM for differential diagnosis if pt improves  Platelet 20, transfusion target 10, will coordinated with surgical procedures trach and peg and thoracentesis for blood platelet transfusion    Normocytic anemia, secondary to underlying sepsis/bacteremia versus myelophthisis secondary to malignancy  Status post PRBC transfusion  Hb stable, 7.4  Follow-up folate B12 and iron panel      Endocrinology  Type 2 diabetes  On Lantus 40 nightly. High-dose sliding scale. Continue monitor blood glucose every 4 hour. DVT/GI prophy: lovenox/pepcid  Disposition plan: Continue current management        Pieter Bañuelos MD, PGY-1    Attending physician: Dr. Leo Bowens personally saw, examined and provided care for the patient. Radiographs, labs and medication list were reviewed by me independently. I spoke with bedside nursing, therapists and consultants. Critical care services and times documented are independent of procedures and multidisciplinary rounds with Residents. Additionally comprehensive, multidisciplinary rounds were conducted with the MICU team. The case was discussed in detail and plans for care were established.  Review of Residents documentation was conducted and revisions were made as appropriate. I agree with the above documented exam, problem list and plan of care. Small cell Lung cancer with pseudomonads pneumonia and for radiation  On the vent   OMER obstrction  On cefepime  Steroids  liberate once stenosis better and pneumonia heal better     Care reviewed with nursing staff, medical and surgical specialty care, primary care and the patient's family as available. Chart review/lab review/X-ray viewing/documentation and had long Conversation with patient/family re: prognosis, care options and any end of life issues:      Critical care time spent reviewing labs/films, examining patient, collaborating with other physicians more than 34  Minutes  excluding procedures . Sebastian Quiroz M.D.   12/7/2020  11:50 PM

## 2020-12-07 NOTE — PROGRESS NOTES
Patient off floor at this time. Pulmonary to see at a later time.     Remonia Meigs  12/7/2020  1:59 PM

## 2020-12-07 NOTE — PROGRESS NOTES
Spoke with blood bank re: platelets ordered last evening that don't appear to be given. Per Prakash You in IR, the patient should receive platelets during IR procedure. Confirmed with Dr. Leopold Maduro that patient should receive both units. Spoke with blood bank again, there is national shortage of platelets. Currently they have one unit ready for him, to be given in IR. They will call and notify RN when more become available.        Vandana Tavares RN  12/7/2020  9:41 AM

## 2020-12-07 NOTE — PROGRESS NOTES
General surgery was consulted on 12/1 for trach and PEG. On 12/2 D5W + 30 mEq KCL @ 50 mL/hr was started in addition to FW for persistent hypernatremia. On 12/3 he was tolerating PSV and was extubated to bipap however he required re-intubation shortly after. Transfer to [facility] was again attempted but he was not accepted. Decision was made to transfer downtown for trach and PEG. Problems resolved: ISIDRO stage I 2/2 overt diuresis versus hemodynamically mediated?, resolved. Creatinine remains stable at baseline. IMPRESSION/RECOMMENDATIONS:      1. Hypernatremia with hypervolemia, 2/2 to large volume resuscitation and HFpEF 55%, >19 liter + fluid balance upon transfer to this hospital after large volume free water administration. · Sodium levels improving with continued natriuresis and free water administration. Excellent diuresis on Bumex and acetazolamide (>5.9 L). 2. Alkalemia, pH 9.375, with metabolic alkalosis (post hypercapnic and contraction). To continue with acetazolamide in addition to Bumex. 3. HFpEF 55%, pro-BNP 2,533 >> 1948, Bumex drip and acetazolamide  _______________________________________________________________________________________  4. Acute hypoxic and hypercapnic respiratory failure s/p intubation, extubated and re-intubated on 12/3. For trach and PEG. 5. Post-obstructive PNA, cultures growing pseudomonas, on cefepime per ID. 6. Left pleural effusion, likely malignant. 7. Small cell carcinoma, biopsy confirmed. Plan for palliative radiation per heme-onc. 8. HLD on atorvastatin  9. Hypoalbuminemia improved with supplementation  10. Mild transaminitis  11. Type II DM on SSI, holding lantus  12. Normocytic anemia, with drop in H&H  13. Thrombocytopenia likely 2/2 malignancy. Heme-onc following.    14. Nutrition NPO      Plan:    · Hold Bumex drip and acetazolamide  · ABG results reviewed, consistent with worsening metabolic alkalosis  · Received a total of 120 meq of KCL IV so far and 2 gm of Magnesium sulphate IV  · Repeat BMP at 4 pm    Electronically signed by Lucy Hemphill MD on 12/7/2020 at 8:52 AM

## 2020-12-07 NOTE — PROGRESS NOTES
Patient was off the unit for a procedure at the time of my visit. I will see him again tomorrow.     Thank you for involving me in the care of Isaac Kelly. . I will continue to follow. Please do not hesitate to call for any questions or concerns.     Electronically signed by Gara Lanes, MD on 12/7/2020 at 10:50 AM

## 2020-12-07 NOTE — PLAN OF CARE
Problem: Restraint Use - Nonviolent/Non-Self-Destructive Behavior:  Goal: Absence of restraint-related injury  Description: Absence of restraint-related injury  12/7/2020 0634 by Dl Mays RN  Outcome: Met This Shift  12/6/2020 2321 by Duncan Mena RN  Outcome: Met This Shift     Problem: Skin Integrity:  Goal: Will show no infection signs and symptoms  Description: Will show no infection signs and symptoms  Outcome: Met This Shift  Goal: Absence of new skin breakdown  Description: Absence of new skin breakdown  Outcome: Met This Shift     Problem: Falls - Risk of:  Goal: Will remain free from falls  Description: Will remain free from falls  Outcome: Met This Shift  Goal: Absence of physical injury  Description: Absence of physical injury  Outcome: Met This Shift     Problem: Restraint Use - Nonviolent/Non-Self-Destructive Behavior:  Goal: Absence of restraint indications  Description: Absence of restraint indications  12/7/2020 0634 by Dl Mays RN  Outcome: Not Met This Shift  12/6/2020 2321 by Duncan Mena RN  Outcome: Not Met This Shift

## 2020-12-07 NOTE — PLAN OF CARE
Problem: Restraint Use - Nonviolent/Non-Self-Destructive Behavior:  Goal: Absence of restraint-related injury  Description: Absence of restraint-related injury  12/6/2020 2321 by Laura White RN  Outcome: Met This Shift     Problem: Restraint Use - Nonviolent/Non-Self-Destructive Behavior:  Goal: Absence of restraint indications  Description: Absence of restraint indications  12/6/2020 2321 by Laura White RN  Outcome: Not Met This Shift

## 2020-12-07 NOTE — PROGRESS NOTES
Patient taken to radiation from [id number]. Returned to room with VS stable and no complications. Received call from radiation that patient will need to return in 1 hour for treatment. [facility] contacted for transport. Evgeny Reza RN  12/7/2020  2:31 PM    Per Linnette Villela with [facility], unable to do transport due to prior transportation arrangement. Re-contacted radiation department to confirm time for therapy. Spoke with Phillip Benson, who said we were welcome to come down and 330, but she couldn't guarantee that the plan would be completed. Asked that she call and let this RN know when therapy time could be confirmed.        Evgeny Reza RN  12/7/2020  3:05 PM

## 2020-12-07 NOTE — PROGRESS NOTES
This RN spoke with resident asking about repeat CBC prior to blood transfusion. Advised to hold off on transfusion at this time d/t pt's repeat CBC results.

## 2020-12-07 NOTE — FLOWSHEET NOTE
Patient continues to reach for lines and tubes despite verbal redirection. Restraints remain in place for patient safety.     Satya Sood RN  12/7/2020  4:00 PM

## 2020-12-07 NOTE — FLOWSHEET NOTE
Patient continues to reach for lines and tubes despite verbal redirection. Restraints remain in place for patient safety.     Summer Leija RN  12/7/2020  8:00 AM

## 2020-12-07 NOTE — PROGRESS NOTES
[address] [facility]  PULMONARY/CRITICAL CARE PROGRESS NOTE    Patient: Denise Stinson. MRN: [id number]  : 1960    Encounter Date: 2020  Encounter Time: 12:49 PM     Date of Admission: .12/3/2020 11:34 PM    Consulting Physician:  Primary Care Physician:     Ruiz Briones     [phone]    Reason for Consultation:    Problem List:  Patient Active Problem List   Diagnosis    CAD (coronary artery disease)    HTN (hypertension)    Hyperlipemia    Presence of stent in left circumflex coronary artery    Smoker    Aortic valve stenosis    Primary osteoarthritis of right knee    Non-insulin dependent type 2 diabetes mellitus ([address]    Chronic pain syndrome    Chronic pain of right knee    Status post total right knee replacement    Morbid obesity with BMI of 40.0-44.9, adult ([address]    Chronic bilateral low back pain with bilateral sciatica    Right hip pain    DDD (degenerative disc disease), lumbar    AAA (abdominal aortic aneurysm) ([address]    Lumbar radiculopathy    Lumbosacral spondylosis without myelopathy    Obesity    Chronic back pain    Spinal stenosis of lumbar region with neurogenic claudication    Acute respiratory failure with hypoxia ([address]    Community acquired pneumonia of left upper lobe of lung    Thrombocytopenia ([address]    Small cell lung cancer ([address]    ARF (acute respiratory failure) ([address]     SUBJECTIVE: Patient seen and examined. Patient remains on vent with no increased work of breathing, fevers, chills noted. HOME MEDICATIONS:  Prior to Admission medications    Medication Sig Start Date End Date Taking? Authorizing Provider   loratadine (CLARITIN) 10 MG capsule Take 10 mg by mouth daily    Historical Provider, MD   pioglitazone (ACTOS) 15 MG tablet TAKE 1 TABLET BY MOUTH EVERY DAY 20   [facility], PAROXANE   pregabalin (LYRICA) 150 MG capsule Take 1 capsule by mouth 2 times daily for 30 days.  20  Kumar Prieto DO diclofenac (VOLTAREN) 75 MG EC tablet TAKE 1 TABLET BY MOUTH TWICE A DAY 10/7/20   Historical Provider, MD   oxyCODONE-acetaminophen (PERCOCET) 7.5-325 MG per tablet Take 1 tablet by mouth 3 times daily as needed for Pain for up to 30 days.  Intended supply: 30 days 11/12/20 12/12/20  Linus Adams DO   quinapril (ACCUPRIL) 10 MG tablet Take 1 tablet by mouth daily  Patient taking differently: Take 10 mg by mouth daily Wife states on hold 10/16/20   Malia Medina PA-C   metFORMIN (GLUCOPHAGE) 500 MG tablet TAKE 1 TABLET BY MOUTH TWICE A DAY WITH MEALS  Patient taking differently: Wife states on hold 10/8/20   Kiesha Denny DO   fluticasone (FLONASE) 50 MCG/ACT nasal spray 2 sprays by Nasal route daily 10/6/20   Malia Medina PA-C   omega-3 acid ethyl esters (LOVAZA) 1 g capsule take 1 capsule twice a day 8/14/20   Malia Medina PA-C   niacin (SLO-NIACIN) 500 MG extended release tablet take 1 tablet by mouth once daily 7/20/20   Kiesha Denny DO   clopidogrel (PLAVIX) 75 MG tablet TAKE 1 TABLET BY MOUTH EVERY DAY  Patient taking differently: Take 75 mg by mouth daily Has been on hold for procedure 7/6/20   Malia Medina PA-C   sildenafil (VIAGRA) 50 MG tablet Take 1 tablet by mouth as needed for Erectile Dysfunction 7/6/20   Malia Medina PA-C   aspirin 325 MG EC tablet TAKE 1 TABLET BY MOUTH EVERY DAY  Patient taking differently: Take 325 mg by mouth daily Has been on hold for procedure 5/7/20   Charlie Matias DO   Tens Unit MISC by Does not apply route 5/7/19   TRUDY Arango   atorvastatin (LIPITOR) 80 MG tablet TAKE 1 TABLET BY MOUTH AT BEDTIME 10/22/18   Malia Medina PA-C   carvedilol (COREG) 25 MG tablet TAKE ONE TABLET BY MOUTH TWICE DAILY (WITH MEALS) 10/22/18   Malia Medina PA-C       CURRENT MEDICATIONS:  Current Facility-Administered Medications: 0.9 % sodium chloride bolus, 20 mL, Intravenous, Once  0.9 % sodium chloride bolus, 20 mL, Intravenous, Once  0.9 % sodium chloride bolus, 250 mL, Intravenous, Once  0.9 % sodium chloride bolus, 20 mL, Intravenous, Once  acetaZOLAMIDE (DIAMOX) tablet 250 mg, 250 mg, Per NG tube, Q12H  dextrose 5 % solution, , Intravenous, Continuous  cefepime (MAXIPIME) 2 g IVPB extended (mini-bag), 2 g, Intravenous, Q8H  Cefepime 0.9% sodium chloride flush, , Intravenous, Q8H  bumetanide (BUMEX) 12.5 mg in sodium chloride 0.9 % 125 mL infusion, 0.5 mg/hr, Intravenous, Continuous  lidocaine PF 1 % injection 5 mL, 5 mL, Intradermal, Once  sodium chloride flush 0.9 % injection 10 mL, 10 mL, Intravenous, PRN  heparin flush 100 UNIT/ML injection 300 Units, 3 mL, Intravenous, 2 times per day  heparin flush 100 UNIT/ML injection 300 Units, 3 mL, Intracatheter, PRN  sodium chloride flush 0.9 % injection 10 mL, 10 mL, Intravenous, 2 times per day  sodium chloride flush 0.9 % injection 10 mL, 10 mL, Intravenous, PRN  acetaminophen (TYLENOL) tablet 650 mg, 650 mg, Oral, Q6H PRN **OR** acetaminophen (TYLENOL) suppository 650 mg, 650 mg, Rectal, Q6H PRN  [Held by provider] insulin glargine (LANTUS) injection vial 40 Units, 40 Units, Subcutaneous, Nightly  insulin lispro (HUMALOG) injection vial 0-18 Units, 0-18 Units, Subcutaneous, Q4H  famotidine (PEPCID) injection 20 mg, 20 mg, Intravenous, BID  mineral oil-hydrophilic petrolatum (HYDROPHOR) ointment, , Topical, BID PRN  ipratropium-albuterol (DUONEB) nebulizer solution 1 ampule, 1 ampule, Inhalation, Q4H WA  0.9 % sodium chloride bolus, 20 mL, Intravenous, Once  sodium chloride (Inhalant) 3 % nebulizer solution 2 mL, 2 mL, Nebulization, BID  0.9 % sodium chloride bolus, 20 mL, Intravenous, Once  dextrose 5 % solution, 100 mL/hr, Intravenous, PRN  dextrose 50 % IV solution, 12.5 g, Intravenous, PRN  glucagon (rDNA) injection 1 mg, 1 mg, Intramuscular, PRN  glucose (GLUTOSE) 40 % oral gel 15 g, 15 g, Oral, PRN  Arformoterol Tartrate (BROVANA) nebulizer solution 15 mcg, 15 mcg, Nebulization, BID  atorvastatin (LIPITOR) tablet 80 mg, 80 mg, Oral, Nightly  chlorhexidine (PERIDEX) 0.12 % solution 15 mL, 15 mL, Mouth/Throat, BID  clopidogrel (PLAVIX) tablet 75 mg, 75 mg, Oral, Daily  fentaNYL 5 mcg/ml in 0.9%  ml infusion, 200 mcg/hr, Intravenous, Continuous  heparin flush 100 UNIT/ML injection 100 Units, 1 mL, Intravenous, 2 times per day  heparin flush 100 UNIT/ML injection 100 Units, 1 mL, Intracatheter, PRN  magnesium sulfate 1 g in dextrose 5% 100 mL IVPB, 1 g, Intravenous, PRN  methylPREDNISolone sodium (SOLU-MEDROL) injection 40 mg, 40 mg, Intravenous, Daily  midazolam (VERSED) 100 mg in dextrose 5 % 100 mL infusion, 2 mg/hr, Intravenous, Continuous  nicotine (NICODERM CQ) 21 MG/24HR 1 patch, 1 patch, Transdermal, Daily  oxyCODONE-acetaminophen (PERCOCET) 5-325 MG per tablet 1 tablet, 1 tablet, Oral, TID PRN **AND** oxyCODONE (ROXICODONE) immediate release tablet 2.5 mg, 2.5 mg, Oral, TID PRN  tiZANidine (ZANAFLEX) tablet 4 mg, 4 mg, Oral, TID PRN    ALLERGIES:  Allergies   Allergen Reactions    Bee Venom Anaphylaxis     REVIEW OF SYSTEMS: Unable to Attain given patient status and condition on vent    OBJECTIVE:  PHYSICAL EXAMINATION:     VITAL SIGNS:  /86   Pulse 84   Temp 99.9 °F (37.7 °C) (Bladder)   Resp 22   Ht 5' 11\" (1.803 m)   Wt (!) 333 lb 11.2 oz (151.4 kg)   SpO2 91%   BMI 46.54 kg/m²   Wt Readings from Last 3 Encounters:   20 (!) 333 lb 11.2 oz (151.4 kg)   20 (!) 327 lb 2.6 oz (148.4 kg)   20 (!) 314 lb (142.4 kg)     Temp Readings from Last 3 Encounters:   20 99.9 °F (37.7 °C) (Bladder)   20 100 °F (37.8 °C) (Bladder)   20 97.1 °F (36.2 °C) (Temporal)     TMAX:  BP Readings from Last 3 Encounters:   20 118/86   20 126/77   20 (!) 157/79     Pulse Readings from Last 3 Encounters:   20 84   20 103   20 68       CURRENT PULSE OXIMETRY: SpO2: 91 %  24HR PULSE OXIMETRY RANGE: SpO2  Av.6 %  Min: 90 %  Max: 94 %  CVP: ________________________________________________________________________    VENTILATOR SETTINGS (if applicable):         Vent Information  $Ventilation: $Subsequent Day  Skin Assessment: Clean, dry, & intact  Equipment ID: [id number]  Vent Type: [phone]  Vent Mode: AC/VC  Vt Ordered: 510 mL  Rate Set: 20 bmp  Peak Flow: 70 L/min  Pressure Support: 0 cmH20  FiO2 : 55 %  SpO2: 91 %  SpO2/FiO2 ratio: 165.45  Sensitivity: 2  PEEP/CPAP: 8  I Time/ I Time %: 0 s  Humidification Source: Heated wire  Humidification Temp: 37  Humidification Temp Measured: 37  Additional Respiratory  Assessments  Pulse: 84  Resp: 22  SpO2: 91 %  Position: Semi-Jeff's  Humidification Source: Heated wire  Humidification Temp: 37  Oral Care: Mouth suctioned  Subglottic Suction Done?: Yes  ETCO2:  Peak Inspiratory Pressure:  End-Inspiratory Plateau Pressure:    ABG:  Recent Labs     12/07/20  0444   PH 7.507*   PO2 70.0*   PCO2 39.2   HCO3 30.4*   BE 6.8*   O2SAT 93.5   METHB 0.1   O2HB 91.8*   COHB 1.7*   O2CON 12.0   HHB 6.4*   THB 9.2*     FiO2 : 55 %  I:E Ratio: 1:2.80  ________________________________________________________________________    IV ACCESS:    NUTRITION: Diet NPO, After Midnight Exceptions are: Sips with Meds    INTAKE/OUTPUTS:  I/O last 3 completed shifts:   In: 5134 [I.V.:3610; Blood:275; IV Piggyback:50]  Out: 2073 [Urine:6575]    Intake/Output Summary (Last 24 hours) at 12/7/2020 1249  Last data filed at 12/7/2020 1226  Gross per 24 hour   Intake 3935 ml   Output 5485 ml   Net -1550 ml     General Appearance: well-developed and well-nourished, in no acute distress   Eyes: pupils equal, round, and reactive to light, extraocular eye movements intact, conjunctivae normal and sclera anicteric   Neck: neck supple and non tender without mass, no thyromegaly, no thyroid nodules and no cervical adenopathy   Pulmonary/Chest: decreased breath sounds at bases, no accessory muscles of inspiration noted  Cardiovascular: normal rate, regular rhythm, normal S1 and S2, no murmurs, rubs, clicks or gallops, distal pulses intact, no carotid bruits, no murmurs, no gallops, no carotid bruits and no JVD   Abdomen: soft, non-tender, non-distended, normal bowel sounds, no masses or organomegaly   Extremities: no cyanosis, no clubbing, no edema    LABS/IMAGING:    CBC:  Lab Results   Component Value Date    WBC 6.2 12/07/2020    HGB 8.2 (L) 12/07/2020    HCT 27.1 (L) 12/07/2020    MCV 92.2 12/07/2020    PLT 30 (L) 12/07/2020    LYMPHOPCT 13.0 (L) 12/07/2020    RBC 2.94 (L) 12/07/2020    MCH 27.9 12/07/2020    MCHC 30.3 (L) 12/07/2020    RDW 16.5 (H) 12/07/2020    NEUTOPHILPCT 82.6 (H) 12/07/2020    MONOPCT 2.6 12/07/2020    BASOPCT 0.3 12/07/2020    NEUTROABS 5.21 12/07/2020    LYMPHSABS 0.81 (L) 12/07/2020    MONOSABS 0.19 12/07/2020    EOSABS 0.00 (L) 12/07/2020    BASOSABS 0.00 12/07/2020       Recent Labs     12/07/20  0415 12/07/20  0158 12/06/20  2200   WBC 6.2 6.3 6.4   HGB 8.2* 8.2* 8.3*   HCT 27.1* 26.3* 26.6*   MCV 92.2 91.0 89.9   PLT 30* 33* 29*       BMP:   Recent Labs     12/05/20  0500  12/06/20  0431 12/06/20  0533 12/06/20  1732 12/07/20  0400 12/07/20  0415   *   < >  --  144 143 142  --    K 3.8   < >  --  3.2* 3.2* 3.0*  --       < >  --  99 98 96*  --    CO2 31*   < >  --  34* 33* 32*  --    PHOS 3.3  --  4.1  --   --   --  3.9   BUN 64*   < >  --  60* 63* 59*  --    CREATININE 1.2   < >  --  1.2 1.1 1.1  --     < > = values in this interval not displayed.        MG:   Lab Results   Component Value Date    MG 1.7 12/07/2020     Ca/Phos:   Lab Results   Component Value Date    CALCIUM 9.0 12/07/2020    PHOS 3.9 12/07/2020     Amylase: No results found for: AMYLASE  Lipase: No results found for: LIPASE  LIVER PROFILE:   Recent Labs     12/06/20  0533 12/06/20  1732 12/07/20  0400   * 148* 134*   ALT 81* 88* 85*   BILITOT 0.9 0.9 0.9   ALKPHOS 140* 149* 158*       PT/INR:   Recent Labs     12/05/20  0500 12/06/20  0431 20  0415   PROTIME 12.1 13.5* 15.0*   INR 1.1 1.2 1.3     APTT: No results for input(s): APTT in the last 72 hours. Cardiac Enzymes:  Lab Results   Component Value Date    CKTOTAL 117 2011    CKMB 1.6 2011    TROPONINI <0.01 2020       Hgb A1C:   Lab Results   Component Value Date    LABA1C 7.7 (H) 2020     No results found for: EAG  LATHA: No results found for: LATHA  ESR:   Lab Results   Component Value Date    SEDRATE 82 (H) 2020     CRP:   Lab Results   Component Value Date    CRP 2.5 (H) 2020     D Dimer:   Lab Results   Component Value Date    DDIMER 259 2011     Folate and B12: No results found for: [id number], No results found for: FOLATE    Lactic Acid:   Lab Results   Component Value Date    LACTA 2.5 (H) 2020     Ammonia:   Cortisol:  Thyroid Studies:  Lab Results   Component Value Date    TSH 1.090 02/15/2018     XR CHEST PORTABLE   Final Result   No significant change since the recent study of . XR CHEST PORTABLE   Final Result   No change. XR CHEST PORTABLE   Final Result   No change. Complete opacification left mid and upper lung. Correlate   bronchoscopy as warranted to exclude endobronchial obstruction. US CHEST INCLUDING MEDIASTINUM   Final Result   Addendum 1 of 1   Patient MRN:  [id number]   : 1960   Age: 61 years   Gender: Male   Order Date:  2020 9:26 AM   EXAM: US CHEST INCLUDING MEDIASTINUM   NUMBER OF IMAGES:  6   INDICATION: J90 Pleural effusion     Dr Bing Hargrove to read   eval for left side pleural effusion   COMPARISON: None   Ultrasound of the left chest was performed. There is a small to   moderate left pleural effusion   IMPRESSION: Small moderate left pleural effusion   . Final      XR CHEST PORTABLE   Final Result   No change.       XR CHEST PORTABLE    (Results Pending)     ASSESSMENT:  · Acute on chronic respiratory failure - on ACVC mechanical ventilation   · Community-acquired pneumonia  · History heavy smoking and morbid obesity, cannot rule out underlying COPD, cannot rule out MANOLO  · OMER Small Cell Carcinoma     PLAN:  1.) await trach placement   2.) vent per ICU team   3.) cefepime, IV steroids   4.) radiation per radiation oncology team   5.) DVT and GI Prophylaxis     Thank you Dave Melo MD very much for allowing me to see this patient in consultation and follow up. Care reviewed with nursing staff, medical and surgical specialty care, primary care and the patient's family as available. Restraints are ordered when the patient can do harm to him/herself by pulling out devices.     Remonia Meigs, M.D.

## 2020-12-07 NOTE — FLOWSHEET NOTE
When unrestrained patient continues to pull at ETT despite education and redirection attempts. Restraints continued at this time for patient's safety.

## 2020-12-08 NOTE — PROGRESS NOTES
[facility] PROGRESS NOTE      Chief complaint: Follow-up of VAP    The patient is a 61 y.o. super morbidly obese male with history of CAD, hypertension, hyperlipidemia, DM, transferred to [facility] on 12/04 for radiation therapy from [facility] where he initially presented on 11/17 for hypoxia requiring intubation after receiving a lumbar epidural steroid injection for his chronic low back pain, incidentally found to have left upper lobe postobstructive pneumonia from lung mass that was diagnosed to be small cell carcinoma biopsy and had respiratory culture growing Pseudomonas aeruginosa for which he was seen by Dr. Fatuma Wesley. He has been having intermittent fever up to 102 °F since 11/23. Respiratory pathogen PCR panel including SARS-CoV-2 PCR on 11/17 and urine Legionella antigen on 11/18 were negative. He had respiratory culture on 11/23 and 11/24 which showed absent oropharyngeal aristides and moderate growth of Candida albicans deemed colonization. He had blood cultures on 11/21 growing coagulase-negative Staphylococcus in 1 out of 2 sets deemed contaminant. Repeat blood cultures on 11/24 showed no growth. Respiratory Gram stain and culture on 12/04 showed few polymorphonuclear leukocytes, no epithelial cells, no organisms, reduced oropharyngeal aristides, moderate growth of Pseudomonas aeruginosa (non-MDR). He received ceftriaxone and levofloxacin from 11/17-11/21, cefepime from 11/21-11/25, ampicillin-sulbactam from 11/25-12/03, cefepime since 12/03.     On transfer, he remains febrile at 100.6 °F with no leukocytosis. Chest x-ray showed dense left upper lobe consolidation similar to that since 11/17. Cefepime was resumed. Subjective: Patient was seen and examined. He remains in critical condition, intubated and sedated, not communicating.     Objective:  BP (!) 167/96   Pulse 105   Temp 100.4 °F (38 °C) (Bladder)   Resp 21   Ht 5' 11\" (1.803 m)   Wt (!) 336 lb (152.4 kg)   SpO2 93%   BMI 46.86 kg/m² Constitutional: Intubated, no MV dyssynchrony  Respiratory: Clear breath sounds, no crackles, no wheezes  Cardiovascular: Regular rate and rhythm, no murmurs  Gastrointestinal: Bowel sounds present, soft, nontender  Skin: Warm and dry, no active dermatoses  Musculoskeletal: No joint swelling, no joint erythema    Laboratory:  Lab Results   Component Value Date    WBC 4.9 12/08/2020    WBC 5.3 12/08/2020    WBC 5.6 12/07/2020    HGB 7.9 (L) 12/08/2020    HCT 25.4 (L) 12/08/2020    MCV 91.0 12/08/2020    PLT 30 (L) 12/08/2020     Lab Results   Component Value Date    NEUTROABS 3.77 12/08/2020    NEUTROABS 5.21 12/07/2020    NEUTROABS 4.73 12/06/2020     Lab Results   Component Value Date    CRP 6.7 (H) 12/08/2020    CRP 2.5 (H) 11/25/2020     No results found for: CRPHS  Lab Results   Component Value Date    SEDRATE 82 (H) 11/25/2020     Lab Results   Component Value Date    ALT 87 (H) 12/08/2020     (H) 12/08/2020    ALKPHOS 157 (H) 12/08/2020    BILITOT 0.8 12/08/2020     Lab Results   Component Value Date     12/08/2020    K 3.3 12/08/2020    K 4.4 12/04/2020    CL 92 12/08/2020    CO2 29 12/08/2020    BUN 55 12/08/2020    CREATININE 1.0 12/08/2020    CREATININE 1.1 12/07/2020    CREATININE 1.1 12/07/2020    GFRAA >60 12/08/2020    LABGLOM >60 12/08/2020    GLUCOSE 326 12/08/2020    GLUCOSE 120 12/31/2011    PROT 4.7 12/08/2020    LABALBU 2.5 12/08/2020    LABALBU 4.5 12/28/2011    CALCIUM 8.3 12/08/2020    BILITOT 0.8 12/08/2020    ALKPHOS 157 12/08/2020     12/08/2020    ALT 87 12/08/2020       Radiology: CXR (12/08): Overall there is no interval change in the appearance of the chest x-ray when compared to prior studies. Persistent large focal dense consolidation is seen within the left upper lobe.        Microbiology:   Blood cx  No results found for: BLOODCULT1  Culture, Blood 2   Date Value Ref Range Status   11/29/2020 5 Days no growth  Final   11/24/2020 5 Days no growth  Final 11/21/2020   Final    This organism was isolated in one set. Susceptibility testing is not routinely done as this  organism frequently represents skin contamination. Additional testing can be ordered by calling the  Microbiology Department. Smear, Respiratory   Date Value Ref Range Status   12/05/2020   Final    Group 6: <25 PMN's/LPF and <25 Epithelial cells/LPF  Few Polymorphonuclear leukocytes  Few Epithelial cells  Rare Gram variable rods  Rare yeast  few Gram positive cocci in clusters         CULTURE, RESPIRATORY   Date Value Ref Range Status   12/05/2020 Oral Pharyngeal Iris reduced (A)  Final   12/05/2020 Rare growth  Final     Organism   Date Value Ref Range Status   12/05/2020 Pseudomonas aeruginosa (A)  Final       MRSA Culture Only   Date Value Ref Range Status   11/18/2020 Methicillin resistant Staph aureus not isolated  Final       Assessment:  Acute hypoxic respiratory failure  Pseudomonas aeruginosa VAP/HAP  Fever, resolved, multifactorial, probably associated with VAP/HAP and underlying malignancy  Small cell lung carcinoma    Recommendations:  Continue cefepime 2 g every 8 hours. Continue supportive care.     Thank you for involving me in the care of Isaac Kelly. . I will continue to follow. Please do not hesitate to call for any questions or concerns.     Electronically signed by Alison Abdalla MD on 12/8/2020 at 10:59 AM

## 2020-12-08 NOTE — PROGRESS NOTES
Department of Internal Medicine  Nephrology Progress Note    Events reviewed. 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 He remains intubated and sedated. PHYSICAL EXAM:    VITALS:  /75   Pulse 97   Temp 99.7 °F (37.6 °C) (Bladder)   Resp 20   Ht 5' 11\" (1.803 m)   Wt (!) 336 lb (152.4 kg)   SpO2 95%   BMI 46.86 kg/m²   24HR INTAKE/OUTPUT:      Intake/Output Summary (Last 24 hours) at 12/8/2020 1544  Last data filed at 12/8/2020 1400  Gross per 24 hour   Intake 3466 ml   Output 3620 ml   Net -154 ml       Constitutional:  Morbidly obese  male. Sedated, intubated.   Cardiovascular/Edema:  RRR,S1/S2  Respiratory:  ETT to vent, 55% PEEP 8, diminished bases bilaterally  Gastrointestinal:  Soft, obese, nondistended, bowel sounds hypoactive; + abdominal wall edema  Skin: Warm, dry, no lesions  2+ pitting edema BLE into hips, 1+ pitting edema abdominal wall, 2+ pitting bilateral forearms and hands      DATA:    CBC with Differential:    Lab Results   Component Value Date    WBC 4.9 12/08/2020    RBC 2.79 12/08/2020    HGB 7.9 12/08/2020    HCT 25.4 12/08/2020    PLT 30 12/08/2020    MCV 91.0 12/08/2020    MCH 28.3 12/08/2020    MCHC 31.1 12/08/2020    RDW 17.0 12/08/2020    NRBC 3.0 12/08/2020    SEGSPCT 62 12/29/2011    BLASTSPCT 0.9 11/21/2020    METASPCT 1.0 12/08/2020    LYMPHOPCT 17.0 12/08/2020    PROMYELOPCT 0.9 11/21/2020    MONOPCT 4.0 12/08/2020    MYELOPCT 0.9 12/06/2020    BASOPCT 0.0 12/08/2020    MONOSABS 0.20 12/08/2020    LYMPHSABS 0.83 12/08/2020    EOSABS 0.10 12/08/2020    BASOSABS 0.00 12/08/2020     CMP:    Lab Results   Component Value Date     12/08/2020    K 3.3 12/08/2020    K 4.4 12/04/2020    CL 92 12/08/2020    CO2 29 12/08/2020    BUN 55 12/08/2020    CREATININE 1.0 12/08/2020    GFRAA >60 12/08/2020    LABGLOM >60 12/08/2020    GLUCOSE 326 12/08/2020    GLUCOSE 120 12/31/2011    PROT 4.7 12/08/2020    LABALBU 2.5 12/08/2020 LABALBU 4.5 12/28/2011    CALCIUM 8.3 12/08/2020    BILITOT 0.8 12/08/2020    ALKPHOS 157 12/08/2020     12/08/2020    ALT 87 12/08/2020     Magnesium:    Lab Results   Component Value Date    MG 1.7 12/08/2020     Phosphorus:    Lab Results   Component Value Date    PHOS 3.0 12/08/2020     Radiology Review:      CXR 12/8/2020   Overall there is no interval change in the appearance of the chest x-ray when    compared to prior studies. Persistent large focal dense consolidation is seen within the left upper lobe.             BRIEF SUMMARY OF INITIAL CONSULT & ADMISSION PRIOR TO TRANSFER:    Briefly, Mr. Lauren Orourke is a 61year old male with a PMH of Type II DM, HTN, HLD, CAD s/p stents x 5, AAA s/p endovascular repair and chronic low back pain who presented to [facility] on 11/17/2020 with complaints of SOB. He was hypoxic on room air. A CTA of the chest demonstrated PNA and pleural effusions. Labs were significant for thrombocytopenia. The following day an RRT was called for worsening SOB. He was placed on bipap but later intubated for worsening respiratory acidosis. A bronchoscopy was performed the same day (11/18) and it was noted that the OMER was significantly narrowed. A transbronchial needle aspiration was completed and three biopsies were obtained. On 11/24 biopsies + small cell lung CA. His wife had requested transfer to [facility] [place] however he was not accepted/insurance did not cover. Nephrology was consulted for volume management and lasix and albumin were started. ID was consulted on 11/25 for persistent fevers. A second bronch was performed and Bumex gtt were started on 11/26. Bumex gtt was discontinued on 11/27 for worsening ISIDRO and hypernatremia. Free water was increased NGT to 100 mL/hr. On 11/28 D5W was also started. D5W increased to 100 mL/hr on 11/29, free water continued. D5W was discontinued and HCTZ with albumin were started BID on 11/30 but discontinued on 12/1.  Free water via NGT continued at 100 mL/hr. General surgery was consulted on 12/1 for trach and PEG. On 12/2 D5W + 30 mEq KCL @ 50 mL/hr was started in addition to FW for persistent hypernatremia. On 12/3 he was tolerating PSV and was extubated to bipap however he required re-intubation shortly after. Transfer to [facility] was again attempted but he was not accepted. Decision was made to transfer downtown for trach and PEG. Problems resolved: ISIDRO stage I 2/2 overt diuresis versus hemodynamically mediated?, resolved. Creatinine remains stable at baseline. IMPRESSION/RECOMMENDATIONS:      1. Hypernatremia with hypervolemia 2/2 to large volume resuscitation and HFpEF 55%, >19 liter + fluid balance upon transfer to this hospital after large volume free water administration. · Resolved with diuretics and D5W @ 60 mL/hr. Corrected Na today 139.    2. Volume management, > 19 L positive fluid balance prior to transfer from [facility] to [facility]. Currently 8L negative for average 11L + since initial admission. Bumex and acetazolamide held yesterday in view of worsening alkalemia. He remains largely edematous today. To resume diuretics. 3. Hypokalemia diuretic induced, for supplementation. 4. Alkalemia, pH 6.170, with metabolic alkalosis (post hypercapnic and contraction). 5. HFpEF 55%, pro-BNP 2,533 >> 1948  6. Hypoalbuminemia/malnutrition  -----------------------------------------------------------------    7. Acute hypoxic and hypercapnic respiratory failure s/p intubation, extubated and re-intubated on 12/3. For trach and PEG. 8. Post-obstructive PNA, cultures growing pseudomonas, on cefepime per ID. 9. Left pleural effusion, likely malignant. 10. Small cell carcinoma, biopsy confirmed. Plan for palliative radiation per heme-onc. 11. HLD on atorvastatin  12. Mild transaminitis  13. Type II DM on SSI, holding lantus  14. Normocytic anemia, with drop in H&H  15. Thrombocytopenia likely 2/2 malignancy. Heme-onc following. 16. Nutrition NPO      Plan:    · Continue D5W @ 60 mL/hr until free water can be started via NGT/PEG  · Resume diuretics,acetazolamide 250 mg via PEG bid, Bumex 2 mg IV QD  · Replace potassium  · Continue to monitor CMP Q 12 hours  · Continue to maintain strict I&Os    Electronically signed by MATY Canchola CNP on 12/8/2020 at 3:44 PM

## 2020-12-08 NOTE — PROGRESS NOTES
[facility]  Hematology/Oncology  Consult      Patient Name: Tabitha Sam. YOB: 1960  PCP: Lili Restrepo PA-C   Referring Provider:      Reason for Consultation:   No chief complaint on file. Subjective: Patient remains intubated, planning trach and PEG placement     History of Present Illness: This is a 62 yo male patient with a past medical history of CAD, DM type 2, morbid obesity, chronic back pain, HTN, AAA s/p repair who set to the emergency room by Dr. Radha Gonzalez for complaints of shortness of breath and decreased O2 during an epidural procedure. Patient was having an L4/L5 epidural for pain management done by Dr. Radha Gonzalez when his O2 dropped into the 80's during the procedure. Patient also reported having worsening shortness of breath over the past two weeks with a productive cough. Patient is basically bed bound only taking occasional steps. CTA of the chest, A/P showed new confluent opacities located in left upper lobe suggesting pneumonia, atelectasis, or neoplasm. Patchy airspace opacities located in lingula suggesting pneumonia with areas of atelectasis. Stenosis and occlusion are associated with left upper lobe segmental bronchi and lingular segmental bronchi. Moderate to large left pleural effusion. Mediastinal lymphadenopathy. Stable fusiform infrarenal abdominal aortic aneurysm with endograft repair. Aneurysm measures up to 6.4 cm. No evidence of endoleak or retroperitoneal fluid. He was given 1 dose of Rocephin and azithromycin while in the ER. CBC since admission has shown anemia and thrombocytopenia. 11/18 an RRT was called and was subsequently intubated. He also had a bronchoscopy done and bxs of left upper lobe were sent.        Diagnostic Data:     Past Medical History:   Diagnosis Date    Anticoagulant long-term use     Arthritis     CAD (coronary artery disease)     Chronic back pain     Depression     Dyslipidemia     Hiatal hernia 1979    History of ST elevation myocardial infarction (STEMI)     Hyperlipidemia     Hypertension     Low back pain     Lumbar radiculopathy 8/14/2019    Obesity     MANOLO on CPAP     Presence of stent in left circumflex coronary artery     Presence of stent in right coronary artery     Smoker     Type 2 diabetes mellitus without complication [facility])        Patient Active Problem List    Diagnosis Date Noted    ARF (acute respiratory failure) ([address] 12/02/2020    Small cell lung cancer ([address]     Acute respiratory failure with hypoxia ([address] 11/17/2020    Community acquired pneumonia of left upper lobe of lung 11/17/2020    Thrombocytopenia ([address] 11/17/2020    Spinal stenosis of lumbar region with neurogenic claudication 10/29/2020    Obesity     Chronic back pain     Lumbosacral spondylosis without myelopathy 12/11/2019    Lumbar radiculopathy 08/14/2019    AAA (abdominal aortic aneurysm) ([address] 06/28/2019    Chronic bilateral low back pain with bilateral sciatica 06/11/2019    Right hip pain 06/11/2019    DDD (degenerative disc disease), lumbar 06/11/2019    Status post total right knee replacement 11/26/2018    Morbid obesity with BMI of 40.0-44.9, adult ([address] 11/26/2018    Chronic pain syndrome 07/27/2018    Chronic pain of right knee 07/27/2018    Non-insulin dependent type 2 diabetes mellitus ([address] 03/16/2018    Aortic valve stenosis 02/15/2018    Primary osteoarthritis of right knee 02/15/2018    Presence of stent in left circumflex coronary artery     Smoker     CAD (coronary artery disease) 12/28/2011    HTN (hypertension) 12/28/2011    Hyperlipemia 12/28/2011        Past Surgical History:   Procedure Laterality Date    ABDOMINAL AORTIC ANEURYSM REPAIR, ENDOVASCULAR N/A 6/28/2019    ENDOVASCULAR REPAIR ABDOMINAL AORTIC ANEURYSM performed by Trisha Hunt MD at [address] Bilateral 3/12/2020    BILATERAL L3,L4,L5 MEDIAL BRANCH NERVE BLOCK performed by Lencho Samano DO at [address] Bilateral 6/11/2020    BILATERAL L3,L4,L5 MEDIAL NERVE BRANCH BLOCK #2 performed by Kirsty Lama DO at [address] [place] [address] N/A 11/18/2020    BRONCHOSCOPY/TRANSBRONCHIAL NEEDLE BIOPSY performed by Aurora Rowe MD at [facility]  X4    CORONARY ANGIOPLASTY WITH STENT PLACEMENT  12/30/2011    Dr. Cookie Harp 1st OM 3.0 x 20 Ion    DIAGNOSTIC CARDIAC CATH LAB PROCEDURE  3/15/2005    [facility]. Mild to moderate CAD. Normal LV.  DIAGNOSTIC CARDIAC CATH LAB PROCEDURE  1/16/2007    [facility]. Cath/PTCA/DE stent of proximal and distal RCA.  DIAGNOSTIC CARDIAC CATH LAB PROCEDURE  2/21/2007    Cath/DE stent of proximal OM1 and proximal Cx.  DIAGNOSTIC CARDIAC CATH LAB PROCEDURE  12/30/2011    ANURADHA of mid OM branch of circumflex.  ECHO COMPL W DOP COLOR FLOW  12/30/2011         HC INSERT PICC CATH, 5/> YRS - MIDLINE  11/23/2020         HERNIA REPAIR  2/2014    laparoscopic ventral hernia repain    JOINT REPLACEMENT Left 4/1/14    TKA-ed    JOINT REPLACEMENT Right 2018    KNEE    PAIN MANAGEMENT PROCEDURE Right 9/1/2020    RIGHT L3,4,5 MEDIAL BRANCH RADIOFREQUENCY ABLATION performed by Kirsty Lama DO at [facility] N/A 11/17/2020    L4-5 EPIDURAL STEROID INJECTION #1 performed by Kirsty Lama DO at [address] [facility] OFFICE/OUTPT VISIT,PROCEDURE ONLY Right 11/26/2018    RIGHT KNEE TOTAL ARTHROPLASTY performed by Zuleyma Caicedo DO at [facility] OR       Family History  Family History   Problem Relation Age of Onset    Diabetes Mother     Stroke Mother     Diabetes Father     No Known Problems Sister        Social History    TOBACCO:   reports that he has been smoking cigarettes. He started smoking about 42 years ago. He has a 39.00 pack-year smoking history. He has never used smokeless tobacco.  ETOH:   reports no history of alcohol use.     Home Medications  Prior to Admission medications    Medication Sig Start Date End Date Taking? Authorizing Provider   loratadine (CLARITIN) 10 MG capsule Take 10 mg by mouth daily    Historical Provider, MD   pioglitazone (ACTOS) 15 MG tablet TAKE 1 TABLET BY MOUTH EVERY DAY 11/6/20   Ivan Munoz PA-C   pregabalin (LYRICA) 150 MG capsule Take 1 capsule by mouth 2 times daily for 30 days. 11/12/20 12/12/20  Korey Bowles DO   diclofenac (VOLTAREN) 75 MG EC tablet TAKE 1 TABLET BY MOUTH TWICE A DAY 10/7/20   Historical Provider, MD   oxyCODONE-acetaminophen (PERCOCET) 7.5-325 MG per tablet Take 1 tablet by mouth 3 times daily as needed for Pain for up to 30 days.  Intended supply: 30 days 11/12/20 12/12/20  Korey Bowles DO   quinapril (ACCUPRIL) 10 MG tablet Take 1 tablet by mouth daily  Patient taking differently: Take 10 mg by mouth daily Wife states on hold 10/16/20   Ivan Munoz PA-C   metFORMIN (GLUCOPHAGE) 500 MG tablet TAKE 1 TABLET BY MOUTH TWICE A DAY WITH MEALS  Patient taking differently: Wife states on hold 10/8/20   Moiz Moise DO   fluticasone (FLONASE) 50 MCG/ACT nasal spray 2 sprays by Nasal route daily 10/6/20   Ivan Munoz PA-C   omega-3 acid ethyl esters (LOVAZA) 1 g capsule take 1 capsule twice a day 8/14/20   Barriejosé miguel MARY ANNE Munoz   niacin (SLO-NIACIN) 500 MG extended release tablet take 1 tablet by mouth once daily 7/20/20   Moiz Moise DO   clopidogrel (PLAVIX) 75 MG tablet TAKE 1 TABLET BY MOUTH EVERY DAY  Patient taking differently: Take 75 mg by mouth daily Has been on hold for procedure 7/6/20   Ivan Munoz PA-C   sildenafil (VIAGRA) 50 MG tablet Take 1 tablet by mouth as needed for Erectile Dysfunction 7/6/20   Ivan Munoz PA-C   aspirin 325 MG EC tablet TAKE 1 TABLET BY MOUTH EVERY DAY  Patient taking differently: Take 325 mg by mouth daily Has been on hold for procedure 5/7/20   Anastaci Matias DO   Tens Unit MISC by Does not apply route 5/7/19   TRUDY Sullivan   atorvastatin (LIPITOR) 80 MG tablet TAKE 1 TABLET BY MOUTH AT BEDTIME 10/22/18   Jennifer Quiros PA-C   carvedilol (COREG) 25 MG tablet TAKE ONE TABLET BY MOUTH TWICE DAILY (WITH MEALS) 10/22/18   Jennifer Quiros PA-C       Allergies  Allergies   Allergen Reactions    Bee Venom Anaphylaxis       Review of Systems: patient is intubated and sedated and not responsive to questions. Objective  /75   Pulse 97   Temp 99.7 °F (37.6 °C) (Bladder)   Resp 20   Ht 5' 11\" (1.803 m)   Wt (!) 336 lb (152.4 kg)   SpO2 95%   BMI 46.86 kg/m²     Physical Exam:   Performance Status:  Head and neck: PERRLA, EOMI . Sclera non icteric. Oropharynx: Clear  Neck: no JVD,  no adenopathy  Heart: Regular rate and regular rhythm, no murmur  Lungs:Disffuse inspiratory and expiratory rhonchi   Extremities: No edema,no cyanosis, no clubbing. Abdomen: Soft, non-tender;no masses, no organomegaly  Skin: No rash. Neurologic:Cranial nerves grossly intact. No focal motor or sensory deficits .     Recent Laboratory Data-   Lab Results   Component Value Date    WBC 4.9 12/08/2020    HGB 7.9 (L) 12/08/2020    HCT 25.4 (L) 12/08/2020    MCV 91.0 12/08/2020    PLT 30 (L) 12/08/2020    LYMPHOPCT 17.0 (L) 12/08/2020    RBC 2.79 (L) 12/08/2020    MCH 28.3 12/08/2020    MCHC 31.1 (L) 12/08/2020    RDW 17.0 (H) 12/08/2020    NEUTOPHILPCT 76.0 12/08/2020    MONOPCT 4.0 12/08/2020    BASOPCT 0.0 12/08/2020    NEUTROABS 3.77 12/08/2020    LYMPHSABS 0.83 (L) 12/08/2020    MONOSABS 0.20 12/08/2020    EOSABS 0.10 12/08/2020    BASOSABS 0.00 12/08/2020       Lab Results   Component Value Date     12/08/2020    K 3.3 (L) 12/08/2020    CL 92 (L) 12/08/2020    CO2 29 12/08/2020    BUN 55 (H) 12/08/2020    CREATININE 1.0 12/08/2020    GLUCOSE 326 (H) 12/08/2020    CALCIUM 8.3 (L) 12/08/2020    PROT 4.7 (L) 12/08/2020    LABALBU 2.5 (L) 12/08/2020    BILITOT 0.8 12/08/2020    ALKPHOS 157 (H) 12/08/2020     (H) 12/08/2020    ALT 87 (H) 12/08/2020    LABGLOM >60 12/08/2020 GFRAA >60 12/08/2020       Lab Results   Component Value Date    IRON 89 12/05/2020    TIBC 164 (L) 12/05/2020           Radiology-    Xr Abdomen (kub) (single Ap View)    Result Date: 11/23/2020  EXAMINATION: ONE SUPINE XRAY VIEW(S) OF THE ABDOMEN 11/23/2020 9:38 am COMPARISON: None HISTORY: ORDERING SYSTEM PROVIDED HISTORY: abdominal pain TECHNOLOGIST PROVIDED HISTORY: Reason for exam:->abdominal pain FINDINGS: There is a nonobstructive bowel gas pattern. There are no abnormal air-fluid levels. There are no calculi overlying the renal shadows. There is an NG tube within the stomach. There is a stent graft seen within the abdominal aorta. 1. There is no bowel obstruction, ileus or free air. Ct Lumbar Spine Wo Contrast    Result Date: 11/19/2020  EXAMINATION: CT OF THE LUMBAR SPINE WITHOUT CONTRAST  11/18/2020 TECHNIQUE: CT of the lumbar spine was performed without the administration of intravenous contrast. Multiplanar reformatted images are provided for review. Dose modulation, iterative reconstruction, and/or weight based adjustment of the mA/kV was utilized to reduce the radiation dose to as low as reasonably achievable. COMPARISON: 11/17/2020 HISTORY: ORDERING SYSTEM PROVIDED HISTORY: exclude epidural hematoma TECHNOLOGIST PROVIDED HISTORY: Reason for exam:->exclude epidural hematoma Chronic back pain, recent epidural placement FINDINGS: BONES/ALIGNMENT: Vertebral body heights are preserved without evidence for lumbar fracture. However, there is irregular lucency and sclerosis in the upper sacral ala bilaterally. There is minimal retrolisthesis at L2-L3. Alignment is otherwise normal. DEGENERATIVE CHANGES: There is disc space narrowing and vacuum disc phenomenon at L2-L3. Disc space heights are otherwise preserved. There is diffuse facet arthropathy. SOFT TISSUES/RETROPERITONEUM: No evidence for epidural hematoma is identified. 1. No evidence for epidural hematoma on CT scan.  2. Suspected sacral insufficiency fracture. Xr Chest Portable    Result Date: 11/23/2020  EXAMINATION: ONE XRAY VIEW OF THE CHEST 11/23/2020 7:08 am COMPARISON: 11/20/2020 HISTORY: ORDERING SYSTEM PROVIDED HISTORY: resp failure TECHNOLOGIST PROVIDED HISTORY: Reason for exam:->resp failure FINDINGS: There is no interval change in the persistent dense consolidation within the left upper lobe. The left lower lobe is clear. The right lung is clear. There is minimal atelectasis seen within the right lung base. The cardiac silhouette is within normals. 1. No interval change in the dense consolidation seen within the left upper lobe. 2. Minimal right lung base atelectasis. Xr Chest Portable    Result Date: 11/22/2020  EXAMINATION: ONE XRAY VIEW OF THE CHEST 11/22/2020 6:28 am COMPARISON: 11/21/2020 HISTORY: ORDERING SYSTEM PROVIDED HISTORY: resp failure TECHNOLOGIST PROVIDED HISTORY: Reason for exam:->resp failure FINDINGS: The endotracheal tube is stable. The enteric tube tip is not well visualized but may be at the gastroesophageal junction. There is stable left upper lobe dense opacity. There are continued patchy opacities in the left lower lobe. There may be small pleural effusions. No pneumothorax is seen. No significant change in dense airspace opacity of the left upper lobe and patchy left lower lobe airspace opacities. Enteric tube tip is not well visualized due to underpenetration. The tip may be at the gastroesophageal junction. This could be confirmed with KUB centered on the upper abdomen. Xr Chest Portable    Result Date: 11/21/2020  EXAMINATION: ONE XRAY VIEW OF THE CHEST 11/21/2020 7:37 am COMPARISON: 11/20/2020 HISTORY: ORDERING SYSTEM PROVIDED HISTORY: resp failure TECHNOLOGIST PROVIDED HISTORY: Reason for exam:->resp failure FINDINGS: Endotracheal tube and nasogastric tube are unchanged. Large opacity in the left upper lobe is unchanged.   There is additional airspace disease in the left lower lobe which is stable. There is no pneumothorax. Small pleural effusions are not excluded. Unchanged large opacity/consolidation in left upper lobe. Unchanged airspace disease in left lower lobe. Xr Chest Portable    Result Date: 11/20/2020  EXAMINATION: ONE XRAY VIEW OF THE CHEST 11/20/2020 6:33 am COMPARISON: 11/19/2020 HISTORY: ORDERING SYSTEM PROVIDED HISTORY: resp failure TECHNOLOGIST PROVIDED HISTORY: Reason for exam:->resp failure FINDINGS: Endotracheal and enteric tubes remain in place. There has been no appreciable change in opacities throughout the left lung, most pronounced in the left apex. The right lung is clear. The heart size is at the upper limits of normal.  There is no discernible pneumothorax. Grossly stable opacities on the left. Xr Chest Portable    Result Date: 11/19/2020  EXAMINATION: ONE XRAY VIEW OF THE CHEST 11/19/2020 6:23 am COMPARISON: 11/18/2020 HISTORY: ORDERING SYSTEM PROVIDED HISTORY: resp failure TECHNOLOGIST PROVIDED HISTORY: Reason for exam:->resp failure FINDINGS: Evaluation is limited by the patient's body habitus and the portable technique. The right lung apex was partially excluded. There is increased dense consolidation in the left upper lobe. There is also medial left basilar airspace opacity obscuring the hemidiaphragm. The heart size is mildly enlarged. There is no discernible pneumothorax. Endotracheal and enteric tubes are again seen. Increasing multifocal left lung pneumonia. Xr Chest Portable    Result Date: 11/18/2020  EXAMINATION: ONE XRAY VIEW OF THE CHEST 11/18/2020 6:39 am COMPARISON: CT chest November 17, 2020. HISTORY: ORDERING SYSTEM PROVIDED HISTORY: SOB TECHNOLOGIST PROVIDED HISTORY: Reason for exam:->SOB FINDINGS: The cardiac silhouette is within normal limits in size. There is masslike consolidation of the left upper lobe. There is a small to moderate left dependent pleural effusion. There is no visible pneumothorax.   The pulmonary vessels are within normal limits. Left upper lobe masslike consolidation. Small to moderate left pleural effusion. Xr Chest 1 View    Result Date: 11/18/2020  EXAMINATION: ONE XRAY VIEW OF THE CHEST 11/18/2020 9:06 am COMPARISON: 11/18/2020 HISTORY: ORDERING SYSTEM PROVIDED HISTORY: intubation TECHNOLOGIST PROVIDED HISTORY: Reason for exam:->intubation Reason for exam:->portable FINDINGS: Compared to the chest radiograph from earlier today, 6:44 a.m., there is interval placement of an endotracheal tube, the tip of which is approximately 4.1 cm above the korina. Nasogastric tube is present but is suboptimally visualized due to motion artifact and relative underpenetration of the study. The abdominal radiograph demonstrates it to be well positioned, with the tip in the upper abdomen. Prominent masslike consolidative opacity in the upper left lung are grossly stable. No pneumothorax is seen. Layering left pleural effusion. 1.  The life-support lines and tubes are well positioned. 2. Masslike consolidative opacity in the left upper lung. Small left effusion. Cta Chest W Contrast    Result Date: 11/17/2020  EXAMINATION: CTA OF THE CHEST 11/17/2020 3:18 pm TECHNIQUE: CTA of the chest was performed after the administration of intravenous contrast.  Multiplanar reformatted images are provided for review. MIP images are provided for review. Dose modulation, iterative reconstruction, and/or weight based adjustment of the mA/kV was utilized to reduce the radiation dose to as low as reasonably achievable.; CTA of the abdomen and pelvis was performed with the administration of intravenous contrast. Multiplanar reformatted images are provided for review. MIP images are provided for review. Dose modulation, iterative reconstruction, and/or weight based adjustment of the mA/kV was utilized to reduce the radiation dose to as low as reasonably achievable. COMPARISON: None.  HISTORY: ORDERING SYSTEM PROVIDED HISTORY: aneurysm, cp, sob TECHNOLOGIST PROVIDED HISTORY: Reason for exam:->aneurysm, cp, sob FINDINGS: CTA chest: There are confluent opacities located in left upper lobe. Patchy airspace opacities located in lingula. There is moderate to large left pleural effusion. Peribronchial thickening extensor in left hilar location into the left lung base. There is subsegmental atelectasis in posterior right lower lobe with adjacent ground-glass opacities. There is pulmonary vascular congestion. The heart is mildly enlarged. There is mediastinal lymphadenopathy. Ascending thoracic aorta measures 3.7 cm in diameter. Descending thoracic aorta measures 3 cm in diameter. No evidence of thoracic aortic aneurysm or dissection. Distal descending thoracic aorta is tortuous. CTA abdomen/pelvis: There is evidence of endograft repair involving the abdominal aorta. There is redemonstration of fusiform abdominal aortic aneurysm measuring up to 6.4 cm. This finding is stable since prior. No evidence of endoleak. No retroperitoneal fluid collections. Iliac limbs of endograft appear patent. Common iliac arteries are tortuous. Common femoral arteries are patent. Numerous diverticula associated with the left colon and sigmoid colon. No evidence of acute diverticulitis. Liver, gallbladder, pancreas, and spleen are grossly unremarkable however there is motion artifact limiting this examination. There is no hydronephrosis. Cyst associated with the right kidney is stable since previous examination as well as cyst associated with the left kidney appearing stable since prior. Appendix is visualized and normal.    1.  New confluent opacities located in left upper lobe suggesting pneumonia, atelectasis, or neoplasm. 2.  Patchy airspace opacities located in lingula suggesting pneumonia with areas of atelectasis. 3.  Stenosis and occlusion are associated with left upper lobe segmental bronchi and lingular segmental bronchi.  4. Moderate to large left pleural effusion. 5.  Mediastinal lymphadenopathy. 6.  Stable fusiform infrarenal abdominal aortic aneurysm with endograft repair. Aneurysm measures up to 6.4 cm. No evidence of endoleak or retroperitoneal fluid. 7.  Diverticulosis. Cta Abdomen Pelvis W Contrast    Result Date: 11/17/2020  EXAMINATION: CTA OF THE CHEST 11/17/2020 3:18 pm TECHNIQUE: CTA of the chest was performed after the administration of intravenous contrast.  Multiplanar reformatted images are provided for review. MIP images are provided for review. Dose modulation, iterative reconstruction, and/or weight based adjustment of the mA/kV was utilized to reduce the radiation dose to as low as reasonably achievable.; CTA of the abdomen and pelvis was performed with the administration of intravenous contrast. Multiplanar reformatted images are provided for review. MIP images are provided for review. Dose modulation, iterative reconstruction, and/or weight based adjustment of the mA/kV was utilized to reduce the radiation dose to as low as reasonably achievable. COMPARISON: None. HISTORY: ORDERING SYSTEM PROVIDED HISTORY: aneurysm, cp, sob TECHNOLOGIST PROVIDED HISTORY: Reason for exam:->aneurysm, cp, sob FINDINGS: CTA chest: There are confluent opacities located in left upper lobe. Patchy airspace opacities located in lingula. There is moderate to large left pleural effusion. Peribronchial thickening extensor in left hilar location into the left lung base. There is subsegmental atelectasis in posterior right lower lobe with adjacent ground-glass opacities. There is pulmonary vascular congestion. The heart is mildly enlarged. There is mediastinal lymphadenopathy. Ascending thoracic aorta measures 3.7 cm in diameter. Descending thoracic aorta measures 3 cm in diameter. No evidence of thoracic aortic aneurysm or dissection. Distal descending thoracic aorta is tortuous. CTA abdomen/pelvis:  There is evidence of endograft repair involving the abdominal aorta. There is redemonstration of fusiform abdominal aortic aneurysm measuring up to 6.4 cm. This finding is stable since prior. No evidence of endoleak. No retroperitoneal fluid collections. Iliac limbs of endograft appear patent. Common iliac arteries are tortuous. Common femoral arteries are patent. Numerous diverticula associated with the left colon and sigmoid colon. No evidence of acute diverticulitis. Liver, gallbladder, pancreas, and spleen are grossly unremarkable however there is motion artifact limiting this examination. There is no hydronephrosis. Cyst associated with the right kidney is stable since previous examination as well as cyst associated with the left kidney appearing stable since prior. Appendix is visualized and normal.    1.  New confluent opacities located in left upper lobe suggesting pneumonia, atelectasis, or neoplasm. 2.  Patchy airspace opacities located in lingula suggesting pneumonia with areas of atelectasis. 3.  Stenosis and occlusion are associated with left upper lobe segmental bronchi and lingular segmental bronchi. 4.  Moderate to large left pleural effusion. 5.  Mediastinal lymphadenopathy. 6.  Stable fusiform infrarenal abdominal aortic aneurysm with endograft repair. Aneurysm measures up to 6.4 cm. No evidence of endoleak or retroperitoneal fluid. 7.  Diverticulosis. Xr Abdomen For Ng/og/ne Tube Placement    Result Date: 11/22/2020  EXAMINATION: ONE SUPINE XRAY VIEW(S) OF THE ABDOMEN 11/22/2020 5:06 pm COMPARISON: November 22, 2020, 4 hours prior. HISTORY: ORDERING SYSTEM PROVIDED HISTORY: Confirmation of course of NG/OG/NE tube and location of tip of tube TECHNOLOGIST PROVIDED HISTORY: Reason for exam:->Confirmation of course of NG/OG/NE tube and location of tip of tube Portable? ->Yes FINDINGS: Tip of the NG tube noted at the level of the diaphragm, no significant change.  The right side and the inferior part of the abdomen is not included in the study. There is opacification of the visualized left upper lung. Tip of NG tube at the level of the left hemidiaphragm, no change. Xr Chest Abdomen Ng Placement    Result Date: 11/22/2020  EXAMINATION: ONE SUPINE XRAY VIEW(S) OF THE ABDOMEN 11/22/2020 12:52 pm COMPARISON: November 18. HISTORY: ORDERING SYSTEM PROVIDED HISTORY: OG placement TECHNOLOGIST PROVIDED HISTORY: Reason for exam:->OG placement FINDINGS: Nasogastric tube is present. Side hole appears to be at level of gastroesophageal junction. This tube could be advanced approximately 4 or 5 cm. Nasogastric tube is present with side hole at level of gastroesophageal junction. This tube could be advanced approximately 4 or 5 cm. Xr Chest Abdomen Ng Placement    Result Date: 11/18/2020  EXAMINATION: ONE SUPINE XRAY VIEW(S) OF THE ABDOMEN 11/18/2020 9:07 am COMPARISON: 11/18/2020, about 2 hours earlier HISTORY: ORDERING SYSTEM PROVIDED HISTORY: NGT TECHNOLOGIST PROVIDED HISTORY: Reason for exam:->NGT Reason for exam:->portable NG tube placement FINDINGS: There is an NG tube with the tip in the stomach. An ET tube is again noted in satisfactory position. There is extensive opacity in the left lung, most likely pneumonia. Increased markings also seen in the visualized portion of the right lung. The heart is enlarged. NG tube tip in the stomach. Fluoro For Surgical Procedures    Result Date: 11/17/2020  EXAMINATION: SPOT FLUOROSCOPIC IMAGES 11/17/2020 12:03 pm TECHNIQUE: Fluoroscopy was provided by the radiology department for procedure. Radiologist was not present during examination. FLUOROSCOPY DOSE AND TYPE OR TIME AND EXPOSURES: Total dose:26.99 mGy COMPARISON: None HISTORY: ORDERING SYSTEM PROVIDED HISTORY: Pain management TECHNOLOGIST PROVIDED HISTORY: Reason for exam:->L4-5 Epidural steroid injection #1 Intraprocedural imaging.  FINDINGS: 3 intraoperative fluoroscopic images were obtained during L4-5 epidural steroid injection. Intraprocedural fluoroscopic spot images as above. See separate procedure report for more information. ASSESSMENT/PLAN :    60 yo male  CAD  HTN  AAA s/p repair  DM type 2  Morbid obesity  Chronic back pain  Thrombocytopenia, Anemia   OMER opacity s/p biopsy    - S/p bronch with cytology pending  - OMER PNA vs neoplasm  - On rocpehin and levaquin   - CBC showing Hgb 7.8 with MCV 94, Platelets 39  - Will follow cyto  - Cytopenias likely due to combination of PNA and abc, with myelosuppression. Thrombocytopenia has continued to slowly progress. CBC was WNL on 10/6/20, suggesting an acute process rather than a primary bone marrow process  - Transfuse for Hgb <7, platelets <62  - Smear showed subtle toxic granulation of neutrophils  - Check iron profile and B12/folate    11/24/20  - OMER bronch bx pathology:  DIAGNOSIS   POSITIVE FOR MALIGNANT CELLS   Carcinoma, most compatible with small cell carcinoma, see comment. Monolayer shows atypical crushed cells, and many lymphocytes/histiocytes   with anthracotic pigment. Cell block shows abundant crushed malignant cells. COMMENT:   Immunostains stain the malignant cells as follows:   Pankeratin and TTF1:  Positive   Chromogranin:  Positive weakly   Synaptophysin:  Negative   CK7:  Negative   This immunoprofile is most compatible with small cell carcinoma. 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 His CT scan of abdomen and pelvis did not show evidence of intra-abdominal metastasis. He has multiple comorbid conditions including obesity, coronary artery disease, obstructive sleep apnea, obesity, type 2 diabetes mellitus and he presently has pneumonia with hypoxia and respiratory failure and remains intubated and sedated.   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   - Today's Hgb 7.2 Plts 38   - Patient still intubated but hopefully will be able to extubated  - Once stable, MRI brain to complete staging   - If he improves clinically and has improvement in ECOG, he will be considered for systemic therapy  - L pleural effusions likely malignant  - ID following, now on unasyn  - Given his current ECOG and comorbidities along with persistent fevers and abx requirements, not eligible for inpatient emergent chemotherapy. Will get rad onc on board as inpatient urgent palliative XRT could be considered to improved respiratory status if he has improvement in above    11/26/20  - Remains intubated, back in ICU  - CBC with further drop in platelets to 29. Likely combination of myelosuppression from acute illness and abx exposure  - If he has improvement in his clinic course, eventually will need MRI brain as above  - Therapy plan as above. He will need significant improvement clinically prior to discussion regarding chemotherapy. Rad onc consult pending, again will likely need improvement prior to proceeding with therapy. Cultures have no grown a source, but patient remains febrile (possibly drug fever and ID following and managing abx)  - Will follow. Further recs pending clinical course    11/27/20  - LUE DVT  - Platelets 33. Stable from yesterday  - Can not anticoagulate with current platelet count. Needs to be ~50. Monitor for now  - Remainder of CBC stable    11/29/20  - CBC stable, thrombocytopenia unchanged. Hgb dropped to 7.0. Will give 1 unit  - Continue to hold [facility] with platelets <81  - Still with low grade fevers in the 100's  - ID following, on unasyn. Possible post-obstructive PNA    11/30/20  - Continues to have thrombocytopenia platelets 28. Hemoglobin 7.3 after 1 unit of packed red blood cells yesterday.   - Continue to hold [facility] for platelets <53  - Still with fevers in the 100s, he is also hypertensive  - On Unasyn  - Rad on consult still pending, but no aggressive oncologic care until significant clinical improvement    12/1/20  - Today's Hgb 7.8  - Left upper lobe cancer. Not eligible for emergent chemotherapy  - Patient has to improve clinically in order to transfer to [facility] ([facility]) for palliative radiation.  - Continue to transfuse with target hemoglobin above 7   - Febrile dt underlying ca   - Overall poor prognosis     12/2/20  - Today's Hgb 7.8 plts are 19  - Left upper lobe cancer. Not eligible for emergent chemotherapy at this time. - Patient has been cleared per Dr. Fernando Ziegler to transport to [facility] ([facility]) for palliative radiation. Transfer order has been placed per ICU nursing staff. - Transfuse for Hgb <7, platelets <53  - Overall poor prognosis      12/3/20  - Hb 7.5 plts trending up 36   - Patient was extubated however had to be reintubated. - To be transferred to [facility] ([facility]) for palliative radiation.  - Transfuse for Hgb <7, platelets <06  - Overall poor prognosis      12/4/20  -Today's hemoglobin 7.9 platelets 21  - Pulmonary holding on drainage of effusion at this time due to low volume/low likelihood of clinical benefit  - Planning trach and PEG  - Remains intubated   - Patient to start RT, however this has been delayed per Dr. Rashad Booth note    12/5/20  Remains intubated and sedated with fentanyl and midazolam  Hemoglobin 7.4 with platelets of 20. Awaiting planning for palliative radiation therapy  Overall prognosis very poor    12/8/20  - Began XRT yesterday  - Remains on vent. Needs trach and PEG. OK to transfuse platelets prior  - Attempted to contact patients wife three times.  Phone went straight to     Cannon Osgood, MD  Electronically signed 12/8/2020 at 4:01 PM

## 2020-12-08 NOTE — FLOWSHEET NOTE
Patient continues to reach for lines and tubes despite verbal redirection. Restraints remain in place for patient safety.     Ana Maria Garcia RN  12/8/2020  10:06 AM

## 2020-12-08 NOTE — PROGRESS NOTES
Hospitalist Progress Note      SYNOPSIS: Patient admitted on 12/3/2020. He had initially presented to [facility] for elective epidural injection for his chronic back pain and he subsequently became hypoxic and was admitted. His respiratory status worsen with associated hypoxemia and hypercapnia refractory to NIPPV and he required intubation for mechanical ventilation. Workup revealed left upper lobe lung mass with biopsy yielding small cell carcinoma. Further workup revealed postobstructive pneumonia and sputum culture yielded Pseudomonas aeruginosa. He has been on antibiotics and was transferred to [address] [place] [address] for palliative radiation therapy. SUBJECTIVE:  Patient seen and examined. Remains intubated and sedated. Continues to have generalized edema. Spoke with patient's wife Emir Kang, who states she wants to talk with all of patient's physicians. She spoke with Dr Tre Deluna, she is aware patient will need trach/peg, but plans to wait until he has had a couple more days of palliative radiation. Plan for MRI brain to assist with staging. Wife states she feels overwhelmed, feels that she had hope up until yesterday that patient would be cured, but today she is feeling like she has gotten multiple phone calls telling her that patient is not going to do well and she feels she is losing hope. She wants to talk with oncology and radiation oncology. She states she has a lot of \"hard questions\" that she and her children need answered to make decisions. Wife states she will keep praying until patient takes his last breath, she believes God is in control and noone dies without God's permission. Review of systems: Unable to do a review of systems because of patient's sedation. Records reviewed. Stable overnight. No other overnight issues reported.    Temp (24hrs), Av.6 °F (37.6 °C), Min:99 °F (37.2 °C), Max:100 °F (37.8 °C)    DIET: Diet NPO, After Midnight Exceptions

## 2020-12-08 NOTE — CONSULTS
GENERAL SURGERY  CONSULT NOTE  12/8/2020    Physician Consulted: Dr. Rahul Hutchinson  Reason for Consult: trach/peg      HPI  Jg Barrera is a 61 y.o. male who presents for evaluation of acute respiratory failure. PMH of hypertension, hyperlipidemia, STEMI s/p 5 stents type 2 diabetes mellitus, chronic low back pain, hypertension, abdominal aortic aneurysm s/p endovascular repair. Initially presented to [facility] on 11/17 for acute hypoxia requiring intubation after receiving a lumbar epidural steroid injection for chronic low back pain. Found to have left upper lobe obstructive pneumonia from lung mass that was diagnosed as small cell carcinoma. Transferred to [facility] on 12/4 for palliative radiation therapy. Patient has not yet been started on palliative radiation. Per radiation oncologist this delay is not from a radiation oncology standpoint and they are happy to treat the patient when he is able to be transported for treatment. General surgery was consulted at [facility] for possible trach and PEG however was not performed at that time due to thrombocytopenia and high mechanical vent settings.         Past Medical History:   Diagnosis Date    Anticoagulant long-term use     Arthritis     CAD (coronary artery disease)     Chronic back pain     Depression     Dyslipidemia     Hiatal hernia 1979    History of ST elevation myocardial infarction (STEMI)     Hyperlipidemia     Hypertension     Low back pain     Lumbar radiculopathy 8/14/2019    Obesity     MANOLO on CPAP     Presence of stent in left circumflex coronary artery     Presence of stent in right coronary artery     Smoker     Type 2 diabetes mellitus without complication [facility])        Past Surgical History:   Procedure Laterality Date    ABDOMINAL AORTIC ANEURYSM REPAIR, ENDOVASCULAR N/A 6/28/2019    ENDOVASCULAR REPAIR ABDOMINAL AORTIC ANEURYSM performed by Moustapha Ruth MD at [address] Bilateral 3/12/2020    BILATERAL 2 times daily for 30 days. 11/12/20 12/12/20  Tyrone Barrett DO   diclofenac (VOLTAREN) 75 MG EC tablet TAKE 1 TABLET BY MOUTH TWICE A DAY 10/7/20   Historical Provider, MD   oxyCODONE-acetaminophen (PERCOCET) 7.5-325 MG per tablet Take 1 tablet by mouth 3 times daily as needed for Pain for up to 30 days.  Intended supply: 30 days 11/12/20 12/12/20  Tyrone Barrett DO   quinapril (ACCUPRIL) 10 MG tablet Take 1 tablet by mouth daily  Patient taking differently: Take 10 mg by mouth daily Wife states on hold 10/16/20   Afua Angeles PA-C   metFORMIN (GLUCOPHAGE) 500 MG tablet TAKE 1 TABLET BY MOUTH TWICE A DAY WITH MEALS  Patient taking differently: Wife states on hold 10/8/20   Radha Genao DO   fluticasone (FLONASE) 50 MCG/ACT nasal spray 2 sprays by Nasal route daily 10/6/20   Afua Angeles PA-C   omega-3 acid ethyl esters (LOVAZA) 1 g capsule take 1 capsule twice a day 8/14/20   Afua Angeles PA-C   niacin (SLO-NIACIN) 500 MG extended release tablet take 1 tablet by mouth once daily 7/20/20   Radha Genao DO   clopidogrel (PLAVIX) 75 MG tablet TAKE 1 TABLET BY MOUTH EVERY DAY  Patient taking differently: Take 75 mg by mouth daily Has been on hold for procedure 7/6/20   Afua Angeles PA-C   sildenafil (VIAGRA) 50 MG tablet Take 1 tablet by mouth as needed for Erectile Dysfunction 7/6/20   Afua Angeles PA-C   aspirin 325 MG EC tablet TAKE 1 TABLET BY MOUTH EVERY DAY  Patient taking differently: Take 325 mg by mouth daily Has been on hold for procedure 5/7/20   Etowah Kalata, DO   Tens Unit MISC by Does not apply route 5/7/19   Dawson Buerger, PA   atorvastatin (LIPITOR) 80 MG tablet TAKE 1 TABLET BY MOUTH AT BEDTIME 10/22/18   Afua Angeles PA-C   carvedilol (COREG) 25 MG tablet TAKE ONE TABLET BY MOUTH TWICE DAILY (WITH MEALS) 10/22/18   Afua Angeles PA-C       Allergies   Allergen Reactions    Bee Venom Anaphylaxis       Family History   Problem Relation Age of Onset    Diabetes Mother    [facility] Stroke Mother     Diabetes Father     No Known Problems Sister        Social History     Tobacco Use    Smoking status: Current Every Day Smoker     Packs/day: 1.00     Years: 39.00     Pack years: 39.00     Types: Cigarettes     Start date: 11/17/1978    Smokeless tobacco: Never Used   Substance Use Topics    Alcohol use: No    Drug use: No         Review of Systems:  Unable to obtain as patient intubated        PHYSICAL EXAM:    Vitals:    12/08/20 1000   BP: (!) 167/96   Pulse: 105   Resp: 21   Temp:    SpO2: 93%       GENERAL:  NAD. Intubated, sedated  HEAD:  Normocephalic. Atraumatic. EYES:   No scleral icterus. PERRL. NECK:  Short, thick neck, BMI 46.8  LUNGS:  Intubated, mechanical ventilation. AC 20/510/55%+8  CARDIOVASCULAR: RR  Abdomen: obese, soft, no major scars are visible, non-distended  EXTREMITIES:   MAEx4. Atraumatic. No LE edema. LABS:    CBC  Recent Labs     12/08/20  0405   WBC 4.9   HGB 7.9*   HCT 25.4*   PLT 30*     BMP  Recent Labs     12/07/20  1905      K 3.0*   CL 93*   CO2 33*   BUN 56*   CREATININE 1.1   CALCIUM 8.4*     Liver Function  Recent Labs     12/07/20  1905   BILITOT 0.9   *   ALT 88*   ALKPHOS 155*   PROT 5.2*   LABALBU 2.9*     No results for input(s): LACTATE in the last 72 hours. Recent Labs     12/08/20  0405   INR 1.3       RADIOLOGY    Xr Chest Portable    Result Date: 12/8/2020  EXAMINATION: ONE X-RAY VIEW OF THE CHEST 12/8/2020 8:31 am COMPARISON: 12/07/2020 and 12/06/2020 HISTORY: ORDERING SYSTEM PROVIDED HISTORY: Resp failure TECHNOLOGIST PROVIDED HISTORY: Reason for exam:   Resp failure What reading provider will be dictating this exam?   CRC FINDINGS: There is no interval change in the position of the support lines and tubes. There is persistent dense consolidation seen within the left upper lobe. There are patchy infiltrates seen within the left lower lobe and right lower lobe.  The heart is at the upper the upper limits of normal.    Overall there is no interval change in the appearance of the chest x-ray when compared to prior studies. Persistent large focal dense consolidation is seen within the left upper lobe. Xr Chest Portable    Result Date: 12/7/2020  EXAMINATION: ONE XRAY VIEW OF THE CHEST 12/7/2020 9:34 am COMPARISON: CT chest November 17, recent chest radiographs of December 3-December 6 HISTORY: ORDERING SYSTEM PROVIDED HISTORY: resp failure TECHNOLOGIST PROVIDED HISTORY: Reason for exam:->resp failure What reading provider will be dictating this exam?->CRC Left upper lobe mass FINDINGS: Persistent opacity with loss of volume caused by an expansile encasing mass in the left upper lobe, as seen previously. Endotracheal tube in good position at the level of the upper contour of the arch of the aorta. NG tube projects below the diaphragma in the area of the stomach. Right-sided PICC line tip in the superior vena cava. No pneumothorax on the right on the left. Increased density seen both lower lung bases relate with a component of posterior located bilateral pleural effusions. No significant change since the recent study of December 6. ASSESSMENT/PLAN:  61 y.o. male with acute respiratory failure unable to be weaned from the ventilator    Overall care per Primary  Critical care per ICU team  Wife requesting discussion with ICU team regarding need for trach/peg  He is having chest tube placement today by Pulmonology/Critical Care  Thrombocytopenia likely 2/2 SCC, today platelets [id number]. If family agreeable will proceed with open tracheostomy in the OR with PEG. Will need to be done in the OR due to his body habitus and thrombocytopenia for appropriate hemostasis. Scheduling TBD when medically appropriate      Plan discussed with Dr. Lion Drafts.     Sumaya Tran DO  Resident, PGY-2  12/8/2020  10:18 AM    [facility]   Attending Physician Statement:    I personally saw, examined and provided care for the patient. Radiographs, labs and medication list were reviewed by me independently. The case was discussed in detail and plans for care were established. Review of Residents documentation was conducted and revisions were made as appropriate. I agree with the above documented exam, problem list and plan of care. Plan on Trach-Peg this week in OR due to body habitus and thrombocytopenia if wife is agreeable. Overall prognosis poor    Majo Jack MD, FACS  12/8/2020  4:16 PM      NOTE: This report was transcribed using voice recognition software. Every effort was made to ensure accuracy; however, inadvertent computerized transcription errors may be present.

## 2020-12-08 NOTE — FLOWSHEET NOTE
Pt continues to reach for lines and tubes despite attempts to deter. Restraints continued for pt safety.  Patrick Bueno

## 2020-12-08 NOTE — PROGRESS NOTES
Dr. Dwight Moulton notified that the patient wife would like to speak with him re: CT insertion and Trach and peg procedure. Dr. Janki Pitts notified of the above while rounding this morning. Per Dr. Janki Pitts, he can speak with wife if she would like after speaking with Dr. Dwight Moulton. T/P will have to be done in OR d/t patient platelet count.        Gerhardt Lamy, RN  12/8/2020  10:36 AM

## 2020-12-08 NOTE — PROGRESS NOTES
Coordination of care discussion and chart review with PM team.Pt seen in icu, he is intubated, diagnosis of lung mass that was diagnosed as small cell carcinoma and was transferred here for palliative radiation. Pt is , next of kin is his wife Priya Solis. No advance directives on file.  will remain available for on-going psychosocial support, as needed.

## 2020-12-08 NOTE — ACP (ADVANCE CARE PLANNING)
ADVANCED CARE PLANNING    Patient Name: Fide Whitten. YOB: 1960              MRN:    [id number]  Admission Date:  12/3/2020 11:34 PM      Active Diagnoses: Active Problems:    ARF (acute respiratory failure) (HCC)  Resolved Problems:    * No resolved hospital problems. *      These active diagnoses are of sufficient risk that focused discussion on advanced care planning is indicated in order to allow the patient to thoughtfully consider personal goals of care; and, if situations arise that prevent the patient to personally give input, to ensure appropriate representation of their personal desire for different levels and levels of care. Person(s) present in discussion: Fide Whitten., Cherry Garcia DO, Family members:wife Cheri Saint. Discussion: I reviewed his admission for the above diagnoses and his desires for ongoing aggresive care, including potential intubation and mechanical ventilation; also discussed who would speak on his behalf should he be unable to do so and discussed what conversations he has had with his family so they understand his desires if such a situation occurred now or in the future. I presented and explained the availability of our palliaitve care team to him, which he will review with his family this evening and then fill out. PLAN:  Code Status: At this time patient's wife wishes patient to be Full Code      Time Spent on Advanced Planning Documents: 23 minutes    Fide Whitten.   [address] [place] [address]

## 2020-12-08 NOTE — PLAN OF CARE
Problem: Restraint Use - Nonviolent/Non-Self-Destructive Behavior:  Goal: Absence of restraint-related injury  Description: Absence of restraint-related injury  12/7/2020 2200 by Clay Figueroa  Outcome: Met This Shift     Problem: Skin Integrity:  Goal: Will show no infection signs and symptoms  Description: Will show no infection signs and symptoms  Outcome: Met This Shift     Problem: Skin Integrity:  Goal: Absence of new skin breakdown  Description: Absence of new skin breakdown  Outcome: Met This Shift     Problem: Falls - Risk of:  Goal: Will remain free from falls  Description: Will remain free from falls  Outcome: Met This Shift     Problem: Falls - Risk of:  Goal: Absence of physical injury  Description: Absence of physical injury  Outcome: Met This Shift     Problem: Restraint Use - Nonviolent/Non-Self-Destructive Behavior:  Goal: Absence of restraint indications  Description: Absence of restraint indications  12/7/2020 2200 by Clay Figueroa  Outcome: Not Met This Shift

## 2020-12-08 NOTE — PLAN OF CARE
Problem: Restraint Use - Nonviolent/Non-Self-Destructive Behavior:  Goal: Absence of restraint indications  Description: Absence of restraint indications  12/8/2020 1005 by Rocío Fuentes RN  Outcome: Not Met This Shift     Problem: Restraint Use - Nonviolent/Non-Self-Destructive Behavior:  Goal: Absence of restraint-related injury  Description: Absence of restraint-related injury  12/8/2020 1005 by Rocío Fuentes RN  Outcome: Met This Shift

## 2020-12-08 NOTE — ONCOLOGY
Patient received radiation treatment 1/4 to his chest. Per Dr. Vasu Matt prescription patient received 400cGy of radiation.

## 2020-12-08 NOTE — PROGRESS NOTES
[address] [facility]  Department of Internal Medicine   Internal Medicine Residency   MICU Progress Note    Patient:  Iggy Mcdaniels 61 y.o. male  MRN: [id number]     Date of Service: 12/8/2020    Allergy: Bee venom  follow up ARF  Change in mental status   Subjective     Patient is examined and seen in the room. Currently sedated with fentanyl and versed , vital signs stable , not responding to verbal stimuli . ACVC 20/480/55%/8, ABG 7.5/37/70/29, went through one session of lung radiation therapy yesterday and one session today, will continue, will try wean off vent and extubate, if not successful, will planTrach and PEG availability , thoracentesis pending outcome of radiation therapy reassess after radiation therapy    Overnight, hypokalemia. K replaced. Objective     VS: /78   Pulse 97   Temp 99.7 °F (37.6 °C) (Bladder)   Resp 20   Ht 5' 11\" (1.803 m)   Wt (!) 336 lb (152.4 kg)   SpO2 93%   BMI 46.86 kg/m²           I & O - 24hr:     Intake/Output Summary (Last 24 hours) at 12/8/2020 1851  Last data filed at 12/8/2020 1800  Gross per 24 hour   Intake 3366 ml   Output 3270 ml   Net 96 ml       Physical Exam:  General Appearance: Intubated, sedated, and   Lung:  breath sound congruent with the vent, (+) crackles and wheezing, marked reduced air entry to Lt lung  Heart: regular rate and rhythm, S1, S2 normal, no murmur, click, rub or gallop  Extremities:  extremities normal, atraumatic, no cyanosis or edema  Neurologic: Mental status: Sedated.   Not following commands        Lines     site day    Art line   None    TLC None    PICC Lt basilic midline  16   Hemoaccess None    Oxygen:       Mechanical Ventilation:   Mode:ACVC 20/480/55%/8, ABG 7.5/37/70/29  ABG:     Lab Results   Component Value Date    PH 7.514 12/08/2020    PCO2 37.1 12/08/2020    PO2 69.9 12/08/2020    DDL4INV 36.0 11/29/2020    HCO3 29.2 12/08/2020    BE 5.9 12/08/2020    THB 9.8 12/08/2020    O2SAT 93.6 12/08/2020 Medications     Infusions: (Fluid, Sedation, Vasopressors)  IVF:    D5W 60 ml/hr  Vasopressors   (-) pressors  Sedation   Fentanyl 100, Versed 10    Nutrition:   NPO  ATB:   Antibiotics  Days   cefepime 5               Labs     CBC with Differential:    Lab Results   Component Value Date    WBC 4.9 12/08/2020    RBC 2.88 12/08/2020    HGB 8.1 12/08/2020    HCT 26.9 12/08/2020    PLT 28 12/08/2020    MCV 93.4 12/08/2020    MCH 28.1 12/08/2020    MCHC 30.1 12/08/2020    RDW 17.3 12/08/2020    NRBC 3.0 12/08/2020    SEGSPCT 62 12/29/2011    BLASTSPCT 0.9 11/21/2020    METASPCT 1.0 12/08/2020    LYMPHOPCT 17.0 12/08/2020    PROMYELOPCT 0.9 11/21/2020    MONOPCT 4.0 12/08/2020    MYELOPCT 0.9 12/06/2020    BASOPCT 0.0 12/08/2020    MONOSABS 0.20 12/08/2020    LYMPHSABS 0.83 12/08/2020    EOSABS 0.10 12/08/2020    BASOSABS 0.00 12/08/2020     CMP:    Lab Results   Component Value Date     12/08/2020    K 3.3 12/08/2020    K 4.4 12/04/2020    CL 93 12/08/2020    CO2 28 12/08/2020    BUN 52 12/08/2020    CREATININE 1.1 12/08/2020    GFRAA >60 12/08/2020    LABGLOM >60 12/08/2020    GLUCOSE 396 12/08/2020    GLUCOSE 120 12/31/2011    PROT 4.8 12/08/2020    LABALBU 2.5 12/08/2020    LABALBU 4.5 12/28/2011    CALCIUM 8.2 12/08/2020    BILITOT 0.8 12/08/2020    ALKPHOS 153 12/08/2020    AST 88 12/08/2020    ALT 81 12/08/2020       Imaging Studies:  CXR 12/06/20, No interval change.  Complete opacification left mid and upper lung.  Correlate bronchoscopy as warranted to exclude endobronchial obstruction. Resident's Assessment and Plan     Yazmin Koehler. is a 61 y.o. male with past medical history of STEMI s/p 5 stents type 2 diabetes mellitus, chronic low back pain, hypertension, abdominal aortic aneurysm s/p endovascular repair admitted to [facility] on November 17 with shortness of breath and low platelet count. Patient was found to have CAP and small cell carcinoma. Currently intubated. Patient is transferred to [facility] for trach and PEG/palliative radiation. Neurology   Patient is sedated with fentanyl and Versed  Not responsive to verbal stimuli  Monitor mentation    Cardiology  Vital signs stable, not on pressor, monitor, on ICU telemetry    CAD status post 5 stents  Home Meds aspirin and Plavix and Lipitor  Hold aspirin and Plavix due to patient's thrombocytopenia    History of aortic aneurysm  Status post endovascular repair  Stable, monitor    Pulmonology  Acute hypoxic hypercapnic respiratory failure secondary to obstructive pneumonia in the setting of small cell carcinoma of the lung  Patient is a transfer from [facility] post 1 trial of extubation, desaturations of there after, reintubated and same day  ACVC 20/480/55%/8, ABG 7.5/37/70/29  Candidate for trach and PEG, pending procedure availability likely next Monday  Candidate of palliative radiation therapy. ICU team opined that pt may benefit from radiation therapy that is likely the primary reason of obstruction. Dec 7, pt underwent one session of radiation therapy of the lung. Dec 8,  went through one session of lung radiation therapy yesterday and one session today, will continue, will try wean off vent and extubate, if not successful, will planTrach and PEG availability , thoracentesis pending outcome of radiation therapy reassess after radiation therapy  awaiting Trach and PEG availability, plan thoracentesis tomorrow   Continue bronchodilator  Monitor    Obstructive pneumonia in the setting of small cell carcinoma of the lung  Patient with small cell carcinoma with left upper lobe opacity with large left pleural effusion along with mediastinal lymphadenopathy. His CT scan of abdomen and pelvis did not show evidence of intra-abdominal metastasis. Respiratory cultures gram-negative clifford. Probable Pseudomonas.   On cefepime, ID following patient      Small cell carcinoma of the lung  CTA showing a mass, no obvious mucous plugging on the left upper lobe. Very edematous and compressed airway. Biopsies immunoprofile compatible with small cell carcinoma of the lung  Patient with small cell carcinoma with left upper lobe opacity with large left pleural effusion along with mediastinal lymphadenopathy. His CT scan of abdomen and pelvis did not show evidence of intra-abdominal metastasis. Candidate of palliative radiation therapy. ICU team opined that pt may benefit from radiation therapy that is likely the primary reason of obstruction. Some nursing staffing issues with the urgent radiation therapy scheduled for this Friday. Hematology oncology following, recommend palliative care given poor diagnosis  Radiation oncology following the patient, rec No treatment over the weekend      Nephrology  ISIDRO stage II, likely prerenal secondary to diuresis,   December 5, creatinine 1.2 BUN 62  bumex 2mg QD, and acetazolamide  monitor bmp and renal function  Nephrology following pt, rec  · Continue D5W @ 60 mL/hr until free water can be started via NGT/PEG  · Resume diuretics,acetazolamide 250 mg via PEG bid, Bumex 2 mg IV QD  · Replace potassium  · Continue to monitor CMP Q 12 hours  · Continue to maintain strict I&Os      Hematology and oncology  Thrombocytopenia, secondary to underlying sepsis/bacteremia versus myelophthisis secondary to malignancy  Status post transfusion of platelets x2  Hematology following  Consider peripheral blood smear, and BM for differential diagnosis if pt improves  Platelet 20, transfusion target 10, will coordinated with surgical procedures trach and peg and thoracentesis for blood platelet transfusion    Normocytic anemia, secondary to underlying sepsis/bacteremia versus myelophthisis secondary to malignancy  Status post PRBC transfusion  Hb stable, 7.4  Follow-up folate B12 and iron panel      Endocrinology  Type 2 diabetes  On Lantus 40 nightly. High-dose sliding scale.   Continue monitor blood glucose every 4 hour. DVT/GI prophy: lovenox/pepcid  Disposition plan: Continue current management        Truong Fairbanks MD, PGY-1    Attending physician: Dr. Ed Manzano personally saw, examined and provided care for the patient. Radiographs, labs and medication list were reviewed by me independently. I spoke with bedside nursing, therapists and consultants. Critical care services and times documented are independent of procedures and multidisciplinary rounds with Residents. Additionally comprehensive, multidisciplinary rounds were conducted with the MICU team. The case was discussed in detail and plans for care were established. Review of Residents documentation was conducted and revisions were made as appropriate. I agree with the above documented exam, problem list and plan of care. Small cell Lung cancer with pseudomonas pneumonia - receiving palliative radiation  On the vent - poss trach,peg once platelets improve  OMER obstruction  On cefepime  Steroids  Discussed with wife in details we will wait another 24 to 48 hours to get radiation effect, and there is no opening in the main airway then patient definitely need a trach PEG so he can continue his chemotherapy and radiation as outpatient I am also may need another bronchoscopy to assess the airway as I doubt there is any benefit of thoracentesis without lung opening up ID following      Care reviewed with nursing staff, medical and surgical specialty care, primary care and the patient's family as available. Chart review/lab review/X-ray viewing/documentation and had long Conversation with patient/family re: prognosis, care options and any end of life issues:      Critical care time spent reviewing labs/films, examining patient, collaborating with other physicians more than 35  Minutes  excluding procedures . Sunil Bhardwaj M.D.   12/8/2020  10:58 PM

## 2020-12-08 NOTE — PLAN OF CARE
Problem: Restraint Use - Nonviolent/Non-Self-Destructive Behavior:  Goal: Absence of restraint indications  Description: Absence of restraint indications  12/8/2020 1746 by Jordon Vaughn RN  Outcome: Not Met This Shift  12/8/2020 1005 by Jordon Vaughn RN  Outcome: Not Met This Shift  Goal: Absence of restraint-related injury  Description: Absence of restraint-related injury  12/8/2020 1746 by Jordon Vaughn RN  Outcome: Met This Shift  12/8/2020 1005 by Jordon Vaughn RN  Outcome: Met This Shift

## 2020-12-08 NOTE — CARE COORDINATION
12/8 Care Coordination: Malvina Gosselin was a transfer to [address] [place] [address] on 12/3 from [facility] for Palliative Radiation Rx's. He remains Vented and for Trach/Peg possibly today. Wife wants to speak with physician prior. Also Palliative Care was consulted. Backdoor referral to both Simona Tirado and Patrizia. Await decision from wife after updated by physician and Palliative Care. CM/SW will continue to follow for discharge planning.    Huong BARDALESN,RN--[phone]

## 2020-12-08 NOTE — CONSULTS
Palliative Care Department  [phone]  Palliative Care Initial Consult  Provider [place] APRN-CNP, AGACNP-BC    Denise Stinson  [id number]  Hospital Day: 6  Date of Initial Consult: 12/8/2020  Referring Provider: Christine Lopez MD   Palliative Medicine was consulted for assistance with: Goals of care, CODE STATUS discussion, family support, symptom management    HPI:   Denise Farah is a 61 y.o. with a past medical history of DM, smoker, obesity, HTN, HLD, STEMI, hiatal hernia, depression, CAD s/p stents x5, AAA s/p endovascular repair , arthritis, MANOLO on CPAP. SCLC diagnosed by biopsy 11/24. Not accepted at [facility] for management, continued management at [address] [place] [address], was tolerating PSV, extubated to BiPAP, however reintubated shortly after and now admitted on 12/3/2020 from [address] [place] [address] for further management and possible placement of trach and peg. ASSESSMENT/PLAN:     Pertinent Hospital Diagnoses      Acute hypoxic hypercapnic respiratory failure/ SCLC L lung  - intubated, NSICU, Trach / PEG, Palliative radiation, Palliative consult       Palliative Care Encounter / Counseling Regarding Goals of Care  Please see detailed goals of care discussion as below   At this time, Denise Farah, Does Not have capacity for medical decision-making.   Capacity is time limited and situation/question specific   During encounter Kisha Whitt was surrogate medical decision-maker   Outcome of goals of care meeting:   Code status Full Code   Advanced Directives: no POA or living will in [facility]   Surrogate/Legal NOK:    Jose C Bile [phone] spouse     Spiritual assessment: no spiritual distress identified  Bereavement and grief: to be determined  Referrals to: none today  SUBJECTIVE:     Current medical issues leading to Palliative Medicine involvement include   Active Hospital Problems    Diagnosis Date Noted    ARF (acute respiratory failure) ([facility] [address] [J96.00] 12/02/2020       Details of Conversation:   Saw patient at the bedside, remains on ventilator at this time. Reviewed patient's chart, review of patient with staff RN, and with respiratory therapist.  Introduced myself and palliative care to the patient's wife, and contact information provided. Went over [address] levels in great detail. Patient's wife wants her  to remain a FULL CODE STATUS at this time. Everything started when the patient had an epidural on November 17 and the pain management doctor said I don't like how you are breathing and made him get admitted. Since then the patient has had multiple ups and downs. The patient was diagnosed with SCLC for which he needs radiation to shrink mass according to wife. The patient was at a [facility] and transferred here for further treatment and placement of trach and PEG. Wife thanked me for the call. Stated that she is happy I called her before the physicians reached out to her. Patient and wife have been  for 36 years and have 4 grown sons. Wife stated she has a lot riding on the decisions she is about to make.    ------The wife is patiently waiting for oncology to call her, and pulmonology to call her. Wife stated that she will not sign any consents until she speaks to both of them. She needs to know the prognosis / long-term life expectancy for her  from oncology. And she wants to now if the trach will be long-term or short-term per pulmonology.      OBJECTIVE:   Prognosis: Per input of consults and Poor    Physical Exam:  BP (!) 167/96   Pulse 105   Temp 100.4 °F (38 °C) (Bladder)   Resp 21   Ht 5' 11\" (1.803 m)   Wt (!) 336 lb (152.4 kg)   SpO2 93%   BMI 46.86 kg/m²   Gen:  ETT/Vent Elderly, NAD, sedated  HEENT:  Normocephalic, atraumatic, mucosa moist, EOMI  Neck:  Supple, trachea midline, no JVD  Lungs:  Diminished bilaterally, rhonchi noted  Heart[de-identified]  ST-monitor, distant heart tones, no murmur, rub, or gallop noted during exam  Abd:  Soft, non tender, rounded, bowel sounds present  : Catheter  Ext:  Moving all extremities, 3+ BUE (hands) / 2+ BLE  edema, pulses /doppler present, R PICC   Skin:  Warm /cool and dry  Neuro:  PERRL, Responds to voice/called by name; following commands, intubated / sedated     Objective data reviewed: labs, images, records, medication use, vitals and chart    Discussed patient and the plan of care with the other IDT members: Palliative Medicine IDT Team, Floor Nurse, Patient and Family    Time/Communication  Greater than 50% of time spent, total 70 minutes in counseling and coordination of care at the bedside regarding Code status, family support , goals of care and symptom management. Thank you for allowing Palliative Medicine to participate in the care of Isaac Kelly. .    Note: This report was completed using [email or web address] voiced recognition software. Every effort has been made to ensure accuracy; however, inadvertent computerized transcription errors may be present.

## 2020-12-09 NOTE — PROGRESS NOTES
[address] [facility]  Department of Internal Medicine   Internal Medicine Residency   MICU Progress Note    Patient:  Jg Patel. 61 y.o. male  MRN: [id number]     Date of Service: 12/9/2020    Allergy: Bee venom  Follow up lung cancer  Subjective     Patient seen and examined this morning. Currently intubated, and sedated with fentanyl and Versed. Off Bumex drip. Plan is for day 3 of palliative radiation today. CXR remains unchanged from yesterday. No acute events overnight. Objective     VS: /74   Pulse 85   Temp 99.3 °F (37.4 °C) (Bladder)   Resp 16   Ht 5' 11\" (1.803 m)   Wt (!) 339 lb (153.8 kg)   SpO2 93%   BMI 47.28 kg/m²           I & O - 24hr:     Intake/Output Summary (Last 24 hours) at 12/9/2020 1414  Last data filed at 12/9/2020 1200  Gross per 24 hour   Intake 1918 ml   Output 1500 ml   Net 418 ml       Physical Exam:  · General Appearance: Intubated and sedated  · Neck: no adenopathy, no carotid bruit, no JVD, supple, symmetrical, trachea midline and thyroid not enlarged, symmetric, no tenderness/mass/nodules  · Lung: Reduced air entry to left lung, crackles on right  · Heart: regular rate and rhythm, S1, S2 normal, no murmur, click, rub or gallop  · Abdomen: soft, non-tender; bowel sounds normal; no masses,  no organomegaly  · Extremities:  extremities normal, atraumatic, no cyanosis or edema  · Musculoskeletal: No joint swelling, no muscle tenderness. ROM normal in all joints of extremities.    · Neurologic: Mental status: Sedated    Lines     site day    Art line   None    TLC None    PICC R Arm 2   Hemoaccess None      Mechanical Ventilation:   Mode: A/C   TV:480 ml RR: 20  PEEP 8cmH2O FiO2 55    ABG:     Lab Results   Component Value Date    PH 7.469 12/09/2020    PCO2 43.5 12/09/2020    PO2 75.6 12/09/2020    EGU0AUS 36.0 11/29/2020    HCO3 30.9 12/09/2020    BE 6.5 12/09/2020    THB 9.2 12/09/2020    O2SAT 94.4 12/09/2020        Medications     Infusions: (Fluid, Sedation, Vasopressors)  IVF:    D5 60 cc/h  Vasopressors   None  Sedation   Fentanyl 200, Versed 10    Nutrition:   NPO    ATB:   Antibiotics  Days   Cefepime 6             Labs     CBC with Differential:    Lab Results   Component Value Date    WBC 5.1 12/09/2020    RBC 2.87 12/09/2020    HGB 8.0 12/09/2020    HCT 26.8 12/09/2020    PLT 27 12/09/2020    MCV 93.4 12/09/2020    MCH 27.9 12/09/2020    MCHC 29.9 12/09/2020    RDW 17.4 12/09/2020    NRBC 4.3 12/09/2020    SEGSPCT 62 12/29/2011    BLASTSPCT 0.9 11/21/2020    METASPCT 1.0 12/08/2020    LYMPHOPCT 5.2 12/09/2020    PROMYELOPCT 0.9 11/21/2020    MONOPCT 1.7 12/09/2020    MYELOPCT 0.9 12/06/2020    BASOPCT 0.2 12/09/2020    MONOSABS 0.10 12/09/2020    LYMPHSABS 0.26 12/09/2020    EOSABS 0.09 12/09/2020    BASOSABS 0.00 12/09/2020     CMP:    Lab Results   Component Value Date     12/09/2020    K 3.6 12/09/2020    K 4.4 12/04/2020    CL 99 12/09/2020    CO2 31 12/09/2020    BUN 57 12/09/2020    CREATININE 1.1 12/09/2020    GFRAA >60 12/09/2020    LABGLOM >60 12/09/2020    GLUCOSE 154 12/09/2020    GLUCOSE 120 12/31/2011    PROT 4.9 12/09/2020    LABALBU 2.5 12/09/2020    LABALBU 4.5 12/28/2011    CALCIUM 8.8 12/09/2020    BILITOT 0.7 12/09/2020    ALKPHOS 160 12/09/2020    AST 73 12/09/2020    ALT 79 12/09/2020       Imaging Studies:      Resident's Assessment and Plan     Mr. Nicolás Perdomo is a 80-year-old male with PMHx of STEMI s/p 5 stents, T2DM, chronic low back pain, HTN, AAA s/p repair initially admitted to [facility] on 11/17 for shortness of breath and thrombocytopenia, was found to have CAP and small cell lung carcinoma. He was intubated and transferred to [facility] for urgent palliative radiation, possible trach and PEG.     Neurology:   - Sedated with fentanyl and Versed  - Continue to monitor neurologic status    Cardiology:   History of CAD s/p 5 stents  - Continue to hold aspirin and Plavix due to thrombocytopenia    Pulmonary: Acute hypoxic respiratory failure 2/2 Obstructive pneumonia in the setting of small cell carcinoma of the left lung  CTA chest showed left upper lobe mass with edematous and compressed airway. Biopsy confirmed small cell carcinoma of the lung. CT abdomen/pelvis did not show intra-abdominal metastasis. - ID, hematology/oncology, radiation oncology following; appreciate recommendations  - Respiratory culture grew gram-negative rods  - Current vent settings 20/480/55/8  - ABG 7.46/43.5/75.6/30.9, PF 1.37  - Chest x-ray today unchanged from yesterday still with white out of the left upper lung  - For day 3/4 of palliative radiation therapy today  - Plan is for possible trach and PEG or thoracentesis if with no improvement on palliative - radiation  - Continue cefepime (D6)    Nephrology (Fluids/ Electrolytes & Nutrition): ISIDRO stage II likely prerenal 2/2 diuresis  - Nephrology following, patient recommendations  - Continue Diamox 250 twice daily  - Continue Bumex 2 mg once daily  - Continue D5 water 60 cc/h    Hematology/ Oncology: Thrombocytopenia 2/2 sepsis/bacteremia versus malignancy  - Hematology/oncology following, appreciate recommendations  - Platelets 27 today  - Keep platelets > 20  - Transfuse appropriately    Normocytic anemia 2/2 sepsis/bacteremia versus malignancy  - Hematology/oncology following, appreciate recommendations  - Hgb 8 today  - Keep Hgb > 7  - Transfuse appropriately    Endocrine:   Type 2 diabetes mellitus  - -184  - On HDSS  - Continue to monitor glucose      DVT ppx:PCD  GI ppx: Pepcid      Final note: Continue palliative radiation therapy. Dr. Anju Koehler to contact wife of patient regarding further plans for trach and PEG if palliative radiation does not relieve obstruction on the left lung. Donnel Councilman, MD, PGY-1  Attending physician: Dr. Angei Shanks personally saw, examined and provided care for the patient.  Radiographs, labs and medication list were reviewed by me independently. I spoke with bedside nursing, therapists and consultants. Critical care services and times documented are independent of procedures and multidisciplinary rounds with Residents. Additionally comprehensive, multidisciplinary rounds were conducted with the MICU team. The case was discussed in detail and plans for care were established. Review of Residents documentation was conducted and revisions were made as appropriate. I agree with the above documented exam, problem list and plan of care. Continue radiation  Trach and PEG if no wenaing next 24   Discuss with wife   Vick Giuliano Mancia MD,[facility]  Pulmonary&Critical Care Medicine   Director of [address] [place] [address] Director of [address] [place] [address]    Jeff Mckay        .

## 2020-12-09 NOTE — ONCOLOGY
Patient received radiation treatment 2/4 to his chest. Per Dr. Alejandro Blackburn prescription patient received 400cGy of radiation.

## 2020-12-09 NOTE — PROGRESS NOTES
TAVIA PROGRESS NOTE      Chief complaint: Follow-up of VAP    The patient is a 61 y.o. super morbidly obese male with history of CAD, hypertension, hyperlipidemia, DM, transferred to [facility] on 12/04 for radiation therapy from [facility] where he initially presented on 11/17 for hypoxia requiring intubation after receiving a lumbar epidural steroid injection for his chronic low back pain, incidentally found to have left upper lobe postobstructive pneumonia from lung mass that was diagnosed to be small cell carcinoma biopsy and had respiratory culture growing Pseudomonas aeruginosa for which he was seen by Dr. Xochitl Hernandez. He has been having intermittent fever up to 102 °F since 11/23. Respiratory pathogen PCR panel including SARS-CoV-2 PCR on 11/17 and urine Legionella antigen on 11/18 were negative. He had respiratory culture on 11/23 and 11/24 which showed absent oropharyngeal aristides and moderate growth of Candida albicans deemed colonization. He had blood cultures on 11/21 growing coagulase-negative Staphylococcus in 1 out of 2 sets deemed contaminant. Repeat blood cultures on 11/24 showed no growth. Respiratory Gram stain and culture on 12/04 showed few polymorphonuclear leukocytes, no epithelial cells, no organisms, reduced oropharyngeal aristides, moderate growth of Pseudomonas aeruginosa (non-MDR). He received ceftriaxone and levofloxacin from 11/17-11/21, cefepime from 11/21-11/25, ampicillin-sulbactam from 11/25-12/03, cefepime since 12/03.     On transfer, he remains febrile at 100.6 °F with no leukocytosis. Chest x-ray showed dense left upper lobe consolidation similar to that since 11/17. Cefepime was resumed. Subjective: Patient was seen and examined. He just came back from radiation therapy. He remains in critical condition, intubated and sedated, not communicating.     Objective:  /80   Pulse 87   Temp 99.5 °F (37.5 °C) (Bladder)   Resp 20   Ht 5' 11\" (1.803 m)   Wt (!) 339 lb (153.8 kg) SpO2 94%   BMI 47.28 kg/m²   Constitutional: Intubated, no MV dyssynchrony  Respiratory: Clear breath sounds, no crackles, no wheezes  Cardiovascular: Regular rate and rhythm, no murmurs  Gastrointestinal: Bowel sounds present, soft, nontender  Skin: Warm and dry, no active dermatoses  Musculoskeletal: No joint swelling, no joint erythema    Laboratory:  Lab Results   Component Value Date    WBC 5.1 12/09/2020    WBC 5.5 12/08/2020    WBC 4.9 12/08/2020    HGB 8.0 (L) 12/09/2020    HCT 26.8 (L) 12/09/2020    MCV 93.4 12/09/2020    PLT 27 (L) 12/09/2020     Lab Results   Component Value Date    NEUTROABS 4.64 12/09/2020    NEUTROABS 3.77 12/08/2020    NEUTROABS 5.21 12/07/2020     Lab Results   Component Value Date    CRP 6.7 (H) 12/08/2020    CRP 2.5 (H) 11/25/2020     No results found for: CRP  Lab Results   Component Value Date    SEDRATE 82 (H) 11/25/2020     Lab Results   Component Value Date    ALT 79 (H) 12/09/2020    AST 73 (H) 12/09/2020    ALKPHOS 160 (H) 12/09/2020    BILITOT 0.7 12/09/2020     Lab Results   Component Value Date     12/09/2020    K 3.6 12/09/2020    K 4.4 12/04/2020    CL 99 12/09/2020    CO2 31 12/09/2020    BUN 57 12/09/2020    CREATININE 1.1 12/09/2020    CREATININE 1.1 12/08/2020    CREATININE 1.0 12/08/2020    GFRAA >60 12/09/2020    LABGLOM >60 12/09/2020    GLUCOSE 154 12/09/2020    GLUCOSE 120 12/31/2011    PROT 4.9 12/09/2020    LABALBU 2.5 12/09/2020    LABALBU 4.5 12/28/2011    CALCIUM 8.8 12/09/2020    BILITOT 0.7 12/09/2020    ALKPHOS 160 12/09/2020    AST 73 12/09/2020    ALT 79 12/09/2020       Radiology: CXR (12/08): Overall there is no interval change in the appearance of the chest x-ray when compared to prior studies. Persistent large focal dense consolidation is seen within the left upper lobe.        Microbiology:   Blood cx  No results found for: BLOODCULT1  Culture, Blood 2   Date Value Ref Range Status   11/29/2020 5 Days no growth  Final   11/24/2020 5 Days no growth  Final   11/21/2020   Final    This organism was isolated in one set. Susceptibility testing is not routinely done as this  organism frequently represents skin contamination. Additional testing can be ordered by calling the  Microbiology Department. Smear, Respiratory   Date Value Ref Range Status   12/05/2020   Final    Group 6: <25 PMN's/LPF and <25 Epithelial cells/LPF  Few Polymorphonuclear leukocytes  Few Epithelial cells  Rare Gram variable rods  Rare yeast  few Gram positive cocci in clusters         CULTURE, RESPIRATORY   Date Value Ref Range Status   12/05/2020 Oral Pharyngeal Iris reduced (A)  Final   12/05/2020 Rare growth  Final     Organism   Date Value Ref Range Status   12/05/2020 Pseudomonas aeruginosa (A)  Final       MRSA Culture Only   Date Value Ref Range Status   11/18/2020 Methicillin resistant Staph aureus not isolated  Final       Assessment:  Acute hypoxic respiratory failure  Pseudomonas aeruginosa VAP/HAP  Fever, resolved, multifactorial, probably associated with VAP/HAP and underlying malignancy  Small cell lung carcinoma    Recommendations:  Continue cefepime 2 g every 8 hours. Anticipate switch to oral levofloxacin 750 mg q24h on discharge to finish 14 days from 12/04-12/17. Continue supportive care.     Thank you for involving me in the care of 53Rafaela Kelly. . Dr. Madisyn Pro will continue to follow. Please do not hesitate to call for any questions or concerns.     Electronically signed by Candice Escamilal MD on 12/9/2020 at 8:38 AM

## 2020-12-09 NOTE — PLAN OF CARE
Problem: Restraint Use - Nonviolent/Non-Self-Destructive Behavior:  Goal: Absence of restraint-related injury  Description: Absence of restraint-related injury  12/9/2020 0226 by Giovanni Houston RN  Outcome: Met This Shift     Problem: Restraint Use - Nonviolent/Non-Self-Destructive Behavior:  Goal: Absence of restraint indications  Description: Absence of restraint indications  12/9/2020 0226 by Giovanni Houston RN  Outcome: Not Met This Shift

## 2020-12-09 NOTE — PROGRESS NOTES
Department of Internal Medicine  Nephrology Progress Note    Events reviewed. S/p radiation this morning. SUBJECTIVE:  We are following Dr. Anastasiya Owen for ISIDRO and volume management. He remains intubated and sedated. PHYSICAL EXAM:    VITALS:  /74   Pulse 91   Temp 99.3 °F (37.4 °C) (Bladder)   Resp 20   Ht 5' 11\" (1.803 m)   Wt (!) 339 lb (153.8 kg)   SpO2 92%   BMI 47.28 kg/m²   24HR INTAKE/OUTPUT:      Intake/Output Summary (Last 24 hours) at 12/9/2020 1211  Last data filed at 12/9/2020 1144  Gross per 24 hour   Intake 3236 ml   Output 1935 ml   Net 1301 ml       Constitutional:  Morbidly obese  male. Sedated, intubated.   Cardiovascular/Edema:  RRR,S1/S2  Respiratory:  ETT to vent, 55% PEEP 8, diminished bases bilaterally  Gastrointestinal:  Soft, obese, nondistended, bowel sounds hypoactive; + abdominal wall edema  Skin: Warm, dry, no lesions  1+ pitting edema BLE into hips, 1+ pitting edema abdominal wall, 1+ pitting bilateral forearms and hands      DATA:    CBC with Differential:    Lab Results   Component Value Date    WBC 5.1 12/09/2020    RBC 2.87 12/09/2020    HGB 8.0 12/09/2020    HCT 26.8 12/09/2020    PLT 27 12/09/2020    MCV 93.4 12/09/2020    MCH 27.9 12/09/2020    MCHC 29.9 12/09/2020    RDW 17.4 12/09/2020    NRBC 4.3 12/09/2020    SEGSPCT 62 12/29/2011    BLASTSPCT 0.9 11/21/2020    METASPCT 1.0 12/08/2020    LYMPHOPCT 5.2 12/09/2020    PROMYELOPCT 0.9 11/21/2020    MONOPCT 1.7 12/09/2020    MYELOPCT 0.9 12/06/2020    BASOPCT 0.2 12/09/2020    MONOSABS 0.10 12/09/2020    LYMPHSABS 0.26 12/09/2020    EOSABS 0.09 12/09/2020    BASOSABS 0.00 12/09/2020     CMP:    Lab Results   Component Value Date     12/09/2020    K 3.6 12/09/2020    K 4.4 12/04/2020    CL 99 12/09/2020    CO2 31 12/09/2020    BUN 57 12/09/2020    CREATININE 1.1 12/09/2020    GFRAA >60 12/09/2020    LABGLOM >60 12/09/2020    GLUCOSE 154 12/09/2020    GLUCOSE 120 12/31/2011    PROT 4.9 12/09/2020 LABALBU 2.5 12/09/2020    LABALBU 4.5 12/28/2011    CALCIUM 8.8 12/09/2020    BILITOT 0.7 12/09/2020    ALKPHOS 160 12/09/2020    AST 73 12/09/2020    ALT 79 12/09/2020     Magnesium:    Lab Results   Component Value Date    MG 1.7 12/08/2020     Phosphorus:    Lab Results   Component Value Date    PHOS 3.0 12/08/2020     Radiology Review:      CXR 12/8/2020   Overall there is no interval change in the appearance of the chest x-ray when    compared to prior studies. Persistent large focal dense consolidation is seen within the left upper lobe.             BRIEF SUMMARY OF INITIAL CONSULT & ADMISSION PRIOR TO TRANSFER:    Briefly, Mr. Tono Harris is a 61year old male with a PMH of Type II DM, HTN, HLD, CAD s/p stents x 5, AAA s/p endovascular repair and chronic low back pain who presented to [address] [address] on 11/17/2020 with complaints of SOB. He was hypoxic on room air. A CTA of the chest demonstrated PNA and pleural effusions. Labs were significant for thrombocytopenia. The following day an RRT was called for worsening SOB. He was placed on bipap but later intubated for worsening respiratory acidosis. A bronchoscopy was performed the same day (11/18) and it was noted that the OMER was significantly narrowed. A transbronchial needle aspiration was completed and three biopsies were obtained. On 11/24 biopsies + small cell lung CA. His wife had requested transfer to [facility] [employer] however he was not accepted/insurance did not cover. Nephrology was consulted for volume management and lasix and albumin were started. ID was consulted on 11/25 for persistent fevers. A second bronch was performed and Bumex gtt were started on 11/26. Bumex gtt was discontinued on 11/27 for worsening ISIDRO and hypernatremia. Free water was increased NGT to 100 mL/hr. On 11/28 D5W was also started. D5W increased to 100 mL/hr on 11/29, free water continued. D5W was discontinued and HCTZ with albumin were started BID on 11/30 but discontinued on 12/1. Free water via NGT continued at 100 mL/hr. General surgery was consulted on 12/1 for trach and PEG. On 12/2 D5W + 30 mEq KCL @ 50 mL/hr was started in addition to FW for persistent hypernatremia. On 12/3 he was tolerating PSV and was extubated to bipap however he required re-intubation shortly after. Transfer to [facility] [employer] [facility] was again attempted but he was not accepted. Decision was made to [place] for trach and PEG. Problems resolved: ISIDRO stage I 2/2 overt diuresis versus hemodynamically mediated?, resolved. Creatinine remains stable at baseline. IMPRESSION/RECOMMENDATIONS:      1. Hypernatremia with hypervolemia 2/2 to large volume resuscitation and HFpEF 55%, >19 liter + fluid balance upon transfer to this hospital after large volume free water administration. · Resolved with diuretics and D5W @ 60 mL/hr. 2. Volume management, > 19 L positive fluid balance prior to transfer from [facility] to [facility]. Currently 8L negative for average 11L + since initial admission. Volume status improving with diuretics. To continue with acetazolamide and bumex. 3. Hypokalemia diuretic induced, for supplementation. 4. Alkalemia, pH 6.721, with metabolic alkalosis (post hypercapnic and contraction). 5. HFpEF 55%, pro-BNP 2,533 >> 1948  6. Hypoalbuminemia/malnutrition  --------------------------------------------------------------    7. Acute hypoxic and hypercapnic respiratory failure s/p intubation, extubated and re-intubated on 12/3. For trach and PEG. 8. Post-obstructive PNA, cultures growing pseudomonas, on cefepime per ID. 9. Left pleural effusion, likely malignant. 10. Small cell carcinoma, biopsy confirmed. Receiving palliative radiation. 11. HLD on atorvastatin  12. Mild transaminitis  13. Type II DM on SSI, holding lantus  14. Normocytic anemia  15. Thrombocytopenia likely 2/2 malignancy. Heme-onc following.    16. Nutrition NPO      Plan:    · Continue D5W @ 60 mL/hr until free water can be started via NGT/PEG  · Continue acetazolamide 250 mg via PEG bid  · Continue Bumex 2 mg IV daily  · Replace potassium 20 mEq IV x1  · Continue to monitor CMP Q 12 hours  · Continue to maintain strict I&Os    Electronically signed by MATY Hammond CNP on 12/9/2020 at 12:11 PM

## 2020-12-09 NOTE — PROGRESS NOTES
Patricio Christian.  12/9/2020  2:32 PM      No chief complaint on file. Wt Readings from Last 3 Encounters:   12/09/20 (!) 339 lb (153.8 kg)   11/30/20 (!) 327 lb 2.6 oz (148.4 kg)   11/17/20 (!) 314 lb (142.4 kg)       Comments: Dose 1200 cGy in 3 fractions. Patient condition is the same. He is still an inpatient and on respirator.   He has no complaints pertaining to radiation      Plan: Radiation to continue as planned      Electronically signed by Jennifer Cook MD on 12/9/20 at 2:32 PM EST

## 2020-12-09 NOTE — ONCOLOGY
Patient received radiation treatment 3/4 to his chest. Per Dr. Josefa Brasher prescription patient received 400cGy of radiation.

## 2020-12-09 NOTE — PROGRESS NOTES
Palliative Care Department  [phone]  Palliative Care Progress Note  Provider [place] APRN-CNP, AGACNP-BC    Lola Lott  [phone]  Hospital Day: 7  Date of Initial Consult: 12/8/2020  Referring Provider: Theodora Cruz MD   Palliative Medicine was consulted for assistance with: Goals of care, CODE STATUS discussion, family support, symptom management    HPI:   Lola Grijalva is a 61 y.o. with a past medical history of DM, smoker, obesity, HTN, HLD, STEMI, hiatal hernia, depression, CAD s/p stents x5, AAA s/p endovascular repair , arthritis, MANOLO on CPAP. SCLC diagnosed by biopsy 11/24. Not accepted at [facility] for management, continued management at [facility], was tolerating PSV, extubated to BiPAP, however reintubated shortly after and now admitted on 12/3/2020 from [facility] for further management and possible placement of trach and peg. ASSESSMENT/PLAN:     Pertinent Hospital Diagnoses      Acute hypoxic hypercapnic respiratory failure/ SCLC L lung  - intubated, NSICU, Trach / PEG, Palliative radiation, Palliative consult       Palliative Care Encounter / Counseling Regarding Goals of Care  Please see detailed goals of care discussion as below   At this time, Lola Grijalva, Does Not have capacity for medical decision-making.   Capacity is time limited and situation/question specific   During encounter Matt Yin was surrogate medical decision-maker   Outcome of goals of care meeting:   Code status Full Code   Advanced Directives: no POA or living will in epic   Surrogate/Legal NOK:    Danitza Man [phone] spouse     Spiritual assessment: no spiritual distress identified  Bereavement and grief: to be determined  Referrals to: none today  SUBJECTIVE:     Current medical issues leading to Palliative Medicine involvement include   Active Hospital Problems    Diagnosis Date Noted    ARF (acute respiratory failure) ([facility] [address] [J96.00] 12/02/2020       Details of Conversation: 12/9/2020 Call returned to patient's wife. Cesar Jimenez received calls from radiation oncology yesterday and pulmonology yesterday. Waiting for Dr. Riley García to call her. Stated that he must have dialed incorrectly by mistake and to please have him call her again. Cesar Jimenez was able to speak to all of her family and now has more questions moving forward. She has the following questions: Can the patient get a Lobectomy to remove the cancer? How far can the patient be brought out of sedation? Does the patient need sedation on the vent? Is surgery an option? Cesar Jimenez called the [facility] and the family wants to donate platelets allocated to the Hospital for her , but the [facility] said that an order needs placed by the hospital.  Stated that she knows her  is \"O Positive\" . She talked to the special collections department for platelets. Will follow along daily to help the family with their questions and try help them moving forward. Cesra Jimenez and family want the patient to remain a FULL code status.     -Went down to the unit and spoke to the staff RN. Patient's wife is coming to visit today from 200 to 5 PM.     12/8/2020  Saw patient at the bedside, remains on ventilator at this time. Reviewed patient's chart, review of patient with staff RN, and with respiratory therapist.  Introduced myself and palliative care to the patient's wife, and contact information provided. Went over [address] levels in great detail. Patient's wife wants her  to remain a FULL CODE STATUS at this time. Everything started when the patient had an epidural on November 17 and the pain management doctor said I don't like how you are breathing and made him get admitted. Since then the patient has had multiple ups and downs. The patient was diagnosed with SCLC for which he needs radiation to shrink mass according to wife. The [facility] [address] and transferred here for further treatment and placement of trach and PEG.   Wife thanked me for the call. Stated that she is happy I called her before the physicians reached out to her. Patient and wife have been  for 36 years and have 4 grown sons. Wife stated she has a lot riding on the decisions she is about to make.    ------The wife is patiently waiting for oncology to call her, and pulmonology to call her. Wife stated that she will not sign any consents until she speaks to both of them. She needs to know the prognosis / long-term life expectancy for her  from oncology. And she wants to now if the trach will be long-term or short-term per pulmonology. OBJECTIVE:   Prognosis: Per input of consults and Poor    Physical Exam:  /74   Pulse 86   Temp 99.5 °F (37.5 °C) (Bladder)   Resp 20   Ht 5' 11\" (1.803 m)   Wt (!) 339 lb (153.8 kg)   SpO2 93%   BMI 47.28 kg/m²   Gen:  ETT/Vent Elderly, NAD, sedated  HEENT:  Normocephalic, atraumatic, mucosa moist, EOMI  Neck:  Supple, trachea midline, no JVD  Lungs:  Diminished bilaterally, rhonchi noted  Heart[de-identified]  ST-monitor, distant heart tones, no murmur, rub, or gallop noted during exam  Abd:  Soft, non tender, rounded, bowel sounds present  :  Catheter  Ext:  Moving all extremities, 3+ BUE (hands) / 2+ BLE  edema, pulses /doppler present, R PICC   Skin:  Warm /cool and dry  Neuro:  PERRL, Responds to voice;  intubated / sedated     Objective data reviewed: labs, images, records, medication use, vitals and chart    Discussed patient and the plan of care with the other IDT members: Palliative Medicine IDT Team, Floor Nurse, Patient and Family    Time/Communication  Greater than 50% of time spent, total 35 minutes in counseling and coordination of care at the bedside regarding Code status, family support , goals of care and symptom management. Thank you for allowing Palliative Medicine to participate in the care of Isaac Kelly. .    Note: This report was completed using computerMyVerse voiced recognition software.   Every effort has been made to ensure accuracy; however, inadvertent computerized transcription errors may be present.

## 2020-12-09 NOTE — PROGRESS NOTES
[address] [facility]  PULMONARY/CRITICAL CARE PROGRESS NOTE    Patient: Mando Cowan. MRN: [id number]  : 1960    Encounter Date: 2020  Encounter Time: 2:25 PM     Date of Admission: .12/3/2020 11:34 PM    Consulting Physician:  Primary Care Physician:     Ruiz Cardona     [phone]    Reason for Consultation: Respiratory Failure     Problem List:  Patient Active Problem List   Diagnosis    CAD (coronary artery disease)    HTN (hypertension)    Hyperlipemia    Presence of stent in left circumflex coronary artery    Smoker    Aortic valve stenosis    Primary osteoarthritis of right knee    Non-insulin dependent type 2 diabetes mellitus ([address]    Chronic pain syndrome    Chronic pain of right knee    Status post total right knee replacement    Morbid obesity with BMI of 40.0-44.9, adult ([address]    Chronic bilateral low back pain with bilateral sciatica    Right hip pain    DDD (degenerative disc disease), lumbar    AAA (abdominal aortic aneurysm) ([address]    Lumbar radiculopathy    Lumbosacral spondylosis without myelopathy    Obesity    Chronic back pain    Spinal stenosis of lumbar region with neurogenic claudication    Acute respiratory failure with hypoxia ([address]    Community acquired pneumonia of left upper lobe of lung    Thrombocytopenia ([address]    Small cell lung cancer ([address]    ARF (acute respiratory failure) ([address]     SUBJECTIVE: Patient seen and examined. Patient remains on vent with no increased work of breathing, fevers, chills noted. Patient remains on Fentanyl, Versed and ACVC 20/480/55/8. XRT initiated on 2020. HOME MEDICATIONS:  Prior to Admission medications    Medication Sig Start Date End Date Taking?  Authorizing Provider   loratadine (CLARITIN) 10 MG capsule Take 10 mg by mouth daily    Historical Provider, MD   pioglitazone (ACTOS) 15 MG tablet TAKE 1 TABLET BY MOUTH EVERY DAY 20   Marie Barrera PA-C   pregabalin (LYRICA) 150 MG capsule Take 1 capsule by mouth 2 times daily for 30 days. 11/12/20 12/12/20  Korey Bowles DO   diclofenac (VOLTAREN) 75 MG EC tablet TAKE 1 TABLET BY MOUTH TWICE A DAY 10/7/20   Historical Provider, MD   oxyCODONE-acetaminophen (PERCOCET) 7.5-325 MG per tablet Take 1 tablet by mouth 3 times daily as needed for Pain for up to 30 days.  Intended supply: 30 days 11/12/20 12/12/20  Korey Bowles DO   quinapril (ACCUPRIL) 10 MG tablet Take 1 tablet by mouth daily  Patient taking differently: Take 10 mg by mouth daily Wife states on hold 10/16/20   Ivan Munoz PA-C   metFORMIN (GLUCOPHAGE) 500 MG tablet TAKE 1 TABLET BY MOUTH TWICE A DAY WITH MEALS  Patient taking differently: Wife states on hold 10/8/20   Moiz Moise DO   fluticasone (FLONASE) 50 MCG/ACT nasal spray 2 sprays by Nasal route daily 10/6/20   Ivan Munoz PA-C   omega-3 acid ethyl esters (LOVAZA) 1 g capsule take 1 capsule twice a day 8/14/20   Ivan Munoz PA-C   niacin (SLO-NIACIN) 500 MG extended release tablet take 1 tablet by mouth once daily 7/20/20   Moiz Moise DO   clopidogrel (PLAVIX) 75 MG tablet TAKE 1 TABLET BY MOUTH EVERY DAY  Patient taking differently: Take 75 mg by mouth daily Has been on hold for procedure 7/6/20   Ivan Munoz PA-C   sildenafil (VIAGRA) 50 MG tablet Take 1 tablet by mouth as needed for Erectile Dysfunction 7/6/20   Ivan Munoz PA-C   aspirin 325 MG EC tablet TAKE 1 TABLET BY MOUTH EVERY DAY  Patient taking differently: Take 325 mg by mouth daily Has been on hold for procedure 5/7/20   Jeremy Kidd,    Tens Unit MISC by Does not apply route 5/7/19   TRUDY Sullivan   atorvastatin (LIPITOR) 80 MG tablet TAKE 1 TABLET BY MOUTH AT BEDTIME 10/22/18   Ivan Munoz PA-C   carvedilol (COREG) 25 MG tablet TAKE ONE TABLET BY MOUTH TWICE DAILY (WITH MEALS) 10/22/18   Ivan Munoz PA-C       CURRENT MEDICATIONS:  Current Facility-Administered Medications: potassium chloride 10 mEq/100 mL IVPB (Peripheral Line), 10 mEq, Intravenous, Q1H  acetaZOLAMIDE (DIAMOX) tablet 250 mg, 250 mg, Oral, BID  bumetanide (BUMEX) injection 2 mg, 2 mg, Intravenous, Daily  0.9 % sodium chloride bolus, 20 mL, Intravenous, Once  dextrose 5 % solution, , Intravenous, Continuous  cefepime (MAXIPIME) 2 g IVPB extended (mini-bag), 2 g, Intravenous, Q8H  Cefepime 0.9% sodium chloride flush, , Intravenous, Q8H  lidocaine PF 1 % injection 5 mL, 5 mL, Intradermal, Once  sodium chloride flush 0.9 % injection 10 mL, 10 mL, Intravenous, PRN  heparin flush 100 UNIT/ML injection 300 Units, 3 mL, Intravenous, 2 times per day  heparin flush 100 UNIT/ML injection 300 Units, 3 mL, Intracatheter, PRN  sodium chloride flush 0.9 % injection 10 mL, 10 mL, Intravenous, 2 times per day  sodium chloride flush 0.9 % injection 10 mL, 10 mL, Intravenous, PRN  acetaminophen (TYLENOL) tablet 650 mg, 650 mg, Oral, Q6H PRN **OR** acetaminophen (TYLENOL) suppository 650 mg, 650 mg, Rectal, Q6H PRN  [Held by provider] insulin glargine (LANTUS) injection vial 40 Units, 40 Units, Subcutaneous, Nightly  insulin lispro (HUMALOG) injection vial 0-18 Units, 0-18 Units, Subcutaneous, Q4H  famotidine (PEPCID) injection 20 mg, 20 mg, Intravenous, BID  mineral oil-hydrophilic petrolatum (HYDROPHOR) ointment, , Topical, BID PRN  ipratropium-albuterol (DUONEB) nebulizer solution 1 ampule, 1 ampule, Inhalation, Q4H WA  sodium chloride (Inhalant) 3 % nebulizer solution 2 mL, 2 mL, Nebulization, BID  dextrose 5 % solution, 100 mL/hr, Intravenous, PRN  dextrose 50 % IV solution, 12.5 g, Intravenous, PRN  glucagon (rDNA) injection 1 mg, 1 mg, Intramuscular, PRN  glucose (GLUTOSE) 40 % oral gel 15 g, 15 g, Oral, PRN  Arformoterol Tartrate (BROVANA) nebulizer solution 15 mcg, 15 mcg, Nebulization, BID  [Held by provider] atorvastatin (LIPITOR) tablet 80 mg, 80 mg, Oral, Nightly  chlorhexidine (PERIDEX) 0.12 % solution 15 mL, 15 mL, Mouth/Throat, BID  clopidogrel (PLAVIX) tablet 75 mg, 75 mg, Oral, Daily  fentaNYL 5 mcg/ml in 0.9%  ml infusion, 200 mcg/hr, Intravenous, Continuous  heparin flush 100 UNIT/ML injection 100 Units, 1 mL, Intravenous, 2 times per day  heparin flush 100 UNIT/ML injection 100 Units, 1 mL, Intracatheter, PRN  magnesium sulfate 1 g in dextrose 5% 100 mL IVPB, 1 g, Intravenous, PRN  methylPREDNISolone sodium (SOLU-MEDROL) injection 40 mg, 40 mg, Intravenous, Daily  midazolam (VERSED) 100 mg in dextrose 5 % 100 mL infusion, 2 mg/hr, Intravenous, Continuous  nicotine (NICODERM CQ) 21 MG/24HR 1 patch, 1 patch, Transdermal, Daily  oxyCODONE-acetaminophen (PERCOCET) 5-325 MG per tablet 1 tablet, 1 tablet, Oral, TID PRN **AND** oxyCODONE (ROXICODONE) immediate release tablet 2.5 mg, 2.5 mg, Oral, TID PRN  tiZANidine (ZANAFLEX) tablet 4 mg, 4 mg, Oral, TID PRN    ALLERGIES:  Allergies   Allergen Reactions    Bee Venom Anaphylaxis     REVIEW OF SYSTEMS: Unable to Attain given patient status and condition on vent    OBJECTIVE:  PHYSICAL EXAMINATION:     VITAL SIGNS:  /74   Pulse 85   Temp 99.3 °F (37.4 °C) (Bladder)   Resp 16   Ht 5' 11\" (1.803 m)   Wt (!) 339 lb (153.8 kg)   SpO2 93%   BMI 47.28 kg/m²   Wt Readings from Last 3 Encounters:   20 (!) 339 lb (153.8 kg)   20 (!) 327 lb 2.6 oz (148.4 kg)   20 (!) 314 lb (142.4 kg)     Temp Readings from Last 3 Encounters:   20 99.3 °F (37.4 °C) (Bladder)   20 100 °F (37.8 °C) (Bladder)   20 97.1 °F (36.2 °C) (Temporal)     TMAX:  BP Readings from Last 3 Encounters:   20 105/74   20 126/77   20 (!) 157/79     Pulse Readings from Last 3 Encounters:   20 85   20 103   20 68       CURRENT PULSE OXIMETRY: SpO2: 93 %  24HR PULSE OXIMETRY RANGE: SpO2  Av.7 %  Min: 88 %  Max: 95 %  CVP:      ________________________________________________________________________    VENTILATOR SETTINGS (if applicable):         Vent Information  $Ventilation: $Subsequent Day  Skin Assessment: Clean, dry, & intact  Equipment ID: [id number]  Vent Type: [phone]  Vent Mode: AC/VC  Vt Ordered: 480 mL  Rate Set: 20 bmp  Peak Flow: 70 L/min  Pressure Support: 0 cmH20  FiO2 : 55 %  SpO2: 93 %  SpO2/FiO2 ratio: 169.09  Sensitivity: 2  PEEP/CPAP: 8  I Time/ I Time %: 0 s  Humidification Source: Heated wire  Humidification Temp: 37  Humidification Temp Measured: 37.2  Circuit Condensation: Drained  Additional Respiratory  Assessments  Pulse: 85  Resp: 16  SpO2: 93 %  Position: Semi-Jeff's  Humidification Source: Heated wire  Humidification Temp: 37  Circuit Condensation: Drained  Oral Care: Mouth swabbed, Mouth moisturizer, Mouth suctioned  Subglottic Suction Done?: Yes  Airway Type: ET  Airway Size: 8  Cuff Pressure (cm H2O): 29 cm H2O  ETCO2:  Peak Inspiratory Pressure:  End-Inspiratory Plateau Pressure:    ABG:    Recent Labs     12/09/20  0423   PH 7.469*   PO2 75.6   PCO2 43.5   HCO3 30.9*   BE 6.5*   O2SAT 94.4   METHB 0.1   O2HB 93.5*   COHB 0.9   O2CON 12.2   HHB 5.5*   THB 9.2*     FiO2 : 55 %  I:E Ratio: 1:3.10  ________________________________________________________________________    IV ACCESS:    NUTRITION: Diet NPO, After Midnight Exceptions are: Sips with Meds    INTAKE/OUTPUTS:  I/O last 3 completed shifts:   In: 1769 [I.V.:3146; NG/GT:90]  Out: 2445 [Urine:2435; Emesis/NG output:10]    Intake/Output Summary (Last 24 hours) at 12/9/2020 1425  Last data filed at 12/9/2020 1200  Gross per 24 hour   Intake 1918 ml   Output 1500 ml   Net 418 ml     General Appearance: well-developed and well-nourished, in no acute distress   Eyes: pupils equal, round, and reactive to light, extraocular eye movements intact, conjunctivae normal and sclera anicteric   Neck: neck supple and non tender without mass, no thyromegaly, no thyroid nodules and no cervical adenopathy   Pulmonary/Chest: decreased breath sounds at bases, no accessory muscles of inspiration noted  Cardiovascular: normal rate, regular rhythm, normal S1 and S2, no murmurs, rubs, clicks or gallops, distal pulses intact, no carotid bruits, no murmurs, no gallops, no carotid bruits and no JVD   Abdomen: soft, non-tender, non-distended, normal bowel sounds, no masses or organomegaly   Extremities: no cyanosis, no clubbing, no edema    LABS/IMAGING:    CBC:  Lab Results   Component Value Date    WBC 5.1 12/09/2020    HGB 8.0 (L) 12/09/2020    HCT 26.8 (L) 12/09/2020    MCV 93.4 12/09/2020    PLT 27 (L) 12/09/2020    LYMPHOPCT 5.2 (L) 12/09/2020    RBC 2.87 (L) 12/09/2020    MCH 27.9 12/09/2020    MCHC 29.9 (L) 12/09/2020    RDW 17.4 (H) 12/09/2020    NEUTOPHILPCT 91.3 (H) 12/09/2020    MONOPCT 1.7 (L) 12/09/2020    BASOPCT 0.2 12/09/2020    NEUTROABS 4.64 12/09/2020    LYMPHSABS 0.26 (L) 12/09/2020    MONOSABS 0.10 12/09/2020    EOSABS 0.09 12/09/2020    BASOSABS 0.00 12/09/2020       Recent Labs     12/09/20  0400 12/08/20  2220 12/08/20  1720   WBC 5.1 5.5 4.9   HGB 8.0* 8.3* 8.1*   HCT 26.8* 27.0* 26.9*   MCV 93.4 91.8 93.4   PLT 27* 28* 28*       BMP:   Recent Labs     12/07/20  0415  12/08/20  0405 12/08/20  1720 12/09/20  0400   NA  --    < > 134 134 140   K  --    < > 3.3* 3.3* 3.6   CL  --    < > 92* 93* 99   CO2  --    < > 29 28 31*   PHOS 3.9  --  3.0  --   --    BUN  --    < > 55* 52* 57*   CREATININE  --    < > 1.0 1.1 1.1    < > = values in this interval not displayed.        MG:   Lab Results   Component Value Date    MG 1.7 12/08/2020     Ca/Phos:   Lab Results   Component Value Date    CALCIUM 8.8 12/09/2020    PHOS 3.0 12/08/2020     Amylase: No results found for: AMYLASE  Lipase: No results found for: LIPASE  LIVER PROFILE:   Recent Labs     12/08/20  0405 12/08/20  1720 12/09/20  0400   * 88* 73*   ALT 87* 81* 79*   BILITOT 0.8 0.8 0.7   ALKPHOS 157* 153* 160*       PT/INR:   Recent Labs     12/07/20  0415 12/08/20  0405 12/09/20  0400   PROTIME 15.0* 14.2* 13.5*   INR 1.3 1.3 1.2 APTT: No results for input(s): APTT in the last 72 hours. Cardiac Enzymes:  Lab Results   Component Value Date    CKTOTAL 117 2011    CKMB 1.6 2011    TROPONINI <0.01 2020       Hgb A1C:   Lab Results   Component Value Date    LABA1C 7.7 (H) 2020     No results found for: EAG  LATHA: No results found for: LATHA  ESR:   Lab Results   Component Value Date    SEDRATE 82 (H) 2020     CRP:   Lab Results   Component Value Date    CRP 6.7 (H) 2020     D Dimer:   Lab Results   Component Value Date    DDIMER 259 2011     Folate and B12: No results found for: [id number], No results found for: FOLATE    Lactic Acid:   Lab Results   Component Value Date    LACTA 2.5 (H) 2020     Ammonia:   Cortisol:  Thyroid Studies:  Lab Results   Component Value Date    TSH 1.090 02/15/2018     XR CHEST PORTABLE   Final Result   No significant change since the study of . XR CHEST PORTABLE   Final Result   Overall there is no interval change in the appearance of the chest x-ray when   compared to prior studies. Persistent large focal dense consolidation is seen within the left upper lobe. XR CHEST PORTABLE   Final Result   No significant change since the recent study of . XR CHEST PORTABLE   Final Result   No change. XR CHEST PORTABLE   Final Result   No change. Complete opacification left mid and upper lung. Correlate   bronchoscopy as warranted to exclude endobronchial obstruction. US CHEST INCLUDING MEDIASTINUM   Final Result   Addendum 1 of 1   Patient MRN:  [id number]   : 1960   Age: 61 years   Gender: Male   Order Date:  2020 9:26 AM   EXAM: US CHEST INCLUDING MEDIASTINUM   NUMBER OF IMAGES:  6   INDICATION: J90 Pleural effusion     Dr Ni [place] to read   eval for left side pleural effusion   COMPARISON: None   Ultrasound of the left chest was performed.  There is a small to   moderate left pleural effusion   IMPRESSION: Small moderate left pleural effusion   . Final      XR CHEST PORTABLE   Final Result   No change. XR CHEST PORTABLE    (Results Pending)     ASSESSMENT:  · Acute on chronic respiratory failure - on ACVC mechanical ventilation   · Community-acquired pneumonia  · History heavy smoking and morbid obesity, cannot rule out underlying COPD, cannot rule out MANOLO  · OMER Small Cell Carcinoma     PLAN:  1.) await trach placement   2.) vent per ICU team   3.) cefepime, IV steroids   4.) radiation per radiation oncology team   5.) DVT and GI Prophylaxis     - XRT initiated 12/8/2020  - supportive care to continue   - Drips: Fentanyl, Versed  - Vent: ACVC 20/480/55/8    Thank you Da Avalos MD very much for allowing me to see this patient in consultation and follow up. Care reviewed with nursing staff, medical and surgical specialty care, primary care and the patient's family as available. Restraints are ordered when the patient can do harm to him/herself by pulling out devices.     Rika Valdovinos M.D.

## 2020-12-09 NOTE — FLOWSHEET NOTE
Patient opens eyes to name and will track but will not respond to command only stares at you when you ask him to do something.

## 2020-12-09 NOTE — PROGRESS NOTES
[facility]  Hematology/Oncology  Consult      Patient Name: Yahir Zuñiga YOB: 1960  PCP: David Villanueva PA-C   Referring Provider:      Reason for Consultation:   No chief complaint on file. Subjective: Patient remains intubated, undergoing daily XRT. Planning trach and PEG placement     History of Present Illness: This is a 62 yo male patient with a past medical history of CAD, DM type 2, morbid obesity, chronic back pain, HTN, AAA s/p repair who set to the emergency room by Dr. Lobo Johnston for complaints of shortness of breath and decreased O2 during an epidural procedure. Patient was having an L4/L5 epidural for pain management done by Dr. Lobo Johnston when his O2 dropped into the 80's during the procedure. Patient also reported having worsening shortness of breath over the past two weeks with a productive cough. Patient is basically bed bound only taking occasional steps. CTA of the chest, A/P showed new confluent opacities located in left upper lobe suggesting pneumonia, atelectasis, or neoplasm. Patchy airspace opacities located in lingula suggesting pneumonia with areas of atelectasis. Stenosis and occlusion are associated with left upper lobe segmental bronchi and lingular segmental bronchi. Moderate to large left pleural effusion. Mediastinal lymphadenopathy. Stable fusiform infrarenal abdominal aortic aneurysm with endograft repair. Aneurysm measures up to 6.4 cm. No evidence of endoleak or retroperitoneal fluid. He was given 1 dose of Rocephin and azithromycin while in the ER. CBC since admission has shown anemia and thrombocytopenia. 11/18 an RRT was called and was subsequently intubated. He also had a bronchoscopy done and bxs of left upper lobe were sent.        Diagnostic Data:     Past Medical History:   Diagnosis Date    Anticoagulant long-term use     Arthritis     CAD (coronary artery disease)     Chronic back pain     Depression     Dyslipidemia     Hiatal hernia 1979    History of ST elevation myocardial infarction (STEMI)     Hyperlipidemia     Hypertension     Low back pain     Lumbar radiculopathy 8/14/2019    Obesity     MANOLO on CPAP     Presence of stent in left circumflex coronary artery     Presence of stent in right coronary artery     Smoker     Type 2 diabetes mellitus without complication [facility])        Patient Active Problem List    Diagnosis Date Noted    ARF (acute respiratory failure) ([address] 12/02/2020    Small cell lung cancer ([address]     Acute respiratory failure with hypoxia ([address] 11/17/2020    Community acquired pneumonia of left upper lobe of lung 11/17/2020    Thrombocytopenia ([address] 11/17/2020    Spinal stenosis of lumbar region with neurogenic claudication 10/29/2020    Obesity     Chronic back pain     Lumbosacral spondylosis without myelopathy 12/11/2019    Lumbar radiculopathy 08/14/2019    AAA (abdominal aortic aneurysm) ([address] 06/28/2019    Chronic bilateral low back pain with bilateral sciatica 06/11/2019    Right hip pain 06/11/2019    DDD (degenerative disc disease), lumbar 06/11/2019    Status post total right knee replacement 11/26/2018    Morbid obesity with BMI of 40.0-44.9, adult ([address] 11/26/2018    Chronic pain syndrome 07/27/2018    Chronic pain of right knee 07/27/2018    Non-insulin dependent type 2 diabetes mellitus ([address] 03/16/2018    Aortic valve stenosis 02/15/2018    Primary osteoarthritis of right knee 02/15/2018    Presence of stent in left circumflex coronary artery     Smoker     CAD (coronary artery disease) 12/28/2011    HTN (hypertension) 12/28/2011    Hyperlipemia 12/28/2011        Past Surgical History:   Procedure Laterality Date    ABDOMINAL AORTIC ANEURYSM REPAIR, ENDOVASCULAR N/A 6/28/2019    ENDOVASCULAR REPAIR ABDOMINAL AORTIC ANEURYSM performed by Dania Everett MD at [address] [place] [address],[address] Bilateral 3/12/2020    BILATERAL L3,L4,L5 MEDIAL BRANCH NERVE BLOCK performed by Kathy Cordova DO at [address] Bilateral 6/11/2020    BILATERAL L3,L4,L5 MEDIAL NERVE BRANCH BLOCK #2 performed by Kathy Cordova DO at [facility] N/A 11/18/2020    BRONCHOSCOPY/TRANSBRONCHIAL NEEDLE BIOPSY performed by Moises Nix MD at [facility]  12/30/2011    Dr. Tariq Thompson 1st OM 3.0 x 20 Ion    DIAGNOSTIC CARDIAC CATH LAB PROCEDURE  3/15/2005    [facility]. Mild to moderate CAD. Normal LV.  DIAGNOSTIC CARDIAC CATH LAB PROCEDURE  1/16/2007    [facility]. Cath/PTCA/DE stent of proximal and distal RCA.  DIAGNOSTIC CARDIAC CATH LAB PROCEDURE  2/21/2007    Cath/DE stent of proximal OM1 and proximal Cx.  DIAGNOSTIC CARDIAC CATH LAB PROCEDURE  12/30/2011    ANURADHA of mid OM branch of circumflex.  ECHO COMPL W DOP COLOR FLOW  12/30/2011         HC INSERT PICC CATH, 5/> YRS - MIDLINE  11/23/2020         HERNIA REPAIR  2/2014    laparoscopic ventral hernia repain    JOINT REPLACEMENT Left 4/1/14    TKA-ed    JOINT REPLACEMENT Right 2018    KNEE    PAIN MANAGEMENT PROCEDURE Right 9/1/2020    RIGHT L3,4,5 MEDIAL BRANCH RADIOFREQUENCY ABLATION performed by Kathy Cordova DO at [facility] N/A 11/17/2020    L4-5 EPIDURAL STEROID INJECTION #1 performed by Kathy Cordova DO at [facility] OFFICE/OUTPT VISIT,PROCEDURE ONLY Right 11/26/2018    RIGHT KNEE TOTAL ARTHROPLASTY performed by Chanel Pantoja DO at [facility] OR       Family History  Family History   Problem Relation Age of Onset    Diabetes Mother     Stroke Mother     Diabetes Father     No Known Problems Sister        Social History    TOBACCO:   reports that he has been smoking cigarettes. He started smoking about 42 years ago. He has a 39.00 pack-year smoking history. He has never used smokeless tobacco.  ETOH:   reports no history of alcohol use.     Home Medications  Prior to Admission medications    Medication Sig Start Date End Date Taking? Authorizing Provider   loratadine (CLARITIN) 10 MG capsule Take 10 mg by mouth daily    Historical Provider, MD   pioglitazone (ACTOS) 15 MG tablet TAKE 1 TABLET BY MOUTH EVERY DAY 11/6/20   Carrington Potter PA-C   pregabalin (LYRICA) 150 MG capsule Take 1 capsule by mouth 2 times daily for 30 days. 11/12/20 12/12/20  Gamaliel Hannon,    diclofenac (VOLTAREN) 75 MG EC tablet TAKE 1 TABLET BY MOUTH TWICE A DAY 10/7/20   Historical Provider, MD   oxyCODONE-acetaminophen (PERCOCET) 7.5-325 MG per tablet Take 1 tablet by mouth 3 times daily as needed for Pain for up to 30 days.  Intended supply: 30 days 11/12/20 12/12/20  Gamaliel Hannon, DO   quinapril (ACCUPRIL) 10 MG tablet Take 1 tablet by mouth daily  Patient taking differently: Take 10 mg by mouth daily Wife states on hold 10/16/20   Carrington Potter PA-C   metFORMIN (GLUCOPHAGE) 500 MG tablet TAKE 1 TABLET BY MOUTH TWICE A DAY WITH MEALS  Patient taking differently: Wife states on hold 10/8/20   [facility], DO   fluticasone (FLONASE) 50 MCG/ACT nasal spray 2 sprays by Nasal route daily 10/6/20   Carrington Potter PA-C   omega-3 acid ethyl esters (LOVAZA) 1 g capsule take 1 capsule twice a day 8/14/20   Carrington Potter PA-C   niacin (SLO-NIACIN) 500 MG extended release tablet take 1 tablet by mouth once daily 7/20/20   [facility],    clopidogrel (PLAVIX) 75 MG tablet TAKE 1 TABLET BY MOUTH EVERY DAY  Patient taking differently: Take 75 mg by mouth daily Has been on hold for procedure 7/6/20   Carrington Potter PA-C   sildenafil (VIAGRA) 50 MG tablet Take 1 tablet by mouth as needed for Erectile Dysfunction 7/6/20   Carrington Potter PA-C   aspirin 325 MG EC tablet TAKE 1 TABLET BY MOUTH EVERY DAY  Patient taking differently: Take 325 mg by mouth daily Has been on hold for procedure 5/7/20   [address], DO   Tens Unit MISC by Does not apply route 5/7/19   Sadia Will PA   atorvastatin (LIPITOR) 80 MG tablet TAKE 1 TABLET BY MOUTH AT BEDTIME 10/22/18   Terrence Franks PA-C   carvedilol (COREG) 25 MG tablet TAKE ONE TABLET BY MOUTH TWICE DAILY (WITH MEALS) 10/22/18   Terrence Franks PA-C       Allergies  Allergies   Allergen Reactions    Bee Venom Anaphylaxis       Review of Systems: patient is intubated and sedated and not responsive to questions. Objective  /73   Pulse 85   Temp 99.3 °F (37.4 °C) (Bladder)   Resp 20   Ht 5' 11\" (1.803 m)   Wt (!) 339 lb (153.8 kg)   SpO2 93%   BMI 47.28 kg/m²     Physical Exam:   Performance Status:  Head and neck: PERRLA, EOMI . Sclera non icteric. Oropharynx: Clear  Neck: no JVD,  no adenopathy  Heart: Regular rate and regular rhythm, no murmur  Lungs:Disffuse inspiratory and expiratory rhonchi   Extremities: No edema,no cyanosis, no clubbing. Abdomen: Soft, non-tender;no masses, no organomegaly  Skin: No rash. Neurologic:Cranial nerves grossly intact. No focal motor or sensory deficits .     Recent Laboratory Data-   Lab Results   Component Value Date    WBC 4.8 12/09/2020    HGB 7.7 (L) 12/09/2020    HCT 25.3 (L) 12/09/2020    MCV 93.0 12/09/2020    PLT 23 (L) 12/09/2020    LYMPHOPCT 5.2 (L) 12/09/2020    RBC 2.72 (L) 12/09/2020    MCH 28.3 12/09/2020    MCHC 30.4 (L) 12/09/2020    RDW 17.3 (H) 12/09/2020    NEUTOPHILPCT 91.3 (H) 12/09/2020    MONOPCT 1.7 (L) 12/09/2020    BASOPCT 0.2 12/09/2020    NEUTROABS 4.64 12/09/2020    LYMPHSABS 0.26 (L) 12/09/2020    MONOSABS 0.10 12/09/2020    EOSABS 0.09 12/09/2020    BASOSABS 0.00 12/09/2020       Lab Results   Component Value Date     12/09/2020    K 3.5 12/09/2020    CL 98 12/09/2020    CO2 31 (H) 12/09/2020    BUN 58 (H) 12/09/2020    CREATININE 1.1 12/09/2020    GLUCOSE 183 (H) 12/09/2020    CALCIUM 8.7 12/09/2020    PROT 4.9 (L) 12/09/2020    LABALBU 2.5 (L) 12/09/2020    BILITOT 0.7 12/09/2020    ALKPHOS 160 (H) 12/09/2020    AST 73 (H) 12/09/2020    ALT 79 (H) 12/09/2020    LABGLOM >60 12/09/2020    GFRAA >60 12/09/2020       Lab Results   Component Value Date    IRON 89 12/05/2020    TIBC 164 (L) 12/05/2020    FERRITIN 3,578 12/05/2020           Radiology-    Xr Abdomen (kub) (single Ap View)    Result Date: 11/23/2020  EXAMINATION: ONE SUPINE XRAY VIEW(S) OF THE ABDOMEN 11/23/2020 9:38 am COMPARISON: None HISTORY: ORDERING SYSTEM PROVIDED HISTORY: abdominal pain TECHNOLOGIST PROVIDED HISTORY: Reason for exam:->abdominal pain FINDINGS: There is a nonobstructive bowel gas pattern. There are no abnormal air-fluid levels. There are no calculi overlying the renal shadows. There is an NG tube within the stomach. There is a stent graft seen within the abdominal aorta. 1. There is no bowel obstruction, ileus or free air. Ct Lumbar Spine Wo Contrast    Result Date: 11/19/2020  EXAMINATION: CT OF THE LUMBAR SPINE WITHOUT CONTRAST  11/18/2020 TECHNIQUE: CT of the lumbar spine was performed without the administration of intravenous contrast. Multiplanar reformatted images are provided for review. Dose modulation, iterative reconstruction, and/or weight based adjustment of the mA/kV was utilized to reduce the radiation dose to as low as reasonably achievable. COMPARISON: 11/17/2020 HISTORY: ORDERING SYSTEM PROVIDED HISTORY: exclude epidural hematoma TECHNOLOGIST PROVIDED HISTORY: Reason for exam:->exclude epidural hematoma Chronic back pain, recent epidural placement FINDINGS: BONES/ALIGNMENT: Vertebral body heights are preserved without evidence for lumbar fracture. However, there is irregular lucency and sclerosis in the upper sacral ala bilaterally. There is minimal retrolisthesis at L2-L3. Alignment is otherwise normal. DEGENERATIVE CHANGES: There is disc space narrowing and vacuum disc phenomenon at L2-L3. Disc space heights are otherwise preserved. There is diffuse facet arthropathy.  SOFT TISSUES/RETROPERITONEUM: No evidence for epidural hematoma is identified. 1. No evidence for epidural hematoma on CT scan. 2. Suspected sacral insufficiency fracture. Xr Chest Portable    Result Date: 11/23/2020  EXAMINATION: ONE XRAY VIEW OF THE CHEST 11/23/2020 7:08 am COMPARISON: 11/20/2020 HISTORY: ORDERING SYSTEM PROVIDED HISTORY: resp failure TECHNOLOGIST PROVIDED HISTORY: Reason for exam:->resp failure FINDINGS: There is no interval change in the persistent dense consolidation within the left upper lobe. The left lower lobe is clear. The right lung is clear. There is minimal atelectasis seen within the right lung base. The cardiac silhouette is within normals. 1. No interval change in the dense consolidation seen within the left upper lobe. 2. Minimal right lung base atelectasis. Xr Chest Portable    Result Date: 11/22/2020  EXAMINATION: ONE XRAY VIEW OF THE CHEST 11/22/2020 6:28 am COMPARISON: 11/21/2020 HISTORY: ORDERING SYSTEM PROVIDED HISTORY: resp failure TECHNOLOGIST PROVIDED HISTORY: Reason for exam:->resp failure FINDINGS: The endotracheal tube is stable. The enteric tube tip is not well visualized but may be at the gastroesophageal junction. There is stable left upper lobe dense opacity. There are continued patchy opacities in the left lower lobe. There may be small pleural effusions. No pneumothorax is seen. No significant change in dense airspace opacity of the left upper lobe and patchy left lower lobe airspace opacities. Enteric tube tip is not well visualized due to underpenetration. The tip may be at the gastroesophageal junction. This could be confirmed with KUB centered on the upper abdomen. Xr Chest Portable    Result Date: 11/21/2020  EXAMINATION: ONE XRAY VIEW OF THE CHEST 11/21/2020 7:37 am COMPARISON: 11/20/2020 HISTORY: ORDERING SYSTEM PROVIDED HISTORY: resp failure TECHNOLOGIST PROVIDED HISTORY: Reason for exam:->resp failure FINDINGS: Endotracheal tube and nasogastric tube are unchanged.  Large opacity in the left upper lobe is unchanged. There is additional airspace disease in the left lower lobe which is stable. There is no pneumothorax. Small pleural effusions are not excluded. Unchanged large opacity/consolidation in left upper lobe. Unchanged airspace disease in left lower lobe. Xr Chest Portable    Result Date: 11/20/2020  EXAMINATION: ONE XRAY VIEW OF THE CHEST 11/20/2020 6:33 am COMPARISON: 11/19/2020 HISTORY: ORDERING SYSTEM PROVIDED HISTORY: resp failure TECHNOLOGIST PROVIDED HISTORY: Reason for exam:->resp failure FINDINGS: Endotracheal and enteric tubes remain in place. There has been no appreciable change in opacities throughout the left lung, most pronounced in the left apex. The right lung is clear. The heart size is at the upper limits of normal.  There is no discernible pneumothorax. Grossly stable opacities on the left. Xr Chest Portable    Result Date: 11/19/2020  EXAMINATION: ONE XRAY VIEW OF THE CHEST 11/19/2020 6:23 am COMPARISON: 11/18/2020 HISTORY: ORDERING SYSTEM PROVIDED HISTORY: resp failure TECHNOLOGIST PROVIDED HISTORY: Reason for exam:->resp failure FINDINGS: Evaluation is limited by the patient's body habitus and the portable technique. The right lung apex was partially excluded. There is increased dense consolidation in the left upper lobe. There is also medial left basilar airspace opacity obscuring the hemidiaphragm. The heart size is mildly enlarged. There is no discernible pneumothorax. Endotracheal and enteric tubes are again seen. Increasing multifocal left lung pneumonia. Xr Chest Portable    Result Date: 11/18/2020  EXAMINATION: ONE XRAY VIEW OF THE CHEST 11/18/2020 6:39 am COMPARISON: CT chest November 17, 2020. HISTORY: ORDERING SYSTEM PROVIDED HISTORY: SOB TECHNOLOGIST PROVIDED HISTORY: Reason for exam:->SOB FINDINGS: The cardiac silhouette is within normal limits in size. There is masslike consolidation of the left upper lobe.   There is a small to moderate left dependent pleural effusion. There is no visible pneumothorax. The pulmonary vessels are within normal limits. Left upper lobe masslike consolidation. Small to moderate left pleural effusion. Xr Chest 1 View    Result Date: 11/18/2020  EXAMINATION: ONE XRAY VIEW OF THE CHEST 11/18/2020 9:06 am COMPARISON: 11/18/2020 HISTORY: ORDERING SYSTEM PROVIDED HISTORY: intubation TECHNOLOGIST PROVIDED HISTORY: Reason for exam:->intubation Reason for exam:->portable FINDINGS: Compared to the chest radiograph from earlier today, 6:44 a.m., there is interval placement of an endotracheal tube, the tip of which is approximately 4.1 cm above the korina. Nasogastric tube is present but is suboptimally visualized due to motion artifact and relative underpenetration of the study. The abdominal radiograph demonstrates it to be well positioned, with the tip in the upper abdomen. Prominent masslike consolidative opacity in the upper left lung are grossly stable. No pneumothorax is seen. Layering left pleural effusion. 1.  The life-support lines and tubes are well positioned. 2. Masslike consolidative opacity in the left upper lung. Small left effusion. Cta Chest W Contrast    Result Date: 11/17/2020  EXAMINATION: CTA OF THE CHEST 11/17/2020 3:18 pm TECHNIQUE: CTA of the chest was performed after the administration of intravenous contrast.  Multiplanar reformatted images are provided for review. MIP images are provided for review. Dose modulation, iterative reconstruction, and/or weight based adjustment of the mA/kV was utilized to reduce the radiation dose to as low as reasonably achievable.; CTA of the abdomen and pelvis was performed with the administration of intravenous contrast. Multiplanar reformatted images are provided for review. MIP images are provided for review.  Dose modulation, iterative reconstruction, and/or weight based adjustment of the mA/kV was utilized to reduce the radiation dose to as low as reasonably achievable. COMPARISON: None. HISTORY: ORDERING SYSTEM PROVIDED HISTORY: aneurysm, cp, sob TECHNOLOGIST PROVIDED HISTORY: Reason for exam:->aneurysm, cp, sob FINDINGS: CTA chest: There are confluent opacities located in left upper lobe. Patchy airspace opacities located in lingula. There is moderate to large left pleural effusion. Peribronchial thickening extensor in left hilar location into the left lung base. There is subsegmental atelectasis in posterior right lower lobe with adjacent ground-glass opacities. There is pulmonary vascular congestion. The heart is mildly enlarged. There is mediastinal lymphadenopathy. Ascending thoracic aorta measures 3.7 cm in diameter. Descending thoracic aorta measures 3 cm in diameter. No evidence of thoracic aortic aneurysm or dissection. Distal descending thoracic aorta is tortuous. CTA abdomen/pelvis: There is evidence of endograft repair involving the abdominal aorta. There is redemonstration of fusiform abdominal aortic aneurysm measuring up to 6.4 cm. This finding is stable since prior. No evidence of endoleak. No retroperitoneal fluid collections. Iliac limbs of endograft appear patent. Common iliac arteries are tortuous. Common femoral arteries are patent. Numerous diverticula associated with the left colon and sigmoid colon. No evidence of acute diverticulitis. Liver, gallbladder, pancreas, and spleen are grossly unremarkable however there is motion artifact limiting this examination. There is no hydronephrosis. Cyst associated with the right kidney is stable since previous examination as well as cyst associated with the left kidney appearing stable since prior. Appendix is visualized and normal.    1.  New confluent opacities located in left upper lobe suggesting pneumonia, atelectasis, or neoplasm. 2.  Patchy airspace opacities located in lingula suggesting pneumonia with areas of atelectasis.  3.  Stenosis and occlusion are associated with left upper lobe segmental bronchi and lingular segmental bronchi. 4.  Moderate to large left pleural effusion. 5.  Mediastinal lymphadenopathy. 6.  Stable fusiform infrarenal abdominal aortic aneurysm with endograft repair. Aneurysm measures up to 6.4 cm. No evidence of endoleak or retroperitoneal fluid. 7.  Diverticulosis. Cta Abdomen Pelvis W Contrast    Result Date: 11/17/2020  EXAMINATION: CTA OF THE CHEST 11/17/2020 3:18 pm TECHNIQUE: CTA of the chest was performed after the administration of intravenous contrast.  Multiplanar reformatted images are provided for review. MIP images are provided for review. Dose modulation, iterative reconstruction, and/or weight based adjustment of the mA/kV was utilized to reduce the radiation dose to as low as reasonably achievable.; CTA of the abdomen and pelvis was performed with the administration of intravenous contrast. Multiplanar reformatted images are provided for review. MIP images are provided for review. Dose modulation, iterative reconstruction, and/or weight based adjustment of the mA/kV was utilized to reduce the radiation dose to as low as reasonably achievable. COMPARISON: None. HISTORY: ORDERING SYSTEM PROVIDED HISTORY: aneurysm, cp, sob TECHNOLOGIST PROVIDED HISTORY: Reason for exam:->aneurysm, cp, sob FINDINGS: CTA chest: There are confluent opacities located in left upper lobe. Patchy airspace opacities located in lingula. There is moderate to large left pleural effusion. Peribronchial thickening extensor in left hilar location into the left lung base. There is subsegmental atelectasis in posterior right lower lobe with adjacent ground-glass opacities. There is pulmonary vascular congestion. The heart is mildly enlarged. There is mediastinal lymphadenopathy. Ascending thoracic aorta measures 3.7 cm in diameter. Descending thoracic aorta measures 3 cm in diameter. No evidence of thoracic aortic aneurysm or dissection.   Distal descending thoracic aorta is tortuous. CTA abdomen/pelvis: There is evidence of endograft repair involving the abdominal aorta. There is redemonstration of fusiform abdominal aortic aneurysm measuring up to 6.4 cm. This finding is stable since prior. No evidence of endoleak. No retroperitoneal fluid collections. Iliac limbs of endograft appear patent. Common iliac arteries are tortuous. Common femoral arteries are patent. Numerous diverticula associated with the left colon and sigmoid colon. No evidence of acute diverticulitis. Liver, gallbladder, pancreas, and spleen are grossly unremarkable however there is motion artifact limiting this examination. There is no hydronephrosis. Cyst associated with the right kidney is stable since previous examination as well as cyst associated with the left kidney appearing stable since prior. Appendix is visualized and normal.    1.  New confluent opacities located in left upper lobe suggesting pneumonia, atelectasis, or neoplasm. 2.  Patchy airspace opacities located in lingula suggesting pneumonia with areas of atelectasis. 3.  Stenosis and occlusion are associated with left upper lobe segmental bronchi and lingular segmental bronchi. 4.  Moderate to large left pleural effusion. 5.  Mediastinal lymphadenopathy. 6.  Stable fusiform infrarenal abdominal aortic aneurysm with endograft repair. Aneurysm measures up to 6.4 cm. No evidence of endoleak or retroperitoneal fluid. 7.  Diverticulosis. Xr Abdomen For Ng/og/ne Tube Placement    Result Date: 11/22/2020  EXAMINATION: ONE SUPINE XRAY VIEW(S) OF THE ABDOMEN 11/22/2020 5:06 pm COMPARISON: November 22, 2020, 4 hours prior. HISTORY: ORDERING SYSTEM PROVIDED HISTORY: Confirmation of course of NG/OG/NE tube and location of tip of tube TECHNOLOGIST PROVIDED HISTORY: Reason for exam:->Confirmation of course of NG/OG/NE tube and location of tip of tube Portable? ->Yes FINDINGS: Tip of the NG tube noted at the level of the diaphragm, no significant change. The right side and the inferior part of the abdomen is not included in the study. There is opacification of the visualized left upper lung. Tip of NG tube at the level of the left hemidiaphragm, no change. Xr Chest Abdomen Ng Placement    Result Date: 11/22/2020  EXAMINATION: ONE SUPINE XRAY VIEW(S) OF THE ABDOMEN 11/22/2020 12:52 pm COMPARISON: November 18. HISTORY: ORDERING SYSTEM PROVIDED HISTORY: OG placement TECHNOLOGIST PROVIDED HISTORY: Reason for exam:->OG placement FINDINGS: Nasogastric tube is present. Side hole appears to be at level of gastroesophageal junction. This tube could be advanced approximately 4 or 5 cm. Nasogastric tube is present with side hole at level of gastroesophageal junction. This tube could be advanced approximately 4 or 5 cm. Xr Chest Abdomen Ng Placement    Result Date: 11/18/2020  EXAMINATION: ONE SUPINE XRAY VIEW(S) OF THE ABDOMEN 11/18/2020 9:07 am COMPARISON: 11/18/2020, about 2 hours earlier HISTORY: ORDERING SYSTEM PROVIDED HISTORY: NGT TECHNOLOGIST PROVIDED HISTORY: Reason for exam:->NGT Reason for exam:->portable NG tube placement FINDINGS: There is an NG tube with the tip in the stomach. An ET tube is again noted in satisfactory position. There is extensive opacity in the left lung, most likely pneumonia. Increased markings also seen in the visualized portion of the right lung. The heart is enlarged. NG tube tip in the stomach. Fluoro For Surgical Procedures    Result Date: 11/17/2020  EXAMINATION: SPOT FLUOROSCOPIC IMAGES 11/17/2020 12:03 pm TECHNIQUE: Fluoroscopy was provided by the radiology department for procedure. Radiologist was not present during examination. FLUOROSCOPY DOSE AND TYPE OR TIME AND EXPOSURES: Total dose:26.99 mGy COMPARISON: None HISTORY: ORDERING SYSTEM PROVIDED HISTORY: Pain management TECHNOLOGIST PROVIDED HISTORY: Reason for exam:->L4-5 Epidural steroid injection #1 Intraprocedural imaging. FINDINGS: 3 intraoperative fluoroscopic images were obtained during L4-5 epidural steroid injection. Intraprocedural fluoroscopic spot images as above. See separate procedure report for more information. ASSESSMENT/PLAN :    62 yo male  CAD  HTN  AAA s/p repair  DM type 2  Morbid obesity  Chronic back pain  Thrombocytopenia, Anemia   OMER opacity s/p biopsy    - S/p bronch with cytology pending  - OMER PNA vs neoplasm  - On rocpehin and levaquin   - CBC showing Hgb 7.8 with MCV 94, Platelets 39  - Will follow cyto  - Cytopenias likely due to combination of PNA and abc, with myelosuppression. Thrombocytopenia has continued to slowly progress. CBC was WNL on 10/6/20, suggesting an acute process rather than a primary bone marrow process  - Transfuse for Hgb <7, platelets <90  - Smear showed subtle toxic granulation of neutrophils  - Check iron profile and B12/folate    11/24/20  - OMER bronch bx pathology:  DIAGNOSIS   POSITIVE FOR MALIGNANT CELLS   Carcinoma, most compatible with small cell carcinoma, see comment. Monolayer shows atypical crushed cells, and many lymphocytes/histiocytes   with anthracotic pigment. Cell block shows abundant crushed malignant cells. COMMENT:   Immunostains stain the malignant cells as follows:   Pankeratin and TTF1:  Positive   Chromogranin:  Positive weakly   Synaptophysin:  Negative   CK7:  Negative   This immunoprofile is most compatible with small cell carcinoma. 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 His CT scan of abdomen and pelvis did not show evidence of intra-abdominal metastasis.   He has multiple comorbid conditions including obesity, coronary artery disease, obstructive sleep apnea, obesity, type 2 diabetes mellitus and he presently has pneumonia with hypoxia and respiratory failure and remains intubated and sedated. 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   - Today's Hgb 7.2 Plts 38   - Patient still intubated but hopefully will be able to extubated  - Once stable, MRI brain to complete staging   - If he improves clinically and has improvement in ECOG, he will be considered for systemic therapy  - L pleural effusions likely malignant  - ID following, now on unasyn  - Given his current ECOG and comorbidities along with persistent fevers and abx requirements, not eligible for inpatient emergent chemotherapy. Will get rad onc on board as inpatient urgent palliative XRT could be considered to improved respiratory status if he has improvement in above    11/26/20  - Remains intubated, back in ICU  - CBC with further drop in platelets to 29. Likely combination of myelosuppression from acute illness and abx exposure  - If he has improvement in his clinic course, eventually will need MRI brain as above  - Therapy plan as above. He will need significant improvement clinically prior to discussion regarding chemotherapy. Rad onc consult pending, again will likely need improvement prior to proceeding with therapy. Cultures have no grown a source, but patient remains febrile (possibly drug fever and ID following and managing abx)  - Will follow. Further recs pending clinical course    11/27/20  - LUE DVT  - Platelets 33. Stable from yesterday  - Can not anticoagulate with current platelet count. Needs to be ~50. Monitor for now  - Remainder of CBC stable    11/29/20  - CBC stable, thrombocytopenia unchanged. Hgb dropped to 7.0. Will give 1 unit  - Continue to hold [facility] with platelets <51  - Still with low grade fevers in the 100's  - ID following, on unasyn. Possible post-obstructive PNA    11/30/20  - Continues to have thrombocytopenia platelets 28.   Hemoglobin 7.3 after 1 unit of packed red blood cells yesterday. - Continue to hold [facility] for platelets <52  - Still with fevers in the 100s, he is also hypertensive  - On Unasyn  - Rad on consult still pending, but no aggressive oncologic care until significant clinical improvement    12/1/20  - Today's Hgb 7.8  - Left upper lobe cancer. Not eligible for emergent chemotherapy  - Patient has to improve clinically in order to transfer to [facility] ([facility]) for palliative radiation.  - Continue to transfuse with target hemoglobin above 7   - Febrile dt underlying ca   - Overall poor prognosis     12/2/20  - Today's Hgb 7.8 plts are 19  - Left upper lobe cancer. Not eligible for emergent chemotherapy at this time. - Patient has been cleared per Dr. Elias Sheets to transport to [facility] ([facility]) for palliative radiation. Transfer order has been placed per ICU nursing staff. - Transfuse for Hgb <7, platelets <79  - Overall poor prognosis      12/3/20  - Hb 7.5 plts trending up 36   - Patient was extubated however had to be reintubated. - To be transferred to [facility] ([facility]) for palliative radiation.  - Transfuse for Hgb <7, platelets <96  - Overall poor prognosis      12/4/20  -Today's hemoglobin 7.9 platelets 21  - Pulmonary holding on drainage of effusion at this time due to low volume/low likelihood of clinical benefit  - Planning trach and PEG  - Remains intubated   - Patient to start RT, however this has been delayed per Dr. Steff Wan note    12/5/20  Remains intubated and sedated with fentanyl and midazolam  Hemoglobin 7.4 with platelets of 20. Awaiting planning for palliative radiation therapy  Overall prognosis very poor    12/8/20  - Began XRT yesterday  - Remains on vent. Needs trach and PEG. OK to transfuse platelets prior  - Attempted to contact patients wife three times. Phone went straight to     12/9/20  - S/p 3 fr XRT inpatient  - Trach/PEG pending per family decision  - Pts thrombocytopenia and overall clinical status preclude inpatient systemic chemotherapy.  Doubt this would address his overall clinical issues anyway as his tumor burden in the thorax is not enough to be causing his respiratory failure alone    Harinder Stone MD  Electronically signed 12/9/2020 at 5:57 PM

## 2020-12-09 NOTE — PLAN OF CARE
Problem: Restraint Use - Nonviolent/Non-Self-Destructive Behavior:  Goal: Absence of restraint-related injury  Description: Absence of restraint-related injury  12/9/2020 1110 by Rafi Lane RN  Outcome: Met This Shift  12/9/2020 0226 by June Schmitz RN  Outcome: Met This Shift     Problem: Restraint Use - Nonviolent/Non-Self-Destructive Behavior:  Goal: Absence of restraint indications  Description: Absence of restraint indications  12/9/2020 1110 by Rafi Lane RN  Outcome: Not Met This Shift  12/9/2020 0226 by June Schmitz RN  Outcome: Not Met This Shift

## 2020-12-09 NOTE — FLOWSHEET NOTE
Pt is impulsive with poor judgement. Reoriented to place time and situation. bilat soft wrist restraints remain on for pt safety.

## 2020-12-09 NOTE — PROGRESS NOTES
Hospitalist Progress Note      SYNOPSIS: Patient admitted on 12/3/2020. He had initially presented to [facility] for elective epidural injection for his chronic back pain and he subsequently became hypoxic and was admitted. His respiratory status worsen with associated hypoxemia and hypercapnia refractory to NIPPV and he required intubation for mechanical ventilation. Workup revealed left upper lobe lung mass with biopsy yielding small cell carcinoma. Further workup revealed postobstructive pneumonia and sputum culture yielded Pseudomonas aeruginosa. He has been on antibiotics and was transferred to [facility] for palliative radiation therapy. SUBJECTIVE:  Patient seen and examined. Remains intubated and sedated. Continues to have generalized edema. received radiation. Review of systems: Unable to do a review of systems because of patient's sedation. Records reviewed. Stable overnight. No other overnight issues reported. Temp (24hrs), Av.6 °F (37.6 °C), Min:99.3 °F (37.4 °C), Max:100.2 °F (37.9 °C)    DIET: Diet NPO, After Midnight Exceptions are: Sips with Meds  CODE: Full Code    Intake/Output Summary (Last 24 hours) at 2020 1651  Last data filed at 2020 1600  Gross per 24 hour   Intake 3048 ml   Output 1725 ml   Net 1323 ml       OBJECTIVE:    /71   Pulse 89   Temp 99.3 °F (37.4 °C) (Bladder)   Resp 20   Ht 5' 11\" (1.803 m)   Wt (!) 339 lb (153.8 kg)   SpO2 92%   BMI 47.28 kg/m²     General appearance: morbidly obese, generalized edema. Intubated and sedated. HEENT:  Conjunctivae/corneas clear. Neck: No jugular venous distention. Respiratory:  Decreased bilateral breath sounds, rhonchi. Cardiovascular: Regular rate rhythm, normal S1-S2  Abdomen: Soft, nontender, nondistended  Musculoskeletal: No clubbing, cyanosis, 1+ bilateral lower extremity edema. Brisk capillary refill.    Skin:  No rashes  on visible skin  Neurologic: Intubated and sedated. Opens eyes to voice but doesn't follow commands. ASSESSMENT/PLAN:    Acute hypoxic respiratory failure:   - secondary to postobstructive pneumonia in the setting of small cell carcinoma of the lung with large left pleural effusion. .  Attempted extubation with patient desaturating and was reintubated. .  Vent management per Critical Care. .  Plan is for trach and PEG tube placement in due course     postobstructive pneumonia:  - secondary to small-cell carcinoma in the left upper lobe  - cultures growing Pseudomonas aeruginosa. Patient adequately covered with cefepime. -  Infectious Disease on board. Sepsis secondary to the above: continue ongoing antibiotic.     coronary artery disease, status post stent placement x5:  - antiplatelets on hold due to thrombocytopenia. Small cell cancer of left upper lobe:  - patient undergoing palliative radiation therapy. -  Follow further Oncology recommendations. Mild hypernatremia: resolved. Nephrology on board. Appreciate nephrology help. Diabetes mellitus type 2:   -  Fingerstick glucose fairly controlled. -continue ongoing antidiabetic regimen. Thrombocytopenia: likely secondary to malignancy.   -lower today at 23k-Hematology oncology on board. Normocytic anemia:   - likely anemia of chronic disease. H and H remain stable. Monitor H&H. Today hg 7.7      Left pleural effusion: likely malignant effusion. Code status is full code.        DISPOSITION: remains ICU level of care        Medications:  REVIEWED DAILY    Infusion Medications    dextrose 60 mL/hr at 12/09/20 0230    dextrose      fentaNYL 5 mcg/ml in 0.9%  ml infusion 200 mcg/hr (12/09/20 1537)    midazolam 10 mg/hr (12/09/20 1101)     Scheduled Medications    acetaZOLAMIDE  250 mg Oral BID    bumetanide  2 mg Intravenous Daily    sodium chloride  20 mL Intravenous Once    cefepime  2 g Intravenous Q8H    sodium chloride Intravenous Q8H    lidocaine PF  5 mL Intradermal Once    heparin flush  3 mL Intravenous 2 times per day    sodium chloride flush  10 mL Intravenous 2 times per day    [Held by provider] insulin glargine  40 Units Subcutaneous Nightly    insulin lispro  0-18 Units Subcutaneous Q4H    famotidine (PEPCID) injection  20 mg Intravenous BID    ipratropium-albuterol  1 ampule Inhalation Q4H WA    sodium chloride (Inhalant)  2 mL Nebulization BID    Arformoterol Tartrate  15 mcg Nebulization BID    [Held by provider] atorvastatin  80 mg Oral Nightly    chlorhexidine  15 mL Mouth/Throat BID    clopidogrel  75 mg Oral Daily    heparin flush  1 mL Intravenous 2 times per day    methylPREDNISolone  40 mg Intravenous Daily    nicotine  1 patch Transdermal Daily     PRN Meds: sodium chloride flush, heparin flush, sodium chloride flush, acetaminophen **OR** acetaminophen, mineral oil-hydrophilic petrolatum, dextrose, dextrose, glucagon (rDNA), glucose, heparin flush, magnesium sulfate, oxyCODONE-acetaminophen **AND** oxyCODONE, tiZANidine    Labs:     Recent Labs     12/08/20  2220 12/09/20  0400 12/09/20  1400   WBC 5.5 5.1 4.8   HGB 8.3* 8.0* 7.7*   HCT 27.0* 26.8* 25.3*   PLT 28* 27* 23*       Recent Labs     12/07/20  0415  12/08/20  0405 12/08/20  1720 12/09/20  0400   NA  --    < > 134 134 140   K  --    < > 3.3* 3.3* 3.6   CL  --    < > 92* 93* 99   CO2  --    < > 29 28 31*   BUN  --    < > 55* 52* 57*   CREATININE  --    < > 1.0 1.1 1.1   CALCIUM  --    < > 8.3* 8.2* 8.8   PHOS 3.9  --  3.0  --   --     < > = values in this interval not displayed. Recent Labs     12/08/20  0405 12/08/20  1720 12/09/20  0400   PROT 4.7* 4.8* 4.9*   ALKPHOS 157* 153* 160*   ALT 87* 81* 79*   * 88* 73*   BILITOT 0.8 0.8 0.7       Recent Labs     12/07/20  0415 12/08/20  0405 12/09/20  0400   INR 1.3 1.3 1.2       No results for input(s): Peter Mcguire in the last 72 hours.     Chronic labs:    Lab Results Component Value Date    CHOL 115 10/06/2020    TRIG 160 (H) 10/06/2020    HDL 42 10/06/2020    LDLCALC 41 10/06/2020    TSH 1.090 02/15/2018    INR 1.2 12/09/2020    LABA1C 7.7 (H) 11/18/2020       Radiology: REVIEWED DAILY    +++++++++++++++++++++++++++++++++++++++++++++++++  Sahra Cruz MD  [place] [address], [place]  +++++++++++++++++++++++++++++++++++++++++++++++++

## 2020-12-10 NOTE — ONCOLOGY
Patient received 400 cGray to the chest for Radiation Therapy Treatment.   Patient also had a CT simulation for a possible Boost to his Chest.

## 2020-12-10 NOTE — FLOWSHEET NOTE
Patient impulsively moves, pulling on and reaching for vital tubes and lines during assessment despite redirection and education. Bilateral soft wrist restraints will be maintained for patient safety. Will continue to follow.

## 2020-12-10 NOTE — PLAN OF CARE
Problem: Restraint Use - Nonviolent/Non-Self-Destructive Behavior:  Goal: Absence of restraint-related injury  Description: Absence of restraint-related injury  Outcome: Met This Shift     Problem: Skin Integrity:  Goal: Will show no infection signs and symptoms  Description: Will show no infection signs and symptoms  Outcome: Met This Shift     Problem: Skin Integrity:  Goal: Absence of new skin breakdown  Description: Absence of new skin breakdown  Outcome: Met This Shift     Problem: Restraint Use - Nonviolent/Non-Self-Destructive Behavior:  Goal: Absence of restraint indications  Description: Absence of restraint indications  Outcome: Not Met This Shift

## 2020-12-10 NOTE — PROGRESS NOTES
Comprehensive Nutrition Assessment    Type and Reason for Visit:  Reassess    Nutrition Recommendations/Plan: Recommend continuing NPO status as deemed medically appropriate. Note if unable to obtain access for EN by day 10 of stay, consider parenteral nutrition. Consult for EN recs as needed. Nutrition Assessment:  Pt transferred from [facility] for palliative RT, originally admitted w/ PNA. PMH of lung cancer and DM. EN dc 12/3 w/ pt having 7 days of 0% intake. Recs above.     Estimated Daily Nutrient Needs:  Energy (kcal):  2539; kcal; Weight Used for Energy Requirements:  Admission     Protein (g):  140-160 g; Weight Used for Protein Requirements:  Ideal(1.5-1.8 g/kg IBW)        Fluid (ml/day):  Per critical care; Method Used for Fluid Requirements:  1 ml/kcal      Nutrition Related Findings:  Pt intubated/sedated, edentulous, abd wdl, hypoactive BS, +3 upper extr edema, +2 lower edema, -I/O's      Wounds:  Deep Tissue Injury(buttocks 12/7)       Current Nutrition Therapies:    Diet NPO, After Midnight Exceptions are: Sips with Meds    Anthropometric Measures:  · Height: 5' 11\" (180.3 cm)  · Current Body Weight: (12/10 153.3 kg confounded by fluids)   · Admission Body Weight: 323 lb (146.5 kg)(11/18/20 actual)    · Usual Body Weight: (Lack of wt hx per EMR)     · Ideal Body Weight: 172 lbs; % Ideal Body Weight 187.8 %   · BMI: 45.1  · BMI Categories: Obese Class 3 (BMI 40.0 or greater)       Nutrition Diagnosis:   · Inadequate protein-energy intake related to impaired respiratory function as evidenced by NPO or clear liquid status due to medical condition, intubation      Nutrition Interventions:   Food and/or Nutrient Delivery:  Continue NPO  Nutrition Education/Counseling:  Education not indicated   Coordination of Nutrition Care:  Continue to monitor while inpatient    Goals:  Nutrition Progression       Nutrition Monitoring and Evaluation:   Behavioral-Environmental Outcomes:  None Identified Food/Nutrient Intake Outcomes:  Diet Advancement/Tolerance  Physical Signs/Symptoms Outcomes:  Chewing or Swallowing, Biochemical Data, Hemodynamic Status, GI Status, Weight, Nutrition Focused Physical Findings, Skin, Fluid Status or Edema     Discharge Planning:     Too soon to determine     Electronically signed by Patrizia Robles RD, DASH on 12/10/20 at 1:22 PM EST    Contact: [phone]

## 2020-12-10 NOTE — PROGRESS NOTES
[address] [facility]  Department of Internal Medicine   Internal Medicine Residency   MICU Progress Note    Patient:  Ynes Joya. 61 y.o. male  MRN: [id number]     Date of Service: 12/10/2020    Allergy: Bee venom  follow up ARF  Change in mental status   Subjective     Patient is examined and seen in the room. Currently sedated with fentanyl and versed , vital signs stable , not responding to verbal stimuli . ACVC 20/480/55%/8, ABG 7.5/37/70/29, went through one session of lung radiation therapy yesterday and one session today, will continue, will try wean off vent and extubate, if not successful, will planTrach and PEG availability , thoracentesis pending outcome of radiation therapy reassess after radiation therapy    Overnight, hypokalemia. K replaced. Objective     VS: /79   Pulse 82   Temp 99.5 °F (37.5 °C)   Resp 20   Ht 5' 11\" (1.803 m)   Wt (!) 338 lb 8 oz (153.5 kg)   SpO2 94%   BMI 47.21 kg/m²           I & O - 24hr:     Intake/Output Summary (Last 24 hours) at 12/10/2020 1514  Last data filed at 12/10/2020 1431  Gross per 24 hour   Intake 3522.01 ml   Output 1650 ml   Net 1872.01 ml       Physical Exam:  General Appearance: Intubated, sedated, and   Lung:  breath sound congruent with the vent, (+) crackles and wheezing, marked reduced air entry to Lt lung  Heart: regular rate and rhythm, S1, S2 normal, no murmur, click, rub or gallop  Extremities:  extremities normal, atraumatic, no cyanosis or edema  Neurologic: Mental status: Sedated.   Not following commands        Lines     site day    Art line   None    TLC None    PICC Lt basilic midline  16   Hemoaccess None    Oxygen:       Mechanical Ventilation:   Mode:ACVC 20/480/55%/8, ABG 7.5/37/70/29  ABG:     Lab Results   Component Value Date    PH 7.443 12/10/2020    PCO2 37.7 12/10/2020    PO2 77.9 12/10/2020    YKD4AZT 36.0 11/29/2020    HCO3 25.2 12/10/2020    BE 1.1 12/10/2020    THB 9.3 12/10/2020    O2SAT 94.7 12/10/2020        Medications     Infusions: (Fluid, Sedation, Vasopressors)  IVF:    D5W 60 ml/hr  Vasopressors   (-) pressors  Sedation   Fentanyl 100, Versed 10    Nutrition:   NPO  ATB:   Antibiotics  Days   cefepime 5               Labs     CBC with Differential:    Lab Results   Component Value Date    WBC 5.6 12/10/2020    RBC 2.59 12/10/2020    HGB 7.3 12/10/2020    HCT 24.2 12/10/2020    PLT 22 12/10/2020    MCV 93.4 12/10/2020    MCH 28.2 12/10/2020    MCHC 30.2 12/10/2020    RDW 17.3 12/10/2020    NRBC 1.7 12/10/2020    SEGSPCT 62 12/29/2011    BLASTSPCT 0.9 11/21/2020    METASPCT 0.9 12/10/2020    LYMPHOPCT 7.8 12/10/2020    PROMYELOPCT 0.9 11/21/2020    MONOPCT 0.9 12/10/2020    MYELOPCT 0.9 12/06/2020    BASOPCT 0.2 12/10/2020    MONOSABS 0.06 12/10/2020    LYMPHSABS 0.45 12/10/2020    EOSABS 0.00 12/10/2020    BASOSABS 0.00 12/10/2020     CMP:    Lab Results   Component Value Date     12/10/2020    K 3.2 12/10/2020    K 4.4 12/04/2020    CL 99 12/10/2020    CO2 32 12/10/2020    BUN 60 12/10/2020    CREATININE 1.2 12/10/2020    GFRAA >60 12/10/2020    LABGLOM >60 12/10/2020    GLUCOSE 157 12/10/2020    GLUCOSE 120 12/31/2011    PROT 4.9 12/10/2020    LABALBU 2.5 12/10/2020    LABALBU 4.5 12/28/2011    CALCIUM 8.6 12/10/2020    BILITOT 0.5 12/10/2020    ALKPHOS 147 12/10/2020    AST 57 12/10/2020    ALT 73 12/10/2020       Imaging Studies:  CXR 12/06/20, No interval change.  Complete opacification left mid and upper lung.  Correlate bronchoscopy as warranted to exclude endobronchial obstruction. Resident's Assessment and Plan     Drucilla Blizzard. is a 61 y.o. male with past medical history of STEMI s/p 5 stents type 2 diabetes mellitus, chronic low back pain, hypertension, abdominal aortic aneurysm s/p endovascular repair admitted to [facility] [place] on November 17 with shortness of breath and low platelet count. Patient was found to have CAP and small cell carcinoma.   Currently Pseudomonas. On cefepime, ID following patient      Small cell carcinoma of the lung  CTA showing a mass, no obvious mucous plugging on the left upper lobe. Very edematous and compressed airway. Biopsies immunoprofile compatible with small cell carcinoma of the lung  Patient with small cell carcinoma with left upper lobe opacity with large left pleural effusion along with mediastinal lymphadenopathy. His CT scan of abdomen and pelvis did not show evidence of intra-abdominal metastasis. Candidate of palliative radiation therapy. ICU team opined that pt may benefit from radiation therapy that is likely the primary reason of obstruction. Some nursing staffing issues with the urgent radiation therapy scheduled for this Friday. Hematology oncology following, recommend palliative care given poor diagnosis  Radiation oncology following the patient, rec No treatment over the weekend  Dec. 10, pt completed 3 sessions of 4 planned radiation therapy since Monday, will proceed for the 4th session today.         Nephrology  ISIDRO stage II, likely prerenal secondary to diuresis,   December 5, creatinine 1.2 BUN 62  bumex 2mg QD, and acetazolamide  monitor bmp and renal function  Nephrology following pt, rec  · Continue D5W @ 60 mL/hr until free water can be started via NGT/PEG  · Resume diuretics,acetazolamide 250 mg via PEG bid, Bumex 2 mg IV QD  · Replace potassium  · Continue to monitor CMP Q 12 hours  · Continue to maintain strict I&Os      Hematology and oncology  Thrombocytopenia, secondary to underlying sepsis/bacteremia versus myelophthisis secondary to malignancy  Status post transfusion of platelets x2  Hematology following  Consider peripheral blood smear, and BM for differential diagnosis if pt improves  Platelet 20, transfusion target 10, will coordinated with surgical procedures trach and peg and thoracentesis for blood platelet transfusion    Normocytic anemia, secondary to underlying sepsis/bacteremia versus myelophthisis secondary to malignancy  Status post PRBC transfusion  Hb stable, 7.4  Follow-up folate B12 and iron panel      Endocrinology  Type 2 diabetes  On Lantus 40 nightly. High-dose sliding scale. Continue monitor blood glucose every 4 hour. DVT/GI prophy: lovenox/pepcid  Disposition plan: Continue current management        Hellen Levine MD, PGY-1    Attending physician: Dr. Krystal Nicole personally saw, examined and provided care for the patient. Radiographs, labs and medication list were reviewed by me independently. I spoke with bedside nursing, therapists and consultants. Critical care services and times documented are independent of procedures and multidisciplinary rounds with Residents. Additionally comprehensive, multidisciplinary rounds were conducted with the MICU team. The case was discussed in detail and plans for care were established. Review of Residents documentation was conducted and revisions were made as appropriate. I agree with the above documented exam, problem list and plan of care.     For trach and peg  Chemoradiation as per oncology   transfuse platelet as needed    [address]

## 2020-12-10 NOTE — PLAN OF CARE
Problem: Restraint Use - Nonviolent/Non-Self-Destructive Behavior:  Goal: Absence of restraint-related injury  Description: Absence of restraint-related injury  Outcome: Met This Shift     Problem: Falls - Risk of:  Goal: Will remain free from falls  Description: Will remain free from falls  Outcome: Met This Shift     Problem: Falls - Risk of:  Goal: Absence of physical injury  Description: Absence of physical injury  Outcome: Met This Shift     Problem: Restraint Use - Nonviolent/Non-Self-Destructive Behavior:  Goal: Absence of restraint indications  Description: Absence of restraint indications  Outcome: Not Met This Shift

## 2020-12-10 NOTE — PROGRESS NOTES
(!) 338 lb 8 oz (153.5 kg)   SpO2 93%   BMI 47.21 kg/m²   Constitutional: Intubated, no MV dyssynchrony  Respiratory: Clear breath sounds, no crackles, no wheezes  Cardiovascular: Regular rate and rhythm, no murmurs  Gastrointestinal: Bowel sounds present, soft, nontender  Skin: Warm and dry, no active dermatoses bilateral lower extremity 4+ edema  Musculoskeletal: No joint swelling, no joint erythema   picc 12/6/2020, left midline 11/23/2020    Laboratory:  Lab Results   Component Value Date    WBC 5.6 12/10/2020    WBC 5.7 12/09/2020    WBC 4.8 12/09/2020    HGB 7.3 (L) 12/10/2020    HCT 24.2 (L) 12/10/2020    MCV 93.4 12/10/2020    PLT 22 (L) 12/10/2020     Lab Results   Component Value Date    NEUTROABS 5.10 12/10/2020    NEUTROABS 4.64 12/09/2020    NEUTROABS 3.77 12/08/2020     Lab Results   Component Value Date    CRP 6.7 (H) 12/08/2020    CRP 2.5 (H) 11/25/2020     No results found for: CRPHS  Lab Results   Component Value Date    SEDRATE 82 (H) 11/25/2020     Lab Results   Component Value Date    ALT 73 (H) 12/10/2020    AST 57 (H) 12/10/2020    ALKPHOS 147 (H) 12/10/2020    BILITOT 0.5 12/10/2020     Lab Results   Component Value Date     12/10/2020    K 3.2 12/10/2020    K 4.4 12/04/2020    CL 99 12/10/2020    CO2 32 12/10/2020    BUN 60 12/10/2020    CREATININE 1.2 12/10/2020    CREATININE 1.1 12/09/2020    CREATININE 1.1 12/09/2020    GFRAA >60 12/10/2020    LABGLOM >60 12/10/2020    GLUCOSE 157 12/10/2020    GLUCOSE 120 12/31/2011    PROT 4.9 12/10/2020    LABALBU 2.5 12/10/2020    LABALBU 4.5 12/28/2011    CALCIUM 8.6 12/10/2020    BILITOT 0.5 12/10/2020    ALKPHOS 147 12/10/2020    AST 57 12/10/2020    ALT 73 12/10/2020       Radiology: CXR (12/08): Overall there is no interval change in the appearance of the chest x-ray when compared to prior studies. Persistent large focal dense consolidation is seen within the left upper lobe.       chest xray 12/10/2020    Questionable increase in small Changes made as necessary.      Vikas Zhang MD

## 2020-12-10 NOTE — PROGRESS NOTES
Hospitalist Progress Note      SYNOPSIS: Patient admitted on 12/3/2020 for ARF (acute respiratory failure) ([facility] [address]      SUBJECTIVE:    Patient seen and examined  Records reviewed.      Awaits trach placement    CAP  OMER small cell ca  Morbid obesity  Heavy smoker     Temp (24hrs), Av.6 °F (37.6 °C), Min:99.5 °F (37.5 °C), Max:100 °F (37.8 °C)    DIET: Diet NPO, After Midnight Exceptions are: Sips with Meds  CODE: Full Code    Intake/Output Summary (Last 24 hours) at 12/10/2020 1814  Last data filed at 12/10/2020 1600  Gross per 24 hour   Intake 3522.01 ml   Output 1675 ml   Net 1847.01 ml       OBJECTIVE:    /69   Pulse 92   Temp 99.5 °F (37.5 °C) (Bladder)   Resp 21   Ht 5' 11\" (1.803 m)   Wt (!) 338 lb 8 oz (153.5 kg)   SpO2 95%   BMI 47.21 kg/m²     Intubated   On vent  ASSESSMENT:    Acute resp failure  Pneumonia cap/bacterial  Chronic resp failure  OMER ca small cell  Obesity morbid  smoker     PLAN:    As per crit care team    DISPOSITION: tbd    Medications:  REVIEWED DAILY    Infusion Medications    dextrose 60 mL/hr at 12/10/20 1513    dextrose      fentaNYL 5 mcg/ml in 0.9%  ml infusion 200 mcg/hr (12/10/20 1224)    midazolam 9 mg/hr (12/10/20 1648)     Scheduled Medications    miconazole nitrate   Topical BID    potassium chloride  20 mEq Intravenous Q1H    acetaZOLAMIDE  250 mg Oral BID    bumetanide  2 mg Intravenous Daily    sodium chloride  20 mL Intravenous Once    cefepime  2 g Intravenous Q8H    sodium chloride   Intravenous Q8H    lidocaine PF  5 mL Intradermal Once    heparin flush  3 mL Intravenous 2 times per day    sodium chloride flush  10 mL Intravenous 2 times per day    [Held by provider] insulin glargine  40 Units Subcutaneous Nightly    insulin lispro  0-18 Units Subcutaneous Q4H    famotidine (PEPCID) injection  20 mg Intravenous BID    ipratropium-albuterol  1 ampule Inhalation Q4H WA    sodium chloride (Inhalant)  2 mL Nebulization BID    Arformoterol Tartrate  15 mcg Nebulization BID    [Held by provider] atorvastatin  80 mg Oral Nightly    chlorhexidine  15 mL Mouth/Throat BID    clopidogrel  75 mg Oral Daily    heparin flush  1 mL Intravenous 2 times per day    methylPREDNISolone  40 mg Intravenous Daily    nicotine  1 patch Transdermal Daily     PRN Meds: miconazole nitrate **AND** miconazole nitrate, sodium chloride flush, heparin flush, sodium chloride flush, acetaminophen **OR** acetaminophen, mineral oil-hydrophilic petrolatum, dextrose, dextrose, glucagon (rDNA), glucose, heparin flush, magnesium sulfate, oxyCODONE-acetaminophen **AND** oxyCODONE, tiZANidine    Labs:     Recent Labs     12/09/20  2208 12/10/20  0459 12/10/20  1550   WBC 5.7 5.6 5.6   HGB 7.5* 7.3* 7.2*   HCT 24.6* 24.2* 23.5*   PLT 21* 22* 28*       Recent Labs     12/08/20  0405 12/09/20  0400 12/09/20  1615 12/10/20  0459 12/10/20  1550      < > 140 139 140 139   K 3.3*   < > 3.6 3.5 3.2* 3.1*   CL 92*   < > 99 98 99 97*   CO2 29   < > 31* 31* 32* 28   BUN 55*   < > 57* 58* 60* 59*   CREATININE 1.0   < > 1.1 1.1 1.2 1.2   CALCIUM 8.3*   < > 8.8 8.7 8.6 8.9   PHOS 3.0  --  3.0  --  3.4  --     < > = values in this interval not displayed. Recent Labs     12/09/20  0400 12/10/20  0459 12/10/20  1550   PROT 4.9* 4.9* 5.0*   ALKPHOS 160* 147* 163*   ALT 79* 73* 73*   AST 73* 57* 55*   BILITOT 0.7 0.5 0.6       Recent Labs     12/08/20 0405 12/09/20  0400 12/10/20  0459   INR 1.3 1.2 1.1       No results for input(s): Geovanny Mcneill in the last 72 hours.     Chronic labs:    Lab Results   Component Value Date    CHOL 115 10/06/2020    TRIG 160 (H) 10/06/2020    HDL 42 10/06/2020    LDLCALC 41 10/06/2020    TSH 1.090 02/15/2018    INR 1.1 12/10/2020    LABA1C 7.7 (H) 11/18/2020       Radiology: REVIEWED DAILY    +++++++++++++++++++++++++++++++++++++++++++++++++  Chiquita Galdamez [address] [facility] [place], [place]  +++++++++++++++++++++++++++++++++++++++++++++++++  NOTE: This report was transcribed using voice recognition software. Every effort was made to ensure accuracy; however, inadvertent computerized transcription errors may be present.

## 2020-12-10 NOTE — PROGRESS NOTES
[facility]  ATTENDING PHYSICIAN PROGRESS NOTE     I have examined the patient and  reviewed the record. I have reviewed all relevant labs and imaging data. The following summarizes my clinical findings and independent assessment. CC: small cell lung cancer    S. Pt's wife wishes for patient to undergo trach-peg  O.  Vitals:    12/10/20 1355   BP:    Pulse: 88   Resp: 21   Temp:    SpO2: 95%     Intubated  Sedated  Morbidly obese  Abdomen soft ntnd    ASSESSMENT:  Active Problems:    ARF (acute respiratory failure) (HCC)  Resolved Problems:    * No resolved hospital problems. *       PLAN:  Acute Resp Failure  SCLC  Overall poor prognosis  Will schedule for trach-peg in OR tomorrow       Regla Chavira MD, FACS  12/10/2020  2:18 PM    NOTE: This report was transcribed using voice recognition software. Every effort was made to ensure accuracy; however, inadvertent computerized transcription errors may be present.

## 2020-12-10 NOTE — PROGRESS NOTES
RADIATION ONCOLOGY    Germán Acosta.        12/10/2020     [id number]     1. Small cell lung cancer [facility])      Patient started palliative radiation therapy to the left upper lung mass this week. He is status post 1600 cGy/4 fractions. Re-CT in our radiation department was done today to assess response. Compared to the pre-RT CT there is slight decrease in the size and density of the mass. Assessment/plan: To resume XRT after a break tomorrow, on 12/14/2020 at lower fractionation. He will receive 8 more fractions of XRT. The goal is to decrease the size of the mass, improve aeration, give him a chance of extubation and perhaps, make him amenable to chemotherapy. Prognosis, however, remains guarded.       Rajeev Krishnan MD

## 2020-12-10 NOTE — PLAN OF CARE
Problem: Restraint Use - Nonviolent/Non-Self-Destructive Behavior:  Goal: Absence of restraint-related injury  Description: Absence of restraint-related injury  12/10/2020 1638 by Milka Jang RN  Outcome: Met This Shift     Problem: Restraint Use - Nonviolent/Non-Self-Destructive Behavior:  Goal: Absence of restraint indications  Description: Absence of restraint indications  12/10/2020 1853 by Milka Jang RN  Outcome: Not Met This Shift

## 2020-12-10 NOTE — PROGRESS NOTES
Department of Internal Medicine  Nephrology Progress Note    Events reviewed. 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 He remains intubated and sedated. PHYSICAL EXAM:    VITALS:  BP (!) 144/91   Pulse 91   Temp 99.7 °F (37.6 °C) (Bladder)   Resp 20   Ht 5' 11\" (1.803 m)   Wt (!) 338 lb 8 oz (153.5 kg)   SpO2 94%   BMI 47.21 kg/m²   24HR INTAKE/OUTPUT:      Intake/Output Summary (Last 24 hours) at 12/10/2020 1031  Last data filed at 12/10/2020 0800  Gross per 24 hour   Intake 3111.85 ml   Output 1730 ml   Net 1381.85 ml       Constitutional:  Morbidly obese  male. Sedated, intubated.   Cardiovascular/Edema:  RRR,S1/S2  Respiratory:  ETT to vent, 55% PEEP 8, diminished bases bilaterally  Gastrointestinal:  Soft, obese, nondistended, bowel sounds hypoactive; + abdominal wall edema  Skin: Warm, dry, no lesions  1+ pitting edema BLE into hips, 1+ pitting edema abdominal wall, 1+ pitting bilateral forearms and hands      DATA:    CBC with Differential:    Lab Results   Component Value Date    WBC 5.6 12/10/2020    RBC 2.59 12/10/2020    HGB 7.3 12/10/2020    HCT 24.2 12/10/2020    PLT 22 12/10/2020    MCV 93.4 12/10/2020    MCH 28.2 12/10/2020    MCHC 30.2 12/10/2020    RDW 17.3 12/10/2020    NRBC 1.7 12/10/2020    SEGSPCT 62 12/29/2011    BLASTSPCT 0.9 11/21/2020    METASPCT 0.9 12/10/2020    LYMPHOPCT 7.8 12/10/2020    PROMYELOPCT 0.9 11/21/2020    MONOPCT 0.9 12/10/2020    MYELOPCT 0.9 12/06/2020    BASOPCT 0.2 12/10/2020    MONOSABS 0.06 12/10/2020    LYMPHSABS 0.45 12/10/2020    EOSABS 0.00 12/10/2020    BASOSABS 0.00 12/10/2020     CMP:    Lab Results   Component Value Date     12/10/2020    K 3.2 12/10/2020    K 4.4 12/04/2020    CL 99 12/10/2020    CO2 32 12/10/2020    BUN 60 12/10/2020    CREATININE 1.2 12/10/2020    GFRAA >60 12/10/2020    LABGLOM >60 12/10/2020    GLUCOSE 157 12/10/2020    GLUCOSE 120 12/31/2011    PROT 4.9 12/10/2020    LABALBU 2.5 12/10/2020    LABALBU 4.5 12/28/2011    CALCIUM 8.6 12/10/2020    BILITOT 0.5 12/10/2020    ALKPHOS 147 12/10/2020    AST 57 12/10/2020    ALT 73 12/10/2020     Magnesium:    Lab Results   Component Value Date    MG 1.8 12/10/2020     Phosphorus:    Lab Results   Component Value Date    PHOS 3.4 12/10/2020     Radiology Review:      CXR 12/8/2020   Overall there is no interval change in the appearance of the chest x-ray when    compared to prior studies. Persistent large focal dense consolidation is seen within the left upper lobe.             BRIEF SUMMARY OF INITIAL CONSULT & ADMISSION PRIOR TO TRANSFER:    Briefly, Mr. Nicole Kathleen is a 61year old male with a PMH of Type II DM, HTN, HLD, CAD s/p stents x 5, AAA s/p endovascular repair and chronic low back pain who presented to [facility] on 11/17/2020 with complaints of SOB. He was hypoxic on room air. A CTA of the chest demonstrated PNA and pleural effusions. Labs were significant for thrombocytopenia. The following day an RRT was called for worsening SOB. He was placed on bipap but later intubated for worsening respiratory acidosis. A bronchoscopy was performed the same day (11/18) and it was noted that the OMER was significantly narrowed. A transbronchial needle aspiration was completed and three biopsies were obtained. On 11/24 biopsies + small cell lung CA. His wife had requested transfer to [facility] however he was not accepted/insurance did not cover. Nephrology was consulted for volume management and lasix and albumin were started. ID was consulted on 11/25 for persistent fevers. A second bronch was performed and Bumex gtt were started on 11/26. Bumex gtt was discontinued on 11/27 for worsening ISIDRO and hypernatremia. Free water was increased NGT to 100 mL/hr. On 11/28 D5W was also started. D5W increased to 100 mL/hr on 11/29, free water continued. D5W was discontinued and HCTZ with albumin were started BID on 11/30 but discontinued on 12/1.  Free water via NGT/PEG  · Change acetazolamide to 250 mg daily  · Increase Bumex 2 mg IV twice daily  · Replace potassium 20 mEq IV x1  · Continue to monitor renal function and electrolytes      Electronically signed by Juani Renee MD on 12/10/2020 at 10:31 AM

## 2020-12-10 NOTE — PROGRESS NOTES
(LYRICA) 150 MG capsule Take 1 capsule by mouth 2 times daily for 30 days. 11/12/20 12/12/20  Kanika Gutierrez DO   diclofenac (VOLTAREN) 75 MG EC tablet TAKE 1 TABLET BY MOUTH TWICE A DAY 10/7/20   Historical Provider, MD   oxyCODONE-acetaminophen (PERCOCET) 7.5-325 MG per tablet Take 1 tablet by mouth 3 times daily as needed for Pain for up to 30 days.  Intended supply: 30 days 11/12/20 12/12/20  Kanika Gutierrez DO   quinapril (ACCUPRIL) 10 MG tablet Take 1 tablet by mouth daily  Patient taking differently: Take 10 mg by mouth daily Wife states on hold 10/16/20   Abdi Simmons PA-C   metFORMIN (GLUCOPHAGE) 500 MG tablet TAKE 1 TABLET BY MOUTH TWICE A DAY WITH MEALS  Patient taking differently: Wife states on hold 10/8/20   Norma Garnica DO   fluticasone (FLONASE) 50 MCG/ACT nasal spray 2 sprays by Nasal route daily 10/6/20   Abdi Simmons PA-C   omega-3 acid ethyl esters (LOVAZA) 1 g capsule take 1 capsule twice a day 8/14/20   Abdi Simmons PA-C   niacin (SLO-NIACIN) 500 MG extended release tablet take 1 tablet by mouth once daily 7/20/20   Norma Garnica DO   clopidogrel (PLAVIX) 75 MG tablet TAKE 1 TABLET BY MOUTH EVERY DAY  Patient taking differently: Take 75 mg by mouth daily Has been on hold for procedure 7/6/20   Abdi Simmons PA-C   sildenafil (VIAGRA) 50 MG tablet Take 1 tablet by mouth as needed for Erectile Dysfunction 7/6/20   Abdi Simmons PA-C   aspirin 325 MG EC tablet TAKE 1 TABLET BY MOUTH EVERY DAY  Patient taking differently: Take 325 mg by mouth daily Has been on hold for procedure 5/7/20   [address], DO   Tens Unit MISC by Does not apply route 5/7/19   TRUDY Skinner   atorvastatin (LIPITOR) 80 MG tablet TAKE 1 TABLET BY MOUTH AT BEDTIME 10/22/18   Abdi Simmons PA-C   carvedilol (COREG) 25 MG tablet TAKE ONE TABLET BY MOUTH TWICE DAILY (WITH MEALS) 10/22/18   Abdi Simmons PA-C       CURRENT MEDICATIONS:  Current Facility-Administered Medications: miconazole nitrate 2 % ointment, , Topical, BID **AND** miconazole nitrate 2 % ointment, , Topical, TID PRN  acetaZOLAMIDE (DIAMOX) tablet 250 mg, 250 mg, Oral, BID  bumetanide (BUMEX) injection 2 mg, 2 mg, Intravenous, Daily  0.9 % sodium chloride bolus, 20 mL, Intravenous, Once  dextrose 5 % solution, , Intravenous, Continuous  cefepime (MAXIPIME) 2 g IVPB extended (mini-bag), 2 g, Intravenous, Q8H  Cefepime 0.9% sodium chloride flush, , Intravenous, Q8H  lidocaine PF 1 % injection 5 mL, 5 mL, Intradermal, Once  sodium chloride flush 0.9 % injection 10 mL, 10 mL, Intravenous, PRN  heparin flush 100 UNIT/ML injection 300 Units, 3 mL, Intravenous, 2 times per day  heparin flush 100 UNIT/ML injection 300 Units, 3 mL, Intracatheter, PRN  sodium chloride flush 0.9 % injection 10 mL, 10 mL, Intravenous, 2 times per day  sodium chloride flush 0.9 % injection 10 mL, 10 mL, Intravenous, PRN  acetaminophen (TYLENOL) tablet 650 mg, 650 mg, Oral, Q6H PRN **OR** acetaminophen (TYLENOL) suppository 650 mg, 650 mg, Rectal, Q6H PRN  [Held by provider] insulin glargine (LANTUS) injection vial 40 Units, 40 Units, Subcutaneous, Nightly  insulin lispro (HUMALOG) injection vial 0-18 Units, 0-18 Units, Subcutaneous, Q4H  famotidine (PEPCID) injection 20 mg, 20 mg, Intravenous, BID  mineral oil-hydrophilic petrolatum (HYDROPHOR) ointment, , Topical, BID PRN  ipratropium-albuterol (DUONEB) nebulizer solution 1 ampule, 1 ampule, Inhalation, Q4H WA  sodium chloride (Inhalant) 3 % nebulizer solution 2 mL, 2 mL, Nebulization, BID  dextrose 5 % solution, 100 mL/hr, Intravenous, PRN  dextrose 50 % IV solution, 12.5 g, Intravenous, PRN  glucagon (rDNA) injection 1 mg, 1 mg, Intramuscular, PRN  glucose (GLUTOSE) 40 % oral gel 15 g, 15 g, Oral, PRN  Arformoterol Tartrate (BROVANA) nebulizer solution 15 mcg, 15 mcg, Nebulization, BID  [Held by provider] atorvastatin (LIPITOR) tablet 80 mg, 80 mg, Oral, Nightly  chlorhexidine (PERIDEX) 0.12 % solution 15 mL, 15 mL, Mouth/Throat, BID  clopidogrel (PLAVIX) tablet 75 mg, 75 mg, Oral, Daily  fentaNYL 5 mcg/ml in 0.9%  ml infusion, 200 mcg/hr, Intravenous, Continuous  heparin flush 100 UNIT/ML injection 100 Units, 1 mL, Intravenous, 2 times per day  heparin flush 100 UNIT/ML injection 100 Units, 1 mL, Intracatheter, PRN  magnesium sulfate 1 g in dextrose 5% 100 mL IVPB, 1 g, Intravenous, PRN  methylPREDNISolone sodium (SOLU-MEDROL) injection 40 mg, 40 mg, Intravenous, Daily  midazolam (VERSED) 100 mg in dextrose 5 % 100 mL infusion, 2 mg/hr, Intravenous, Continuous  nicotine (NICODERM CQ) 21 MG/24HR 1 patch, 1 patch, Transdermal, Daily  oxyCODONE-acetaminophen (PERCOCET) 5-325 MG per tablet 1 tablet, 1 tablet, Oral, TID PRN **AND** oxyCODONE (ROXICODONE) immediate release tablet 2.5 mg, 2.5 mg, Oral, TID PRN  tiZANidine (ZANAFLEX) tablet 4 mg, 4 mg, Oral, TID PRN    ALLERGIES:  Allergies   Allergen Reactions    Bee Venom Anaphylaxis     REVIEW OF SYSTEMS: Unable to Attain given patient status and condition on vent    OBJECTIVE:  PHYSICAL EXAMINATION:     VITAL SIGNS:  /79   Pulse 82   Temp 99.5 °F (37.5 °C)   Resp 20   Ht 5' 11\" (1.803 m)   Wt (!) 338 lb 8 oz (153.5 kg)   SpO2 94%   BMI 47.21 kg/m²   Wt Readings from Last 3 Encounters:   12/10/20 (!) 338 lb 8 oz (153.5 kg)   20 (!) 327 lb 2.6 oz (148.4 kg)   20 (!) 314 lb (142.4 kg)     Temp Readings from Last 3 Encounters:   12/10/20 99.5 °F (37.5 °C)   20 100 °F (37.8 °C) (Bladder)   20 97.1 °F (36.2 °C) (Temporal)     TMAX:  BP Readings from Last 3 Encounters:   12/10/20 131/79   20 126/77   20 (!) 157/79     Pulse Readings from Last 3 Encounters:   12/10/20 82   20 103   20 68       CURRENT PULSE OXIMETRY: SpO2: 94 %  24HR PULSE OXIMETRY RANGE: SpO2  Av.9 %  Min: 92 %  Max: 95 %  CVP:      ________________________________________________________________________    VENTILATOR SETTINGS (if applicable):         Vent Information  $Ventilation: $Subsequent Day  Skin Assessment: Clean, dry, & intact  Equipment ID: [id number]  Vent Type: [phone]  Vent Mode: AC/VC  Vt Ordered: 480 mL  Rate Set: 20 bmp  Peak Flow: 70 L/min  Pressure Support: 0 cmH20  FiO2 : 55 %  SpO2: 94 %  SpO2/FiO2 ratio: 170.91  Sensitivity: 2  PEEP/CPAP: 8  I Time/ I Time %: 0 s  Humidification Source: Heated wire  Humidification Temp: 37  Humidification Temp Measured: 37  Circuit Condensation: Drained  Additional Respiratory  Assessments  Pulse: 82  Resp: 20  SpO2: 94 %  Position: Semi-Jeff's  Humidification Source: Heated wire  Humidification Temp: 37  Circuit Condensation: Drained  Oral Care: Mouth swabbed, Mouth moisturizer, Mouth suctioned  Subglottic Suction Done?: Yes  Airway Type: ET  Airway Size: 8  Cuff Pressure (cm H2O): 29 cm H2O  ETCO2:  Peak Inspiratory Pressure:  End-Inspiratory Plateau Pressure:    ABG:    Recent Labs     12/10/20  0541   PH 7.443   PO2 77.9   PCO2 37.7   HCO3 25.2   BE 1.1   O2SAT 94.7   METHB 0.2   O2HB 93.8*   COHB 0.8   O2CON 12.4   HHB 5.2*   THB 9.3*     FiO2 : 55 %  I:E Ratio: 1:3.10  ________________________________________________________________________    IV ACCESS:    NUTRITION: Diet NPO, After Midnight Exceptions are: Sips with Meds    INTAKE/OUTPUTS:  I/O last 3 completed shifts:   In: 5730 [I.V.:3067; Blood:335; NG/GT:120]  Out: 1926 [Urine:1650]    Intake/Output Summary (Last 24 hours) at 12/10/2020 1536  Last data filed at 12/10/2020 1431  Gross per 24 hour   Intake 3522.01 ml   Output 1650 ml   Net 1872.01 ml     General Appearance: well-developed and well-nourished, in no acute distress   Eyes: pupils equal, round, and reactive to light, extraocular eye movements intact, conjunctivae normal and sclera anicteric   Neck: neck supple and non tender without mass, no thyromegaly, no thyroid nodules and no cervical adenopathy   Pulmonary/Chest: decreased breath sounds at bases, no accessory muscles of inspiration noted  Cardiovascular: normal rate, regular rhythm, normal S1 and S2, no murmurs, rubs, clicks or gallops, distal pulses intact, no carotid bruits, no murmurs, no gallops, no carotid bruits and no JVD   Abdomen: soft, non-tender, non-distended, normal bowel sounds, no masses or organomegaly   Extremities: no cyanosis, no clubbing, no edema    LABS/IMAGING:    CBC:  Lab Results   Component Value Date    WBC 5.6 12/10/2020    HGB 7.3 (L) 12/10/2020    HCT 24.2 (L) 12/10/2020    MCV 93.4 12/10/2020    PLT 22 (L) 12/10/2020    LYMPHOPCT 7.8 (L) 12/10/2020    RBC 2.59 (L) 12/10/2020    MCH 28.2 12/10/2020    MCHC 30.2 (L) 12/10/2020    RDW 17.3 (H) 12/10/2020    NEUTOPHILPCT 90.4 (H) 12/10/2020    MONOPCT 0.9 (L) 12/10/2020    BASOPCT 0.2 12/10/2020    NEUTROABS 5.10 12/10/2020    LYMPHSABS 0.45 (L) 12/10/2020    MONOSABS 0.06 (L) 12/10/2020    EOSABS 0.00 (L) 12/10/2020    BASOSABS 0.00 12/10/2020       Recent Labs     12/10/20  0459 12/09/20  2208 12/09/20  1400   WBC 5.6 5.7 4.8   HGB 7.3* 7.5* 7.7*   HCT 24.2* 24.6* 25.3*   MCV 93.4 93.9 93.0   PLT 22* 21* 23*       BMP:   Recent Labs     12/08/20  0405  12/09/20  0400 12/09/20  1615 12/10/20  0459      < > 140 139 140   K 3.3*   < > 3.6 3.5 3.2*   CL 92*   < > 99 98 99   CO2 29   < > 31* 31* 32*   PHOS 3.0  --  3.0  --  3.4   BUN 55*   < > 57* 58* 60*   CREATININE 1.0   < > 1.1 1.1 1.2    < > = values in this interval not displayed.        MG:   Lab Results   Component Value Date    MG 1.8 12/10/2020     Ca/Phos:   Lab Results   Component Value Date    CALCIUM 8.6 12/10/2020    PHOS 3.4 12/10/2020     Amylase: No results found for: AMYLASE  Lipase: No results found for: LIPASE  LIVER PROFILE:   Recent Labs     12/08/20  1720 12/09/20  0400 12/10/20  0459   AST 88* 73* 57*   ALT 81* 79* 73*   BILITOT 0.8 0.7 0.5   ALKPHOS 153* 160* 147*       PT/INR:   Recent Labs     12/08/20  0405 12/09/20  0400 12/10/20  0459   PROTIME 14.2* 13.5* 12.5*   INR 1.3 1. 2 1.1     APTT: No results for input(s): APTT in the last 72 hours. Cardiac Enzymes:  Lab Results   Component Value Date    CKTOTAL 117 12/28/2011    CKMB 1.6 12/28/2011    TROPONINI <0.01 11/17/2020       Hgb A1C:   Lab Results   Component Value Date    LABA1C 7.7 (H) 11/18/2020     No results found for: EAG  LATHA: No results found for: LATHA  ESR:   Lab Results   Component Value Date    SEDRATE 82 (H) 11/25/2020     CRP:   Lab Results   Component Value Date    CRP 6.7 (H) 12/08/2020     D Dimer:   Lab Results   Component Value Date    DDIMER 259 12/28/2011     Folate and B12: No results found for: [id number], No results found for: FOLATE    Lactic Acid:   Lab Results   Component Value Date    LACTA 2.5 (H) 12/04/2020     Ammonia:   Cortisol:  Thyroid Studies:  Lab Results   Component Value Date    TSH 1.090 02/15/2018     XR CHEST PORTABLE   Final Result   1. Questionable increase in small to moderate right pleural effusion an   underlying right basilar passive atelectasis may be related to differences in   positioning. Superimposed pneumonia and aspiration are not excluded   2. No significant change in likely malignant loculated large left pleural   effusion nor in underlying left perihilar and basilar passive atelectasis. Superimposed pneumonia and aspiration are not excluded. Suspected left upper   lobe malignancy is not well evaluated on the current study. 3. Widening of the bilateral paratracheal stripes related to metastatic   lymphadenopathy as seen on CT. XR CHEST PORTABLE   Final Result   No significant change since the study of December 7. XR CHEST PORTABLE   Final Result   Overall there is no interval change in the appearance of the chest x-ray when   compared to prior studies. Persistent large focal dense consolidation is seen within the left upper lobe. XR CHEST PORTABLE   Final Result   No significant change since the recent study of December 6.       XR CHEST PORTABLE   Final Result   No change. XR CHEST PORTABLE   Final Result   No change. Complete opacification left mid and upper lung. Correlate   bronchoscopy as warranted to exclude endobronchial obstruction. US CHEST INCLUDING MEDIASTINUM   Final Result   Addendum 1 of 1   Patient MRN:  [id number]   : 1960   Age: 61 years   Gender: Male   Order Date:  2020 9:26 AM   EXAM: US CHEST INCLUDING MEDIASTINUM   NUMBER OF IMAGES:  6   INDICATION: J90 Pleural effusion     Dr Inez Roche to read   eval for left side pleural effusion   COMPARISON: None   Ultrasound of the left chest was performed. There is a small to   moderate left pleural effusion   IMPRESSION: Small moderate left pleural effusion   . Final      XR CHEST PORTABLE   Final Result   No change. XR CHEST PORTABLE    (Results Pending)     ASSESSMENT:  · Acute on chronic respiratory failure - on ACVC mechanical ventilation   · Community-acquired pneumonia  · History heavy smoking and morbid obesity, cannot rule out underlying COPD, cannot rule out MANOLO  · OMER Small Cell Carcinoma     PLAN:  1.) await trach placement   2.) vent per ICU team   3.) cefepime, IV steroids   4.) radiation per radiation oncology team   5.) DVT and GI Prophylaxis     - XRT initiated 2020  - supportive care to continue   - Drips: Fentanyl, Versed  - Vent: ACVC 20/480/55/8  - trach/PEG in OR 2020 per general surgery     Thank you Zachariah Saeed MD very much for allowing me to see this patient in consultation and follow up. Care reviewed with nursing staff, medical and surgical specialty care, primary care and the patient's family as available. Restraints are ordered when the patient can do harm to him/herself by pulling out devices.     Jonny Cuevas M.D.

## 2020-12-10 NOTE — PROGRESS NOTES
Palliative Care Department  [phone]  Palliative Care Progress Note  Provider [place] APRN-CNP, AGACNP-BC    Kendall Keating  [id number]  Hospital Day: 8  Date of Initial Consult: 12/8/2020  Referring Provider: Laci Duong MD   Palliative Medicine was consulted for assistance with: Goals of care, CODE STATUS discussion, family support, symptom management    HPI:   Kendall Walker is a 61 y.o. with a past medical history of DM, smoker, obesity, HTN, HLD, STEMI, hiatal hernia, depression, CAD s/p stents x5, AAA s/p endovascular repair , arthritis, MANOLO on CPAP. SCLC diagnosed by biopsy 11/24. Not accepted at [facility] for management, continued management at [address] [place] [address], was tolerating PSV, extubated to BiPAP, however reintubated shortly after and now admitted on 12/3/2020 from [address] [place] [address] for further management and possible placement of trach and peg. ASSESSMENT/PLAN:     Pertinent Hospital Diagnoses      Acute hypoxic hypercapnic respiratory failure/ SCLC L lung  - intubated, NSICU, Trach / PEG, Palliative radiation, Palliative consult       Palliative Care Encounter / Counseling Regarding Goals of Care  Please see detailed goals of care discussion as below   At this time, Kendall Walker, Does Not have capacity for medical decision-making.   Capacity is time limited and situation/question specific   During encounter Jaqui Tristan was surrogate medical decision-maker   Outcome of goals of care meeting:   Code status Full Code   Advanced Directives: no POA or living will in epic   Surrogate/Legal NOK:    Kingsley Essex [phone] spouse     Spiritual assessment: no spiritual distress identified  Bereavement and grief: to be determined  Referrals to: none today  SUBJECTIVE:     Current medical issues leading to Palliative Medicine involvement include   Active Hospital Problems    Diagnosis Date Noted    ARF (acute respiratory failure) ([facility] [address] [J96.00] 12/02/2020       Details of Conversation: 12/10/2020 Patient's wife got to visit her  yesterday in the unit. She felt a sense of peace being able to see him and hold his hand. Stated that he had a little more color to him. She was told by Kali Bowser that the tumor had shrunk a little bit. Would like to proceed with the [facility] and PEG tube. Priya Solis was told that it would be scheduled for tomorrow. Patient's regular PCP has not seen him since the month before November according to wife. Wife to call Dr. Pearl Philippe this afternoon around 3 or 3:30. Priya Solis would still like to talk to Dr. Yumiko Layne if possible. 12/9/2020 Call returned to patient's wife. Priya Solis received calls from radiation oncology yesterday and pulmonology yesterday. Waiting for Dr. Yumiko Layne to call her. Stated that he must have dialed incorrectly by mistake and to please have him call her again. Priya Solis was able to speak to all of her family and now has more questions moving forward. She has the following questions: Can the patient get a Lobectomy to remove the cancer? How far can the patient be brought out of sedation? Does the patient need sedation on the vent? Is surgery an option? Priya Solis called the [facility] and the family wants to donate platelets allocated to the Hospital for her , but the [facility] said that an order needs placed by the hospital.  Stated that she knows her  is \"O Positive\" . She talked to the special collections department for platelets. Will follow along daily to help the family with their questions and try help them moving forward. Priya Solis and family want the patient to remain a FULL code status.     -Went down to the unit and spoke to the staff RN. Patient's wife is coming to visit today from 200 to 5 PM.     12/8/2020  Saw patient at the bedside, remains on ventilator at this time.   Reviewed patient's chart, review of patient with staff RN, and with respiratory therapist.  Introduced myself and palliative care to the patient's wife, and contact information provided. Went over [address] levels in great detail. Patient's wife wants her  to remain a FULL CODE STATUS at this time. Everything started when the patient had an epidural on November 17 and the pain management doctor said I don't like how you are breathing and made him get admitted. Since then the patient has had multiple ups and downs. The patient was diagnosed with SCLC for which he needs radiation to shrink mass according to wife. The patient was at a [facility] and transferred here for further treatment and placement of trach and PEG. Wife thanked me for the call. Stated that she is happy I called her before the physicians reached out to her. Patient and wife have been  for 36 years and have 4 grown sons. Wife stated she has a lot riding on the decisions she is about to make.    ------The wife is patiently waiting for oncology to call her, and pulmonology to call her. Wife stated that she will not sign any consents until she speaks to both of them. She needs to know the prognosis / long-term life expectancy for her  from oncology. And she wants to now if the trach will be long-term or short-term per pulmonology.      OBJECTIVE:   Prognosis: Per input of consults and Poor    Physical Exam:  /76   Pulse 89   Temp 99.7 °F (37.6 °C) (Bladder)   Resp 20   Ht 5' 11\" (1.803 m)   Wt (!) 338 lb 8 oz (153.5 kg)   SpO2 92%   BMI 47.21 kg/m²   Gen:  ETT/Vent Elderly, NAD, sedated  HEENT:  Normocephalic, atraumatic, mucosa moist, EOMI  Neck:  Supple, trachea midline, no JVD  Lungs:  Diminished bilaterally, rhonchi noted  Heart[de-identified]  ST-monitor, distant heart tones, no murmur, rub, or gallop noted during exam  Abd:  Soft, non tender, rounded, bowel sounds hypo  :  Catheter  Ext:  Moving all extremities, 3+ BUE (hands) / 2+ BLE  edema, pulses /doppler present, R PICC   Skin:  Warm /cool and dry  Neuro:  PERRL, Responds to voice;  intubated / sedated     Objective data reviewed: labs, images, records, medication use, vitals and chart    Discussed patient and the plan of care with the other IDT members: Palliative Medicine IDT Team, Floor Nurse, Patient and Family    Time/Communication  Greater than 50% of time spent, total 15 minutes in counseling and coordination of care at the bedside regarding Code status, family support , goals of care and symptom management. Thank you for allowing Palliative Medicine to participate in the care of Isaac Kelly. .    Note: This report was completed using computerPlayhouseSquare voiced recognition software. Every effort has been made to ensure accuracy; however, inadvertent computerized transcription errors may be present.

## 2020-12-10 NOTE — FLOWSHEET NOTE
Inpatient Wound Care(initial evaluation) 4418    Admit Date: 12/3/2020 11:34 PM    Reason for consult:  buttocks    Significant history: per H & P  Chief Complaint:  Shortness of breath after epidural.   Presented to [facility] on 11/17 after receiving an epidural injection for chronic low back pain. He was found to be hypoxic and was admitted. During his admission he became increasingly hypoxic and then hypercapnic and failed non-invasive ventilation so he had to intubated. Patient was found to have an infiltrate that was due to OMER obstruction from a lung mass that biopsy proved to be small cell carcinoma. He has had thrombocytopenia with low platelets. He was recommended to have an MRI of head but could not fit into the scanner. He was transferred to [facility] for Palliative radiation therapy. Findings:       12/10/20 1115   Skin Integrity   Skin Integrity Bruising   Location BUE, abdomen   Skin Integrity Site 2   Skin Integrity Location 2 Redness;Rash   Location 2 scroum, posterior thighs   Skin Integrity Site 3   Skin Integrity Location 3   (dried scabs= left elbow)    Location 3 purplish patchy rash- right elbow   Skin Integrity Site 4   Skin Integrity Location 4   (erosion)   Location 4 coccyx   Skin Integrity Site 5   Skin Integrity Location 5 Bruising; Redness  (dried scabs, skin tears)   Location 5 BUE   Wound 12/07/20 Buttocks Left   Date First Assessed/Time First Assessed: 12/07/20 0000   Present on Hospital Admission: No  Location: Buttocks  Wound Location Orientation: Left   Wound Image    Wound Etiology Deep tissue/Injury   Dressing/Treatment Pharmaceutical agent (see MAR)   Wound Length (cm) 2 cm   Wound Width (cm) 3 cm   Wound Depth (cm) 0.1 cm   Wound Surface Area (cm^2) 6 cm^2   Change in Wound Size % (l*w) -252.94   Wound Volume (cm^3) 0.6 cm^3   Wound Assessment Purple/maroon   Drainage Amount None   Miracle-wound Assessment Intact   Wound 12/07/20 Buttocks Right   Date First Assessed/Time First Assessed: 12/07/20 0000   Present on Hospital Admission: No  Location: Buttocks  Wound Location Orientation: Right   Wound Image   (see media)   Wound Etiology Deep tissue/Injury   Dressing/Treatment Pharmaceutical agent (see MAR)   Wound Length (cm) 1 cm   Wound Width (cm) 2 cm   Wound Depth (cm)   (unable to determine)   Wound Surface Area (cm^2) 2 cm^2   Change in Wound Size % (l*w) -300   Wound Assessment   (2 sites in close proximitry)   Drainage Amount None   Miracle-wound Assessment Intact     **Informed Consent**    photos taken of wounds and inserted into their chart as part of their permanent medical record for purposes of documentation, treatment management and/or medical review. All Images taken on 12/10/20 of patient name: Yazmin Koehler. were transmitted and stored on secured [employer] located within Task Messenger Tab by a registered [employer] Mobile Application Device.    presently intubated with vent support, restrained  Scrotum extremely swollen    Impression: DTI bilateral buttocks    Plan:  antifungal to buttocks, posterior thighs  interdry to scrotum  Bariatric surface  Will need continued preventative care  Nurse assigned to notify resident or intensivist of bilateral buttocks DTI    Oneda Jamari 12/10/2020 12:25 PM

## 2020-12-11 NOTE — PLAN OF CARE
Problem: Restraint Use - Nonviolent/Non-Self-Destructive Behavior:  Goal: Absence of restraint-related injury  Description: Absence of restraint-related injury  12/10/2020 2111 by Laisha Stanford  Outcome: Met This Shift     Problem: Skin Integrity:  Goal: Will show no infection signs and symptoms  Description: Will show no infection signs and symptoms  12/10/2020 2111 by Laisha Stanford  Outcome: Met This Shift     Problem: Skin Integrity:  Goal: Absence of new skin breakdown  Description: Absence of new skin breakdown  12/10/2020 2111 by Laisha Stanford  Outcome: Met This Shift     Problem: Falls - Risk of:  Goal: Will remain free from falls  Description: Will remain free from falls  Outcome: Met This Shift     Problem: Falls - Risk of:  Goal: Absence of physical injury  Description: Absence of physical injury  Outcome: Met This Shift     Problem: Restraint Use - Nonviolent/Non-Self-Destructive Behavior:  Goal: Absence of restraint indications  Description: Absence of restraint indications  12/10/2020 2111 by Laisha Stanford  Outcome: Not Met This Shift    Patient continues to reach for line and tubes necessary for treatment despite redirection and reorientation. Restraints remain in place for patient safety.

## 2020-12-11 NOTE — PROGRESS NOTES
MEDICATIONS:  Current Facility-Administered Medications: miconazole nitrate 2 % ointment, , Topical, BID **AND** miconazole nitrate 2 % ointment, , Topical, TID PRN  acetaZOLAMIDE (DIAMOX) tablet 250 mg, 250 mg, Oral, BID  bumetanide (BUMEX) injection 2 mg, 2 mg, Intravenous, Daily  0.9 % sodium chloride bolus, 20 mL, Intravenous, Once  dextrose 5 % solution, , Intravenous, Continuous  cefepime (MAXIPIME) 2 g IVPB extended (mini-bag), 2 g, Intravenous, Q8H  Cefepime 0.9% sodium chloride flush, , Intravenous, Q8H  lidocaine PF 1 % injection 5 mL, 5 mL, Intradermal, Once  sodium chloride flush 0.9 % injection 10 mL, 10 mL, Intravenous, PRN  heparin flush 100 UNIT/ML injection 300 Units, 3 mL, Intravenous, 2 times per day  heparin flush 100 UNIT/ML injection 300 Units, 3 mL, Intracatheter, PRN  sodium chloride flush 0.9 % injection 10 mL, 10 mL, Intravenous, 2 times per day  sodium chloride flush 0.9 % injection 10 mL, 10 mL, Intravenous, PRN  acetaminophen (TYLENOL) tablet 650 mg, 650 mg, Oral, Q6H PRN **OR** acetaminophen (TYLENOL) suppository 650 mg, 650 mg, Rectal, Q6H PRN  [Held by provider] insulin glargine (LANTUS) injection vial 40 Units, 40 Units, Subcutaneous, Nightly  insulin lispro (HUMALOG) injection vial 0-18 Units, 0-18 Units, Subcutaneous, Q4H  famotidine (PEPCID) injection 20 mg, 20 mg, Intravenous, BID  mineral oil-hydrophilic petrolatum (HYDROPHOR) ointment, , Topical, BID PRN  ipratropium-albuterol (DUONEB) nebulizer solution 1 ampule, 1 ampule, Inhalation, Q4H WA  sodium chloride (Inhalant) 3 % nebulizer solution 2 mL, 2 mL, Nebulization, BID  dextrose 5 % solution, 100 mL/hr, Intravenous, PRN  dextrose 50 % IV solution, 12.5 g, Intravenous, PRN  glucagon (rDNA) injection 1 mg, 1 mg, Intramuscular, PRN  glucose (GLUTOSE) 40 % oral gel 15 g, 15 g, Oral, PRN  Arformoterol Tartrate (BROVANA) nebulizer solution 15 mcg, 15 mcg, Nebulization, BID  [Held by provider] atorvastatin (LIPITOR) tablet 80 mg, 80 mg, Oral, Nightly  chlorhexidine (PERIDEX) 0.12 % solution 15 mL, 15 mL, Mouth/Throat, BID  [Held by provider] clopidogrel (PLAVIX) tablet 75 mg, 75 mg, Oral, Daily  fentaNYL 5 mcg/ml in 0.9%  ml infusion, 200 mcg/hr, Intravenous, Continuous  heparin flush 100 UNIT/ML injection 100 Units, 1 mL, Intravenous, 2 times per day  heparin flush 100 UNIT/ML injection 100 Units, 1 mL, Intracatheter, PRN  methylPREDNISolone sodium (SOLU-MEDROL) injection 40 mg, 40 mg, Intravenous, Daily  midazolam (VERSED) 100 mg in dextrose 5 % 100 mL infusion, 2 mg/hr, Intravenous, Continuous  nicotine (NICODERM CQ) 21 MG/24HR 1 patch, 1 patch, Transdermal, Daily  oxyCODONE-acetaminophen (PERCOCET) 5-325 MG per tablet 1 tablet, 1 tablet, Oral, TID PRN **AND** oxyCODONE (ROXICODONE) immediate release tablet 2.5 mg, 2.5 mg, Oral, TID PRN  tiZANidine (ZANAFLEX) tablet 4 mg, 4 mg, Oral, TID PRN    ALLERGIES:  Allergies   Allergen Reactions    Bee Venom Anaphylaxis     REVIEW OF SYSTEMS: Unable to Attain given patient status and condition on vent    OBJECTIVE:  PHYSICAL EXAMINATION:     VITAL SIGNS:  BP (!) 144/78   Pulse 119   Temp 99.3 °F (37.4 °C)   Resp 23   Ht 5' 11\" (1.803 m)   Wt (!) 310 lb 8 oz (140.8 kg)   SpO2 95%   BMI 43.31 kg/m²   Wt Readings from Last 3 Encounters:   20 (!) 310 lb 8 oz (140.8 kg)   20 (!) 327 lb 2.6 oz (148.4 kg)   20 (!) 314 lb (142.4 kg)     Temp Readings from Last 3 Encounters:   20 99.3 °F (37.4 °C)   20 100 °F (37.8 °C) (Bladder)   20 97.1 °F (36.2 °C) (Temporal)     TMAX:  BP Readings from Last 3 Encounters:   20 (!) 144/78   20 126/77   20 (!) 157/79     Pulse Readings from Last 3 Encounters:   20 119   20 103   20 68       CURRENT PULSE OXIMETRY: SpO2: 95 %  24HR PULSE OXIMETRY RANGE: SpO2  Av.6 %  Min: 91 %  Max: 97 %  CVP:      ________________________________________________________________________    Suzanna Kaiser SETTINGS (if applicable):         Vent Information  $Ventilation: $Subsequent Day  Skin Assessment: Clean, dry, & intact  Equipment ID: [id number]  Vent Type: [phone]  Vent Mode: AC/VC  Vt Ordered: 480 mL  Rate Set: 20 bmp  Peak Flow: 70 L/min  Pressure Support: 0 cmH20  FiO2 : 55 %  SpO2: 95 %  SpO2/FiO2 ratio: 172.73  Sensitivity: 3  PEEP/CPAP: 8  I Time/ I Time %: 0 s  Humidification Source: Heated wire  Humidification Temp: 37  Humidification Temp Measured: 37  Circuit Condensation: Drained  Additional Respiratory  Assessments  Pulse: 119  Resp: 23  SpO2: 95 %  Position: Semi-Jeff's  Humidification Source: Heated wire  Humidification Temp: 37  Circuit Condensation: Drained  Oral Care: Mouth suctioned, Mouth swabbed  Subglottic Suction Done?: Yes  Airway Type: ET  Airway Size: 8  Cuff Pressure (cm H2O): 29 cm H2O  ETCO2:  Peak Inspiratory Pressure:  End-Inspiratory Plateau Pressure:    ABG:    Recent Labs     12/11/20  0637   PH 7.427   PO2 81.4   PCO2 41.7   HCO3 26.9*   BE 2.3   O2SAT 95.3   METHB 0.3   O2HB 94.8   COHB 0.2   O2CON 13.3   HHB 4.7   THB 9.9*     FiO2 : 55 %  I:E Ratio: 1:3.10  ________________________________________________________________________    IV ACCESS:    NUTRITION: Diet NPO, After Midnight Exceptions are: Sips with Meds    INTAKE/OUTPUTS:  I/O last 3 completed shifts:   In: 4593.3 [I.V.:2988.3; Blood:1315; NG/GT:120; IV Piggyback:170]  Out: 7727 [Urine:2238]    Intake/Output Summary (Last 24 hours) at 12/11/2020 1518  Last data filed at 12/11/2020 1325  Gross per 24 hour   Intake 4593.26 ml   Output 2238 ml   Net 2355.26 ml     General Appearance: well-developed and well-nourished, in no acute distress   Eyes: pupils equal, round, and reactive to light, extraocular eye movements intact, conjunctivae normal and sclera anicteric   Neck: neck supple and non tender without mass, no thyromegaly, no thyroid nodules and no cervical adenopathy   Pulmonary/Chest: decreased breath sounds at bases, no accessory muscles of inspiration noted  Cardiovascular: normal rate, regular rhythm, normal S1 and S2, no murmurs, rubs, clicks or gallops, distal pulses intact, no carotid bruits, no murmurs, no gallops, no carotid bruits and no JVD   Abdomen: soft, non-tender, non-distended, normal bowel sounds, no masses or organomegaly   Extremities: no cyanosis, no clubbing, no edema    LABS/IMAGING:    CBC:  Lab Results   Component Value Date    WBC 6.2 12/11/2020    HGB 7.9 (L) 12/11/2020    HCT 25.0 (L) 12/11/2020    MCV 92.3 12/11/2020    PLT 20 (L) 12/11/2020    LYMPHOPCT 9.6 (L) 12/11/2020    RBC 2.71 (L) 12/11/2020    MCH 29.2 12/11/2020    MCHC 31.6 (L) 12/11/2020    RDW 16.9 (H) 12/11/2020    NEUTOPHILPCT 86.0 (H) 12/11/2020    MONOPCT 1.8 (L) 12/11/2020    BASOPCT 0.9 12/11/2020    NEUTROABS 5.46 12/11/2020    LYMPHSABS 0.62 (L) 12/11/2020    MONOSABS 0.12 12/11/2020    EOSABS 0.00 (L) 12/11/2020    BASOSABS 0.06 12/11/2020       Recent Labs     12/11/20  0700 12/10/20  2210 12/10/20  1550   WBC 6.2 5.4 5.6   HGB 7.9* 6.8* 7.2*   HCT 25.0* 22.3* 23.5*   MCV 92.3 92.1 92.5   PLT 20* 26* 28*       BMP:   Recent Labs     12/09/20  0400  12/10/20  0459 12/10/20  1550 12/11/20  0700 12/11/20  0800      < > 140 139  --  135   K 3.6   < > 3.2* 3.1*  --  3.3*   CL 99   < > 99 97*  --  96*   CO2 31*   < > 32* 28  --  27   PHOS 3.0  --  3.4  --  3.2  --    BUN 57*   < > 60* 59*  --  59*   CREATININE 1.1   < > 1.2 1.2  --  1.2    < > = values in this interval not displayed.        MG:   Lab Results   Component Value Date    MG 1.7 12/11/2020     Ca/Phos:   Lab Results   Component Value Date    CALCIUM 8.2 (L) 12/11/2020    PHOS 3.2 12/11/2020     Amylase: No results found for: AMYLASE  Lipase: No results found for: LIPASE  LIVER PROFILE:   Recent Labs     12/10/20  0459 12/10/20  1550 12/11/20  0800   AST 57* 55* 46*   ALT 73* 73* 61*   BILITOT 0.5 0.6 1.0   ALKPHOS 147* 163* 160*       PT/INR:   Recent Labs 12/09/20  0400 12/10/20  0459 12/11/20  0700   PROTIME 13.5* 12.5* 12.6*   INR 1.2 1.1 1.1     APTT: No results for input(s): APTT in the last 72 hours. Cardiac Enzymes:  Lab Results   Component Value Date    CKTOTAL 117 12/28/2011    CKMB 1.6 12/28/2011    TROPONINI <0.01 11/17/2020       Hgb A1C:   Lab Results   Component Value Date    LABA1C 7.7 (H) 11/18/2020     No results found for: EAG  LATHA: No results found for: LATHA  ESR:   Lab Results   Component Value Date    SEDRATE 82 (H) 11/25/2020     CRP:   Lab Results   Component Value Date    CRP 6.7 (H) 12/08/2020     D Dimer:   Lab Results   Component Value Date    DDIMER 259 12/28/2011     Folate and B12: No results found for: [id number], No results found for: FOLATE    Lactic Acid:   Lab Results   Component Value Date    LACTA 2.5 (H) 12/04/2020     Ammonia:   Cortisol:  Thyroid Studies:  Lab Results   Component Value Date    TSH 1.090 02/15/2018     XR CHEST PORTABLE   Final Result   1. Questionable increase in small to moderate right pleural effusion an   underlying right basilar passive atelectasis may be related to differences in   positioning. Superimposed pneumonia and aspiration are not excluded   2. No significant change in likely malignant loculated large left pleural   effusion nor in underlying left perihilar and basilar passive atelectasis. Superimposed pneumonia and aspiration are not excluded. Suspected left upper   lobe malignancy is not well evaluated on the current study. 3. Widening of the bilateral paratracheal stripes related to metastatic   lymphadenopathy as seen on CT. XR CHEST PORTABLE   Final Result   No significant change since the study of December 7. XR CHEST PORTABLE   Final Result   Overall there is no interval change in the appearance of the chest x-ray when   compared to prior studies. Persistent large focal dense consolidation is seen within the left upper lobe.       XR CHEST PORTABLE   Final Result   No significant change since the recent study of . XR CHEST PORTABLE   Final Result   No change. XR CHEST PORTABLE   Final Result   No change. Complete opacification left mid and upper lung. Correlate   bronchoscopy as warranted to exclude endobronchial obstruction. US CHEST INCLUDING MEDIASTINUM   Final Result   Addendum 1 of 1   Patient MRN:  [id number]   : 1960   Age: 61 years   Gender: Male   Order Date:  2020 9:26 AM   EXAM: US CHEST INCLUDING MEDIASTINUM   NUMBER OF IMAGES:  6   INDICATION: J90 Pleural effusion     Dr Nicole Antoine to read   eval for left side pleural effusion   COMPARISON: None   Ultrasound of the left chest was performed. There is a small to   moderate left pleural effusion   IMPRESSION: Small moderate left pleural effusion   . Final      XR CHEST PORTABLE   Final Result   No change. XR CHEST PORTABLE    (Results Pending)     ASSESSMENT:  · Acute on chronic respiratory failure - on ACVC mechanical ventilation   · Community-acquired pneumonia  · History heavy smoking and morbid obesity, cannot rule out underlying COPD, cannot rule out MANOLO  · OMER Small Cell Carcinoma     PLAN:  1.) await trach placement   2.) vent per ICU team   3.) cefepime, IV steroids   4.) radiation per radiation oncology team   5.) DVT and GI Prophylaxis     - XRT initiated 2020  - supportive care to continue   - Drips: Fentanyl, Versed  - Vent: ACVC 20/480/55/8  - trach/PEG in OR 2020 per general surgery (held  as patient had plavix and platelets of 03Y)  - LTACH placement after trach placement     Thank you Lucille Sommers MD very much for allowing me to see this patient in consultation and follow up. Care reviewed with nursing staff, medical and surgical specialty care, primary care and the patient's family as available. Restraints are ordered when the patient can do harm to him/herself by pulling out devices.     Rowe Habermann, M.D.

## 2020-12-11 NOTE — PROGRESS NOTES
Patient continues to reach for line and tubes necessary for treatment despite redirection and reorientation. Restraints remain in place for patient safety.

## 2020-12-11 NOTE — CARE COORDINATION
12/11 Care Coordination: Emerald Burroughs remains in ICU, Cont with his Palliative Radiation Treatments. For Possible Trach/Peg today, Plt 20. ... Both LTAC's following, none can accept till Radiation Treatments complete. CM/SW will continue to follow for discharge planning. Palliative Care also following.   Latonia BARDALESN,RN--[phone]

## 2020-12-11 NOTE — PROGRESS NOTES
[facility]  ATTENDING PHYSICIAN PROGRESS NOTE     I have examined the patient and  reviewed the record. I have reviewed all relevant labs and imaging data. The following summarizes my clinical findings and independent assessment. CC: small cell lung cancer    S. Pt's plts are 20 k  O. Vitals:    12/11/20 1400   BP: (!) 144/78   Pulse: 119   Resp: 23   Temp:    SpO2: 95%     Intubated  Sedated  Morbidly obese  Abdomen soft ntnd    ASSESSMENT:  Principal Problem:    ARF (acute respiratory failure) (HCC)  Active Problems:    CAD (coronary artery disease)    HTN (hypertension)    Hyperlipemia    Smoker    Non-insulin dependent type 2 diabetes mellitus ([facility] [address]    Acute respiratory failure with hypoxia (HCC)  Resolved Problems:    * No resolved hospital problems. *       PLAN:  Acute Resp Failure  SCLC  Overall poor prognosis  Trach-peg cancelled today. Will revisit next week  Plavix on hold now    Remi Lal MD, [facility]  12/11/2020  3:04 PM    NOTE: This report was transcribed using voice recognition software. Every effort was made to ensure accuracy; however, inadvertent computerized transcription errors may be present.

## 2020-12-11 NOTE — PROGRESS NOTES
[facility]  Hematology/Oncology  Consult      Patient Name: Vania Rainey YOB: 1960  PCP: Arturo Steven PA-C   Referring Provider:      Reason for Consultation:   No chief complaint on file. Subjective: Patient remains intubated, sedated. On XRT break     History of Present Illness: This is a 62 yo male patient with a past medical history of CAD, DM type 2, morbid obesity, chronic back pain, HTN, AAA s/p repair who set to the emergency room by Dr. Dorina Isaac for complaints of shortness of breath and decreased O2 during an epidural procedure. Patient was having an L4/L5 epidural for pain management done by Dr. Dorina Isaac when his O2 dropped into the 80's during the procedure. Patient also reported having worsening shortness of breath over the past two weeks with a productive cough. Patient is basically bed bound only taking occasional steps. CTA of the chest, A/P showed new confluent opacities located in left upper lobe suggesting pneumonia, atelectasis, or neoplasm. Patchy airspace opacities located in lingula suggesting pneumonia with areas of atelectasis. Stenosis and occlusion are associated with left upper lobe segmental bronchi and lingular segmental bronchi. Moderate to large left pleural effusion. Mediastinal lymphadenopathy. Stable fusiform infrarenal abdominal aortic aneurysm with endograft repair. Aneurysm measures up to 6.4 cm. No evidence of endoleak or retroperitoneal fluid. He was given 1 dose of Rocephin and azithromycin while in the ER. CBC since admission has shown anemia and thrombocytopenia. 11/18 an RRT was called and was subsequently intubated. He also had a bronchoscopy done and bxs of left upper lobe were sent.        Diagnostic Data:     Past Medical History:   Diagnosis Date    Anticoagulant long-term use     Arthritis     CAD (coronary artery disease)     Chronic back pain     Depression     Dyslipidemia     Hiatal hernia 1979    History of ST elevation myocardial infarction (STEMI)     Hyperlipidemia     Hypertension     Low back pain     Lumbar radiculopathy 8/14/2019    Obesity     MANOLO on CPAP     Presence of stent in left circumflex coronary artery     Presence of stent in right coronary artery     Smoker     Type 2 diabetes mellitus without complication [facility])        Patient Active Problem List    Diagnosis Date Noted    ARF (acute respiratory failure) ([address] 12/02/2020    Small cell lung cancer ([address]     Acute respiratory failure with hypoxia ([address] 11/17/2020    Community acquired pneumonia of left upper lobe of lung 11/17/2020    Thrombocytopenia ([address] 11/17/2020    Spinal stenosis of lumbar region with neurogenic claudication 10/29/2020    Obesity     Chronic back pain     Lumbosacral spondylosis without myelopathy 12/11/2019    Lumbar radiculopathy 08/14/2019    AAA (abdominal aortic aneurysm) ([address] 06/28/2019    Chronic bilateral low back pain with bilateral sciatica 06/11/2019    Right hip pain 06/11/2019    DDD (degenerative disc disease), lumbar 06/11/2019    Status post total right knee replacement 11/26/2018    Morbid obesity with BMI of 40.0-44.9, adult ([address] 11/26/2018    Chronic pain syndrome 07/27/2018    Chronic pain of right knee 07/27/2018    Non-insulin dependent type 2 diabetes mellitus ([address] 03/16/2018    Aortic valve stenosis 02/15/2018    Primary osteoarthritis of right knee 02/15/2018    Presence of stent in left circumflex coronary artery     Smoker     CAD (coronary artery disease) 12/28/2011    HTN (hypertension) 12/28/2011    Hyperlipemia 12/28/2011        Past Surgical History:   Procedure Laterality Date    ABDOMINAL AORTIC ANEURYSM REPAIR, ENDOVASCULAR N/A 6/28/2019    ENDOVASCULAR REPAIR ABDOMINAL AORTIC ANEURYSM performed by Moustapha Ruth MD at [address] [place] [address],[facility] 1 Bilateral 3/12/2020    BILATERAL L3,L4,L5 MEDIAL BRANCH NERVE BLOCK performed by Elena Gregorio DO at [facility]    ANESTHESIA NERVE BLOCK Bilateral 6/11/2020    BILATERAL L3,L4,L5 MEDIAL NERVE BRANCH BLOCK #2 performed by Elena Gregorio DO at [address] [place] [address] N/A 11/18/2020    BRONCHOSCOPY/TRANSBRONCHIAL NEEDLE BIOPSY performed by Jill Price MD at [place] [facility] STENT PLACEMENT  12/30/2011    Dr. Krissy Garcia 1st OM 3.0 x 20 Ion    DIAGNOSTIC CARDIAC CATH LAB PROCEDURE  3/15/2005    [facility]. Mild to moderate CAD. Normal LV.  DIAGNOSTIC CARDIAC CATH LAB PROCEDURE  1/16/2007    [facility]. Cath/PTCA/DE stent of proximal and distal RCA.  DIAGNOSTIC CARDIAC CATH LAB PROCEDURE  2/21/2007    Cath/DE stent of proximal OM1 and proximal Cx.  DIAGNOSTIC CARDIAC CATH LAB PROCEDURE  12/30/2011    ANURADHA of mid OM branch of circumflex.  ECHO COMPL W DOP COLOR FLOW  12/30/2011         HC INSERT PICC CATH, 5/> YRS - MIDLINE  11/23/2020         HERNIA REPAIR  2/2014    laparoscopic ventral hernia repain    JOINT REPLACEMENT Left 4/1/14    TKA-ed    JOINT REPLACEMENT Right 2018    KNEE    PAIN MANAGEMENT PROCEDURE Right 9/1/2020    RIGHT L3,4,5 MEDIAL BRANCH RADIOFREQUENCY ABLATION performed by Elena Gregorio DO at [facility] N/A 11/17/2020    L4-5 EPIDURAL STEROID INJECTION #1 performed by Elena Gregorio DO at [facility] OFFICE/OUTPT VISIT,PROCEDURE ONLY Right 11/26/2018    RIGHT KNEE TOTAL ARTHROPLASTY performed by Clover Elkins DO at [facility] OR       Family History  Family History   Problem Relation Age of Onset    Diabetes Mother     Stroke Mother     Diabetes Father     No Known Problems Sister        Social History    TOBACCO:   reports that he has been smoking cigarettes. He started smoking about 42 years ago. He has a 39.00 pack-year smoking history. He has never used smokeless tobacco.  ETOH:   reports no history of alcohol use.     Home Medications  Prior to Admission medications Medication Sig Start Date End Date Taking? Authorizing Provider   loratadine (CLARITIN) 10 MG capsule Take 10 mg by mouth daily    Historical Provider, MD   pioglitazone (ACTOS) 15 MG tablet TAKE 1 TABLET BY MOUTH EVERY DAY 11/6/20   Darío Banegas PA-C   pregabalin (LYRICA) 150 MG capsule Take 1 capsule by mouth 2 times daily for 30 days. 11/12/20 12/12/20  Mando Brothers DO   diclofenac (VOLTAREN) 75 MG EC tablet TAKE 1 TABLET BY MOUTH TWICE A DAY 10/7/20   Historical Provider, MD   oxyCODONE-acetaminophen (PERCOCET) 7.5-325 MG per tablet Take 1 tablet by mouth 3 times daily as needed for Pain for up to 30 days.  Intended supply: 30 days 11/12/20 12/12/20  Mando Brothers DO   quinapril (ACCUPRIL) 10 MG tablet Take 1 tablet by mouth daily  Patient taking differently: Take 10 mg by mouth daily Wife states on hold 10/16/20   Darío Banegas PA-C   metFORMIN (GLUCOPHAGE) 500 MG tablet TAKE 1 TABLET BY MOUTH TWICE A DAY WITH MEALS  Patient taking differently: Wife states on hold 10/8/20   Emre Manuel DO   fluticasone (FLONASE) 50 MCG/ACT nasal spray 2 sprays by Nasal route daily 10/6/20   Darío Banegas PA-C   omega-3 acid ethyl esters (LOVAZA) 1 g capsule take 1 capsule twice a day 8/14/20   Darío Banegas PA-C   niacin (SLO-NIACIN) 500 MG extended release tablet take 1 tablet by mouth once daily 7/20/20   Emre Manuel DO   clopidogrel (PLAVIX) 75 MG tablet TAKE 1 TABLET BY MOUTH EVERY DAY  Patient taking differently: Take 75 mg by mouth daily Has been on hold for procedure 7/6/20   Darío Banegas PA-C   sildenafil (VIAGRA) 50 MG tablet Take 1 tablet by mouth as needed for Erectile Dysfunction 7/6/20   Darío Banegas PA-C   aspirin 325 MG EC tablet TAKE 1 TABLET BY MOUTH EVERY DAY  Patient taking differently: Take 325 mg by mouth daily Has been on hold for procedure 5/7/20   Maria G Matias DO   Tens Unit MISC by Does not apply route 5/7/19   TRUDY Salinas   atorvastatin (LIPITOR) 80 MG tablet TAKE 1 TABLET BY MOUTH AT BEDTIME 10/22/18   Bryson Bolaños PA-C   carvedilol (COREG) 25 MG tablet TAKE ONE TABLET BY MOUTH TWICE DAILY (WITH MEALS) 10/22/18   Bryson Bolaños PA-C       Allergies  Allergies   Allergen Reactions    Bee Venom Anaphylaxis       Review of Systems: patient is intubated and sedated and not responsive to questions. Objective  /74   Pulse 117   Temp 99.5 °F (37.5 °C) (Bladder)   Resp 20   Ht 5' 11\" (1.803 m)   Wt (!) 310 lb 8 oz (140.8 kg)   SpO2 96%   BMI 43.31 kg/m²     Physical Exam:   Performance Status:  Head and neck: PERRLA, EOMI . Sclera non icteric. Oropharynx: Clear  Neck: no JVD,  no adenopathy  Heart: Regular rate and regular rhythm, no murmur  Lungs:Disffuse inspiratory and expiratory rhonchi   Extremities: No edema,no cyanosis, no clubbing. Abdomen: Soft, non-tender;no masses, no organomegaly  Skin: No rash. Neurologic:Cranial nerves grossly intact. No focal motor or sensory deficits .     Recent Laboratory Data-   Lab Results   Component Value Date    WBC 6.3 12/11/2020    HGB 8.6 (L) 12/11/2020    HCT 28.0 (L) 12/11/2020    MCV 91.8 12/11/2020    PLT 28 (L) 12/11/2020    LYMPHOPCT 9.6 (L) 12/11/2020    RBC 3.05 (L) 12/11/2020    MCH 28.2 12/11/2020    MCHC 30.7 (L) 12/11/2020    RDW 17.0 (H) 12/11/2020    NEUTOPHILPCT 86.0 (H) 12/11/2020    MONOPCT 1.8 (L) 12/11/2020    BASOPCT 0.9 12/11/2020    NEUTROABS 5.46 12/11/2020    LYMPHSABS 0.62 (L) 12/11/2020    MONOSABS 0.12 12/11/2020    EOSABS 0.00 (L) 12/11/2020    BASOSABS 0.06 12/11/2020       Lab Results   Component Value Date     12/11/2020    K 3.6 12/11/2020    CL 99 12/11/2020    CO2 27 12/11/2020    BUN 61 (H) 12/11/2020    CREATININE 1.3 (H) 12/11/2020    GLUCOSE 199 (H) 12/11/2020    CALCIUM 8.6 12/11/2020    PROT 5.0 (L) 12/11/2020    LABALBU 2.6 (L) 12/11/2020    BILITOT 0.8 12/11/2020    ALKPHOS 179 (H) 12/11/2020    AST 51 (H) 12/11/2020    ALT 63 (H) 12/11/2020    LABGLOM 56 12/11/2020 GFRAA >60 12/11/2020       Lab Results   Component Value Date    IRON 89 12/05/2020    TIBC 164 (L) 12/05/2020    FERRITIN 3,578 12/05/2020           Radiology-    Xr Abdomen (kub) (single Ap View)    Result Date: 11/23/2020  EXAMINATION: ONE SUPINE XRAY VIEW(S) OF THE ABDOMEN 11/23/2020 9:38 am COMPARISON: None HISTORY: ORDERING SYSTEM PROVIDED HISTORY: abdominal pain TECHNOLOGIST PROVIDED HISTORY: Reason for exam:->abdominal pain FINDINGS: There is a nonobstructive bowel gas pattern. There are no abnormal air-fluid levels. There are no calculi overlying the renal shadows. There is an NG tube within the stomach. There is a stent graft seen within the abdominal aorta. 1. There is no bowel obstruction, ileus or free air. Ct Lumbar Spine Wo Contrast    Result Date: 11/19/2020  EXAMINATION: CT OF THE LUMBAR SPINE WITHOUT CONTRAST  11/18/2020 TECHNIQUE: CT of the lumbar spine was performed without the administration of intravenous contrast. Multiplanar reformatted images are provided for review. Dose modulation, iterative reconstruction, and/or weight based adjustment of the mA/kV was utilized to reduce the radiation dose to as low as reasonably achievable. COMPARISON: 11/17/2020 HISTORY: ORDERING SYSTEM PROVIDED HISTORY: exclude epidural hematoma TECHNOLOGIST PROVIDED HISTORY: Reason for exam:->exclude epidural hematoma Chronic back pain, recent epidural placement FINDINGS: BONES/ALIGNMENT: Vertebral body heights are preserved without evidence for lumbar fracture. However, there is irregular lucency and sclerosis in the upper sacral ala bilaterally. There is minimal retrolisthesis at L2-L3. Alignment is otherwise normal. DEGENERATIVE CHANGES: There is disc space narrowing and vacuum disc phenomenon at L2-L3. Disc space heights are otherwise preserved. There is diffuse facet arthropathy. SOFT TISSUES/RETROPERITONEUM: No evidence for epidural hematoma is identified.     1. No evidence for epidural hematoma on CT scan. 2. Suspected sacral insufficiency fracture. Xr Chest Portable    Result Date: 11/23/2020  EXAMINATION: ONE XRAY VIEW OF THE CHEST 11/23/2020 7:08 am COMPARISON: 11/20/2020 HISTORY: ORDERING SYSTEM PROVIDED HISTORY: resp failure TECHNOLOGIST PROVIDED HISTORY: Reason for exam:->resp failure FINDINGS: There is no interval change in the persistent dense consolidation within the left upper lobe. The left lower lobe is clear. The right lung is clear. There is minimal atelectasis seen within the right lung base. The cardiac silhouette is within normals. 1. No interval change in the dense consolidation seen within the left upper lobe. 2. Minimal right lung base atelectasis. Xr Chest Portable    Result Date: 11/22/2020  EXAMINATION: ONE XRAY VIEW OF THE CHEST 11/22/2020 6:28 am COMPARISON: 11/21/2020 HISTORY: ORDERING SYSTEM PROVIDED HISTORY: resp failure TECHNOLOGIST PROVIDED HISTORY: Reason for exam:->resp failure FINDINGS: The endotracheal tube is stable. The enteric tube tip is not well visualized but may be at the gastroesophageal junction. There is stable left upper lobe dense opacity. There are continued patchy opacities in the left lower lobe. There may be small pleural effusions. No pneumothorax is seen. No significant change in dense airspace opacity of the left upper lobe and patchy left lower lobe airspace opacities. Enteric tube tip is not well visualized due to underpenetration. The tip may be at the gastroesophageal junction. This could be confirmed with KUB centered on the upper abdomen. Xr Chest Portable    Result Date: 11/21/2020  EXAMINATION: ONE XRAY VIEW OF THE CHEST 11/21/2020 7:37 am COMPARISON: 11/20/2020 HISTORY: ORDERING SYSTEM PROVIDED HISTORY: resp failure TECHNOLOGIST PROVIDED HISTORY: Reason for exam:->resp failure FINDINGS: Endotracheal tube and nasogastric tube are unchanged. Large opacity in the left upper lobe is unchanged.   There is additional airspace visible pneumothorax. The pulmonary vessels are within normal limits. Left upper lobe masslike consolidation. Small to moderate left pleural effusion. Xr Chest 1 View    Result Date: 11/18/2020  EXAMINATION: ONE XRAY VIEW OF THE CHEST 11/18/2020 9:06 am COMPARISON: 11/18/2020 HISTORY: ORDERING SYSTEM PROVIDED HISTORY: intubation TECHNOLOGIST PROVIDED HISTORY: Reason for exam:->intubation Reason for exam:->portable FINDINGS: Compared to the chest radiograph from earlier today, 6:44 a.m., there is interval placement of an endotracheal tube, the tip of which is approximately 4.1 cm above the korina. Nasogastric tube is present but is suboptimally visualized due to motion artifact and relative underpenetration of the study. The abdominal radiograph demonstrates it to be well positioned, with the tip in the upper abdomen. Prominent masslike consolidative opacity in the upper left lung are grossly stable. No pneumothorax is seen. Layering left pleural effusion. 1.  The life-support lines and tubes are well positioned. 2. Masslike consolidative opacity in the left upper lung. Small left effusion. Cta Chest W Contrast    Result Date: 11/17/2020  EXAMINATION: CTA OF THE CHEST 11/17/2020 3:18 pm TECHNIQUE: CTA of the chest was performed after the administration of intravenous contrast.  Multiplanar reformatted images are provided for review. MIP images are provided for review. Dose modulation, iterative reconstruction, and/or weight based adjustment of the mA/kV was utilized to reduce the radiation dose to as low as reasonably achievable.; CTA of the abdomen and pelvis was performed with the administration of intravenous contrast. Multiplanar reformatted images are provided for review. MIP images are provided for review. Dose modulation, iterative reconstruction, and/or weight based adjustment of the mA/kV was utilized to reduce the radiation dose to as low as reasonably achievable. COMPARISON: None. HISTORY: ORDERING SYSTEM PROVIDED HISTORY: aneurysm, cp, sob TECHNOLOGIST PROVIDED HISTORY: Reason for exam:->aneurysm, cp, sob FINDINGS: CTA chest: There are confluent opacities located in left upper lobe. Patchy airspace opacities located in lingula. There is moderate to large left pleural effusion. Peribronchial thickening extensor in left hilar location into the left lung base. There is subsegmental atelectasis in posterior right lower lobe with adjacent ground-glass opacities. There is pulmonary vascular congestion. The heart is mildly enlarged. There is mediastinal lymphadenopathy. Ascending thoracic aorta measures 3.7 cm in diameter. Descending thoracic aorta measures 3 cm in diameter. No evidence of thoracic aortic aneurysm or dissection. Distal descending thoracic aorta is tortuous. CTA abdomen/pelvis: There is evidence of endograft repair involving the abdominal aorta. There is redemonstration of fusiform abdominal aortic aneurysm measuring up to 6.4 cm. This finding is stable since prior. No evidence of endoleak. No retroperitoneal fluid collections. Iliac limbs of endograft appear patent. Common iliac arteries are tortuous. Common femoral arteries are patent. Numerous diverticula associated with the left colon and sigmoid colon. No evidence of acute diverticulitis. Liver, gallbladder, pancreas, and spleen are grossly unremarkable however there is motion artifact limiting this examination. There is no hydronephrosis. Cyst associated with the right kidney is stable since previous examination as well as cyst associated with the left kidney appearing stable since prior. Appendix is visualized and normal.    1.  New confluent opacities located in left upper lobe suggesting pneumonia, atelectasis, or neoplasm. 2.  Patchy airspace opacities located in lingula suggesting pneumonia with areas of atelectasis.  3.  Stenosis and occlusion are associated with left upper lobe segmental bronchi and lingular segmental bronchi. 4.  Moderate to large left pleural effusion. 5.  Mediastinal lymphadenopathy. 6.  Stable fusiform infrarenal abdominal aortic aneurysm with endograft repair. Aneurysm measures up to 6.4 cm. No evidence of endoleak or retroperitoneal fluid. 7.  Diverticulosis. Cta Abdomen Pelvis W Contrast    Result Date: 11/17/2020  EXAMINATION: CTA OF THE CHEST 11/17/2020 3:18 pm TECHNIQUE: CTA of the chest was performed after the administration of intravenous contrast.  Multiplanar reformatted images are provided for review. MIP images are provided for review. Dose modulation, iterative reconstruction, and/or weight based adjustment of the mA/kV was utilized to reduce the radiation dose to as low as reasonably achievable.; CTA of the abdomen and pelvis was performed with the administration of intravenous contrast. Multiplanar reformatted images are provided for review. MIP images are provided for review. Dose modulation, iterative reconstruction, and/or weight based adjustment of the mA/kV was utilized to reduce the radiation dose to as low as reasonably achievable. COMPARISON: None. HISTORY: ORDERING SYSTEM PROVIDED HISTORY: aneurysm, cp, sob TECHNOLOGIST PROVIDED HISTORY: Reason for exam:->aneurysm, cp, sob FINDINGS: CTA chest: There are confluent opacities located in left upper lobe. Patchy airspace opacities located in lingula. There is moderate to large left pleural effusion. Peribronchial thickening extensor in left hilar location into the left lung base. There is subsegmental atelectasis in posterior right lower lobe with adjacent ground-glass opacities. There is pulmonary vascular congestion. The heart is mildly enlarged. There is mediastinal lymphadenopathy. Ascending thoracic aorta measures 3.7 cm in diameter. Descending thoracic aorta measures 3 cm in diameter. No evidence of thoracic aortic aneurysm or dissection. Distal descending thoracic aorta is tortuous.  CTA abdomen/pelvis: There is evidence of endograft repair involving the abdominal aorta. There is redemonstration of fusiform abdominal aortic aneurysm measuring up to 6.4 cm. This finding is stable since prior. No evidence of endoleak. No retroperitoneal fluid collections. Iliac limbs of endograft appear patent. Common iliac arteries are tortuous. Common femoral arteries are patent. Numerous diverticula associated with the left colon and sigmoid colon. No evidence of acute diverticulitis. Liver, gallbladder, pancreas, and spleen are grossly unremarkable however there is motion artifact limiting this examination. There is no hydronephrosis. Cyst associated with the right kidney is stable since previous examination as well as cyst associated with the left kidney appearing stable since prior. Appendix is visualized and normal.    1.  New confluent opacities located in left upper lobe suggesting pneumonia, atelectasis, or neoplasm. 2.  Patchy airspace opacities located in lingula suggesting pneumonia with areas of atelectasis. 3.  Stenosis and occlusion are associated with left upper lobe segmental bronchi and lingular segmental bronchi. 4.  Moderate to large left pleural effusion. 5.  Mediastinal lymphadenopathy. 6.  Stable fusiform infrarenal abdominal aortic aneurysm with endograft repair. Aneurysm measures up to 6.4 cm. No evidence of endoleak or retroperitoneal fluid. 7.  Diverticulosis. Xr Abdomen For Ng/og/ne Tube Placement    Result Date: 11/22/2020  EXAMINATION: ONE SUPINE XRAY VIEW(S) OF THE ABDOMEN 11/22/2020 5:06 pm COMPARISON: November 22, 2020, 4 hours prior. HISTORY: ORDERING SYSTEM PROVIDED HISTORY: Confirmation of course of NG/OG/NE tube and location of tip of tube TECHNOLOGIST PROVIDED HISTORY: Reason for exam:->Confirmation of course of NG/OG/NE tube and location of tip of tube Portable? ->Yes FINDINGS: Tip of the NG tube noted at the level of the diaphragm, no significant change.  The fluoroscopic images were obtained during L4-5 epidural steroid injection. Intraprocedural fluoroscopic spot images as above. See separate procedure report for more information. ASSESSMENT/PLAN :    62 yo male  CAD  HTN  AAA s/p repair  DM type 2  Morbid obesity  Chronic back pain  Thrombocytopenia, Anemia   OMER opacity s/p biopsy    - S/p bronch with cytology pending  - OMER PNA vs neoplasm  - On rocpehin and levaquin   - CBC showing Hgb 7.8 with MCV 94, Platelets 39  - Will follow cyto  - Cytopenias likely due to combination of PNA and abc, with myelosuppression. Thrombocytopenia has continued to slowly progress. CBC was WNL on 10/6/20, suggesting an acute process rather than a primary bone marrow process  - Transfuse for Hgb <7, platelets <68  - Smear showed subtle toxic granulation of neutrophils  - Check iron profile and B12/folate    11/24/20  - OMER bronch bx pathology:  DIAGNOSIS   POSITIVE FOR MALIGNANT CELLS   Carcinoma, most compatible with small cell carcinoma, see comment. Monolayer shows atypical crushed cells, and many lymphocytes/histiocytes   with anthracotic pigment. Cell block shows abundant crushed malignant cells. COMMENT:   Immunostains stain the malignant cells as follows:   Pankeratin and TTF1:  Positive   Chromogranin:  Positive weakly   Synaptophysin:  Negative   CK7:  Negative   This immunoprofile is most compatible with small cell carcinoma. - Today's platelets 34, Hgb 7.6  - Transfuse for Hgb <7, platelets <37  Patient with small cell carcinoma with left upper lobe opacity with large left pleural effusion along with mediastinal lymphadenopathy. His CT scan of abdomen and pelvis did not show evidence of intra-abdominal metastasis.   He has multiple comorbid conditions including obesity, coronary artery disease, obstructive sleep apnea, obesity, type 2 diabetes mellitus and he presently has pneumonia with hypoxia and respiratory failure and remains intubated and sedated. 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   - Today's Hgb 7.2 Plts 38   - Patient still intubated but hopefully will be able to extubated  - Once stable, MRI brain to complete staging   - If he improves clinically and has improvement in ECOG, he will be considered for systemic therapy  - L pleural effusions likely malignant  - ID following, now on unasyn  - Given his current ECOG and comorbidities along with persistent fevers and abx requirements, not eligible for inpatient emergent chemotherapy. Will get rad onc on board as inpatient urgent palliative XRT could be considered to improved respiratory status if he has improvement in above    11/26/20  - Remains intubated, back in ICU  - CBC with further drop in platelets to 29. Likely combination of myelosuppression from acute illness and abx exposure  - If he has improvement in his clinic course, eventually will need MRI brain as above  - Therapy plan as above. He will need significant improvement clinically prior to discussion regarding chemotherapy. Rad onc consult pending, again will likely need improvement prior to proceeding with therapy. Cultures have no grown a source, but patient remains febrile (possibly drug fever and ID following and managing abx)  - Will follow. Further recs pending clinical course    11/27/20  - LUE DVT  - Platelets 33. Stable from yesterday  - Can not anticoagulate with current platelet count. Needs to be ~50. Monitor for now  - Remainder of CBC stable    11/29/20  - CBC stable, thrombocytopenia unchanged. Hgb dropped to 7.0. Will give 1 unit  - Continue to hold [facility] with platelets <31  - Still with low grade fevers in the 100's  - ID following, on unasyn. Possible post-obstructive PNA    11/30/20  - Continues to have thrombocytopenia platelets 28.   Hemoglobin 7.3 after 1 unit of packed red blood overall clinical issues anyway as his tumor burden in the thorax is not enough to be causing his respiratory failure alone    12/11/20  - Per Dr. Danis Carranza, slight improvement in neoplastic burden in MOER. Will resume XRT 12/14  - Spoke with patients wife and updated her. Understands prognosis remains grim. She inquired about direct donation through the [facility] for platelets.  Informed her this is not needed at this time, as he is not requiring frequent platelet transfusions and and the platelet count itself is not the reason to defer chemo, but rather his poor bone marrow function at this time as well as his critical illness  - Can transfuse 2 units platelets 1 hour prior to trach/PEG if needed    Haroldo Chappell MD  Electronically signed 12/11/2020 at 5:55 PM

## 2020-12-11 NOTE — PROGRESS NOTES
TAVIA PROGRESS NOTE      Chief complaint: Follow-up of VAP    The patient is a 61 y.o. super morbidly obese male with history of CAD, hypertension, hyperlipidemia, DM, transferred to [facility] on 12/04 for radiation therapy from [facility] where he initially presented on 11/17 for hypoxia requiring intubation after receiving a lumbar epidural steroid injection for his chronic low back pain, incidentally found to have left upper lobe postobstructive pneumonia from lung mass that was diagnosed to be small cell carcinoma biopsy and had respiratory culture growing Pseudomonas aeruginosa for which he was seen by Dr. Mariano Kanner. He has been having intermittent fever up to 102 °F since 11/23. Respiratory pathogen PCR panel including SARS-CoV-2 PCR on 11/17 and urine Legionella antigen on 11/18 were negative. He had respiratory culture on 11/23 and 11/24 which showed absent oropharyngeal aristides and moderate growth of Candida albicans deemed colonization. He had blood cultures on 11/21 growing coagulase-negative Staphylococcus in 1 out of 2 sets deemed contaminant. Repeat blood cultures on 11/24 showed no growth. Respiratory Gram stain and culture on 12/04 showed few polymorphonuclear leukocytes, no epithelial cells, no organisms, reduced oropharyngeal aristides, moderate growth of Pseudomonas aeruginosa (non-MDR). He received ceftriaxone and levofloxacin from 11/17-11/21, cefepime from 11/21-11/25, ampicillin-sulbactam from 11/25-12/03, cefepime since 12/03.     On transfer, he remains febrile at 100.6 °F with no leukocytosis. Chest x-ray showed dense left upper lobe consolidation similar to that since 11/17. Cefepime was resumed. Subjective: Patient was seen and examined. He remains in critical condition, intubated and sedated, not communicating. FIO2 55. Sat 94, peep 8.      Objective:  /65   Pulse 87   Temp 99.9 °F (37.7 °C) (Bladder)   Resp 20   Ht 5' 11\" (1.803 m)   Wt (!) 310 lb 8 oz (140.8 kg)   SpO2 94% BMI 43.31 kg/m²   Constitutional: Intubated, no MV dyssynchrony  Respiratory: Coarse breath sounds, no crackles, no wheezes  Cardiovascular: Regular rate and rhythm, no murmurs  Gastrointestinal: Bowel sounds present, soft, nontender  Skin: Warm and dry, no active dermatoses bilateral lower extremity 4+ edema  Musculoskeletal: No joint swelling, no joint erythema   picc 12/6/2020, left midline 11/23/2020    Laboratory:  Lab Results   Component Value Date    WBC 6.2 12/11/2020    WBC 5.4 12/10/2020    WBC 5.6 12/10/2020    HGB 7.9 (L) 12/11/2020    HCT 25.0 (L) 12/11/2020    MCV 92.3 12/11/2020    PLT 20 (L) 12/11/2020     Lab Results   Component Value Date    NEUTROABS 5.46 12/11/2020    NEUTROABS 5.10 12/10/2020    NEUTROABS 4.64 12/09/2020     Lab Results   Component Value Date    CRP 6.7 (H) 12/08/2020    CRP 2.5 (H) 11/25/2020     No results found for: CRP  Lab Results   Component Value Date    SEDRATE 82 (H) 11/25/2020     Lab Results   Component Value Date    ALT 61 (H) 12/11/2020    AST 46 (H) 12/11/2020    ALKPHOS 160 (H) 12/11/2020    BILITOT 1.0 12/11/2020     Lab Results   Component Value Date     12/11/2020    K 3.3 12/11/2020    K 4.4 12/04/2020    CL 96 12/11/2020    CO2 27 12/11/2020    BUN 59 12/11/2020    CREATININE 1.2 12/11/2020    CREATININE 1.2 12/10/2020    CREATININE 1.2 12/10/2020    GFRAA >60 12/11/2020    LABGLOM >60 12/11/2020    GLUCOSE 306 12/11/2020    GLUCOSE 120 12/31/2011    PROT 4.7 12/11/2020    LABALBU 2.6 12/11/2020    LABALBU 4.5 12/28/2011    CALCIUM 8.2 12/11/2020    BILITOT 1.0 12/11/2020    ALKPHOS 160 12/11/2020    AST 46 12/11/2020    ALT 61 12/11/2020       Radiology: CXR (12/08): Overall there is no interval change in the appearance of the chest x-ray when compared to prior studies. Persistent large focal dense consolidation is seen within the left upper lobe.       chest xray 12/10/2020    Questionable increase in small to moderate right pleural effusion an made as necessary.      Ping Chaidez MD

## 2020-12-11 NOTE — PROGRESS NOTES
95.3 12/11/2020        Medications     Infusions: (Fluid, Sedation, Vasopressors)  IVF:    D5W 60 ml/hr  Vasopressors   (-) pressors  Sedation   Fentanyl 100, Versed 10    Nutrition:   NPO  ATB:   Antibiotics  Days   cefepime 6               Labs     CBC with Differential:    Lab Results   Component Value Date    WBC 6.2 12/11/2020    RBC 2.71 12/11/2020    HGB 7.9 12/11/2020    HCT 25.0 12/11/2020    PLT 20 12/11/2020    MCV 92.3 12/11/2020    MCH 29.2 12/11/2020    MCHC 31.6 12/11/2020    RDW 16.9 12/11/2020    NRBC 1.8 12/11/2020    SEGSPCT 62 12/29/2011    BLASTSPCT 0.9 11/21/2020    METASPCT 1.8 12/11/2020    LYMPHOPCT 9.6 12/11/2020    PROMYELOPCT 0.9 11/21/2020    MONOPCT 1.8 12/11/2020    MYELOPCT 0.9 12/06/2020    BASOPCT 0.9 12/11/2020    MONOSABS 0.12 12/11/2020    LYMPHSABS 0.62 12/11/2020    EOSABS 0.00 12/11/2020    BASOSABS 0.06 12/11/2020     CMP:    Lab Results   Component Value Date     12/11/2020    K 3.3 12/11/2020    K 4.4 12/04/2020    CL 96 12/11/2020    CO2 27 12/11/2020    BUN 59 12/11/2020    CREATININE 1.2 12/11/2020    GFRAA >60 12/11/2020    LABGLOM >60 12/11/2020    GLUCOSE 306 12/11/2020    GLUCOSE 120 12/31/2011    PROT 4.7 12/11/2020    LABALBU 2.6 12/11/2020    LABALBU 4.5 12/28/2011    CALCIUM 8.2 12/11/2020    BILITOT 1.0 12/11/2020    ALKPHOS 160 12/11/2020    AST 46 12/11/2020    ALT 61 12/11/2020       Imaging Studies:  CXR 12/06/20, No interval change.  Complete opacification left mid and upper lung.  Correlate bronchoscopy as warranted to exclude endobronchial obstruction. Resident's Assessment and Plan     Monae Dudley. is a 61 y.o. male with past medical history of STEMI s/p 5 stents type 2 diabetes mellitus, chronic low back pain, hypertension, abdominal aortic aneurysm s/p endovascular repair admitted to [facility] on November 17 with shortness of breath and low platelet count. Patient was found to have CAP and small cell carcinoma.   Currently intubated. Patient is transferred to [facility] for trach and PEG/palliative radiation. Neurology   Patient is sedated with fentanyl and Versed  Not responsive to verbal stimuli  Monitor mentation    Cardiology  Vital signs stable, not on pressor, monitor, on ICU telemetry    CAD status post 5 stents  Home Meds aspirin and Plavix and Lipitor  Hold aspirin and Plavix due to patient's thrombocytopenia    History of aortic aneurysm  Status post endovascular repair  Stable, monitor    Pulmonology  Acute hypoxic hypercapnic respiratory failure secondary to obstructive pneumonia in the setting of small cell carcinoma of the lung  Patient is a transfer from [facility] post 1 trial of extubation, desaturations of there after, reintubated and same day  ACVC 20/480/55%/8, ABG 7.5/37/70/29  Candidate for trach and PEG, pending procedure availability likely next Monday  Candidate of palliative radiation therapy. ICU team opined that pt may benefit from radiation therapy that is likely the primary reason of obstruction. Dec 7, pt underwent one session of radiation therapy of the lung. Dec 8,  went through one session of lung radiation therapy yesterday and one session today, will continue, will try wean off vent and extubate, if not successful, will planTrach and PEG availability , thoracentesis pending outcome of radiation therapy reassess after radiation therapy  Dec 10, pt can not be weaned down for ventilation for the last 24 hrs, plan for Trach and PEG tomorrow, will start transfusion of platelet for surgery tomorrow, target 50  Dec 11.  Pt platelet 20 this morning, on Plavix, risk of bleeding high for trach and peg, will reschedule, and platelet target 50 before trach and peg  Continue bronchodilator  Monitor    Obstructive pneumonia in the setting of small cell carcinoma of the lung  Patient with small cell carcinoma with left upper lobe opacity with large left pleural effusion along with mediastinal lymphadenopathy. His CT scan of abdomen and pelvis did not show evidence of intra-abdominal metastasis. Respiratory cultures gram-negative clifford. Probable Pseudomonas. On cefepime, ID following patient      Small cell carcinoma of the lung  CTA showing a mass, no obvious mucous plugging on the left upper lobe. Very edematous and compressed airway. Biopsies immunoprofile compatible with small cell carcinoma of the lung  Patient with small cell carcinoma with left upper lobe opacity with large left pleural effusion along with mediastinal lymphadenopathy. His CT scan of abdomen and pelvis did not show evidence of intra-abdominal metastasis. Candidate of palliative radiation therapy. ICU team opined that pt may benefit from radiation therapy that is likely the primary reason of obstruction. Some nursing staffing issues with the urgent radiation therapy scheduled for this Friday. Hematology oncology following, recommend palliative care given poor diagnosis  Radiation oncology following the patient, rec No treatment over the weekend  Dec. 10, pt completed 3 sessions of 4 planned radiation therapy since Monday, will proceed for the 4th session today.         Nephrology  ISIDRO stage II, likely prerenal secondary to diuresis,   December 5, creatinine 1.2 BUN 62  bumex 2mg QD, and acetazolamide  monitor bmp and renal function  Nephrology following pt, rec  · Continue D5W @ 60 mL/hr until free water can be started via NGT/PEG  · Change acetazolamide to 250 mg daily  · Increase Bumex 2 mg IV twice daily  · Replace potassium 20 mEq IV x1  · Continue to monitor renal function and electrolytes      Hematology and oncology  Thrombocytopenia, secondary to underlying sepsis/bacteremia versus myelophthisis secondary to malignancy  Status post transfusion of platelets x2  Hematology following  Consider peripheral blood smear, and BM for differential diagnosis if pt improves  Platelet 20, transfusion target 10, will coordinated with surgical procedures trach and peg and thoracentesis for blood platelet transfusion    Normocytic anemia, secondary to underlying sepsis/bacteremia versus myelophthisis secondary to malignancy  Status post PRBC transfusion  Hb stable, 7.4  Follow-up folate B12 and iron panel      Endocrinology  Type 2 diabetes  On Lantus 40 nightly. High-dose sliding scale. Continue monitor blood glucose every 4 hour. DVT/GI prophy: lovenox/pepcid  Disposition plan: Continue current management    Final note: pt will be rescheduled for trach and peg and hold plavix and move platelet above target of 50 per GS.     Sasha Andrew MD, PGY-1    Attending physician: Dr. Graves Fitting  Low plt  Trach and  peg when off plavix and platelet better  Chemoradiation  Keep    Myriam Carrera MD,[facility]  Pulmonary&Critical Care Medicine   Director of [address] [place] [address] Director of [address] [place] [address]    Jennifer Park

## 2020-12-11 NOTE — PROGRESS NOTES
Palliative Care Department  [phone]  Palliative Care Progress Note  Provider Dorrance APRN-CNP, AGACNP-BC    Arnaud Jarrell  [id number]  Hospital Day: 9  Date of Initial Consult: 12/8/2020  Referring Provider: Danitza Brown MD   Palliative Medicine was consulted for assistance with: Goals of care, CODE STATUS discussion, family support, symptom management    HPI:   Arnaud Dewey is a 61 y.o. with a past medical history of DM, smoker, obesity, HTN, HLD, STEMI, hiatal hernia, depression, CAD s/p stents x5, AAA s/p endovascular repair , arthritis, MANOLO on CPAP. SCLC diagnosed by biopsy 11/24. Not accepted at [facility] for management, continued management at [address] [place] [address], was tolerating PSV, extubated to BiPAP, however reintubated shortly after and now admitted on 12/3/2020 from [address] [place] [address] for further management and possible placement of trach and peg. ASSESSMENT/PLAN:     Pertinent Hospital Diagnoses      Acute hypoxic hypercapnic respiratory failure/ SCLC L lung  - intubated, NSICU, Trach / PEG, Palliative radiation, Palliative consult       Palliative Care Encounter / Counseling Regarding Goals of Care  Please see detailed goals of care discussion as below   At this time, Arnaud Jarrell., Does Not have capacity for medical decision-making.   Capacity is time limited and situation/question specific   During encounter Gerda Mirlande was surrogate medical decision-maker   Outcome of goals of care meeting:   Code status Full Code   Advanced Directives: no POA or living will in [facility]   Surrogate/Legal NOK:    Alem Ellsworth [phone] spouse     Spiritual assessment: no spiritual distress identified  Bereavement and grief: to be determined  Referrals to: none today  SUBJECTIVE:     Current medical issues leading to Palliative Medicine involvement include   Active Hospital Problems    Diagnosis Date Noted    ARF (acute respiratory failure) ([facility] [address] [J96.00] 12/02/2020    Acute respiratory failure with hypoxia ([facility] [address] [facility] [J96.01] 11/17/2020    Non-insulin dependent type 2 diabetes mellitus ([facility] [address] [facility] [E11.9] 03/16/2018    Smoker [F17.200]     CAD (coronary artery disease) [I25.10] 12/28/2011    HTN (hypertension) [I10] 12/28/2011    Hyperlipemia [E78.5] 12/28/2011       Details of Conversation: 12/11/2020 Patient's wife called this am to let me know that her  is going to the [address] today for his [facility]. Stated that she called the oncology office and left a message yesterday after 4 pm about her family members donating platelets with the [employer] and needing an order from Physician. Instructed her to hang up the phone with me and call the oncology office back now during business hours so that she may speak to someone and not have to leave a message. On-gong support provided. 12/10/2020 Patient's wife got to visit her  yesterday in the unit. She felt a sense of peace being able to see him and hold his hand. Stated that he had a little more color to him. She was told by Yanick Ribera that the tumor had shrunk a little bit. Would like to proceed with the [facility] and PEG tube. Kaleigh Martino was told that it would be scheduled for tomorrow. Patient's regular PCP has not seen him since the month before November according to wife. Wife to call Dr. Carol Martinez this afternoon around 3 or 3:30. Kaleigh Martino would still like to talk to Dr. Wen Gillis if possible. 12/9/2020 Call returned to patient's wife. Kaleigh Martino received calls from radiation oncology yesterday and pulmonology yesterday. Waiting for Dr. Wen Gillis to call her. Stated that he must have dialed incorrectly by mistake and to please have him call her again. Kaleighedis Martino was able to speak to all of her family and now has more questions moving forward. She has the following questions: Can the patient get a Lobectomy to remove the cancer? How far can the patient be brought out of sedation? Does the patient need sedation on the vent? Is surgery an option?  Kaleigh Martino called the [employer] and the family wants to donate platelets allocated to the Hospital for her , but the [facility] said that an order needs placed by the hospital.  Stated that she knows her  is \"O Positive\" . She talked to the special collections department for platelets. Will follow along daily to help the family with their questions and try help them moving forward. Gemini Orellana and family want the patient to remain a FULL code status.     -Went down to the unit and spoke to the staff RN. Patient's wife is coming to visit today from 200 to 5 PM.     12/8/2020  Saw patient at the bedside, remains on ventilator at this time. Reviewed patient's chart, review of patient with staff RN, and with respiratory therapist.  Introduced myself and palliative care to the patient's wife, and contact information provided. Went over [address] levels in great detail. Patient's wife wants her  to remain a FULL CODE STATUS at this time. Everything started when the patient had an epidural on November 17 and the pain management doctor said I don't like how you are breathing and made him get admitted. Since then the patient has had multiple ups and downs. The patient was diagnosed with SCLC for which he needs radiation to shrink mass according to wife. The patient was at a [facility] and transferred here for further treatment and placement of trach and PEG. Wife thanked me for the call. Stated that she is happy I called her before the physicians reached out to her. Patient and wife have been  for 36 years and have 4 grown sons. Wife stated she has a lot riding on the decisions she is about to make.    ------The wife is patiently waiting for oncology to call her, and pulmonology to call her. Wife stated that she will not sign any consents until she speaks to both of them. She needs to know the prognosis / long-term life expectancy for her  from oncology.   And she wants to now if the trach will be long-term or short-term per pulmonology. OBJECTIVE:   Prognosis: Per input of consults and Poor    Physical Exam:  /65   Pulse 87   Temp 99.9 °F (37.7 °C) (Bladder)   Resp 20   Ht 5' 11\" (1.803 m)   Wt (!) 310 lb 8 oz (140.8 kg)   SpO2 94%   BMI 43.31 kg/m²   Gen:  ETT/Vent Elderly, NAD, sedated  HEENT:  Normocephalic, atraumatic, mucosa moist, EOMI  Neck:  Supple, trachea midline, no JVD  Lungs:  Diminished bilaterally, rhonchi noted  Heart[de-identified]  ST-monitor, distant heart tones, no murmur, rub, or gallop noted during exam  Abd:  Soft, non tender, rounded, bowel sounds hypo  :  Catheter  Ext:  Moving all extremities, 3+ BUE (hands) / 2+ BLE  edema, pulses /doppler present, R PICC   Skin:  Warm /cool and dry  Neuro:  PERRL, Responds to voice;  intubated / sedated     Objective data reviewed: labs, images, records, medication use, vitals and chart    Discussed patient and the plan of care with the other IDT members: Palliative Medicine IDT Team, Floor Nurse, Patient and Family    Time/Communication  Greater than 50% of time spent, total 10 minutes in counseling and coordination of care at the bedside regarding Code status, family support , goals of care and symptom management. Thank you for allowing Palliative Medicine to participate in the care of Isaac Kelly. .    Note: This report was completed using computerize voiced recognition software. Every effort has been made to ensure accuracy; however, inadvertent computerized transcription errors may be present.

## 2020-12-11 NOTE — PLAN OF CARE
Problem: Restraint Use - Nonviolent/Non-Self-Destructive Behavior:  Goal: Absence of restraint-related injury  Description: Absence of restraint-related injury  12/11/2020 0223 by Sandro Johnston  Outcome: Met This Shift     Problem: Restraint Use - Nonviolent/Non-Self-Destructive Behavior:  Goal: Absence of restraint indications  Description: Absence of restraint indications  12/11/2020 0223 by Sandro Johnston  Outcome: Not Met This Shift

## 2020-12-11 NOTE — PLAN OF CARE
Problem: Restraint Use - Nonviolent/Non-Self-Destructive Behavior:  Goal: Absence of restraint indications  Description: Absence of restraint indications  12/11/2020 1243 by Wilma Romano RN  Outcome: Completed  12/11/2020 0223 by Iggy Rizvi  Outcome: Not Met This Shift  Goal: Absence of restraint-related injury  Description: Absence of restraint-related injury  12/11/2020 1243 by Wlima Romano RN  Outcome: Completed  12/11/2020 0223 by Iggy Rizvi  Outcome: Met This Shift

## 2020-12-11 NOTE — PROGRESS NOTES
Department of Internal Medicine  Nephrology Progress Note    Events reviewed. 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 He remains intubated and sedated. PHYSICAL EXAM:    VITALS:  /65   Pulse 87   Temp 99.9 °F (37.7 °C) (Bladder)   Resp 20   Ht 5' 11\" (1.803 m)   Wt (!) 310 lb 8 oz (140.8 kg)   SpO2 94%   BMI 43.31 kg/m²   24HR INTAKE/OUTPUT:      Intake/Output Summary (Last 24 hours) at 12/11/2020 1030  Last data filed at 12/11/2020 0948  Gross per 24 hour   Intake 4823.42 ml   Output 2181 ml   Net 2642.42 ml       Constitutional:  Morbidly obese  male. Sedated, intubated.   Cardiovascular/Edema:  RRR,S1/S2  Respiratory:  ETT to vent, 55% PEEP 8, diminished bases bilaterally  Gastrointestinal:  Soft, obese, nondistended, bowel sounds hypoactive; + abdominal wall edema  Skin: Warm, dry, no lesions  1+ pitting edema BLE into hips, 1+ pitting edema abdominal wall, 1+ pitting bilateral forearms and hands      DATA:    CBC with Differential:    Lab Results   Component Value Date    WBC 6.2 12/11/2020    RBC 2.71 12/11/2020    HGB 7.9 12/11/2020    HCT 25.0 12/11/2020    PLT 20 12/11/2020    MCV 92.3 12/11/2020    MCH 29.2 12/11/2020    MCHC 31.6 12/11/2020    RDW 16.9 12/11/2020    NRBC 1.8 12/11/2020    SEGSPCT 62 12/29/2011    BLASTSPCT 0.9 11/21/2020    METASPCT 1.8 12/11/2020    LYMPHOPCT 9.6 12/11/2020    PROMYELOPCT 0.9 11/21/2020    MONOPCT 1.8 12/11/2020    MYELOPCT 0.9 12/06/2020    BASOPCT 0.9 12/11/2020    MONOSABS 0.12 12/11/2020    LYMPHSABS 0.62 12/11/2020    EOSABS 0.00 12/11/2020    BASOSABS 0.06 12/11/2020     CMP:    Lab Results   Component Value Date     12/11/2020    K 3.3 12/11/2020    K 4.4 12/04/2020    CL 96 12/11/2020    CO2 27 12/11/2020    BUN 59 12/11/2020    CREATININE 1.2 12/11/2020    GFRAA >60 12/11/2020    LABGLOM >60 12/11/2020    GLUCOSE 306 12/11/2020    GLUCOSE 120 12/31/2011    PROT 4.7 12/11/2020    LABALBU 2.6 until free water can be started via NGT/PEG  · Continue acetazolamide to 250 mg daily  · Continue Bumex 2 mg IV twice daily  · Replace potassium   · Continue to monitor renal function and electrolytes      Electronically signed by Brady Pineda MD on 12/11/2020 at 10:30 AM

## 2020-12-12 NOTE — PROGRESS NOTES
Hospitalist Progress Note      SYNOPSIS: Patient admitted on 12/3/2020 for ARF (acute respiratory failure) ([facility] [address]      SUBJECTIVE:    Patient seen and examined  Records reviewed.      Acute resp failure  Pneumonia cap/bacterial-Pseudomonas  Chronic resp failure  OMER ca small cell  Acute kidney injury  Hyper natremia  HfpEF    Hypertension essential  Anemia  Thrombocytopenia  lue dvt  Pad  Obesity morbid  Hypoalbumin  smoker       Temp (24hrs), Av.6 °F (37.6 °C), Min:99.5 °F (37.5 °C), Max:99.9 °F (37.7 °C)    DIET: Diet NPO, After Midnight Exceptions are: Sips with Meds  CODE: Full Code    Intake/Output Summary (Last 24 hours) at 2020 1702  Last data filed at 2020 1500  Gross per 24 hour   Intake 3300.06 ml   Output 1990 ml   Net 1310.06 ml       OBJECTIVE:    BP (!) 171/108   Pulse 134   Temp 99.7 °F (37.6 °C) (Bladder)   Resp 20   Ht 5' 11\" (1.803 m)   Wt (!) 319 lb 3.2 oz (144.8 kg)   SpO2 92%   BMI 44.52 kg/m²        Intubated  General= obese  Cv=s1 s2 audible    Dorsal pedis pulses palpable bilateral  Pulmon= no wheeze on auscultation  Chest wall= no subcutaneous crepitance palpated    ASSESSMENT:    Acute resp failure  Pneumonia cap/bacterial-Pseudomonas  Chronic resp failure  OMER ca small cell  Left pleural effusion  Acute kidney injury  Hyper natremia  HfpEF    Hypertension essential  Anemia normocytic  Thrombocytopenia  Mild transaminitis    lue dvt  Pad  Obesity morbid  Hyperlipidemia unspecified  Hypoalbumin  smoker     PLAN:    Patient receiving cefepime 2 g every 8 hours  Rest of supportive care    Treatment regiment as per critical care/pulmonary/infectious disease/nephrology/hematology services    DISPOSITION: tbd    Medications:  REVIEWED DAILY    Infusion Medications    dextrose      fentaNYL 5 mcg/ml in 0.9%  ml infusion 150 mcg/hr (20 1448)    midazolam 8 mg/hr (20 1535)     Scheduled Medications    miconazole nitrate   Topical BID  [Held by provider] acetaZOLAMIDE  250 mg Oral BID    bumetanide  2 mg Intravenous Daily    sodium chloride  20 mL Intravenous Once    cefepime  2 g Intravenous Q8H    sodium chloride   Intravenous Q8H    lidocaine PF  5 mL Intradermal Once    heparin flush  3 mL Intravenous 2 times per day    sodium chloride flush  10 mL Intravenous 2 times per day    [Held by provider] insulin glargine  40 Units Subcutaneous Nightly    insulin lispro  0-18 Units Subcutaneous Q4H    famotidine (PEPCID) injection  20 mg Intravenous BID    ipratropium-albuterol  1 ampule Inhalation Q4H WA    sodium chloride (Inhalant)  2 mL Nebulization BID    Arformoterol Tartrate  15 mcg Nebulization BID    [Held by provider] atorvastatin  80 mg Oral Nightly    chlorhexidine  15 mL Mouth/Throat BID    [Held by provider] clopidogrel  75 mg Oral Daily    heparin flush  1 mL Intravenous 2 times per day    methylPREDNISolone  40 mg Intravenous Daily    nicotine  1 patch Transdermal Daily     PRN Meds: miconazole nitrate **AND** miconazole nitrate, sodium chloride flush, heparin flush, sodium chloride flush, acetaminophen **OR** acetaminophen, mineral oil-hydrophilic petrolatum, dextrose, dextrose, glucagon (rDNA), glucose, heparin flush, oxyCODONE-acetaminophen **AND** oxyCODONE, tiZANidine    Labs:     Recent Labs     12/11/20  2200 12/12/20  0430 12/12/20  1450   WBC 5.7 5.3 7.0   HGB 8.0* 7.9* 9.8*   HCT 24.8* 24.9* 30.6*   PLT 26* 21* 29*       Recent Labs     12/10/20  0459 12/10/20  0459 12/11/20  0700 12/11/20  0700 12/11/20  1514 12/12/20  0430 12/12/20  1109      < >  --    < > 138 133 138   K 3.2*   < >  --    < > 3.6 2.9* 3.6   CL 99   < >  --    < > 99 97* 99   CO2 32*   < >  --    < > 27 25 27   BUN 60*   < >  --    < > 61* 59* 60*   CREATININE 1.2   < >  --    < > 1.3* 1.2 1.2   CALCIUM 8.6   < >  --    < > 8.6 8.1* 8.8   PHOS 3.4  --  3.2  --   --  3.3  --     < > = values in this interval not displayed. Recent Labs     12/11/20  0800 12/11/20  1514 12/12/20  0430   PROT 4.7* 5.0* 4.6*   ALKPHOS 160* 179* 164*   ALT 61* 63* 55*   AST 46* 51* 40*   BILITOT 1.0 0.8 0.6       Recent Labs     12/10/20  0459 12/11/20  0700 12/12/20  0430   INR 1.1 1.1 1.1       No results for input(s): Janny Cuenca in the last 72 hours. Chronic labs:    Lab Results   Component Value Date    CHOL 115 10/06/2020    TRIG 160 (H) 10/06/2020    HDL 42 10/06/2020    LDLCALC 41 10/06/2020    TSH 1.090 02/15/2018    INR 1.1 12/12/2020    LABA1C 7.7 (H) 11/18/2020       Radiology: REVIEWED DAILY    +++++++++++++++++++++++++++++++++++++++++++++++++  RHEA [id number] [address]  +++++++++++++++++++++++++++++++++++++++++++++++++  NOTE: This report was transcribed using voice recognition software. Every effort was made to ensure accuracy; however, inadvertent computerized transcription errors may be present.

## 2020-12-12 NOTE — PROGRESS NOTES
Patient repositioned and underneath linens changed. RT called to room as patient O2 sat began to drop and being with nasal flaring and appeared uncomfortable, resident called to room. Stated to keep patient the way he is for now and sedate patient and make him more comfortable, versed gtt increased and fentanyl gtt increased.

## 2020-12-12 NOTE — PROGRESS NOTES
Department of Internal Medicine  Nephrology Progress Note    Events reviewed. SUBJECTIVE:  We are following Dr. Lauren Orourke for ISIDRO and volume management. He remains intubated and sedated. PHYSICAL EXAM:    VITALS:  BP (!) 153/108   Pulse 120   Temp 99.9 °F (37.7 °C) (Bladder)   Resp 28   Ht 5' 11\" (1.803 m)   Wt (!) 319 lb 3.2 oz (144.8 kg)   SpO2 93%   BMI 44.52 kg/m²   24HR INTAKE/OUTPUT:      Intake/Output Summary (Last 24 hours) at 12/12/2020 1057  Last data filed at 12/12/2020 0949  Gross per 24 hour   Intake 3960.06 ml   Output 2047 ml   Net 1913.06 ml       Constitutional:  Morbidly obese  male. Sedated, intubated.   Cardiovascular/Edema:  RRR,S1/S2  Respiratory:  ETT to vent, 55% PEEP 8, diminished bases bilaterally  Gastrointestinal:  Soft, obese, nondistended, bowel sounds hypoactive; + abdominal wall edema  Skin: Warm, dry, no lesions  1+ pitting edema BLE into hips, 1+ pitting edema abdominal wall, 1+ pitting bilateral forearms and hands      DATA:    CBC with Differential:    Lab Results   Component Value Date    WBC 5.3 12/12/2020    RBC 2.74 12/12/2020    HGB 7.9 12/12/2020    HCT 24.9 12/12/2020    PLT 21 12/12/2020    MCV 90.9 12/12/2020    MCH 28.8 12/12/2020    MCHC 31.7 12/12/2020    RDW 17.2 12/12/2020    NRBC 6.3 12/12/2020    SEGSPCT 62 12/29/2011    BLASTSPCT 0.9 11/21/2020    METASPCT 0.9 12/12/2020    LYMPHOPCT 5.4 12/12/2020    PROMYELOPCT 0.9 11/21/2020    MONOPCT 5.4 12/12/2020    MYELOPCT 1.8 12/12/2020    BASOPCT 0.4 12/12/2020    MONOSABS 0.26 12/12/2020    LYMPHSABS 0.27 12/12/2020    EOSABS 0.00 12/12/2020    BASOSABS 0.00 12/12/2020     CMP:    Lab Results   Component Value Date     12/12/2020    K 2.9 12/12/2020    K 4.4 12/04/2020    CL 97 12/12/2020    CO2 25 12/12/2020    BUN 59 12/12/2020    CREATININE 1.2 12/12/2020    GFRAA >60 12/12/2020    LABGLOM >60 12/12/2020    GLUCOSE 291 12/12/2020    GLUCOSE 120 12/31/2011    PROT 4.6 12/12/2020 LABALBU 2.6 12/12/2020    LABALBU 4.5 12/28/2011    CALCIUM 8.1 12/12/2020    BILITOT 0.6 12/12/2020    ALKPHOS 164 12/12/2020    AST 40 12/12/2020    ALT 55 12/12/2020     Magnesium:    Lab Results   Component Value Date    MG 1.9 12/12/2020     Phosphorus:    Lab Results   Component Value Date    PHOS 3.3 12/12/2020     Radiology Review:      CXR 12/8/2020   Overall there is no interval change in the appearance of the chest x-ray when    compared to prior studies.     Persistent large focal dense consolidation is seen within the left upper lobe.             BRIEF SUMMARY OF INITIAL CONSULT & ADMISSION PRIOR TO TRANSFER: Briefly, Mr. Chapo Carty is a 61year old male with a PMH of Type II DM, HTN, HLD, CAD s/p stents x 5, AAA s/p endovascular repair and chronic low back pain who presented to [facility] on 11/17/2020 with complaints of SOB. He was hypoxic on room air. A CTA of the chest demonstrated PNA and pleural effusions. Labs were significant for thrombocytopenia. The following day an RRT was called for worsening SOB. He was placed on bipap but later intubated for worsening respiratory acidosis. A bronchoscopy was performed the same day (11/18) and it was noted that the OMER was significantly narrowed. A transbronchial needle aspiration was completed and three biopsies were obtained. On 11/24 biopsies + small cell lung CA. His wife had requested transfer to [place] however he was not accepted/insurance did not cover. Nephrology was consulted for volume management and lasix and albumin were started. ID was consulted on 11/25 for persistent fevers. A second bronch was performed and Bumex gtt were started on 11/26. Bumex gtt was discontinued on 11/27 for worsening ISIDRO and hypernatremia. Free water was increased NGT to 100 mL/hr. On 11/28 D5W was also started. D5W increased to 100 mL/hr on 11/29, free water continued. D5W was discontinued and HCTZ with albumin were started BID on 11/30 but discontinued on 12/1. Free water via NGT continued at 100 mL/hr. General surgery was consulted on 12/1 for trach and PEG. On 12/2 D5W + 30 mEq KCL @ 50 mL/hr was started in addition to FW for persistent hypernatremia. On 12/3 he was tolerating PSV and was extubated to bipap however he required re-intubation shortly after. Transfer to [place] was again attempted but he was not accepted. Decision was made to transfer downtown for trach and PEG. Problems resolved:      · ISIDRO stage I 2/2 overt diuresis versus hemodynamically mediated?, resolved. Creatinine remains stable at baseline. · Hypernatremia with hypervolemia 2/2 to large volume resuscitation and HFpEF 55%, >19 liter + fluid balance upon transfer to this hospital after large volume free water administration. Resolved with diuretics and D5W @ 60 mL/hr. · Normal pH (pH: 7.504) with metabolic alkalosis (contraction alkalosis) and respiratory alkalosis     IMPRESSION/RECOMMENDATIONS:        1. Volume management, > 19 L positive fluid balance prior to transfer from [facility] to [facility]. Achieving fair diuresis with a negative balance of -3.7 L this admission. . To continue with acetazolamide and bumex. 2. HFpEF 55%, pro-BNP 2,533 >> 1948, on Bumex and acetazolamide  3. Hypokalemia diuretic induced, for supplementation. 4. Hypoalbuminemia/malnutrition  --------------------------------------------------------------    5. Acute hypoxic and hypercapnic respiratory failure s/p intubation, extubated and re-intubated on 12/3. For trach and PEG. 6. Post-obstructive PNA, cultures growing pseudomonas, on cefepime per ID.  7. Left pleural effusion, likely malignant. 8. Small cell carcinoma, biopsy confirmed. Receiving palliative radiation. 9. HLD on atorvastatin  10. Mild transaminitis  11. Type II DM on SSI, holding lantus  12. Normocytic anemia  13. Thrombocytopenia likely 2/2 malignancy. Heme-onc following. 14. Nutrition NPO, for PEG and tracheostomy today.       Plan:    · Stop IV D5W  · Hold acetazolamide  · Continue Bumex 2 mg IV twice daily  · Replace potassium IV  · Continue to monitor renal function and electrolytes      Electronically signed by Kavin Horvath MD on 12/12/2020 at 10:57 AM

## 2020-12-12 NOTE — PROGRESS NOTES
[address] [facility]  Department of Internal Medicine   Internal Medicine Residency   MICU Progress Note    Patient:  Osmani Ulrich. 61 y.o. male  MRN: [id number]     Date of Service: 12/12/2020    Allergy: Bee venom  follow up ARF  Change in mental status   Subjective     Patient is examined and seen in the room. Currently sedated with fentanyl and versed , vital signs stable , trying to open eyes with verbal stimuli . ACVC 20/480/55%/8, ABG 7.4/38/84/25, completed 4 day courses of radiation therapy. Platelet 21 this morning, pt was held plavix since yesterday morning  Electrolytes replaced, 2 units of platelet prep and ready for transfusion before surgery    On acute overnight event, awaiting to complete holding period of plavix and new trach and peg schedule    Objective     VS: BP (!) 151/91   Pulse 110   Temp 99.9 °F (37.7 °C) (Bladder)   Resp 21   Ht 5' 11\" (1.803 m)   Wt (!) 319 lb 3.2 oz (144.8 kg)   SpO2 94%   BMI 44.52 kg/m²           I & O - 24hr:     Intake/Output Summary (Last 24 hours) at 12/12/2020 1256  Last data filed at 12/12/2020 1127  Gross per 24 hour   Intake 3697.06 ml   Output 1890 ml   Net 1807.06 ml       Physical Exam:  General Appearance: Intubated, sedated, and   Lung:  breath sound congruent with the vent, (+) crackles and wheezing, marked reduced air entry to Lt lung  Heart: regular rate and rhythm, S1, S2 normal, no murmur, click, rub or gallop  Extremities:  extremities normal, atraumatic, no cyanosis or edema  Neurologic: Mental status: Sedated. Not following commands        Lines     site day    Art line   None    TLC None    PICC Lt basilic midline  18   Hemoaccess None    Oxygen:    ? Mechanical Ventilation:  ?  Mode:ACVC 20/480/55%/8, ABG 7.5/37/70/29  ABG:     Lab Results   Component Value Date    PH 7.431 12/12/2020    PCO2 38.4 12/12/2020    PO2 83.8 12/12/2020    NKO5UZO 36.0 11/29/2020    HCO3 25.0 12/12/2020    BE 0.7 12/12/2020 THB 9.2 12/12/2020    O2SAT 95.8 12/12/2020        Medications     Infusions: (Fluid, Sedation, Vasopressors)  IVF:   ? D5W 60 ml/hr  Vasopressors  ? (-) pressors  Sedation  ? Fentanyl 150, Versed 10    Nutrition:   NPO  ATB:   Antibiotics  Days   cefepime 7               Labs     CBC with Differential:    Lab Results   Component Value Date    WBC 5.3 12/12/2020    RBC 2.74 12/12/2020    HGB 7.9 12/12/2020    HCT 24.9 12/12/2020    PLT 21 12/12/2020    MCV 90.9 12/12/2020    MCH 28.8 12/12/2020    MCHC 31.7 12/12/2020    RDW 17.2 12/12/2020    NRBC 6.3 12/12/2020    SEGSPCT 62 12/29/2011    BLASTSPCT 0.9 11/21/2020    METASPCT 0.9 12/12/2020    LYMPHOPCT 5.4 12/12/2020    PROMYELOPCT 0.9 11/21/2020    MONOPCT 5.4 12/12/2020    MYELOPCT 1.8 12/12/2020    BASOPCT 0.4 12/12/2020    MONOSABS 0.26 12/12/2020    LYMPHSABS 0.27 12/12/2020    EOSABS 0.00 12/12/2020    BASOSABS 0.00 12/12/2020     CMP:    Lab Results   Component Value Date     12/12/2020    K 3.6 12/12/2020    K 4.4 12/04/2020    CL 99 12/12/2020    CO2 27 12/12/2020    BUN 60 12/12/2020    CREATININE 1.2 12/12/2020    GFRAA >60 12/12/2020    LABGLOM >60 12/12/2020    GLUCOSE 185 12/12/2020    GLUCOSE 120 12/31/2011    PROT 4.6 12/12/2020    LABALBU 2.6 12/12/2020    LABALBU 4.5 12/28/2011    CALCIUM 8.8 12/12/2020    BILITOT 0.6 12/12/2020    ALKPHOS 164 12/12/2020    AST 40 12/12/2020    ALT 55 12/12/2020       Imaging Studies:  CXR 12/12/20, Stable abnormal chest with persistent perihilar and bibasilar infiltrates and   pleural effusions likely pneumonia or edema. This soft tissue mass in the left apex concerning for malignancy.  CT assessment is recommended.     Resident's Assessment and Plan Gaston Peterson. is a 61 y.o. male with past medical history of STEMI s/p 5 stents type 2 diabetes mellitus, chronic low back pain, hypertension, abdominal aortic aneurysm s/p endovascular repair admitted to [facility] on November 17 with shortness of breath and low platelet count. Patient was found to have CAP and small cell carcinoma. Currently intubated. Patient is transferred to [facility] for trach and PEG/palliative radiation. Neurology   Patient is sedated with fentanyl and Versed  Not responsive to verbal stimuli  Monitor mentation    Cardiology  Vital signs stable, not on pressor, monitor, on ICU telemetry    CAD status post 5 stents  Home Meds aspirin and Plavix and Lipitor  Hold aspirin and Plavix due to patient's thrombocytopenia    History of aortic aneurysm  Status post endovascular repair  Stable, monitor    Pulmonology  Acute hypoxic hypercapnic respiratory failure secondary to obstructive pneumonia in the setting of small cell carcinoma of the lung  Patient is a transfer from [facility] post 1 trial of extubation, desaturations of there after, reintubated and same day  ACVC 20/480/55%/8, ABG 7.5/37/70/29  Candidate for trach and PEG, pending procedure availability likely next Monday  Candidate of palliative radiation therapy. ICU team opined that pt may benefit from radiation therapy that is likely the primary reason of obstruction. Dec 7, pt underwent one session of radiation therapy of the lung.   Dec 8,  went through one session of lung radiation therapy yesterday and one session today, will continue, will try wean off vent and extubate, if not successful, will planTrach and PEG availability , thoracentesis pending outcome of radiation therapy reassess after radiation therapy  Dec 10, pt can not be weaned down for ventilation for the last 24 hrs, plan for Trach and PEG tomorrow, will start transfusion of platelet for surgery tomorrow, target 50 Dec 11. Pt platelet 20 this morning, on Plavix, risk of bleeding high for trach and peg, will reschedule, and platelet target 50 before trach and peg  Continue bronchodilator  Monitor    Obstructive pneumonia in the setting of small cell carcinoma of the lung  Patient with small cell carcinoma with left upper lobe opacity with large left pleural effusion along with mediastinal lymphadenopathy. His CT scan of abdomen and pelvis did not show evidence of intra-abdominal metastasis. Respiratory cultures gram-negative clifford. Probable Pseudomonas. On cefepime, ID following patient      Small cell carcinoma of the lung  CTA showing a mass, no obvious mucous plugging on the left upper lobe. Very edematous and compressed airway. Biopsies immunoprofile compatible with small cell carcinoma of the lung  Patient with small cell carcinoma with left upper lobe opacity with large left pleural effusion along with mediastinal lymphadenopathy. His CT scan of abdomen and pelvis did not show evidence of intra-abdominal metastasis. Candidate of palliative radiation therapy. ICU team opined that pt may benefit from radiation therapy that is likely the primary reason of obstruction. Some nursing staffing issues with the urgent radiation therapy scheduled for this Friday. Hematology oncology following, recommend palliative care given poor diagnosis  Radiation oncology following the patient, rec No treatment over the weekend  Dec. 10, pt completed 3 sessions of 4 planned radiation therapy since Monday, will proceed for the 4th session today.         Nephrology  ISIDRO stage II, likely prerenal secondary to diuresis,   December 5, creatinine 1.2 BUN 62  bumex 2mg QD, and acetazolamide  monitor bmp and renal function  Nephrology following pt, rec  · Continue D5W @ 60 mL/hr until free water can be started via NGT/PEG  · Change acetazolamide to 250 mg daily  · Increase Bumex 2 mg IV twice daily  · Replace potassium 20 mEq IV x1 · Continue to monitor renal function and electrolytes      Hematology and oncology  Thrombocytopenia, secondary to underlying sepsis/bacteremia versus myelophthisis secondary to malignancy  Status post transfusion of platelets x2  Hematology following  Consider peripheral blood smear, and BM for differential diagnosis if pt improves  Platelet 20, transfusion target 10, will coordinated with surgical procedures trach and peg and thoracentesis for blood platelet transfusion    Normocytic anemia, secondary to underlying sepsis/bacteremia versus myelophthisis secondary to malignancy  Status post PRBC transfusion  Hb stable, 7.4  Follow-up folate B12 and iron panel      Endocrinology  Type 2 diabetes  On Lantus 40 nightly. High-dose sliding scale. Continue monitor blood glucose every 4 hour. DVT/GI prophy: lovenox/pepcid  Disposition plan: Continue current management    Final note: pt will be rescheduled for trach and peg and hold plavix and move platelet above target of 50 per GS.     Mg Blandon MD, PGY-1    Attending physician: Dr. She Cross and Peg   Platelet transfusion as needed   Hold Plavix   CXr stable     Myriam Carrera MD,[facility]  Pulmonary&Critical Care Medicine   Director of [address] [place] [address] Director of [address] [place] [address]    Dorian Waterman

## 2020-12-12 NOTE — PLAN OF CARE
Problem: Skin Integrity:  Goal: Will show no infection signs and symptoms  Description: Will show no infection signs and symptoms  Outcome: Met This Shift     Problem: Skin Integrity:  Goal: Absence of new skin breakdown  Description: Absence of new skin breakdown  Outcome: Met This Shift     Problem: Falls - Risk of:  Goal: Absence of physical injury  Description: Absence of physical injury  Outcome: Met This Shift

## 2020-12-12 NOTE — PROGRESS NOTES
TAVIA PROGRESS NOTE      Chief complaint: Follow-up of VAP    The patient is a 61 y.o. super morbidly obese male with history of CAD, hypertension, hyperlipidemia, DM, transferred to [facility] on 12/04 for radiation therapy from [facility] where he initially presented on 11/17 for hypoxia requiring intubation after receiving a lumbar epidural steroid injection for his chronic low back pain, incidentally found to have left upper lobe postobstructive pneumonia from lung mass that was diagnosed to be small cell carcinoma biopsy and had respiratory culture growing Pseudomonas aeruginosa for which he was seen by Dr. Michelle Ruiz. He has been having intermittent fever up to 102 °F since 11/23. Respiratory pathogen PCR panel including SARS-CoV-2 PCR on 11/17 and urine Legionella antigen on 11/18 were negative. He had respiratory culture on 11/23 and 11/24 which showed absent oropharyngeal aristides and moderate growth of Candida albicans deemed colonization. He had blood cultures on 11/21 growing coagulase-negative Staphylococcus in 1 out of 2 sets deemed contaminant. Repeat blood cultures on 11/24 showed no growth. Respiratory Gram stain and culture on 12/04 showed few polymorphonuclear leukocytes, no epithelial cells, no organisms, reduced oropharyngeal aristides, moderate growth of Pseudomonas aeruginosa (non-MDR). He received ceftriaxone and levofloxacin from 11/17-11/21, cefepime from 11/21-11/25, ampicillin-sulbactam from 11/25-12/03, cefepime since 12/03.     On transfer, he remains febrile at 100.6 °F with no leukocytosis. Chest x-ray showed dense left upper lobe consolidation similar to that since 11/17. Cefepime was resumed. Subjective: Patient was seen and examined. He remains in critical condition, intubated and sedated, not communicating. FIO2 55. Sat 94, peep 8.      Objective: BP (!) 153/108   Pulse 120   Temp 99.9 °F (37.7 °C) (Bladder)   Resp 28   Ht 5' 11\" (1.803 m)   Wt (!) 319 lb 3.2 oz (144.8 kg)   SpO2 93%   BMI 44.52 kg/m²      Constitutional: Intubated, sedated, morbid obesity , FiO2 55% , PEEP 8   HEENT : no pallor, no icterus, no LN   Respiratory: Coarse breath sounds, no crackles, no wheezes  Cardiovascular: Regular rate and rhythm, no murmurs  Gastrointestinal: Bowel sounds present, soft, nontender  Skin: Warm and dry, no active dermatoses bilateral lower extremity 4+ edema  Musculoskeletal: No joint swelling, no joint erythema   picc 12/6/2020, left midline 11/23/2020    Laboratory:  Lab Results   Component Value Date    WBC 5.3 12/12/2020    WBC 5.7 12/11/2020    WBC 6.3 12/11/2020    HGB 7.9 (L) 12/12/2020    HCT 24.9 (L) 12/12/2020    MCV 90.9 12/12/2020    PLT 21 (L) 12/12/2020     Lab Results   Component Value Date    NEUTROABS 4.72 12/12/2020    NEUTROABS 5.46 12/11/2020    NEUTROABS 5.10 12/10/2020     Lab Results   Component Value Date    CRP 6.7 (H) 12/08/2020    CRP 2.5 (H) 11/25/2020     No results found for: CRPHS  Lab Results   Component Value Date    SEDRATE 82 (H) 11/25/2020     Lab Results   Component Value Date    ALT 55 (H) 12/12/2020    AST 40 (H) 12/12/2020    ALKPHOS 164 (H) 12/12/2020    BILITOT 0.6 12/12/2020     Lab Results   Component Value Date     12/12/2020    K 2.9 12/12/2020    K 4.4 12/04/2020    CL 97 12/12/2020    CO2 25 12/12/2020    BUN 59 12/12/2020    CREATININE 1.2 12/12/2020    CREATININE 1.3 12/11/2020    CREATININE 1.2 12/11/2020    GFRAA >60 12/12/2020    LABGLOM >60 12/12/2020    GLUCOSE 291 12/12/2020    GLUCOSE 120 12/31/2011    PROT 4.6 12/12/2020    LABALBU 2.6 12/12/2020    LABALBU 4.5 12/28/2011    CALCIUM 8.1 12/12/2020    BILITOT 0.6 12/12/2020    ALKPHOS 164 12/12/2020    AST 40 12/12/2020    ALT 55 12/12/2020

## 2020-12-12 NOTE — PROGRESS NOTES
Blood sugars taken at 0421 and 0435 were taken from a line that has D5W running. Blood sugar taken at 0436 was a finger stick.

## 2020-12-13 NOTE — PLAN OF CARE
Patient tachypnic, tachycardic, and hypoxic this evening. Concerns that patient has developed Pulmonary Embolism. Unfortunately, patient is too unstable for CTA of lungs at this point in time and is not a candidate for anticoagulation 2/2 his severe thrombocytopenia. Discussed case with intensivist as well as the family. Family verbalized understanding that he is unstable and that his current condition is concerning for high mortality. We discussed that he is likely to have a cardiac arrest 2/2 his current state. After discussion of CODE STATUS and patient prognosis, the family decided that they would change CODE STATUS to DNR CC and compassionately extubate the patient. Orders Placed and HOSPICE to be consulted.       Electronically signed by Douglas Falcon DO on 12/13/2020 at 3:29 AM

## 2020-12-13 NOTE — DISCHARGE SUMMARY
Hospitalist Discharge Summary    Patient ID: Lauren Brambila Patient : 1960  Patient's PCP: Comfort Gann PA-C    Admit Date: 12/3/2020   Admitting Physician: Irasema Dawn MD    Discharge Date:  2020  Discharge Physician: Hailey Xavier DO   Discharge Condition:   Discharge Disposition:     History of presenting illness:  61 y.o. male who presented to [facility] on  after receiving an epidural injection for chronic low back pain. He was found to be hypoxic and was admitted. During his admission he became increasingly hypoxic and then hypercapnic and failed non-invasive ventilation so he had to intubated. Patient was found to have an infiltrate that was due to OMER obstruction from a lung mass that biopsy proved to be small cell carcinoma. He has had thrombocytopenia with low platelets. He was recommended to have an MRI of head but could not fit into the scanner. He was transferred to [facility] for Palliative radiation therapy.     Hospital course in brief:  (Please refer to daily progress notes for a comprehensive review of the hospitalization by requesting medical records)  Admitted on 12/3/2020 to [facility]   Patient had been treated at [address] [place] [address] from -12/3   Please see those progress notes for detail  ptn intubated dec 3( at [facility]) due to hypoxia and resp hypoxem failure     Patient was followed by multiple consulting service as listed  Hematology services had recommended palliative care for this patient given poor prognosis   Patient remained in the icu for the entirety of his hospital stay at [facility]  On dec 13 patient was noted to be tachypneic/tachycardic/ and hypoxic  Determined to be unstable and at risk for cardiac arresst clinical diagnosis at this point was felt to be a pulmon embolism and due to patient's overall declined state of health a number of health issues and overall poor prognosis family for patient opted for code status change [facility] and compassionate extubation  Patient  at 0350 2020      Consults:   IP CONSULT TO PULMONOLOGY  IP CONSULT TO RADIATION ONCOLOGY  IP CONSULT TO DIETITIAN  IP CONSULT TO INFECTIOUS DISEASES  IP CONSULT TO HEM/ONC  IP CONSULT TO NEPHROLOGY  IP CONSULT TO PALLIATIVE CARE  IP CONSULT TO HOSPICE    Discharge Diagnoses:  Acute respi failure    hypox  Pneumonia pseudomonas  Small cell lung cancer OMER  Thrombocytopenia of malignancy    Discharge Instructions / Follow up:na    Activity: na    Significant labs:  CBC:   Recent Labs     20  0430 20  1450 20  2152   WBC 5.3 7.0 5.3   RBC 2.74* 3.35* 3.54*   HGB 7.9* 9.8* 10.1*   HCT 24.9* 30.6* 33.0*   MCV 90.9 91.3 93.2   RDW 17.2* 17.3* 17.5*   PLT 21* 29* 21*     BMP:   Recent Labs     20  0700 20  0700 20  0430 20  1109 20  1735 20  0151   NA  --    < > 133 138 140 138   K  --    < > 2.9* 3.6 3.5 3.6   CL  --    < > 97* 99 102 102   CO2  --    < > 25 27 26 24   BUN  --    < > 59* 60* 61* 61*   CREATININE  --    < > 1.2 1.2 1.2 1.3*   MG 1.7  --  1.9  --   --   --    PHOS 3.2  --  3.3  --   --  4.1    < > = values in this interval not displayed.      LFT:  Recent Labs     20  1514 20  0430 20  1735   PROT 5.0* 4.6* 5.4*   ALKPHOS 179* 164* 201*   ALT 63* 55* 63*   AST 51* 40* 50*   BILITOT 0.8 0.6 0.8     PT/INR:   Recent Labs     20  0700 20  0430   INR 1.1 1.1       Folate and B12:   Lab Results   Component Value Date    [id number] 354 2020   ,   Lab Results   Component Value Date    FOLATE 4.3 (L) 2020       Imaging: EXAMINATION: ONE XRAY VIEW OF THE . Questionable increase in small to moderate right pleural effusion an underlying right basilar passive atelectasis may be related to differences in positioning. Superimposed pneumonia and aspiration are not excluded 2. No significant change in likely malignant loculated large left pleural effusion nor in underlying left perihilar and basilar passive atelectasis. Superimposed pneumonia and aspiration are not excluded. Suspected left upper lobe malignancy is not well evaluated on the current study. 3. Widening of the bilateral paratracheal stripes related to metastatic lymphadenopathy as seen on CT. Xr Chest Portable    Result Date: 12/9/2020  EXAMINATION: ONE XRAY VIEW OF THE . No significant change since the study of December 7. Xr Chest Portable    Result Date: 12/8/2020  EXAMINATION: ONE X-RAY VIEW OF THE CHEST 12/8/2020 8:31 am   Overall there is no interval change in the appearance of the chest x-ray when compared to prior studies. Persistent large focal dense consolidation is seen within the left upper lobe. Xr Chest Portable    Result Date: 12/7/2020  EXAMINATION: ONE XRAY VIEW OF THE CHEST 12/7/2020 9:34    No significant change since the recent study of December 6. Xr Chest Portable    Result Date: 12/6/2020  EXAMINATION: ONE XRAY VIEW OF THE CHEST 12/6/2020 7:37 am  No change. Xr Chest Portable    Result Date: 12/5/2020  EXAMINATION: ONE XRAY VIEW OF THE CHEST 12/5/2020 8:55 am   No change. Complete opacification left mid and upper lung. Correlate bronchoscopy as warranted to exclude endobronchial obstruction. Xr Chest Portable    Result Date: 12/4/2020  EXAMINATION: ONE XRAY VIEW OF THE CHEST 12/4/2020 8:03 am  No change.     Xr Chest Portable    Result Date: 12/3/2020  EXAMINATION: ONE XRAY VIEW OF THE CHEST 12/3/2020 12:36 pmi +++++++++++++++++++++++++++++++++++++++++++++++++  NOTE: This report was transcribed using voice recognition software. Every effort was made to ensure accuracy; however, inadvertent computerized transcription errors may be present.

## 2020-12-13 NOTE — PLAN OF CARE
Problem: Skin Integrity:  Goal: Will show no infection signs and symptoms  Description: Will show no infection signs and symptoms  12/13/2020 0038 by Tamy Schulte RN  Outcome: Ongoing  12/12/2020 2020 by Tamy Schulte RN  Outcome: Ongoing  Goal: Absence of new skin breakdown  Description: Absence of new skin breakdown  12/13/2020 0038 by Tamy Schulte RN  Outcome: Ongoing  12/12/2020 2020 by Tamy Schulte RN  Outcome: Ongoing     Problem: Falls - Risk of:  Goal: Will remain free from falls  Description: Will remain free from falls  12/13/2020 0038 by Tamy Schulte RN  Outcome: Ongoing  12/12/2020 2020 by Tamy Schulte RN  Outcome: Ongoing  Goal: Absence of physical injury  Description: Absence of physical injury  12/13/2020 0038 by Tamy Schulte RN  Outcome: Ongoing  12/12/2020 2020 by Tamy Schulte RN  Outcome: Ongoing     Problem: OXYGENATION/RESPIRATORY FUNCTION  Goal: Patient will maintain patent airway  Outcome: Ongoing  Goal: Patient will achieve/maintain normal respiratory rate/effort  Description: Respiratory rate and effort will be within normal limits for the patient  Outcome: Ongoing     Problem: MECHANICAL VENTILATION  Goal: Patient will maintain patent airway  Outcome: Ongoing  Goal: Oral health is maintained or improved  Outcome: Ongoing  Goal: ET tube will be managed safely  Outcome: Ongoing  Goal: Ability to express needs and understand communication  Outcome: Ongoing  Goal: Mobility/activity is maintained at optimum level for patient  Outcome: Ongoing     Problem: SKIN INTEGRITY  Goal: Skin integrity is maintained or improved  Outcome: Ongoing     Problem: NUTRITION  Goal: Nutritional status is improving  Outcome: Ongoing

## 2020-12-14 LAB
(1,3)-BETA-D-GLUCAN (FUNGITELL) INTERPRETATION: POSITIVE
(1,3)-BETA-D-GLUCAN (FUNGITELL): 254 PG/ML
ASPERGILLUS GALACTO AG: NEGATIVE
ASPERGILLUS GALACTO INDEX: 0.04
CULTURE CATHETER TIP: NORMAL
CULTURE, RESPIRATORY: ABNORMAL
CULTURE, RESPIRATORY: ABNORMAL
EKG ATRIAL RATE: 122 BPM
EKG ATRIAL RATE: 150 BPM
EKG P AXIS: 45 DEGREES
EKG P-R INTERVAL: 144 MS
EKG P-R INTERVAL: 176 MS
EKG Q-T INTERVAL: 284 MS
EKG Q-T INTERVAL: 292 MS
EKG QRS DURATION: 104 MS
EKG QRS DURATION: 98 MS
EKG QTC CALCULATION (BAZETT): 416 MS
EKG QTC CALCULATION (BAZETT): 444 MS
EKG R AXIS: 54 DEGREES
EKG R AXIS: 62 DEGREES
EKG T AXIS: -44 DEGREES
EKG T AXIS: -63 DEGREES
EKG VENTRICULAR RATE: 122 BPM
EKG VENTRICULAR RATE: 147 BPM
ORGANISM: ABNORMAL
SMEAR, RESPIRATORY: ABNORMAL

## 2020-12-17 LAB
BLOOD CULTURE, ROUTINE: NORMAL
CULTURE, BLOOD 2: NORMAL

## 2020-12-20 NOTE — PROGRESS NOTES
Radiation Treatment Summary    Patient Name:  Gaston Peterson.,  1960,  61 y.o., male       Referring Physician: Dr. Kaycee Alberto    PCP: Janelle Kunz PA-C       Diagnosis: Small cell carcinoma left lung, locally advanced       Narrative: Patient started emergent palliative radiation therapy to the left lung mass, causing acute respiratory failure. He was intubated and on the ventilator all through the palliative treatments. XRT start: 2020    XRT ended 12/10/2020    Dose: 1600 cGy/4 fractions    Response/Tolerance: Response was difficult to  assess, but at the minimum he appeared stable. Plan was to continue on with lower fractions, however, after the 4 fractions patient reportedly  on 2020.   No obvious side effects were noted          Sandra Alvarez MD   [facility]:  [phone]   FAX:    [phone] [facility]: [address]:  [phone]

## 2022-03-20 NOTE — ED NOTES
Assumed care of patient. Pt lying in bed in no apparent distress. No visitors at bedside.      Sarah Sanches RN  11/17/20 2002
Attempted to ambulate pt, pt stood up and stated \"I can't walk, my hamstrings are locked up; I haven't walked in 3 weeks\". Pt requested to lay in bed prone.       Maxine Charles RN  11/17/20 1311
Bed: 18  Expected date:   Expected time:   Means of arrival:   Comments:  [address], RN  11/17/20 3627
Patient's SpO2 85 % on room air at rest.     Mona Waller RN  11/17/20 9406
Medications

## 2023-10-12 NOTE — TELEPHONE ENCOUNTER
I don't know history on this patient. If he is in the hospital is he being seen by the hospitalist service? Does he have an oncologist? We do not go into the hospital so are unable to assist if he is currently admitted.
The wife has a call into the oncologist ; she said thank you for your help she will ask him
Tonja Chesetr (Spouse)   Showing 1 of 1     [phone]      Called in and left a vm that the message was a matter of life and death    The patient jonna dayl her  is in the ICU and needs platelets; the hosp told her to call her primary care doctor     Please advise on what I should ask her before I call her back
0 = swallows foods/liquids without difficulty

## 2024-06-22 NOTE — TELEPHONE ENCOUNTER
Spoke with [facility] who called to inquire how long they would need to be here with pt. She stated that they would only be sending an RN & no respiratory therapist and also that the RN would likely not be able to stay for the duration of simulation & treatment. Attempted to reach  for update without response. Called to unit for update. Spoke with [facility] who stated that they would NOT be coming down with pt for treatment because they do not have staff. Dr. Carol Martinez updated. This was a shared visit with the [facility]. I reviewed and verified the documentation.

## 2024-08-16 NOTE — PROGRESS NOTES
Nephrology Consult Note    Patient's Name: Pao Ambrocio.  10:22 AM  12/2/2020    Nephrologist: Dr. Roly Sandoval MD    Reason for Consult: Renal is consulted for volume management  Requesting Physician:  Dr. Hunter Greene    Chief Complaint:  SOB    History Obtained From: EMR as the patient is intubated, sedated and no family member is present today    Sub: Patient seen and examined bedside in the ICU, he is on 20% FiO2. Comfortable. Events reviewed with the ICU RN, Plan to go to [facility] for palliative radiation of the left chest      Past Medical History:   Diagnosis Date    Anticoagulant long-term use     Arthritis     CAD (coronary artery disease)     Chronic back pain     Depression     Dyslipidemia     Hiatal hernia 1979    History of ST elevation myocardial infarction (STEMI)     Hyperlipidemia     Hypertension     Low back pain     Lumbar radiculopathy 8/14/2019    Obesity     MANOLO on CPAP     Presence of stent in left circumflex coronary artery     Presence of stent in right coronary artery     Smoker     Type 2 diabetes mellitus without complication (HCC)        Past Surgical History:   Procedure Laterality Date    ABDOMINAL AORTIC ANEURYSM REPAIR, ENDOVASCULAR N/A 6/28/2019    ENDOVASCULAR REPAIR ABDOMINAL AORTIC ANEURYSM performed by Romayne Spence, MD at [address] [place] [address] Bilateral 3/12/2020    BILATERAL L3,L4,L5 MEDIAL BRANCH NERVE BLOCK performed by Lisandra Banerjee DO at [address] [place] [address] Bilateral 6/11/2020    BILATERAL L3,L4,L5 MEDIAL NERVE BRANCH BLOCK #2 performed by Lisandra Banerjee DO at [address] [place] [address] N/A 11/18/2020    BRONCHOSCOPY/TRANSBRONCHIAL NEEDLE BIOPSY performed by Donis Farris MD at [address] [place] [address] PLACEMENT  X4    CORONARY ANGIOPLASTY WITH STENT PLACEMENT  12/30/2011    Dr. Omero Domingo 1st OM 3.0 x 20 Ion    DIAGNOSTIC CARDIAC CATH LAB PROCEDURE  3/15/2005    [facility]. Mild to moderate CAD. Sustained metatarsal fx of left great toe on Wednesday after a heavy weight fell on foot. Placed in splint. Unable to obtain appt with podiatry for cast and was told she could see ortho in the ED for cast. States unable to tolerate crutches.   Normal LV.  DIAGNOSTIC CARDIAC CATH LAB PROCEDURE  1/16/2007    [facility]. Cath/PTCA/DE stent of proximal and distal RCA.  DIAGNOSTIC CARDIAC CATH LAB PROCEDURE  2/21/2007    Cath/DE stent of proximal OM1 and proximal Cx.  DIAGNOSTIC CARDIAC CATH LAB PROCEDURE  12/30/2011    ANURADHA of mid OM branch of circumflex.  ECHO COMPL W DOP COLOR FLOW  12/30/2011         HC INSERT PICC CATH, 5/> YRS - MIDLINE  11/23/2020         HERNIA REPAIR  2/2014    laparoscopic ventral hernia repain    JOINT REPLACEMENT Left 4/1/14    TKA-ed    JOINT REPLACEMENT Right 2018    KNEE    PAIN MANAGEMENT PROCEDURE Right 9/1/2020    RIGHT L3,4,5 MEDIAL BRANCH RADIOFREQUENCY ABLATION performed by Yvan Gamez DO at [employer] N/A 11/17/2020    L4-5 EPIDURAL STEROID INJECTION #1 performed by Yvan Gamez DO at [facility] OFFICE/OUTPT VISIT,PROCEDURE ONLY Right 11/26/2018    RIGHT KNEE TOTAL ARTHROPLASTY performed by Judy Hahn DO at [facility] OR       Family History   Problem Relation Age of Onset    Diabetes Mother     Stroke Mother     Diabetes Father     No Known Problems Sister         reports that he has been smoking cigarettes. He started smoking about 42 years ago. He has a 39.00 pack-year smoking history. He has never used smokeless tobacco. He reports that he does not drink alcohol or use drugs.     Allergies:  Bee venom    Current Medications:    potassium bicarb-citric acid (EFFER-K) effervescent tablet 40 mEq, Q4H  potassium chloride 10 mEq/100 mL IVPB (Peripheral Line), Q1H  famotidine (PEPCID) tablet 20 mg, BID  methylPREDNISolone sodium (SOLU-MEDROL) injection 40 mg, Daily  potassium chloride 10 mEq/100 mL IVPB (Peripheral Line), PRN  0.9 % sodium chloride bolus, Once  ampicillin-sulbactam (UNASYN) 3 g ivpb minibag, Q6H  acetaminophen (TYLENOL) tablet 650 mg, Q4H PRN    Or  acetaminophen (TYLENOL) suppository 650 mg, Q4H PRN  fentaNYL 5 mcg/ml in 0.9%  ml infusion, Continuous  sodium chloride flush 0.9 % injection 10 mL, PRN  heparin flush 100 UNIT/ML injection 100 Units, 2 times per day  heparin flush 100 UNIT/ML injection 100 Units, PRN  sodium phosphate 24.3 mmol in dextrose 5 % 250 mL IVPB, PRN    Or  sodium phosphate 48.57 mmol in dextrose 5 % 250 mL IVPB, PRN  magnesium sulfate 1 g in dextrose 5% 100 mL IVPB, PRN  insulin lispro (HUMALOG) injection vial 0-18 Units, Q6H  nicotine (NICODERM CQ) 21 MG/24HR 1 patch, Daily  acetylcysteine (MUCOMYST) 10 % solution 400 mg, BID  albuterol (PROVENTIL) nebulizer solution 2.5 mg, Q4H  Arformoterol Tartrate (BROVANA) nebulizer solution 15 mcg, BID  atorvastatin (LIPITOR) tablet 80 mg, Nightly  [Held by provider] clopidogrel (PLAVIX) tablet 75 mg, Daily  fluticasone (FLONASE) 50 MCG/ACT nasal spray 2 spray, Daily  tiZANidine (ZANAFLEX) tablet 4 mg, TID PRN  sodium chloride flush 0.9 % injection 10 mL, 2 times per day  polyethylene glycol (GLYCOLAX) packet 17 g, Daily PRN  promethazine (PHENERGAN) tablet 12.5 mg, Q6H PRN    Or  ondansetron (ZOFRAN) injection 4 mg, Q6H PRN  [Held by provider] enoxaparin (LOVENOX) injection 40 mg, Daily  insulin glargine (LANTUS) injection vial 36 Units, Nightly  glucose (GLUTOSE) 40 % oral gel 15 g, PRN  dextrose 50 % IV solution, PRN  glucagon (rDNA) injection 1 mg, PRN  dextrose 5 % solution, PRN  oxyCODONE-acetaminophen (PERCOCET) 5-325 MG per tablet 1 tablet, TID PRN    And  oxyCODONE (ROXICODONE) immediate release tablet 2.5 mg, TID PRN  albuterol (PROVENTIL) nebulizer solution 2.5 mg, Q4H PRN  chlorhexidine (PERIDEX) 0.12 % solution 15 mL, BID  midazolam (VERSED) 1 mg/mL in D5W infusion, Continuous        Review of Systems:   Unable to obtain as he is intubated and sedated    Physical exam:   Constitutional:    Vitals: /78   Pulse 80   Temp 99.5 °F (37.5 °C) (Bladder)   Resp 20   Ht 5' 11\" (1.803 m)   Wt (!) 327 lb 2.6 oz (148.4 kg)   SpO2 96%   BMI 45.63 kg/m² Patient seen and examined bedside , he is intubated and awake, FiO2 requirement is at 50%, no acute distress  Skin: no rash, turgor wnl  Heent:  eomi, mmm  Neck: no bruits or jvd noted  Cardiovascular:  S1, S2 without m/r/g  Respiratory: CTA B without w/r/r, decreased breath sounds in bases  Abdomen:  +bs, soft, nt, Black catheter draining clear urine  Ext: 3+ lower extremity edema  Psychiatric: mood and affect appropriate  Musculoskeletal:  Rom, muscular strength intact    Data:   Labs:  CBC:   Lab Results   Component Value Date    WBC 6.1 12/02/2020    RBC 2.82 12/02/2020    HGB 7.8 12/02/2020    HCT 25.9 12/02/2020    MCV 91.8 12/02/2020    MCH 27.7 12/02/2020    MCHC 30.1 12/02/2020    RDW 17.0 12/02/2020    PLT 19 12/02/2020    MPV 9.6 12/02/2020     CMP:    Lab Results   Component Value Date     12/02/2020    K 3.0 12/02/2020    K 5.1 11/17/2020     12/02/2020    CO2 33 12/02/2020    BUN 78 12/02/2020    CREATININE 1.4 12/02/2020    GFRAA >60 12/02/2020    LABGLOM 52 12/02/2020    GLUCOSE 226 12/02/2020    GLUCOSE 120 12/31/2011    PROT 5.1 12/02/2020    LABALBU 2.9 12/02/2020    LABALBU 4.5 12/28/2011    CALCIUM 8.7 12/02/2020    BILITOT 0.9 12/02/2020    ALKPHOS 159 12/02/2020    AST 77 12/02/2020    ALT 49 12/02/2020     BMP:    Lab Results   Component Value Date     12/02/2020    K 3.0 12/02/2020    K 5.1 11/17/2020     12/02/2020    CO2 33 12/02/2020    BUN 78 12/02/2020    LABALBU 2.9 12/02/2020    LABALBU 4.5 12/28/2011    CREATININE 1.4 12/02/2020    CALCIUM 8.7 12/02/2020    GFRAA >60 12/02/2020    LABGLOM 52 12/02/2020    GLUCOSE 226 12/02/2020    GLUCOSE 120 12/31/2011     Calcium:    Lab Results   Component Value Date    CALCIUM 8.7 12/02/2020     Phosphorus:    Lab Results   Component Value Date    PHOS 3.1 12/02/2020     Uric Acid:  No results found for: URICACID  U/A:    Lab Results   Component Value Date    NITRU Negative 11/24/2020    COLORU Yellow 11/24/2020 PHUR 5.5 11/24/2020    WBCUA 2-5 11/24/2020    RBCUA >20 11/24/2020    MUCUS Present 11/24/2020    BACTERIA MODERATE 11/24/2020    CLARITYU CLOUDY 11/24/2020    SPECGRAV 1.025 11/24/2020    LEUKOCYTESUR Negative 11/24/2020    UROBILINOGEN 0.2 11/24/2020    BILIRUBINUR Negative 11/24/2020    BLOODU LARGE 11/24/2020    GLUCOSEU Negative 11/24/2020    KETUA Negative 11/24/2020    AMORPHOUS MANY 11/24/2020        Imaging:  Xr Abdomen (kub) (single Ap View)    Result Date: 11/23/2020  EXAMINATION: ONE SUPINE XRAY VIEW(S) OF THE ABDOMEN 11/23/2020 9:38 am COMPARISON: None HISTORY: ORDERING SYSTEM PROVIDED HISTORY: abdominal pain TECHNOLOGIST PROVIDED HISTORY: Reason for exam:->abdominal pain FINDINGS: There is a nonobstructive bowel gas pattern. There are no abnormal air-fluid levels. There are no calculi overlying the renal shadows. There is an NG tube within the stomach. There is a stent graft seen within the abdominal aorta. 1. There is no bowel obstruction, ileus or free air. Ct Lumbar Spine Wo Contrast    Result Date: 11/19/2020  EXAMINATION: CT OF THE LUMBAR SPINE WITHOUT CONTRAST  11/18/2020 TECHNIQUE: CT of the lumbar spine was performed without the administration of intravenous contrast. Multiplanar reformatted images are provided for review. Dose modulation, iterative reconstruction, and/or weight based adjustment of the mA/kV was utilized to reduce the radiation dose to as low as reasonably achievable. COMPARISON: 11/17/2020 HISTORY: ORDERING SYSTEM PROVIDED HISTORY: exclude epidural hematoma TECHNOLOGIST PROVIDED HISTORY: Reason for exam:->exclude epidural hematoma Chronic back pain, recent epidural placement FINDINGS: BONES/ALIGNMENT: Vertebral body heights are preserved without evidence for lumbar fracture. However, there is irregular lucency and sclerosis in the upper sacral ala bilaterally. There is minimal retrolisthesis at L2-L3.  Alignment is otherwise normal. DEGENERATIVE CHANGES: There is disc space narrowing and vacuum disc phenomenon at L2-L3. Disc space heights are otherwise preserved. There is diffuse facet arthropathy. SOFT TISSUES/RETROPERITONEUM: No evidence for epidural hematoma is identified. 1. No evidence for epidural hematoma on CT scan. 2. Suspected sacral insufficiency fracture. Xr Chest Portable    Result Date: 11/24/2020  EXAMINATION: ONE XRAY VIEW OF THE CHEST 11/24/2020 6:36 am COMPARISON: 11/23/2020 HISTORY: ORDERING SYSTEM PROVIDED HISTORY: resp failure TECHNOLOGIST PROVIDED HISTORY: Reason for exam:->resp failure FINDINGS: The heart is borderline enlarged. Dense opacity remains in the mid and upper left lung unchanged. Moderate amount of opacity present in the lower right lung. Small amount of opacity present in the left lung base favored to be due to atelectasis. No abnormal pulmonary vascular congestion. ET tube and NG tube unchanged. Persistent dense opacity in the upper left lung unchanged. Moderate opacity in the right lung base worrisome for pneumonia. Probable mild left basilar atelectasis. No abnormal vascular congestion. Xr Chest Portable    Result Date: 11/23/2020  EXAMINATION: ONE XRAY VIEW OF THE CHEST 11/23/2020 4:54 pm COMPARISON: Multiple prior chest series with the most recent dated November 23, 2020 at [address]: ETT adjusted TECHNOLOGIST PROVIDED HISTORY: Reason for exam:->ETT adjusted Reason for exam:->ETT advanced to 28 cm FINDINGS: Medical support devices: Endotracheal tube terminates in the mid to distal thoracic trachea. Enteric tube extends subdiaphragmatic and off the field of view. Lungs: Stable left upper lung complete opacification. Persistent right lower lung opacity. Slight elevation of the right hemidiaphragm is stable. Left lower lung not imaged. Cardiomediastinal silhouette and pulmonary vascularity: There appears to be atherosclerotic disease and prominence of the cardiac silhouette. Osseous and soft tissue structures: No acute findings. 1.  Unchanged left upper lung complete opacification. 2.  Elevation of the right hemidiaphragm and right lower lung opacity. 3.  Medical support devices as above. Xr Chest Portable    Result Date: 11/23/2020  EXAMINATION: ONE XRAY VIEW OF THE CHEST 11/23/2020 7:08 am COMPARISON: 11/20/2020 HISTORY: ORDERING SYSTEM PROVIDED HISTORY: resp failure TECHNOLOGIST PROVIDED HISTORY: Reason for exam:->resp failure FINDINGS: There is no interval change in the persistent dense consolidation within the left upper lobe. The left lower lobe is clear. The right lung is clear. There is minimal atelectasis seen within the right lung base. The cardiac silhouette is within normals. 1. No interval change in the dense consolidation seen within the left upper lobe. 2. Minimal right lung base atelectasis. Xr Chest Portable    Result Date: 11/22/2020  EXAMINATION: ONE XRAY VIEW OF THE CHEST 11/22/2020 6:28 am COMPARISON: 11/21/2020 HISTORY: ORDERING SYSTEM PROVIDED HISTORY: resp failure TECHNOLOGIST PROVIDED HISTORY: Reason for exam:->resp failure FINDINGS: The endotracheal tube is stable. The enteric tube tip is not well visualized but may be at the gastroesophageal junction. There is stable left upper lobe dense opacity. There are continued patchy opacities in the left lower lobe. There may be small pleural effusions. No pneumothorax is seen. No significant change in dense airspace opacity of the left upper lobe and patchy left lower lobe airspace opacities. Enteric tube tip is not well visualized due to underpenetration. The tip may be at the gastroesophageal junction. This could be confirmed with KUB centered on the upper abdomen.     Xr Chest Portable    Result Date: 11/21/2020  EXAMINATION: ONE XRAY VIEW OF THE CHEST 11/21/2020 7:37 am COMPARISON: 11/20/2020 HISTORY: ORDERING SYSTEM PROVIDED HISTORY: resp failure TECHNOLOGIST PROVIDED HISTORY: Reason for exam:->resp failure FINDINGS: Endotracheal tube and nasogastric tube are unchanged. Large opacity in the left upper lobe is unchanged. There is additional airspace disease in the left lower lobe which is stable. There is no pneumothorax. Small pleural effusions are not excluded. Unchanged large opacity/consolidation in left upper lobe. Unchanged airspace disease in left lower lobe. Xr Chest Portable    Result Date: 11/20/2020  EXAMINATION: ONE XRAY VIEW OF THE CHEST 11/20/2020 6:33 am COMPARISON: 11/19/2020 HISTORY: ORDERING SYSTEM PROVIDED HISTORY: resp failure TECHNOLOGIST PROVIDED HISTORY: Reason for exam:->resp failure FINDINGS: Endotracheal and enteric tubes remain in place. There has been no appreciable change in opacities throughout the left lung, most pronounced in the left apex. The right lung is clear. The heart size is at the upper limits of normal.  There is no discernible pneumothorax. Grossly stable opacities on the left. Xr Chest Portable    Result Date: 11/19/2020  EXAMINATION: ONE XRAY VIEW OF THE CHEST 11/19/2020 6:23 am COMPARISON: 11/18/2020 HISTORY: ORDERING SYSTEM PROVIDED HISTORY: resp failure TECHNOLOGIST PROVIDED HISTORY: Reason for exam:->resp failure FINDINGS: Evaluation is limited by the patient's body habitus and the portable technique. The right lung apex was partially excluded. There is increased dense consolidation in the left upper lobe. There is also medial left basilar airspace opacity obscuring the hemidiaphragm. The heart size is mildly enlarged. There is no discernible pneumothorax. Endotracheal and enteric tubes are again seen. Increasing multifocal left lung pneumonia. Xr Chest Portable    Result Date: 11/18/2020  EXAMINATION: ONE XRAY VIEW OF THE CHEST 11/18/2020 6:39 am COMPARISON: CT chest November 17, 2020.  HISTORY: ORDERING SYSTEM PROVIDED HISTORY: SOB TECHNOLOGIST PROVIDED HISTORY: Reason for exam:->SOB FINDINGS: The cardiac silhouette is within normal limits in size. There is masslike consolidation of the left upper lobe. There is a small to moderate left dependent pleural effusion. There is no visible pneumothorax. The pulmonary vessels are within normal limits. Left upper lobe masslike consolidation. Small to moderate left pleural effusion. Xr Chest 1 View    Result Date: 11/18/2020  EXAMINATION: ONE XRAY VIEW OF THE CHEST 11/18/2020 9:06 am COMPARISON: 11/18/2020 HISTORY: ORDERING SYSTEM PROVIDED HISTORY: intubation TECHNOLOGIST PROVIDED HISTORY: Reason for exam:->intubation Reason for exam:->portable FINDINGS: Compared to the chest radiograph from earlier today, 6:44 a.m., there is interval placement of an endotracheal tube, the tip of which is approximately 4.1 cm above the korina. Nasogastric tube is present but is suboptimally visualized due to motion artifact and relative underpenetration of the study. The abdominal radiograph demonstrates it to be well positioned, with the tip in the upper abdomen. Prominent masslike consolidative opacity in the upper left lung are grossly stable. No pneumothorax is seen. Layering left pleural effusion. 1.  The life-support lines and tubes are well positioned. 2. Masslike consolidative opacity in the left upper lung. Small left effusion. Cta Chest W Contrast    Result Date: 11/17/2020  EXAMINATION: CTA OF THE CHEST 11/17/2020 3:18 pm TECHNIQUE: CTA of the chest was performed after the administration of intravenous contrast.  Multiplanar reformatted images are provided for review. MIP images are provided for review. Dose modulation, iterative reconstruction, and/or weight based adjustment of the mA/kV was utilized to reduce the radiation dose to as low as reasonably achievable.; CTA of the abdomen and pelvis was performed with the administration of intravenous contrast. Multiplanar reformatted images are provided for review. MIP images are provided for review.  Dose modulation, iterative opacities located in lingula suggesting pneumonia with areas of atelectasis. 3.  Stenosis and occlusion are associated with left upper lobe segmental bronchi and lingular segmental bronchi. 4.  Moderate to large left pleural effusion. 5.  Mediastinal lymphadenopathy. 6.  Stable fusiform infrarenal abdominal aortic aneurysm with endograft repair. Aneurysm measures up to 6.4 cm. No evidence of endoleak or retroperitoneal fluid. 7.  Diverticulosis. Cta Abdomen Pelvis W Contrast    Result Date: 11/17/2020  EXAMINATION: CTA OF THE CHEST 11/17/2020 3:18 pm TECHNIQUE: CTA of the chest was performed after the administration of intravenous contrast.  Multiplanar reformatted images are provided for review. MIP images are provided for review. Dose modulation, iterative reconstruction, and/or weight based adjustment of the mA/kV was utilized to reduce the radiation dose to as low as reasonably achievable.; CTA of the abdomen and pelvis was performed with the administration of intravenous contrast. Multiplanar reformatted images are provided for review. MIP images are provided for review. Dose modulation, iterative reconstruction, and/or weight based adjustment of the mA/kV was utilized to reduce the radiation dose to as low as reasonably achievable. COMPARISON: None. HISTORY: ORDERING SYSTEM PROVIDED HISTORY: aneurysm, cp, sob TECHNOLOGIST PROVIDED HISTORY: Reason for exam:->aneurysm, cp, sob FINDINGS: CTA chest: There are confluent opacities located in left upper lobe. Patchy airspace opacities located in lingula. There is moderate to large left pleural effusion. Peribronchial thickening extensor in left hilar location into the left lung base. There is subsegmental atelectasis in posterior right lower lobe with adjacent ground-glass opacities. There is pulmonary vascular congestion. The heart is mildly enlarged. There is mediastinal lymphadenopathy. Ascending thoracic aorta measures 3.7 cm in diameter. Descending thoracic aorta measures 3 cm in diameter. No evidence of thoracic aortic aneurysm or dissection. Distal descending thoracic aorta is tortuous. CTA abdomen/pelvis: There is evidence of endograft repair involving the abdominal aorta. There is redemonstration of fusiform abdominal aortic aneurysm measuring up to 6.4 cm. This finding is stable since prior. No evidence of endoleak. No retroperitoneal fluid collections. Iliac limbs of endograft appear patent. Common iliac arteries are tortuous. Common femoral arteries are patent. Numerous diverticula associated with the left colon and sigmoid colon. No evidence of acute diverticulitis. Liver, gallbladder, pancreas, and spleen are grossly unremarkable however there is motion artifact limiting this examination. There is no hydronephrosis. Cyst associated with the right kidney is stable since previous examination as well as cyst associated with the left kidney appearing stable since prior. Appendix is visualized and normal.    1.  New confluent opacities located in left upper lobe suggesting pneumonia, atelectasis, or neoplasm. 2.  Patchy airspace opacities located in lingula suggesting pneumonia with areas of atelectasis. 3.  Stenosis and occlusion are associated with left upper lobe segmental bronchi and lingular segmental bronchi. 4.  Moderate to large left pleural effusion. 5.  Mediastinal lymphadenopathy. 6.  Stable fusiform infrarenal abdominal aortic aneurysm with endograft repair. Aneurysm measures up to 6.4 cm. No evidence of endoleak or retroperitoneal fluid. 7.  Diverticulosis. Xr Abdomen For Ng/og/ne Tube Placement    Result Date: 11/22/2020  EXAMINATION: ONE SUPINE XRAY VIEW(S) OF THE ABDOMEN 11/22/2020 5:06 pm COMPARISON: November 22, 2020, 4 hours prior.  HISTORY: ORDERING SYSTEM PROVIDED HISTORY: Confirmation of course of NG/OG/NE tube and location of tip of tube TECHNOLOGIST PROVIDED HISTORY: Reason for exam:->Confirmation of course of NG/OG/NE tube and location of tip of tube Portable? ->Yes FINDINGS: Tip of the NG tube noted at the level of the diaphragm, no significant change. The right side and the inferior part of the abdomen is not included in the study. There is opacification of the visualized left upper lung. Tip of NG tube at the level of the left hemidiaphragm, no change. Xr Chest Abdomen Ng Placement    Result Date: 11/22/2020  EXAMINATION: ONE SUPINE XRAY VIEW(S) OF THE ABDOMEN 11/22/2020 12:52 pm COMPARISON: November 18. HISTORY: ORDERING SYSTEM PROVIDED HISTORY: OG placement TECHNOLOGIST PROVIDED HISTORY: Reason for exam:->OG placement FINDINGS: Nasogastric tube is present. Side hole appears to be at level of gastroesophageal junction. This tube could be advanced approximately 4 or 5 cm. Nasogastric tube is present with side hole at level of gastroesophageal junction. This tube could be advanced approximately 4 or 5 cm. Xr Chest Abdomen Ng Placement    Result Date: 11/18/2020  EXAMINATION: ONE SUPINE XRAY VIEW(S) OF THE ABDOMEN 11/18/2020 9:07 am COMPARISON: 11/18/2020, about 2 hours earlier HISTORY: ORDERING SYSTEM PROVIDED HISTORY: NGT TECHNOLOGIST PROVIDED HISTORY: Reason for exam:->NGT Reason for exam:->portable NG tube placement FINDINGS: There is an NG tube with the tip in the stomach. An ET tube is again noted in satisfactory position. There is extensive opacity in the left lung, most likely pneumonia. Increased markings also seen in the visualized portion of the right lung. The heart is enlarged. NG tube tip in the stomach. Fluoro For Surgical Procedures    Result Date: 11/17/2020  EXAMINATION: SPOT FLUOROSCOPIC IMAGES 11/17/2020 12:03 pm TECHNIQUE: Fluoroscopy was provided by the radiology department for procedure. Radiologist was not present during examination.  FLUOROSCOPY DOSE AND TYPE OR TIME AND EXPOSURES: Total dose:26.99 mGy COMPARISON: None HISTORY: ORDERING SYSTEM PROVIDED HISTORY: Pain management TECHNOLOGIST PROVIDED HISTORY: Reason for exam:->L4-5 Epidural steroid injection #1 Intraprocedural imaging. FINDINGS: 3 intraoperative fluoroscopic images were obtained during L4-5 epidural steroid injection. Intraprocedural fluoroscopic spot images as above. See separate procedure report for more information. Assessment  1. Acute hypoxic hypercapnic respiratory secondary to community-acquired pneumonia, on mechanical ventilation  2. Acute kidney injury secondary to overt diuresis, questionable underlying chronic kidney disease with evidence of proteinuria  3. Anasarca, multifactorial secondary to hypoalbuminemia, aggressive IVF, anemia  4. Hypernatremia secondary to free water deficit, loop diuretic use. Calculated free water deficit is 3.75 L  5. Hyperkalemia  6. Anemia, normocytic, oncology on board  7. Severe thrombocytopenia    Plan    1. Chest x-ray results reviewed, there is no evidence of volume overload  2. Add D5W with 30 meq of KCL at 50 ml/hr x 1 liter  4. Continue with free water flushes at 100 mL/h for persistent hypernatremia  5.   Renal function remains stable with adequate urine output               Electronically signed by Lilly Heredia MD on 12/2/2020 at 10:22 AM

## 2025-04-22 NOTE — H&P
[facility] Pain Management        [address] [facility], [address]  Dept: [phone]    Procedure History & Physical      Elle Isabel. HPI:    Patient  is here for right LBP for right L3,4,5 RFA  Labs/imaging studies reviewed   All question and concerns addressed including R/B/A associated with the procedure    Past Medical History:   Diagnosis Date    Anticoagulant long-term use     Arthritis     CAD (coronary artery disease)     Chronic back pain     Depression     Dyslipidemia     Hiatal hernia 1979    History of ST elevation myocardial infarction (STEMI)     Hyperlipidemia     Hypertension     Low back pain     Lumbar radiculopathy 8/14/2019    Obesity     MANOLO on CPAP     Presence of stent in left circumflex coronary artery     Presence of stent in right coronary artery     Smoker     Type 2 diabetes mellitus without complication (HCC)        Past Surgical History:   Procedure Laterality Date    ABDOMINAL AORTIC ANEURYSM REPAIR, ENDOVASCULAR N/A 6/28/2019    ENDOVASCULAR REPAIR ABDOMINAL AORTIC ANEURYSM performed by Morris Hogan MD at [address] [place] [address] Bilateral 3/12/2020    BILATERAL L3,L4,L5 MEDIAL BRANCH NERVE BLOCK performed by Rupa Kwan DO at [address] [place] [address] Bilateral 6/11/2020    BILATERAL L3,L4,L5 MEDIAL NERVE BRANCH BLOCK #2 performed by Rupa Kwan DO at [facility] OR    CORONARY ANGIOPLASTY WITH STENT PLACEMENT  X4    CORONARY ANGIOPLASTY WITH STENT PLACEMENT  12/30/2011    Dr. Janny Machuca 1st OM 3.0 x 20 Ion    DIAGNOSTIC CARDIAC CATH LAB PROCEDURE  3/15/2005    [facility]. Mild to moderate CAD. Normal LV.  DIAGNOSTIC CARDIAC CATH LAB PROCEDURE  1/16/2007    [facility]. Cath/PTCA/DE stent of proximal and distal RCA.  DIAGNOSTIC CARDIAC CATH LAB PROCEDURE  2/21/2007    Cath/DE stent of proximal OM1 and proximal Cx.  DIAGNOSTIC CARDIAC CATH LAB PROCEDURE  12/30/2011    ANURADHA of mid OM branch of circumflex.      ECHO COMPL W DOP COLOR FLOW  12/30/2011         HERNIA REPAIR  2/2014    laparoscopic ventral hernia repain    JOINT REPLACEMENT Left 4/1/14    TKA-ed    JOINT REPLACEMENT Right 2018    KNEE    UT OFFICE/OUTPT VISIT,PROCEDURE ONLY Right 11/26/2018    RIGHT KNEE TOTAL ARTHROPLASTY performed by Kannan Rodrigues DO at [address]       Prior to Admission medications    Medication Sig Start Date End Date Taking? Authorizing Provider   omega-3 acid ethyl esters (LOVAZA) 1 g capsule take 1 capsule twice a day 8/14/20  Yes Sage Vogel PA-C   oxyCODONE-acetaminophen (PERCOCET) 7.5-325 MG per tablet Take 1 tablet by mouth 3 times daily as needed for Pain for up to 30 days.  Intended supply: 30 days 8/14/20 9/13/20 Yes Ang Golden DO   niacin (NIASPAN) 500 MG extended release tablet TAKE 1 TABLET BY MOUTH EVERY DAY IN THE EVENING 7/6/20  Yes Sage Vogel PA-C   clopidogrel (PLAVIX) 75 MG tablet TAKE 1 TABLET BY MOUTH EVERY DAY 7/6/20  Yes Sage Vogel PA-C   sildenafil (VIAGRA) 50 MG tablet Take 1 tablet by mouth as needed for Erectile Dysfunction 7/6/20  Yes Sage Vogel PA-C   diclofenac (VOLTAREN) 75 MG EC tablet TAKE 1 TABLET BY MOUTH TWICE A DAY 5/7/20  Yes [address], DO   metFORMIN (GLUCOPHAGE) 500 MG tablet TAKE 1 TABLET BY MOUTH TWICE A DAY WITH MEALS 5/7/20  Yes Fadumo Matias DO   triamterene-hydrochlorothiazide (RNXRRGQ-35) 37.5-25 MG per tablet TAKE 1 TABLET BY MOUTH EVERY DAY 5/7/20  Yes [address], DO   acetaminophen (TYLENOL) 500 MG tablet Take 1 tablet by mouth every 6 hours as needed for Pain 5/7/20  Yes [address], DO   aspirin 325 MG EC tablet TAKE 1 TABLET BY MOUTH EVERY DAY 5/7/20  Yes Fadumo Matias DO   pioglitazone (ACTOS) 15 MG tablet TAKE 1 TABLET BY MOUTH EVERY DAY 5/7/20  Yes Fadumo Matias DO   quinapril (ACCUPRIL) 40 MG tablet Take one tab po daily 5/7/20  Yes Monika Matias, DO   loratadine (CLARITIN) 10 MG tablet TAKE 1 TABLET BY MOUTH EVERY DAY 2/21/20  Yes Sherly Griffin PA-C   atorvastatin (LIPITOR) 80 MG tablet TAKE 1 TABLET BY MOUTH AT BEDTIME 10/22/18  Yes Sherly Griffin PA-C   carvedilol (COREG) 25 MG tablet TAKE ONE TABLET BY MOUTH TWICE DAILY (WITH MEALS) 10/22/18  Yes Sherly Griffin PA-C   niacin (SLO-NIACIN) 500 MG extended release tablet take 1 tablet by mouth once daily 7/20/20   Lillian Dasilva,    Tens Unit MISC by Does not apply route 5/7/19   TRUDY Platt       Allergies   Allergen Reactions    Bee Venom Anaphylaxis       Social History     Socioeconomic History    Marital status:      Spouse name: Not on file    Number of children: Not on file    Years of education: Not on file    Highest education level: Not on file   Occupational History    Not on file   Social Needs    Financial resource strain: Not on file    Food insecurity     Worry: Not on file     Inability: Not on file    Transportation needs     Medical: Not on file     Non-medical: Not on file   Tobacco Use    Smoking status: Current Every Day Smoker     Packs/day: 0.25     Years: 39.00     Pack years: 9.75     Types: Cigarettes    Smokeless tobacco: Never Used   Substance and Sexual Activity    Alcohol use: No    Drug use: No    Sexual activity: Not on file   Lifestyle    Physical activity     Days per week: Not on file     Minutes per session: Not on file    Stress: Not on file   Relationships    Social connections     Talks on phone: Not on file     Gets together: Not on file     Attends [employer] service: Not on file     Active member of club or organization: Not on file     Attends meetings of clubs or organizations: Not on file     Relationship status: Not on file    Intimate partner violence     Fear of current or ex partner: Not on file     Emotionally abused: Not on file     Physically abused: Not on file     Forced sexual activity: Not on file   Other Topics Concern    Not on file   Social History Narrative    Not on file Family History   Problem Relation Age of Onset    Diabetes Mother     Stroke Mother     Diabetes Father     No Known Problems Sister          REVIEW OF SYSTEMS:    CONSTITUTIONAL:  negative for  fevers, chills, sweats and fatigue    RESPIRATORY:  negative for  dry cough, cough with sputum, dyspnea, wheezing and chest pain    CARDIOVASCULAR:  negative for chest pain, dyspnea, palpitations, syncope    GASTROINTESTINAL:  negative for nausea, vomiting, change in bowel habits, diarrhea, constipation and abdominal pain    MUSCULOSKELETAL: negative for muscle weakness    SKIN: negative for itching or rashes. BEHAVIOR/PSYCH:  negative for poor appetite, increased appetite, decreased sleep and poor concentration    All other systems negative      PHYSICAL EXAM:    VITALS:  BP (!) 217/119   Pulse 82   Resp 18   Ht 5' 11\" (1.803 m)   Wt (!) 320 lb (145.2 kg)   SpO2 95%   BMI 44.63 kg/m²     CONSTITUTIONAL:  awake, alert, cooperative, no apparent distress, and appears stated age    EYES: PERRLA, EOMI    LUNGS:  No increased work of breathing, no audible wheezing    CARDIOVASCULAR:  regular rate and rhythm    ABDOMEN:  Soft non tender non distended     EXTREMITIES: no signs of clubbing or cyanosis. MUSCULOSKELETAL: negative for flaccid muscle tone or spastic movements. SKIN: gross examination reveals no signs of rashes, or diaphoresis. NEURO: Cranial nerves II-XII grossly intact. No signs of agitated mood.        Assessment/Plan:    Right LB pain for right L3,4,5 RFA cardiac stent X3/yes(specify)

## (undated) DEVICE — CHECK-FLO PERFORMER INTRODUCER: Brand: PERFORMER

## (undated) DEVICE — GLOVE SURG SZ 75 L12IN FNGR THK94MIL TRNSLUC YEL LTX

## (undated) DEVICE — 40436 HEAD REST OCULAR: Brand: 40436 HEAD REST OCULAR

## (undated) DEVICE — SYRINGE MED 50ML LUERLOCK TIP

## (undated) DEVICE — GOWN,SIRUS,FABRNF,L,20/CS: Brand: MEDLINE

## (undated) DEVICE — SHEARS ENDOSCP L9CM CRV HARM FOCS +

## (undated) DEVICE — STRYKER PERFORMANCE SERIES SAGITTAL BLADE: Brand: STRYKER PERFORMANCE SERIES

## (undated) DEVICE — BENTSON WIRE GUIDE 20CM DISTAL FLEXIBILITY WITH SOFTENED TIP: Brand: BENTSON

## (undated) DEVICE — DEVICE SUTURING CLOSURE PROGLIDE

## (undated) DEVICE — APPLICATOR PVP IOD SWABSTK MED NS LF

## (undated) DEVICE — BANDAGE COMPR W6INXL12FT SMOOTH FOR LIMB EXSANG ESMARCH

## (undated) DEVICE — ADAPTER TBNG DIA15MM SWVL FBROPT BRONCHSCP TERM 2 AXIS PEEP

## (undated) DEVICE — SNAP KOVER: Brand: UNBRANDED

## (undated) DEVICE — SOLUTION SURG PREP POV IOD 7.5% 4 OZ

## (undated) DEVICE — PEEL-AWAY HOOD: Brand: FLYTE, SURGICOOL

## (undated) DEVICE — RADIFOCUS GLIDEWIRE ADVANTAGE GUIDEWIRE: Brand: GLIDEWIRE ADVANTAGE

## (undated) DEVICE — DRESSING FOAM W22XL25CM FILVE LAYR FOAM DP DEF SAFETAC

## (undated) DEVICE — 3M™ RED DOT™ MONITORING ELECTRODE WITH FOAM TAPE AND STICKY GEL 2560, 50/BAG, 20/CASE, 72/PLT: Brand: RED DOT™

## (undated) DEVICE — Z INACTIVE USE 2660664 SOLUTION IRRIG 3000ML 0.9% SOD CHL USP UROMATIC PLAS CONT

## (undated) DEVICE — 6 ML SYRINGE LUER-LOCK TIP: Brand: MONOJECT

## (undated) DEVICE — GLOVE ORANGE PI 8   MSG9080

## (undated) DEVICE — SYRINGE 20ML LL S/C 50

## (undated) DEVICE — 72" ARTERIAL PRESSURE TUBING: Brand: ICU MEDICAL

## (undated) DEVICE — SET SURG INSTR ART III

## (undated) DEVICE — CHLORAPREP 26ML ORANGE

## (undated) DEVICE — FLEXOR, CHECK-FLO, INTRODUCER SET: Brand: FLEXOR

## (undated) DEVICE — BANDAGE ADH W1XL3IN NAT FAB WVN FLX DURABLE N ADH PD SEAL

## (undated) DEVICE — INTENDED FOR TISSUE SEPARATION, AND OTHER PROCEDURES THAT REQUIRE A SHARP SURGICAL BLADE TO PUNCTURE OR CUT.: Brand: BARD-PARKER ® STAINLESS STEEL BLADES

## (undated) DEVICE — Z DISCONTINUED APPLICATOR SURG PREP 0.35OZ 2% CHG 70% ISO ALC W/ HI LT

## (undated) DEVICE — 34" SINGLE PATIENT USE HOVERMATT BREATHABLE: Brand: SINGLE PATIENT USE HOVERMATT

## (undated) DEVICE — SET STRYKER GLUE GUN

## (undated) DEVICE — COVER,TABLE,60X90,STERILE: Brand: MEDLINE

## (undated) DEVICE — Z DISCONTINUED PER MEDLINE USE 2741944 DRESSING AQUACEL 12 IN SURG W9XL30CM SIL CVR WTRPRF VIR BACT BARR ANTIMIC

## (undated) DEVICE — 3M™ STERI-DRAPE™ INCISE DRAPE 1050 (60CM X 45CM): Brand: STERI-DRAPE™

## (undated) DEVICE — SPONGE,DISSECTOR,ROUND CHERRY,XR,ST,5/PK: Brand: MEDLINE

## (undated) DEVICE — RACK TUBE CURETTE

## (undated) DEVICE — CODA, LP BALLOON CATHETER: Brand: CODA

## (undated) DEVICE — PENCIL,CAUTERY,ROCKER,PTFE,15'CORD: Brand: MEDLINE INDUSTRIES, INC.

## (undated) DEVICE — FIXED CORE WIRE GUIDE SAFE-T-J, CURVED: Brand: COOK

## (undated) DEVICE — MARKER,SKIN,WI/RULER AND LABELS: Brand: MEDLINE

## (undated) DEVICE — SET TRIATHALON KNEE SZ 3-6 FEM/TIB TRIAL

## (undated) DEVICE — SODIUM CHL 09% SOL EXCEL

## (undated) DEVICE — DRAPE 24X23IN RADIOLOGY CASS ADHES STRIP

## (undated) DEVICE — DOUBLE BASIN SET: Brand: MEDLINE INDUSTRIES, INC.

## (undated) DEVICE — MICROPUNCTURE INTRODUCER SET SILHOUETTE TRANSITIONLESS WITH STAINLESS STEEL WIRE GUIDE: Brand: MICROPUNCTURE

## (undated) DEVICE — MASK RESP UNIV N95 4 PANEL HD STRP INDIVIDUALLY WRP LF

## (undated) DEVICE — 2108 SERIES SAGITTAL BLADE FAN, OFFSET  (34.5 X 0.8 X 64.0MM)

## (undated) DEVICE — SET TRIATHALON KNEE SZ 3-6 FEM/TIB PREP

## (undated) DEVICE — STANDARD HYPODERMIC NEEDLE,POLYPROPYLENE HUB: Brand: MONOJECT

## (undated) DEVICE — DRAPE, MULTI FENESTRATED, HYBRID OR, STE: Brand: MEDLINE

## (undated) DEVICE — Z DISCONTINUED USE 2275686 GLOVE SURG SZ 8 L12IN FNGR THK13MIL WHT ISOLEX POLYISOPRENE

## (undated) DEVICE — HOLDER TRACH TB W1IN FIT UPTO 19.5IN NK 2 PC ADJ BLU

## (undated) DEVICE — SPONGE LAP W18XL18IN WHT COT 4 PLY FLD STRUNG RADPQ DISP ST

## (undated) DEVICE — SHEET,DRAPE,53X77,STERILE: Brand: MEDLINE

## (undated) DEVICE — SURGICAL PROCEDURE PACK VASC MAJ CUST

## (undated) DEVICE — LABEL MED 4 IN SURG PANEL W/ PEN STRL

## (undated) DEVICE — DRIP REDUCTION MANIFOLD

## (undated) DEVICE — 1.5L THIN WALL CAN: Brand: CRD

## (undated) DEVICE — Device

## (undated) DEVICE — SET TRIATH PATELLA PREP TRIAL TRAY

## (undated) DEVICE — TOTAL TRAY, 16FR 10ML SIL FOLEY, URN: Brand: MEDLINE

## (undated) DEVICE — 4-PORT MANIFOLD: Brand: NEPTUNE 2

## (undated) DEVICE — GAUZE,SPONGE,4"X4",12PLY,STERILE,LF,2'S: Brand: MEDLINE

## (undated) DEVICE — NEEDLE HYPO 25GA L1.5IN BLU POLYPR HUB S STL REG BVL STR

## (undated) DEVICE — 12 ML SYRINGE,LUER-LOCK TIP: Brand: MONOJECT

## (undated) DEVICE — MEDIA CONTRAST SYRINGE 125ML PREFLL OPTIRAY 320 PWR INJ

## (undated) DEVICE — SYRINGE, LUER LOCK, 5ML: Brand: MEDLINE

## (undated) DEVICE — 3M™ STERI-DRAPE™ U-DRAPE 1015: Brand: STERI-DRAPE™

## (undated) DEVICE — SOLUTION IV IRRIG WATER 1000ML POUR BRL 2F7114

## (undated) DEVICE — RETRACTOR WEITLANER BLUNT

## (undated) DEVICE — PADDING CAST W6INXL4YD COT LO LINTING WYTEX

## (undated) DEVICE — LARGE BORE STOPCOCK WITH ROTATING MALE LUER LOCK

## (undated) DEVICE — PATIENT RETURN ELECTRODE, SINGLE-USE, CONTACT QUALITY MONITORING, ADULT, WITH 9FT CORD, FOR PATIENTS WEIGING OVER 33LBS. (15KG): Brand: MEGADYNE

## (undated) DEVICE — CONTROL SYRINGE LUER-LOCK TIP: Brand: MONOJECT

## (undated) DEVICE — Z DISCONTINUED NO SUB IDED NEEDLE BX REPL AUTOCLV FOR NA2C1

## (undated) DEVICE — SET INSTR TRACH

## (undated) DEVICE — 3M™ BAIR HUGGER® UNDERBODY BLANKET, FULL ACCESS, 10 PER CASE 63500: Brand: BAIR HUGGER™

## (undated) DEVICE — SYRINGE MED 20ML STD CLR PLAS LUERLOCK TIP N CTRL DISP

## (undated) DEVICE — SURGICAL PROCEDURE PACK EENT CUST

## (undated) DEVICE — APPLICATOR PREP 26ML 0.7% IOD POVACRYLEX 74% ISO ALC ST

## (undated) DEVICE — ZINACTIVE USE 2539607 PIN FIX L3.5IN DIA1/8IN LNG HDLSS FOR MIS KNEE JT REPL

## (undated) DEVICE — BANDAGE COMPR W4INXL10YD WHITE/BEIGE E MTRX HK LOOP CLSR

## (undated) DEVICE — GLOVE SURG SZ 7 L12IN FNGR THK94MIL TRNSLUC YEL LTX HYDRGEL

## (undated) DEVICE — YANKAUER,OPEN TIP,W/O VENT,STERILE: Brand: MEDLINE INDUSTRIES, INC.

## (undated) DEVICE — SET TRIATHALON MISC INSTR TRAY

## (undated) DEVICE — SKIN AFFIX SURG ADHESIVE 72/CS 0.55ML: Brand: MEDLINE

## (undated) DEVICE — ANG DR W/PANELS: Brand: CONVERTORS

## (undated) DEVICE — AGENT HEMSTAT W2XL3IN OXIDIZED REGENERATED CELOS ABSRB

## (undated) DEVICE — DRILL SYSTEM 7

## (undated) DEVICE — ELECTRODE PT RET AD L9FT HI MOIST COND ADH HYDRGEL CORDED

## (undated) DEVICE — GOWN,BREATHABLE SLV,AURORA,XLG,STRL: Brand: MEDLINE

## (undated) DEVICE — NEEDLE HYPO 18GA L1.5IN PNK POLYPR HUB S STL THN WALL FILL

## (undated) DEVICE — TUBING ANGIO L48IN POLYUR HI PRSS 1200PSI AIRLESS ROT ADPT

## (undated) DEVICE — SET ORTHO STD STORTSTD2

## (undated) DEVICE — GOWN,AURORA,BRTHSLV,2XL,18/CS: Brand: MEDLINE

## (undated) DEVICE — CLEANER,CAUTERY TIP,2X2",STERILE: Brand: MEDLINE

## (undated) DEVICE — MAGNETIC INSTR DRAPE 20X16: Brand: MEDLINE INDUSTRIES, INC.

## (undated) DEVICE — TUBING SUCT 12FR MAL ALUM SHFT FN CAP VENT UNIV CONN W/ OBT

## (undated) DEVICE — BLADE CLIPPER GEN PURP NS

## (undated) DEVICE — SIGNATURE SET

## (undated) DEVICE — NON-DEHP CATHETER EXTENSION SET, MALE LUER LOCK ADAPTER

## (undated) DEVICE — SPONGE,DRAIN,NONWVN,4"X4",6PLY,STRL,LF: Brand: MEDLINE

## (undated) DEVICE — TOTAL KNEE PK

## (undated) DEVICE — GAUZE,SPONGE,4"X4",16PLY,XRAY,STRL,LF: Brand: MEDLINE

## (undated) DEVICE — CEMENT MIXING SYSTEM WITH FEMORAL BREAKWAY NOZZLE: Brand: REVOLUTION

## (undated) DEVICE — TOWEL,OR,DSP,ST,BLUE,STD,6/PK,12PK/CS: Brand: MEDLINE

## (undated) DEVICE — ZIMMER® STERILE DISPOSABLE TOURNIQUET CUFF WITH PROTECTIVE SLEEVE AND PLC, DUAL PORT, SINGLE BLADDER, 34 IN. (86 CM)

## (undated) DEVICE — SURGICAL PROCEDURE PACK BASIC

## (undated) DEVICE — CATHETER VASC DIAG MOD PERIPH W/O HYDRPHLC COAT AD

## (undated) DEVICE — Z INACTIVE USE 2641837 CLIP LIG M BLU TI HRT SHP WIRE HORZ 600 PER BX

## (undated) DEVICE — GOWN,SIRUS,FABRNF,XL,20/CS: Brand: MEDLINE

## (undated) DEVICE — MEDIA CONTRAST RX ISOVUE-300 61% 30ML VIALS

## (undated) DEVICE — LOOP VES W25MM THK1MM MAXI RED SIL FLD REPELLENT 100 PER

## (undated) DEVICE — AGENT HEMSTAT W4XL8IN OXIDIZED REGENERATED CELOS ABSRB

## (undated) DEVICE — PACK PROCEDURE SURG GEN CUST

## (undated) DEVICE — GAUZE,SPONGE,AVANT,4"X4",4PLY,STRL,10/TR: Brand: MEDLINE

## (undated) DEVICE — Z CONVERTED USE 2275207 CLOTH PREP W7.5XL7.5IN 2% CHG SKIN ALC AND RNS FREE

## (undated) DEVICE — SET INSTR ART 1

## (undated) DEVICE — TRAY,SKIN SCRUB,DRY,W/GAUZE: Brand: MEDLINE

## (undated) DEVICE — BASIN NEURO

## (undated) DEVICE — SET LAMBOTTE

## (undated) DEVICE — HANDPIECE SET WITH BONE CLEANING TIP AND SUCTION TUBE: Brand: INTERPULSE

## (undated) DEVICE — BEACON TIP TORCON NB ADVANTAGE CATHETER: Brand: BEACON TIP TORCON NB

## (undated) DEVICE — CATHETER ANGIO AD 5FR L65CM DIA0.046IN GWIRE 0.035IN BERN

## (undated) DEVICE — SOLUTION IV IRRIG 500ML 0.9% SODIUM CHL 2F7123

## (undated) DEVICE — GLOVE SURG SZ 65 THK91MIL LTX FREE SYN POLYISOPRENE

## (undated) DEVICE — 18GA (1.3MM OD: 1.0MM ID) X 7CM INTRODUCER18GA X 7CM NEEDLENEEDLE: Brand: INTRODUCER NEEDLEINTRODUCER NEEDLE

## (undated) DEVICE — SET ORTHO STD STORTSTD1

## (undated) DEVICE — SOLUTION IV IRRIG POUR BRL 0.9% SODIUM CHL 2F7124

## (undated) DEVICE — SURGICAL PROCEDURE PACK BRONCH

## (undated) DEVICE — GLOVE ORANGE PI 7 1/2   MSG9075

## (undated) DEVICE — Device: Brand: PORTEX

## (undated) DEVICE — 3M™ IOBAN™ 2 ANTIMICROBIAL INCISE DRAPE 6650EZ: Brand: IOBAN™ 2

## (undated) DEVICE — GUIDEWIRE URO L145CM DIA0.035IN TIP L7CM S STL PTFE BENT